# Patient Record
Sex: MALE | Race: WHITE | NOT HISPANIC OR LATINO | URBAN - METROPOLITAN AREA
[De-identification: names, ages, dates, MRNs, and addresses within clinical notes are randomized per-mention and may not be internally consistent; named-entity substitution may affect disease eponyms.]

---

## 2019-07-30 ENCOUNTER — EMERGENCY (EMERGENCY)
Facility: HOSPITAL | Age: 69
LOS: 0 days | Discharge: HOME | End: 2019-07-31
Attending: STUDENT IN AN ORGANIZED HEALTH CARE EDUCATION/TRAINING PROGRAM
Payer: COMMERCIAL

## 2019-07-30 VITALS
OXYGEN SATURATION: 100 % | WEIGHT: 175.05 LBS | RESPIRATION RATE: 17 BRPM | SYSTOLIC BLOOD PRESSURE: 127 MMHG | HEART RATE: 76 BPM | TEMPERATURE: 98 F | DIASTOLIC BLOOD PRESSURE: 73 MMHG | HEIGHT: 74 IN

## 2019-07-30 DIAGNOSIS — S80.212A ABRASION, LEFT KNEE, INITIAL ENCOUNTER: ICD-10-CM

## 2019-07-30 DIAGNOSIS — E16.2 HYPOGLYCEMIA, UNSPECIFIED: ICD-10-CM

## 2019-07-30 DIAGNOSIS — E11.649 TYPE 2 DIABETES MELLITUS WITH HYPOGLYCEMIA WITHOUT COMA: ICD-10-CM

## 2019-07-30 DIAGNOSIS — R42 DIZZINESS AND GIDDINESS: ICD-10-CM

## 2019-07-30 LAB
ALBUMIN SERPL ELPH-MCNC: 2.3 G/DL — LOW (ref 3.3–5)
ALP SERPL-CCNC: 58 U/L — SIGNIFICANT CHANGE UP (ref 40–120)
ALT FLD-CCNC: 27 U/L — SIGNIFICANT CHANGE UP (ref 12–78)
ANION GAP SERPL CALC-SCNC: 13 MMOL/L — SIGNIFICANT CHANGE UP (ref 5–17)
APTT BLD: 33.9 SEC — SIGNIFICANT CHANGE UP (ref 28.5–37)
AST SERPL-CCNC: 26 U/L — SIGNIFICANT CHANGE UP (ref 15–37)
BASOPHILS # BLD AUTO: 0.03 K/UL — SIGNIFICANT CHANGE UP (ref 0–0.2)
BASOPHILS NFR BLD AUTO: 0.6 % — SIGNIFICANT CHANGE UP (ref 0–2)
BILIRUB SERPL-MCNC: 0.2 MG/DL — SIGNIFICANT CHANGE UP (ref 0.2–1.2)
BUN SERPL-MCNC: 50 MG/DL — HIGH (ref 7–23)
CALCIUM SERPL-MCNC: 6.3 MG/DL — CRITICAL LOW (ref 8.5–10.1)
CHLORIDE SERPL-SCNC: 115 MMOL/L — HIGH (ref 96–108)
CK MB BLD-MCNC: 1.9 % — SIGNIFICANT CHANGE UP (ref 0–3.5)
CK MB CFR SERPL CALC: 3.8 NG/ML — HIGH (ref 0.5–3.6)
CK SERPL-CCNC: 202 U/L — SIGNIFICANT CHANGE UP (ref 26–308)
CO2 SERPL-SCNC: 13 MMOL/L — LOW (ref 22–31)
CREAT SERPL-MCNC: 5.73 MG/DL — HIGH (ref 0.5–1.3)
EOSINOPHIL # BLD AUTO: 0.04 K/UL — SIGNIFICANT CHANGE UP (ref 0–0.5)
EOSINOPHIL NFR BLD AUTO: 0.8 % — SIGNIFICANT CHANGE UP (ref 0–6)
GLUCOSE BLDC GLUCOMTR-MCNC: 115 MG/DL — HIGH (ref 70–99)
GLUCOSE BLDC GLUCOMTR-MCNC: 136 MG/DL — HIGH (ref 70–99)
GLUCOSE BLDC GLUCOMTR-MCNC: 141 MG/DL — HIGH (ref 70–99)
GLUCOSE SERPL-MCNC: 104 MG/DL — HIGH (ref 70–99)
HCT VFR BLD CALC: 22.3 % — LOW (ref 39–50)
HGB BLD-MCNC: 7.1 G/DL — LOW (ref 13–17)
IMM GRANULOCYTES NFR BLD AUTO: 0.4 % — SIGNIFICANT CHANGE UP (ref 0–1.5)
INR BLD: 1.18 RATIO — HIGH (ref 0.88–1.16)
LYMPHOCYTES # BLD AUTO: 0.53 K/UL — LOW (ref 1–3.3)
LYMPHOCYTES # BLD AUTO: 10.3 % — LOW (ref 13–44)
MCHC RBC-ENTMCNC: 28.6 PG — SIGNIFICANT CHANGE UP (ref 27–34)
MCHC RBC-ENTMCNC: 31.8 GM/DL — LOW (ref 32–36)
MCV RBC AUTO: 89.9 FL — SIGNIFICANT CHANGE UP (ref 80–100)
MONOCYTES # BLD AUTO: 0.48 K/UL — SIGNIFICANT CHANGE UP (ref 0–0.9)
MONOCYTES NFR BLD AUTO: 9.3 % — SIGNIFICANT CHANGE UP (ref 2–14)
NEUTROPHILS # BLD AUTO: 4.06 K/UL — SIGNIFICANT CHANGE UP (ref 1.8–7.4)
NEUTROPHILS NFR BLD AUTO: 78.6 % — HIGH (ref 43–77)
NRBC # BLD: 0 /100 WBCS — SIGNIFICANT CHANGE UP (ref 0–0)
PLATELET # BLD AUTO: 208 K/UL — SIGNIFICANT CHANGE UP (ref 150–400)
POTASSIUM SERPL-MCNC: 3.5 MMOL/L — SIGNIFICANT CHANGE UP (ref 3.5–5.3)
POTASSIUM SERPL-SCNC: 3.5 MMOL/L — SIGNIFICANT CHANGE UP (ref 3.5–5.3)
PROT SERPL-MCNC: 6.1 GM/DL — SIGNIFICANT CHANGE UP (ref 6–8.3)
PROTHROM AB SERPL-ACNC: 13.3 SEC — HIGH (ref 10–12.9)
RBC # BLD: 2.48 M/UL — LOW (ref 4.2–5.8)
RBC # FLD: 14.3 % — SIGNIFICANT CHANGE UP (ref 10.3–14.5)
SODIUM SERPL-SCNC: 141 MMOL/L — SIGNIFICANT CHANGE UP (ref 135–145)
TROPONIN I SERPL-MCNC: 0.02 NG/ML — SIGNIFICANT CHANGE UP (ref 0.01–0.04)
WBC # BLD: 5.16 K/UL — SIGNIFICANT CHANGE UP (ref 3.8–10.5)
WBC # FLD AUTO: 5.16 K/UL — SIGNIFICANT CHANGE UP (ref 3.8–10.5)

## 2019-07-30 PROCEDURE — 99284 EMERGENCY DEPT VISIT MOD MDM: CPT

## 2019-07-30 PROCEDURE — 93010 ELECTROCARDIOGRAM REPORT: CPT

## 2019-07-30 RX ORDER — SODIUM CHLORIDE 9 MG/ML
1000 INJECTION INTRAMUSCULAR; INTRAVENOUS; SUBCUTANEOUS
Refills: 0 | Status: DISCONTINUED | OUTPATIENT
Start: 2019-07-30 | End: 2019-07-31

## 2019-07-30 RX ORDER — TETANUS TOXOID, REDUCED DIPHTHERIA TOXOID AND ACELLULAR PERTUSSIS VACCINE, ADSORBED 5; 2.5; 8; 8; 2.5 [IU]/.5ML; [IU]/.5ML; UG/.5ML; UG/.5ML; UG/.5ML
0.5 SUSPENSION INTRAMUSCULAR ONCE
Refills: 0 | Status: COMPLETED | OUTPATIENT
Start: 2019-07-30 | End: 2019-07-30

## 2019-07-30 RX ADMIN — TETANUS TOXOID, REDUCED DIPHTHERIA TOXOID AND ACELLULAR PERTUSSIS VACCINE, ADSORBED 0.5 MILLILITER(S): 5; 2.5; 8; 8; 2.5 SUSPENSION INTRAMUSCULAR at 23:12

## 2019-07-30 RX ADMIN — SODIUM CHLORIDE 125 MILLILITER(S): 9 INJECTION INTRAMUSCULAR; INTRAVENOUS; SUBCUTANEOUS at 23:24

## 2019-07-30 NOTE — ED ADULT NURSE NOTE - OBJECTIVE STATEMENT
received patient in bed 15. fs 35 pt ate couple peanuts butter sandwiches, alert x 4, left wrist H/L, pt fall 2 hours ago. AOX4. speech clear.  at this time. Bilat Knee bruising noted. Denies any pain, dizziness or HA. Awaiting MD gill

## 2019-07-30 NOTE — ED ADULT NURSE NOTE - NSIMPLEMENTINTERV_GEN_ALL_ED
Implemented All Fall with Harm Risk Interventions:  [place] to call system. Call bell, personal items and telephone within reach. Instruct patient to call for assistance. Room bathroom lighting operational. Non-slip footwear when patient is off stretcher. Physically safe environment: no spills, clutter or unnecessary equipment. Stretcher in lowest position, wheels locked, appropriate side rails in place. Provide visual cue, wrist band, yellow gown, etc. Monitor gait and stability. Monitor for mental status changes and reorient to person, place, and time. Review medications for side effects contributing to fall risk. Reinforce activity limits and safety measures with patient and family. Provide visual clues: red socks.

## 2019-07-31 VITALS
OXYGEN SATURATION: 100 % | DIASTOLIC BLOOD PRESSURE: 75 MMHG | SYSTOLIC BLOOD PRESSURE: 150 MMHG | HEART RATE: 83 BPM | TEMPERATURE: 97 F | RESPIRATION RATE: 18 BRPM

## 2019-07-31 LAB
GLUCOSE BLDC GLUCOMTR-MCNC: 118 MG/DL — HIGH (ref 70–99)
GLUCOSE BLDC GLUCOMTR-MCNC: 96 MG/DL — SIGNIFICANT CHANGE UP (ref 70–99)

## 2019-07-31 PROCEDURE — 70450 CT HEAD/BRAIN W/O DYE: CPT | Mod: 26

## 2019-07-31 RX ADMIN — SODIUM CHLORIDE 1000 MILLILITER(S): 9 INJECTION INTRAMUSCULAR; INTRAVENOUS; SUBCUTANEOUS at 01:30

## 2019-07-31 NOTE — ED PROVIDER NOTE - OBJECTIVE STATEMENT
presented with hypoglycemia  took insulin this morning  ate at 1130, gave himselft insulin this morning  by 2pm felt dizzy  fell, left knee abrasion  unknown head injury 68 year old male presents with a hypoglycemic episode, pt states that he took his insulin this morning after eating breakfast, by 2pm he started to feel dizzy and unsteady, and fell, he was able to crawl to the phone and call his nephew + left knee abrasion +

## 2022-09-26 ENCOUNTER — INPATIENT (INPATIENT)
Facility: HOSPITAL | Age: 72
LOS: 17 days | Discharge: NURSING, REHAB OR LONG-TERM CARE | DRG: 299 | End: 2022-10-14
Attending: INTERNAL MEDICINE | Admitting: INTERNAL MEDICINE
Payer: MEDICARE

## 2022-09-26 VITALS
RESPIRATION RATE: 16 BRPM | WEIGHT: 160.06 LBS | OXYGEN SATURATION: 100 % | SYSTOLIC BLOOD PRESSURE: 138 MMHG | HEART RATE: 80 BPM | HEIGHT: 74 IN | DIASTOLIC BLOOD PRESSURE: 72 MMHG | TEMPERATURE: 98 F

## 2022-09-26 DIAGNOSIS — Z29.9 ENCOUNTER FOR PROPHYLACTIC MEASURES, UNSPECIFIED: ICD-10-CM

## 2022-09-26 DIAGNOSIS — E11.621 TYPE 2 DIABETES MELLITUS WITH FOOT ULCER: ICD-10-CM

## 2022-09-26 DIAGNOSIS — N18.6 END STAGE RENAL DISEASE: ICD-10-CM

## 2022-09-26 DIAGNOSIS — E11.9 TYPE 2 DIABETES MELLITUS WITHOUT COMPLICATIONS: ICD-10-CM

## 2022-09-26 DIAGNOSIS — I77.1 STRICTURE OF ARTERY: ICD-10-CM

## 2022-09-26 DIAGNOSIS — I96 GANGRENE, NOT ELSEWHERE CLASSIFIED: ICD-10-CM

## 2022-09-26 LAB
A1C WITH ESTIMATED AVERAGE GLUCOSE RESULT: 6.2 % — HIGH (ref 4–5.6)
ALBUMIN SERPL ELPH-MCNC: 3.1 G/DL — LOW (ref 3.5–5)
ALP SERPL-CCNC: 61 U/L — SIGNIFICANT CHANGE UP (ref 40–120)
ALT FLD-CCNC: 16 U/L DA — SIGNIFICANT CHANGE UP (ref 10–60)
ANION GAP SERPL CALC-SCNC: 10 MMOL/L — SIGNIFICANT CHANGE UP (ref 5–17)
APPEARANCE UR: CLEAR — SIGNIFICANT CHANGE UP
AST SERPL-CCNC: 22 U/L — SIGNIFICANT CHANGE UP (ref 10–40)
BASOPHILS # BLD AUTO: 0.05 K/UL — SIGNIFICANT CHANGE UP (ref 0–0.2)
BASOPHILS NFR BLD AUTO: 0.7 % — SIGNIFICANT CHANGE UP (ref 0–2)
BILIRUB SERPL-MCNC: 0.2 MG/DL — SIGNIFICANT CHANGE UP (ref 0.2–1.2)
BILIRUB UR-MCNC: NEGATIVE — SIGNIFICANT CHANGE UP
BLD GP AB SCN SERPL QL: SIGNIFICANT CHANGE UP
BUN SERPL-MCNC: 84 MG/DL — HIGH (ref 7–18)
CALCIUM SERPL-MCNC: 9.2 MG/DL — SIGNIFICANT CHANGE UP (ref 8.4–10.5)
CHLORIDE SERPL-SCNC: 104 MMOL/L — SIGNIFICANT CHANGE UP (ref 96–108)
CO2 SERPL-SCNC: 20 MMOL/L — LOW (ref 22–31)
COLOR SPEC: YELLOW — SIGNIFICANT CHANGE UP
CREAT SERPL-MCNC: 5.73 MG/DL — HIGH (ref 0.5–1.3)
CRP SERPL-MCNC: 9 MG/L — HIGH
DIFF PNL FLD: ABNORMAL
EGFR: 10 ML/MIN/1.73M2 — LOW
EOSINOPHIL # BLD AUTO: 0.2 K/UL — SIGNIFICANT CHANGE UP (ref 0–0.5)
EOSINOPHIL NFR BLD AUTO: 3 % — SIGNIFICANT CHANGE UP (ref 0–6)
ERYTHROCYTE [SEDIMENTATION RATE] IN BLOOD: 19 MM/HR — SIGNIFICANT CHANGE UP (ref 0–20)
ESTIMATED AVERAGE GLUCOSE: 131 MG/DL — HIGH (ref 68–114)
FLUAV AG NPH QL: SIGNIFICANT CHANGE UP
FLUBV AG NPH QL: SIGNIFICANT CHANGE UP
GLUCOSE BLDC GLUCOMTR-MCNC: 171 MG/DL — HIGH (ref 70–99)
GLUCOSE SERPL-MCNC: 200 MG/DL — HIGH (ref 70–99)
GLUCOSE UR QL: 50 MG/DL
HCT VFR BLD CALC: 38 % — LOW (ref 39–50)
HCT VFR BLD CALC: 39.1 % — SIGNIFICANT CHANGE UP (ref 39–50)
HGB BLD-MCNC: 12.3 G/DL — LOW (ref 13–17)
HGB BLD-MCNC: 12.5 G/DL — LOW (ref 13–17)
IMM GRANULOCYTES NFR BLD AUTO: 0.3 % — SIGNIFICANT CHANGE UP (ref 0–0.9)
KETONES UR-MCNC: NEGATIVE — SIGNIFICANT CHANGE UP
LACTATE SERPL-SCNC: 0.9 MMOL/L — SIGNIFICANT CHANGE UP (ref 0.7–2)
LEUKOCYTE ESTERASE UR-ACNC: ABNORMAL
LYMPHOCYTES # BLD AUTO: 0.82 K/UL — LOW (ref 1–3.3)
LYMPHOCYTES # BLD AUTO: 12.1 % — LOW (ref 13–44)
MCHC RBC-ENTMCNC: 30.3 PG — SIGNIFICANT CHANGE UP (ref 27–34)
MCHC RBC-ENTMCNC: 30.4 PG — SIGNIFICANT CHANGE UP (ref 27–34)
MCHC RBC-ENTMCNC: 32 GM/DL — SIGNIFICANT CHANGE UP (ref 32–36)
MCHC RBC-ENTMCNC: 32.4 GM/DL — SIGNIFICANT CHANGE UP (ref 32–36)
MCV RBC AUTO: 93.6 FL — SIGNIFICANT CHANGE UP (ref 80–100)
MCV RBC AUTO: 95.1 FL — SIGNIFICANT CHANGE UP (ref 80–100)
MONOCYTES # BLD AUTO: 0.67 K/UL — SIGNIFICANT CHANGE UP (ref 0–0.9)
MONOCYTES NFR BLD AUTO: 9.9 % — SIGNIFICANT CHANGE UP (ref 2–14)
NEUTROPHILS # BLD AUTO: 5 K/UL — SIGNIFICANT CHANGE UP (ref 1.8–7.4)
NEUTROPHILS NFR BLD AUTO: 74 % — SIGNIFICANT CHANGE UP (ref 43–77)
NITRITE UR-MCNC: NEGATIVE — SIGNIFICANT CHANGE UP
NRBC # BLD: 0 /100 WBCS — SIGNIFICANT CHANGE UP (ref 0–0)
NRBC # BLD: 0 /100 WBCS — SIGNIFICANT CHANGE UP (ref 0–0)
PH UR: 6 — SIGNIFICANT CHANGE UP (ref 5–8)
PLATELET # BLD AUTO: 226 K/UL — SIGNIFICANT CHANGE UP (ref 150–400)
PLATELET # BLD AUTO: 245 K/UL — SIGNIFICANT CHANGE UP (ref 150–400)
POTASSIUM SERPL-MCNC: 4.4 MMOL/L — SIGNIFICANT CHANGE UP (ref 3.5–5.3)
POTASSIUM SERPL-SCNC: 4.4 MMOL/L — SIGNIFICANT CHANGE UP (ref 3.5–5.3)
PROT SERPL-MCNC: 7.1 G/DL — SIGNIFICANT CHANGE UP (ref 6–8.3)
PROT UR-MCNC: 30 MG/DL
RBC # BLD: 4.06 M/UL — LOW (ref 4.2–5.8)
RBC # BLD: 4.11 M/UL — LOW (ref 4.2–5.8)
RBC # FLD: 14.4 % — SIGNIFICANT CHANGE UP (ref 10.3–14.5)
RBC # FLD: 14.4 % — SIGNIFICANT CHANGE UP (ref 10.3–14.5)
SARS-COV-2 RNA SPEC QL NAA+PROBE: SIGNIFICANT CHANGE UP
SODIUM SERPL-SCNC: 134 MMOL/L — LOW (ref 135–145)
SP GR SPEC: 1.01 — SIGNIFICANT CHANGE UP (ref 1.01–1.02)
UROBILINOGEN FLD QL: NEGATIVE — SIGNIFICANT CHANGE UP
WBC # BLD: 6.76 K/UL — SIGNIFICANT CHANGE UP (ref 3.8–10.5)
WBC # BLD: 6.76 K/UL — SIGNIFICANT CHANGE UP (ref 3.8–10.5)
WBC # FLD AUTO: 6.76 K/UL — SIGNIFICANT CHANGE UP (ref 3.8–10.5)
WBC # FLD AUTO: 6.76 K/UL — SIGNIFICANT CHANGE UP (ref 3.8–10.5)

## 2022-09-26 PROCEDURE — 99284 EMERGENCY DEPT VISIT MOD MDM: CPT

## 2022-09-26 PROCEDURE — 73620 X-RAY EXAM OF FOOT: CPT | Mod: 26,RT

## 2022-09-26 PROCEDURE — 93923 UPR/LXTR ART STDY 3+ LVLS: CPT | Mod: 26

## 2022-09-26 PROCEDURE — 99221 1ST HOSP IP/OBS SF/LOW 40: CPT

## 2022-09-26 RX ORDER — SEVELAMER CARBONATE 2400 MG/1
800 POWDER, FOR SUSPENSION ORAL
Refills: 0 | Status: DISCONTINUED | OUTPATIENT
Start: 2022-09-26 | End: 2022-09-28

## 2022-09-26 RX ORDER — POLYETHYLENE GLYCOL 3350 17 G/17G
17 POWDER, FOR SOLUTION ORAL DAILY
Refills: 0 | Status: DISCONTINUED | OUTPATIENT
Start: 2022-09-26 | End: 2022-10-03

## 2022-09-26 RX ORDER — INSULIN LISPRO 100/ML
VIAL (ML) SUBCUTANEOUS EVERY 6 HOURS
Refills: 0 | Status: DISCONTINUED | OUTPATIENT
Start: 2022-09-26 | End: 2022-09-27

## 2022-09-26 RX ORDER — INSULIN LISPRO 100/ML
VIAL (ML) SUBCUTANEOUS
Refills: 0 | Status: DISCONTINUED | OUTPATIENT
Start: 2022-09-26 | End: 2022-09-26

## 2022-09-26 RX ORDER — PANTOPRAZOLE SODIUM 20 MG/1
40 TABLET, DELAYED RELEASE ORAL
Refills: 0 | Status: DISCONTINUED | OUTPATIENT
Start: 2022-09-26 | End: 2022-10-03

## 2022-09-26 RX ORDER — INSULIN LISPRO 100/ML
VIAL (ML) SUBCUTANEOUS AT BEDTIME
Refills: 0 | Status: DISCONTINUED | OUTPATIENT
Start: 2022-09-26 | End: 2022-09-26

## 2022-09-26 RX ORDER — VANCOMYCIN HCL 1 G
1000 VIAL (EA) INTRAVENOUS
Refills: 0 | Status: DISCONTINUED | OUTPATIENT
Start: 2022-09-26 | End: 2022-10-03

## 2022-09-26 RX ORDER — LANOLIN ALCOHOL/MO/W.PET/CERES
3 CREAM (GRAM) TOPICAL AT BEDTIME
Refills: 0 | Status: DISCONTINUED | OUTPATIENT
Start: 2022-09-26 | End: 2022-10-03

## 2022-09-26 RX ORDER — ONDANSETRON 8 MG/1
4 TABLET, FILM COATED ORAL EVERY 8 HOURS
Refills: 0 | Status: DISCONTINUED | OUTPATIENT
Start: 2022-09-26 | End: 2022-10-03

## 2022-09-26 RX ORDER — ACETAMINOPHEN 500 MG
650 TABLET ORAL EVERY 6 HOURS
Refills: 0 | Status: DISCONTINUED | OUTPATIENT
Start: 2022-09-26 | End: 2022-10-03

## 2022-09-26 RX ORDER — SENNA PLUS 8.6 MG/1
2 TABLET ORAL AT BEDTIME
Refills: 0 | Status: DISCONTINUED | OUTPATIENT
Start: 2022-09-26 | End: 2022-10-03

## 2022-09-26 NOTE — H&P ADULT - HISTORY OF PRESENT ILLNESS
This is a 72 year old male, coming from [facility], with medical history of  This is a 72 year old male, coming from [facility], with medical history of ESRD (T-TH-S), DM coming in with right leg necrosis. As per the pt he noticed this growing since 3 months prior. He was concerned and decided to come to the hospital today. His foot is necrotic and no pulses. He denies any headaches, visual disturbances, N/V/D, dizziness, falls, chest pain, palpitations,  fevers, skin rash, recent travel, or sick contacts   This is a 72 year old male, coming from [facility], with medical history of ESRD (T-TH-S), DM coming in with right leg necrosis. As per the pt he noticed this growing since 3 months prior. He was concerned and decided to come to the hospital today. His foot is necrotic and no pulses. He denies any headaches, visual disturbances, N/V/D, dizziness, falls, chest pain, palpitations,  fevers, skin rash, recent travel, or sick contacts    In the ED pt was noted to have a bloody bowl movement.

## 2022-09-26 NOTE — H&P ADULT - PROBLEM SELECTOR PLAN 1
- Pt presented with right foot gangrene.   - CTA abd/pelvis w shannen le runoff   with runoff for further diagnosis, will need to coordinate with renal/HD  - Ordered JESUS/PVR  - Recommend R. MRI to r/o OM  - f/u ESR and CRP.   - ID - Dr. Babcock  - Vascular Following   - Podiatry Following

## 2022-09-26 NOTE — ED ADULT TRIAGE NOTE - CHIEF COMPLAINT QUOTE
BIBA fro [facility] facility, with gangrene of r  2 nd toe and infection on r heel

## 2022-09-26 NOTE — ED PROVIDER NOTE - MUSCULOSKELETAL MINIMAL EXAM
R. foot has 2nd toe w dry gangrene. R. heel has ulcer, tender, no redness or discharge noted. Has some swelling

## 2022-09-26 NOTE — ED ADULT TRIAGE NOTE - SOURCE OF INFORMATION
Go for blood tests as directed. Your doctor will do lab tests at regular visits to monitor the effects of this medicine. Please follow up with your doctor and keep your health care provider appointments.
Patient/EMS

## 2022-09-26 NOTE — ED PROVIDER NOTE - CLINICAL SUMMARY MEDICAL DECISION MAKING FREE TEXT BOX
Ddx: Diabetic foot ulcer/ ro worsening gangrene/ infection  Plan: Cbc, cmp, lactate, esr, crp, xray, podiatry consult

## 2022-09-26 NOTE — H&P ADULT - NSHPREVIEWOFSYSTEMS_GEN_ALL_CORE
CONSTITUTIONAL: No fever, weight loss, or fatigue  RESPIRATORY: No cough, wheezing, chills or hemoptysis; No shortness of breath  CARDIOVASCULAR: No chest pain, palpitations, dizziness, or leg swelling  GASTROINTESTINAL: No abdominal pain. No nausea, vomiting, or hematemesis; No diarrhea or constipation. No melena or hematochezia.  GENITOURINARY: No dysuria or hematuria, urinary frequency  NEUROLOGICAL: No headaches, memory loss, loss of strength, numbness, or tremors  ENDOCRINE: No polyuria, polydipsia, or heat/cold intolerance  MUSCULOSKELETAL: No muscle aches, joint pains  HEME: no easy bruisability, no tender or enlarged lymph nodes  SKIN: No itching, burning, rashes, or lesions .

## 2022-09-26 NOTE — ED ADULT NURSE NOTE - OBJECTIVE STATEMENT
pt awake alert sent from [facility] extended care for right heel gangrebne right 2nd toe ulcer x 3 weeks

## 2022-09-26 NOTE — ED PROVIDER NOTE - OBJECTIVE STATEMENT
Pt is a 71 yo gentleman with a pmhx of PVD, CKD on HD, T, Th, Sa, HTn, who presents to the ED with pressure ulcer on heel. Has known gangrene of 2nd toe on R. foot. Now having more pain on heel. No fever, no chest pain. Has not missed dialysis.

## 2022-09-26 NOTE — H&P ADULT - PROBLEM SELECTOR PLAN 4
h/o DM  f/u A1c  On at home  c/w sliding scale  Adjust insulin as indicated  FS ACHS q6 while NPO h/o DM  iss ESRD on HD on T-TH-S  HD as per nephro  c/w sevelamer  Renal diet when not NPO  Nephro consulted: Dr. Dumont

## 2022-09-26 NOTE — H&P ADULT - ASSESSMENT
This is a 72 year old male, coming [facility], with medical history of ESRD (T-TH-S), DM coming in with right leg necrosis.      - Primary team to do med rec.

## 2022-09-26 NOTE — CONSULT NOTE ADULT - SUBJECTIVE AND OBJECTIVE BOX
HPI: 72 year old male with PMH of ESRD on HD T//S via right chest wall PC, HTN, and PVD with known dry gangrene of his right second toe and PSH of left upper extremity AVF presents to ED complaining of right heel pain and dark circular lesion on the heel that has been present for about a month. Patient is unsure how long he has had the gangrene on his second toe. Patient states that he ambulates without assistance at home. Denies fever, chills. Denies other complains. Denies changes in his left foot. Patient is unsure if he has ever seen a vascular surgeon or had a work up, but states he had his surgery for his fistula about 2 weeks ago. He cannot remember where it was done or the name of the surgeon. Patient also admits to failed AVF in distal LUE. Denies problems with his PC and states that he has not had problems with HD.     Review of Systems: Contained within HPI     FAMILY HISTORY:  Vital Signs Last 24 Hrs  T(C): 36.7 (26 Sep 2022 15:48), Max: 36.7 (26 Sep 2022 11:59)  T(F): 98 (26 Sep 2022 15:48), Max: 98 (26 Sep 2022 11:59)  HR: 78 (26 Sep 2022 15:48) (78 - 80)  BP: 130/71 (26 Sep 2022 15:48) (130/71 - 138/72)  RR: 18 (26 Sep 2022 15:48) (16 - 18)  SpO2: 99% (26 Sep 2022 15:48) (99% - 100%)  Parameters below as of 26 Sep 2022 15:48  Patient On (Oxygen Delivery Method): room air    Physical Exam:  General:  Appears stated age, well-groomed, well-nourished, no distress  Eyes: EOMI  HENT:  WNL, no JVD  Chest: respirations nonlabored; right chest wall PC in place, no signs of infection   Cardiovascular:  Regular rate & rhythm  Abdomen:    Extremities: LUE AVF in place with steri-strips and surgical dressing still in place. Dressing taken down, area without erythema. Steri-strips kept in place. Palpable pulse but no thrill at this time.   RLE: dry gangrene of 2nd toe, ischemic changes to tip of right great toe and tip of 3rd right toe. Right heel posterior eschar, no erythema or drainage.   LLE: skin flaking, warm, no lesions  Vascular: right DP and PT nonpalpable, left  DP and PT palpable.   Skin: warm and dry  Musculoskeletal: no calf tenderness  Neuro:  Alert  Psych: normal affect    LABS:                        12.5   6.76  )-----------( 245      ( 26 Sep 2022 14:30 )             39.1         134<L>  |  104  |  84<H>  ----------------------------<  200<H>  4.4   |  20<L>  |  5.73<H>    Ca    9.2      26 Sep 2022 14:30    TPro  7.1  /  Alb  3.1<L>  /  TBili  0.2  /  DBili  x   /  AST  22  /  ALT  16  /  AlkPhos  61      Urinalysis Basic - ( 26 Sep 2022 16:30 )    Color: Yellow / Appearance: Clear / S.010 / pH: x  Gluc: x / Ketone: Negative  / Bili: Negative / Urobili: Negative   Blood: x / Protein: 30 mg/dL / Nitrite: Negative   Leuk Esterase: Small / RBC: x / WBC x   Sq Epi: x / Non Sq Epi: x / Bacteria: x    RADIOLOGY & ADDITIONAL STUDIES:  < from: VA Physiol Extremity Lower 3+ Level, BI (22 @ 16:49) >  ACC: [id number] EXAM:  US PHYSIOL LWR EXT 3+ LEV BI                          PROCEDURE DATE:  2022      INTERPRETATION:  Clinical indications: Gangrene changes    COMPARISON: None    FINDINGS: There are biphasic changes bilaterally, dampened at the level   of the metatarsals and left digit. No discernible waveform at the right   digit. Segmental pressures were not obtained at the level of the thighs.    Right JESUS = 1.44  Left JESUS = 1.43    IMPRESSION: Abnormally elevated ABIs compatible with calcified vessels.    No discernible pulse volume recording at the level of the right digit.    --- End of Report ---    CARMENCITA FARAH MD; Attending Radiologist  This document has been electronically signed. Sep 26 2022  4:57PM    < end of copied text >

## 2022-09-26 NOTE — H&P ADULT - PROBLEM SELECTOR PLAN 2
- as above p/w melena  protonix 40  CBC q8  GI - Dr. Jarquin consulted p/w melena  protonix 40  CBC stable   if trends down GI can be consulted.

## 2022-09-26 NOTE — H&P ADULT - PROBLEM SELECTOR PLAN 3
ESRD on HD on T-TH-S  HD as per nephro  c/w sevelamer  Renal diet when not NPO  Nephro consulted: Dr. Dumont - as above

## 2022-09-26 NOTE — CONSULT NOTE ADULT - ASSESSMENT
72 year old male with dry gangrene to tips of right great toe and third toe, dry gangrene of right 2nd toe, painful eschar to posterior right heel.   JESUS abnormally calcified. No signal at right foot  ESRD on HD via right chest wall PC    - continue local wound care to right foot per podiatry  - confirm ambulatory status with patient and records  - may need CTA with runoff for further diagnosis, will need to coordinate with renal/HD  - podiatry follow up regarding salvagabilty of right foot  - awaiting call back from vascular attending for finalized plan, updated plan to follow  - HD per renal   - pending discussion with Dr. Kim 72 year old male with dry gangrene to tips of right great toe and third toe, dry gangrene of right 2nd toe, painful eschar to posterior right heel.   JESUS abnormally calcified. No signal at right foot  ESRD on HD via right chest wall PC    - continue local wound care to right foot per podiatry  - confirm ambulatory status with patient and records  - recommend  CTA abd/pelvis w shannen le runoff   with runoff for further diagnosis, will need to coordinate with renal/HD  - podiatry eval to see if foot is at this time salvageable  will follow

## 2022-09-26 NOTE — ED PROVIDER NOTE - CHIEF COMPLAINT
Pt d/c via wheelchair to home with spouse. No complications. The patient is a 72y Male complaining of

## 2022-09-26 NOTE — ED PROVIDER NOTE - PROGRESS NOTE DETAILS
Jose: Received signout from Dr. Gil to follow-up podiatry consult  Podiatry resident called me and is requesting formal vascular consult and recommending admission to medicine Jose: Podiatry recommends admission to medicine and vascular consult  Vascular consulted, recs pending  Spoke with Dr. Huertas who accepted patient to her service.  Endorsed to MAR

## 2022-09-26 NOTE — H&P ADULT - NSHPPHYSICALEXAM_GEN_ALL_CORE
Vital Signs Last 24 Hrs  T(C): 36.7 (26 Sep 2022 15:48), Max: 36.7 (26 Sep 2022 11:59)  T(F): 98 (26 Sep 2022 15:48), Max: 98 (26 Sep 2022 11:59)  HR: 78 (26 Sep 2022 15:48) (78 - 80)  BP: 130/71 (26 Sep 2022 15:48) (130/71 - 138/72)  BP(mean): --  RR: 18 (26 Sep 2022 15:48) (16 - 18)  SpO2: 99% (26 Sep 2022 15:48) (99% - 100%)    Parameters below as of 26 Sep 2022 15:48  Patient On (Oxygen Delivery Method): room air    PHYSICAL EXAM:  GENERAL: NAD, speaks in full sentences, no signs of respiratory distress  HEAD:  Atraumatic, Normocephalic  EYES: EOMI, PERRLA, conjunctiva and sclera clear  NECK: Supple, No JVD  CHEST/LUNG: Clear to auscultation bilaterally; No wheeze; No crackles; No accessory muscles used  HEART: Regular rate and rhythm; No murmurs;   ABDOMEN: Soft, Nontender, Nondistended; Bowel sounds present; No guarding  EXTREMITIES:  Right foot cold and clammy, Necrotic and dusky toes.   PSYCH: AAOx3  NEUROLOGY: non-focal  SKIN: No rashes or lesions

## 2022-09-27 DIAGNOSIS — Z02.9 ENCOUNTER FOR ADMINISTRATIVE EXAMINATIONS, UNSPECIFIED: ICD-10-CM

## 2022-09-27 DIAGNOSIS — K92.2 GASTROINTESTINAL HEMORRHAGE, UNSPECIFIED: ICD-10-CM

## 2022-09-27 LAB
GLUCOSE BLDC GLUCOMTR-MCNC: 127 MG/DL — HIGH (ref 70–99)
GLUCOSE BLDC GLUCOMTR-MCNC: 129 MG/DL — HIGH (ref 70–99)
GLUCOSE BLDC GLUCOMTR-MCNC: 178 MG/DL — HIGH (ref 70–99)
GLUCOSE BLDC GLUCOMTR-MCNC: 182 MG/DL — HIGH (ref 70–99)
GLUCOSE BLDC GLUCOMTR-MCNC: 307 MG/DL — HIGH (ref 70–99)
HBV SURFACE AG SER-ACNC: SIGNIFICANT CHANGE UP

## 2022-09-27 PROCEDURE — 75635 CT ANGIO ABDOMINAL ARTERIES: CPT | Mod: 26

## 2022-09-27 RX ORDER — INSULIN LISPRO 100/ML
VIAL (ML) SUBCUTANEOUS AT BEDTIME
Refills: 0 | Status: DISCONTINUED | OUTPATIENT
Start: 2022-09-27 | End: 2022-10-03

## 2022-09-27 RX ORDER — INSULIN LISPRO 100/ML
VIAL (ML) SUBCUTANEOUS
Refills: 0 | Status: DISCONTINUED | OUTPATIENT
Start: 2022-09-27 | End: 2022-10-03

## 2022-09-27 RX ORDER — INFLUENZA VIRUS VACCINE 15; 15; 15; 15 UG/.5ML; UG/.5ML; UG/.5ML; UG/.5ML
0.7 SUSPENSION INTRAMUSCULAR ONCE
Refills: 0 | Status: DISCONTINUED | OUTPATIENT
Start: 2022-09-27 | End: 2022-10-14

## 2022-09-27 RX ADMIN — Medication 1: at 17:46

## 2022-09-27 RX ADMIN — Medication 650 MILLIGRAM(S): at 14:43

## 2022-09-27 RX ADMIN — Medication 250 MILLIGRAM(S): at 06:15

## 2022-09-27 RX ADMIN — PANTOPRAZOLE SODIUM 40 MILLIGRAM(S): 20 TABLET, DELAYED RELEASE ORAL at 06:15

## 2022-09-27 RX ADMIN — SENNA PLUS 2 TABLET(S): 8.6 TABLET ORAL at 23:23

## 2022-09-27 RX ADMIN — Medication 650 MILLIGRAM(S): at 11:39

## 2022-09-27 RX ADMIN — Medication 4: at 00:09

## 2022-09-27 RX ADMIN — Medication 1 TABLET(S): at 00:09

## 2022-09-27 RX ADMIN — SEVELAMER CARBONATE 800 MILLIGRAM(S): 2400 POWDER, FOR SUSPENSION ORAL at 17:46

## 2022-09-27 NOTE — PROGRESS NOTE ADULT - ASSESSMENT
This is a 72 year old male, coming from [facility], with medical history of ESRD (T-TH-S), DM coming in with right leg necrosis. JESUS abnormal findings. CT angio with out flow disease bilat. f/u vascular for further recommendations. started on vancomycin ID following. Plan MRI in am r/o osteo, podiatry following

## 2022-09-27 NOTE — PROGRESS NOTE ADULT - PROBLEM SELECTOR PLAN 4
ESRD on HD on T-TH-S  HD as per nephro  c/w sevelamer  Renal diet when not NPO  Nephro consulted: Dr. Dumont

## 2022-09-27 NOTE — PROGRESS NOTE ADULT - SUBJECTIVE AND OBJECTIVE BOX
Patient is a 72y old  Male who presents with a chief complaint of Gangrene foot (27 Sep 2022 12:18)    INTERVAL HPI/OVERNIGHT EVENTS: no acute envents ovnernight    REVIEW OF SYSTEMS:  CONSTITUTIONAL: No fever, chills  ENMT:  No difficulty hearing, no change in vision  NECK: No pain or stiffness  RESPIRATORY: No cough, SOB  CARDIOVASCULAR: No chest pain, palpitations  GASTROINTESTINAL: No abdominal pain. No nausea, vomiting, or diarrhea  GENITOURINARY: No dysuria  NEUROLOGICAL: No HA  SKIN: No itching, burning, rashes, or lesions   LYMPH NODES: No enlarged glands  ENDOCRINE: No heat or cold intolerance; No hair loss  MUSCULOSKELETAL: No joint pain or swelling; No muscle, back, or extremity pain  PSYCHIATRIC: No depression, anxiety  HEME/LYMPH: No easy bruising, or bleeding gums    T(C): 36.4 (22 @ 13:43), Max: 36.7 (22 @ 05:07)  HR: 59 (22 @ 13:43) (59 - 76)  BP: 147/58 (22 @ 13:43) (141/75 - 155/77)  RR: 18 (22 @ 13:43) (18 - 18)  SpO2: 100% (22 @ 13:43) (99% - 100%)  Wt(kg): --Vital Signs Last 24 Hrs  T(C): 36.4 (27 Sep 2022 13:43), Max: 36.7 (27 Sep 2022 05:07)  T(F): 97.5 (27 Sep 2022 13:43), Max: 98 (27 Sep 2022 05:07)  HR: 59 (27 Sep 2022 13:43) (59 - 76)  BP: 147/58 (27 Sep 2022 13:43) (141/75 - 155/77)  BP(mean): 91 (27 Sep 2022 05:07) (91 - 91)  RR: 18 (27 Sep 2022 13:43) (18 - 18)  SpO2: 100% (27 Sep 2022 13:43) (99% - 100%)    Parameters below as of 27 Sep 2022 13:43  Patient On (Oxygen Delivery Method): room air      PHYSICAL EXAM:  GENERAL: NAD  EYES: clear conjunctiva; EOMI  ENMT: Moist mucous membranes  NECK: Supple, No JVD, Normal thyroid  CHEST/LUNG: dec breath sounds at bases  HEART: S1, S2, Regular rate and rhythm  ABDOMEN: Soft, Nontender, Nondistended; Bowel sounds present  NEURO: Alert & Oriented X3  EXTREMITIES: R foot gangrene, L av fistula, + thrill and no bruit, L wrist area old av fistulla  LYMPH: No lymphadenopathy noted  SKIN: No rashes or lesions    Consultant(s) Notes Reviewed:  [x ] YES  [ ] NO  Care Discussed with Consultants/Other Providers [ x] YES  [ ] NO    LABS:      134<L>  |  104  |  84<H>  ----------------------------<  200<H>  4.4   |  20<L>  |  5.73<H>    Ca    9.2      26 Sep 2022 14:30    TPro  7.1  /  Alb  3.1<L>  /  TBili  0.2  /  DBili      /  AST  22  /  ALT  16  /  AlkPhos  61      Creatinine Trend: 5.73 <--                        12.3   6.76  )-----------( 226      ( 26 Sep 2022 22:20 )             38.0     Urine Studies:  Urinalysis Basic - ( 26 Sep 2022 16:30 )    Color: Yellow / Appearance: Clear / S.010 / pH:   Gluc:  / Ketone: Negative  / Bili: Negative / Urobili: Negative   Blood:  / Protein: 30 mg/dL / Nitrite: Negative   Leuk Esterase: Small / RBC: 2-5 /HPF / WBC 11-25 /HPF   Sq Epi:  / Non Sq Epi: Few /HPF / Bacteria: Few /HPF              LIVER FUNCTIONS - ( 26 Sep 2022 14:30 )  Alb: 3.1 g/dL / Pro: 7.1 g/dL / ALK PHOS: 61 U/L / ALT: 16 U/L DA / AST: 22 U/L / GGT: x                 POCT Blood Glucose.: 127 mg/dL (27 Sep 2022 11:31)  POCT Blood Glucose.: 129 mg/dL (27 Sep 2022 06:13)  POCT Blood Glucose.: 307 mg/dL (27 Sep 2022 00:00)  POCT Blood Glucose.: 171 mg/dL (26 Sep 2022 21:34)        Urinalysis Basic - ( 26 Sep 2022 16:30 )    Color: Yellow / Appearance: Clear / S.010 / pH: x  Gluc: x / Ketone: Negative  / Bili: Negative / Urobili: Negative   Blood: x / Protein: 30 mg/dL / Nitrite: Negative   Leuk Esterase: Small / RBC: 2-5 /HPF / WBC 11-25 /HPF   Sq Epi: x / Non Sq Epi: Few /HPF / Bacteria: Few /HPF      RADIOLOGY & ADDITIONAL TESTS:    Imaging Personally Reviewed:  [ x] YES  [ ] NO  < from: CT Angio Abd Aorta w/run-off w/ IV Cont (22 @ 12:04) >  ACC: [id number] EXAM:  CT ANGIO ABD AOR W RUN(W)AW IC                          PROCEDURE DATE:  2022          INTERPRETATION:  CLINICAL INFORMATION: Right leg necrosis    COMPARISON: None.    CONTRAST/COMPLICATIONS:  IV Contrast: Omnipaque [id number] cc administered   10 cc discarded  Oral Contrast: NONE  Complications: None reported at time of study completion    CT ANGIOGRAM ABDOMEN, PELVIS, AND LOWER EXTREMITIES:    PROCEDURE:  Initially, nonenhanced CT was obtained through the calves. Then,   following the rapid administration of intravenous contrast, CT   angiography was performed through the abdomen, pelvis, and lower   extremities down to the toes.  Delayed images through the calves were   also obtained. Sagittal and coronal reformats as well as 3D   reconstructions were performed.    FINDINGS: Calcified atherosclerotic disease.    CENTRAL ARTERIAL SYSTEM:    No aortic dissection, or aneurysm. The celiac axis artery, SMA, XANDER, and   the bilateral main renal arteries are patent without stenosis.    RIGHT LOWER EXTREMITY: The common, internal and external iliac arteries   are patent without stenosis. Mild focal stenosis at the common femoral   artery. A few mild focal stenoses are seen in the superficial femoral   artery. Multifocal mild-to-moderate stenoses in the popliteal artery.    Three-vessel runoff in the right calf with multifocal stenoses in the   anterior tibial, peroneal and posterior tibial arteries. There is a   severe focal stenosis at the tibioperoneal trunkartery.    Evaluation of the dorsalis pedis and posterior tibial arteries in the   right foot is difficult due to extensive vascular calcifications.    LEFT LOWER EXTREMITY: The common, internal and external iliac arteries   are patent without stenosis. The common femoral artery is patent without   stenosis. Mild stenoses at the distal superficial femoral artery. The   popliteal artery is patent with mild multifocal stenoses.    At least two-vessel runoff in the left calf via the anterior tibial and   peroneal arteries. The posterior tibial artery is difficult to evaluate   due to extensive vascular calcifications. Moderate to severe stenosis at   the tibioperoneal trunk artery. Multifocal stenoses at the anterior   tibial and peroneal arteries.    In the left foot, the dorsalis pedis and posterior tibial arteries are   difficult to evaluate due to extensive vascular calcifications.    ADDITIONAL FINDINGS: Nonspecific mild left adrenal thickening. Small   nonobstructive calculus in the left kidney. Bilateral renal cysts.   Degenerative spondylosis.    IMPRESSION:    Outflow disease bilaterally as detailed above.          --- End of Report ---            SARANYA CHAN MD; Attending Radiologist  This document has been electronically signed. Sep 27 2022  3:00PM    < end of copied text >  < from: Xray Foot AP + Lateral, Right (22 @ 17:00) >    ACC: [id number] EXAM:  XR FOOT 2 VIEWS RT                          PROCEDURE DATE:  2022          INTERPRETATION:  Right foot    HISTORY: Heel gangrene and second toe gangrene     Three views of the right foot show no evidence of acute fracturenor   destructive change. The joint spaces are maintained. Vascular   calcifications are present.    IMPRESSION: No definite bony destruction.      Thank you for this referral.    --- End of Report ---            ALEISHA GARLAND MD; Attending Interventional Radiologist  This document has been electronically signed. Sep 27 2022  4:05PM    < end of copied text >  < from: VA Physiol Extremity Lower 3+ Level, BI (22 @ 16:49) >    ACC: [id number] EXAM:  US PHYSIOL LWR EXT 3+ LEV BI                          PROCEDURE DATE:  2022          INTERPRETATION:  Clinical indications: Gangrene changes    COMPARISON: None    FINDINGS: There are biphasic changes bilaterally, dampened at the level   of the metatarsals and left digit. No discernible waveform at the right   digit. Segmental pressures were not obtained at the level of the thighs.    Right JESUS = 1.44  Left JESUS = 1.43    IMPRESSION: Abnormally elevated ABIs compatiblewith calcified vessels.    No discernible pulse volume recording at the level of the right digit.    --- End of Report ---            CARMENCITA FARAH MD; Attending Radiologist  This document has been electronically signed. Sep 26 2022  4:57PM    < end of copied text >

## 2022-09-27 NOTE — PROGRESS NOTE ADULT - ASSESSMENT
This is a 72 year old male, coming from [facility], with medical history of ESRD (T-TH-S), DM coming in with right leg necrosis. JESUS abnormal findings. CT angio with out flow disease bilat. f/u vascular for further recommendations. started on vancomycin ID following. Plan MRI in am r/o osteo, podiatry following

## 2022-09-27 NOTE — CONSULT NOTE ADULT - SUBJECTIVE AND OBJECTIVE BOX
[facility] NEPHROLOGY- CONSULTATION NOTE    Patient is a 71yo Male with ESRD on HD, DM a/w Rt foot gangrene and GI bleed. Nephrology consulted for ESRD status.     Last HD  @ [facility]. Pt denies any SOB, chest pain, n/v/d, or LE edema. +Rt foot pain  As per pt, he had a failed Left wrist AVF and had new Left upper arm AVF placed ~2 weeks ago he thinks at [facility].   In the ER, pt found to have bloody bowel movement.       PAST MEDICAL & SURGICAL HISTORY:  ESRD on dialysis      DM (diabetes mellitus)      No significant past surgical history        No Known Allergies    Home Medications Reviewed  Hospital Medications:   MEDICATIONS  (STANDING):  influenza  Vaccine (HIGH DOSE) 0.7 milliLiter(s) IntraMuscular once  insulin lispro (ADMELOG) corrective regimen sliding scale   SubCutaneous every 6 hours  pantoprazole    Tablet 40 milliGRAM(s) Oral before breakfast  polyethylene glycol 3350 17 Gram(s) Oral daily  senna 2 Tablet(s) Oral at bedtime  sevelamer carbonate 800 milliGRAM(s) Oral <User Schedule>  vancomycin  IVPB 1000 milliGRAM(s) IV Intermittent every 48 hours    SOCIAL HISTORY: +smoker  Denies ETOh,  or drug use  FAMILY HISTORY:  No pertinent family history in first degree relatives        REVIEW OF SYSTEMS:  Gen: no changes in weight  HEENT: no rhinorrhea  Neck: no sore throat  Cards: no chest pain  Resp: no dyspnea  GI: no nausea or vomiting or diarrhea  : no dysuria or hematuria  Vascular: no LE edema +rt foot pain  Derm: no rashes  Neuro: no numbness/tingling  All other review of systems is negative unless indicated above.    VITALS:  T(F): 98 (22 @ 05:07), Max: 98 (22 @ 15:48)  HR: 76 (22 @ 05:07)  BP: 141/75 (22 @ 05:07)  RR: 18 (22 @ 05:07)  SpO2: 99% (22 @ 05:07)  Wt(kg): --    Height (cm): 188 ( @ 06:46)  Weight (kg): 72.6 ( @ 06:46)  BMI (kg/m2): 20.5 ( @ 06:46)  BSA (m2): 1.98 ( @ 06:46)    PHYSICAL EXAM:  Gen: NAD, calm  HEENT: anicteric  Neck: no JVD  Cards: RRR, +S1/S2, no M/G/R  Resp: CTA B/L  GI: soft, NT/ND, NABS  : no CVA tenderness  Extremities: no LE edema B/L  Derm: Rt foot wrapped dressing c/d/i  Neuro: non-focal  Access: Rt IJ tunneled HD catheter- benign  Left upper arm AVF- no thrill no bruit    LABS:      134<L>  |  104  |  84<H>  ----------------------------<  200<H>  4.4   |  20<L>  |  5.73<H>    Ca    9.2      26 Sep 2022 14:30    TPro  7.1  /  Alb  3.1<L>  /  TBili  0.2  /  DBili      /  AST  22  /  ALT  16  /  AlkPhos  61      Creatinine Trend: 5.73 <--                        12.3   6.76  )-----------( 226      ( 26 Sep 2022 22:20 )             38.0     Urine Studies:  Urinalysis Basic - ( 26 Sep 2022 16:30 )    Color: Yellow / Appearance: Clear / S.010 / pH:   Gluc:  / Ketone: Negative  / Bili: Negative / Urobili: Negative   Blood:  / Protein: 30 mg/dL / Nitrite: Negative   Leuk Esterase: Small / RBC: 2-5 /HPF / WBC 11-25 /HPF   Sq Epi:  / Non Sq Epi: Few /HPF / Bacteria: Few /HPF        RADIOLOGY & ADDITIONAL STUDIES:    < from: VA Physiol Extremity Lower 3+ Level, BI (22 @ 16:49) >    ACC: [id number] EXAM:  US PHYSIOL LWR EXT 3+ LEV BI                          PROCEDURE DATE:  2022          INTERPRETATION:  Clinical indications: Gangrene changes    COMPARISON: None    FINDINGS: There are biphasic changes bilaterally, dampened at the level   of the metatarsals and left digit. No discernible waveform at the right   digit. Segmental pressures were not obtained at the level of the thighs.    Right JESUS = 1.44  Left JESUS = 1.43    IMPRESSION: Abnormally elevated ABIs compatiblewith calcified vessels.    No discernible pulse volume recording at the level of the right digit.    --- End of Report ---        < end of copied text >

## 2022-09-27 NOTE — PROGRESS NOTE ADULT - PROBLEM SELECTOR PLAN 1
- Pt presented with right foot gangrene.   - CTA run off- out flow disease- f/u vascular for further recs  - abnormal JESUS  - cont vancomycin  - f/u MRI r/o osteo  - f/u blood and urine cx  - ID - Dr. Babcock  - Vascular Following   - Podiatry Following

## 2022-09-27 NOTE — PROGRESS NOTE ADULT - SUBJECTIVE AND OBJECTIVE BOX
Patient is a 72y old  Male who presents with a chief complaint of Gangrene foot (27 Sep 2022 12:18)    INTERVAL HPI/OVERNIGHT EVENTS: no acute envents ovnernight    REVIEW OF SYSTEMS:  CONSTITUTIONAL: No fever, chills  ENMT:  No difficulty hearing, no change in vision  NECK: No pain or stiffness  RESPIRATORY: No cough, SOB  CARDIOVASCULAR: No chest pain, palpitations  GASTROINTESTINAL: No abdominal pain. No nausea, vomiting, or diarrhea  GENITOURINARY: No dysuria  NEUROLOGICAL: No HA  SKIN: No itching, burning, rashes, or lesions   LYMPH NODES: No enlarged glands  ENDOCRINE: No heat or cold intolerance; No hair loss  MUSCULOSKELETAL: No joint pain or swelling; No muscle, back, or extremity pain  PSYCHIATRIC: No depression, anxiety  HEME/LYMPH: No easy bruising, or bleeding gums    T(C): 36.4 (22 @ 13:43), Max: 36.7 (22 @ 05:07)  HR: 59 (22 @ 13:43) (59 - 76)  BP: 147/58 (22 @ 13:43) (141/75 - 155/77)  RR: 18 (22 @ 13:43) (18 - 18)  SpO2: 100% (22 @ 13:43) (99% - 100%)  Wt(kg): --Vital Signs Last 24 Hrs  T(C): 36.4 (27 Sep 2022 13:43), Max: 36.7 (27 Sep 2022 05:07)  T(F): 97.5 (27 Sep 2022 13:43), Max: 98 (27 Sep 2022 05:07)  HR: 59 (27 Sep 2022 13:43) (59 - 76)  BP: 147/58 (27 Sep 2022 13:43) (141/75 - 155/77)  BP(mean): 91 (27 Sep 2022 05:07) (91 - 91)  RR: 18 (27 Sep 2022 13:43) (18 - 18)  SpO2: 100% (27 Sep 2022 13:43) (99% - 100%)    Parameters below as of 27 Sep 2022 13:43  Patient On (Oxygen Delivery Method): room air      PHYSICAL EXAM:  GENERAL: NAD  EYES: clear conjunctiva; EOMI  ENMT: Moist mucous membranes  NECK: Supple, No JVD, Normal thyroid  CHEST/LUNG: Clear to auscultation bilaterally; No rales, rhonchi, wheezing, or rubs  HEART: S1, S2, Regular rate and rhythm  ABDOMEN: Soft, Nontender, Nondistended; Bowel sounds present  NEURO: Alert & Oriented X3  EXTREMITIES: R foot gangrene, L av fistula, + thrill and no bruit, L wrist area old av fistulla  LYMPH: No lymphadenopathy noted  SKIN: No rashes or lesions    Consultant(s) Notes Reviewed:  [x ] YES  [ ] NO  Care Discussed with Consultants/Other Providers [ x] YES  [ ] NO    LABS:                        12.3   6.76  )-----------( 226      ( 26 Sep 2022 22:20 )             38.0         134<L>  |  104  |  84<H>  ----------------------------<  200<H>  4.4   |  20<L>  |  5.73<H>    Ca    9.2      26 Sep 2022 14:30    TPro  7.1  /  Alb  3.1<L>  /  TBili  0.2  /  DBili  x   /  AST  22  /  ALT  16  /  AlkPhos  61        CAPILLARY BLOOD GLUCOSE      POCT Blood Glucose.: 127 mg/dL (27 Sep 2022 11:31)  POCT Blood Glucose.: 129 mg/dL (27 Sep 2022 06:13)  POCT Blood Glucose.: 307 mg/dL (27 Sep 2022 00:00)  POCT Blood Glucose.: 171 mg/dL (26 Sep 2022 21:34)        Urinalysis Basic - ( 26 Sep 2022 16:30 )    Color: Yellow / Appearance: Clear / S.010 / pH: x  Gluc: x / Ketone: Negative  / Bili: Negative / Urobili: Negative   Blood: x / Protein: 30 mg/dL / Nitrite: Negative   Leuk Esterase: Small / RBC: 2-5 /HPF / WBC 11-25 /HPF   Sq Epi: x / Non Sq Epi: Few /HPF / Bacteria: Few /HPF      RADIOLOGY & ADDITIONAL TESTS:    Imaging Personally Reviewed:  [ x] YES  [ ] NO  < from: CT Angio Abd Aorta w/run-off w/ IV Cont (22 @ 12:04) >  ACC: [id number] EXAM:  CT ANGIO ABD AOR W RUN(W)AW IC                          PROCEDURE DATE:  2022          INTERPRETATION:  CLINICAL INFORMATION: Right leg necrosis    COMPARISON: None.    CONTRAST/COMPLICATIONS:  IV Contrast: Omnipaque [id number] cc administered   10 cc discarded  Oral Contrast: NONE  Complications: None reported at time of study completion    CT ANGIOGRAM ABDOMEN, PELVIS, AND LOWER EXTREMITIES:    PROCEDURE:  Initially, nonenhanced CT was obtained through the calves. Then,   following the rapid administration of intravenous contrast, CT   angiography was performed through the abdomen, pelvis, and lower   extremities down to the toes.  Delayed images through the calves were   also obtained. Sagittal and coronal reformats as well as 3D   reconstructions were performed.    FINDINGS: Calcified atherosclerotic disease.    CENTRAL ARTERIAL SYSTEM:    No aortic dissection, or aneurysm. The celiac axis artery, SMA, XANDER, and   the bilateral main renal arteries are patent without stenosis.    RIGHT LOWER EXTREMITY: The common, internal and external iliac arteries   are patent without stenosis. Mild focal stenosis at the common femoral   artery. A few mild focal stenoses are seen in the superficial femoral   artery. Multifocal mild-to-moderate stenoses in the popliteal artery.    Three-vessel runoff in the right calf with multifocal stenoses in the   anterior tibial, peroneal and posterior tibial arteries. There is a   severe focal stenosis at the tibioperoneal trunkartery.    Evaluation of the dorsalis pedis and posterior tibial arteries in the   right foot is difficult due to extensive vascular calcifications.    LEFT LOWER EXTREMITY: The common, internal and external iliac arteries   are patent without stenosis. The common femoral artery is patent without   stenosis. Mild stenoses at the distal superficial femoral artery. The   popliteal artery is patent with mild multifocal stenoses.    At least two-vessel runoff in the left calf via the anterior tibial and   peroneal arteries. The posterior tibial artery is difficult to evaluate   due to extensive vascular calcifications. Moderate to severe stenosis at   the tibioperoneal trunk artery. Multifocal stenoses at the anterior   tibial and peroneal arteries.    In the left foot, the dorsalis pedis and posterior tibial arteries are   difficult to evaluate due to extensive vascular calcifications.    ADDITIONAL FINDINGS: Nonspecific mild left adrenal thickening. Small   nonobstructive calculus in the left kidney. Bilateral renal cysts.   Degenerative spondylosis.    IMPRESSION:    Outflow disease bilaterally as detailed above.          --- End of Report ---            SARANYA CHAN MD; Attending Radiologist  This document has been electronically signed. Sep 27 2022  3:00PM    < end of copied text >  < from: Xray Foot AP + Lateral, Right (22 @ 17:00) >    ACC: [id number] EXAM:  XR FOOT 2 VIEWS RT                          PROCEDURE DATE:  2022          INTERPRETATION:  Right foot    HISTORY: Heel gangrene and second toe gangrene     Three views of the right foot show no evidence of acute fracturenor   destructive change. The joint spaces are maintained. Vascular   calcifications are present.    IMPRESSION: No definite bony destruction.      Thank you for this referral.    --- End of Report ---            ALEISHA GARLAND MD; Attending Interventional Radiologist  This document has been electronically signed. Sep 27 2022  4:05PM    < end of copied text >  < from: VA Physiol Extremity Lower 3+ Level, BI (22 @ 16:49) >    ACC: [id number] EXAM:  US PHYSIOL LWR EXT 3+ LEV BI                          PROCEDURE DATE:  2022          INTERPRETATION:  Clinical indications: Gangrene changes    COMPARISON: None    FINDINGS: There are biphasic changes bilaterally, dampened at the level   of the metatarsals and left digit. No discernible waveform at the right   digit. Segmental pressures were not obtained at the level of the thighs.    Right JESUS = 1.44  Left JESUS = 1.43    IMPRESSION: Abnormally elevated ABIs compatiblewith calcified vessels.    No discernible pulse volume recording at the level of the right digit.    --- End of Report ---            CARMENCITA FARAH MD; Attending Radiologist  This document has been electronically signed. Sep 26 2022  4:57PM    < end of copied text >

## 2022-09-27 NOTE — PROGRESS NOTE ADULT - SUBJECTIVE AND OBJECTIVE BOX
Podiatry interval: Pt is seen bedside sleeping in no acute distress. Denies any pain to right foot. Pt states he would like to know when he is able to leave hospital. Denies constitutional symptoms. Denies any overnight acute events. Denies further pedal complaints at this time.     Podiatry HPI: Pt is a 72M who presented to ED from his nursing home ([facility]) after his right foot started having gangrene changes. Pt states he has a vacular doctor appointment coming up soon but hasn't seen a vascular doctor as of yet. Admits he has 10/10 pain to heel and digits. Admits he is normally able to walk however; since he got the right heel wound 2-3 months ago, he has been bedbound. Pt denies constitutional symptoms. Pt denies any recent acute trauma. Pt denies further pedal complaints.     Patient is a 72y old  Male who presents with a chief complaint of     HPI:    Patient admits to  (-) Fevers, (-) Chills, (-) Nausea, (-) Vomiting, (-) Shortness of Breath (-) calf pain (-) chest pain     Medications acetaminophen     Tablet .. 650 milliGRAM(s) Oral every 6 hours PRN  influenza  Vaccine (HIGH DOSE) 0.7 milliLiter(s) IntraMuscular once  insulin lispro (ADMELOG) corrective regimen sliding scale   SubCutaneous every 6 hours  melatonin 3 milliGRAM(s) Oral at bedtime PRN  ondansetron Injectable 4 milliGRAM(s) IV Push every 8 hours PRN  pantoprazole    Tablet 40 milliGRAM(s) Oral before breakfast  polyethylene glycol 3350 17 Gram(s) Oral daily  senna 2 Tablet(s) Oral at bedtime  sevelamer carbonate 800 milliGRAM(s) Oral <User Schedule>  vancomycin  IVPB 1000 milliGRAM(s) IV Intermittent every 48 hours    FHNo pertinent family history in first degree relatives    ,   PMHESRD on dialysis    DM (diabetes mellitus)       PSHNo significant past surgical history        Labs                          12.3   6.76  )-----------( 226      ( 26 Sep 2022 22:20 )             38.0      09-26    134<L>  |  104  |  84<H>  ----------------------------<  200<H>  4.4   |  20<L>  |  5.73<H>    Ca    9.2      26 Sep 2022 14:30    TPro  7.1  /  Alb  3.1<L>  /  TBili  0.2  /  DBili  x   /  AST  22  /  ALT  16  /  AlkPhos  61  09-26     Vital Signs Last 24 Hrs  T(C): 36.7 (27 Sep 2022 05:07), Max: 36.7 (26 Sep 2022 11:59)  T(F): 98 (27 Sep 2022 05:07), Max: 98 (26 Sep 2022 11:59)  HR: 76 (27 Sep 2022 05:07) (69 - 80)  BP: 141/75 (27 Sep 2022 05:07) (130/71 - 155/77)  BP(mean): 91 (27 Sep 2022 05:07) (91 - 91)  RR: 18 (27 Sep 2022 05:07) (16 - 18)  SpO2: 99% (27 Sep 2022 05:07) (99% - 100%)    Parameters below as of 27 Sep 2022 05:07  Patient On (Oxygen Delivery Method): room air      Sedimentation Rate, Erythrocyte: 19 mm/Hr (09-26-22 @ 19:00)         C-Reactive Protein, Serum: 9 mg/L (09-26-22 @ 19:00)   WBC Count: 6.76 K/uL (09-26-22 @ 22:20)  WBC Count: 6.76 K/uL (09-26-22 @ 14:30)      ROS unremarkable outside of HPI    PHYSICAL EXAM  LE Focused: Pt is A&Ox3 in mild distress from right foot pain   Vasc: DP/PT pulses non palpable 0/4 b/l; temperature gradient is warm to cool from proximal leg to digits with R > L. No edema noted to b/l LE  Derm: Right 2nd digit dry gangrenous ischemic changes noted without erythema, drainage, edema that is rigid and is cool to the touch. Right 1st and 3rd digits at distal tuft's show dusky changes that are also cool to the touch without open lesions or signs of infection. Right plantar heel wound with eschar base and no signs of infection, no fluctuance noted. Left foot no signs of gangrene or any open lesions.   Neuro: Protective sensation present b/l; light touch sensation present b/l   MSK: POP to right digits 1-3 and plantar heel wound; muscle strength exam deferred 2/2 pain; b/l hammertoe rigid contractions of digits 2-5       IMAGING: Xray pending R. foot    CULTURES:     A:  - PVD  - DM  - ESRD on dialysis  - Right 2nd digit dry gangrene  - Right 1st and 3rd digit ischemic changes  - Right plantar heel eschar       P:   Patient evaluated and Chart reviewed   Discussed diagnosis and treatment with patient  Applied betadine, DSD to right plantar heel and right 1st, 2nd, 3rd digits   Vascular consult appreciated; pt to have CTA abd/pelvis w/ runoff in coordination w/ dialysis schedule (dialysis T, Th, Sat)   Recommend R. MRI to r/o OM  X-rays ordered; pending official read   Reviewed JESUS/PVR  NWB to RLE  Recommend nephrology consult due to pt's high creatinine and ESRD/dialysis history   Offloading to bilateral Heels; recommend b/l CAIR boots   Discussed importance of daily foot examinations and proper shoe gear and to importance of lower Fasting Blood Glucose levels.   Podiatry to follow while in house.  Discussed with Attending Dr. Godinez

## 2022-09-27 NOTE — CONSULT NOTE ADULT - ASSESSMENT
Patient is a 73yo Male with ESRD on HD, DM a/w Rt foot gangrene and GI bleed. Nephrology consulted for ESRD status.     1) ESRD:  Plan for next maintenance HD today post CT with IV contrast. Hep BsAg neg. Monitor electrolytes.  2) HTN with ESRD: BP acceptable off antihypertensive medications. Can consider CCB if BP elevated. Recc low sodium diet once on diet. Monitor BP.  3) Anemia of renal disease: with blood loss due to GI bleed. Check FOBT. Hb acceptable. No need for JERED at this time. Monitor Hb.  4) Hyperphosphatemia: Check serum phosphorus acceptable. Pt NPO; no need for phos binders at this time. Monitor serum calcium and phosphorus.  5) Rt foot gangrene    [facility] NEPHROLOGY  Wili Hopkins M.D.  Kiran Fneg D.O.  Sujatha Dumont M.D.  Radha Miller, KENYATTA, ANP-C  [phone]    [address] [place], [place] [id number]   Patient is a 71yo Male with ESRD on HD, DM a/w Rt foot gangrene and GI bleed. Nephrology consulted for ESRD status.     1) ESRD:  Plan for next maintenance HD today post CT with IV contrast. Hep BsAg neg. Monitor electrolytes.  Pt with new Left UE AVF- no thrill no bruit- f/u Vascular.   2) HTN with ESRD: BP acceptable off antihypertensive medications. Can consider CCB if BP elevated. Recc low sodium diet once on diet. Monitor BP.  3) Anemia of renal disease: with blood loss due to GI bleed. Check FOBT. Hb acceptable. No need for JERED at this time. Monitor Hb.  4) Hyperphosphatemia: Check serum phosphorus . Pt NPO; no need for phos binders at this time. Monitor serum calcium and phosphorus.  5) Rt foot gangrene: Pt on Vanco 1g IV every 48 hrs. Podiatry/ Vascular/ ID following.     [facility] NEPHROLOGY  Wili Hopkins M.D.  Kiran Feng D.O.  Sujatha Dumont M.D.  Radha Miller, MSN, ANP-C  [phone]    [address] [place] [address]

## 2022-09-27 NOTE — PATIENT PROFILE ADULT - FALL HARM RISK - HARM RISK INTERVENTIONS
Assistance with ambulation/Assistance OOB with selected safe patient handling equipment/Communicate Risk of Fall with Harm to all staff/Discuss with provider need for PT consult/Monitor for mental status changes/Monitor gait and stability/Reinforce activity limits and safety measures with patient and family/Reorient to person, place and time as needed/Review medications for side effects contributing to fall risk/Sit up slowly, dangle for a short time, stand at bedside before walking/Tailored Fall Risk Interventions/Toileting schedule using arm’s reach rule for commode and bathroom/Use of alarms - bed, chair and/or voice tab/Visual Cue: Yellow wristband and red socks/Bed in lowest position, wheels locked, appropriate side rails in place/Call bell, personal items and telephone in reach/Instruct patient to call for assistance before getting out of bed or chair/Non-slip footwear when patient is out of bed/[facility] to call system/Physically safe environment - no spills, clutter or unnecessary equipment/Purposeful Proactive Rounding/Room/bathroom lighting operational, light cord in reach

## 2022-09-27 NOTE — CONSULT NOTE ADULT - ASSESSMENT
Patient is a 72y old  Male with medical history of ESRD (T-TH-S), DM, now send in to the ER from [facility], for evaluation of right foot necrosis. On admission, he has no fever, No leukocytosis. He has evaluated by Podiatry , started on IV Vancomycin, and the ID consult requested to assist with further evaluation and antibiotic management.     # Right gangrenous foot  # UTI    would recommend:    1. Follow up Urine and Blood cultures  2. Wound care as per Podiatry   3. Add IV Cefepime and continue IV Vancomycin until work up is done  4. Monitor and Keep Vancomycin level between 15 to 20    d/w patient and Covering NPSteven    will follow the patient with you and make further recommendation based on the clinical course and Lab results  Thank you for the opportunity to participate in Mr. MATTHEWS's care    Attending Attestation:    Spent more than 65 minutes on total encounter, more than 50 % of the visit was spent counseling and/or coordinating care by the Attending physician.

## 2022-09-27 NOTE — CONSULT NOTE ADULT - SUBJECTIVE AND OBJECTIVE BOX
Patient is a 72y old  Male who presents with a chief complaint of Gangrene foot (27 Sep 2022 11:20)      REVIEW OF SYSTEMS: Total of twelve systems have been reviewed  and found to be negative unless mentioned in HPI      PAST MEDICAL & SURGICAL HISTORY:  ESRD on dialysis  DM (diabetes mellitus)  No significant past surgical history      SOCIAL HISTORY  Alcohol: Does not drink  Tobacco: Does not smoke  Illicit substance use: None      FAMILY HISTORY: Non contributory to the present illness        ALLERGIES: No Known Allergies      Vital Signs Last 24 Hrs  T(C): 36.7 (27 Sep 2022 05:07), Max: 36.7 (26 Sep 2022 11:59)  T(F): 98 (27 Sep 2022 05:07), Max: 98 (26 Sep 2022 11:59)  HR: 76 (27 Sep 2022 05:07) (69 - 80)  BP: 141/75 (27 Sep 2022 05:07) (130/71 - 155/77)  BP(mean): 91 (27 Sep 2022 05:07) (91 - 91)  RR: 18 (27 Sep 2022 05:07) (16 - 18)  SpO2: 99% (27 Sep 2022 05:07) (99% - 100%)    Parameters below as of 27 Sep 2022 05:07  Patient On (Oxygen Delivery Method): room air        PHYSICAL EXAM:  GENERAL: Not in distress   CHEST/LUNG:  Not using accessory muscles   HEART: s1 and s2 present  ABDOMEN:  Nontender and  Nondistended  EXTREMITIES: No pedal  edema  CNS: Awake and Alert      LABS:                        12.3   6.76  )-----------( 226      ( 26 Sep 2022 22:20 )             38.0       09-    134<L>  |  104  |  84<H>  ----------------------------<  200<H>  4.4   |  20<L>  |  5.73<H>    Ca    9.2      26 Sep 2022 14:30    TPro  7.1  /  Alb  3.1<L>  /  TBili  0.2  /  DBili  x   /  AST  22  /  ALT  16  /  AlkPhos  61  09-26        CAPILLARY BLOOD GLUCOSE  POCT Blood Glucose.: 127 mg/dL (27 Sep 2022 11:31)  POCT Blood Glucose.: 129 mg/dL (27 Sep 2022 06:13)  POCT Blood Glucose.: 307 mg/dL (27 Sep 2022 00:00)  POCT Blood Glucose.: 171 mg/dL (26 Sep 2022 21:34)        Urinalysis Basic - ( 26 Sep 2022 16:30 )  Color: Yellow / Appearance: Clear / S.010 / pH: x  Gluc: x / Ketone: Negative  / Bili: Negative / Urobili: Negative   Blood: x / Protein: 30 mg/dL / Nitrite: Negative   Leuk Esterase: Small / RBC: 2-5 /HPF / WBC 11-25 /HPF   Sq Epi: x / Non Sq Epi: Few /HPF / Bacteria: Few /HPF        MEDICATIONS  (STANDING):  influenza  Vaccine (HIGH DOSE) 0.7 milliLiter(s) IntraMuscular once  insulin lispro (ADMELOG) corrective regimen sliding scale   SubCutaneous every 6 hours  pantoprazole    Tablet 40 milliGRAM(s) Oral before breakfast  polyethylene glycol 3350 17 Gram(s) Oral daily  senna 2 Tablet(s) Oral at bedtime  sevelamer carbonate 800 milliGRAM(s) Oral <User Schedule>  vancomycin  IVPB 1000 milliGRAM(s) IV Intermittent every 48 hours    MEDICATIONS  (PRN):  acetaminophen     Tablet .. 650 milliGRAM(s) Oral every 6 hours PRN Temp greater or equal to 38C (100.4F), Mild Pain (1 - 3)  melatonin 3 milliGRAM(s) Oral at bedtime PRN Insomnia  ondansetron Injectable 4 milliGRAM(s) IV Push every 8 hours PRN Nausea and/or Vomiting          RADIOLOGY & ADDITIONAL TESTS:               Patient is a 72y old  Male with medical history of ESRD (T-S), DM, now send in to the ER from [facility], for evaluation of right foot necrosis. On admission, he has no fever, No leukocytosis. He has evaluated by Podiatry , started on IV Vancomycin, and the ID consult requested to assist with further evaluation and antibiotic management.       REVIEW OF SYSTEMS: Total of twelve systems have been reviewed  and found to be negative unless mentioned in HPI      PAST MEDICAL & SURGICAL HISTORY:  ESRD on dialysis  DM (diabetes mellitus)  No significant past surgical history      SOCIAL HISTORY  Alcohol: Does not drink  Tobacco: Does not smoke  Illicit substance use: None      FAMILY HISTORY: Non contributory to the present illness      ALLERGIES: No Known Allergies      Vital Signs Last 24 Hrs  T(C): 36.7 (27 Sep 2022 05:07), Max: 36.7 (26 Sep 2022 11:59)  T(F): 98 (27 Sep 2022 05:07), Max: 98 (26 Sep 2022 11:59)  HR: 76 (27 Sep 2022 05:07) (69 - 80)  BP: 141/75 (27 Sep 2022 05:07) (130/71 - 155/77)  BP(mean): 91 (27 Sep 2022 05:07) (91 - 91)  RR: 18 (27 Sep 2022 05:07) (16 - 18)  SpO2: 99% (27 Sep 2022 05:07) (99% - 100%)    Parameters below as of 27 Sep 2022 05:07  Patient On (Oxygen Delivery Method): room air      PHYSICAL EXAM:  GENERAL: Not in distress   CHEST/LUNG:  Not using accessory muscles   HEART: s1 and s2 present  ABDOMEN:  Nontender and  Nondistended  EXTREMITIES: Right foot bandage in placed ( Please refer to podiatry note for full description of gangrenous foot)  CNS: Awake and Alert      LABS:                        12.3   6.76  )-----------( 226      ( 26 Sep 2022 22:20 )             38.0       09-    134<L>  |  104  |  84<H>  ----------------------------<  200<H>  4.4   |  20<L>  |  5.73<H>    Ca    9.2      26 Sep 2022 14:30    TPro  7.1  /  Alb  3.1<L>  /  TBili  0.2  /  DBili  x   /  AST  22  /  ALT  16  /  AlkPhos  61          CAPILLARY BLOOD GLUCOSE  POCT Blood Glucose.: 127 mg/dL (27 Sep 2022 11:31)  POCT Blood Glucose.: 129 mg/dL (27 Sep 2022 06:13)  POCT Blood Glucose.: 307 mg/dL (27 Sep 2022 00:00)  POCT Blood Glucose.: 171 mg/dL (26 Sep 2022 21:34)      Urinalysis Basic - ( 26 Sep 2022 16:30 )  Color: Yellow / Appearance: Clear / S.010 / pH: x  Gluc: x / Ketone: Negative  / Bili: Negative / Urobili: Negative   Blood: x / Protein: 30 mg/dL / Nitrite: Negative   Leuk Esterase: Small / RBC: 2-5 /HPF / WBC 11-25 /HPF   Sq Epi: x / Non Sq Epi: Few /HPF / Bacteria: Few /HPF        MEDICATIONS  (STANDING):  influenza  Vaccine (HIGH DOSE) 0.7 milliLiter(s) IntraMuscular once  insulin lispro (ADMELOG) corrective regimen sliding scale   SubCutaneous every 6 hours  pantoprazole    Tablet 40 milliGRAM(s) Oral before breakfast  polyethylene glycol 3350 17 Gram(s) Oral daily  senna 2 Tablet(s) Oral at bedtime  sevelamer carbonate 800 milliGRAM(s) Oral <User Schedule>  vancomycin  IVPB 1000 milliGRAM(s) IV Intermittent every 48 hours    MEDICATIONS  (PRN):  acetaminophen     Tablet .. 650 milliGRAM(s) Oral every 6 hours PRN Temp greater or equal to 38C (100.4F), Mild Pain (1 - 3)  melatonin 3 milliGRAM(s) Oral at bedtime PRN Insomnia  ondansetron Injectable 4 milliGRAM(s) IV Push every 8 hours PRN Nausea and/or Vomiting      RADIOLOGY & ADDITIONAL TESTS:    22 from: Xray Foot AP + Lateral, Right (22 @ 17:00) >  IMPRESSION: No definite bony destruction.      22: VA Physiol Extremity Lower 3+ Level, BI (22 @ 16:49) IMPRESSION: Abnormally elevated ABIs compatible with calcified vessels.    No discernible pulse volume recording at the level of the right digit.      MICROBIOLOGY DATA:    Urine Microscopic-Add On (NC) (22 @ 16:30)   Red Blood Cell - Urine: 2-5 /HPF   White Blood Cell - Urine: 11-25 /HPF   Bacteria: Few /HPF   Comment - Urine: few transitional epithelial cells seen   Epithelial Cells: Few /HPF     Flu With COVID-19 By BEBE (22 @ 14:30)   SARS-CoV-2 Result: NotDetec:

## 2022-09-28 ENCOUNTER — TRANSCRIPTION ENCOUNTER (OUTPATIENT)
Age: 72
End: 2022-09-28

## 2022-09-28 LAB
-  AMIKACIN: SIGNIFICANT CHANGE UP
-  AZTREONAM: SIGNIFICANT CHANGE UP
-  CEFEPIME: SIGNIFICANT CHANGE UP
-  CEFTAZIDIME: SIGNIFICANT CHANGE UP
-  CIPROFLOXACIN: SIGNIFICANT CHANGE UP
-  GENTAMICIN: SIGNIFICANT CHANGE UP
-  IMIPENEM: SIGNIFICANT CHANGE UP
-  LEVOFLOXACIN: SIGNIFICANT CHANGE UP
-  MEROPENEM: SIGNIFICANT CHANGE UP
-  PIPERACILLIN/TAZOBACTAM: SIGNIFICANT CHANGE UP
-  TOBRAMYCIN: SIGNIFICANT CHANGE UP
ALBUMIN SERPL ELPH-MCNC: 3.2 G/DL — LOW (ref 3.5–5)
ALP SERPL-CCNC: 63 U/L — SIGNIFICANT CHANGE UP (ref 40–120)
ALT FLD-CCNC: 18 U/L DA — SIGNIFICANT CHANGE UP (ref 10–60)
ANION GAP SERPL CALC-SCNC: 11 MMOL/L — SIGNIFICANT CHANGE UP (ref 5–17)
AST SERPL-CCNC: 20 U/L — SIGNIFICANT CHANGE UP (ref 10–40)
BILIRUB SERPL-MCNC: 0.3 MG/DL — SIGNIFICANT CHANGE UP (ref 0.2–1.2)
BUN SERPL-MCNC: 59 MG/DL — HIGH (ref 7–18)
CALCIUM SERPL-MCNC: 9.3 MG/DL — SIGNIFICANT CHANGE UP (ref 8.4–10.5)
CHLORIDE SERPL-SCNC: 100 MMOL/L — SIGNIFICANT CHANGE UP (ref 96–108)
CHOLEST SERPL-MCNC: 116 MG/DL — SIGNIFICANT CHANGE UP
CO2 SERPL-SCNC: 25 MMOL/L — SIGNIFICANT CHANGE UP (ref 22–31)
CREAT SERPL-MCNC: 4.82 MG/DL — HIGH (ref 0.5–1.3)
CULTURE RESULTS: SIGNIFICANT CHANGE UP
EGFR: 12 ML/MIN/1.73M2 — LOW
GLUCOSE BLDC GLUCOMTR-MCNC: 156 MG/DL — HIGH (ref 70–99)
GLUCOSE BLDC GLUCOMTR-MCNC: 160 MG/DL — HIGH (ref 70–99)
GLUCOSE BLDC GLUCOMTR-MCNC: 273 MG/DL — HIGH (ref 70–99)
GLUCOSE BLDC GLUCOMTR-MCNC: 353 MG/DL — HIGH (ref 70–99)
GLUCOSE SERPL-MCNC: 156 MG/DL — HIGH (ref 70–99)
HCT VFR BLD CALC: 39 % — SIGNIFICANT CHANGE UP (ref 39–50)
HCV AB S/CO SERPL IA: 0.19 S/CO — SIGNIFICANT CHANGE UP (ref 0–0.99)
HCV AB SERPL-IMP: SIGNIFICANT CHANGE UP
HDLC SERPL-MCNC: 38 MG/DL — LOW
HGB BLD-MCNC: 12.6 G/DL — LOW (ref 13–17)
LIPID PNL WITH DIRECT LDL SERPL: 52 MG/DL — SIGNIFICANT CHANGE UP
MCHC RBC-ENTMCNC: 30.2 PG — SIGNIFICANT CHANGE UP (ref 27–34)
MCHC RBC-ENTMCNC: 32.3 GM/DL — SIGNIFICANT CHANGE UP (ref 32–36)
MCV RBC AUTO: 93.5 FL — SIGNIFICANT CHANGE UP (ref 80–100)
METHOD TYPE: SIGNIFICANT CHANGE UP
NON HDL CHOLESTEROL: 78 MG/DL — SIGNIFICANT CHANGE UP
NRBC # BLD: 0 /100 WBCS — SIGNIFICANT CHANGE UP (ref 0–0)
ORGANISM # SPEC MICROSCOPIC CNT: SIGNIFICANT CHANGE UP
ORGANISM # SPEC MICROSCOPIC CNT: SIGNIFICANT CHANGE UP
PHOSPHATE SERPL-MCNC: 5.1 MG/DL — HIGH (ref 2.5–4.5)
PLATELET # BLD AUTO: 238 K/UL — SIGNIFICANT CHANGE UP (ref 150–400)
POTASSIUM SERPL-MCNC: 4 MMOL/L — SIGNIFICANT CHANGE UP (ref 3.5–5.3)
POTASSIUM SERPL-SCNC: 4 MMOL/L — SIGNIFICANT CHANGE UP (ref 3.5–5.3)
PROT SERPL-MCNC: 7.2 G/DL — SIGNIFICANT CHANGE UP (ref 6–8.3)
RBC # BLD: 4.17 M/UL — LOW (ref 4.2–5.8)
RBC # FLD: 14.4 % — SIGNIFICANT CHANGE UP (ref 10.3–14.5)
SODIUM SERPL-SCNC: 136 MMOL/L — SIGNIFICANT CHANGE UP (ref 135–145)
SPECIMEN SOURCE: SIGNIFICANT CHANGE UP
TRIGL SERPL-MCNC: 128 MG/DL — SIGNIFICANT CHANGE UP
WBC # BLD: 5.24 K/UL — SIGNIFICANT CHANGE UP (ref 3.8–10.5)
WBC # FLD AUTO: 5.24 K/UL — SIGNIFICANT CHANGE UP (ref 3.8–10.5)

## 2022-09-28 PROCEDURE — 99232 SBSQ HOSP IP/OBS MODERATE 35: CPT

## 2022-09-28 RX ORDER — SEVELAMER CARBONATE 2400 MG/1
800 POWDER, FOR SUSPENSION ORAL
Refills: 0 | Status: DISCONTINUED | OUTPATIENT
Start: 2022-09-28 | End: 2022-10-03

## 2022-09-28 RX ORDER — CEFEPIME 1 G/1
INJECTION, POWDER, FOR SOLUTION INTRAMUSCULAR; INTRAVENOUS
Refills: 0 | Status: DISCONTINUED | OUTPATIENT
Start: 2022-09-28 | End: 2022-10-03

## 2022-09-28 RX ORDER — CEFEPIME 1 G/1
1000 INJECTION, POWDER, FOR SOLUTION INTRAMUSCULAR; INTRAVENOUS ONCE
Refills: 0 | Status: COMPLETED | OUTPATIENT
Start: 2022-09-28 | End: 2022-09-28

## 2022-09-28 RX ORDER — CEFEPIME 1 G/1
1000 INJECTION, POWDER, FOR SOLUTION INTRAMUSCULAR; INTRAVENOUS EVERY 24 HOURS
Refills: 0 | Status: DISCONTINUED | OUTPATIENT
Start: 2022-09-29 | End: 2022-10-03

## 2022-09-28 RX ORDER — CHLORHEXIDINE GLUCONATE 213 G/1000ML
1 SOLUTION TOPICAL
Refills: 0 | Status: DISCONTINUED | OUTPATIENT
Start: 2022-09-28 | End: 2022-10-03

## 2022-09-28 RX ADMIN — POLYETHYLENE GLYCOL 3350 17 GRAM(S): 17 POWDER, FOR SOLUTION ORAL at 11:50

## 2022-09-28 RX ADMIN — SEVELAMER CARBONATE 800 MILLIGRAM(S): 2400 POWDER, FOR SUSPENSION ORAL at 17:48

## 2022-09-28 RX ADMIN — SENNA PLUS 2 TABLET(S): 8.6 TABLET ORAL at 22:17

## 2022-09-28 RX ADMIN — Medication 3: at 22:17

## 2022-09-28 RX ADMIN — Medication 650 MILLIGRAM(S): at 17:48

## 2022-09-28 RX ADMIN — SEVELAMER CARBONATE 800 MILLIGRAM(S): 2400 POWDER, FOR SUSPENSION ORAL at 11:51

## 2022-09-28 RX ADMIN — Medication 1: at 08:24

## 2022-09-28 RX ADMIN — Medication 3: at 11:50

## 2022-09-28 RX ADMIN — PANTOPRAZOLE SODIUM 40 MILLIGRAM(S): 20 TABLET, DELAYED RELEASE ORAL at 06:03

## 2022-09-28 RX ADMIN — Medication 650 MILLIGRAM(S): at 08:35

## 2022-09-28 RX ADMIN — SEVELAMER CARBONATE 800 MILLIGRAM(S): 2400 POWDER, FOR SUSPENSION ORAL at 08:24

## 2022-09-28 RX ADMIN — Medication 650 MILLIGRAM(S): at 19:08

## 2022-09-28 RX ADMIN — CEFEPIME 100 MILLIGRAM(S): 1 INJECTION, POWDER, FOR SOLUTION INTRAMUSCULAR; INTRAVENOUS at 19:07

## 2022-09-28 RX ADMIN — CHLORHEXIDINE GLUCONATE 1 APPLICATION(S): 213 SOLUTION TOPICAL at 11:50

## 2022-09-28 RX ADMIN — Medication 650 MILLIGRAM(S): at 11:05

## 2022-09-28 NOTE — PROGRESS NOTE ADULT - SUBJECTIVE AND OBJECTIVE BOX
[facility] NEPHROLOGY- PROGRESS NOTE    Patient is a 71yo Male with ESRD on HD, DM a/w Rt foot gangrene and GI bleed. Nephrology consulted for ESRD status.     Hospital Medications: Medications reviewed.  REVIEW OF SYSTEMS:  CONSTITUTIONAL: No fevers or chills  RESPIRATORY: No shortness of breath  CARDIOVASCULAR: No chest pain.  GASTROINTESTINAL: No nausea, vomiting, diarrhea or abdominal pain.   VASCULAR: No bilateral lower extremity edema. +Mild Rt foot pain    VITALS:  T(F): 98.2 (22 @ 13:37), Max: 98.7 (22 @ 22:38)  HR: 70 (22 @ 13:37)  BP: 118/62 (22 @ 13:37)  RR: 18 (22 @ 13:37)  SpO2: 99% (22 @ 13:37)  Wt(kg): --  Height (cm): 188 ( @ 06:46)  Weight (kg): 72.6 ( @ 06:46)  BMI (kg/m2): 20.5 ( @ 06:46)  BSA (m2): 1.98 ( @ 06:46)     @ 07:01  -   @ 07:00  --------------------------------------------------------  IN: 600 mL / OUT: 2700 mL / NET: -2100 mL      PHYSICAL EXAM:  Gen: NAD, calm  HEENT: anicteric  Neck: no JVD  Cards: RRR, +S1/S2, no M/G/R  Resp: CTA B/L  GI: soft, NT/ND, NABS  Extremities: no LE edema B/L  Derm: Rt foot wrapped dressing c/d/i  Access: Rt IJ tunneled HD catheter- benign  Left upper arm AVF- no thrill no bruit    LABS:      136  |  100  |  59<H>  ----------------------------<  156<H>  4.0   |  25  |  4.82<H>    Ca    9.3      28 Sep 2022 06:20  Phos  5.1         TPro  7.2  /  Alb  3.2<L>  /  TBili  0.3  /  DBili      /  AST  20  /  ALT  18  /  AlkPhos  63      Creatinine Trend: 4.82 <--, 5.73 <--                        12.6   5.24  )-----------( 238      ( 28 Sep 2022 06:20 )             39.0     Urine Studies:  Urinalysis Basic - ( 26 Sep 2022 16:30 )    Color: Yellow / Appearance: Clear / S.010 / pH:   Gluc:  / Ketone: Negative  / Bili: Negative / Urobili: Negative   Blood:  / Protein: 30 mg/dL / Nitrite: Negative   Leuk Esterase: Small / RBC: 2-5 /HPF / WBC 11-25 /HPF   Sq Epi:  / Non Sq Epi: Few /HPF / Bacteria: Few /HPF        RADIOLOGY & ADDITIONAL STUDIES:

## 2022-09-28 NOTE — DISCHARGE NOTE PROVIDER - NSDCMRMEDTOKEN_GEN_ALL_CORE_FT
aspirin 81 mg oral delayed release tablet: 1 tab(s) orally once a day  insulin lispro 100 units/mL injectable solution (obsolete): insulin Sliding scale  Januvia 25 mg oral tablet: 1 tab(s) orally once a day  Nephro-Killian oral tablet: 1 tab(s) orally once a day  Plavix 75 mg oral tablet: 1 tab(s) orally once a day  polyethylene glycol 3350 oral powder for reconstitution: 1 pad(s) orally once a day  sevelamer carbonate 800 mg oral tablet: 1 tab(s) orally 3 times a day (with meals)   acetaminophen 325 mg oral tablet: 2 tab(s) orally every 6 hours, As needed, Temp greater or equal to 38C (100.4F), Mild Pain (1 - 3)  amoxicillin-clavulanate 875 mg-125 mg oral tablet: 1 tab(s) orally once a day (until 10/17)  aspirin 81 mg oral delayed release tablet: 1 tab(s) orally once a day  doxycycline hyclate 100 mg oral tablet: 1 tab(s) orally 2 times a day (until 10/17)  insulin lispro 100 units/mL injectable solution (obsolete): insulin Sliding scale  Januvia 25 mg oral tablet: 1 tab(s) orally once a day  Nephro-Killian oral tablet: 1 tab(s) orally once a day  Plavix 75 mg oral tablet: 1 tab(s) orally once a day  polyethylene glycol 3350 oral powder for reconstitution: 1 pad(s) orally once a day  sevelamer carbonate 800 mg oral tablet: 1 tab(s) orally 3 times a day (with meals)

## 2022-09-28 NOTE — PROGRESS NOTE ADULT - ASSESSMENT
This is a 72 year old male, coming from [facility], with medical history of ESRD (T-TH-S), DM coming in with right leg necrosis. JESUS abnormal findings. CT angio with out flow disease bilat. f/u vascular for further recommendations. started on vancomycin ID following. Plan MRI  r/o osteo, podiatry following.  PT has L ac av graft that was placed 2 weeks ago per pt. + thrill. -bruit. per vascular will take months to mature.

## 2022-09-28 NOTE — DISCHARGE NOTE PROVIDER - NSFOLLOWUPCLINICS_GEN_ALL_ED_FT
[facility]  [address] [place] [address]  Phone: [phone]  Fax: [phone]  Follow Up Time: 1 week    [facility]  Podiatry/Wound Care  [address] [place] [address]  Phone: [phone]  Fax: [phone]  Follow Up Time: 1 week

## 2022-09-28 NOTE — PROGRESS NOTE ADULT - ASSESSMENT
Patient is a 72y old  Male with medical history of ESRD (T-TH-S), DM, now send in to the ER from [facility], for evaluation of right foot necrosis. On admission, he has no fever, No leukocytosis. He has evaluated by Podiatry , started on IV Vancomycin, and the ID consult requested to assist with further evaluation and antibiotic management.     # Right  gangrenous foot- scheduled for Angiogram in next week  # UTI- Urine cx grew Pseudomonas.     would recommend:    1. Add IV Cefepime and continue IV Vancomycin until work up is done  2. Wound care as per Podiatry   3.  Monitor and Keep Vancomycin level between 15 to 20  4. Wound care as per Podiatry     Attending Attestation:    Spent more than 45 minutes on total encounter, more than 50 % of the visit was spent counseling and/or coordinating care by the Attending physician.

## 2022-09-28 NOTE — DISCHARGE NOTE PROVIDER - NSDCCPCAREPLAN_GEN_ALL_CORE_FT
PRINCIPAL DISCHARGE DIAGNOSIS  Diagnosis: Gangrene of toe of right foot  Assessment and Plan of Treatment:       SECONDARY DISCHARGE DIAGNOSES  Diagnosis: 2019 novel coronavirus disease (COVID-19)  Assessment and Plan of Treatment:     Diagnosis: ESRD on dialysis  Assessment and Plan of Treatment:     Diagnosis: Complicated UTI (urinary tract infection)  Assessment and Plan of Treatment:     Diagnosis: Arterial insufficiency with ischemic ulcer  Assessment and Plan of Treatment:     Diagnosis: Anemia of chronic disease  Assessment and Plan of Treatment:      PRINCIPAL DISCHARGE DIAGNOSIS  Diagnosis: Gangrene of toe of right foot  Assessment and Plan of Treatment: Gangrene may be caused by conditions that stop blood flow, or a bacterial infection. Gangrene is a life-threatening condition that needs immediate treatment. You were followed by the vascular and podiatry team. You had an angiogram on 10/4 which found no area to re-vascularize. Gangrene is usually treated with surgery to remove the dead tissue, and you were reccomended an amputation but ultimately refused. The area will continue to have wound care as reccomended   (Wound care: betadine to right digits 1-3 and 4x4 with light nba)  You were treated with intravenous antibiotics while you were in the hospital.   Abx until 10/17/22, may change to oral Augmentin 875 mg daily and Doxycycline 100 mg q 12hours on discharge        SECONDARY DISCHARGE DIAGNOSES  Diagnosis: 2019 novel coronavirus disease (COVID-19)  Assessment and Plan of Treatment:     Diagnosis: ESRD on dialysis  Assessment and Plan of Treatment:     Diagnosis: Complicated UTI (urinary tract infection)  Assessment and Plan of Treatment:     Diagnosis: Arterial insufficiency with ischemic ulcer  Assessment and Plan of Treatment:     Diagnosis: Anemia of chronic disease  Assessment and Plan of Treatment:      PRINCIPAL DISCHARGE DIAGNOSIS  Diagnosis: Gangrene of toe of right foot  Assessment and Plan of Treatment: Gangrene may be caused by conditions that stop blood flow, or a bacterial infection. Gangrene is a life-threatening condition that needs immediate treatment. You were followed by the vascular and podiatry team. You had an angiogram on 10/4 which found no area to re-vascularize. Gangrene is usually treated with surgery to remove the dead tissue, and you were reccomended an amputation but ultimately refused. The area will continue to have wound care as reccomended   (Wound care: betadine to right digits 1-3 and 4x4 with light nba)  You were treated with intravenous antibiotics while you were in the hospital.   Abx until 10/17/22, may change to oral Augmentin 875 mg daily and Doxycycline 100 mg q 12hours on discharge        SECONDARY DISCHARGE DIAGNOSES  Diagnosis: 2019 novel coronavirus disease (COVID-19)  Assessment and Plan of Treatment: You were found to have COVID but remained asymptomatic,    Based on your current clinical status and stability, it has been determined that you no longer need hospitalization and can recover while remaining in self-quarantine at home. You should follow the prevention steps below until a healthcare provider or local or state health department says you can return to your normal activities.   1. You should restrict activities outside your home, except for getting medical care.   2. Do not go to work, school, or public areas.   3. Avoid using public transportation, ride-sharing, or taxis.   4. Separate yourself from other people and animals in your home as much as possible.  When you are around other people (e.g., sharing a room or vehicle) you should wear a facemask.  5. Wash your hands often with soap and water for at least 20 seconds, especially after blowing your nose, coughing, or sneezing; going to the bathroom; and before eating or preparing food.  6. Cover your mouth and nose with a tissue when you cough or sneeze. Throw used tissues in a lined trash can. Immediately wash your hands with soap and water for at least 20 seconds  7. High touch surfaces include counters, tabletops, doorknobs, bathroom fixtures, toilets, phones, keyboards, tablets, and bedside tables.  8. Avoid sharing dishes, drinking glasses, cups, eating utensils, towels, or bedding with other people or pets in your home. After using these items, they should be washed thoroughly with soap and water.  You are strongly advised to seek prompt medical attention if your illness worsens or you develop new symptoms like fever or difficulty breathing.    Diagnosis: ESRD on dialysis  Assessment and Plan of Treatment: You have a history of diaylsis , it is important to continue hemodialysis session avoid taking (NSAIDs) - (ex: Ibuprofen, Advil, Celebrex, Naprosyn) Avoid taking any nephrotoxic agents (can harm kidneys) - Intravenous contrast for diagnostic testing, combination cold medications. Have all medications adjusted for your renal function by your Health Care Provider. Blood pressure control is important.  Take all medication as prescribed.    Diagnosis: Complicated UTI (urinary tract infection)  Assessment and Plan of Treatment: You were found to have a complicated to urinary tract infection, you were treated with intravenous antibiotics. Drink enough water and fluids to keep your urine clear or pale yellow. Avoid caffeine, tea, and carbonated beverages. They tend to irritate your bladder.  Empty your bladder often. Avoid holding urine for long periods of time.  After a bowel movement, women should cleanse from front to back. Use each tissue only once.  SEEK MEDICAL CARE IF:  You have back pain.  You develop a fever.  Your symptoms do not begin to resolve within 3 days.  SEEK IMMEDIATE MEDICAL CARE IF:  You have severe back pain or lower abdominal pain.  You develop chills.  You have nausea or vomiting.  You have continued burning or discomfort with urination.    Diagnosis: Arterial insufficiency with ischemic ulcer  Assessment and Plan of Treatment: plan as above    Diagnosis: Anemia of chronic disease  Assessment and Plan of Treatment: You were found to have anemia, this is likely due to your chronic disease. This appears stable at this time, Symptoms to report, bleeding, palpitations, fatigue, pale skin, cold skin, dizziness. Take medications as ordered by PCP. Follow up with your primary care doctor to have your blood levels checked routinely        PRINCIPAL DISCHARGE DIAGNOSIS  Diagnosis: Gangrene of toe of right foot  Assessment and Plan of Treatment: Gangrene may be caused by conditions that stop blood flow, or a bacterial infection. Gangrene is a life-threatening condition that needs immediate treatment. You were followed by the vascular and podiatry team. You had an angiogram on 10/4 which found no area to re-vascularize. Gangrene is usually treated with surgery to remove the dead tissue, and you were reccomended an amputation but ultimately refused. The area will continue to have wound care as reccomended   (Wound care: betadine to right digits 1-3 and 4x4 with light nba)  You were treated with intravenous antibiotics while you were in the hospital.     You had an MRI of right foot and was negative for osteomyelitis  continue taking Augmentin 875 mg daily and Doxycycline 100 mg q 12hours until 10/17/22.   follow up with Podiatry Dr. Godinez         SECONDARY DISCHARGE DIAGNOSES  Diagnosis: Arterial insufficiency with ischemic ulcer  Assessment and Plan of Treatment: continue taking home medication aspirin and plavix    Diagnosis: ESRD on dialysis  Assessment and Plan of Treatment: You have a history of diaylsis , it is important to continue hemodialysis session (Tues, Thurs, Saturday)  avoid taking (NSAIDs) - (ex: Ibuprofen, Advil, Celebrex, Naprosyn) Avoid taking any nephrotoxic agents (can harm kidneys) - Intravenous contrast for diagnostic testing, combination cold medications. Have all medications adjusted for your renal function by your Health Care Provider. Blood pressure control is important.  Take all medication as prescribed.  you have a Left arm AV fistula - that has no bruit or thrill, follow up with Vascular Dr. Dutta  follow up with nephrologist Dr. Dumont    Diagnosis: Hyperphosphatemia  Assessment and Plan of Treatment: continue taking your home medication sevelamer    Diagnosis: 2019 novel coronavirus disease (COVID-19)  Assessment and Plan of Treatment: you were found covid positive on 9/29 and completed a 10 day quarantine in the hospital.    Diagnosis: Complicated UTI (urinary tract infection)  Assessment and Plan of Treatment: You were found to have a complicated to urinary tract infection, you were treated with intravenous antibiotics. Drink enough water and fluids to keep your urine clear or pale yellow. Avoid caffeine, tea, and carbonated beverages. They tend to irritate your bladder.  Empty your bladder often. Avoid holding urine for long periods of time.  After a bowel movement, women should cleanse from front to back. Use each tissue only once.  SEEK MEDICAL CARE IF:  You have back pain.  You develop a fever.  Your symptoms do not begin to resolve within 3 days.  SEEK IMMEDIATE MEDICAL CARE IF:  You have severe back pain or lower abdominal pain.  You develop chills.  You have nausea or vomiting.  You have continued burning or discomfort with urination.    Diagnosis: Anemia of chronic disease  Assessment and Plan of Treatment: You were found to have anemia, this is likely due to your chronic disease. This appears stable at this time, Symptoms to report, bleeding, palpitations, fatigue, pale skin, cold skin, dizziness. Take medications as ordered by PCP. Follow up with your primary care doctor to have your blood levels checked routinely       Diagnosis: DM (diabetes mellitus)  Assessment and Plan of Treatment: continue taking your home medication januvia and insulin lispro  HgA1C 6.2 this admission.  Make sure you get your HgA1c checked every three months.  If you take oral diabetes medications, check your blood glucose two times a day.  If you take insulin, check your blood glucose before meals and at bedtime.  It's important not to skip any meals.  Keep a log of your blood glucose results and always take it with you to your doctor appointments.  Keep a list of your current medications including injectables and over the counter medications and bring this medication list with you to all your doctor appointments.  If you have not seen your ophthalmologist this year call for appointment.  Check your feet daily for redness, sores, or openings. Do not self treat. If no improvement in two days call your primary care physician for an appointment.  Low blood sugar (hypoglycemia) is a blood sugar below 70mg/dl. Check your blood sugar if you feel signs/symptoms of hypoglycemia. If your blood sugar is below 70 take 15 grams of carbohydrates (ex 4 oz of apple juice, 3-4 glucose tablets, or 4-6 oz of regular soda) wait 15 minutes and repeat blood sugar to make sure it comes up above 70.  If your blood sugar is above 70 and you are due for a meal, have a meal.  If you are not due for a meal have a snack.  This snack helps keeps your blood sugar at a safe range.       PRINCIPAL DISCHARGE DIAGNOSIS  Diagnosis: Gangrene of toe of right foot  Assessment and Plan of Treatment: Gangrene may be caused by conditions that stop blood flow, or a bacterial infection. Gangrene is a life-threatening condition that needs immediate treatment. You were followed by the vascular and podiatry team. You had an angiogram on 10/4 which found no area to re-vascularize. Gangrene is usually treated with surgery to remove the dead tissue, and you were reccomended an amputation but ultimately refused. The area will continue to have wound care as reccomended   (Wound care: betadine to right digits 1-3 and 4x4 with light nba)  You were treated with intravenous antibiotics while you were in the hospital.     You had an MRI of right foot and was negative for osteomyelitis  continue taking Augmentin 875 mg daily and Doxycycline 100 mg q 12hours until 10/17/22.   follow up with Vascular Dr. Dutta if you want to procede to amputation of the right leg.         SECONDARY DISCHARGE DIAGNOSES  Diagnosis: Arterial insufficiency with ischemic ulcer  Assessment and Plan of Treatment: continue taking home medication aspirin and plavix    Diagnosis: ESRD on dialysis  Assessment and Plan of Treatment: You have a history of diaylsis , it is important to continue hemodialysis session (Tues, Thurs, Saturday)  avoid taking (NSAIDs) - (ex: Ibuprofen, Advil, Celebrex, Naprosyn) Avoid taking any nephrotoxic agents (can harm kidneys) - Intravenous contrast for diagnostic testing, combination cold medications. Have all medications adjusted for your renal function by your Health Care Provider. Blood pressure control is important.  Take all medication as prescribed.  you have a Left arm AV fistula - that has no bruit or thrill, follow up with Vascular Dr. Dutta  follow up with nephrologist Dr. Dumont    Diagnosis: Hyperphosphatemia  Assessment and Plan of Treatment: continue taking your home medication sevelamer    Diagnosis: 2019 novel coronavirus disease (COVID-19)  Assessment and Plan of Treatment: you were found covid positive on 9/29 and completed a 10 day quarantine in the hospital.    Diagnosis: Complicated UTI (urinary tract infection)  Assessment and Plan of Treatment: You were found to have a complicated to urinary tract infection, you were treated with intravenous antibiotics. Drink enough water and fluids to keep your urine clear or pale yellow. Avoid caffeine, tea, and carbonated beverages. They tend to irritate your bladder.  Empty your bladder often. Avoid holding urine for long periods of time.  After a bowel movement, women should cleanse from front to back. Use each tissue only once.  SEEK MEDICAL CARE IF:  You have back pain.  You develop a fever.  Your symptoms do not begin to resolve within 3 days.  SEEK IMMEDIATE MEDICAL CARE IF:  You have severe back pain or lower abdominal pain.  You develop chills.  You have nausea or vomiting.  You have continued burning or discomfort with urination.    Diagnosis: Anemia of chronic disease  Assessment and Plan of Treatment: You were found to have anemia, this is likely due to your chronic disease. This appears stable at this time, Symptoms to report, bleeding, palpitations, fatigue, pale skin, cold skin, dizziness. Take medications as ordered by PCP. Follow up with your primary care doctor to have your blood levels checked routinely       Diagnosis: DM (diabetes mellitus)  Assessment and Plan of Treatment: continue taking your home medication januvia and insulin lispro  HgA1C 6.2 this admission.  Make sure you get your HgA1c checked every three months.  If you take oral diabetes medications, check your blood glucose two times a day.  If you take insulin, check your blood glucose before meals and at bedtime.  It's important not to skip any meals.  Keep a log of your blood glucose results and always take it with you to your doctor appointments.  Keep a list of your current medications including injectables and over the counter medications and bring this medication list with you to all your doctor appointments.  If you have not seen your ophthalmologist this year call for appointment.  Check your feet daily for redness, sores, or openings. Do not self treat. If no improvement in two days call your primary care physician for an appointment.  Low blood sugar (hypoglycemia) is a blood sugar below 70mg/dl. Check your blood sugar if you feel signs/symptoms of hypoglycemia. If your blood sugar is below 70 take 15 grams of carbohydrates (ex 4 oz of apple juice, 3-4 glucose tablets, or 4-6 oz of regular soda) wait 15 minutes and repeat blood sugar to make sure it comes up above 70.  If your blood sugar is above 70 and you are due for a meal, have a meal.  If you are not due for a meal have a snack.  This snack helps keeps your blood sugar at a safe range.

## 2022-09-28 NOTE — PROGRESS NOTE ADULT - ASSESSMENT
This is a 72 year old male, coming from [facility], with medical history of ESRD (T-TH-S), DM coming in with right leg necrosis. JESUS abnormal findings. CT angio with out flow disease bilat. f/u vascular for further recommendations. started on vancomycin ID following. Plan MRI  r/o osteo, podiatry following.  PT has L ac av graft that was placed 2 weeks ago per pt. + thrill. -bruit. per vascular will take months to mature.

## 2022-09-28 NOTE — PROGRESS NOTE ADULT - SUBJECTIVE AND OBJECTIVE BOX
Asked by Dr. Kim to eval pt.  Seen ex'ed w PA  R toes/heel ulcers x few months per pt.  Local care w pressure.  Non-ambulatory since onset of wounds.    PMH reviewed.  Ex smoker.    Comfortable  AAO,   Abd: Benign  LEs:   S/NT compartments.  no edema.  L LE skin intact.  R LE: dry gang of toes and heel.  No asc infection.  +fem/pops.  Pedals NP.    JESUS  CTA reviewed:    Asked by Dr. Kim to eval pt.  Seen ex'ed w PA  R toes/heel ulcers x few months per pt.  Local care w pressure.  Non-ambulatory since onset of wounds.    PMH reviewed.  Ex smoker.    Comfortable  AAO,   Abd: Benign  LEs:   S/NT compartments.  no edema.  L LE skin intact.  R LE: dry gang of toes and heel.  No asc infection.  +fem/pops.  Pedals NP.    JESUS: Dist dx.  CTA reviewed: Athero. SFA/pop/tib dz    A/P:   PAD/Gang  Limb threatened.    Rec:   Med mgmt  Podiatry fu/local care/offload.  Clarify WB/ambulatory status  Plan angio next weeek if ok from med standpoint.     Asked by Dr. Kim to eval pt.  Seen ex'ed w PA  R toes/heel ulcers x few months per pt.  Local care w pressure.  Non-ambulatory since onset of wounds.    PMH reviewed.  ESRD: On HD via RI pcath.  L UE AVF in Am Access.  Ex smoker.    Comfortable  AAO,   LUE: healed scar Dist FA.  Fresh C/D/I scar ACF.  No thrill.  Hand/fingers warm  R IJ pcath site ok  Abd: Benign  LEs:   S/NT compartments.  no edema.  L LE skin intact.  R LE: dry gang of toes and heel.  No asc infection.  +fem/pops.  Pedals NP.    JESUS: Dist dx.  CTA reviewed: Athero. SFA/pop/tib dz    A/P:   PAD/Gang  Limb threatened.    Rec:   Med mgmt  Podiatry fu/local care/offload.  Clarify WB/ambulatory status  Plan angio next weeek if ok from med standpoint.  Clarify AVF status

## 2022-09-28 NOTE — DISCHARGE NOTE PROVIDER - PROVIDER TOKENS
PROVIDER:[TOKEN:[[id number]]] PROVIDER:[TOKEN:[[id number]]],PROVIDER:[TOKEN:[[id number]],FOLLOWUP:[1 week]] PROVIDER:[TOKEN:[[id number]]],PROVIDER:[TOKEN:[[id number]],FOLLOWUP:[1 week]],PROVIDER:[TOKEN:[[id number]],FOLLOWUP:[1 week]]

## 2022-09-28 NOTE — DISCHARGE NOTE PROVIDER - HOSPITAL COURSE
72 year old male, coming from [facility], with medical history of ESRD (T-TH-S), DM coming in with right leg necrosis Vascular and Podiatry consulted. JESUS with abnormal findings. CT angio with out flow disease bilat. MRI negative for OM, also found to have pseudomonas UTI, started on vancomycin and cefepime.  ID following. Hospital course c/b COVID + 09/29, asymptomatic, s/p RLE angiogram 10/3 w/no targets for revasc but w/extensive distal small vessel disease, no acute vascular intervention at this time. Recommendations would be for R BKA vs. medication management, patient has been resistant to but now agreeable to BKA. Vascular to follow with SX date -*-*-*-*-* 72 year old male, coming from [facility], with medical history of ESRD (T-TH-S), DM coming in with right leg necrosis Vascular and Podiatry consulted. JESUS with abnormal findings. CT angio with out flow disease bilat. MRI negative for OM, also found to have pseudomonas UTI, started on vancomycin and cefepime.  ID following. Hospital course c/b COVID + 09/29, asymptomatic, s/p RLE angiogram 10/3 w/no targets for revasc but w/extensive distal small vessel disease, no acute vascular intervention at this time. Recommendations would be for R BKA vs. medication management, patient has been resistant to BKA. Plan to return back to [facility] on PO ABX and wound care. Given clinical course decision made to discharge patient back to [facility] with outpatient follow up  Discharge discussed with attending   This is only a brief summary of patients hospital stay, for full course please see EMR 72 year old male, coming from [facility], with medical history of ESRD (T-TH-S), DM coming in with right leg necrosis Vascular and Podiatry consulted. JESUS with abnormal findings. CT angio with out flow disease bilat. MRI negative for OM, also found to have pseudomonas UTI, started on vancomycin and cefepime.  ID following. Hospital course c/b COVID + 09/29, asymptomatic, s/p RLE angiogram 10/3 w/no targets for revasc but w/extensive distal small vessel disease, no acute vascular intervention at this time. Recommendations would be for R BKA vs. medication management, patient has been resistant to BKA. Plan to return back to [facility] on PO ABX and wound care.     pt is medically optimized for discharge.

## 2022-09-28 NOTE — PROGRESS NOTE ADULT - SUBJECTIVE AND OBJECTIVE BOX
Patient is seen and examined at the bed side, is afebrile. He mentioned feeling better, tolerating IV Vancomycin well. The Blood cultures have no growth to date and Urine cx grew Pseudomonas.       REVIEW OF SYSTEMS: All other review systems are negative      ALLERGIES: No Known Allergies      Vital Signs Last 24 Hrs  T(C): 36.8 (28 Sep 2022 13:37), Max: 37.1 (27 Sep 2022 22:38)  T(F): 98.2 (28 Sep 2022 13:37), Max: 98.7 (27 Sep 2022 22:38)  HR: 70 (28 Sep 2022 13:37) (62 - 92)  BP: 118/62 (28 Sep 2022 13:37) (110/68 - 144/71)  BP(mean): --  RR: 18 (28 Sep 2022 13:37) (18 - 18)  SpO2: 99% (28 Sep 2022 13:37) (99% - 100%)    Parameters below as of 28 Sep 2022 13:37  Patient On (Oxygen Delivery Method): room air        PHYSICAL EXAM:  GENERAL: Not in distress   CHEST/LUNG:  Not using accessory muscles   HEART: s1 and s2 present  ABDOMEN:  Nontender and  Nondistended  EXTREMITIES: Right foot bandage in placed ( Please refer to podiatry note for full description of gangrenous foot)  CNS: Awake and Alert      LABS:                        12.6   5.24  )-----------( 238      ( 28 Sep 2022 06:20 )             39.0                           12.3   6.76  )-----------( 226      ( 26 Sep 2022 22:20 )             38.0           136  |  100  |  59<H>  ----------------------------<  156<H>  4.0   |  25  |  4.82<H>    Ca    9.3      28 Sep 2022 06:20  Phos  5.1         TPro  7.2  /  Alb  3.2<L>  /  TBili  0.3  /  DBili  x   /  AST  20  /  ALT  18  /  AlkPhos  63  -    134<L>  |  104  |  84<H>  ----------------------------<  200<H>  4.4   |  20<L>  |  5.73<H>    Ca    9.2      26 Sep 2022 14:30    TPro  7.1  /  Alb  3.1<L>  /  TBili  0.2  /  DBili  x   /  AST  22  /  ALT  16  /  AlkPhos  61          CAPILLARY BLOOD GLUCOSE  POCT Blood Glucose.: 127 mg/dL (27 Sep 2022 11:31)  POCT Blood Glucose.: 129 mg/dL (27 Sep 2022 06:13)  POCT Blood Glucose.: 307 mg/dL (27 Sep 2022 00:00)  POCT Blood Glucose.: 171 mg/dL (26 Sep 2022 21:34)      Urinalysis Basic - ( 26 Sep 2022 16:30 )  Color: Yellow / Appearance: Clear / S.010 / pH: x  Gluc: x / Ketone: Negative  / Bili: Negative / Urobili: Negative   Blood: x / Protein: 30 mg/dL / Nitrite: Negative   Leuk Esterase: Small / RBC: 2-5 /HPF / WBC 11-25 /HPF   Sq Epi: x / Non Sq Epi: Few /HPF / Bacteria: Few /HPF        MEDICATIONS  (STANDING):    chlorhexidine 2% Cloths 1 Application(s) Topical <User Schedule>  influenza  Vaccine (HIGH DOSE) 0.7 milliLiter(s) IntraMuscular once  insulin lispro (ADMELOG) corrective regimen sliding scale   SubCutaneous three times a day before meals  insulin lispro (ADMELOG) corrective regimen sliding scale   SubCutaneous at bedtime  pantoprazole    Tablet 40 milliGRAM(s) Oral before breakfast  polyethylene glycol 3350 17 Gram(s) Oral daily  senna 2 Tablet(s) Oral at bedtime  sevelamer carbonate 800 milliGRAM(s) Oral three times a day with meals  vancomycin  IVPB 1000 milliGRAM(s) IV Intermittent every 48 hours      RADIOLOGY & ADDITIONAL TESTS:    22 from: Xray Foot AP + Lateral, Right (22 @ 17:00) >IMPRESSION: No definite bony destruction.      22: VA Physiol Extremity Lower 3+ Level, BI (22 @ 16:49) IMPRESSION: Abnormally elevated ABIs compatible with calcified vessels.    No discernible pulse volume recording at the level of the right digit.      MICROBIOLOGY DATA:    Culture - Urine (22 @ 16:30)   - Amikacin: S <=16   - Aztreonam: S <=4   - Cefepime: S <=2   - Ceftazidime: S 4   - Ciprofloxacin: S <=0.25   - Gentamicin: S <=2   - Imipenem: S <=1   - Levofloxacin: S <=0.5   - Meropenem: S <=1   - Piperacillin/Tazobactam: S <=8   - Tobramycin: S <=2   Specimen Source: Clean Catch Clean Catch (Midstream)   Culture Results:   50,000 - 99,000 CFU/mL Pseudomonas aeruginosa   Organism Identification: Pseudomonas aeruginosa   Organism: Pseudomonas aeruginosa   Method Type: ANH     Culture - Blood (22 @ 14:30)   Specimen Source: .Blood Blood   Culture Results: No growth to date.     Culture - Blood (22 @ 14:20)   Specimen Source: .Blood Blood   Culture Results: No growth to date.     Urine Microscopic-Add On (NC) (22 @ 16:30)   Red Blood Cell - Urine: 2-5 /HPF   White Blood Cell - Urine: 11-25 /HPF   Bacteria: Few /HPF   Comment - Urine: few transitional epithelial cells seen   Epithelial Cells: Few /HPF     Flu With COVID-19 By BEBE (22 @ 14:30)   SARS-CoV-2 Result: NotDetec:

## 2022-09-28 NOTE — PROGRESS NOTE ADULT - PROBLEM SELECTOR PLAN 1
- Pt presented with right foot gangrene.   - CTA run off- out flow disease- vascular planning angiogram next week   - abnormal JESUS  - cont vancomycin  - f/u MRI r/o osteo  - f/u blood and urine cx  - ID - Dr. Babcock  - Vascular Following   - Podiatry Following

## 2022-09-28 NOTE — PROGRESS NOTE ADULT - SUBJECTIVE AND OBJECTIVE BOX
Patient is a 72y old  Male who presents with a chief complaint of Gangrene foot (28 Sep 2022 15:48)      INTERVAL HPI/OVERNIGHT EVENTS: pt seen and examined    REVIEW OF SYSTEMS:  CONSTITUTIONAL: No fever, chills  ENMT:  No difficulty hearing, no change in vision  NECK: No pain or stiffness  RESPIRATORY: No cough, SOB  CARDIOVASCULAR: No chest pain, palpitations  GASTROINTESTINAL: No abdominal pain. No nausea, vomiting, or diarrhea  GENITOURINARY: No dysuria  NEUROLOGICAL: No HA  SKIN: No itching, burning, rashes, or lesions   LYMPH NODES: No enlarged glands  ENDOCRINE: No heat or cold intolerance; No hair loss  MUSCULOSKELETAL: No joint pain or swelling; No muscle, back, or extremity pain  PSYCHIATRIC: No depression, anxiety  HEME/LYMPH: No easy bruising, or bleeding gums    T(C): 36.8 (09-28-22 @ 13:37), Max: 37.1 (09-27-22 @ 22:38)  HR: 70 (09-28-22 @ 13:37) (62 - 92)  BP: 118/62 (09-28-22 @ 13:37) (110/68 - 144/71)  RR: 18 (09-28-22 @ 13:37) (18 - 18)  SpO2: 99% (09-28-22 @ 13:37) (99% - 100%)  Wt(kg): --Vital Signs Last 24 Hrs  T(C): 36.8 (28 Sep 2022 13:37), Max: 37.1 (27 Sep 2022 22:38)  T(F): 98.2 (28 Sep 2022 13:37), Max: 98.7 (27 Sep 2022 22:38)  HR: 70 (28 Sep 2022 13:37) (62 - 92)  BP: 118/62 (28 Sep 2022 13:37) (110/68 - 144/71)  BP(mean): --  RR: 18 (28 Sep 2022 13:37) (18 - 18)  SpO2: 99% (28 Sep 2022 13:37) (99% - 100%)    Parameters below as of 28 Sep 2022 13:37  Patient On (Oxygen Delivery Method): room air    MEDICATIONS  (STANDING):  chlorhexidine 2% Cloths 1 Application(s) Topical <User Schedule>  influenza  Vaccine (HIGH DOSE) 0.7 milliLiter(s) IntraMuscular once  insulin lispro (ADMELOG) corrective regimen sliding scale   SubCutaneous three times a day before meals  insulin lispro (ADMELOG) corrective regimen sliding scale   SubCutaneous at bedtime  pantoprazole    Tablet 40 milliGRAM(s) Oral before breakfast  polyethylene glycol 3350 17 Gram(s) Oral daily  senna 2 Tablet(s) Oral at bedtime  sevelamer carbonate 800 milliGRAM(s) Oral <User Schedule>  vancomycin  IVPB 1000 milliGRAM(s) IV Intermittent every 48 hours    MEDICATIONS  (PRN):  acetaminophen     Tablet .. 650 milliGRAM(s) Oral every 6 hours PRN Temp greater or equal to 38C (100.4F), Mild Pain (1 - 3)  melatonin 3 milliGRAM(s) Oral at bedtime PRN Insomnia  ondansetron Injectable 4 milliGRAM(s) IV Push every 8 hours PRN Nausea and/or Vomiting      PHYSICAL EXAM:  GENERAL: NAD  EYES: clear conjunctiva; EOMI  ENMT: Moist mucous membranes  NECK: Supple, No JVD, Normal thyroid  CHEST/LUNG: dec breath sounds at bases  HEART: S1, S2, Regular rate and rhythm  ABDOMEN: Soft, Nontender, Nondistended; Bowel sounds present  NEURO: Alert awake  EXTREMITIES- dressing in rt foot  LYMPH: No lymphadenopathy noted  SKIN: No rashes or lesions    Consultant(s) Notes Reviewed:  [x ] YES  [ ] NO  Care Discussed with Consultants/Other Providers [ x] YES  [ ] NO    LABS:                        12.6   5.24  )-----------( 238      ( 28 Sep 2022 06:20 )             39.0     09-28    136  |  100  |  59<H>  ----------------------------<  156<H>  4.0   |  25  |  4.82<H>    Ca    9.3      28 Sep 2022 06:20  Phos  5.1     09-28    TPro  7.2  /  Alb  3.2<L>  /  TBili  0.3  /  DBili  x   /  AST  20  /  ALT  18  /  AlkPhos  63  09-28      CAPILLARY BLOOD GLUCOSE      POCT Blood Glucose.: 273 mg/dL (28 Sep 2022 11:28)  POCT Blood Glucose.: 156 mg/dL (28 Sep 2022 08:23)  POCT Blood Glucose.: 178 mg/dL (27 Sep 2022 22:08)  POCT Blood Glucose.: 182 mg/dL (27 Sep 2022 16:43)      RADIOLOGY & ADDITIONAL TESTS:    Imaging Personally Reviewed:  [ x] YES  [ ] NO  < from: CT Angio Abd Aorta w/run-off w/ IV Cont (09.27.22 @ 12:04) >  ACC: [id number] EXAM:  CT ANGIO ABD AOR W RUN(W)AW IC                          PROCEDURE DATE:  09/27/2022          INTERPRETATION:  CLINICAL INFORMATION: Right leg necrosis    COMPARISON: None.    CONTRAST/COMPLICATIONS:  IV Contrast: Omnipaque [id number] cc administered   10 cc discarded  Oral Contrast: NONE  Complications: None reported at time of study completion    CT ANGIOGRAM ABDOMEN, PELVIS, AND LOWER EXTREMITIES:    PROCEDURE:  Initially, nonenhanced CT was obtained through the calves. Then,   following the rapid administration of intravenous contrast, CT   angiography was performed through the abdomen, pelvis, and lower   extremities down to the toes.  Delayed images through the calves were   also obtained. Sagittal and coronal reformats as well as 3D   reconstructions were performed.    FINDINGS: Calcified atherosclerotic disease.    CENTRAL ARTERIAL SYSTEM:    No aortic dissection, or aneurysm. The celiac axis artery, SMA, XANDER, and   the bilateral main renal arteries are patent without stenosis.    RIGHT LOWER EXTREMITY: The common, internal and external iliac arteries   are patent without stenosis. Mild focal stenosis at the common femoral   artery. A few mild focal stenoses are seen in the superficial femoral   artery. Multifocal mild-to-moderate stenoses in the popliteal artery.    Three-vessel runoff in the right calf with multifocal stenoses in the   anterior tibial, peroneal and posterior tibial arteries. There is a   severe focal stenosis at the tibioperoneal trunkartery.    Evaluation of the dorsalis pedis and posterior tibial arteries in the   right foot is difficult due to extensive vascular calcifications.    LEFT LOWER EXTREMITY: The common, internal and external iliac arteries   are patent without stenosis. The common femoral artery is patent without   stenosis. Mild stenoses at the distal superficial femoral artery. The   popliteal artery is patent with mild multifocal stenoses.    At least two-vessel runoff in the left calf via the anterior tibial and   peroneal arteries. The posterior tibial artery is difficult to evaluate   due to extensive vascular calcifications. Moderate to severe stenosis at   the tibioperoneal trunk artery. Multifocal stenoses at the anterior   tibial and peroneal arteries.    In the left foot, the dorsalis pedis and posterior tibial arteries are   difficult to evaluate due to extensive vascular calcifications.    ADDITIONAL FINDINGS: Nonspecific mild left adrenal thickening. Small   nonobstructive calculus in the left kidney. Bilateral renal cysts.   Degenerative spondylosis.    IMPRESSION:    Outflow disease bilaterally as detailed above.          --- End of Report ---            SARANYA CHAN MD; Attending Radiologist  This document has been electronically signed. Sep 27 2022  3:00PM    < end of copied text >  < from: Xray Foot AP + Lateral, Right (09.26.22 @ 17:00) >    ACC: [id number] EXAM:  XR FOOT 2 VIEWS RT                          PROCEDURE DATE:  09/26/2022          INTERPRETATION:  Right foot    HISTORY: Heel gangrene and second toe gangrene     Three views of the right foot show no evidence of acute fracturenor   destructive change. The joint spaces are maintained. Vascular   calcifications are present.    IMPRESSION: No definite bony destruction.      Thank you for this referral.    --- End of Report ---            ALEISHA GARLAND MD; Attending Interventional Radiologist  This document has been electronically signed. Sep 27 2022  4:05PM    < end of copied text >

## 2022-09-28 NOTE — DISCHARGE NOTE PROVIDER - NSDCQMAMI_CARD_ALL_CORE
No
Constitutional: No fever or chills.   Eyes: No pain, blurry vision, or discharge.  ENMT: No hearing changes, pain, discharge or infections. No neck pain or stiffness.  Cardiac: No chest pain, or edema. No chest pain with exertion.  Respiratory: See HPI  GI: No nausea, vomiting, diarrhea or abdominal pain.  : No dysuria, frequency or burning.  MS: No myalgia, muscle weakness, joint pain or back pain.  Neuro: No headache or weakness. No LOC.  Skin: No skin rash.   Endocrine: No history of thyroid disease or diabetes.  Except as documented in the HPI, all other systems are negative.

## 2022-09-28 NOTE — PROGRESS NOTE ADULT - ASSESSMENT
Patient is a 71yo Male with ESRD on HD, DM a/w Rt foot gangrene and GI bleed. Nephrology consulted for ESRD status.     1) ESRD:  Last HD 9/27, tolerated well with net 2L removed. Plan for next maintenance HD 9/29. Monitor electrolytes.  Pt with new Left UE AVF- no thrill no bruit- f/u Vascular (Dr. Dutta notified).   2) HTN with ESRD: BP acceptable off antihypertensive medications. Can consider CCB if BP elevated. c/w low sodium diet. Monitor BP.  3) Anemia of renal disease: with blood loss due to GI bleed. Check FOBT. Hb acceptable. No need for JERED at this time. Monitor Hb.  4) Hyperphosphatemia: Serum phosphorus acceptable. c/w Sevelamer 1 tab tid with meals and low phos diet. Monitor serum calcium and phosphorus.  5) Rt foot gangrene: Pt on Vanco 1g IV every 48 hrs. Podiatry/ Vascular/ ID following. Pt for possible angiogram next week.     [facility] NEPHROLOGY  Wili Hopkins M.D.  Kiran Feng D.O.  Sujatha Dumont M.D.  Radha Miller, MSN, ANP-C  [phone]    [address] [place] [address]

## 2022-09-28 NOTE — PROGRESS NOTE ADULT - SUBJECTIVE AND OBJECTIVE BOX
Patient is a 72y old  Male who presents with a chief complaint of Gangrene foot (28 Sep 2022 15:48)      INTERVAL HPI/OVERNIGHT EVENTS: no acute events  overnight    REVIEW OF SYSTEMS:  CONSTITUTIONAL: No fever, chills  ENMT:  No difficulty hearing, no change in vision  NECK: No pain or stiffness  RESPIRATORY: No cough, SOB  CARDIOVASCULAR: No chest pain, palpitations  GASTROINTESTINAL: No abdominal pain. No nausea, vomiting, or diarrhea  GENITOURINARY: No dysuria  NEUROLOGICAL: No HA  SKIN: No itching, burning, rashes, or lesions   LYMPH NODES: No enlarged glands  ENDOCRINE: No heat or cold intolerance; No hair loss  MUSCULOSKELETAL: No joint pain or swelling; No muscle, back, or extremity pain  PSYCHIATRIC: No depression, anxiety  HEME/LYMPH: No easy bruising, or bleeding gums    T(C): 36.8 (09-28-22 @ 13:37), Max: 37.1 (09-27-22 @ 22:38)  HR: 70 (09-28-22 @ 13:37) (62 - 92)  BP: 118/62 (09-28-22 @ 13:37) (110/68 - 144/71)  RR: 18 (09-28-22 @ 13:37) (18 - 18)  SpO2: 99% (09-28-22 @ 13:37) (99% - 100%)  Wt(kg): --Vital Signs Last 24 Hrs  T(C): 36.8 (28 Sep 2022 13:37), Max: 37.1 (27 Sep 2022 22:38)  T(F): 98.2 (28 Sep 2022 13:37), Max: 98.7 (27 Sep 2022 22:38)  HR: 70 (28 Sep 2022 13:37) (62 - 92)  BP: 118/62 (28 Sep 2022 13:37) (110/68 - 144/71)  BP(mean): --  RR: 18 (28 Sep 2022 13:37) (18 - 18)  SpO2: 99% (28 Sep 2022 13:37) (99% - 100%)    Parameters below as of 28 Sep 2022 13:37  Patient On (Oxygen Delivery Method): room air    MEDICATIONS  (STANDING):  chlorhexidine 2% Cloths 1 Application(s) Topical <User Schedule>  influenza  Vaccine (HIGH DOSE) 0.7 milliLiter(s) IntraMuscular once  insulin lispro (ADMELOG) corrective regimen sliding scale   SubCutaneous three times a day before meals  insulin lispro (ADMELOG) corrective regimen sliding scale   SubCutaneous at bedtime  pantoprazole    Tablet 40 milliGRAM(s) Oral before breakfast  polyethylene glycol 3350 17 Gram(s) Oral daily  senna 2 Tablet(s) Oral at bedtime  sevelamer carbonate 800 milliGRAM(s) Oral <User Schedule>  vancomycin  IVPB 1000 milliGRAM(s) IV Intermittent every 48 hours    MEDICATIONS  (PRN):  acetaminophen     Tablet .. 650 milliGRAM(s) Oral every 6 hours PRN Temp greater or equal to 38C (100.4F), Mild Pain (1 - 3)  melatonin 3 milliGRAM(s) Oral at bedtime PRN Insomnia  ondansetron Injectable 4 milliGRAM(s) IV Push every 8 hours PRN Nausea and/or Vomiting      PHYSICAL EXAM:  GENERAL: NAD  EYES: clear conjunctiva; EOMI  ENMT: Moist mucous membranes  NECK: Supple, No JVD, Normal thyroid  CHEST/LUNG: Clear to auscultation bilaterally; No rales, rhonchi, wheezing, or rubs, r chest permacath dressing CDi  HEART: S1, S2, Regular rate and rhythm  ABDOMEN: Soft, Nontender, Nondistended; Bowel sounds present  NEURO: Alert & Oriented X3  EXTREMITIES: No LE edema, no calf tenderness, L AC fistula +thrill, - buit  LYMPH: No lymphadenopathy noted  SKIN: No rashes or lesions    Consultant(s) Notes Reviewed:  [x ] YES  [ ] NO  Care Discussed with Consultants/Other Providers [ x] YES  [ ] NO    LABS:                        12.6   5.24  )-----------( 238      ( 28 Sep 2022 06:20 )             39.0     09-28    136  |  100  |  59<H>  ----------------------------<  156<H>  4.0   |  25  |  4.82<H>    Ca    9.3      28 Sep 2022 06:20  Phos  5.1     09-28    TPro  7.2  /  Alb  3.2<L>  /  TBili  0.3  /  DBili  x   /  AST  20  /  ALT  18  /  AlkPhos  63  09-28      CAPILLARY BLOOD GLUCOSE      POCT Blood Glucose.: 273 mg/dL (28 Sep 2022 11:28)  POCT Blood Glucose.: 156 mg/dL (28 Sep 2022 08:23)  POCT Blood Glucose.: 178 mg/dL (27 Sep 2022 22:08)  POCT Blood Glucose.: 182 mg/dL (27 Sep 2022 16:43)      RADIOLOGY & ADDITIONAL TESTS:    Imaging Personally Reviewed:  [ x] YES  [ ] NO  < from: CT Angio Abd Aorta w/run-off w/ IV Cont (09.27.22 @ 12:04) >  ACC: [id number] EXAM:  CT ANGIO ABD AOR W RUN(W)AW IC                          PROCEDURE DATE:  09/27/2022          INTERPRETATION:  CLINICAL INFORMATION: Right leg necrosis    COMPARISON: None.    CONTRAST/COMPLICATIONS:  IV Contrast: Omnipaque [id number] cc administered   10 cc discarded  Oral Contrast: NONE  Complications: None reported at time of study completion    CT ANGIOGRAM ABDOMEN, PELVIS, AND LOWER EXTREMITIES:    PROCEDURE:  Initially, nonenhanced CT was obtained through the calves. Then,   following the rapid administration of intravenous contrast, CT   angiography was performed through the abdomen, pelvis, and lower   extremities down to the toes.  Delayed images through the calves were   also obtained. Sagittal and coronal reformats as well as 3D   reconstructions were performed.    FINDINGS: Calcified atherosclerotic disease.    CENTRAL ARTERIAL SYSTEM:    No aortic dissection, or aneurysm. The celiac axis artery, SMA, XANDER, and   the bilateral main renal arteries are patent without stenosis.    RIGHT LOWER EXTREMITY: The common, internal and external iliac arteries   are patent without stenosis. Mild focal stenosis at the common femoral   artery. A few mild focal stenoses are seen in the superficial femoral   artery. Multifocal mild-to-moderate stenoses in the popliteal artery.    Three-vessel runoff in the right calf with multifocal stenoses in the   anterior tibial, peroneal and posterior tibial arteries. There is a   severe focal stenosis at the tibioperoneal trunkartery.    Evaluation of the dorsalis pedis and posterior tibial arteries in the   right foot is difficult due to extensive vascular calcifications.    LEFT LOWER EXTREMITY: The common, internal and external iliac arteries   are patent without stenosis. The common femoral artery is patent without   stenosis. Mild stenoses at the distal superficial femoral artery. The   popliteal artery is patent with mild multifocal stenoses.    At least two-vessel runoff in the left calf via the anterior tibial and   peroneal arteries. The posterior tibial artery is difficult to evaluate   due to extensive vascular calcifications. Moderate to severe stenosis at   the tibioperoneal trunk artery. Multifocal stenoses at the anterior   tibial and peroneal arteries.    In the left foot, the dorsalis pedis and posterior tibial arteries are   difficult to evaluate due to extensive vascular calcifications.    ADDITIONAL FINDINGS: Nonspecific mild left adrenal thickening. Small   nonobstructive calculus in the left kidney. Bilateral renal cysts.   Degenerative spondylosis.    IMPRESSION:    Outflow disease bilaterally as detailed above.          --- End of Report ---            SARANYA CHAN MD; Attending Radiologist  This document has been electronically signed. Sep 27 2022  3:00PM    < end of copied text >  < from: Xray Foot AP + Lateral, Right (09.26.22 @ 17:00) >    ACC: [id number] EXAM:  XR FOOT 2 VIEWS RT                          PROCEDURE DATE:  09/26/2022          INTERPRETATION:  Right foot    HISTORY: Heel gangrene and second toe gangrene     Three views of the right foot show no evidence of acute fracturenor   destructive change. The joint spaces are maintained. Vascular   calcifications are present.    IMPRESSION: No definite bony destruction.      Thank you for this referral.    --- End of Report ---            ALEISHA GARLAND MD; Attending Interventional Radiologist  This document has been electronically signed. Sep 27 2022  4:05PM    < end of copied text >

## 2022-09-28 NOTE — DISCHARGE NOTE PROVIDER - CARE PROVIDER_API CALL
[facility]  [facility]  [facility] [place] [address]  Phone: [phone]  Fax: ()-  Follow Up Time:    ANGELA FARR  Internal Medicine  [address] [place] [address]  Phone: ()-  Fax: ()-  Follow Up Time:     Sujatha Dumont)  Internal Medicine  [address] [place] [address]  Phone: [phone]  Fax: [phone]  Follow Up Time: 1 week   ANGELA FARR  Internal Medicine  [address] [place] [address]  Phone: ()-  Fax: ()-  Follow Up Time:     Sujatha Dumont)  Internal Medicine  [address] [place] [address]  Phone: [phone]  Fax: [phone]  Follow Up Time: 1 week    Gabriela Dutta)  Surgery; Vascular Surgery  [address] [facility], [address]  [place] [address]  Phone: [phone]  Fax: [phone]  Follow Up Time: 1 week

## 2022-09-28 NOTE — PROGRESS NOTE ADULT - SUBJECTIVE AND OBJECTIVE BOX
Podiatry interval: Pt is seen bedside eating lunch in no acute distress. Requests if attending doctor can speak to daughter about treatment plan. Denies any pain to right foot. Admits he has been seen by vascular earlier today. Denies constitutional symptoms. Denies any overnight acute events. Denies further pedal complaints at this time.     Podiatry HPI: Pt is a 72M who presented to ED from his nursing home ([facility]) after his right foot started having gangrene changes. Pt states he has a vacular doctor appointment coming up soon but hasn't seen a vascular doctor as of yet. Admits he has 10/10 pain to heel and digits. Admits he is normally able to walk however; since he got the right heel wound 2-3 months ago, he has been bedbound. Pt denies constitutional symptoms. Pt denies any recent acute trauma. Pt denies further pedal complaints.     Patient is a 72y old  Male who presents with a chief complaint of     HPI:    Patient admits to  (-) Fevers, (-) Chills, (-) Nausea, (-) Vomiting, (-) Shortness of Breath (-) calf pain (-) chest pain     Medications acetaminophen     Tablet .. 650 milliGRAM(s) Oral every 6 hours PRN  chlorhexidine 2% Cloths 1 Application(s) Topical <User Schedule>  influenza  Vaccine (HIGH DOSE) 0.7 milliLiter(s) IntraMuscular once  insulin lispro (ADMELOG) corrective regimen sliding scale   SubCutaneous three times a day before meals  insulin lispro (ADMELOG) corrective regimen sliding scale   SubCutaneous at bedtime  melatonin 3 milliGRAM(s) Oral at bedtime PRN  ondansetron Injectable 4 milliGRAM(s) IV Push every 8 hours PRN  pantoprazole    Tablet 40 milliGRAM(s) Oral before breakfast  polyethylene glycol 3350 17 Gram(s) Oral daily  senna 2 Tablet(s) Oral at bedtime  sevelamer carbonate 800 milliGRAM(s) Oral <User Schedule>  vancomycin  IVPB 1000 milliGRAM(s) IV Intermittent every 48 hours    FHNo pertinent family history in first degree relatives    ,   PMHESRD on dialysis    DM (diabetes mellitus)       PSHNo significant past surgical history        Labs                          12.6   5.24  )-----------( 238      ( 28 Sep 2022 06:20 )             39.0      09-28    136  |  100  |  59<H>  ----------------------------<  156<H>  4.0   |  25  |  4.82<H>    Ca    9.3      28 Sep 2022 06:20  Phos  5.1     09-28    TPro  7.2  /  Alb  3.2<L>  /  TBili  0.3  /  DBili  x   /  AST  20  /  ALT  18  /  AlkPhos  63  09-28     Vital Signs Last 24 Hrs  T(C): 36.8 (28 Sep 2022 13:37), Max: 37.1 (27 Sep 2022 22:38)  T(F): 98.2 (28 Sep 2022 13:37), Max: 98.7 (27 Sep 2022 22:38)  HR: 70 (28 Sep 2022 13:37) (62 - 92)  BP: 118/62 (28 Sep 2022 13:37) (110/68 - 144/71)  BP(mean): --  RR: 18 (28 Sep 2022 13:37) (18 - 18)  SpO2: 99% (28 Sep 2022 13:37) (99% - 100%)    Parameters below as of 28 Sep 2022 13:37  Patient On (Oxygen Delivery Method): room air      Sedimentation Rate, Erythrocyte: 19 mm/Hr (09-26-22 @ 19:00)         C-Reactive Protein, Serum: 9 mg/L (09-26-22 @ 19:00)   WBC Count: 5.24 K/uL (09-28-22 @ 06:20)      PHYSICAL EXAM  LE Focused: Pt is A&Ox3 in mild distress from right foot pain   Vasc: DP/PT pulses non palpable 0/4 b/l; temperature gradient is warm to cool from proximal leg to digits with R > L. No edema noted to b/l LE  Derm: Right 2nd digit dry gangrenous ischemic changes noted without erythema, drainage, edema that is rigid and is cool to the touch. Right 1st and 3rd digits at distal tuft's show dusky changes that are also cool to the touch without open lesions or signs of infection. Right plantar heel wound with eschar base and no signs of infection, no fluctuance noted. Left foot no signs of gangrene or any open lesions.   Neuro: Protective sensation present b/l; light touch sensation present b/l   MSK: POP to right digits 1-3 and plantar heel wound; muscle strength exam deferred 2/2 pain; b/l hammertoe rigid contractions of digits 2-5       IMAGING: Xray pending R. foot    CULTURES:     A:  - PVD  - DM  - ESRD on dialysis  - Right 2nd digit dry gangrene  - Right 1st and 3rd digit ischemic changes  - Right plantar heel eschar       P:   Patient evaluated and Chart reviewed   Discussed diagnosis and treatment with patient  Applied betadine, DSD to right plantar heel and right 1st, 2nd, 3rd digits   Vascular consult appreciated; pt to have angio next week   Recommend R. MRI to r/o OM  X-rays reviewed; no bony destruction   Reviewed JESUS/PVR  NWB to RLE  Nephrology consult appreciated  Offloading to bilateral Heels via CAIR boots  Discussed importance of daily foot examinations and proper shoe gear and to importance of lower Fasting Blood Glucose levels.   Podiatry to follow while in house.  Discussed and seen bedside with Attending Dr. Godinez

## 2022-09-29 DIAGNOSIS — N39.0 URINARY TRACT INFECTION, SITE NOT SPECIFIED: ICD-10-CM

## 2022-09-29 LAB
ANION GAP SERPL CALC-SCNC: 8 MMOL/L — SIGNIFICANT CHANGE UP (ref 5–17)
BUN SERPL-MCNC: 87 MG/DL — HIGH (ref 7–18)
CALCIUM SERPL-MCNC: 8.6 MG/DL — SIGNIFICANT CHANGE UP (ref 8.4–10.5)
CHLORIDE SERPL-SCNC: 102 MMOL/L — SIGNIFICANT CHANGE UP (ref 96–108)
CO2 SERPL-SCNC: 24 MMOL/L — SIGNIFICANT CHANGE UP (ref 22–31)
CREAT SERPL-MCNC: 6.69 MG/DL — HIGH (ref 0.5–1.3)
EGFR: 8 ML/MIN/1.73M2 — LOW
GLUCOSE BLDC GLUCOMTR-MCNC: 117 MG/DL — HIGH (ref 70–99)
GLUCOSE BLDC GLUCOMTR-MCNC: 158 MG/DL — HIGH (ref 70–99)
GLUCOSE BLDC GLUCOMTR-MCNC: 260 MG/DL — HIGH (ref 70–99)
GLUCOSE BLDC GLUCOMTR-MCNC: 288 MG/DL — HIGH (ref 70–99)
GLUCOSE SERPL-MCNC: 148 MG/DL — HIGH (ref 70–99)
HCT VFR BLD CALC: 36 % — LOW (ref 39–50)
HGB BLD-MCNC: 11.4 G/DL — LOW (ref 13–17)
MCHC RBC-ENTMCNC: 29.7 PG — SIGNIFICANT CHANGE UP (ref 27–34)
MCHC RBC-ENTMCNC: 31.7 GM/DL — LOW (ref 32–36)
MCV RBC AUTO: 93.8 FL — SIGNIFICANT CHANGE UP (ref 80–100)
NRBC # BLD: 0 /100 WBCS — SIGNIFICANT CHANGE UP (ref 0–0)
PLATELET # BLD AUTO: 229 K/UL — SIGNIFICANT CHANGE UP (ref 150–400)
POTASSIUM SERPL-MCNC: 4.5 MMOL/L — SIGNIFICANT CHANGE UP (ref 3.5–5.3)
POTASSIUM SERPL-SCNC: 4.5 MMOL/L — SIGNIFICANT CHANGE UP (ref 3.5–5.3)
RBC # BLD: 3.84 M/UL — LOW (ref 4.2–5.8)
RBC # FLD: 14.1 % — SIGNIFICANT CHANGE UP (ref 10.3–14.5)
SARS-COV-2 RNA SPEC QL NAA+PROBE: DETECTED
SODIUM SERPL-SCNC: 134 MMOL/L — LOW (ref 135–145)
WBC # BLD: 5.96 K/UL — SIGNIFICANT CHANGE UP (ref 3.8–10.5)
WBC # FLD AUTO: 5.96 K/UL — SIGNIFICANT CHANGE UP (ref 3.8–10.5)

## 2022-09-29 PROCEDURE — 73718 MRI LOWER EXTREMITY W/O DYE: CPT | Mod: 26,RT

## 2022-09-29 RX ADMIN — SENNA PLUS 2 TABLET(S): 8.6 TABLET ORAL at 21:23

## 2022-09-29 RX ADMIN — Medication 1: at 21:49

## 2022-09-29 RX ADMIN — PANTOPRAZOLE SODIUM 40 MILLIGRAM(S): 20 TABLET, DELAYED RELEASE ORAL at 05:57

## 2022-09-29 RX ADMIN — CHLORHEXIDINE GLUCONATE 1 APPLICATION(S): 213 SOLUTION TOPICAL at 11:54

## 2022-09-29 RX ADMIN — SEVELAMER CARBONATE 800 MILLIGRAM(S): 2400 POWDER, FOR SUSPENSION ORAL at 18:58

## 2022-09-29 RX ADMIN — Medication 650 MILLIGRAM(S): at 09:00

## 2022-09-29 RX ADMIN — Medication 250 MILLIGRAM(S): at 16:36

## 2022-09-29 RX ADMIN — SEVELAMER CARBONATE 800 MILLIGRAM(S): 2400 POWDER, FOR SUSPENSION ORAL at 11:56

## 2022-09-29 RX ADMIN — POLYETHYLENE GLYCOL 3350 17 GRAM(S): 17 POWDER, FOR SOLUTION ORAL at 18:58

## 2022-09-29 RX ADMIN — Medication 3: at 11:55

## 2022-09-29 RX ADMIN — Medication 650 MILLIGRAM(S): at 08:14

## 2022-09-29 RX ADMIN — SEVELAMER CARBONATE 800 MILLIGRAM(S): 2400 POWDER, FOR SUSPENSION ORAL at 08:15

## 2022-09-29 RX ADMIN — CEFEPIME 100 MILLIGRAM(S): 1 INJECTION, POWDER, FOR SOLUTION INTRAMUSCULAR; INTRAVENOUS at 18:58

## 2022-09-29 NOTE — PROGRESS NOTE ADULT - SUBJECTIVE AND OBJECTIVE BOX
[facility] NEPHROLOGY- PROGRESS NOTE    Patient is a 73yo Male with ESRD on HD, DM a/w Rt foot gangrene and GI bleed. Nephrology consulted for ESRD status.   COVID-10 + 22    Hospital Medications: Medications reviewed.  REVIEW OF SYSTEMS:  CONSTITUTIONAL: No fevers or chills  RESPIRATORY: No shortness of breath  CARDIOVASCULAR: No chest pain.  GASTROINTESTINAL: No nausea, vomiting, diarrhea or abdominal pain.   VASCULAR: No bilateral lower extremity edema. +Mild Rt foot pain    VITALS:  T(F): 97.8 (22 @ 13:37), Max: 98.9 (22 @ 20:44)  HR: 83 (22 @ 13:37)  BP: 110/62 (22 @ 13:37)  RR: 17 (22 @ 13:37)  SpO2: 98% (22 @ 13:37)  Wt(kg): --      PHYSICAL EXAM:  Gen: NAD,   HEENT: anicteric  Neck: no JVD  Cards: RRR, +S1/S2, no M/G/R  Resp: CTA B/L  GI: soft, NT/ND, NABS  Extremities: no LE edema B/L  Derm: Rt foot wrapped dressing c/d/i  Access: Rt IJ tunneled HD catheter- benign  Left upper arm AVF- no thrill no bruit    LABS:      136  |  100  |  59<H>  ----------------------------<  156<H>  4.0   |  25  |  4.82<H>    Ca    9.3      28 Sep 2022 06:20  Phos  5.1         TPro  7.2  /  Alb  3.2<L>  /  TBili  0.3  /  DBili      /  AST  20  /  ALT  18  /  AlkPhos  63      Creatinine Trend: 4.82 <--, 5.73 <--                        12.6   5.24  )-----------( 238      ( 28 Sep 2022 06:20 )             39.0     Urine Studies:  Urinalysis Basic - ( 26 Sep 2022 16:30 )    Color: Yellow / Appearance: Clear / S.010 / pH:   Gluc:  / Ketone: Negative  / Bili: Negative / Urobili: Negative   Blood:  / Protein: 30 mg/dL / Nitrite: Negative   Leuk Esterase: Small / RBC: 2-5 /HPF / WBC 11-25 /HPF   Sq Epi:  / Non Sq Epi: Few /HPF / Bacteria: Few /HPF

## 2022-09-29 NOTE — PROGRESS NOTE ADULT - SUBJECTIVE AND OBJECTIVE BOX
Patient is a 72y old  Male who presents with a chief complaint of Gangrene foot (29 Sep 2022 14:28)      INTERVAL HPI/OVERNIGHT EVENTS: no acute envents  overnight    REVIEW OF SYSTEMS:  CONSTITUTIONAL: No fever, chills  ENMT:  No difficulty hearing, no change in vision  NECK: No pain or stiffness  RESPIRATORY: No cough, SOB  CARDIOVASCULAR: No chest pain, palpitations  GASTROINTESTINAL: No abdominal pain. No nausea, vomiting, or diarrhea  GENITOURINARY: No dysuria  NEUROLOGICAL: No HA  SKIN: No itching, burning, rashes, or lesions   LYMPH NODES: No enlarged glands  ENDOCRINE: No heat or cold intolerance; No hair loss  MUSCULOSKELETAL: No joint pain or swelling; No muscle, back, or extremity pain  PSYCHIATRIC: No depression, anxiety  HEME/LYMPH: No easy bruising, or bleeding gums    T(C): 36.4 (09-29-22 @ 14:45), Max: 37.2 (09-28-22 @ 20:44)  HR: 77 (09-29-22 @ 14:45) (66 - 83)  BP: 112/62 (09-29-22 @ 14:45) (110/62 - 137/59)  RR: 16 (09-29-22 @ 14:45) (16 - 17)  SpO2: 98% (09-29-22 @ 13:37) (98% - 100%)  Wt(kg): --Vital Signs Last 24 Hrs  T(C): 36.4 (29 Sep 2022 14:45), Max: 37.2 (28 Sep 2022 20:44)  T(F): 97.5 (29 Sep 2022 14:45), Max: 98.9 (28 Sep 2022 20:44)  HR: 77 (29 Sep 2022 14:45) (66 - 83)  BP: 112/62 (29 Sep 2022 14:45) (110/62 - 137/59)  BP(mean): --  RR: 16 (29 Sep 2022 14:45) (16 - 17)  SpO2: 98% (29 Sep 2022 13:37) (98% - 100%)    Parameters below as of 29 Sep 2022 14:45  Patient On (Oxygen Delivery Method): room air    MEDICATIONS  (STANDING):  cefepime   IVPB      cefepime   IVPB 1000 milliGRAM(s) IV Intermittent every 24 hours  chlorhexidine 2% Cloths 1 Application(s) Topical <User Schedule>  influenza  Vaccine (HIGH DOSE) 0.7 milliLiter(s) IntraMuscular once  insulin lispro (ADMELOG) corrective regimen sliding scale   SubCutaneous three times a day before meals  insulin lispro (ADMELOG) corrective regimen sliding scale   SubCutaneous at bedtime  pantoprazole    Tablet 40 milliGRAM(s) Oral before breakfast  polyethylene glycol 3350 17 Gram(s) Oral daily  senna 2 Tablet(s) Oral at bedtime  sevelamer carbonate 800 milliGRAM(s) Oral three times a day with meals  vancomycin  IVPB 1000 milliGRAM(s) IV Intermittent every 48 hours    MEDICATIONS  (PRN):  acetaminophen     Tablet .. 650 milliGRAM(s) Oral every 6 hours PRN Temp greater or equal to 38C (100.4F), Mild Pain (1 - 3)  melatonin 3 milliGRAM(s) Oral at bedtime PRN Insomnia  ondansetron Injectable 4 milliGRAM(s) IV Push every 8 hours PRN Nausea and/or Vomiting      PHYSICAL EXAM:  GENERAL: NAD  EYES: clear conjunctiva; EOMI  ENMT: Moist mucous membranes  NECK: Supple, No JVD, Normal thyroid  CHEST/LUNG: Clear to auscultation bilaterally r chest PermCath with dressing cdi  HEART: S1, S2, Regular rate and rhythm  ABDOMEN: Soft, Nontender, Nondistended; Bowel sounds present  NEURO: Alert & Oriented X3  EXTREMITIES: No LE edema, no calf tenderness, R foot with gangrene dressing CDI, L as av fistulla +thrill, no buit  LYMPH: No lymphadenopathy noted  SKIN: No rashes or lesions    Consultant(s) Notes Reviewed:  [x ] YES  [ ] NO  Care Discussed with Consultants/Other Providers [ x] YES  [ ] NO    LABS:                        11.4   5.96  )-----------( 229      ( 29 Sep 2022 14:45 )             36.0     09-29    134<L>  |  102  |  87<H>  ----------------------------<  148<H>  4.5   |  24  |  6.69<H>    Ca    8.6      29 Sep 2022 14:45  Phos  5.1     09-28    TPro  7.2  /  Alb  3.2<L>  /  TBili  0.3  /  DBili  x   /  AST  20  /  ALT  18  /  AlkPhos  63  09-28      CAPILLARY BLOOD GLUCOSE      POCT Blood Glucose.: 117 mg/dL (29 Sep 2022 16:20)  POCT Blood Glucose.: 288 mg/dL (29 Sep 2022 11:25)  POCT Blood Glucose.: 158 mg/dL (29 Sep 2022 07:55)  POCT Blood Glucose.: 353 mg/dL (28 Sep 2022 21:45)      RADIOLOGY & ADDITIONAL TESTS:    Imaging Personally Reviewed:  [ x] YES  [ ] NO  < from: CT Angio Abd Aorta w/run-off w/ IV Cont (09.27.22 @ 12:04) >    ACC: [id number] EXAM:  CT ANGIO ABD AOR W RUN(W)AW IC                          PROCEDURE DATE:  09/27/2022          INTERPRETATION:  CLINICAL INFORMATION: Right leg necrosis    COMPARISON: None.    CONTRAST/COMPLICATIONS:  IV Contrast: Omnipaque [id number] cc administered   10 cc discarded  Oral Contrast: NONE  Complications: None reported at time of study completion    CT ANGIOGRAM ABDOMEN, PELVIS, AND LOWER EXTREMITIES:    PROCEDURE:  Initially, nonenhanced CT was obtained through the calves. Then,   following the rapid administration of intravenous contrast, CT   angiography was performed through the abdomen, pelvis, and lower   extremities down to the toes.  Delayed images through the calves were   also obtained. Sagittal and coronal reformats as well as 3D   reconstructions were performed.    FINDINGS: Calcified atherosclerotic disease.    CENTRAL ARTERIAL SYSTEM:    No aortic dissection, or aneurysm. The celiac axis artery, SMA, XANDER, and   the bilateral main renal arteries are patent without stenosis.    RIGHT LOWER EXTREMITY: The common, internal and external iliac arteries   are patent without stenosis. Mild focal stenosis at the common femoral   artery. A few mild focal stenoses are seen in the superficial femoral   artery. Multifocal mild-to-moderate stenoses in the popliteal artery.    Three-vessel runoff in the right calf with multifocal stenoses in the   anterior tibial, peroneal and posterior tibial arteries. There is a   severe focal stenosis at the tibioperoneal trunkartery.    Evaluation of the dorsalis pedis and posterior tibial arteries in the   right foot is difficult due to extensive vascular calcifications.    LEFT LOWER EXTREMITY: The common, internal and external iliac arteries   are patent without stenosis. The common femoral artery is patent without   stenosis. Mild stenoses at the distal superficial femoral artery. The   popliteal artery is patent with mild multifocal stenoses.    At least two-vessel runoff in the left calf via the anterior tibial and   peroneal arteries. The posterior tibial artery is difficult to evaluate   due to extensive vascular calcifications. Moderate to severe stenosis at   the tibioperoneal trunk artery. Multifocal stenoses at the anterior   tibial and peroneal arteries.    In the left foot, the dorsalis pedis and posterior tibial arteries are   difficult to evaluate due to extensive vascular calcifications.    ADDITIONAL FINDINGS: Nonspecific mild left adrenal thickening. Small   nonobstructive calculus in the left kidney. Bilateral renal cysts.   Degenerative spondylosis.    IMPRESSION:    Outflow disease bilaterally as detailed above.          --- End of Report ---            SARANYA CHAN MD; Attending Radiologist  This document has been electronically signed. Sep 27 2022  3:00PM    < end of copied text >  < from: Xray Foot AP + Lateral, Right (09.26.22 @ 17:00) >    ACC: [id number] EXAM:  XR FOOT 2 VIEWS RT                          PROCEDURE DATE:  09/26/2022          INTERPRETATION:  Right foot    HISTORY: Heel gangrene and second toe gangrene     Three views of the right foot show no evidence of acute fracturenor   destructive change. The joint spaces are maintained. Vascular   calcifications are present.    IMPRESSION: No definite bony destruction.      Thank you for this referral.    --- End of Report ---            ALEISHA GARLAND MD; Attending Interventional Radiologist  This document has been electronically signed. Sep 27 2022  4:05    < end of copied text >

## 2022-09-29 NOTE — PROGRESS NOTE ADULT - ASSESSMENT
This is a 72 year old male, coming from [facility], with medical history of ESRD (T-TH-S), DM coming in with right leg necrosis. JESUS abnormal findings. CT angio with out flow disease bilat. Vascular planning angio gram next week. found to have pseudomonas UTI, Started on vancomycin and cefepime.  ID following. Plan MRI  r/o osteo, podiatry following.  Pt has L ac av graft that was placed 2 weeks ago per pt. + thrill. -bruit. per vascular will take months to mature. currently on dialysis via R chest PermCath. nephro following

## 2022-09-29 NOTE — PROGRESS NOTE ADULT - ASSESSMENT
Patient is a 73yo Male with ESRD on HD, DM a/w Rt foot gangrene and GI bleed. Nephrology consulted for ESRD status.     1) ESRD:  Last HD 9/27, tolerated well with net 2L removed. Plan for next maintenance HD today 9/29. Monitor electrolytes.  Pt with new Left UE AVF- no thrill no bruit- f/u Vascular (Dr. Dutta notified).   2) HTN with ESRD: BP acceptable off antihypertensive medications. Can consider CCB if BP elevated. c/w low sodium diet. Monitor BP.  3) Anemia of renal disease: with blood loss due to GI bleed. Check FOBT. Hb acceptable. No need for JERED at this time. Monitor Hb.  4) Hyperphosphatemia: Serum phosphorus acceptable. c/w Sevelamer 1 tab tid with meals and low phos diet. Monitor serum calcium and phosphorus.  5) Rt foot gangrene: Pt on Cefepime/ Vanco 1g IV every 48 hrs. Podiatry/ Vascular/ ID following. Pt for possible angiogram next week.     [facility] NEPHROLOGY  Wili Hopkins M.D.  Kiran Feng D.O.  Sujatha Dumont M.D.  Radha Miller, MSN, ANP-C  [phone]    [address] [place] [address]

## 2022-09-29 NOTE — PROGRESS NOTE ADULT - SUBJECTIVE AND OBJECTIVE BOX
Patient is a 72y old  Male who presents with a chief complaint of Gangrene foot (29 Sep 2022 14:28)      INTERVAL HPI/OVERNIGHT EVENTS: pt seen and examined    REVIEW OF SYSTEMS:  CONSTITUTIONAL: No fever, chills  ENMT:  No difficulty hearing, no change in vision  NECK: No pain or stiffness  RESPIRATORY: No cough, SOB  CARDIOVASCULAR: No chest pain, palpitations  GASTROINTESTINAL: No abdominal pain. No nausea, vomiting, or diarrhea  GENITOURINARY: No dysuria  NEUROLOGICAL: No HA  SKIN: No itching, burning, rashes, or lesions   LYMPH NODES: No enlarged glands  ENDOCRINE: No heat or cold intolerance; No hair loss  MUSCULOSKELETAL: No joint pain or swelling; No muscle, back, or extremity pain  PSYCHIATRIC: No depression, anxiety  HEME/LYMPH: No easy bruising, or bleeding gums    T(C): 36.4 (09-29-22 @ 14:45), Max: 37.2 (09-28-22 @ 20:44)  HR: 77 (09-29-22 @ 14:45) (66 - 83)  BP: 112/62 (09-29-22 @ 14:45) (110/62 - 137/59)  RR: 16 (09-29-22 @ 14:45) (16 - 17)  SpO2: 98% (09-29-22 @ 13:37) (98% - 100%)  Wt(kg): --Vital Signs Last 24 Hrs  T(C): 36.4 (29 Sep 2022 14:45), Max: 37.2 (28 Sep 2022 20:44)  T(F): 97.5 (29 Sep 2022 14:45), Max: 98.9 (28 Sep 2022 20:44)  HR: 77 (29 Sep 2022 14:45) (66 - 83)  BP: 112/62 (29 Sep 2022 14:45) (110/62 - 137/59)  BP(mean): --  RR: 16 (29 Sep 2022 14:45) (16 - 17)  SpO2: 98% (29 Sep 2022 13:37) (98% - 100%)    Parameters below as of 29 Sep 2022 14:45  Patient On (Oxygen Delivery Method): room air    MEDICATIONS  (STANDING):  cefepime   IVPB      cefepime   IVPB 1000 milliGRAM(s) IV Intermittent every 24 hours  chlorhexidine 2% Cloths 1 Application(s) Topical <User Schedule>  influenza  Vaccine (HIGH DOSE) 0.7 milliLiter(s) IntraMuscular once  insulin lispro (ADMELOG) corrective regimen sliding scale   SubCutaneous three times a day before meals  insulin lispro (ADMELOG) corrective regimen sliding scale   SubCutaneous at bedtime  pantoprazole    Tablet 40 milliGRAM(s) Oral before breakfast  polyethylene glycol 3350 17 Gram(s) Oral daily  senna 2 Tablet(s) Oral at bedtime  sevelamer carbonate 800 milliGRAM(s) Oral three times a day with meals  vancomycin  IVPB 1000 milliGRAM(s) IV Intermittent every 48 hours    MEDICATIONS  (PRN):  acetaminophen     Tablet .. 650 milliGRAM(s) Oral every 6 hours PRN Temp greater or equal to 38C (100.4F), Mild Pain (1 - 3)  melatonin 3 milliGRAM(s) Oral at bedtime PRN Insomnia  ondansetron Injectable 4 milliGRAM(s) IV Push every 8 hours PRN Nausea and/or Vomiting      PHYSICAL EXAM:  GENERAL: NAD  EYES: clear conjunctiva; EOMI  ENMT: Moist mucous membranes  NECK: Supple, No JVD, Normal thyroid  CHEST/LUNG: dec breath sounds at bases  HEART: S1, S2, Regular rate and rhythm  ABDOMEN: Soft, Nontender, Nondistended; Bowel sounds present  NEURO: Alert awake  EXTREMITIES: No LE edema, no calf tenderness, R foot with gangrene dressing CDI, L as av fistulla +thrill, no buit  LYMPH: No lymphadenopathy noted  SKIN: No rashes or lesions    Consultant(s) Notes Reviewed:  [x ] YES  [ ] NO  Care Discussed with Consultants/Other Providers [ x] YES  [ ] NO    LABS:                        11.4   5.96  )-----------( 229      ( 29 Sep 2022 14:45 )             36.0     09-29    134<L>  |  102  |  87<H>  ----------------------------<  148<H>  4.5   |  24  |  6.69<H>    Ca    8.6      29 Sep 2022 14:45  Phos  5.1     09-28    TPro  7.2  /  Alb  3.2<L>  /  TBili  0.3  /  DBili  x   /  AST  20  /  ALT  18  /  AlkPhos  63  09-28      CAPILLARY BLOOD GLUCOSE      POCT Blood Glucose.: 117 mg/dL (29 Sep 2022 16:20)  POCT Blood Glucose.: 288 mg/dL (29 Sep 2022 11:25)  POCT Blood Glucose.: 158 mg/dL (29 Sep 2022 07:55)  POCT Blood Glucose.: 353 mg/dL (28 Sep 2022 21:45)      RADIOLOGY & ADDITIONAL TESTS:    Imaging Personally Reviewed:  [ x] YES  [ ] NO  < from: CT Angio Abd Aorta w/run-off w/ IV Cont (09.27.22 @ 12:04) >    ACC: [id number] EXAM:  CT ANGIO ABD AOR W RUN(W)AW IC                          PROCEDURE DATE:  09/27/2022          INTERPRETATION:  CLINICAL INFORMATION: Right leg necrosis    COMPARISON: None.    CONTRAST/COMPLICATIONS:  IV Contrast: Omnipaque [id number] cc administered   10 cc discarded  Oral Contrast: NONE  Complications: None reported at time of study completion    CT ANGIOGRAM ABDOMEN, PELVIS, AND LOWER EXTREMITIES:    PROCEDURE:  Initially, nonenhanced CT was obtained through the calves. Then,   following the rapid administration of intravenous contrast, CT   angiography was performed through the abdomen, pelvis, and lower   extremities down to the toes.  Delayed images through the calves were   also obtained. Sagittal and coronal reformats as well as 3D   reconstructions were performed.    FINDINGS: Calcified atherosclerotic disease.    CENTRAL ARTERIAL SYSTEM:    No aortic dissection, or aneurysm. The celiac axis artery, SMA, XANDER, and   the bilateral main renal arteries are patent without stenosis.    RIGHT LOWER EXTREMITY: The common, internal and external iliac arteries   are patent without stenosis. Mild focal stenosis at the common femoral   artery. A few mild focal stenoses are seen in the superficial femoral   artery. Multifocal mild-to-moderate stenoses in the popliteal artery.    Three-vessel runoff in the right calf with multifocal stenoses in the   anterior tibial, peroneal and posterior tibial arteries. There is a   severe focal stenosis at the tibioperoneal trunkartery.    Evaluation of the dorsalis pedis and posterior tibial arteries in the   right foot is difficult due to extensive vascular calcifications.    LEFT LOWER EXTREMITY: The common, internal and external iliac arteries   are patent without stenosis. The common femoral artery is patent without   stenosis. Mild stenoses at the distal superficial femoral artery. The   popliteal artery is patent with mild multifocal stenoses.    At least two-vessel runoff in the left calf via the anterior tibial and   peroneal arteries. The posterior tibial artery is difficult to evaluate   due to extensive vascular calcifications. Moderate to severe stenosis at   the tibioperoneal trunk artery. Multifocal stenoses at the anterior   tibial and peroneal arteries.    In the left foot, the dorsalis pedis and posterior tibial arteries are   difficult to evaluate due to extensive vascular calcifications.    ADDITIONAL FINDINGS: Nonspecific mild left adrenal thickening. Small   nonobstructive calculus in the left kidney. Bilateral renal cysts.   Degenerative spondylosis.    IMPRESSION:    Outflow disease bilaterally as detailed above.          --- End of Report ---            SARANYA CHAN MD; Attending Radiologist  This document has been electronically signed. Sep 27 2022  3:00PM    < end of copied text >  < from: Xray Foot AP + Lateral, Right (09.26.22 @ 17:00) >    ACC: [id number] EXAM:  XR FOOT 2 VIEWS RT                          PROCEDURE DATE:  09/26/2022          INTERPRETATION:  Right foot    HISTORY: Heel gangrene and second toe gangrene     Three views of the right foot show no evidence of acute fracturenor   destructive change. The joint spaces are maintained. Vascular   calcifications are present.    IMPRESSION: No definite bony destruction.      Thank you for this referral.    --- End of Report ---            ALEISHA GARLAND MD; Attending Interventional Radiologist  This document has been electronically signed. Sep 27 2022  4:05    < end of copied text >

## 2022-09-29 NOTE — PROGRESS NOTE ADULT - PROBLEM SELECTOR PLAN 1
- Pt presented with right foot gangrene.   - CTA run off- out flow disease- vascular planning angiogram next week   - abnormal JESUS  - cont vancomycin  - blood Cx negative  - f/u MRI r/o osteo- will be done today last case due to covid-  per director of radiology  - ID - Dr. Babcock  - Vascular Following   - Podiatry Following

## 2022-09-29 NOTE — PROGRESS NOTE ADULT - ASSESSMENT
Patient is a 72y old  Male with medical history of ESRD (T-TH-S), DM, now send in to the ER from [facility], for evaluation of right foot necrosis. On admission, he has no fever, No leukocytosis. He has evaluated by Podiatry , started on IV Vancomycin, and the ID consult requested to assist with further evaluation and antibiotic management.     # Right  gangrenous foot- scheduled for Angiogram in next week  # UTI- Urine cx grew Pseudomonas.   # Positive COVID as of 9/29/22    would recommend:    1. Continue IV Cefepime and IV Vancomycin until work up is done  2. Wound care as per Podiatry   3.  Monitor and Keep Vancomycin level between 15 to 20  4. Monitor oxygen saturation closely and if drop to 94 % then start Remdesivir and Dexamethasone  5. COVID precaution    Attending Attestation:    Spent more than 35 minutes on total encounter, more than 50 % of the visit was spent counseling and/or coordinating care by the Attending physician.

## 2022-09-29 NOTE — PROGRESS NOTE ADULT - ASSESSMENT
This is a 72 year old male, coming from [facility] system, with medical history of ESRD (T-TH-S), DM coming in with right leg necrosis. JESUS abnormal findings. CT angio with out flow disease bilat. Vascular planning angio gram next week. found to have pseudomonas UTI, Started on vancomycin and cefepime.  ID following. Plan MRI  r/o osteo, podiatry following.  Pt has L ac av graft that was placed 2 weeks ago per pt. + thrill. -bruit. per vascular will take months to mature. currently on dialysis via R chest PermCath. nephro following

## 2022-09-29 NOTE — PROGRESS NOTE ADULT - SUBJECTIVE AND OBJECTIVE BOX
Patient is afebrile and saturating well in Room Air.  He become COVID positive as of 22.       REVIEW OF SYSTEMS: All other review systems are negative      ALLERGIES: No Known Allergies      Vital Signs Last 24 Hrs  T(C): 36.4 (29 Sep 2022 19:08), Max: 37.2 (28 Sep 2022 20:44)  T(F): 97.5 (29 Sep 2022 19:08), Max: 98.9 (28 Sep 2022 20:44)  HR: 82 (29 Sep 2022 19:08) (63 - 83)  BP: 151/73 (29 Sep 2022 19:08) (105/52 - 151/73)  BP(mean): --  RR: 19 (29 Sep 2022 19:08) (16 - 19)  SpO2: 96% (29 Sep 2022 19:08) (96% - 100%)    Parameters below as of 29 Sep 2022 19:08  Patient On (Oxygen Delivery Method): room air      PHYSICAL EXAM:  GENERAL: Not in distress   CHEST/LUNG:  Not using accessory muscles   EXTREMITIES: Right foot bandage in placed ( Please refer to podiatry note for full description of gangrenous foot)  CNS: Awake and Alert      LABS:                        11.4   5.96  )-----------( 229      ( 29 Sep 2022 14:45 )             36.0                           12.6   5.24  )-----------( 238      ( 28 Sep 2022 06:20 )             39.0         134<L>  |  102  |  87<H>  ----------------------------<  148<H>  4.5   |  24  |  6.69<H>    Ca    8.6      29 Sep 2022 14:45  Phos  5.1         TPro  7.2  /  Alb  3.2<L>  /  TBili  0.3  /  DBili  x   /  AST  20  /  ALT  18  /  AlkPhos  63      136  |  100  |  59<H>  ----------------------------<  156<H>  4.0   |  25  |  4.82<H>    Ca    9.3      28 Sep 2022 06:20  Phos  5.1         TPro  7.2  /  Alb  3.2<L>  /  TBili  0.3  /  DBili  x   /  AST  20  /  ALT  18  /  AlkPhos  63          CAPILLARY BLOOD GLUCOSE  POCT Blood Glucose.: 127 mg/dL (27 Sep 2022 11:31)  POCT Blood Glucose.: 129 mg/dL (27 Sep 2022 06:13)  POCT Blood Glucose.: 307 mg/dL (27 Sep 2022 00:00)  POCT Blood Glucose.: 171 mg/dL (26 Sep 2022 21:34)      Urinalysis Basic - ( 26 Sep 2022 16:30 )  Color: Yellow / Appearance: Clear / S.010 / pH: x  Gluc: x / Ketone: Negative  / Bili: Negative / Urobili: Negative   Blood: x / Protein: 30 mg/dL / Nitrite: Negative   Leuk Esterase: Small / RBC: 2-5 /HPF / WBC 11-25 /HPF   Sq Epi: x / Non Sq Epi: Few /HPF / Bacteria: Few /HPF        MEDICATIONS  (STANDING):    cefepime   IVPB      cefepime   IVPB 1000 milliGRAM(s) IV Intermittent every 24 hours  chlorhexidine 2% Cloths 1 Application(s) Topical <User Schedule>  influenza  Vaccine (HIGH DOSE) 0.7 milliLiter(s) IntraMuscular once  insulin lispro (ADMELOG) corrective regimen sliding scale   SubCutaneous three times a day before meals  insulin lispro (ADMELOG) corrective regimen sliding scale   SubCutaneous at bedtime  pantoprazole    Tablet 40 milliGRAM(s) Oral before breakfast  polyethylene glycol 3350 17 Gram(s) Oral daily  senna 2 Tablet(s) Oral at bedtime  sevelamer carbonate 800 milliGRAM(s) Oral three times a day with meals  vancomycin  IVPB 1000 milliGRAM(s) IV Intermittent every 48 hours      RADIOLOGY & ADDITIONAL TESTS:    22 from: Xray Foot AP + Lateral, Right (22 @ 17:00) >IMPRESSION: No definite bony destruction.      22: VA Physiol Extremity Lower 3+ Level, BI (22 @ 16:49) IMPRESSION: Abnormally elevated ABIs compatible with calcified vessels.    No discernible pulse volume recording at the level of the right digit.      MICROBIOLOGY DATA:    COVID-19 PCR . (22 @ 10:00)   COVID-19 PCR: Detected  Culture - Urine (22 @ 16:30)   - Amikacin: S <=16   - Aztreonam: S <=4   - Cefepime: S <=2   - Ceftazidime: S 4   - Ciprofloxacin: S <=0.25   - Gentamicin: S <=2   - Imipenem: S <=1   - Levofloxacin: S <=0.5   - Meropenem: S <=1   - Piperacillin/Tazobactam: S <=8   - Tobramycin: S <=2   Specimen Source: Clean Catch Clean Catch (Midstream)   Culture Results:   50,000 - 99,000 CFU/mL Pseudomonas aeruginosa   Organism Identification: Pseudomonas aeruginosa   Organism: Pseudomonas aeruginosa   Method Type: ANH     Culture - Blood (22 @ 14:30)   Specimen Source: .Blood Blood   Culture Results: No growth to date.     Culture - Blood (22 @ 14:20)   Specimen Source: .Blood Blood   Culture Results: No growth to date.     Urine Microscopic-Add On (NC) (22 @ 16:30)   Red Blood Cell - Urine: 2-5 /HPF   White Blood Cell - Urine: 11-25 /HPF   Bacteria: Few /HPF   Comment - Urine: few transitional epithelial cells seen   Epithelial Cells: Few /HPF     Flu With COVID-19 By BEBE (22 @ 14:30)   SARS-CoV-2 Result: NotDetec:

## 2022-09-30 DIAGNOSIS — D64.9 ANEMIA, UNSPECIFIED: ICD-10-CM

## 2022-09-30 LAB
ALBUMIN SERPL ELPH-MCNC: 3 G/DL — LOW (ref 3.5–5)
ALP SERPL-CCNC: 57 U/L — SIGNIFICANT CHANGE UP (ref 40–120)
ALT FLD-CCNC: 17 U/L DA — SIGNIFICANT CHANGE UP (ref 10–60)
ANION GAP SERPL CALC-SCNC: 9 MMOL/L — SIGNIFICANT CHANGE UP (ref 5–17)
AST SERPL-CCNC: 18 U/L — SIGNIFICANT CHANGE UP (ref 10–40)
BILIRUB SERPL-MCNC: 0.2 MG/DL — SIGNIFICANT CHANGE UP (ref 0.2–1.2)
BUN SERPL-MCNC: 50 MG/DL — HIGH (ref 7–18)
CALCIUM SERPL-MCNC: 9.1 MG/DL — SIGNIFICANT CHANGE UP (ref 8.4–10.5)
CHLORIDE SERPL-SCNC: 102 MMOL/L — SIGNIFICANT CHANGE UP (ref 96–108)
CO2 SERPL-SCNC: 27 MMOL/L — SIGNIFICANT CHANGE UP (ref 22–31)
CREAT SERPL-MCNC: 4.76 MG/DL — HIGH (ref 0.5–1.3)
EGFR: 12 ML/MIN/1.73M2 — LOW
GLUCOSE BLDC GLUCOMTR-MCNC: 161 MG/DL — HIGH (ref 70–99)
GLUCOSE BLDC GLUCOMTR-MCNC: 171 MG/DL — HIGH (ref 70–99)
GLUCOSE BLDC GLUCOMTR-MCNC: 232 MG/DL — HIGH (ref 70–99)
GLUCOSE BLDC GLUCOMTR-MCNC: 257 MG/DL — HIGH (ref 70–99)
GLUCOSE SERPL-MCNC: 159 MG/DL — HIGH (ref 70–99)
HCT VFR BLD CALC: 37.7 % — LOW (ref 39–50)
HGB BLD-MCNC: 12.1 G/DL — LOW (ref 13–17)
MCHC RBC-ENTMCNC: 30.2 PG — SIGNIFICANT CHANGE UP (ref 27–34)
MCHC RBC-ENTMCNC: 32.1 GM/DL — SIGNIFICANT CHANGE UP (ref 32–36)
MCV RBC AUTO: 94 FL — SIGNIFICANT CHANGE UP (ref 80–100)
NRBC # BLD: 0 /100 WBCS — SIGNIFICANT CHANGE UP (ref 0–0)
PLATELET # BLD AUTO: 208 K/UL — SIGNIFICANT CHANGE UP (ref 150–400)
POTASSIUM SERPL-MCNC: 3.9 MMOL/L — SIGNIFICANT CHANGE UP (ref 3.5–5.3)
POTASSIUM SERPL-SCNC: 3.9 MMOL/L — SIGNIFICANT CHANGE UP (ref 3.5–5.3)
PROT SERPL-MCNC: 6.7 G/DL — SIGNIFICANT CHANGE UP (ref 6–8.3)
RBC # BLD: 4.01 M/UL — LOW (ref 4.2–5.8)
RBC # FLD: 13.7 % — SIGNIFICANT CHANGE UP (ref 10.3–14.5)
SODIUM SERPL-SCNC: 138 MMOL/L — SIGNIFICANT CHANGE UP (ref 135–145)
VANCOMYCIN TROUGH SERPL-MCNC: 11.7 UG/ML — SIGNIFICANT CHANGE UP (ref 10–20)
WBC # BLD: 4.99 K/UL — SIGNIFICANT CHANGE UP (ref 3.8–10.5)
WBC # FLD AUTO: 4.99 K/UL — SIGNIFICANT CHANGE UP (ref 3.8–10.5)

## 2022-09-30 RX ADMIN — Medication 1: at 08:38

## 2022-09-30 RX ADMIN — SEVELAMER CARBONATE 800 MILLIGRAM(S): 2400 POWDER, FOR SUSPENSION ORAL at 08:39

## 2022-09-30 RX ADMIN — Medication 650 MILLIGRAM(S): at 13:31

## 2022-09-30 RX ADMIN — Medication 650 MILLIGRAM(S): at 13:45

## 2022-09-30 RX ADMIN — SEVELAMER CARBONATE 800 MILLIGRAM(S): 2400 POWDER, FOR SUSPENSION ORAL at 17:20

## 2022-09-30 RX ADMIN — Medication 2: at 12:01

## 2022-09-30 RX ADMIN — CEFEPIME 100 MILLIGRAM(S): 1 INJECTION, POWDER, FOR SOLUTION INTRAMUSCULAR; INTRAVENOUS at 17:21

## 2022-09-30 RX ADMIN — SENNA PLUS 2 TABLET(S): 8.6 TABLET ORAL at 21:55

## 2022-09-30 RX ADMIN — POLYETHYLENE GLYCOL 3350 17 GRAM(S): 17 POWDER, FOR SOLUTION ORAL at 12:02

## 2022-09-30 RX ADMIN — Medication 1: at 21:55

## 2022-09-30 RX ADMIN — Medication 1: at 17:19

## 2022-09-30 RX ADMIN — SEVELAMER CARBONATE 800 MILLIGRAM(S): 2400 POWDER, FOR SUSPENSION ORAL at 13:29

## 2022-09-30 RX ADMIN — PANTOPRAZOLE SODIUM 40 MILLIGRAM(S): 20 TABLET, DELAYED RELEASE ORAL at 05:59

## 2022-09-30 RX ADMIN — CHLORHEXIDINE GLUCONATE 1 APPLICATION(S): 213 SOLUTION TOPICAL at 05:59

## 2022-09-30 NOTE — PROGRESS NOTE ADULT - PROBLEM SELECTOR PLAN 1
- Pt presented with right foot gangrene.   - CTA run off- out flow disease- vascular planning angiogram next week   - abnormal JESUS  - NWB to RLE- podiatry recc  - cont vancomycin every 48 hrs, vanco level 11.7 (9/30/22)  - blood Cx negative  - MRI Rt foot no osteo  - plan angio next week- Vascular recc  - ID - Dr. Babcock  - Vascular Following   - Podiatry Following

## 2022-09-30 NOTE — PROGRESS NOTE ADULT - SUBJECTIVE AND OBJECTIVE BOX
Patient is a 72y old  Male who presents with a chief complaint of Gangrene foot (30 Sep 2022 17:06)      INTERVAL HPI/OVERNIGHT EVENTS: pt seen and examined    I&O's Summary    Vital Signs Last 24 Hrs  T(C): 36.1 (30 Sep 2022 12:07), Max: 36.7 (29 Sep 2022 18:06)  T(F): 96.9 (30 Sep 2022 12:07), Max: 98 (29 Sep 2022 18:06)  HR: 74 (30 Sep 2022 12:07) (63 - 87)  BP: 129/68 (30 Sep 2022 12:07) (105/52 - 151/73)  BP(mean): --  RR: 17 (30 Sep 2022 12:07) (16 - 19)  SpO2: 100% (30 Sep 2022 12:07) (96% - 100%)    Parameters below as of 30 Sep 2022 12:07  Patient On (Oxygen Delivery Method): room air      PAST MEDICAL & SURGICAL HISTORY:  ESRD on dialysis      DM (diabetes mellitus)      No significant past surgical history          SOCIAL HISTORY  Alcohol:  Tobacco:  Illicit substance use:      FAMILY HISTORY:      LABS:                        12.1   4.99  )-----------( 208      ( 30 Sep 2022 06:22 )             37.7     09-30    138  |  102  |  50<H>  ----------------------------<  159<H>  3.9   |  27  |  4.76<H>    Ca    9.1      30 Sep 2022 06:22    TPro  6.7  /  Alb  3.0<L>  /  TBili  0.2  /  DBili  x   /  AST  18  /  ALT  17  /  AlkPhos  57  09-30        CAPILLARY BLOOD GLUCOSE      POCT Blood Glucose.: 161 mg/dL (30 Sep 2022 16:55)  POCT Blood Glucose.: 232 mg/dL (30 Sep 2022 11:28)  POCT Blood Glucose.: 171 mg/dL (30 Sep 2022 08:28)  POCT Blood Glucose.: 260 mg/dL (29 Sep 2022 21:38)      MEDICATIONS  (STANDING):  cefepime   IVPB      cefepime   IVPB 1000 milliGRAM(s) IV Intermittent every 24 hours  chlorhexidine 2% Cloths 1 Application(s) Topical <User Schedule>  influenza  Vaccine (HIGH DOSE) 0.7 milliLiter(s) IntraMuscular once  insulin lispro (ADMELOG) corrective regimen sliding scale   SubCutaneous three times a day before meals  insulin lispro (ADMELOG) corrective regimen sliding scale   SubCutaneous at bedtime  pantoprazole    Tablet 40 milliGRAM(s) Oral before breakfast  polyethylene glycol 3350 17 Gram(s) Oral daily  senna 2 Tablet(s) Oral at bedtime  sevelamer carbonate 800 milliGRAM(s) Oral three times a day with meals  vancomycin  IVPB 1000 milliGRAM(s) IV Intermittent every 48 hours    MEDICATIONS  (PRN):  acetaminophen     Tablet .. 650 milliGRAM(s) Oral every 6 hours PRN Temp greater or equal to 38C (100.4F), Mild Pain (1 - 3)  melatonin 3 milliGRAM(s) Oral at bedtime PRN Insomnia  ondansetron Injectable 4 milliGRAM(s) IV Push every 8 hours PRN Nausea and/or Vomiting      REVIEW OF SYSTEMS:  CONSTITUTIONAL: No fever  EYES: No eye pain, visual disturbances  ENMT:  No difficulty hearing, No sinus or throat pain  NECK: No pain  RESPIRATORY: No cough, wheezing; No shortness of breath  CARDIOVASCULAR: No chest pain, palpitations, dizziness, or leg swelling  GASTROINTESTINAL: No abdominal pain. No nausea, vomiting; No diarrhea or constipation.   GENITOURINARY: No dysuria  NEUROLOGICAL: No headaches  SKIN: No rashes, or lesions   MUSCULOSKELETAL: No joint pain or swelling    RADIOLOGY & ADDITIONAL TESTS:< from: MR Foot No Cont, Right (09.29.22 @ 20:37) >    ACC: [id number] EXAM:  MR FOOT RT                          PROCEDURE DATE:  09/29/2022          INTERPRETATION:  MR FOOT RIGHT dated 9/29/2022 8:37 PM    INDICATION: Pain and swelling    COMPARISON: Foot radiographs dated 9/26/2022    TECHNIQUE: Multi-sequential, multiplanar MRI imaging of the right ankle   and hindfoot was performed per standard protocol.    FINDINGS:    BONE MARROW: There is no focal T1 signal hypointensity present. No   cortical erosion or destruction is noted. There is no fracture. No   osteonecrosis.  SYNOVIUM/ JOINT FLUID: No joint effusion.  TENDONS: Mild tendinosis of the posterior tibialis tendon. No   full-thickness tendon tear or retraction  MUSCLES: Edema and atrophy in the musculature. A nonspecific finding that   can be seen with neuropathic change.  NEUROVASCULAR STRUCTURES: Preserved  SUBCUTANEOUS SOFT TISSUES: Mild dorsal forefoot soft tissue swelling.   Posterior heel skin wound. Mild subjacent edema.    IMPRESSION:    1.  No osteomyelitis.  2.  Posterior heel skin wound and dorsal forefoot soft tissue swelling.   Nonspecific but can be seen with cellulitis. No drainable fluid   collection.    --- End of Report ---            ALAYNA ADAM MD; Attending Radiologist  This document has been electronically signed. Sep 30 2022  9:04AM    < end of copied text >      ACC: [id number] EXAM:  CT ANGIO ABD AOR W RUN(W)AW IC                          PROCEDURE DATE:  09/27/2022          INTERPRETATION:  CLINICAL INFORMATION: Right leg necrosis    COMPARISON: None.    CONTRAST/COMPLICATIONS:  IV Contrast: Omnipaque [id number] cc administered   10 cc discarded  Oral Contrast: NONE  Complications: None reported at time of study completion    CT ANGIOGRAM ABDOMEN, PELVIS, AND LOWER EXTREMITIES:    PROCEDURE:  Initially, nonenhanced CT was obtained through the calves. Then,   following the rapid administration of intravenous contrast, CT   angiography was performed through the abdomen, pelvis, and lower   extremities down to the toes.  Delayed images through the calves were   also obtained. Sagittal and coronal reformats as well as 3D   reconstructions were performed.    FINDINGS: Calcified atherosclerotic disease.    CENTRAL ARTERIAL SYSTEM:    No aortic dissection, or aneurysm. The celiac axis artery, SMA, XANDER, and   the bilateral main renal arteries are patent without stenosis.    RIGHT LOWER EXTREMITY: The common, internal and external iliac arteries   are patent without stenosis. Mild focal stenosis at the common femoral   artery. A few mild focal stenoses are seen in the superficial femoral   artery. Multifocal mild-to-moderate stenoses in the popliteal artery.    Three-vessel runoff in the right calf with multifocal stenoses in the   anterior tibial, peroneal and posterior tibial arteries. There is a   severe focal stenosis at the tibioperoneal trunkartery.    Evaluation of the dorsalis pedis and posterior tibial arteries in the   right foot is difficult due to extensive vascular calcifications.    LEFT LOWER EXTREMITY: The common, internal and external iliac arteries   are patent without stenosis. The common femoral artery is patent without   stenosis. Mild stenoses at the distal superficial femoral artery. The   popliteal artery is patent with mild multifocal stenoses.    At least two-vessel runoff in the left calf via the anterior tibial and   peroneal arteries. The posterior tibial artery is difficult to evaluate   due to extensive vascular calcifications. Moderate to severe stenosis at   the tibioperoneal trunk artery. Multifocal stenoses at the anterior   tibial and peroneal arteries.    In the left foot, the dorsalis pedis and posterior tibial arteries are   difficult to evaluate due to extensive vascular calcifications.    ADDITIONAL FINDINGS: Nonspecific mild left adrenal thickening. Small   nonobstructive calculus in the left kidney. Bilateral renal cysts.   Degenerative spondylosis.    IMPRESSION:    Outflow disease bilaterally as detailed above.      --- End of Report ---      SARANYA CHAN MD; Attending Radiologist  This document has been electronically signed. Sep 27 2022  3:00PM      ACC: [id number] EXAM:  US PHYSIOL LWR EXT 3+ LEV BI                          PROCEDURE DATE:  09/26/2022          INTERPRETATION:  Clinical indications: Gangrene changes    COMPARISON: None    FINDINGS: There are biphasic changes bilaterally, dampened at the level   of the metatarsals and left digit. No discernible waveform at the right   digit. Segmental pressures were not obtained at the level of the thighs.    Right JESUS = 1.44  Left JESUS = 1.43    IMPRESSION: Abnormally elevated ABIs compatiblewith calcified vessels.    No discernible pulse volume recording at the level of the right digit.    --- End of Report ---    CARMENCITA FARAH MD; Attending Radiologist  This document has been electronically signed. Sep 26 2022  4:57PM    Imaging Personally Reviewed:  [ x] YES  [ ] NO    Consultant(s) Notes Reviewed:  [ x] YES  [ ] NO    PHYSICAL EXAM:  GENERAL: NAD  HEAD:  Atraumatic, Normocephalic  EYES: conjunctiva and sclera clear  ENMT:  Moist mucous membranes  NECK: Supple  NERVOUS SYSTEM: alert awake  CHEST/LUNG: dec breath sounds at bases  HEART: Regular rate and rhythm  ABDOMEN: Soft, Nontender, Nondistended; Bowel sounds present  EXTREMITIES: no edema foot dressing+  SKIN: No rashes or lesions    Care Collaborated Discussed with Consultants/Other Providers [x ] YES  [ ] NO

## 2022-09-30 NOTE — PROGRESS NOTE ADULT - SUBJECTIVE AND OBJECTIVE BOX
Podiatry interval: Pt is seen resting in bed in no acute distress. Patient inquires if there are any updates on his procedures for his sister. Reports he is sensitive on his right foot. Denies constitutional symptoms. Denies any overnight acute events. Denies further pedal complaints at this time.     Podiatry HPI: Pt is a 72M who presented to ED from his nursing home ([facility]) after his right foot started having gangrene changes. Pt states he has a vacular doctor appointment coming up soon but hasn't seen a vascular doctor as of yet. Admits he has 10/10 pain to heel and digits. Admits he is normally able to walk however; since he got the right heel wound 2-3 months ago, he has been bedbound. Pt denies constitutional symptoms. Pt denies any recent acute trauma. Pt denies further pedal complaints.       Patient admits to  (-) Fevers, (-) Chills, (-) Nausea, (-) Vomiting, (-) Shortness of Breath (-) calf pain (-) chest pain     Medications acetaminophen     Tablet .. 650 milliGRAM(s) Oral every 6 hours PRN  cefepime   IVPB      cefepime   IVPB 1000 milliGRAM(s) IV Intermittent every 24 hours  chlorhexidine 2% Cloths 1 Application(s) Topical <User Schedule>  influenza  Vaccine (HIGH DOSE) 0.7 milliLiter(s) IntraMuscular once  insulin lispro (ADMELOG) corrective regimen sliding scale   SubCutaneous three times a day before meals  insulin lispro (ADMELOG) corrective regimen sliding scale   SubCutaneous at bedtime  melatonin 3 milliGRAM(s) Oral at bedtime PRN  ondansetron Injectable 4 milliGRAM(s) IV Push every 8 hours PRN  pantoprazole    Tablet 40 milliGRAM(s) Oral before breakfast  polyethylene glycol 3350 17 Gram(s) Oral daily  senna 2 Tablet(s) Oral at bedtime  sevelamer carbonate 800 milliGRAM(s) Oral three times a day with meals  vancomycin  IVPB 1000 milliGRAM(s) IV Intermittent every 48 hours    FHNo pertinent family history in first degree relatives    ,   PMHESRD on dialysis    DM (diabetes mellitus)       PSHNo significant past surgical history        Labs                          12.1   4.99  )-----------( 208      ( 30 Sep 2022 06:22 )             37.7      09-30    138  |  102  |  50<H>  ----------------------------<  159<H>  3.9   |  27  |  4.76<H>    Ca    9.1      30 Sep 2022 06:22    TPro  6.7  /  Alb  3.0<L>  /  TBili  0.2  /  DBili  x   /  AST  18  /  ALT  17  /  AlkPhos  57  09-30     Vital Signs Last 24 Hrs  T(C): 36.5 (30 Sep 2022 06:37), Max: 36.7 (29 Sep 2022 18:06)  T(F): 97.7 (30 Sep 2022 06:37), Max: 98 (29 Sep 2022 18:06)  HR: 68 (30 Sep 2022 06:37) (63 - 87)  BP: 114/55 (30 Sep 2022 06:37) (105/52 - 151/73)  BP(mean): --  RR: 18 (30 Sep 2022 06:37) (16 - 19)  SpO2: 99% (30 Sep 2022 06:37) (96% - 99%)    Parameters below as of 30 Sep 2022 06:37  Patient On (Oxygen Delivery Method): room air      Sedimentation Rate, Erythrocyte: 19 mm/Hr (09-26-22 @ 19:00)         C-Reactive Protein, Serum: 9 mg/L (09-26-22 @ 19:00)   WBC Count: 4.99 K/uL (09-30-22 @ 06:22)  WBC Count: 5.96 K/uL (09-29-22 @ 14:45)      ROS - unremarkable outside of Physical Exam      PHYSICAL EXAM  LE Focused: Pt is A&Ox3 in mild distress from right foot pain   Vasc: DP/PT pulses non palpable 0/4 b/l; temperature gradient is warm to cool from proximal leg to digits with R > L. No edema noted to b/l LE. No varicosities. Pedal hair absent.  Derm: Right 2nd digit dry gangrenous ischemic changes noted without erythema, drainage, edema that is rigid and is cool to the touch. Right 1st and 3rd digits at distal tuft's and, now dorsal, show dusky changes that are also cool to the touch without open lesions or signs of infection. Right plantar heel wound with eschar base and no signs of infection, no fluctuance noted. Left foot no signs of gangrene or any open lesions.   Neuro: Protective sensation present b/l; light touch sensation present b/l   MSK: POP to right digits 1-3 and plantar heel wound; muscle strength exam deferred 2/2 pain; b/l hammertoe rigid contractions of digits 2-5       IMAGING    < from: Xray Foot AP + Lateral, Right (09.26.22 @ 17:00) >  ACC: [id number] EXAM:  XR FOOT 2 VIEWS RT                          PROCEDURE DATE:  09/26/2022          INTERPRETATION:  Right foot    HISTORY: Heel gangrene and second toe gangrene     Three views of the right foot show no evidence of acute fracturenor   destructive change. The joint spaces are maintained. Vascular   calcifications are present.    IMPRESSION: No definite bony destruction.      Thank you for this referral.    --- End of Report ---      < end of copied text >      CULTURES:     A:  - PVD  - DM  - ESRD on dialysis  - Right 2nd digit dry gangrene  - Right 1st and 3rd digit ischemic changes  - Right plantar heel eschar       P:   Patient evaluated and Chart reviewed   Discussed diagnosis and treatment with patient  Applied betadine, DSD to right plantar heel and right 1st, 2nd, 3rd digits   Vascular consult appreciated; pt to have angio next week   Recommend R. MRI to r/o OM  X-rays reviewed; no bony destruction   Reviewed JESUS/PVR  NWB to RLE  Nephrology consult appreciated  Offloading to bilateral Heels via CAIR boots  Discussed importance of daily foot examinations and proper shoe gear and to importance of lower Fasting Blood Glucose levels.   Podiatry to follow while in house.  Discussed and seen bedside with Attending Dr. Godinez

## 2022-09-30 NOTE — PROGRESS NOTE ADULT - ASSESSMENT
72 year old male, coming from [facility], with medical history of ESRD (T-TH-S), DM coming in with right leg necrosis. JESUS abnormal findings. CT angio with out flow disease bilat. Vascular planning angio gram next week. found to have pseudomonas UTI, Started on vancomycin and cefepime.  ID following. Plan MRI  r/o osteo, podiatry following.  Pt has L ac av graft that was placed 2 weeks ago per pt. + thrill. -bruit. per vascular will take months to mature. currently on dialysis via R chest PermCath. nephro following

## 2022-09-30 NOTE — PROGRESS NOTE ADULT - SUBJECTIVE AND OBJECTIVE BOX
[facility] NEPHROLOGY- PROGRESS NOTE    Patient is a 73yo Male with ESRD on HD, DM a/w Rt foot gangrene and GI bleed. Nephrology consulted for ESRD status.   COVID-10 + 22    Hospital Medications: Medications reviewed.  REVIEW OF SYSTEMS:  CONSTITUTIONAL: No fevers or chills  RESPIRATORY: No shortness of breath  CARDIOVASCULAR: No chest pain.  GASTROINTESTINAL: No nausea, vomiting, diarrhea or abdominal pain.   VASCULAR: No bilateral lower extremity edema. +Mild Rt foot pain    VITALS:  T(F): 97.8 (22 @ 13:37), Max: 98.9 (22 @ 20:44)  HR: 83 (22 @ 13:37)  BP: 110/62 (22 @ 13:37)  RR: 17 (22 @ 13:37)  SpO2: 98% (22 @ 13:37)  Wt(kg): --      PHYSICAL EXAM:  Gen: NAD,   HEENT: anicteric  Neck: no JVD  Cards: RRR, +S1/S2, no M/G/R  Resp: CTA B/L  GI: soft, NT/ND, NABS  Extremities: no LE edema B/L  Derm: Rt foot wrapped dressing c/d/i  Access: Rt IJ tunneled HD catheter- benign  Left upper arm AVF- no thrill no bruit    LABS:      136  |  100  |  59<H>  ----------------------------<  156<H>  4.0   |  25  |  4.82<H>    Ca    9.3      28 Sep 2022 06:20  Phos  5.1         TPro  7.2  /  Alb  3.2<L>  /  TBili  0.3  /  DBili      /  AST  20  /  ALT  18  /  AlkPhos  63      Creatinine Trend: 4.82 <--, 5.73 <--                        12.6   5.24  )-----------( 238      ( 28 Sep 2022 06:20 )             39.0     Urine Studies:  Urinalysis Basic - ( 26 Sep 2022 16:30 )    Color: Yellow / Appearance: Clear / S.010 / pH:   Gluc:  / Ketone: Negative  / Bili: Negative / Urobili: Negative   Blood:  / Protein: 30 mg/dL / Nitrite: Negative   Leuk Esterase: Small / RBC: 2-5 /HPF / WBC 11-25 /HPF   Sq Epi:  / Non Sq Epi: Few /HPF / Bacteria: Few /HPF         [facility] NEPHROLOGY- PROGRESS NOTE    Patient is a 71yo Male with ESRD on HD, DM a/w Rt foot gangrene and GI bleed. Nephrology consulted for ESRD status.   COVID-10 + 22    Hospital Medications: Medications reviewed.  REVIEW OF SYSTEMS:  CONSTITUTIONAL: No fevers or chills  RESPIRATORY: No shortness of breath  CARDIOVASCULAR: No chest pain.  GASTROINTESTINAL: No nausea, vomiting, diarrhea or abdominal pain.   VASCULAR: No bilateral lower extremity edema. +Mild Rt foot pain    VITALS:  T(F): 96.9 (22 @ 12:07), Max: 98 (22 @ 18:06)  HR: 74 (22 @ 12:07)  BP: 129/68 (22 @ 12:07)  RR: 17 (22 @ 12:07)  SpO2: 100% (22 @ 12:07)  Wt(kg): --      PHYSICAL EXAM:  Gen: NAD,   HEENT: anicteric  Neck: no JVD  Cards: RRR, +S1/S2, no M/G/R  Resp: CTA B/L  GI: soft, NT/ND, NABS  Extremities: no LE edema B/L  Derm: Rt foot wrapped dressing c/d/i  Access: Rt IJ tunneled HD catheter- benign  Left upper arm AVF- no thrill no bruit    LABS:      138  |  102  |  50<H>  ----------------------------<  159<H>  3.9   |  27  |  4.76<H>    Ca    9.1      30 Sep 2022 06:22    TPro  6.7  /  Alb  3.0<L>  /  TBili  0.2  /  DBili      /  AST  18  /  ALT  17  /  AlkPhos  57      Creatinine Trend: 4.76 <--, 6.69 <--, 4.82 <--, 5.73 <--                        12.1   4.99  )-----------( 208      ( 30 Sep 2022 06:22 )             37.7     Urine Studies:  Urinalysis Basic - ( 26 Sep 2022 16:30 )    Color: Yellow / Appearance: Clear / S.010 / pH:   Gluc:  / Ketone: Negative  / Bili: Negative / Urobili: Negative   Blood:  / Protein: 30 mg/dL / Nitrite: Negative   Leuk Esterase: Small / RBC: 2-5 /HPF / WBC 11-25 /HPF   Sq Epi:  / Non Sq Epi: Few /HPF / Bacteria: Few /HPF

## 2022-09-30 NOTE — PROGRESS NOTE ADULT - ASSESSMENT
Patient is a 73yo Male with ESRD on HD, DM a/w Rt foot gangrene and GI bleed. Nephrology consulted for ESRD status.     1) ESRD:  Last HD 9/27, tolerated well with net 2L removed. Plan for next maintenance HD today 9/29. Monitor electrolytes.  Pt with new Left UE AVF- no thrill no bruit- f/u Vascular (Dr. Dutta notified).   2) HTN with ESRD: BP acceptable off antihypertensive medications. Can consider CCB if BP elevated. c/w low sodium diet. Monitor BP.  3) Anemia of renal disease: with blood loss due to GI bleed. Check FOBT. Hb acceptable. No need for JERED at this time. Monitor Hb.  4) Hyperphosphatemia: Serum phosphorus acceptable. c/w Sevelamer 1 tab tid with meals and low phos diet. Monitor serum calcium and phosphorus.  5) Rt foot gangrene: Pt on Cefepime/ Vanco 1g IV every 48 hrs. Podiatry/ Vascular/ ID following. Pt for possible angiogram next week.     [facility] NEPHROLOGY  Wili Hopkins M.D.  Kiran Feng D.O.  Sujatha Dumont M.D.  Radha Miller, MSN, ANP-C  [phone]    [address] [place] [address]   Patient is a 73yo Male with ESRD on HD, DM a/w Rt foot gangrene and GI bleed. Nephrology consulted for ESRD status.     1) ESRD:  Last HD 9/29, with severe intradialytic hypotension with net 307ml removed. Plan for next maintenance HD 10/1. Monitor electrolytes.  Pt with new Left UE AVF- no thrill no bruit- f/u Vascular (Dr. Dutta aware).   2) HTN with ESRD: BP acceptable off antihypertensive medications. Can consider CCB if BP elevated. c/w low sodium diet. Monitor BP.  3) Anemia of renal disease: with blood loss due to GI bleed. Check FOBT. Hb acceptable. No need for JERED at this time. Monitor Hb.  4) Hyperphosphatemia: Serum phosphorus acceptable. c/w Sevelamer 1 tab tid with meals and low phos diet. Monitor serum calcium and phosphorus.  5) Rt foot gangrene: Pt on Cefepime/ Vanco 1g IV every 48 hrs. MRI neg for OM. Podiatry/ Vascular/ ID following. Pt for possible angiogram next week.     [facility] NEPHROLOGY  Wili Hopkins M.D.  Kiran Feng D.O.  Sujatha Dumont M.D.  Radha Miller, MSN, ANP-C  [phone]    [address] [place] [id number]

## 2022-09-30 NOTE — PROGRESS NOTE ADULT - ASSESSMENT
72 year old male, coming from [facility], with medical history of ESRD (T-TH-S), DM coming in with right leg necrosis. JESUS abnormal findings. CT angio with out flow disease bilat. Vascular planning angio gram next week. found to have pseudomonas UTI, Started on vancomycin and cefepime.  ID following. Plan MRI  r/o osteo, podiatry following.  Pt has L ac av graft that was placed 2 weeks ago per pt. + thrill. -bruit. per vascular will take months to mature. currently on dialysis via R chest PermCath. nephro following

## 2022-10-01 LAB
CULTURE RESULTS: SIGNIFICANT CHANGE UP
CULTURE RESULTS: SIGNIFICANT CHANGE UP
GLUCOSE BLDC GLUCOMTR-MCNC: 128 MG/DL — HIGH (ref 70–99)
GLUCOSE BLDC GLUCOMTR-MCNC: 155 MG/DL — HIGH (ref 70–99)
GLUCOSE BLDC GLUCOMTR-MCNC: 204 MG/DL — HIGH (ref 70–99)
GLUCOSE BLDC GLUCOMTR-MCNC: 248 MG/DL — HIGH (ref 70–99)
MRSA PCR RESULT.: SIGNIFICANT CHANGE UP
OB PNL STL: NEGATIVE — SIGNIFICANT CHANGE UP
S AUREUS DNA NOSE QL NAA+PROBE: SIGNIFICANT CHANGE UP
SPECIMEN SOURCE: SIGNIFICANT CHANGE UP
SPECIMEN SOURCE: SIGNIFICANT CHANGE UP

## 2022-10-01 RX ADMIN — Medication 250 MILLIGRAM(S): at 19:34

## 2022-10-01 RX ADMIN — CEFEPIME 100 MILLIGRAM(S): 1 INJECTION, POWDER, FOR SOLUTION INTRAMUSCULAR; INTRAVENOUS at 21:49

## 2022-10-01 RX ADMIN — POLYETHYLENE GLYCOL 3350 17 GRAM(S): 17 POWDER, FOR SOLUTION ORAL at 11:44

## 2022-10-01 RX ADMIN — PANTOPRAZOLE SODIUM 40 MILLIGRAM(S): 20 TABLET, DELAYED RELEASE ORAL at 06:23

## 2022-10-01 RX ADMIN — SEVELAMER CARBONATE 800 MILLIGRAM(S): 2400 POWDER, FOR SUSPENSION ORAL at 11:44

## 2022-10-01 RX ADMIN — Medication 1: at 08:22

## 2022-10-01 RX ADMIN — Medication 2: at 11:43

## 2022-10-01 RX ADMIN — SENNA PLUS 2 TABLET(S): 8.6 TABLET ORAL at 21:48

## 2022-10-01 RX ADMIN — CHLORHEXIDINE GLUCONATE 1 APPLICATION(S): 213 SOLUTION TOPICAL at 06:22

## 2022-10-01 RX ADMIN — Medication 2: at 17:01

## 2022-10-01 RX ADMIN — SEVELAMER CARBONATE 800 MILLIGRAM(S): 2400 POWDER, FOR SUSPENSION ORAL at 08:22

## 2022-10-01 NOTE — PROGRESS NOTE ADULT - ASSESSMENT
Patient is a 73yo Male with ESRD on HD, DM a/w Rt foot gangrene and GI bleed. Nephrology consulted for ESRD status.     1) ESRD:  Last HD 9/29, with severe intradialytic hypotension with net 307ml removed. Plan for next maintenance HD today. Monitor electrolytes.  Pt with new Left UE AVF- no thrill no bruit- f/u Vascular (Dr. Dutta aware).   2) HTN with ESRD: BP acceptable off antihypertensive medications. Can consider CCB if BP elevated. c/w low sodium diet. Monitor BP.  3) Anemia of renal disease: with blood loss due to GI bleed. Check FOBT. Hb acceptable. No need for JERED at this time. Monitor Hb.  4) Hyperphosphatemia: Serum phosphorus acceptable. c/w Sevelamer 1 tab tid with meals and low phos diet. Monitor serum calcium and phosphorus.  5) Rt foot gangrene: Pt on Cefepime/ Vanco 1g IV every 48 hrs. MRI neg for OM. Podiatry/ Vascular/ ID following. Pt for possible angiogram Monday.     [facility] NEPHROLOGY  Wili Hopkins M.D.  Kiran Feng D.O.  Sujatha Dumont M.D.  Radha Miller, MSN, ANP-C  [phone]    [address] [place] [id number]

## 2022-10-01 NOTE — PROGRESS NOTE ADULT - ASSESSMENT
72 year old male, coming from [facility], with medical history of ESRD (T-TH-S), DM coming in with right leg necrosis. JESUS abnormal findings. CT angio with out flow disease bilat. Vascular planning angio gram next week. found to have pseudomonas UTI, Started on vancomycin and cefepime.  ID following. Plan MRI  r/o osteo, podiatry following.  Pt has L ac av graft that was placed 2 weeks ago per pt. + thrill. -bruit. per vascular will take months to mature. currently on dialysis via R chest PermCath. nephro following

## 2022-10-01 NOTE — PROGRESS NOTE ADULT - SUBJECTIVE AND OBJECTIVE BOX
[facility] NEPHROLOGY- PROGRESS NOTE    Patient is a 73yo Male with ESRD on HD, DM a/w Rt foot gangrene and GI bleed. Nephrology consulted for ESRD status.   COVID-10 + 22    Hospital Medications: Medications reviewed.  REVIEW OF SYSTEMS:  CONSTITUTIONAL: No fevers or chills  RESPIRATORY: No shortness of breath  CARDIOVASCULAR: No chest pain.  GASTROINTESTINAL: No nausea, vomiting, diarrhea or abdominal pain.   VASCULAR: No bilateral lower extremity edema. +Mild Rt foot pain      VITALS:  T(F): 97.9 (10-01-22 @ 05:19), Max: 98.3 (22 @ 20:42)  HR: 71 (10-01-22 @ 05:19)  BP: 143/71 (10-01-22 @ 05:19)  RR: 16 (10-01-22 @ 05:19)  SpO2: 100% (10-01-22 @ 05:19)  Wt(kg): --      PHYSICAL EXAM:  Gen: NAD,   HEENT: anicteric  Neck: no JVD  Cards: RRR, +S1/S2, no M/G/R  Resp: CTA B/L  GI: soft, NT/ND, NABS  Extremities: no LE edema B/L  Derm: Rt foot wrapped dressing c/d/i  Access: Rt IJ tunneled HD catheter- benign  Left upper arm AVF- no thrill no bruit      LABS:      138  |  102  |  50<H>  ----------------------------<  159<H>  3.9   |  27  |  4.76<H>    Ca    9.1      30 Sep 2022 06:22    TPro  6.7  /  Alb  3.0<L>  /  TBili  0.2  /  DBili      /  AST  18  /  ALT  17  /  AlkPhos  57      Creatinine Trend: 4.76 <--, 6.69 <--, 4.82 <--, 5.73 <--                        12.1   4.99  )-----------( 208      ( 30 Sep 2022 06:22 )             37.7     Urine Studies:  Urinalysis Basic - ( 26 Sep 2022 16:30 )    Color: Yellow / Appearance: Clear / S.010 / pH:   Gluc:  / Ketone: Negative  / Bili: Negative / Urobili: Negative   Blood:  / Protein: 30 mg/dL / Nitrite: Negative   Leuk Esterase: Small / RBC: 2-5 /HPF / WBC 11-25 /HPF   Sq Epi:  / Non Sq Epi: Few /HPF / Bacteria: Few /HPF

## 2022-10-01 NOTE — PROGRESS NOTE ADULT - SUBJECTIVE AND OBJECTIVE BOX
Patient is seen at the bed side, is afebrile and saturating well in Room Air.  He is doing better, saturating well in Room air.      REVIEW OF SYSTEMS: All other review systems are negative      ALLERGIES: No Known Allergies      Vital Signs Last 24 Hrs  T(C): 36.6 (01 Oct 2022 11:36), Max: 36.8 (30 Sep 2022 20:42)  T(F): 97.8 (01 Oct 2022 11:36), Max: 98.3 (30 Sep 2022 20:42)  HR: 65 (01 Oct 2022 11:36) (65 - 71)  BP: 143/61 (01 Oct 2022 11:36) (143/61 - 143/71)  BP(mean): --  RR: 16 (01 Oct 2022 11:36) (16 - 17)  SpO2: 100% (01 Oct 2022 11:36) (99% - 100%)    Parameters below as of 01 Oct 2022 11:36  Patient On (Oxygen Delivery Method): room air      PHYSICAL EXAM:  GENERAL: Not in distress   HEENT: Right IJ dialysis catheter   CHEST/LUNG:  Not using accessory muscles   EXTREMITIES: Right foot bandage in placed ( Please refer to podiatry note for full description of gangrenous foot)  CNS: Awake and Alert  -Please refer to Primary team's note for rest of the systems      LABS:  No new Labs                         12.1   4.99  )-----------( 208      ( 30 Sep 2022 06:22 )             37.7                           11.4   5.96  )-----------( 229      ( 29 Sep 2022 14:45 )             36.0           138  |  102  |  50<H>  ----------------------------<  159<H>  3.9   |  27  |  4.76<H>    Ca    9.1      30 Sep 2022 06:22    TPro  6.7  /  Alb  3.0<L>  /  TBili  0.2  /  DBili  x   /  AST  18  /  ALT  17  /  AlkPhos  57      0929    134<L>  |  102  |  87<H>  ----------------------------<  148<H>  4.5   |  24  |  6.69<H>    Ca    8.6      29 Sep 2022 14:45  Phos  5.1         TPro  7.2  /  Alb  3.2<L>  /  TBili  0.3  /  DBili  x   /  AST  20  /  ALT  18  /  AlkPhos  63        CAPILLARY BLOOD GLUCOSE  POCT Blood Glucose.: 127 mg/dL (27 Sep 2022 11:31)  POCT Blood Glucose.: 129 mg/dL (27 Sep 2022 06:13)  POCT Blood Glucose.: 307 mg/dL (27 Sep 2022 00:00)  POCT Blood Glucose.: 171 mg/dL (26 Sep 2022 21:34)      Urinalysis Basic - ( 26 Sep 2022 16:30 )  Color: Yellow / Appearance: Clear / S.010 / pH: x  Gluc: x / Ketone: Negative  / Bili: Negative / Urobili: Negative   Blood: x / Protein: 30 mg/dL / Nitrite: Negative   Leuk Esterase: Small / RBC: 2-5 /HPF / WBC 11-25 /HPF   Sq Epi: x / Non Sq Epi: Few /HPF / Bacteria: Few /HPF      Vancomycin Level, Trough (22 @ 15:25) : 11.7  MEDICATIONS  (STANDING):  cefepime   IVPB      cefepime   IVPB 1000 milliGRAM(s) IV Intermittent every 24 hours  chlorhexidine 2% Cloths 1 Application(s) Topical <User Schedule>  influenza  Vaccine (HIGH DOSE) 0.7 milliLiter(s) IntraMuscular once  insulin lispro (ADMELOG) corrective regimen sliding scale   SubCutaneous three times a day before meals  insulin lispro (ADMELOG) corrective regimen sliding scale   SubCutaneous at bedtime  pantoprazole    Tablet 40 milliGRAM(s) Oral before breakfast  polyethylene glycol 3350 17 Gram(s) Oral daily  senna 2 Tablet(s) Oral at bedtime  sevelamer carbonate 800 milliGRAM(s) Oral three times a day with meals  vancomycin  IVPB 1000 milliGRAM(s) IV Intermittent every 48 hours      RADIOLOGY & ADDITIONAL TESTS:    22 from: Xray Foot AP + Lateral, Right (22 @ 17:00) >IMPRESSION: No definite bony destruction.      22: VA Physiol Extremity Lower 3+ Level, BI (22 @ 16:49) IMPRESSION: Abnormally elevated ABIs compatible with calcified vessels.    No discernible pulse volume recording at the level of the right digit.      MICROBIOLOGY DATA:    COVID-19 PCR . (22 @ 10:00)   COVID-19 PCR: Detected  Culture - Urine (22 @ 16:30)   - Amikacin: S <=16   - Aztreonam: S <=4   - Cefepime: S <=2   - Ceftazidime: S 4   - Ciprofloxacin: S <=0.25   - Gentamicin: S <=2   - Imipenem: S <=1   - Levofloxacin: S <=0.5   - Meropenem: S <=1   - Piperacillin/Tazobactam: S <=8   - Tobramycin: S <=2   Specimen Source: Clean Catch Clean Catch (Midstream)   Culture Results:   50,000 - 99,000 CFU/mL Pseudomonas aeruginosa   Organism Identification: Pseudomonas aeruginosa   Organism: Pseudomonas aeruginosa   Method Type: ANH     Culture - Blood (22 @ 14:30)   Specimen Source: .Blood Blood   Culture Results: No growth to date.     Culture - Blood (22 @ 14:20)   Specimen Source: .Blood Blood   Culture Results: No growth to date.     Urine Microscopic-Add On (NC) (22 @ 16:30)   Red Blood Cell - Urine: 2-5 /HPF   White Blood Cell - Urine: 11-25 /HPF   Bacteria: Few /HPF   Comment - Urine: few transitional epithelial cells seen   Epithelial Cells: Few /HPF     Flu With COVID-19 By BEBE (22 @ 14:30)   SARS-CoV-2 Result: NotDetec:

## 2022-10-01 NOTE — PROGRESS NOTE ADULT - SUBJECTIVE AND OBJECTIVE BOX
Podiatry interval: Pt is seen resting in bed eating breakfast in [facility] isolation room in no acute distress. Admits that he is agreeable and aware of his angiogram scheduled for Monday. Reports he is sensitive on his right foot to the gangrenous digits. Denies constitutional symptoms. Denies any overnight acute events. Denies further pedal complaints at this time.     Podiatry HPI: Pt is a 72M who [facility] ED [facility] after his right foot started having gangrene changes. Pt states he has a vacular doctor appointment coming up soon but hasn't seen a vascular doctor as of yet. Admits he has 10/10 pain to heel and digits. Admits he is normally able to walk however; since he got the right heel wound 2-3 months ago, he has been bedbound. Pt denies constitutional symptoms. Pt denies any recent acute trauma. Pt denies further pedal complaints.     Patient admits to  (-) Fevers, (-) Chills, (-) Nausea, (-) Vomiting, (-) Shortness of Breath (-) calf pain (-) chest pain     Medications acetaminophen     Tablet .. 650 milliGRAM(s) Oral every 6 hours PRN  cefepime   IVPB      cefepime   IVPB 1000 milliGRAM(s) IV Intermittent every 24 hours  chlorhexidine 2% Cloths 1 Application(s) Topical <User Schedule>  influenza  Vaccine (HIGH DOSE) 0.7 milliLiter(s) IntraMuscular once  insulin lispro (ADMELOG) corrective regimen sliding scale   SubCutaneous three times a day before meals  insulin lispro (ADMELOG) corrective regimen sliding scale   SubCutaneous at bedtime  melatonin 3 milliGRAM(s) Oral at bedtime PRN  ondansetron Injectable 4 milliGRAM(s) IV Push every 8 hours PRN  pantoprazole    Tablet 40 milliGRAM(s) Oral before breakfast  polyethylene glycol 3350 17 Gram(s) Oral daily  senna 2 Tablet(s) Oral at bedtime  sevelamer carbonate 800 milliGRAM(s) Oral three times a day with meals  vancomycin  IVPB 1000 milliGRAM(s) IV Intermittent every 48 hours    FHNo pertinent family history in first degree relatives    ,   PMHESRD on dialysis    DM (diabetes mellitus)       PSHNo significant past surgical history        Labs                          12.1   4.99  )-----------( 208      ( 30 Sep 2022 06:22 )             37.7      09-30    138  |  102  |  50<H>  ----------------------------<  159<H>  3.9   |  27  |  4.76<H>    Ca    9.1      30 Sep 2022 06:22    TPro  6.7  /  Alb  3.0<L>  /  TBili  0.2  /  DBili  x   /  AST  18  /  ALT  17  /  AlkPhos  57  09-30     Vital Signs Last 24 Hrs  T(C): 36.6 (01 Oct 2022 05:19), Max: 36.8 (30 Sep 2022 20:42)  T(F): 97.9 (01 Oct 2022 05:19), Max: 98.3 (30 Sep 2022 20:42)  HR: 71 (01 Oct 2022 05:19) (71 - 74)  BP: 143/71 (01 Oct 2022 05:19) (129/68 - 143/71)  BP(mean): --  RR: 16 (01 Oct 2022 05:19) (16 - 17)  SpO2: 100% (01 Oct 2022 05:19) (99% - 100%)    Parameters below as of 01 Oct 2022 05:19  Patient On (Oxygen Delivery Method): room air      Sedimentation Rate, Erythrocyte: 19 mm/Hr (09-26-22 @ 19:00)         C-Reactive Protein, Serum: 9 mg/L (09-26-22 @ 19:00)       ROS unremarkable outside of HPI    PHYSICAL EXAM  LE Focused: Pt is A&Ox3 in mild distress from right foot pain   Vasc: DP/PT pulses non palpable 0/4 b/l; temperature gradient is warm to cool from proximal leg to digits with R > L. No edema noted to b/l LE. No varicosities. Pedal hair absent.  Derm: Right 2nd digit dry gangrenous ischemic changes noted without erythema, drainage, edema that is rigid and is cool to the touch. Right 1st and 3rd digits at distal tuft's and, now dorsal, show dusky changes that are also cool to the touch without open lesions or signs of infection. Right plantar heel wound with eschar base and no signs of infection, no fluctuance noted. Left foot no signs of gangrene or any open lesions.   Neuro: Protective sensation present b/l; light touch sensation present b/l   MSK: POP to right digits 1-3 and plantar heel wound; muscle strength exam deferred 2/2 pain; b/l hammertoe rigid contractions of digits 2-5       IMAGING    < from: Xray Foot AP + Lateral, Right (09.26.22 @ 17:00) >  ACC: [id number] EXAM:  XR FOOT 2 VIEWS RT                          PROCEDURE DATE:  09/26/2022          INTERPRETATION:  Right foot    HISTORY: Heel gangrene and second toe gangrene     Three views of the right foot show no evidence of acute fracturenor   destructive change. The joint spaces are maintained. Vascular   calcifications are present.    IMPRESSION: No definite bony destruction.      Thank you for this referral.    --- End of Report ---      < end of copied text >      CULTURES:     A:  - PVD  - DM  - ESRD on dialysis  - Right 2nd digit dry gangrene  - Right 1st and 3rd digit ischemic changes  - Right plantar heel eschar       P:   Patient evaluated and Chart reviewed   Discussed diagnosis and treatment with patient  Applied betadine, DSD to right plantar heel and right 1st, 2nd, 3rd digits   Vascular consult appreciated; pt to have angio Monday; pending results of angiogram, potential podiatric intervention   MRI results reviewed   X-rays reviewed; no bony destruction   Reviewed JESUS/PVR  NWB to RLE  Nephrology consult appreciated  Offloading to bilateral Heels via CAIR boots  Discussed importance of daily foot examinations and proper shoe gear and to importance of lower Fasting Blood Glucose levels.   Podiatry to follow while in house.  Discussed and seen bedside with Attending Dr. Martínez  Discussed with attending Dr. Godinez

## 2022-10-01 NOTE — PROGRESS NOTE ADULT - ASSESSMENT
Patient is a 72y old  Male with medical history of ESRD (T-TH-S), DM, now send in to the ER from [facility], for evaluation of right foot necrosis. On admission, he has no fever, No leukocytosis. He has evaluated by Podiatry , started on IV Vancomycin, and the ID consult requested to assist with further evaluation and antibiotic management.     # Right  gangrenous foot- scheduled for Angiogram in next week  # UTI- Urine cx grew Pseudomonas.   # Positive COVID as of 9/29/22- Not hypoxic     would recommend:    1. Dose of IV Vancomycin now since level i slow, 11.7  2. Continue IV Cefepime  3. Wound care as per Podiatry   4.  Monitor and Keep Vancomycin level between 15 to 20  5. Monitor oxygen saturation closely and if drop to 94 % then start Remdesivir and Dexamethasone  6. COVID precaution    Attending Attestation:    Spent more than 35 minutes on total encounter, more than 50 % of the visit was spent counseling and/or coordinating care by the Attending physician.

## 2022-10-02 ENCOUNTER — TRANSCRIPTION ENCOUNTER (OUTPATIENT)
Age: 72
End: 2022-10-02

## 2022-10-02 LAB
GLUCOSE BLDC GLUCOMTR-MCNC: 112 MG/DL — HIGH (ref 70–99)
GLUCOSE BLDC GLUCOMTR-MCNC: 158 MG/DL — HIGH (ref 70–99)
GLUCOSE BLDC GLUCOMTR-MCNC: 277 MG/DL — HIGH (ref 70–99)
GLUCOSE BLDC GLUCOMTR-MCNC: 288 MG/DL — HIGH (ref 70–99)
SARS-COV-2 RNA SPEC QL NAA+PROBE: SIGNIFICANT CHANGE UP

## 2022-10-02 RX ORDER — CHLORHEXIDINE GLUCONATE 213 G/1000ML
1 SOLUTION TOPICAL DAILY
Refills: 0 | Status: DISCONTINUED | OUTPATIENT
Start: 2022-10-02 | End: 2022-10-03

## 2022-10-02 RX ADMIN — Medication 1: at 08:20

## 2022-10-02 RX ADMIN — Medication 3: at 12:11

## 2022-10-02 RX ADMIN — CEFEPIME 100 MILLIGRAM(S): 1 INJECTION, POWDER, FOR SOLUTION INTRAMUSCULAR; INTRAVENOUS at 17:10

## 2022-10-02 RX ADMIN — CHLORHEXIDINE GLUCONATE 1 APPLICATION(S): 213 SOLUTION TOPICAL at 05:41

## 2022-10-02 RX ADMIN — SEVELAMER CARBONATE 800 MILLIGRAM(S): 2400 POWDER, FOR SUSPENSION ORAL at 17:10

## 2022-10-02 RX ADMIN — Medication 1: at 21:25

## 2022-10-02 RX ADMIN — SEVELAMER CARBONATE 800 MILLIGRAM(S): 2400 POWDER, FOR SUSPENSION ORAL at 12:12

## 2022-10-02 RX ADMIN — SEVELAMER CARBONATE 800 MILLIGRAM(S): 2400 POWDER, FOR SUSPENSION ORAL at 08:28

## 2022-10-02 RX ADMIN — PANTOPRAZOLE SODIUM 40 MILLIGRAM(S): 20 TABLET, DELAYED RELEASE ORAL at 05:41

## 2022-10-02 RX ADMIN — SENNA PLUS 2 TABLET(S): 8.6 TABLET ORAL at 21:24

## 2022-10-02 RX ADMIN — Medication 3 MILLIGRAM(S): at 21:24

## 2022-10-02 NOTE — PROGRESS NOTE ADULT - SUBJECTIVE AND OBJECTIVE BOX
Podiatry interval: Pt is seen resting in bed in [facility] isolation room in no acute distress. Admits that he is agreeable and aware of his angiogram scheduled for Monday. Reports he is sensitive on his right foot to the gangrenous digits. Denies constitutional symptoms. Denies any overnight acute events. Denies further pedal complaints at this time.     Podiatry HPI: Pt is a 72M who presented to ED from his nursing home ([facility]) after his right foot started having gangrene changes. Pt states he has a vacular doctor appointment coming up soon but hasn't seen a vascular doctor as of yet. Admits he has 10/10 pain to heel and digits. Admits he is normally able to walk however; since he got the right heel wound 2-3 months ago, he has been bedbound. Pt denies constitutional symptoms. Pt denies any recent acute trauma. Pt denies further pedal complaints.       Patient admits to  (-) Fevers, (-) Chills, (-) Nausea, (-) Vomiting, (-) Shortness of Breath (-) calf pain (-) chest pain     Medications acetaminophen     Tablet .. 650 milliGRAM(s) Oral every 6 hours PRN  cefepime   IVPB      cefepime   IVPB 1000 milliGRAM(s) IV Intermittent every 24 hours  chlorhexidine 2% Cloths 1 Application(s) Topical <User Schedule>  influenza  Vaccine (HIGH DOSE) 0.7 milliLiter(s) IntraMuscular once  insulin lispro (ADMELOG) corrective regimen sliding scale   SubCutaneous three times a day before meals  insulin lispro (ADMELOG) corrective regimen sliding scale   SubCutaneous at bedtime  melatonin 3 milliGRAM(s) Oral at bedtime PRN  ondansetron Injectable 4 milliGRAM(s) IV Push every 8 hours PRN  pantoprazole    Tablet 40 milliGRAM(s) Oral before breakfast  polyethylene glycol 3350 17 Gram(s) Oral daily  senna 2 Tablet(s) Oral at bedtime  sevelamer carbonate 800 milliGRAM(s) Oral three times a day with meals  vancomycin  IVPB 1000 milliGRAM(s) IV Intermittent every 48 hours    FHNo pertinent family history in first degree relatives    ,   PMHESRD on dialysis    DM (diabetes mellitus)       PSHNo significant past surgical history        Labs              Vital Signs Last 24 Hrs  T(C): 36.6 (02 Oct 2022 12:52), Max: 36.7 (01 Oct 2022 20:13)  T(F): 97.8 (02 Oct 2022 12:52), Max: 98 (01 Oct 2022 20:13)  HR: 69 (02 Oct 2022 12:52) (63 - 73)  BP: 128/65 (02 Oct 2022 12:52) (125/61 - 146/73)  BP(mean): --  RR: 16 (02 Oct 2022 12:52) (16 - 18)  SpO2: 98% (02 Oct 2022 12:52) (98% - 99%)    Parameters below as of 02 Oct 2022 12:52  Patient On (Oxygen Delivery Method): room air      Sedimentation Rate, Erythrocyte: 19 mm/Hr (09-26-22 @ 19:00)         C-Reactive Protein, Serum: 9 mg/L (09-26-22 @ 19:00)     ROS unremarkable outside of HPI    PHYSICAL EXAM  LE Focused: Pt is A&Ox3 in mild distress from right foot pain   Vasc: DP/PT pulses non palpable 0/4 b/l; temperature gradient is warm to cool from proximal leg to digits with R > L. No edema noted to b/l LE. No varicosities. Pedal hair absent.  Derm: Right 2nd digit dry gangrenous ischemic changes noted without erythema, drainage, edema that is rigid and is cool to the touch. Right 1st and 3rd digits at distal tuft's and, now dorsal, show dusky changes that are also cool to the touch without open lesions or signs of infection. Right plantar heel wound with eschar base and no signs of infection, no fluctuance noted. Left foot no signs of gangrene or any open lesions.   Neuro: Protective sensation present b/l; light touch sensation present b/l   MSK: POP to right digits 1-3 and plantar heel wound; muscle strength exam deferred 2/2 pain; b/l hammertoe rigid contractions of digits 2-5       IMAGING    < from: Xray Foot AP + Lateral, Right (09.26.22 @ 17:00) >  ACC: [id number] EXAM:  XR FOOT 2 VIEWS RT                          PROCEDURE DATE:  09/26/2022          INTERPRETATION:  Right foot    HISTORY: Heel gangrene and second toe gangrene     Three views of the right foot show no evidence of acute fracturenor   destructive change. The joint spaces are maintained. Vascular   calcifications are present.    IMPRESSION: No definite bony destruction.      Thank you for this referral.    --- End of Report ---      < end of copied text >      CULTURES:     A:  - PVD  - DM  - ESRD on dialysis  - Right 2nd digit dry gangrene  - Right 1st and 3rd digit ischemic changes  - Right plantar heel eschar       P:   Patient evaluated and Chart reviewed   Discussed diagnosis and treatment with patient  Applied betadine, DSD to right plantar heel and right 1st, 2nd, 3rd digits   Vascular consult appreciated; pt to have angio Monday; pending results of angiogram, potential podiatric intervention   MRI results reviewed   X-rays reviewed; no bony destruction   Reviewed JESUS/PVR  NWB to RLE  Nephrology consult appreciated  Offloading to bilateral Heels via CAIR boots  Discussed importance of daily foot examinations and proper shoe gear and to importance of lower Fasting Blood Glucose levels.   Podiatry to follow while in house.  Discussed with attending Dr. Godinez

## 2022-10-02 NOTE — PROGRESS NOTE ADULT - PROBLEM SELECTOR PLAN 1
- Pt presented with right foot gangrene.   - CTA run off- out flow disease- vascular planning angiogram   pt medically clear for angiogram

## 2022-10-02 NOTE — PROGRESS NOTE ADULT - ASSESSMENT
Patient is a 71yo Male with ESRD on HD, DM a/w Rt foot gangrene and GI bleed. Nephrology consulted for ESRD status.     1) ESRD:  Last HD 10/1 tolerated well with 0.7L removed. Plan for next maintenance HD on 10/4. Monitor electrolytes.  Pt with new Left UE AVF- no thrill no bruit- f/u Vascular (Dr. Dutta aware).   2) HTN with ESRD: BP acceptable off antihypertensive medications. Can consider CCB if BP elevated. c/w low sodium diet. Monitor BP.  3) Anemia of renal disease: with blood loss due to GI bleed. Check FOBT. Hb acceptable. No need for JERED at this time. Monitor Hb.  4) Hyperphosphatemia: Serum phosphorus acceptable. c/w Sevelamer 1 tab tid with meals and low phos diet. Monitor serum calcium and phosphorus.  5) Rt foot gangrene: Pt on Cefepime/ Vanco 1g IV every 48 hrs. MRI neg for OM. Podiatry/ Vascular/ ID following. Pt for possible angiogram Monday.     [facility] NEPHROLOGY  Wili Hopkins M.D.  Kiran Feng D.O.  Sujatha Dumont M.D.  Radha Miller, MSN, ANP-C  [phone]    [address] [place] [address]

## 2022-10-02 NOTE — PROGRESS NOTE ADULT - ASSESSMENT
Patient is a 72y old  Male with medical history of ESRD (T-TH-S), DM, now send in to the ER from [facility], for evaluation of right foot necrosis. On admission, he has no fever, No leukocytosis. He has evaluated by Podiatry , started on IV Vancomycin, and the ID consult requested to assist with further evaluation and antibiotic management.     # Right  gangrenous foot- scheduled for Angiogram in next week  # UTI- Urine cx grew Pseudomonas.   # Positive COVID as of 9/29/22- Not hypoxic     would recommend:    1. NPO after midnight for Angiogram in AM  2. Continue IV Cefepime and IV Vancomycin for now  3. Wound care as per Podiatry   4.  Monitor and Keep Vancomycin level between 15 to 20  5. Monitor oxygen saturation closely and if drop to 94 % then start Remdesivir and Dexamethasone  6. COVID precaution    Attending Attestation:    Spent more than 35 minutes on total encounter, more than 50 % of the visit was spent counseling and/or coordinating care by the Attending physician.

## 2022-10-02 NOTE — PROGRESS NOTE ADULT - ASSESSMENT
72y.o. Male with PAD, R LE Dry Gangrene    - OR 10/3 for B/L LE Angiogram Possible Balloon Stent   - NPO after midnight except PO meds  - IVF when NPO  - F/u medical clearance, discussed with Dr. Huertas   - Chlorhexidine wipes  - DVT ppx  - f/u preop labs  - consent pending   - Remainder of care as per primary team   - Discussed and agreed with Dr. Billy

## 2022-10-02 NOTE — PROGRESS NOTE ADULT - SUBJECTIVE AND OBJECTIVE BOX
Patient is seen at the bed side, remains afebrile and saturating well in Room Air.  He is scheduled for Angiogram on Monday.      REVIEW OF SYSTEMS: All other review systems are negative      ALLERGIES: No Known Allergies      Vital Signs Last 24 Hrs  T(C): 36.6 (02 Oct 2022 12:52), Max: 36.7 (01 Oct 2022 20:13)  T(F): 97.8 (02 Oct 2022 12:52), Max: 98 (01 Oct 2022 20:13)  HR: 69 (02 Oct 2022 12:52) (63 - 73)  BP: 128/65 (02 Oct 2022 12:52) (125/61 - 146/73)  BP(mean): --  RR: 16 (02 Oct 2022 12:52) (16 - 18)  SpO2: 98% (02 Oct 2022 12:52) (98% - 99%)    Parameters below as of 02 Oct 2022 12:52  Patient On (Oxygen Delivery Method): room air        PHYSICAL EXAM:  GENERAL: Not in distress   HEENT: Right IJ dialysis catheter   CHEST/LUNG:  Not using accessory muscles   EXTREMITIES: Right foot bandage in placed ( Please refer to podiatry note for full description of gangrenous foot)  CNS: Awake and Alert  -Please refer to Primary team's note for rest of the systems      LABS:  No new Labs                           12.1   4.99  )-----------( 208      ( 30 Sep 2022 06:22 )             37.7                           11.4   5.96  )-----------( 229      ( 29 Sep 2022 14:45 )             36.0       30    138  |  102  |  50<H>  ----------------------------<  159<H>  3.9   |  27  |  4.76<H>    Ca    9.1      30 Sep 2022 06:22    TPro  6.7  /  Alb  3.0<L>  /  TBili  0.2  /  DBili  x   /  AST  18  /  ALT  17  /  AlkPhos  57      09    134<L>  |  102  |  87<H>  ----------------------------<  148<H>  4.5   |  24  |  6.69<H>    Ca    8.6      29 Sep 2022 14:45  Phos  5.1         TPro  7.2  /  Alb  3.2<L>  /  TBili  0.3  /  DBili  x   /  AST  20  /  ALT  18  /  AlkPhos  63        CAPILLARY BLOOD GLUCOSE  POCT Blood Glucose.: 127 mg/dL (27 Sep 2022 11:31)  POCT Blood Glucose.: 129 mg/dL (27 Sep 2022 06:13)  POCT Blood Glucose.: 307 mg/dL (27 Sep 2022 00:00)  POCT Blood Glucose.: 171 mg/dL (26 Sep 2022 21:34)      Urinalysis Basic - ( 26 Sep 2022 16:30 )  Color: Yellow / Appearance: Clear / S.010 / pH: x  Gluc: x / Ketone: Negative  / Bili: Negative / Urobili: Negative   Blood: x / Protein: 30 mg/dL / Nitrite: Negative   Leuk Esterase: Small / RBC: 2-5 /HPF / WBC 11-25 /HPF   Sq Epi: x / Non Sq Epi: Few /HPF / Bacteria: Few /HPF      Vancomycin Level, Trough (22 @ 15:25) : 11.7  MEDICATIONS  (STANDING):    cefepime   IVPB      cefepime   IVPB 1000 milliGRAM(s) IV Intermittent every 24 hours  chlorhexidine 2% Cloths 1 Application(s) Topical <User Schedule>  influenza  Vaccine (HIGH DOSE) 0.7 milliLiter(s) IntraMuscular once  insulin lispro (ADMELOG) corrective regimen sliding scale   SubCutaneous three times a day before meals  insulin lispro (ADMELOG) corrective regimen sliding scale   SubCutaneous at bedtime  pantoprazole    Tablet 40 milliGRAM(s) Oral before breakfast  polyethylene glycol 3350 17 Gram(s) Oral daily  senna 2 Tablet(s) Oral at bedtime  sevelamer carbonate 800 milliGRAM(s) Oral three times a day with meals  vancomycin  IVPB 1000 milliGRAM(s) IV Intermittent every 48 hours      RADIOLOGY & ADDITIONAL TESTS:    22 from: Xray Foot AP + Lateral, Right (22 @ 17:00) >IMPRESSION: No definite bony destruction.      22: VA Physiol Extremity Lower 3+ Level, BI (22 @ 16:49) IMPRESSION: Abnormally elevated ABIs compatible with calcified vessels.    No discernible pulse volume recording at the level of the right digit.      MICROBIOLOGY DATA:    COVID-19 PCR . (22 @ 10:00)   COVID-19 PCR: Detected  Culture - Urine (22 @ 16:30)   - Amikacin: S <=16   - Aztreonam: S <=4   - Cefepime: S <=2   - Ceftazidime: S 4   - Ciprofloxacin: S <=0.25   - Gentamicin: S <=2   - Imipenem: S <=1   - Levofloxacin: S <=0.5   - Meropenem: S <=1   - Piperacillin/Tazobactam: S <=8   - Tobramycin: S <=2   Specimen Source: Clean Catch Clean Catch (Midstream)   Culture Results:   50,000 - 99,000 CFU/mL Pseudomonas aeruginosa   Organism Identification: Pseudomonas aeruginosa   Organism: Pseudomonas aeruginosa   Method Type: ANH     Culture - Blood (22 @ 14:30)   Specimen Source: .Blood Blood   Culture Results: No growth to date.     Culture - Blood (22 @ 14:20)   Specimen Source: .Blood Blood   Culture Results: No growth to date.     Urine Microscopic-Add On (NC) (22 @ 16:30)   Red Blood Cell - Urine: 2-5 /HPF   White Blood Cell - Urine: 11-25 /HPF   Bacteria: Few /HPF   Comment - Urine: few transitional epithelial cells seen   Epithelial Cells: Few /HPF     Flu With COVID-19 By BEBE (22 @ 14:30)   SARS-CoV-2 Result: NotDetec:

## 2022-10-02 NOTE — PROGRESS NOTE ADULT - SUBJECTIVE AND OBJECTIVE BOX
Patient is a 72y old  Male who presents with a chief complaint of Gangrene foot (30 Sep 2022 17:06)      INTERVAL HPI/OVERNIGHT EVENTS: pt seen and examined    MEDICATIONS  (STANDING):  cefepime   IVPB      cefepime   IVPB 1000 milliGRAM(s) IV Intermittent every 24 hours  chlorhexidine 2% Cloths 1 Application(s) Topical daily  chlorhexidine 2% Cloths 1 Application(s) Topical <User Schedule>  influenza  Vaccine (HIGH DOSE) 0.7 milliLiter(s) IntraMuscular once  insulin lispro (ADMELOG) corrective regimen sliding scale   SubCutaneous three times a day before meals  insulin lispro (ADMELOG) corrective regimen sliding scale   SubCutaneous at bedtime  pantoprazole    Tablet 40 milliGRAM(s) Oral before breakfast  polyethylene glycol 3350 17 Gram(s) Oral daily  senna 2 Tablet(s) Oral at bedtime  sevelamer carbonate 800 milliGRAM(s) Oral three times a day with meals  vancomycin  IVPB 1000 milliGRAM(s) IV Intermittent every 48 hours    MEDICATIONS  (PRN):  acetaminophen     Tablet .. 650 milliGRAM(s) Oral every 6 hours PRN Temp greater or equal to 38C (100.4F), Mild Pain (1 - 3)  melatonin 3 milliGRAM(s) Oral at bedtime PRN Insomnia  ondansetron Injectable 4 milliGRAM(s) IV Push every 8 hours PRN Nausea and/or Vomiting    Vital Signs Last 24 Hrs  T(C): 36.7 (10-02-22 @ 20:45), Max: 36.7 (10-02-22 @ 20:45)  T(F): 98 (10-02-22 @ 20:45), Max: 98 (10-02-22 @ 20:45)  HR: 67 (10-02-22 @ 20:45) (63 - 69)  BP: 137/67 (10-02-22 @ 20:45) (125/61 - 137/67)  BP(mean): --  RR: 17 (10-02-22 @ 20:45) (16 - 18)  SpO2: 96% (10-02-22 @ 20:45) (96% - 98%)        DM (diabetes mellitus)    PHYSICAL EXAM:  GENERAL: NAD  HEAD:  Atraumatic, Normocephalic  EYES: conjunctiva and sclera clear  ENMT:  Moist mucous membranes  NECK: Supple  NERVOUS SYSTEM:  Alert awake  CHEST/LUNG: CTA bilaterally; No rales, rhonchi, wheezing  HEART: Regular rate and rhythm  ABDOMEN: Soft, Nontender, Nondistended; Bowel sounds present  EXTREMITIES:  foot dressing+    No significant past surgical history    LABS:        Creatinine Trend: 4.76 <--, 6.69 <--, 4.82 <--, 5.73 <--    Urine Studies:  Urinalysis Basic - ( 26 Sep 2022 16:30 )    Color: Yellow / Appearance: Clear / S.010 / pH:   Gluc:  / Ketone: Negative  / Bili: Negative / Urobili: Negative   Blood:  / Protein: 30 mg/dL / Nitrite: Negative   Leuk Esterase: Small / RBC: 2-5 /HPF / WBC 11-25 /HPF   Sq Epi:  / Non Sq Epi: Few /HPF / Bacteria: Few /HPF                        RADIOLOGY & ADDITIONAL TESTS:< from: MR Foot No Cont, Right (22 @ 20:37) >    ACC: [id number] EXAM:  MR FOOT RT                          PROCEDURE DATE:  2022          INTERPRETATION:  MR FOOT RIGHT dated 2022 8:37 PM    INDICATION: Pain and swelling    COMPARISON: Foot radiographs dated 2022    TECHNIQUE: Multi-sequential, multiplanar MRI imaging of the right ankle   and hindfoot was performed per standard protocol.    FINDINGS:    BONE MARROW: There is no focal T1 signal hypointensity present. No   cortical erosion or destruction is noted. There is no fracture. No   osteonecrosis.  SYNOVIUM/ JOINT FLUID: No joint effusion.  TENDONS: Mild tendinosis of the posterior tibialis tendon. No   full-thickness tendon tear or retraction  MUSCLES: Edema and atrophy in the musculature. A nonspecific finding that   can be seen with neuropathic change.  NEUROVASCULAR STRUCTURES: Preserved  SUBCUTANEOUS SOFT TISSUES: Mild dorsal forefoot soft tissue swelling.   Posterior heel skin wound. Mild subjacent edema.    IMPRESSION:    1.  No osteomyelitis.  2.  Posterior heel skin wound and dorsal forefoot soft tissue swelling.   Nonspecific but can be seen with cellulitis. No drainable fluid   collection.    --- End of Report ---            ALAYNA ADAM MD; Attending Radiologist  This document has been electronically signed. Sep 30 2022  9:04AM    < end of copied text >      ACC: [id number] EXAM:  CT ANGIO ABD AOR W RUN(W)AW IC                          PROCEDURE DATE:  2022          INTERPRETATION:  CLINICAL INFORMATION: Right leg necrosis    COMPARISON: None.    CONTRAST/COMPLICATIONS:  IV Contrast: Omnipaque [id number] cc administered   10 cc discarded  Oral Contrast: NONE  Complications: None reported at time of study completion    CT ANGIOGRAM ABDOMEN, PELVIS, AND LOWER EXTREMITIES:    PROCEDURE:  Initially, nonenhanced CT was obtained through the calves. Then,   following the rapid administration of intravenous contrast, CT   angiography was performed through the abdomen, pelvis, and lower   extremities down to the toes.  Delayed images through the calves were   also obtained. Sagittal and coronal reformats as well as 3D   reconstructions were performed.    FINDINGS: Calcified atherosclerotic disease.    CENTRAL ARTERIAL SYSTEM:    No aortic dissection, or aneurysm. The celiac axis artery, SMA, XANDER, and   the bilateral main renal arteries are patent without stenosis.    RIGHT LOWER EXTREMITY: The common, internal and external iliac arteries   are patent without stenosis. Mild focal stenosis at the common femoral   artery. A few mild focal stenoses are seen in the superficial femoral   artery. Multifocal mild-to-moderate stenoses in the popliteal artery.    Three-vessel runoff in the right calf with multifocal stenoses in the   anterior tibial, peroneal and posterior tibial arteries. There is a   severe focal stenosis at the tibioperoneal trunkartery.    Evaluation of the dorsalis pedis and posterior tibial arteries in the   right foot is difficult due to extensive vascular calcifications.    LEFT LOWER EXTREMITY: The common, internal and external iliac arteries   are patent without stenosis. The common femoral artery is patent without   stenosis. Mild stenoses at the distal superficial femoral artery. The   popliteal artery is patent with mild multifocal stenoses.    At least two-vessel runoff in the left calf via the anterior tibial and   peroneal arteries. The posterior tibial artery is difficult to evaluate   due to extensive vascular calcifications. Moderate to severe stenosis at   the tibioperoneal trunk artery. Multifocal stenoses at the anterior   tibial and peroneal arteries.    In the left foot, the dorsalis pedis and posterior tibial arteries are   difficult to evaluate due to extensive vascular calcifications.    ADDITIONAL FINDINGS: Nonspecific mild left adrenal thickening. Small   nonobstructive calculus in the left kidney. Bilateral renal cysts.   Degenerative spondylosis.    IMPRESSION:    Outflow disease bilaterally as detailed above.      --- End of Report ---      SARANYA CHAN MD; Attending Radiologist  This document has been electronically signed. Sep 27 2022  3:00PM      ACC: [id number] EXAM:  US PHYSIOL LWR EXT 3+ LEV BI                          PROCEDURE DATE:  2022          INTERPRETATION:  Clinical indications: Gangrene changes    COMPARISON: None    FINDINGS: There are biphasic changes bilaterally, dampened at the level   of the metatarsals and left digit. No discernible waveform at the right   digit. Segmental pressures were not obtained at the level of the thighs.    Right JESUS = 1.44  Left JESUS = 1.43    IMPRESSION: Abnormally elevated ABIs compatiblewith calcified vessels.    No discernible pulse volume recording at the level of the right digit.    --- End of Report ---    CARMENCITA FARAH MD; Attending Radiologist  This document has been electronically signed. Sep 26 2022  4:57PM    Imaging Personally Reviewed:  [ x] YES  [ ] NO    Consultant(s) Notes Reviewed:  [ x] YES  [ ] NO  Care Collaborated Discussed with Consultants/Other Providers [x ] YES  [ ] NO

## 2022-10-02 NOTE — PROGRESS NOTE ADULT - SUBJECTIVE AND OBJECTIVE BOX
[facility] NEPHROLOGY- PROGRESS NOTE    Patient is a 71yo Male with ESRD on HD, DM a/w Rt foot gangrene and GI bleed. Nephrology consulted for ESRD status.   COVID-10 + 22    Hospital Medications: Medications reviewed.  REVIEW OF SYSTEMS:  CONSTITUTIONAL: No fevers or chills  RESPIRATORY: No shortness of breath  CARDIOVASCULAR: No chest pain.  GASTROINTESTINAL: No nausea, vomiting, diarrhea or abdominal pain.   VASCULAR: No bilateral lower extremity edema. +Mild Rt foot pain      VITALS:  T(F): 97.8 (10-02-22 @ 12:52), Max: 98 (10-01-22 @ 20:13)  HR: 69 (10-02-22 @ 12:52)  BP: 128/65 (10-02-22 @ 12:52)  RR: 16 (10-02-22 @ 12:52)  SpO2: 98% (10-02-22 @ 12:52)  Wt(kg): --      PHYSICAL EXAM:  Gen: NAD,   HEENT: anicteric  Neck: no JVD  Cards: RRR, +S1/S2, no M/G/R  Resp: CTA B/L  GI: soft, NT/ND, NABS  Extremities: no LE edema B/L  Derm: Rt foot wrapped dressing c/d/i  Access: Rt IJ tunneled HD catheter- benign  Left upper arm AVF- no thrill no bruit      LABS:        Creatinine Trend: 4.76 <--, 6.69 <--, 4.82 <--, 5.73 <--    Urine Studies:  Urinalysis Basic - ( 26 Sep 2022 16:30 )    Color: Yellow / Appearance: Clear / S.010 / pH:   Gluc:  / Ketone: Negative  / Bili: Negative / Urobili: Negative   Blood:  / Protein: 30 mg/dL / Nitrite: Negative   Leuk Esterase: Small / RBC: 2-5 /HPF / WBC 11-25 /HPF   Sq Epi:  / Non Sq Epi: Few /HPF / Bacteria: Few /HPF

## 2022-10-02 NOTE — PROGRESS NOTE ADULT - ASSESSMENT
72 year old male, coming from [facility], with medical history of ESRD (T-TH-S), DM coming in with right leg necrosis. JESUS abnormal findings. CT angio with out flow disease bilat. Vascular planning angio gram next week. found to have pseudomonas UTI, Started on vancomycin and cefepime.  ID following. Plan MRI  r/o osteo, podiatry following.  Pt has L ac av graft that was placed 2 weeks ago per pt. + thrill. -bruit. per vascular will take months to mature. currently on dialysis via R chest PermCath. nephro following

## 2022-10-02 NOTE — PROGRESS NOTE ADULT - SUBJECTIVE AND OBJECTIVE BOX
Surgery Pre-op    Dx: PAD, R LE Dry Gangrene  Procedure: B/L LE Angiogram Possible Balloon Stent     Patient seen at bedside.   Discussed with patient, demonstrated understanding and all questions were answered.     Vital Signs Last 24 Hrs  T(C): 36.6 (02 Oct 2022 12:52), Max: 36.7 (01 Oct 2022 20:13)  T(F): 97.8 (02 Oct 2022 12:52), Max: 98 (01 Oct 2022 20:13)  HR: 69 (02 Oct 2022 12:52) (63 - 73)  BP: 128/65 (02 Oct 2022 12:52) (125/61 - 146/73)  BP(mean): --  RR: 16 (02 Oct 2022 12:52) (16 - 18)  SpO2: 98% (02 Oct 2022 12:52) (98% - 99%)    Parameters below as of 02 Oct 2022 12:52  Patient On (Oxygen Delivery Method): room air

## 2022-10-03 ENCOUNTER — TRANSCRIPTION ENCOUNTER (OUTPATIENT)
Age: 72
End: 2022-10-03

## 2022-10-03 LAB
ALBUMIN SERPL ELPH-MCNC: 3.1 G/DL — LOW (ref 3.5–5)
ALP SERPL-CCNC: 57 U/L — SIGNIFICANT CHANGE UP (ref 40–120)
ALT FLD-CCNC: 18 U/L DA — SIGNIFICANT CHANGE UP (ref 10–60)
ANION GAP SERPL CALC-SCNC: 12 MMOL/L — SIGNIFICANT CHANGE UP (ref 5–17)
APTT BLD: 34.9 SEC — SIGNIFICANT CHANGE UP (ref 27.5–35.5)
AST SERPL-CCNC: 16 U/L — SIGNIFICANT CHANGE UP (ref 10–40)
BILIRUB SERPL-MCNC: 0.3 MG/DL — SIGNIFICANT CHANGE UP (ref 0.2–1.2)
BLD GP AB SCN SERPL QL: SIGNIFICANT CHANGE UP
BUN SERPL-MCNC: 68 MG/DL — HIGH (ref 7–18)
CALCIUM SERPL-MCNC: 9.1 MG/DL — SIGNIFICANT CHANGE UP (ref 8.4–10.5)
CHLORIDE SERPL-SCNC: 100 MMOL/L — SIGNIFICANT CHANGE UP (ref 96–108)
CO2 SERPL-SCNC: 24 MMOL/L — SIGNIFICANT CHANGE UP (ref 22–31)
CREAT SERPL-MCNC: 5.1 MG/DL — HIGH (ref 0.5–1.3)
EGFR: 11 ML/MIN/1.73M2 — LOW
GLUCOSE BLDC GLUCOMTR-MCNC: 135 MG/DL — HIGH (ref 70–99)
GLUCOSE BLDC GLUCOMTR-MCNC: 145 MG/DL — HIGH (ref 70–99)
GLUCOSE BLDC GLUCOMTR-MCNC: 151 MG/DL — HIGH (ref 70–99)
GLUCOSE BLDC GLUCOMTR-MCNC: 173 MG/DL — HIGH (ref 70–99)
GLUCOSE SERPL-MCNC: 162 MG/DL — HIGH (ref 70–99)
HCT VFR BLD CALC: 38.2 % — LOW (ref 39–50)
HGB BLD-MCNC: 12.2 G/DL — LOW (ref 13–17)
INR BLD: 1.01 RATIO — SIGNIFICANT CHANGE UP (ref 0.88–1.16)
MAGNESIUM SERPL-MCNC: 2.5 MG/DL — SIGNIFICANT CHANGE UP (ref 1.6–2.6)
MCHC RBC-ENTMCNC: 29.6 PG — SIGNIFICANT CHANGE UP (ref 27–34)
MCHC RBC-ENTMCNC: 31.9 GM/DL — LOW (ref 32–36)
MCV RBC AUTO: 92.7 FL — SIGNIFICANT CHANGE UP (ref 80–100)
NRBC # BLD: 0 /100 WBCS — SIGNIFICANT CHANGE UP (ref 0–0)
PHOSPHATE SERPL-MCNC: 5.2 MG/DL — HIGH (ref 2.5–4.5)
PLATELET # BLD AUTO: 207 K/UL — SIGNIFICANT CHANGE UP (ref 150–400)
POTASSIUM SERPL-MCNC: 4 MMOL/L — SIGNIFICANT CHANGE UP (ref 3.5–5.3)
POTASSIUM SERPL-SCNC: 4 MMOL/L — SIGNIFICANT CHANGE UP (ref 3.5–5.3)
PROT SERPL-MCNC: 6.8 G/DL — SIGNIFICANT CHANGE UP (ref 6–8.3)
PROTHROM AB SERPL-ACNC: 12 SEC — SIGNIFICANT CHANGE UP (ref 10.5–13.4)
RBC # BLD: 4.12 M/UL — LOW (ref 4.2–5.8)
RBC # FLD: 13.6 % — SIGNIFICANT CHANGE UP (ref 10.3–14.5)
SODIUM SERPL-SCNC: 136 MMOL/L — SIGNIFICANT CHANGE UP (ref 135–145)
VANCOMYCIN TROUGH SERPL-MCNC: 11.1 UG/ML — SIGNIFICANT CHANGE UP (ref 10–20)
WBC # BLD: 4.84 K/UL — SIGNIFICANT CHANGE UP (ref 3.8–10.5)
WBC # FLD AUTO: 4.84 K/UL — SIGNIFICANT CHANGE UP (ref 3.8–10.5)

## 2022-10-03 PROCEDURE — 75710 ARTERY X-RAYS ARM/LEG: CPT | Mod: 26,GC

## 2022-10-03 PROCEDURE — 36246 INS CATH ABD/L-EXT ART 2ND: CPT | Mod: GC

## 2022-10-03 PROCEDURE — 76937 US GUIDE VASCULAR ACCESS: CPT | Mod: 26,GC

## 2022-10-03 DEVICE — CATH OMNI FLSH 0.035IN 5FRX65: Type: IMPLANTABLE DEVICE | Site: RIGHT | Status: FUNCTIONAL

## 2022-10-03 DEVICE — DEVICE CLOSURE 5F MYNX GRIP MUST ORDER MIN OF 10 EA: Type: IMPLANTABLE DEVICE | Site: RIGHT | Status: FUNCTIONAL

## 2022-10-03 DEVICE — GUIDEWIRE RADIFOCUS GLIDEWIRE STANDARD ANGLED TIP 0.035" X 260CM: Type: IMPLANTABLE DEVICE | Site: RIGHT | Status: FUNCTIONAL

## 2022-10-03 DEVICE — SHEATH INTRODUCER TERUMO PINNACLE PERIPHERAL 4FR X 10CM X 0.035" MINI WIRE: Type: IMPLANTABLE DEVICE | Site: RIGHT | Status: FUNCTIONAL

## 2022-10-03 DEVICE — INTRO MICROPUNC 4FRX10CM SS: Type: IMPLANTABLE DEVICE | Site: RIGHT | Status: FUNCTIONAL

## 2022-10-03 DEVICE — IMPLANTABLE DEVICE: Type: IMPLANTABLE DEVICE | Site: RIGHT | Status: FUNCTIONAL

## 2022-10-03 DEVICE — MICRO-INTRODUCER KIT 5FR 60CM NITINOL TC TIP 7CM ECHOGENIC REGULAR: Type: IMPLANTABLE DEVICE | Site: RIGHT | Status: FUNCTIONAL

## 2022-10-03 RX ORDER — ACETAMINOPHEN 500 MG
1000 TABLET ORAL ONCE
Refills: 0 | Status: COMPLETED | OUTPATIENT
Start: 2022-10-03 | End: 2022-10-03

## 2022-10-03 RX ORDER — SODIUM CHLORIDE 9 MG/ML
1000 INJECTION, SOLUTION INTRAVENOUS
Refills: 0 | Status: DISCONTINUED | OUTPATIENT
Start: 2022-10-03 | End: 2022-10-03

## 2022-10-03 RX ADMIN — SODIUM CHLORIDE 50 MILLILITER(S): 9 INJECTION, SOLUTION INTRAVENOUS at 06:25

## 2022-10-03 RX ADMIN — PANTOPRAZOLE SODIUM 40 MILLIGRAM(S): 20 TABLET, DELAYED RELEASE ORAL at 06:26

## 2022-10-03 RX ADMIN — Medication 400 MILLIGRAM(S): at 20:46

## 2022-10-03 RX ADMIN — CHLORHEXIDINE GLUCONATE 1 APPLICATION(S): 213 SOLUTION TOPICAL at 06:29

## 2022-10-03 RX ADMIN — Medication 1000 MILLIGRAM(S): at 21:30

## 2022-10-03 RX ADMIN — CHLORHEXIDINE GLUCONATE 1 APPLICATION(S): 213 SOLUTION TOPICAL at 12:29

## 2022-10-03 NOTE — PROGRESS NOTE ADULT - ASSESSMENT
72 year old male, coming from [facility], with medical history of ESRD (T-TH-S), DM coming in with right leg necrosis Vascular and Podiatry consulted. JESUS with abnormal findings. CT angio with out flow disease bilat. MRI negative for OM, also found to have pseudomonas UTI, started on vancomycin and cefepime.  ID following.  Pt has L AV graft that was placed 2 weeks ago per pt. + thrill. -bruit. per vascular will take months to mature, currently on dialysis via R chest PermCath. Nephro following. Hospital course c/b COVID + 09/29. Plan for angiogram today 10/3 with Vascular surgery.

## 2022-10-03 NOTE — PROGRESS NOTE ADULT - PROBLEM SELECTOR PLAN 7
a1c 6.2  c/w ISS  monitor finger sticks - A1c 6.2, glucose controlled  - continue with sliding scale  - monitor finger sticks

## 2022-10-03 NOTE — PROGRESS NOTE ADULT - SUBJECTIVE AND OBJECTIVE BOX
Patient remains afebrile and currently in OR for Angiogram . The vascular follow up recommendation noted.       REVIEW OF SYSTEMS: All other review systems are negative      ALLERGIES: No Known Allergies      Vital Signs Last 24 Hrs  T(C): 36.4 (03 Oct 2022 11:43), Max: 36.7 (02 Oct 2022 20:45)  T(F): 97.6 (03 Oct 2022 11:43), Max: 98 (02 Oct 2022 20:45)  HR: 60 (03 Oct 2022 11:43) (60 - 67)  BP: 142/66 (03 Oct 2022 11:43) (135/67 - 142/66)  BP(mean): --  RR: 18 (03 Oct 2022 11:43) (17 - 18)  SpO2: 97% (03 Oct 2022 11:43) (96% - 98%)    Parameters below as of 03 Oct 2022 11:43  Patient On (Oxygen Delivery Method): room air        PHYSICAL EXAM:   in OR      LABS:                          12.2   4.84  )-----------( 207      ( 03 Oct 2022 07:12 )             38.2                           12.1   4.99  )-----------( 208      ( 30 Sep 2022 06:22 )             37.7       10-03    136  |  100  |  68<H>  ----------------------------<  162<H>  4.0   |  24  |  5.10<H>    Ca    9.1      03 Oct 2022 07:12  Phos  5.2     10-03  Mg     2.5     10-03    TPro  6.8  /  Alb  3.1<L>  /  TBili  0.3  /  DBili  x   /  AST  16  /  ALT  18  /  AlkPhos  57  10-30    138  |  102  |  50<H>  ----------------------------<  159<H>  3.9   |  27  |  4.76<H>    Ca    9.1      30 Sep 2022 06:22    TPro  6.7  /  Alb  3.0<L>  /  TBili  0.2  /  DBili  x   /  AST  18  /  ALT  17  /  AlkPhos  57          CAPILLARY BLOOD GLUCOSE  POCT Blood Glucose.: 127 mg/dL (27 Sep 2022 11:31)  POCT Blood Glucose.: 129 mg/dL (27 Sep 2022 06:13)  POCT Blood Glucose.: 307 mg/dL (27 Sep 2022 00:00)  POCT Blood Glucose.: 171 mg/dL (26 Sep 2022 21:34)      Urinalysis Basic - ( 26 Sep 2022 16:30 )  Color: Yellow / Appearance: Clear / S.010 / pH: x  Gluc: x / Ketone: Negative  / Bili: Negative / Urobili: Negative   Blood: x / Protein: 30 mg/dL / Nitrite: Negative   Leuk Esterase: Small / RBC: 2-5 /HPF / WBC 11-25 /HPF   Sq Epi: x / Non Sq Epi: Few /HPF / Bacteria: Few /HPF      Vancomycin Level, Trough (22 @ 15:25) : 11.7  MEDICATIONS  (STANDING):  cefepime   IVPB      cefepime   IVPB 1000 milliGRAM(s) IV Intermittent every 24 hours  chlorhexidine 2% Cloths 1 Application(s) Topical daily  chlorhexidine 2% Cloths 1 Application(s) Topical <User Schedule>  influenza  Vaccine (HIGH DOSE) 0.7 milliLiter(s) IntraMuscular once  insulin lispro (ADMELOG) corrective regimen sliding scale   SubCutaneous three times a day before meals  insulin lispro (ADMELOG) corrective regimen sliding scale   SubCutaneous at bedtime  pantoprazole    Tablet 40 milliGRAM(s) Oral before breakfast  polyethylene glycol 3350 17 Gram(s) Oral daily  senna 2 Tablet(s) Oral at bedtime  sevelamer carbonate 800 milliGRAM(s) Oral three times a day with meals  vancomycin  IVPB 1000 milliGRAM(s) IV Intermittent every 48 hours        RADIOLOGY & ADDITIONAL TESTS:    22 from: Xray Foot AP + Lateral, Right (22 @ 17:00) >IMPRESSION: No definite bony destruction.      22: VA Physiol Extremity Lower 3+ Level, BI (22 @ 16:49) IMPRESSION: Abnormally elevated ABIs compatible with calcified vessels.    No discernible pulse volume recording at the level of the right digit.      MICROBIOLOGY DATA:    COVID-19 PCR . (22 @ 10:00)   COVID-19 PCR: Detected  Culture - Urine (22 @ 16:30)   - Amikacin: S <=16   - Aztreonam: S <=4   - Cefepime: S <=2   - Ceftazidime: S 4   - Ciprofloxacin: S <=0.25   - Gentamicin: S <=2   - Imipenem: S <=1   - Levofloxacin: S <=0.5   - Meropenem: S <=1   - Piperacillin/Tazobactam: S <=8   - Tobramycin: S <=2   Specimen Source: Clean Catch Clean Catch (Midstream)   Culture Results:   50,000 - 99,000 CFU/mL Pseudomonas aeruginosa   Organism Identification: Pseudomonas aeruginosa   Organism: Pseudomonas aeruginosa   Method Type: ANH     Culture - Blood (22 @ 14:30)   Specimen Source: .Blood Blood   Culture Results: No growth to date.     Culture - Blood (22 @ 14:20)   Specimen Source: .Blood Blood   Culture Results: No growth to date.     Urine Microscopic-Add On (NC) (22 @ 16:30)   Red Blood Cell - Urine: 2-5 /HPF   White Blood Cell - Urine: 11-25 /HPF   Bacteria: Few /HPF   Comment - Urine: few transitional epithelial cells seen   Epithelial Cells: Few /HPF     Flu With COVID-19 By BEBE (22 @ 14:30)   SARS-CoV-2 Result: NotDetec:

## 2022-10-03 NOTE — PROGRESS NOTE ADULT - PROBLEM SELECTOR PLAN 1
- Pt presented with right foot gangrene  - CTA run off shows outflow disease  - abnormal JESUS  - NWB to RLE- podiatry recc  - cont vancomycin every 48 hrs,   - blood Cx negative  - MRI Rt foot negative for OM  - plan for Angiogram today 10/3   - ID Dr. Babcock following  - Vascular Following   - Podiatry Following

## 2022-10-03 NOTE — PROGRESS NOTE ADULT - SUBJECTIVE AND OBJECTIVE BOX
Clinicallu unchanged.  Plan : Angio +/- for limb salvage R.  Informed consent obtained.   Clinicallu unchanged.  Plan : Angio +/- for limb salvage R.  Informed consent obtained.  Also dw'ed sister Kaykay by tel [phone] per pt request.

## 2022-10-03 NOTE — DIETITIAN INITIAL EVALUATION ADULT - NUTRITION DIAGNOSIS
yes... X Size Of Lesion In Cm (Optional): 0 Total Volume Injected (Ccs- Only Use Numbers And Decimals): 0.5 Administered By (Optional): Omar Keller Ndc# For Kenalog Only: 2464-4997-28 Medical Necessity Clause: This procedure was medically necessary because the lesions that were treated were: Concentration Of Solution Injected (Mg/Ml): 3.0 Include Z78.9 (Other Specified Conditions Influencing Health Status) As An Associated Diagnosis?: No Consent: The risks of atrophy were reviewed with the patient. Detail Level: Detailed Kenalog Preparation: Kenalog

## 2022-10-03 NOTE — PROGRESS NOTE ADULT - SUBJECTIVE AND OBJECTIVE BOX
Podiatry interval: Pt is seen resting in bed in [facility] isolation room in no acute distress. Aware of his upcoming angiogram. Admits to pain on his right foot to the gangrenous digits. Denies constitutional symptoms. Denies any overnight acute events. Denies further pedal complaints at this time.     Podiatry HPI: Pt is a 72M who presented to ED from his nursing home ([facility]) after his right foot started having gangrene changes. Pt states he has a vacular doctor appointment coming up soon but hasn't seen a vascular doctor as of yet. Admits he has 10/10 pain to heel and digits. Admits he is normally able to walk however; since he got the right heel wound 2-3 months ago, he has been bedbound. Pt denies constitutional symptoms. Pt denies any recent acute trauma. Pt denies further pedal complaints.     Patient admits to  (-) Fevers, (-) Chills, (-) Nausea, (-) Vomiting, (-) Shortness of Breath (-) calf pain (-) chest pain     Medications acetaminophen     Tablet .. 650 milliGRAM(s) Oral every 6 hours PRN  cefepime   IVPB      cefepime   IVPB 1000 milliGRAM(s) IV Intermittent every 24 hours  chlorhexidine 2% Cloths 1 Application(s) Topical daily  chlorhexidine 2% Cloths 1 Application(s) Topical <User Schedule>  influenza  Vaccine (HIGH DOSE) 0.7 milliLiter(s) IntraMuscular once  insulin lispro (ADMELOG) corrective regimen sliding scale   SubCutaneous three times a day before meals  insulin lispro (ADMELOG) corrective regimen sliding scale   SubCutaneous at bedtime  melatonin 3 milliGRAM(s) Oral at bedtime PRN  ondansetron Injectable 4 milliGRAM(s) IV Push every 8 hours PRN  pantoprazole    Tablet 40 milliGRAM(s) Oral before breakfast  polyethylene glycol 3350 17 Gram(s) Oral daily  senna 2 Tablet(s) Oral at bedtime  sevelamer carbonate 800 milliGRAM(s) Oral three times a day with meals  vancomycin  IVPB 1000 milliGRAM(s) IV Intermittent every 48 hours    FHNo pertinent family history in first degree relatives    ,   PMHESRD on dialysis    DM (diabetes mellitus)       PSHNo significant past surgical history        Labs                          12.2   4.84  )-----------( 207      ( 03 Oct 2022 07:12 )             38.2      10-03    136  |  100  |  68<H>  ----------------------------<  162<H>  4.0   |  24  |  5.10<H>    Ca    9.1      03 Oct 2022 07:12  Phos  5.2     10-03  Mg     2.5     10-03    TPro  6.8  /  Alb  3.1<L>  /  TBili  0.3  /  DBili  x   /  AST  16  /  ALT  18  /  AlkPhos  57  10-03     Vital Signs Last 24 Hrs  T(C): 36.4 (03 Oct 2022 11:43), Max: 36.7 (02 Oct 2022 20:45)  T(F): 97.6 (03 Oct 2022 11:43), Max: 98 (02 Oct 2022 20:45)  HR: 60 (03 Oct 2022 11:43) (60 - 69)  BP: 142/66 (03 Oct 2022 11:43) (128/65 - 142/66)  BP(mean): --  RR: 18 (03 Oct 2022 11:43) (16 - 18)  SpO2: 97% (03 Oct 2022 11:43) (96% - 98%)    Parameters below as of 03 Oct 2022 11:43  Patient On (Oxygen Delivery Method): room air      Sedimentation Rate, Erythrocyte: 19 mm/Hr (09-26-22 @ 19:00)         C-Reactive Protein, Serum: 9 mg/L (09-26-22 @ 19:00)   WBC Count: 4.84 K/uL (10-03-22 @ 07:12)      ROS unremarkable outside of HPI    PHYSICAL EXAM  LE Focused: Pt is A&Ox3 in mild distress from right foot pain   Vasc: DP/PT pulses non palpable 0/4 b/l; temperature gradient is warm to cool from proximal leg to digits with R > L. No edema noted to b/l LE. No varicosities. Pedal hair absent.  Derm: Right 2nd digit dry gangrenous ischemic changes noted without erythema, drainage, edema that is rigid and is cool to the touch. Right 1st and 3rd digits at distal tuft's and, now dorsal, show dusky changes that are also cool to the touch without open lesions or signs of infection. Right plantar heel wound with eschar base and no signs of infection, no fluctuance noted. Left foot no signs of gangrene or any open lesions.   Neuro: Protective sensation present b/l; light touch sensation present b/l   MSK: POP to right digits 1-3 and plantar heel wound; muscle strength exam deferred 2/2 pain; b/l hammertoe rigid contractions of digits 2-5       IMAGING    < from: Xray Foot AP + Lateral, Right (09.26.22 @ 17:00) >  ACC: [id number] EXAM:  XR FOOT 2 VIEWS RT                          PROCEDURE DATE:  09/26/2022          INTERPRETATION:  Right foot    HISTORY: Heel gangrene and second toe gangrene     Three views of the right foot show no evidence of acute fracturenor   destructive change. The joint spaces are maintained. Vascular   calcifications are present.    IMPRESSION: No definite bony destruction.      Thank you for this referral.    --- End of Report ---      < end of copied text >      CULTURES:     A:  - PVD  - DM  - ESRD on dialysis  - Right 2nd digit dry gangrene  - Right 1st and 3rd digit ischemic changes  - Right plantar heel eschar       P:   Patient evaluated and Chart reviewed   Discussed diagnosis and treatment with patient  Applied betadine, DSD to right plantar heel and right 1st, 2nd, 3rd digits   Vascular consult appreciated; pt to have angio Monday; pending results of angiogram, potential podiatric intervention   MRI results reviewed   X-rays reviewed; no bony destruction   Reviewed JESUS/PVR  NWB to RLE  Offloading to bilateral Heels via CAIR boots  Discussed importance of daily foot examinations and proper shoe gear and to importance of lower Fasting Blood Glucose levels.   Podiatry to follow while in house.  Discussed with attending Dr. Godinez

## 2022-10-03 NOTE — PROGRESS NOTE ADULT - SUBJECTIVE AND OBJECTIVE BOX
Patient is a 72y old  Male who presents with a chief complaint of Gangrene foot (03 Oct 2022 12:02)      INTERVAL HPI/OVERNIGHT EVENTS: no acute events overnight, for angiogram today 10/3    MEDICATIONS  (STANDING):  cefepime   IVPB      cefepime   IVPB 1000 milliGRAM(s) IV Intermittent every 24 hours  chlorhexidine 2% Cloths 1 Application(s) Topical daily  chlorhexidine 2% Cloths 1 Application(s) Topical <User Schedule>  influenza  Vaccine (HIGH DOSE) 0.7 milliLiter(s) IntraMuscular once  insulin lispro (ADMELOG) corrective regimen sliding scale   SubCutaneous three times a day before meals  insulin lispro (ADMELOG) corrective regimen sliding scale   SubCutaneous at bedtime  pantoprazole    Tablet 40 milliGRAM(s) Oral before breakfast  polyethylene glycol 3350 17 Gram(s) Oral daily  senna 2 Tablet(s) Oral at bedtime  sevelamer carbonate 800 milliGRAM(s) Oral three times a day with meals  vancomycin  IVPB 1000 milliGRAM(s) IV Intermittent every 48 hours    MEDICATIONS  (PRN):  acetaminophen     Tablet .. 650 milliGRAM(s) Oral every 6 hours PRN Temp greater or equal to 38C (100.4F), Mild Pain (1 - 3)  melatonin 3 milliGRAM(s) Oral at bedtime PRN Insomnia  ondansetron Injectable 4 milliGRAM(s) IV Push every 8 hours PRN Nausea and/or Vomiting      __________________________________________________  REVIEW OF SYSTEMS:    CONSTITUTIONAL: No fever,   EYES: no acute visual disturbances  NECK: No pain or stiffness  RESPIRATORY: No cough; No shortness of breath  CARDIOVASCULAR: No chest pain, no palpitations  GASTROINTESTINAL: No pain. No nausea or vomiting; No diarrhea   NEUROLOGICAL: No headache or numbness, no tremors  MUSCULOSKELETAL: No joint pain, no muscle pain  GENITOURINARY: no dysuria, no frequency, no hesitancy  PSYCHIATRY: no depression , no anxiety  ALL OTHER  ROS negative        Vital Signs Last 24 Hrs  T(C): 36.4 (03 Oct 2022 11:43), Max: 36.7 (02 Oct 2022 20:45)  T(F): 97.6 (03 Oct 2022 11:43), Max: 98 (02 Oct 2022 20:45)  HR: 60 (03 Oct 2022 11:43) (60 - 67)  BP: 142/66 (03 Oct 2022 11:43) (135/67 - 142/66)  BP(mean): --  RR: 18 (03 Oct 2022 11:43) (17 - 18)  SpO2: 97% (03 Oct 2022 11:43) (96% - 98%)    Parameters below as of 03 Oct 2022 11:43  Patient On (Oxygen Delivery Method): room air        ________________________________________________  PHYSICAL EXAM:  GENERAL: NAD  HEENT: Normocephalic;  conjunctivae and sclerae clear  NECK : supple  CHEST/LUNG: dec breath sounds at bases  HEART: S1 S2  RRR  ABDOMEN: Soft, Nontender, Nondistended; Bowel sounds present  EXTREMITIES: foot dressing+  SKIN: warm and dry; no rash  NERVOUS SYSTEM:  Awake and alert; Oriented  to place, person and time    _________________________________________________  LABS:                        12.2   4.84  )-----------( 207      ( 03 Oct 2022 07:12 )             38.2     10-03    136  |  100  |  68<H>  ----------------------------<  162<H>  4.0   |  24  |  5.10<H>    Ca    9.1      03 Oct 2022 07:12  Phos  5.2     10-03  Mg     2.5     10-03    TPro  6.8  /  Alb  3.1<L>  /  TBili  0.3  /  DBili  x   /  AST  16  /  ALT  18  /  AlkPhos  57  10-03    PT/INR - ( 03 Oct 2022 07:12 )   PT: 12.0 sec;   INR: 1.01 ratio         PTT - ( 03 Oct 2022 07:12 )  PTT:34.9 sec    CAPILLARY BLOOD GLUCOSE      POCT Blood Glucose.: 173 mg/dL (03 Oct 2022 11:24)  POCT Blood Glucose.: 135 mg/dL (03 Oct 2022 07:46)  POCT Blood Glucose.: 277 mg/dL (02 Oct 2022 20:55)  POCT Blood Glucose.: 112 mg/dL (02 Oct 2022 16:58)        RADIOLOGY & ADDITIONAL TESTS:    < from: VA Physiol Extremity Lower 3+ Level, BI (09.26.22 @ 16:49) >    IMPRESSION: Abnormally elevated ABIs compatiblewith calcified vessels.    No discernible pulse volume recording at the level of the right digit.    < end of copied text >    < from: Xray Foot AP + Lateral, Right (09.26.22 @ 17:00) >  IMPRESSION: No definite bony destruction.    < end of copied text >    < from: CT Angio Abd Aorta w/run-off w/ IV Cont (09.27.22 @ 12:04) >  IMPRESSION:    Outflow disease bilaterally as detailed above.    < end of copied text >    < from: MR Foot No Cont, Right (09.29.22 @ 20:37) >  IMPRESSION:    1.  No osteomyelitis.  2.  Posterior heel skin wound and dorsal forefoot soft tissue swelling.   Nonspecific but can be seen with cellulitis. No drainable fluid   collection.    < end of copied text >        Imaging Personally Reviewed:  YES    Consultant(s) Notes Reviewed:   YES      Plan of care was discussed with patient and /or primary care giver; all questions and concerns were addressed and care was aligned with patient's wishes.

## 2022-10-03 NOTE — DIETITIAN INITIAL EVALUATION ADULT - OTHER INFO
72 years old male with right foot gangrene PMH DM ESRD on HD, GI bleed, Anemia Gangrene of toe of right foot, arterial insufficiency. Visited this morning, currently NPO 2/2 going for LE angiogram, PT stated no weight loss in the past, good PO intake and no N/V/D/C. Nursing home records reviewed pt on Regular consistency diet Renal restriction consistent Carbohydrates, LPS sf 30 ml Po daily and Nephro 1 can PO TID.

## 2022-10-03 NOTE — PROGRESS NOTE ADULT - SUBJECTIVE AND OBJECTIVE BOX
R LE angio today:   Poor dist tib/pedal flow.  No target for revasc,    FU podiatry plans.  if Foot not salvageable then will need BKA/AKA  DW'wed pt's sister

## 2022-10-03 NOTE — DIETITIAN INITIAL EVALUATION ADULT - PERTINENT MEDS FT
MEDICATIONS  (STANDING):  cefepime   IVPB      cefepime   IVPB 1000 milliGRAM(s) IV Intermittent every 24 hours  chlorhexidine 2% Cloths 1 Application(s) Topical daily  chlorhexidine 2% Cloths 1 Application(s) Topical <User Schedule>  influenza  Vaccine (HIGH DOSE) 0.7 milliLiter(s) IntraMuscular once  insulin lispro (ADMELOG) corrective regimen sliding scale   SubCutaneous three times a day before meals  insulin lispro (ADMELOG) corrective regimen sliding scale   SubCutaneous at bedtime  pantoprazole    Tablet 40 milliGRAM(s) Oral before breakfast  polyethylene glycol 3350 17 Gram(s) Oral daily  senna 2 Tablet(s) Oral at bedtime  sevelamer carbonate 800 milliGRAM(s) Oral three times a day with meals  vancomycin  IVPB 1000 milliGRAM(s) IV Intermittent every 48 hours    MEDICATIONS  (PRN):  acetaminophen     Tablet .. 650 milliGRAM(s) Oral every 6 hours PRN Temp greater or equal to 38C (100.4F), Mild Pain (1 - 3)  melatonin 3 milliGRAM(s) Oral at bedtime PRN Insomnia  ondansetron Injectable 4 milliGRAM(s) IV Push every 8 hours PRN Nausea and/or Vomiting

## 2022-10-03 NOTE — PROGRESS NOTE ADULT - PROBLEM SELECTOR PLAN 2
+ pseudocomas UTi  - c/w cefepime every 48 hrs  - sensitivity noted.  - ID following - + Pseudomonas UTI  - continue Cefepime  - blood cx NGTD  - ID Dr. Babcock following

## 2022-10-03 NOTE — PROGRESS NOTE ADULT - PROBLEM SELECTOR PLAN 1
- Pt presented with right foot gangrene.   - CTA run off- out flow disease- vascular planning angiogram next week   - abnormal JESUS  - NWB to RLE- podiatry recc  - cont vancomycin every 48 hrs, vanco level 11.7 (9/30/22)  - blood Cx negative  - MRI Rt foot no osteo  - plan angio next week- Vascular recc  - ID - Dr. Babcock  - Vascular Following   - Podiatry Following - Pt presented with right foot gangrene  - CTA run off shows outflow disease  - abnormal JESUS  - NWB to RLE- podiatry recc  - cont vancomycin every 48 hrs,   - blood Cx negative  - MRI Rt foot negative for OM  - plan for Angiogram today 10/3   - ID Dr. Babcock following  - Vascular Following   - Podiatry Following

## 2022-10-03 NOTE — PROGRESS NOTE ADULT - ASSESSMENT
72 year old male, coming from [facility], with medical history of ESRD (T-TH-S), DM coming in with right leg necrosis. JESUS abnormal findings. CT angio with out flow disease bilat. Vascular planning angio gram next week. found to have pseudomonas UTI, Started on vancomycin and cefepime.  ID following. Plan MRI  r/o osteo, podiatry following.  Pt has L ac av graft that was placed 2 weeks ago per pt. + thrill. -bruit. per vascular will take months to mature. currently on dialysis via R chest PermCath. nephro following    72 year old male, coming from [facility], with medical history of ESRD (T-TH-S), DM coming in with right leg necrosis Vascular and Podiatry consulted. JESUS with abnormal findings. CT angio with out flow disease bilat. MRI negative for OM, also found to have pseudomonas UTI, started on vancomycin and cefepime.  ID following.  Pt has L AV graft that was placed 2 weeks ago per pt. + thrill. -bruit. per vascular will take months to mature, currently on dialysis via R chest PermCath. Nephro following. Hospital course c/b COVID + 09/29. Plan for angiogram today 10/3 with Vascular surgery.

## 2022-10-03 NOTE — PROGRESS NOTE ADULT - SUBJECTIVE AND OBJECTIVE BOX
[facility] NEPHROLOGY- PROGRESS NOTE    Patient is a 73yo Male with ESRD on HD, DM a/w Rt foot gangrene and GI bleed. Nephrology consulted for ESRD status.   COVID-10 + 22    Hospital Medications: Medications reviewed.  REVIEW OF SYSTEMS:  CONSTITUTIONAL: No fevers or chills  RESPIRATORY: No shortness of breath  CARDIOVASCULAR: No chest pain.  GASTROINTESTINAL: No nausea, vomiting, diarrhea or abdominal pain.   VASCULAR: No bilateral lower extremity edema. +Mild Rt foot pain      VITALS:  T(F): 97.6 (10-03-22 @ 11:43), Max: 98 (10-02-22 @ 20:45)  HR: 60 (10-03-22 @ 11:43)  BP: 142/66 (10-03-22 @ 11:43)  RR: 18 (10-03-22 @ 11:43)  SpO2: 97% (10-03-22 @ 11:43)  Wt(kg): --    10-02 @ 07:01  -  10-03 @ 07:00  --------------------------------------------------------  IN: 0 mL / OUT: 400 mL / NET: -400 mL      Height (cm): 188 (10-03 @ 11:57)  Weight (kg): 72.6 (10-03 @ 11:57)  BMI (kg/m2): 20.5 (10-03 @ 11:57)  BSA (m2): 1.98 (10-03 @ 11:57)      PHYSICAL EXAM:  Gen: NAD,   HEENT: anicteric  Neck: no JVD  Cards: RRR, +S1/S2, no M/G/R  Resp: CTA B/L  GI: soft, NT/ND, NABS  Extremities: no LE edema B/L  Derm: Rt foot wrapped dressing c/d/i  Access: Rt IJ tunneled HD catheter- benign  Left upper arm AVF- no thrill no bruit    LABS:  10-03    136  |  100  |  68<H>  ----------------------------<  162<H>  4.0   |  24  |  5.10<H>    Ca    9.1      03 Oct 2022 07:12  Phos  5.2     10-03  Mg     2.5     10-    TPro  6.8  /  Alb  3.1<L>  /  TBili  0.3  /  DBili      /  AST  16  /  ALT  18  /  AlkPhos  57  10-03    Creatinine Trend: 5.10 <--, 4.76 <--, 6.69 <--, 4.82 <--, 5.73 <--                        12.2   4.84  )-----------( 207      ( 03 Oct 2022 07:12 )             38.2     Urine Studies:  Urinalysis Basic - ( 26 Sep 2022 16:30 )    Color: Yellow / Appearance: Clear / S.010 / pH:   Gluc:  / Ketone: Negative  / Bili: Negative / Urobili: Negative   Blood:  / Protein: 30 mg/dL / Nitrite: Negative   Leuk Esterase: Small / RBC: 2-5 /HPF / WBC 11-25 /HPF   Sq Epi:  / Non Sq Epi: Few /HPF / Bacteria: Few /HPF

## 2022-10-03 NOTE — DIETITIAN INITIAL EVALUATION ADULT - ADD RECOMMEND
Advance diet as per MD rx. to CHO consistent Renal (No protein restriction, No conc K, No conc Phos Low sodium), Fluid restriction as per MD rx. Nepro cans 1 PO TID.

## 2022-10-03 NOTE — BRIEF OPERATIVE NOTE - OPERATION/FINDINGS
L common femoral artery was used for access. RLE angiography was performed which showed in-line flow of R popliteal artery with <50% stenosis by visual estimation. There was extensive small vessel disease of DP and plantar arteries distal to the ankle but there were no targets for revascularization. There was no angiographic evidence of extravasation or pseudoaneurysm formation for the entirety of the procedure. Please see operative report for further details

## 2022-10-03 NOTE — PROGRESS NOTE ADULT - SUBJECTIVE AND OBJECTIVE BOX
NP Note discussed with  Primary Attending    Patient is a 72y old  Male who presents with a chief complaint of Gangrene foot (03 Oct 2022 12:02)      INTERVAL HPI/OVERNIGHT EVENTS: no acute events overnight, for angiogram today 10/3    MEDICATIONS  (STANDING):  cefepime   IVPB      cefepime   IVPB 1000 milliGRAM(s) IV Intermittent every 24 hours  chlorhexidine 2% Cloths 1 Application(s) Topical daily  chlorhexidine 2% Cloths 1 Application(s) Topical <User Schedule>  influenza  Vaccine (HIGH DOSE) 0.7 milliLiter(s) IntraMuscular once  insulin lispro (ADMELOG) corrective regimen sliding scale   SubCutaneous three times a day before meals  insulin lispro (ADMELOG) corrective regimen sliding scale   SubCutaneous at bedtime  pantoprazole    Tablet 40 milliGRAM(s) Oral before breakfast  polyethylene glycol 3350 17 Gram(s) Oral daily  senna 2 Tablet(s) Oral at bedtime  sevelamer carbonate 800 milliGRAM(s) Oral three times a day with meals  vancomycin  IVPB 1000 milliGRAM(s) IV Intermittent every 48 hours    MEDICATIONS  (PRN):  acetaminophen     Tablet .. 650 milliGRAM(s) Oral every 6 hours PRN Temp greater or equal to 38C (100.4F), Mild Pain (1 - 3)  melatonin 3 milliGRAM(s) Oral at bedtime PRN Insomnia  ondansetron Injectable 4 milliGRAM(s) IV Push every 8 hours PRN Nausea and/or Vomiting      __________________________________________________  REVIEW OF SYSTEMS:    CONSTITUTIONAL: No fever,   EYES: no acute visual disturbances  NECK: No pain or stiffness  RESPIRATORY: No cough; No shortness of breath  CARDIOVASCULAR: No chest pain, no palpitations  GASTROINTESTINAL: No pain. No nausea or vomiting; No diarrhea   NEUROLOGICAL: No headache or numbness, no tremors  MUSCULOSKELETAL: No joint pain, no muscle pain  GENITOURINARY: no dysuria, no frequency, no hesitancy  PSYCHIATRY: no depression , no anxiety  ALL OTHER  ROS negative        Vital Signs Last 24 Hrs  T(C): 36.4 (03 Oct 2022 11:43), Max: 36.7 (02 Oct 2022 20:45)  T(F): 97.6 (03 Oct 2022 11:43), Max: 98 (02 Oct 2022 20:45)  HR: 60 (03 Oct 2022 11:43) (60 - 67)  BP: 142/66 (03 Oct 2022 11:43) (135/67 - 142/66)  BP(mean): --  RR: 18 (03 Oct 2022 11:43) (17 - 18)  SpO2: 97% (03 Oct 2022 11:43) (96% - 98%)    Parameters below as of 03 Oct 2022 11:43  Patient On (Oxygen Delivery Method): room air        ________________________________________________  PHYSICAL EXAM:  GENERAL: NAD  HEENT: Normocephalic;  conjunctivae and sclerae clear  NECK : supple  CHEST/LUNG: Clear to auscultation bilaterally  HEART: S1 S2  RRR  ABDOMEN: Soft, Nontender, Nondistended; Bowel sounds present  EXTREMITIES: no cyanosis; no edema; no calf tenderness  SKIN: warm and dry; no rash  NERVOUS SYSTEM:  Awake and alert; Oriented  to place, person and time    _________________________________________________  LABS:                        12.2   4.84  )-----------( 207      ( 03 Oct 2022 07:12 )             38.2     10-03    136  |  100  |  68<H>  ----------------------------<  162<H>  4.0   |  24  |  5.10<H>    Ca    9.1      03 Oct 2022 07:12  Phos  5.2     10-03  Mg     2.5     10-03    TPro  6.8  /  Alb  3.1<L>  /  TBili  0.3  /  DBili  x   /  AST  16  /  ALT  18  /  AlkPhos  57  10-03    PT/INR - ( 03 Oct 2022 07:12 )   PT: 12.0 sec;   INR: 1.01 ratio         PTT - ( 03 Oct 2022 07:12 )  PTT:34.9 sec    CAPILLARY BLOOD GLUCOSE      POCT Blood Glucose.: 173 mg/dL (03 Oct 2022 11:24)  POCT Blood Glucose.: 135 mg/dL (03 Oct 2022 07:46)  POCT Blood Glucose.: 277 mg/dL (02 Oct 2022 20:55)  POCT Blood Glucose.: 112 mg/dL (02 Oct 2022 16:58)        RADIOLOGY & ADDITIONAL TESTS:    < from: VA Physiol Extremity Lower 3+ Level, BI (09.26.22 @ 16:49) >    IMPRESSION: Abnormally elevated ABIs compatiblewith calcified vessels.    No discernible pulse volume recording at the level of the right digit.    < end of copied text >    < from: Xray Foot AP + Lateral, Right (09.26.22 @ 17:00) >  IMPRESSION: No definite bony destruction.    < end of copied text >    < from: CT Angio Abd Aorta w/run-off w/ IV Cont (09.27.22 @ 12:04) >  IMPRESSION:    Outflow disease bilaterally as detailed above.    < end of copied text >    < from: MR Foot No Cont, Right (09.29.22 @ 20:37) >  IMPRESSION:    1.  No osteomyelitis.  2.  Posterior heel skin wound and dorsal forefoot soft tissue swelling.   Nonspecific but can be seen with cellulitis. No drainable fluid   collection.    < end of copied text >        Imaging Personally Reviewed:  YES    Consultant(s) Notes Reviewed:   YES      Plan of care was discussed with patient and /or primary care giver; all questions and concerns were addressed and care was aligned with patient's wishes.

## 2022-10-03 NOTE — PROGRESS NOTE ADULT - PROBLEM SELECTOR PLAN 6
hgb 12.1  MCv 94  likely of chronic disease  Plan as above. - Hgb 12.1  - MCv 94  - likely 2/2 chronic disease  - trend CBC  - rest of plan as above

## 2022-10-03 NOTE — PROGRESS NOTE ADULT - PROBLEM SELECTOR PLAN 5
p/w melena  protonix 40mg  no overt bleeding  check FOBT  Monitor CBC - p/w melena  - protonix 40mg  - no overt bleeding  - FOBT negative  - trend CBC

## 2022-10-03 NOTE — PROGRESS NOTE ADULT - ASSESSMENT
Patient is a 72y old  Male with medical history of ESRD (T-TH-S), DM, now send in to the ER from [facility], for evaluation of right foot necrosis. On admission, he has no fever, No leukocytosis. He has evaluated by Podiatry , started on IV Vancomycin, and the ID consult requested to assist with further evaluation and antibiotic management.     # Right  gangrenous foot- scheduled for Angiogram in next week  # UTI- Urine cx grew Pseudomonas.   # Positive COVID as of 9/29/22- Not hypoxic     would recommend:    1. Post-procedure labs  2. Continue IV Cefepime and IV Vancomycin until work up is done  3. Wound care as per Podiatry   4.  Monitor and Keep Vancomycin level between 15 to 20  5. Monitor oxygen saturation closely and if drop to 94 % then start Remdesivir and Dexamethasone  6. COVID precaution as per protocol    Attending Attestation:    Spent more than 35 minutes on total encounter, more than 50 % of the visit was spent counseling and/or coordinating care by the Attending physician.

## 2022-10-03 NOTE — DIETITIAN INITIAL EVALUATION ADULT - NS FNS DIET ORDER
Diet, NPO after Midnight:      NPO Start Date: 02-Oct-2022,   NPO Start Time: 23:59  Except Medications (10-02-22 @ 17:59)  Diet: Consistenct CHO Renal replacement- No protein restriction No Concentrated K, No concentrated Phos, Low sodium (RENAL); Supplements Nepro Cans BID.

## 2022-10-03 NOTE — PROGRESS NOTE ADULT - ASSESSMENT
Patient is a 73yo Male with ESRD on HD, DM a/w Rt foot gangrene and GI bleed. Nephrology consulted for ESRD status.     1) ESRD:  Last HD 10/1 tolerated well with 0.7L removed. Plan for next maintenance HD on 10/4. Monitor electrolytes.  Pt with new Left UE AVF- no thrill no bruit- f/u Vascular (Dr. Dutta aware).   2) HTN with ESRD: BP acceptable off antihypertensive medications. Can consider CCB if BP elevated. c/w low sodium diet. Monitor BP.  3) Anemia of renal disease: with blood loss due to GI bleed. Check FOBT. Hb acceptable. No need for JERED at this time. Monitor Hb.  4) Hyperphosphatemia: Serum phosphorus acceptable. c/w Sevelamer 1 tab tid with meals and low phos diet. Monitor serum calcium and phosphorus.  5) Rt foot gangrene: Pt on Cefepime/ Vanco 1g IV every 48 hrs. MRI neg for OM. Pt for angiogram today. Podiatry/ Vascular/ ID following.     [facility] NEPHR[facility]  Wili Hopkins M.D.  Kiran Feng D.O.  Sujatha Dumont M.D.  Radha Miller, MSN, ANP-C  [phone]    [address] [place] [address]

## 2022-10-03 NOTE — DIETITIAN INITIAL EVALUATION ADULT - PERTINENT LABORATORY DATA
10-03    136  |  100  |  68<H>  ----------------------------<  162<H>  4.0   |  24  |  5.10<H>    Ca    9.1      03 Oct 2022 07:12  Phos  5.2     10-03  Mg     2.5     10-03    TPro  6.8  /  Alb  3.1<L>  /  TBili  0.3  /  DBili  x   /  AST  16  /  ALT  18  /  AlkPhos  57  10-03  POCT Blood Glucose.: 135 mg/dL (10-03-22 @ 07:46)  A1C with Estimated Average Glucose Result: 6.2 % (09-26-22 @ 19:00)

## 2022-10-03 NOTE — PROGRESS NOTE ADULT - PROBLEM SELECTOR PLAN 4
ESRD on HD on T-TH-S  c/w sevelamer  Renal diet   Monitor BMP  Nephro consulted: Dr. Dumont - ESRD on HD on T-TH-S  - c/w sevelamer  - Renal diet   - trend BMP  - Nephro consulted: Dr. Dumont

## 2022-10-04 LAB
ALBUMIN SERPL ELPH-MCNC: 3.1 G/DL — LOW (ref 3.5–5)
ALP SERPL-CCNC: 61 U/L — SIGNIFICANT CHANGE UP (ref 40–120)
ALT FLD-CCNC: 17 U/L DA — SIGNIFICANT CHANGE UP (ref 10–60)
ANION GAP SERPL CALC-SCNC: 13 MMOL/L — SIGNIFICANT CHANGE UP (ref 5–17)
AST SERPL-CCNC: 15 U/L — SIGNIFICANT CHANGE UP (ref 10–40)
BILIRUB SERPL-MCNC: 0.2 MG/DL — SIGNIFICANT CHANGE UP (ref 0.2–1.2)
BUN SERPL-MCNC: 79 MG/DL — HIGH (ref 7–18)
CALCIUM SERPL-MCNC: 9 MG/DL — SIGNIFICANT CHANGE UP (ref 8.4–10.5)
CHLORIDE SERPL-SCNC: 100 MMOL/L — SIGNIFICANT CHANGE UP (ref 96–108)
CO2 SERPL-SCNC: 23 MMOL/L — SIGNIFICANT CHANGE UP (ref 22–31)
CREAT SERPL-MCNC: 5.73 MG/DL — HIGH (ref 0.5–1.3)
EGFR: 10 ML/MIN/1.73M2 — LOW
GLUCOSE BLDC GLUCOMTR-MCNC: 178 MG/DL — HIGH (ref 70–99)
GLUCOSE BLDC GLUCOMTR-MCNC: 197 MG/DL — HIGH (ref 70–99)
GLUCOSE BLDC GLUCOMTR-MCNC: 215 MG/DL — HIGH (ref 70–99)
GLUCOSE BLDC GLUCOMTR-MCNC: 337 MG/DL — HIGH (ref 70–99)
GLUCOSE SERPL-MCNC: 234 MG/DL — HIGH (ref 70–99)
HCT VFR BLD CALC: 35.7 % — LOW (ref 39–50)
HGB BLD-MCNC: 11.4 G/DL — LOW (ref 13–17)
MCHC RBC-ENTMCNC: 29.7 PG — SIGNIFICANT CHANGE UP (ref 27–34)
MCHC RBC-ENTMCNC: 31.9 GM/DL — LOW (ref 32–36)
MCV RBC AUTO: 93 FL — SIGNIFICANT CHANGE UP (ref 80–100)
NRBC # BLD: 0 /100 WBCS — SIGNIFICANT CHANGE UP (ref 0–0)
PLATELET # BLD AUTO: 202 K/UL — SIGNIFICANT CHANGE UP (ref 150–400)
POTASSIUM SERPL-MCNC: 4.2 MMOL/L — SIGNIFICANT CHANGE UP (ref 3.5–5.3)
POTASSIUM SERPL-SCNC: 4.2 MMOL/L — SIGNIFICANT CHANGE UP (ref 3.5–5.3)
PROT SERPL-MCNC: 6.6 G/DL — SIGNIFICANT CHANGE UP (ref 6–8.3)
RBC # BLD: 3.84 M/UL — LOW (ref 4.2–5.8)
RBC # FLD: 13.5 % — SIGNIFICANT CHANGE UP (ref 10.3–14.5)
SARS-COV-2 RNA SPEC QL NAA+PROBE: DETECTED
SODIUM SERPL-SCNC: 136 MMOL/L — SIGNIFICANT CHANGE UP (ref 135–145)
WBC # BLD: 6.12 K/UL — SIGNIFICANT CHANGE UP (ref 3.8–10.5)
WBC # FLD AUTO: 6.12 K/UL — SIGNIFICANT CHANGE UP (ref 3.8–10.5)

## 2022-10-04 RX ORDER — CEFEPIME 1 G/1
1000 INJECTION, POWDER, FOR SOLUTION INTRAMUSCULAR; INTRAVENOUS EVERY 24 HOURS
Refills: 0 | Status: DISCONTINUED | OUTPATIENT
Start: 2022-10-04 | End: 2022-10-14

## 2022-10-04 RX ORDER — LANOLIN ALCOHOL/MO/W.PET/CERES
3 CREAM (GRAM) TOPICAL AT BEDTIME
Refills: 0 | Status: DISCONTINUED | OUTPATIENT
Start: 2022-10-04 | End: 2022-10-14

## 2022-10-04 RX ORDER — PANTOPRAZOLE SODIUM 20 MG/1
40 TABLET, DELAYED RELEASE ORAL
Refills: 0 | Status: DISCONTINUED | OUTPATIENT
Start: 2022-10-04 | End: 2022-10-14

## 2022-10-04 RX ORDER — ACETAMINOPHEN 500 MG
650 TABLET ORAL EVERY 6 HOURS
Refills: 0 | Status: DISCONTINUED | OUTPATIENT
Start: 2022-10-04 | End: 2022-10-14

## 2022-10-04 RX ORDER — VANCOMYCIN HCL 1 G
1000 VIAL (EA) INTRAVENOUS
Refills: 0 | Status: DISCONTINUED | OUTPATIENT
Start: 2022-10-04 | End: 2022-10-04

## 2022-10-04 RX ORDER — SENNA PLUS 8.6 MG/1
2 TABLET ORAL AT BEDTIME
Refills: 0 | Status: DISCONTINUED | OUTPATIENT
Start: 2022-10-04 | End: 2022-10-14

## 2022-10-04 RX ORDER — VANCOMYCIN HCL 1 G
1000 VIAL (EA) INTRAVENOUS ONCE
Refills: 0 | Status: COMPLETED | OUTPATIENT
Start: 2022-10-04 | End: 2022-10-04

## 2022-10-04 RX ORDER — CHLORHEXIDINE GLUCONATE 213 G/1000ML
1 SOLUTION TOPICAL DAILY
Refills: 0 | Status: DISCONTINUED | OUTPATIENT
Start: 2022-10-04 | End: 2022-10-14

## 2022-10-04 RX ORDER — POLYETHYLENE GLYCOL 3350 17 G/17G
17 POWDER, FOR SOLUTION ORAL DAILY
Refills: 0 | Status: DISCONTINUED | OUTPATIENT
Start: 2022-10-04 | End: 2022-10-14

## 2022-10-04 RX ORDER — INSULIN LISPRO 100/ML
VIAL (ML) SUBCUTANEOUS
Refills: 0 | Status: DISCONTINUED | OUTPATIENT
Start: 2022-10-04 | End: 2022-10-14

## 2022-10-04 RX ORDER — VANCOMYCIN HCL 1 G
1000 VIAL (EA) INTRAVENOUS
Refills: 0 | Status: DISCONTINUED | OUTPATIENT
Start: 2022-10-04 | End: 2022-10-14

## 2022-10-04 RX ORDER — INSULIN LISPRO 100/ML
VIAL (ML) SUBCUTANEOUS AT BEDTIME
Refills: 0 | Status: DISCONTINUED | OUTPATIENT
Start: 2022-10-04 | End: 2022-10-14

## 2022-10-04 RX ORDER — SEVELAMER CARBONATE 2400 MG/1
800 POWDER, FOR SUSPENSION ORAL
Refills: 0 | Status: DISCONTINUED | OUTPATIENT
Start: 2022-10-04 | End: 2022-10-14

## 2022-10-04 RX ORDER — ONDANSETRON 8 MG/1
4 TABLET, FILM COATED ORAL EVERY 8 HOURS
Refills: 0 | Status: DISCONTINUED | OUTPATIENT
Start: 2022-10-04 | End: 2022-10-14

## 2022-10-04 RX ADMIN — SEVELAMER CARBONATE 800 MILLIGRAM(S): 2400 POWDER, FOR SUSPENSION ORAL at 17:07

## 2022-10-04 RX ADMIN — SEVELAMER CARBONATE 800 MILLIGRAM(S): 2400 POWDER, FOR SUSPENSION ORAL at 08:20

## 2022-10-04 RX ADMIN — SEVELAMER CARBONATE 800 MILLIGRAM(S): 2400 POWDER, FOR SUSPENSION ORAL at 14:24

## 2022-10-04 RX ADMIN — CHLORHEXIDINE GLUCONATE 1 APPLICATION(S): 213 SOLUTION TOPICAL at 06:11

## 2022-10-04 RX ADMIN — SENNA PLUS 2 TABLET(S): 8.6 TABLET ORAL at 21:19

## 2022-10-04 RX ADMIN — Medication 250 MILLIGRAM(S): at 13:13

## 2022-10-04 RX ADMIN — CEFEPIME 100 MILLIGRAM(S): 1 INJECTION, POWDER, FOR SOLUTION INTRAMUSCULAR; INTRAVENOUS at 06:12

## 2022-10-04 RX ADMIN — CHLORHEXIDINE GLUCONATE 1 APPLICATION(S): 213 SOLUTION TOPICAL at 14:25

## 2022-10-04 RX ADMIN — Medication 4: at 17:08

## 2022-10-04 RX ADMIN — Medication 2: at 08:17

## 2022-10-04 RX ADMIN — PANTOPRAZOLE SODIUM 40 MILLIGRAM(S): 20 TABLET, DELAYED RELEASE ORAL at 06:11

## 2022-10-04 RX ADMIN — POLYETHYLENE GLYCOL 3350 17 GRAM(S): 17 POWDER, FOR SOLUTION ORAL at 14:27

## 2022-10-04 NOTE — PROGRESS NOTE ADULT - SUBJECTIVE AND OBJECTIVE BOX
S: Patient seen and examined at bedside. No acute events overnight. Hemodynamically stable afebrile. Patient states he feels well and only endorses complaint of mild R heel ulcer pain unchanged from prior. POD1 s/p RLE angiography - pt w/no targets for revasc but w/extensive distal small vessel disease. No further acute vascular surgery intervention - f/u podiatry and medicine for medical mangement.    O:  Vital Signs Last 24 Hrs  T(C): 36.6 (04 Oct 2022 05:42), Max: 36.6 (04 Oct 2022 05:42)  T(F): 97.8 (04 Oct 2022 05:42), Max: 97.8 (04 Oct 2022 05:42)  HR: 66 (04 Oct 2022 05:42) (56 - 80)  BP: 150/74 (04 Oct 2022 05:42) (112/52 - 162/71)  BP(mean): 88 (03 Oct 2022 19:20) (61 - 90)  RR: 18 (04 Oct 2022 05:42) (14 - 20)  SpO2: 100% (04 Oct 2022 05:42) (97% - 100%)    Parameters below as of 04 Oct 2022 05:42  Patient On (Oxygen Delivery Method): room air    Physical Examination:  Gen: Awake, alert, oriented x3, no acute distress  Abd: Soft, nondistended, non-tender, no guarding, rigidity, or rebound. L groin puncture site dressing is CDI with no hematoma formation, no active bleeding, no strikethrough, no TTP, and no overlying skin changes.  Ext: BL popliteal pulses palpable, DP pulses non-palpable bilaterally

## 2022-10-04 NOTE — PROGRESS NOTE ADULT - SUBJECTIVE AND OBJECTIVE BOX
Podiatry interval: Pt is seen resting in bed in covid isolation room in no acute distress. Patient understands that his angiogram results reported no pedal flow. He is upset that his surgical plan is only a more proximal amputation. Conservative wound care vs amputation is discussedAdmits to pain on his right foot to the gangrenous digits. Denies constitutional symptoms. Denies any overnight acute events. Denies further pedal complaints at this time.     Podiatry HPI: Pt is a 72M who presented to ED from his nursing home ([facility]) after his right foot started having gangrene changes. Pt states he has a vacular doctor appointment coming up soon but hasn't seen a vascular doctor as of yet. Admits he has 10/10 pain to heel and digits. Admits he is normally able to walk however; since he got the right heel wound 2-3 months ago, he has been bedbound. Pt denies constitutional symptoms. Pt denies any recent acute trauma. Pt denies further pedal complaints.     Patient admits to  (-) Fevers, (-) Chills, (-) Nausea, (-) Vomiting, (-) Shortness of Breath (-) calf pain (-) chest pain     Medications acetaminophen     Tablet .. 650 milliGRAM(s) Oral every 6 hours PRN  cefepime   IVPB 1000 milliGRAM(s) IV Intermittent every 24 hours  chlorhexidine 2% Cloths 1 Application(s) Topical daily  influenza  Vaccine (HIGH DOSE) 0.7 milliLiter(s) IntraMuscular once  insulin lispro (ADMELOG) corrective regimen sliding scale   SubCutaneous three times a day before meals  insulin lispro (ADMELOG) corrective regimen sliding scale   SubCutaneous at bedtime  melatonin 3 milliGRAM(s) Oral at bedtime PRN  ondansetron Injectable 4 milliGRAM(s) IV Push every 8 hours PRN  pantoprazole    Tablet 40 milliGRAM(s) Oral before breakfast  polyethylene glycol 3350 17 Gram(s) Oral daily  senna 2 Tablet(s) Oral at bedtime  sevelamer carbonate 800 milliGRAM(s) Oral three times a day with meals  vancomycin  IVPB 1000 milliGRAM(s) IV Intermittent <User Schedule>    FHNo pertinent family history in first degree relatives    ,   PMHESRD on dialysis    DM (diabetes mellitus)       PSHNo significant past surgical history        Labs                          11.4   6.12  )-----------( 202      ( 04 Oct 2022 10:30 )             35.7      10-04    136  |  100  |  79<H>  ----------------------------<  234<H>  4.2   |  23  |  5.73<H>    Ca    9.0      04 Oct 2022 10:30  Phos  5.2     10-03  Mg     2.5     10-03    TPro  6.6  /  Alb  3.1<L>  /  TBili  0.2  /  DBili  x   /  AST  15  /  ALT  17  /  AlkPhos  61  10-04     Vital Signs Last 24 Hrs  T(C): 36.4 (04 Oct 2022 14:31), Max: 36.6 (04 Oct 2022 05:42)  T(F): 97.5 (04 Oct 2022 14:31), Max: 97.8 (04 Oct 2022 05:42)  HR: 50 (04 Oct 2022 14:31) (50 - 80)  BP: 138/69 (04 Oct 2022 14:31) (112/52 - 162/71)  BP(mean): 88 (03 Oct 2022 19:20) (61 - 90)  RR: 15 (04 Oct 2022 14:31) (14 - 20)  SpO2: 96% (04 Oct 2022 14:31) (96% - 100%)    Parameters below as of 04 Oct 2022 14:31  Patient On (Oxygen Delivery Method): room air      Sedimentation Rate, Erythrocyte: 19 mm/Hr (09-26-22 @ 19:00)         C-Reactive Protein, Serum: 9 mg/L (09-26-22 @ 19:00)   WBC Count: 6.12 K/uL (10-04-22 @ 10:30)      ROS unremarkable outside of HPI    PHYSICAL EXAM  LE Focused: Pt is A&Ox3 in mild distress from right foot pain   Vasc: DP/PT pulses non palpable 0/4 b/l; temperature gradient is warm to cool from proximal leg to digits with R > L. No edema noted to b/l LE. No varicosities. Pedal hair absent.  Derm: Right 2nd digit dry gangrenous ischemic changes noted without erythema, drainage, edema that is rigid and is cool to the touch. Right 1st and 3rd digits at distal tuft's and, now dorsal, show dusky changes that are also cool to the touch without open lesions or signs of infection. Right plantar heel wound with eschar base and no signs of infection, no fluctuance noted. Left foot no signs of gangrene or any open lesions.   Neuro: Protective sensation present b/l; light touch sensation present b/l   MSK: POP to right digits 1-3 and plantar heel wound; muscle strength exam deferred 2/2 pain; b/l hammertoe rigid contractions of digits 2-5       IMAGING    < from: Xray Foot AP + Lateral, Right (09.26.22 @ 17:00) >  ACC: [id number] EXAM:  XR FOOT 2 VIEWS RT                          PROCEDURE DATE:  09/26/2022          INTERPRETATION:  Right foot    HISTORY: Heel gangrene and second toe gangrene     Three views of the right foot show no evidence of acute fracturenor   destructive change. The joint spaces are maintained. Vascular   calcifications are present.    IMPRESSION: No definite bony destruction.      Thank you for this referral.    --- End of Report ---      < end of copied text >      CULTURES:     A:  - PVD  - DM  - ESRD on dialysis  - Right 2nd digit dry gangrene  - Right 1st and 3rd digit ischemic changes  - Right plantar heel eschar       P:   Patient evaluated and Chart reviewed   Discussed diagnosis and treatment with patient  Applied betadine, DSD to right plantar heel and right 1st, 2nd, 3rd digits   Vascular consult appreciated  Discussed conservative wound care vs proximal amputation with patient, patient will think over  Discussed vascular results and podiatry interventions with patient's sister, Kelin  MRI results reviewed   X-rays reviewed; no bony destruction   Reviewed JESUS/PVR  NWB to RLE  Offloading to bilateral Heels via CAIR boots  Discussed importance of daily foot examinations and proper shoe gear and to importance of lower Fasting Blood Glucose levels.   Podiatry to follow while in house.  Discussed with attending Dr. Godinez

## 2022-10-04 NOTE — PROGRESS NOTE ADULT - ASSESSMENT
72 year old male, coming from [facility], with medical history of ESRD (T-TH-S), DM coming in with right leg necrosis Vascular and Podiatry consulted. JESUS with abnormal findings. CT angio with out flow disease bilat. MRI negative for OM, also found to have pseudomonas UTI, started on vancomycin and cefepime.  ID following. Hospital course c/b COVID + 09/29, asymptomatic, s/p RLE angioplasty 10/3 w/no targets for revasc but w/extensive distal small vessel disease, no acute vascular intervention at this time. Recommendations would be for R BKA vs. medication management, patient has been resistant to BKA.

## 2022-10-04 NOTE — PROGRESS NOTE ADULT - SUBJECTIVE AND OBJECTIVE BOX
POST-OPERATIVE NOTE    Subjective:   72y Male s/p RLE angioplasty POD #0 . Seen and examined at bed side . Denies nausea, vomiting, chest pain, sob, fevers chills. Pain is well controlled. . Voiding .    Vital Signs Last 24 Hrs  T(C): 36.6 (04 Oct 2022 05:42), Max: 36.6 (04 Oct 2022 05:42)  T(F): 97.8 (04 Oct 2022 05:42), Max: 97.8 (04 Oct 2022 05:42)  HR: 66 (04 Oct 2022 05:42) (56 - 80)  BP: 150/74 (04 Oct 2022 05:42) (112/52 - 162/71)  BP(mean): 88 (03 Oct 2022 19:20) (61 - 90)  RR: 18 (04 Oct 2022 05:42) (14 - 20)  SpO2: 100% (04 Oct 2022 05:42) (97% - 100%)    Parameters below as of 04 Oct 2022 05:42  Patient On (Oxygen Delivery Method): room air      I&O's Detail    02 Oct 2022 07:01  -  03 Oct 2022 07:00  --------------------------------------------------------  IN:  Total IN: 0 mL    OUT:    Voided (mL): 400 mL  Total OUT: 400 mL    Total NET: -400 mL          Physical Exam:  General: NAD, resting comfortably in bed  Pulmonary: Nonlabored breathing, no respiratory distress  Cardiovascular: NSR, S1, S2  Abdominal: soft, NT/ND,   Extremities: dressing c/d/i, no edema, no calf tenderness, distal pulses are palpable     LABS:                        12.2   4.84  )-----------( 207      ( 03 Oct 2022 07:12 )             38.2     10-03    136  |  100  |  68<H>  ----------------------------<  162<H>  4.0   |  24  |  5.10<H>    Ca    9.1      03 Oct 2022 07:12  Phos  5.2     10-03  Mg     2.5     10-03    TPro  6.8  /  Alb  3.1<L>  /  TBili  0.3  /  DBili  x   /  AST  16  /  ALT  18  /  AlkPhos  57  10-03    LIVER FUNCTIONS - ( 03 Oct 2022 07:12 )  Alb: 3.1 g/dL / Pro: 6.8 g/dL / ALK PHOS: 57 U/L / ALT: 18 U/L DA / AST: 16 U/L / GGT: x             MEDICATIONS  (STANDING):  cefepime   IVPB 1000 milliGRAM(s) IV Intermittent every 24 hours  chlorhexidine 2% Cloths 1 Application(s) Topical daily  influenza  Vaccine (HIGH DOSE) 0.7 milliLiter(s) IntraMuscular once  insulin lispro (ADMELOG) corrective regimen sliding scale   SubCutaneous three times a day before meals  insulin lispro (ADMELOG) corrective regimen sliding scale   SubCutaneous at bedtime  pantoprazole    Tablet 40 milliGRAM(s) Oral before breakfast  polyethylene glycol 3350 17 Gram(s) Oral daily  senna 2 Tablet(s) Oral at bedtime  sevelamer carbonate 800 milliGRAM(s) Oral three times a day with meals  vancomycin  IVPB 1000 milliGRAM(s) IV Intermittent every 48 hours    MEDICATIONS  (PRN):  acetaminophen     Tablet .. 650 milliGRAM(s) Oral every 6 hours PRN Temp greater or equal to 38C (100.4F), Mild Pain (1 - 3)  melatonin 3 milliGRAM(s) Oral at bedtime PRN Insomnia  ondansetron Injectable 4 milliGRAM(s) IV Push every 8 hours PRN Nausea and/or Vomiting      Assessment:   72y Male who is s/p.RLE angioplasty POD #0 . Stable    Plan:  - Pain control prn  -Dressing change prn   - Reg diet and DC IV fluids  - Incentive Spirometry  - OOB and ambulating as tolerated  - F/u AM labs  - DVT ppx

## 2022-10-04 NOTE — PROGRESS NOTE ADULT - SUBJECTIVE AND OBJECTIVE BOX
NP Note discussed with  Primary Attending    Patient is a 72y old  Male who presents with a chief complaint of Gangrene foot (04 Oct 2022 11:39)      INTERVAL HPI/OVERNIGHT EVENTS: s/p RLE angioplasty    MEDICATIONS  (STANDING):  cefepime   IVPB 1000 milliGRAM(s) IV Intermittent every 24 hours  chlorhexidine 2% Cloths 1 Application(s) Topical daily  influenza  Vaccine (HIGH DOSE) 0.7 milliLiter(s) IntraMuscular once  insulin lispro (ADMELOG) corrective regimen sliding scale   SubCutaneous three times a day before meals  insulin lispro (ADMELOG) corrective regimen sliding scale   SubCutaneous at bedtime  pantoprazole    Tablet 40 milliGRAM(s) Oral before breakfast  polyethylene glycol 3350 17 Gram(s) Oral daily  senna 2 Tablet(s) Oral at bedtime  sevelamer carbonate 800 milliGRAM(s) Oral three times a day with meals  vancomycin  IVPB 1000 milliGRAM(s) IV Intermittent every 48 hours    MEDICATIONS  (PRN):  acetaminophen     Tablet .. 650 milliGRAM(s) Oral every 6 hours PRN Temp greater or equal to 38C (100.4F), Mild Pain (1 - 3)  melatonin 3 milliGRAM(s) Oral at bedtime PRN Insomnia  ondansetron Injectable 4 milliGRAM(s) IV Push every 8 hours PRN Nausea and/or Vomiting      __________________________________________________  REVIEW OF SYSTEMS:    CONSTITUTIONAL: No fever,   EYES: no acute visual disturbances  NECK: No pain or stiffness  RESPIRATORY: No cough; No shortness of breath  CARDIOVASCULAR: No chest pain, no palpitations  GASTROINTESTINAL: No pain. No nausea or vomiting; No diarrhea   NEUROLOGICAL: No headache or numbness, no tremors  MUSCULOSKELETAL: No joint pain, no muscle pain  GENITOURINARY: no dysuria, no frequency, no hesitancy  PSYCHIATRY: no depression , no anxiety  ALL OTHER  ROS negative        Vital Signs Last 24 Hrs  T(C): 36.1 (04 Oct 2022 10:44), Max: 36.6 (04 Oct 2022 05:42)  T(F): 97 (04 Oct 2022 10:44), Max: 97.8 (04 Oct 2022 05:42)  HR: 65 (04 Oct 2022 10:44) (56 - 80)  BP: 135/66 (04 Oct 2022 10:44) (112/52 - 162/71)  BP(mean): 88 (03 Oct 2022 19:20) (61 - 90)  RR: 17 (04 Oct 2022 10:44) (14 - 20)  SpO2: 100% (04 Oct 2022 05:42) (98% - 100%)    Parameters below as of 04 Oct 2022 10:44  Patient On (Oxygen Delivery Method): room air        ________________________________________________  PHYSICAL EXAM:  GENERAL: NAD  HEENT: Normocephalic;  conjunctivae and sclerae clear  NECK : supple  CHEST/LUNG: Clear to auscultation bilaterally  HEART: S1 S2 RRR  ABDOMEN: Soft, Nontender, Nondistended; Bowel sounds present  EXTREMITIES: no cyanosis; no edema; no calf tenderness  SKIN: warm and dry; no rash; + R foot wrapped in Gauze  NERVOUS SYSTEM:  Awake and alert; Oriented  to place, person and time    _________________________________________________  LABS:                        11.4   6.12  )-----------( 202      ( 04 Oct 2022 10:30 )             35.7     10-04    136  |  100  |  79<H>  ----------------------------<  234<H>  4.2   |  23  |  5.73<H>    Ca    9.0      04 Oct 2022 10:30  Phos  5.2     10-03  Mg     2.5     10-03    TPro  6.6  /  Alb  3.1<L>  /  TBili  0.2  /  DBili  x   /  AST  15  /  ALT  17  /  AlkPhos  61  10-04    PT/INR - ( 03 Oct 2022 07:12 )   PT: 12.0 sec;   INR: 1.01 ratio         PTT - ( 03 Oct 2022 07:12 )  PTT:34.9 sec    CAPILLARY BLOOD GLUCOSE      POCT Blood Glucose.: 178 mg/dL (04 Oct 2022 11:28)  POCT Blood Glucose.: 215 mg/dL (04 Oct 2022 07:55)  POCT Blood Glucose.: 145 mg/dL (03 Oct 2022 21:10)  POCT Blood Glucose.: 151 mg/dL (03 Oct 2022 19:07)        RADIOLOGY & ADDITIONAL TESTS:  < from: VA Physiol Extremity Lower 3+ Level, BI (09.26.22 @ 16:49) >  IMPRESSION: Abnormally elevated ABIs compatiblewith calcified vessels.    No discernible pulse volume recording at the level of the right digit.    < end of copied text >      < from: Xray Foot AP + Lateral, Right (09.26.22 @ 17:00) >  IMPRESSION: No definite bony destruction.    < end of copied text >    < from: CT Angio Abd Aorta w/run-off w/ IV Cont (09.27.22 @ 12:04) >  IMPRESSION:    Outflow disease bilaterally as detailed above.    < end of copied text >    < from: MR Foot No Cont, Right (09.29.22 @ 20:37) >  IMPRESSION:    1.  No osteomyelitis.  2.  Posterior heel skin wound and dorsal forefoot soft tissue swelling.   Nonspecific but can be seen with cellulitis. No drainable fluid   collection.    < end of copied text >      Imaging Personally Reviewed:  YES    Consultant(s) Notes Reviewed:   YES    Care Discussed with Consultants : Podiatry    Plan of care was discussed with patient and /or primary care giver; all questions and concerns were addressed and care was aligned with patient's wishes.

## 2022-10-04 NOTE — PROGRESS NOTE ADULT - SUBJECTIVE AND OBJECTIVE BOX
[facility] NEPHROLOGY- PROGRESS NOTE    Patient is a 71yo Male with ESRD on HD, DM a/w Rt foot gangrene and GI bleed. Nephrology consulted for ESRD status.   COVID-10 + 9/29/22    Hospital Medications: Medications reviewed.  REVIEW OF SYSTEMS:  CONSTITUTIONAL: No fevers or chills  RESPIRATORY: No shortness of breath  CARDIOVASCULAR: No chest pain.  GASTROINTESTINAL: No nausea, vomiting, diarrhea or abdominal pain.   VASCULAR: No bilateral lower extremity edema. +Mild Rt foot pain      VITALS:  T(F): 97.8 (10-04-22 @ 05:42), Max: 97.8 (10-04-22 @ 05:42)  HR: 66 (10-04-22 @ 05:42)  BP: 150/74 (10-04-22 @ 05:42)  RR: 18 (10-04-22 @ 05:42)  SpO2: 100% (10-04-22 @ 05:42)  Wt(kg): --    Height (cm): 188 (10-03 @ 11:57)  Weight (kg): 72.6 (10-03 @ 11:57)  BMI (kg/m2): 20.5 (10-03 @ 11:57)  BSA (m2): 1.98 (10-03 @ 11:57)      PHYSICAL EXAM:  Gen: NAD,   HEENT: anicteric  Neck: no JVD  Cards: RRR, +S1/S2, no M/G/R  Resp: CTA B/L  GI: soft, NT/ND, NABS  Extremities: no LE edema B/L  Derm: Rt foot wrapped dressing c/d/i  Access: Rt IJ tunneled HD catheter- benign  Left upper arm AVF- no thrill no bruit    LABS:  10-04    136  |  100  |  79<H>  ----------------------------<  234<H>  4.2   |  23  |  x     Ca    9.0      04 Oct 2022 10:30  Phos  5.2     10-03  Mg     2.5     10-03    TPro      /  Alb  3.1<L>  /  TBili      /  DBili      /  AST      /  ALT      /  AlkPhos      10-04    Creatinine Trend: 5.10 <--, 4.76 <--, 6.69 <--, 4.82 <--                        11.4   6.12  )-----------( 202      ( 04 Oct 2022 10:30 )             35.7     Urine Studies:

## 2022-10-04 NOTE — PROGRESS NOTE ADULT - SUBJECTIVE AND OBJECTIVE BOX
Patient is seen at the bed side, is afebrile. He is s/p RLE Angioplasty on 10/3 and tolerated the procedure well.       REVIEW OF SYSTEMS: All other review systems are negative      ALLERGIES: No Known Allergies      Vital Signs Last 24 Hrs  T(C): 36.4 (04 Oct 2022 14:31), Max: 36.6 (04 Oct 2022 05:42)  T(F): 97.5 (04 Oct 2022 14:31), Max: 97.8 (04 Oct 2022 05:42)  HR: 50 (04 Oct 2022 14:31) (50 - 80)  BP: 138/69 (04 Oct 2022 14:31) (112/52 - 162/71)  BP(mean): 88 (03 Oct 2022 19:20) (61 - 90)  RR: 15 (04 Oct 2022 14:31) (14 - 20)  SpO2: 96% (04 Oct 2022 14:31) (96% - 100%)    Parameters below as of 04 Oct 2022 14:31  Patient On (Oxygen Delivery Method): room air      PHYSICAL EXAM:  GENERAL : Not in distress  RESP: Not using accessory muscles   EXT: Left heel bandage in placed   CNS: Awake and Alert  -Please refer to Primary team's note for rest of the systems      LABS:                          11.4   6.12  )-----------( 202      ( 04 Oct 2022 10:30 )             35.7                           12.2   4.84  )-----------( 207      ( 03 Oct 2022 07:12 )             38.2                10    136  |  100  |  79<H>  ----------------------------<  234<H>  4.2   |  23  |  5.73<H>    Ca    9.0      04 Oct 2022 10:30  Phos  5.2     10-03  Mg     2.5     10-03    TPro  6.6  /  Alb  3.1<L>  /  TBili  0.2  /  DBili  x   /  AST  15  /  ALT  17  /  AlkPhos  61  10-04    10-03    136  |  100  |  68<H>  ----------------------------<  162<H>  4.0   |  24  |  5.10<H>    Ca    9.1      03 Oct 2022 07:12  Phos  5.2     10-  Mg     2.5     10-03    TPro  6.8  /  Alb  3.1<L>  /  TBili  0.3  /  DBili  x   /  AST  16  /  ALT  18  /  AlkPhos  57  10-      CAPILLARY BLOOD GLUCOSE  POCT Blood Glucose.: 127 mg/dL (27 Sep 2022 11:31)  POCT Blood Glucose.: 129 mg/dL (27 Sep 2022 06:13)  POCT Blood Glucose.: 307 mg/dL (27 Sep 2022 00:00)  POCT Blood Glucose.: 171 mg/dL (26 Sep 2022 21:34)      Urinalysis Basic - ( 26 Sep 2022 16:30 )  Color: Yellow / Appearance: Clear / S.010 / pH: x  Gluc: x / Ketone: Negative  / Bili: Negative / Urobili: Negative   Blood: x / Protein: 30 mg/dL / Nitrite: Negative   Leuk Esterase: Small / RBC: 2-5 /HPF / WBC 11-25 /HPF   Sq Epi: x / Non Sq Epi: Few /HPF / Bacteria: Few /HPF      Vancomycin Level, Trough (10.03.22 @ 07:12) : 11.1  Vancomycin Level, Trough (22 @ 15:25) : 11.7    MEDICATIONS  (STANDING):    cefepime   IVPB 1000 milliGRAM(s) IV Intermittent every 24 hours  chlorhexidine 2% Cloths 1 Application(s) Topical daily  influenza  Vaccine (HIGH DOSE) 0.7 milliLiter(s) IntraMuscular once  insulin lispro (ADMELOG) corrective regimen sliding scale   SubCutaneous three times a day before meals  insulin lispro (ADMELOG) corrective regimen sliding scale   SubCutaneous at bedtime  pantoprazole    Tablet 40 milliGRAM(s) Oral before breakfast  polyethylene glycol 3350 17 Gram(s) Oral daily  senna 2 Tablet(s) Oral at bedtime  sevelamer carbonate 800 milliGRAM(s) Oral three times a day with meals  vancomycin  IVPB 1000 milliGRAM(s) IV Intermittent <User Schedule>  vancomycin  IVPB 1000 milliGRAM(s) IV Intermittent once      RADIOLOGY & ADDITIONAL TESTS:    22: MR Foot No Cont, Right (22 @ 20:37) >  1.  No osteomyelitis.  2.  Posterior heel skin wound and dorsal forefoot soft tissue swelling. Nonspecific but can be seen with cellulitis. No drainable fluid collection.      22 from: Xray Foot AP + Lateral, Right (22 @ 17:00) >IMPRESSION: No definite bony destruction.    22: VA Physiol Extremity Lower 3+ Level, BI (22 @ 16:49) IMPRESSION: Abnormally elevated ABIs compatible with calcified vessels.    No discernible pulse volume recording at the level of the right digit.      MICROBIOLOGY DATA:    COVID-19 PCR . (22 @ 10:00)   COVID-19 PCR: Detected    Culture - Urine (22 @ 16:30)   - Amikacin: S <=16   - Aztreonam: S <=4   - Cefepime: S <=2   - Ceftazidime: S 4   - Ciprofloxacin: S <=0.25   - Gentamicin: S <=2   - Imipenem: S <=1   - Levofloxacin: S <=0.5   - Meropenem: S <=1   - Piperacillin/Tazobactam: S <=8   - Tobramycin: S <=2   Specimen Source: Clean Catch Clean Catch (Midstream)   Culture Results:   50,000 - 99,000 CFU/mL Pseudomonas aeruginosa   Organism Identification: Pseudomonas aeruginosa   Organism: Pseudomonas aeruginosa   Method Type: ANH     Culture - Blood (22 @ 14:30)   Specimen Source: .Blood Blood   Culture Results: No growth to date.     Culture - Blood (22 @ 14:20)   Specimen Source: .Blood Blood   Culture Results: No growth to date.     Urine Microscopic-Add On (NC) (22 @ 16:30)   Red Blood Cell - Urine: 2-5 /HPF   White Blood Cell - Urine: 11-25 /HPF   Bacteria: Few /HPF   Comment - Urine: few transitional epithelial cells seen   Epithelial Cells: Few /HPF     Flu With COVID-19 By BEBE (22 @ 14:30)   SARS-CoV-2 Result: NotDetec:

## 2022-10-04 NOTE — PROGRESS NOTE ADULT - ASSESSMENT
72 year old male, [facility], with medical history of ESRD (T-TH-S), DM coming in with right leg necrosis Vascular and Podiatry consulted. JESUS with abnormal findings. CT angio with out flow disease bilat. MRI negative for OM, also found to have pseudomonas UTI, started on vancomycin and cefepime.  ID following.  Pt has L AV graft that was placed 2 weeks ago per pt. + thrill. -bruit. per vascular will take months to mature, currently on dialysis via R chest PermCath. Nephro following. Hospital course c/b COVID + 09/29. s/p angiogram  10/3

## 2022-10-04 NOTE — PROGRESS NOTE ADULT - ASSESSMENT
Patient is a 73yo Male with ESRD on HD, DM a/w Rt foot gangrene and GI bleed. Nephrology consulted for ESRD status.     1) ESRD:  Last HD 10/1 tolerated well with 0.7L removed. Plan for next maintenance HD today; 10/4. Monitor electrolytes.  Pt with new Left UE AVF- no thrill no bruit- f/u Vascular (Dr. Dutta aware).   2) HTN with ESRD: BP borderline high off antihypertensive medications. Recc CCB. c/w low sodium diet. Monitor BP.  3) Anemia of renal disease: with blood loss due to GI bleed. FOBT neg. Hb acceptable. No need for JERED at this time. Monitor Hb.  4) Hyperphosphatemia: Serum phosphorus acceptable. c/w Sevelamer 1 tab tid with meals and low phos diet. Monitor serum calcium and phosphorus.  5) Rt foot gangrene: Pt on Cefepime/ Vanco 1g IV every 48 hrs. MRI neg for OM. s/p RLE angioplasty on 10/3. Podiatry/ Vascular/ ID following.     [facility] NEPHROLOGY  Wili Hopkins M.D.  Kiran Feng D.O.  Sujatha Dumont M.D.  Radha Millre, MSN, ANP-C  [phone]    [address] [place] [address]

## 2022-10-04 NOTE — PROGRESS NOTE ADULT - PROBLEM SELECTOR PLAN 1
- Pt presented with right foot gangrene  - CTA run off shows outflow disease  - - cont vancomycin every 48 hrs,   - BCx negative  - MRI Rt foot negative for OM  - s/p Angiogram w/no targets for revasc but w/extensive distal small vessel disease  - recommendation would be for R BKA vs. medical management

## 2022-10-04 NOTE — PROGRESS NOTE ADULT - SUBJECTIVE AND OBJECTIVE BOX
Patient is a 72y old  Male who presents with a chief complaint of Gangrene foot (03 Oct 2022 12:02)      INTERVAL HPI/OVERNIGHT EVENTS: no acute events overnight,     MEDICATIONS  (STANDING):  cefepime   IVPB 1000 milliGRAM(s) IV Intermittent every 24 hours  chlorhexidine 2% Cloths 1 Application(s) Topical daily  influenza  Vaccine (HIGH DOSE) 0.7 milliLiter(s) IntraMuscular once  insulin lispro (ADMELOG) corrective regimen sliding scale   SubCutaneous three times a day before meals  insulin lispro (ADMELOG) corrective regimen sliding scale   SubCutaneous at bedtime  pantoprazole    Tablet 40 milliGRAM(s) Oral before breakfast  polyethylene glycol 3350 17 Gram(s) Oral daily  senna 2 Tablet(s) Oral at bedtime  sevelamer carbonate 800 milliGRAM(s) Oral three times a day with meals  vancomycin  IVPB 1000 milliGRAM(s) IV Intermittent <User Schedule>    MEDICATIONS  (PRN):  acetaminophen     Tablet .. 650 milliGRAM(s) Oral every 6 hours PRN Temp greater or equal to 38C (100.4F), Mild Pain (1 - 3)  melatonin 3 milliGRAM(s) Oral at bedtime PRN Insomnia  ondansetron Injectable 4 milliGRAM(s) IV Push every 8 hours PRN Nausea and/or Vomiting      __________________________________________________  REVIEW OF SYSTEMS:    CONSTITUTIONAL: No fever,   EYES: no acute visual disturbances  NECK: No pain or stiffness  RESPIRATORY: No cough; No shortness of breath  CARDIOVASCULAR: No chest pain, no palpitations  GASTROINTESTINAL: No pain. No nausea or vomiting; No diarrhea   NEUROLOGICAL: No headache or numbness, no tremors  MUSCULOSKELETAL: No joint pain, no muscle pain  GENITOURINARY: no dysuria, no frequency, no hesitancy  PSYCHIATRY: no depression , no anxiety  ALL OTHER  ROS negative      Vital Signs Last 24 Hrs  T(C): 36.9 (10-04-22 @ 20:54), Max: 36.9 (10-04-22 @ 20:54)  T(F): 98.5 (10-04-22 @ 20:54), Max: 98.5 (10-04-22 @ 20:54)  HR: 69 (10-04-22 @ 20:54) (50 - 69)  BP: 128/66 (10-04-22 @ 20:54) (128/66 - 154/70)  BP(mean): --  RR: 18 (10-04-22 @ 20:54) (15 - 18)  SpO2: 99% (10-04-22 @ 20:54) (96% - 100%)        ________________________________________________  PHYSICAL EXAM:  GENERAL: NAD  HEENT: Normocephalic;  conjunctivae and sclerae clear  NECK : supple  CHEST/LUNG: dec breath sounds at bases  HEART: S1 S2  RRR  ABDOMEN: Soft, Nontender, Nondistended; Bowel sounds present  EXTREMITIES: foot dressing+  SKIN: warm and dry; no rash  NERVOUS SYSTEM:  Awake and alert; Oriented  to place, person and time    _________________________________________________  LABS:  10-04    136  |  100  |  79<H>  ----------------------------<  234<H>  4.2   |  23  |  5.73<H>    Ca    9.0      04 Oct 2022 10:30  Phos  5.2     10-03  Mg     2.5     10-03    TPro  6.6  /  Alb  3.1<L>  /  TBili  0.2  /  DBili      /  AST  15  /  ALT  17  /  AlkPhos  61  10-04    Creatinine Trend: 5.73 <--, 5.10 <--, 4.76 <--, 6.69 <--, 4.82 <--                        11.4   6.12  )-----------( 202      ( 04 Oct 2022 10:30 )             35.7     Urine Studies:            LIVER FUNCTIONS - ( 04 Oct 2022 10:30 )  Alb: 3.1 g/dL / Pro: 6.6 g/dL / ALK PHOS: 61 U/L / ALT: 17 U/L DA / AST: 15 U/L / GGT: x           PT/INR - ( 03 Oct 2022 07:12 )   PT: 12.0 sec;   INR: 1.01 ratio         PTT - ( 03 Oct 2022 07:12 )  PTT:34.9 sec      RADIOLOGY & ADDITIONAL TESTS:    < from: VA Physiol Extremity Lower 3+ Level, BI (09.26.22 @ 16:49) >    IMPRESSION: Abnormally elevated ABIs compatiblewith calcified vessels.    No discernible pulse volume recording at the level of the right digit.    < end of copied text >    < from: Xray Foot AP + Lateral, Right (09.26.22 @ 17:00) >  IMPRESSION: No definite bony destruction.    < end of copied text >    < from: CT Angio Abd Aorta w/run-off w/ IV Cont (09.27.22 @ 12:04) >  IMPRESSION:    Outflow disease bilaterally as detailed above.    < end of copied text >    < from: MR Foot No Cont, Right (09.29.22 @ 20:37) >  IMPRESSION:    1.  No osteomyelitis.  2.  Posterior heel skin wound and dorsal forefoot soft tissue swelling.   Nonspecific but can be seen with cellulitis. No drainable fluid   collection.    < end of copied text >        Imaging Personally Reviewed:  YES    Consultant(s) Notes Reviewed:   YES      Plan of care was discussed with patient and /or primary care giver; all questions and concerns were addressed and care was aligned with patient's wishes.

## 2022-10-04 NOTE — PROGRESS NOTE ADULT - PROBLEM SELECTOR PLAN 1
- Pt presented with right foot gangrene  - CTA run off shows outflow disease  - abnormal JESUS  - NWB to RLE- podiatry recc  - cont vancomycin every 48 hrs,   - BCx negative  - MRI Rt foot negative for OM  - s/p Angiogram w/no targets for revasc but w/extensive distal small vessel disease  - recommendation would be for R BKA vs. medical management  - ID Dr. Babcock following  - Vascular Following   - Podiatry Following

## 2022-10-04 NOTE — PROGRESS NOTE ADULT - ASSESSMENT
Patient is a 72y old  Male with medical history of ESRD (T-TH-S), DM, now send in to the ER from [facility], for evaluation of right foot necrosis. On admission, he has no fever, No leukocytosis. He has evaluated by Podiatry , started on IV Vancomycin, and the ID consult requested to assist with further evaluation and antibiotic management.     # Right  gangrenous foot- s/p Angioplasty 10/3 - MRI Of Right foot from 9/29/22 shows No osteomyelitis.  # UTI- Urine cx grew Pseudomonas.   # Positive COVID as of 9/29/22- Not hypoxic   # s/p RLE Angioplasty 10/3/22    would recommend:    1. Dose of IV Vancomycin now since level is not therapeutic   2. Continue IV Cefepime and IV Vancomycin   3. Wound care as per Podiatry   4. Monitor and Keep Vancomycin level between 15 to 20  5. Need  Abx until 10/17/22, may change to oral Augmentin 875 mg daily and Doxycycline 100 mg q 12hours on discharge  6. COVID precaution as per protocol    d/w Covering Suki MARCUS    Attending Attestation:    Spent more than 35 minutes on total encounter, more than 50 % of the visit was spent counseling and/or coordinating care by the Attending physician.

## 2022-10-04 NOTE — PROGRESS NOTE ADULT - ASSESSMENT
A: 72M w/PAD POD1 s/p RLE angiography w/no target for revasc    P:  - C/w medical management of PAD per primary  - F/u podiatry for additional recs  - Vascular surgery to sign off - please reconsult for additional concerns

## 2022-10-04 NOTE — PROGRESS NOTE ADULT - PROBLEM SELECTOR PLAN 4
- ESRD on HD on T-TH-S  - c/w sevelamer  - continue with Vancomycin every 48hours intradialysis  - Renal diet   - trend BMP  - avoid Nephrotoxins  - Nephro consulted: Dr. Dumont

## 2022-10-05 LAB
ANION GAP SERPL CALC-SCNC: 9 MMOL/L — SIGNIFICANT CHANGE UP (ref 5–17)
BUN SERPL-MCNC: 52 MG/DL — HIGH (ref 7–18)
CALCIUM SERPL-MCNC: 9.1 MG/DL — SIGNIFICANT CHANGE UP (ref 8.4–10.5)
CHLORIDE SERPL-SCNC: 102 MMOL/L — SIGNIFICANT CHANGE UP (ref 96–108)
CO2 SERPL-SCNC: 24 MMOL/L — SIGNIFICANT CHANGE UP (ref 22–31)
CREAT SERPL-MCNC: 4.63 MG/DL — HIGH (ref 0.5–1.3)
EGFR: 13 ML/MIN/1.73M2 — LOW
GLUCOSE BLDC GLUCOMTR-MCNC: 181 MG/DL — HIGH (ref 70–99)
GLUCOSE BLDC GLUCOMTR-MCNC: 191 MG/DL — HIGH (ref 70–99)
GLUCOSE BLDC GLUCOMTR-MCNC: 236 MG/DL — HIGH (ref 70–99)
GLUCOSE BLDC GLUCOMTR-MCNC: 279 MG/DL — HIGH (ref 70–99)
GLUCOSE SERPL-MCNC: 193 MG/DL — HIGH (ref 70–99)
HCT VFR BLD CALC: 36.8 % — LOW (ref 39–50)
HGB BLD-MCNC: 11.7 G/DL — LOW (ref 13–17)
MCHC RBC-ENTMCNC: 29.3 PG — SIGNIFICANT CHANGE UP (ref 27–34)
MCHC RBC-ENTMCNC: 31.8 GM/DL — LOW (ref 32–36)
MCV RBC AUTO: 92 FL — SIGNIFICANT CHANGE UP (ref 80–100)
NRBC # BLD: 0 /100 WBCS — SIGNIFICANT CHANGE UP (ref 0–0)
PLATELET # BLD AUTO: 208 K/UL — SIGNIFICANT CHANGE UP (ref 150–400)
POTASSIUM SERPL-MCNC: 3.8 MMOL/L — SIGNIFICANT CHANGE UP (ref 3.5–5.3)
POTASSIUM SERPL-SCNC: 3.8 MMOL/L — SIGNIFICANT CHANGE UP (ref 3.5–5.3)
RBC # BLD: 4 M/UL — LOW (ref 4.2–5.8)
RBC # FLD: 13.3 % — SIGNIFICANT CHANGE UP (ref 10.3–14.5)
SODIUM SERPL-SCNC: 135 MMOL/L — SIGNIFICANT CHANGE UP (ref 135–145)
WBC # BLD: 5.27 K/UL — SIGNIFICANT CHANGE UP (ref 3.8–10.5)
WBC # FLD AUTO: 5.27 K/UL — SIGNIFICANT CHANGE UP (ref 3.8–10.5)

## 2022-10-05 RX ADMIN — SEVELAMER CARBONATE 800 MILLIGRAM(S): 2400 POWDER, FOR SUSPENSION ORAL at 17:06

## 2022-10-05 RX ADMIN — Medication 2: at 12:21

## 2022-10-05 RX ADMIN — SEVELAMER CARBONATE 800 MILLIGRAM(S): 2400 POWDER, FOR SUSPENSION ORAL at 12:23

## 2022-10-05 RX ADMIN — CEFEPIME 100 MILLIGRAM(S): 1 INJECTION, POWDER, FOR SOLUTION INTRAMUSCULAR; INTRAVENOUS at 05:12

## 2022-10-05 RX ADMIN — Medication 1: at 08:31

## 2022-10-05 RX ADMIN — Medication 1: at 17:06

## 2022-10-05 RX ADMIN — POLYETHYLENE GLYCOL 3350 17 GRAM(S): 17 POWDER, FOR SOLUTION ORAL at 12:22

## 2022-10-05 RX ADMIN — SEVELAMER CARBONATE 800 MILLIGRAM(S): 2400 POWDER, FOR SUSPENSION ORAL at 08:32

## 2022-10-05 RX ADMIN — PANTOPRAZOLE SODIUM 40 MILLIGRAM(S): 20 TABLET, DELAYED RELEASE ORAL at 05:10

## 2022-10-05 RX ADMIN — Medication 1: at 22:58

## 2022-10-05 RX ADMIN — SENNA PLUS 2 TABLET(S): 8.6 TABLET ORAL at 21:45

## 2022-10-05 RX ADMIN — CHLORHEXIDINE GLUCONATE 1 APPLICATION(S): 213 SOLUTION TOPICAL at 12:23

## 2022-10-05 NOTE — PROGRESS NOTE ADULT - PROBLEM SELECTOR PLAN 9
- patient from home  - BKA vs. medical management of R foot necrosis  - remains on IV ABX  pending decision by pt for BKA

## 2022-10-05 NOTE — PROGRESS NOTE ADULT - PROBLEM SELECTOR PLAN 1
- Pt presented with right foot gangrene  - CTA run off shows outflow disease  - abnormal JESUS  - NWB to RLE- podiatry recc  - cont vancomycin every 48 hrs,   - BCx negative  - MRI Rt foot negative for OM  - s/p Angiogram w/no targets for revasc but w/extensive distal small vessel disease  - recommendation would be for R BKA vs. medical management  - ID Dr. Babcock following  - Vascular Following   - Podiatry Following  pending decision by patient

## 2022-10-05 NOTE — PROGRESS NOTE ADULT - ASSESSMENT
72 year old male, coming from [facility], with medical history of ESRD (T-TH-S), DM coming in with right leg necrosis Vascular and Podiatry consulted. JESUS with abnormal findings. CT angio with out flow disease bilat. MRI negative for OM, also found to have pseudomonas UTI, started on vancomycin and cefepime.  ID following. Hospital course c/b COVID + 09/29, asymptomatic, s/p RLE angiogram 10/3 w/no targets for revasc but w/extensive distal small vessel disease, no acute vascular intervention at this time. Recommendations would be for R BKA vs. medication management, patient has been resistant to BKA pending decision from patient.

## 2022-10-05 NOTE — PROGRESS NOTE ADULT - SUBJECTIVE AND OBJECTIVE BOX
Patient is a 72y old  Male who presents with a chief complaint of Gangrene foot (05 Oct 2022 16:17)      INTERVAL HPI/OVERNIGHT EVENTS: pt seen and examined    MEDICATIONS  (STANDING):  cefepime   IVPB 1000 milliGRAM(s) IV Intermittent every 24 hours  chlorhexidine 2% Cloths 1 Application(s) Topical daily  influenza  Vaccine (HIGH DOSE) 0.7 milliLiter(s) IntraMuscular once  insulin lispro (ADMELOG) corrective regimen sliding scale   SubCutaneous three times a day before meals  insulin lispro (ADMELOG) corrective regimen sliding scale   SubCutaneous at bedtime  pantoprazole    Tablet 40 milliGRAM(s) Oral before breakfast  polyethylene glycol 3350 17 Gram(s) Oral daily  senna 2 Tablet(s) Oral at bedtime  sevelamer carbonate 800 milliGRAM(s) Oral three times a day with meals  vancomycin  IVPB 1000 milliGRAM(s) IV Intermittent <User Schedule>    MEDICATIONS  (PRN):  acetaminophen     Tablet .. 650 milliGRAM(s) Oral every 6 hours PRN Temp greater or equal to 38C (100.4F), Mild Pain (1 - 3)  melatonin 3 milliGRAM(s) Oral at bedtime PRN Insomnia  ondansetron Injectable 4 milliGRAM(s) IV Push every 8 hours PRN Nausea and/or Vomiting    __________________________________________________  REVIEW OF SYSTEMS:    CONSTITUTIONAL: No fever,   EYES: no acute visual disturbances  NECK: No pain or stiffness  RESPIRATORY: No cough; No shortness of breath  CARDIOVASCULAR: No chest pain, no palpitations  GASTROINTESTINAL: No pain. No nausea or vomiting; No diarrhea   NEUROLOGICAL: No headache or numbness, no tremors  MUSCULOSKELETAL: No joint pain, no muscle pain  GENITOURINARY: no dysuria, no frequency, no hesitancy  PSYCHIATRY: no depression , no anxiety  ALL OTHER  ROS negative        Vital Signs Last 24 Hrs  T(C): 36.6 (05 Oct 2022 13:39), Max: 36.9 (04 Oct 2022 20:54)  T(F): 97.9 (05 Oct 2022 13:39), Max: 98.5 (04 Oct 2022 20:54)  HR: 66 (05 Oct 2022 13:39) (66 - 69)  BP: 110/68 (05 Oct 2022 13:39) (110/68 - 145/72)  BP(mean): --  RR: 14 (05 Oct 2022 13:39) (14 - 18)  SpO2: 100% (05 Oct 2022 13:39) (98% - 100%)    Parameters below as of 05 Oct 2022 13:39  Patient On (Oxygen Delivery Method): room air        ________________________________________________  PHYSICAL EXAM:  GENERAL: NAD  HEENT: Normocephalic;  conjunctivae and sclerae clear  NECK : supple  CHEST/LUNG: dec breath sounds at bases  HEART: S1 S2 RRR  ABDOMEN: Soft, Nontender, Nondistended; Bowel sounds present  EXTREMITIES: no cyanosis; no edema; no calf tenderness  SKIN: warm and dry; no rash; + R foot wrapped in Gauze  NERVOUS SYSTEM:  Awake and alert; Oriented  to place, person and time    _________________________________________________  LABS:                        11.7   5.27  )-----------( 208      ( 05 Oct 2022 06:48 )             36.8     10-05    135  |  102  |  52<H>  ----------------------------<  193<H>  3.8   |  24  |  4.63<H>    Ca    9.1      05 Oct 2022 06:48    TPro  6.6  /  Alb  3.1<L>  /  TBili  0.2  /  DBili  x   /  AST  15  /  ALT  17  /  AlkPhos  61  10-04        CAPILLARY BLOOD GLUCOSE      POCT Blood Glucose.: 191 mg/dL (05 Oct 2022 16:42)  POCT Blood Glucose.: 236 mg/dL (05 Oct 2022 11:35)  POCT Blood Glucose.: 181 mg/dL (05 Oct 2022 07:45)  POCT Blood Glucose.: 197 mg/dL (04 Oct 2022 21:01)        RADIOLOGY & ADDITIONAL TESTS:   < from: MR Foot No Cont, Right (09.29.22 @ 20:37) >    IMPRESSION:    1.  No osteomyelitis.  2.  Posterior heel skin wound and dorsal forefoot soft tissue swelling.   Nonspecific but can be seen with cellulitis. No drainable fluid   collection.    --- End of Report ---      < end of copied text >< from: CT Angio Abd Aorta w/run-off w/ IV Cont (09.27.22 @ 12:04) >      IMPRESSION:    Outflow disease bilaterally as detailed above.          --- End of Report ---    < end of copied text >  < from: Xray Foot AP + Lateral, Right (09.26.22 @ 17:00) >  IMPRESSION: No definite bony destruction.      Thank you for this referral.    --- End of Report ---      < end of copied text >  < from: VA Physiol Extremity Lower 3+ Level, BI (09.26.22 @ 16:49) >  IMPRESSION: Abnormally elevated ABIs compatiblewith calcified vessels.    No discernible pulse volume recording at the level of the right digit.    --- End of Report ---    < end of copied text >      Imaging Personally Reviewed:  YES/    Consultant(s) Notes Reviewed:   YES/    Plan of care was discussed with patient and /or primary care giver; all questions and concerns were addressed and care was aligned with patient's wishes.

## 2022-10-05 NOTE — PROGRESS NOTE ADULT - SUBJECTIVE AND OBJECTIVE BOX
Podiatry interval: Pt is seen resting in bed in [facility] isolation room in no acute distress. Patient listens about surgical intervention vs conservative treatment once again. Patient understands that his angiogram results reported no pedal flow. He is upset that his surgical plan is only a more proximal amputation. Admits to pain on his right foot to the gangrenous digits. Denies constitutional symptoms. Denies any overnight acute events. Denies further pedal complaints at this time.     Podiatry HPI: Pt is a 72M who presented to ED from his nursing home ([facility]) after his right foot started having gangrene changes. Pt states he has a vacular doctor appointment coming up soon but hasn't seen a vascular doctor as of yet. Admits he has 10/10 pain to heel and digits. Admits he is normally able to walk however; since he got the right heel wound 2-3 months ago, he has been bedbound. Pt denies constitutional symptoms. Pt denies any recent acute trauma. Pt denies further pedal complaints.     Patient admits to  (-) Fevers, (-) Chills, (-) Nausea, (-) Vomiting, (-) Shortness of Breath (-) calf pain (-) chest pain     Medications acetaminophen     Tablet .. 650 milliGRAM(s) Oral every 6 hours PRN  cefepime   IVPB 1000 milliGRAM(s) IV Intermittent every 24 hours  chlorhexidine 2% Cloths 1 Application(s) Topical daily  influenza  Vaccine (HIGH DOSE) 0.7 milliLiter(s) IntraMuscular once  insulin lispro (ADMELOG) corrective regimen sliding scale   SubCutaneous three times a day before meals  insulin lispro (ADMELOG) corrective regimen sliding scale   SubCutaneous at bedtime  melatonin 3 milliGRAM(s) Oral at bedtime PRN  ondansetron Injectable 4 milliGRAM(s) IV Push every 8 hours PRN  pantoprazole    Tablet 40 milliGRAM(s) Oral before breakfast  polyethylene glycol 3350 17 Gram(s) Oral daily  senna 2 Tablet(s) Oral at bedtime  sevelamer carbonate 800 milliGRAM(s) Oral three times a day with meals  vancomycin  IVPB 1000 milliGRAM(s) IV Intermittent <User Schedule>    FHNo pertinent family history in first degree relatives    ,   PMHESRD on dialysis    DM (diabetes mellitus)       PSHNo significant past surgical history        Labs                          11.7   5.27  )-----------( 208      ( 05 Oct 2022 06:48 )             36.8      10-05    135  |  102  |  52<H>  ----------------------------<  193<H>  3.8   |  24  |  4.63<H>    Ca    9.1      05 Oct 2022 06:48    TPro  6.6  /  Alb  3.1<L>  /  TBili  0.2  /  DBili  x   /  AST  15  /  ALT  17  /  AlkPhos  61  10-04     Vital Signs Last 24 Hrs  T(C): 36.6 (05 Oct 2022 13:39), Max: 36.9 (04 Oct 2022 20:54)  T(F): 97.9 (05 Oct 2022 13:39), Max: 98.5 (04 Oct 2022 20:54)  HR: 66 (05 Oct 2022 13:39) (66 - 69)  BP: 110/68 (05 Oct 2022 13:39) (110/68 - 145/72)  BP(mean): --  RR: 14 (05 Oct 2022 13:39) (14 - 18)  SpO2: 100% (05 Oct 2022 13:39) (98% - 100%)    Parameters below as of 05 Oct 2022 13:39  Patient On (Oxygen Delivery Method): room air      Sedimentation Rate, Erythrocyte: 19 mm/Hr (09-26-22 @ 19:00)         C-Reactive Protein, Serum: 9 mg/L (09-26-22 @ 19:00)   WBC Count: 5.27 K/uL (10-05-22 @ 06:48)          ROS unremarkable outside of HPI    PHYSICAL EXAM  LE Focused: Pt is A&Ox3 in mild distress from right foot pain   Vasc: DP/PT pulses non palpable 0/4 b/l; temperature gradient is warm to cool from proximal leg to digits with R > L. No edema noted to b/l LE. No varicosities. Pedal hair absent.  Derm: Right 2nd digit dry gangrenous ischemic changes noted without erythema, drainage, edema that is rigid and is cool to the touch. Right 1st and 3rd digits at distal tuft's and, now dorsal, show dusky changes that are also cool to the touch without open lesions or signs of infection. Right plantar heel wound with eschar base and no signs of infection, no fluctuance noted. Left foot no signs of gangrene or any open lesions.   Neuro: Protective sensation present b/l; light touch sensation present b/l   MSK: POP to right digits 1-3 and plantar heel wound; muscle strength exam deferred 2/2 pain; b/l hammertoe rigid contractions of digits 2-5       IMAGING    < from: Xray Foot AP + Lateral, Right (09.26.22 @ 17:00) >  ACC: [id number] EXAM:  XR FOOT 2 VIEWS RT                          PROCEDURE DATE:  09/26/2022          INTERPRETATION:  Right foot    HISTORY: Heel gangrene and second toe gangrene     Three views of the right foot show no evidence of acute fracturenor   destructive change. The joint spaces are maintained. Vascular   calcifications are present.    IMPRESSION: No definite bony destruction.      Thank you for this referral.    --- End of Report ---      < end of copied text >      CULTURES:     A:  - PVD  - DM  - ESRD on dialysis  - Right 2nd digit dry gangrene  - Right 1st and 3rd digit ischemic changes  - Right plantar heel eschar       P:   Patient evaluated and Chart reviewed   Discussed diagnosis and treatment with patient  Applied betadine, DSD to right plantar heel and right 1st, 2nd, 3rd digits   Vascular consult appreciated  Discussed conservative wound care vs proximal amputation with patient, patient will think over  MRI results reviewed   X-rays reviewed; no bony destruction   Reviewed JESUS/PVR  NWB to RLE  Offloading to bilateral Heels via CAIR boots  Discussed importance of daily foot examinations and proper shoe gear and to importance of lower Fasting Blood Glucose levels.   Podiatry to follow while in house.  Seen bedside and discussed with attending Dr. Godinez

## 2022-10-05 NOTE — PROGRESS NOTE ADULT - SUBJECTIVE AND OBJECTIVE BOX
[facility] NEPHROLOGY- PROGRESS NOTE    Patient is a 73yo Male with ESRD on HD, DM a/w Rt foot gangrene and GI bleed. Nephrology consulted for ESRD status.   COVID-10 + 9/29/22    Hospital Medications: Medications reviewed.  REVIEW OF SYSTEMS:  CONSTITUTIONAL: No fevers or chills  RESPIRATORY: No shortness of breath  CARDIOVASCULAR: No chest pain.  GASTROINTESTINAL: No nausea, vomiting, diarrhea or abdominal pain.   VASCULAR: No bilateral lower extremity edema. +Mild Rt foot pain      VITALS:  T(F): 97.9 (10-05-22 @ 13:39), Max: 98.5 (10-04-22 @ 20:54)  HR: 66 (10-05-22 @ 13:39)  BP: 110/68 (10-05-22 @ 13:39)  RR: 14 (10-05-22 @ 13:39)  SpO2: 100% (10-05-22 @ 13:39)  Wt(kg): --    10-04 @ 07:01  -  10-05 @ 07:00  --------------------------------------------------------  IN: 0 mL / OUT: 200 mL / NET: -200 mL    10-05 @ 07:01  -  10-05 @ 15:55  --------------------------------------------------------  IN: 0 mL / OUT: 150 mL / NET: -150 mL      PHYSICAL EXAM:  Gen: NAD,   HEENT: anicteric  Neck: no JVD  Cards: RRR, +S1/S2, no M/G/R  Resp: CTA B/L  GI: soft, NT/ND, NABS  Extremities: no LE edema B/L  Derm: Rt foot wrapped dressing c/d/i  Access: Rt IJ tunneled HD catheter- benign  Left upper arm AVF- no thrill no bruit    LABS:  10-05    135  |  102  |  52<H>  ----------------------------<  193<H>  3.8   |  24  |  4.63<H>    Ca    9.1      05 Oct 2022 06:48    TPro  6.6  /  Alb  3.1<L>  /  TBili  0.2  /  DBili      /  AST  15  /  ALT  17  /  AlkPhos  61  10-04    Creatinine Trend: 4.63 <--, 5.73 <--, 5.10 <--, 4.76 <--, 6.69 <--                        11.7   5.27  )-----------( 208      ( 05 Oct 2022 06:48 )             36.8     Urine Studies:

## 2022-10-05 NOTE — PROGRESS NOTE ADULT - ASSESSMENT
Patient is a 71yo Male with ESRD on HD, DM a/w Rt foot gangrene and GI bleed. Nephrology consulted for ESRD status.     1) ESRD:  Last HD 10/4 tolerated well with 1L removed. Plan for next maintenance HD 10/6. Monitor electrolytes.  Pt with new Left UE AVF- no thrill no bruit- f/u Vascular (Dr. Dutta aware); can f/u as an outpt   2) HTN with ESRD: BP borderlin high off antihypertensive medications. Recc low dose CCB. c/w low sodium diet. Monitor BP.  3) Anemia of renal disease: with blood loss due to GI bleed. FOBT neg. Hb acceptable. No need for JERED at this time. Monitor Hb.  4) Hyperphosphatemia: Last serum phosphorus acceptable. c/w Sevelamer 1 tab tid with meals and low phos diet. Monitor serum calcium and phosphorus.  5) Rt foot gangrene: Pt on Cefepime/ Vanco 1g IV tiw. MRI neg for OM. s/p RLE angioplasty on 10/3. Podiatry/ Vascular/ ID following.     [facility] NEPHROLOGY  Wili Hopkins M.D.  Kiran Feng D.O.  Sujatha Dumont M.D.  Radha Miller, MSN, ANP-C  [phone]    [address] [place] [address]

## 2022-10-05 NOTE — PROGRESS NOTE ADULT - SUBJECTIVE AND OBJECTIVE BOX
Patient is seen at the bed side, is afebrile. He is doing better, no new complaints.  The WBC count stay normal.       REVIEW OF SYSTEMS: All other review systems are negative      ALLERGIES: No Known Allergies    Vital Signs Last 24 Hrs  T(C): 36.6 (05 Oct 2022 13:39), Max: 36.9 (04 Oct 2022 20:54)  T(F): 97.9 (05 Oct 2022 13:39), Max: 98.5 (04 Oct 2022 20:54)  HR: 66 (05 Oct 2022 13:39) (66 - 69)  BP: 110/68 (05 Oct 2022 13:39) (110/68 - 145/72)  BP(mean): --  RR: 14 (05 Oct 2022 13:39) (14 - 18)  SpO2: 100% (05 Oct 2022 13:39) (98% - 100%)    Parameters below as of 05 Oct 2022 13:39  Patient On (Oxygen Delivery Method): room air        PHYSICAL EXAM:  GENERAL : Not in distress  RESP: Not using accessory muscles   EXT: Left heel bandage in placed   CNS: Awake and Alert  -Please refer to Primary team's note for rest of the systems      LABS:                          11.7   5.27  )-----------( 208      ( 05 Oct 2022 06:48 )             36.8                           11.4   6.12  )-----------( 202      ( 04 Oct 2022 10:30 )             35.7       10-05    135  |  102  |  52<H>  ----------------------------<  193<H>  3.8   |  24  |  4.63<H>    Ca    9.1      05 Oct 2022 06:48    TPro  6.6  /  Alb  3.1<L>  /  TBili  0.2  /  DBili  x   /  AST  15  /  ALT  17  /  AlkPhos  61  10-04    10-04    136  |  100  |  79<H>  ----------------------------<  234<H>  4.2   |  23  |  5.73<H>    Ca    9.0      04 Oct 2022 10:30  Phos  5.2     10-03  Mg     2.5     10-03    TPro  6.6  /  Alb  3.1<L>  /  TBili  0.2  /  DBili  x   /  AST  15  /  ALT  17  /  AlkPhos  61  10-04      CAPILLARY BLOOD GLUCOSE  POCT Blood Glucose.: 127 mg/dL (27 Sep 2022 11:31)  POCT Blood Glucose.: 129 mg/dL (27 Sep 2022 06:13)  POCT Blood Glucose.: 307 mg/dL (27 Sep 2022 00:00)  POCT Blood Glucose.: 171 mg/dL (26 Sep 2022 21:34)      Urinalysis Basic - ( 26 Sep 2022 16:30 )  Color: Yellow / Appearance: Clear / S.010 / pH: x  Gluc: x / Ketone: Negative  / Bili: Negative / Urobili: Negative   Blood: x / Protein: 30 mg/dL / Nitrite: Negative   Leuk Esterase: Small / RBC: 2-5 /HPF / WBC 11-25 /HPF   Sq Epi: x / Non Sq Epi: Few /HPF / Bacteria: Few /HPF      Vancomycin Level, Trough (10.03.22 @ 07:12) : 11.1  Vancomycin Level, Trough (22 @ 15:25) : 11.7    MEDICATIONS  (STANDING):    cefepime   IVPB 1000 milliGRAM(s) IV Intermittent every 24 hours  chlorhexidine 2% Cloths 1 Application(s) Topical daily  influenza  Vaccine (HIGH DOSE) 0.7 milliLiter(s) IntraMuscular once  insulin lispro (ADMELOG) corrective regimen sliding scale   SubCutaneous three times a day before meals  insulin lispro (ADMELOG) corrective regimen sliding scale   SubCutaneous at bedtime  pantoprazole    Tablet 40 milliGRAM(s) Oral before breakfast  polyethylene glycol 3350 17 Gram(s) Oral daily  senna 2 Tablet(s) Oral at bedtime  sevelamer carbonate 800 milliGRAM(s) Oral three times a day with meals  vancomycin  IVPB 1000 milliGRAM(s) IV Intermittent <User Schedule>      RADIOLOGY & ADDITIONAL TESTS:    22: MR Foot No Cont, Right (22 @ 20:37) >  1.  No osteomyelitis.  2.  Posterior heel skin wound and dorsal forefoot soft tissue swelling. Nonspecific but can be seen with cellulitis. No drainable fluid collection.      22 from: Xray Foot AP + Lateral, Right (22 @ 17:00) >IMPRESSION: No definite bony destruction.    22: VA Physiol Extremity Lower 3+ Level, BI (22 @ 16:49) IMPRESSION: Abnormally elevated ABIs compatible with calcified vessels.    No discernible pulse volume recording at the level of the right digit.      MICROBIOLOGY DATA:    COVID-19 PCR . (22 @ 10:00)   COVID-19 PCR: Detected    Culture - Urine (22 @ 16:30)   - Amikacin: S <=16   - Aztreonam: S <=4   - Cefepime: S <=2   - Ceftazidime: S 4   - Ciprofloxacin: S <=0.25   - Gentamicin: S <=2   - Imipenem: S <=1   - Levofloxacin: S <=0.5   - Meropenem: S <=1   - Piperacillin/Tazobactam: S <=8   - Tobramycin: S <=2   Specimen Source: Clean Catch Clean Catch (Midstream)   Culture Results:   50,000 - 99,000 CFU/mL Pseudomonas aeruginosa   Organism Identification: Pseudomonas aeruginosa   Organism: Pseudomonas aeruginosa   Method Type: ANH     Culture - Blood (22 @ 14:30)   Specimen Source: .Blood Blood   Culture Results: No growth to date.     Culture - Blood (22 @ 14:20)   Specimen Source: .Blood Blood   Culture Results: No growth to date.     Urine Microscopic-Add On (NC) (22 @ 16:30)   Red Blood Cell - Urine: 2-5 /HPF   White Blood Cell - Urine: 11-25 /HPF   Bacteria: Few /HPF   Comment - Urine: few transitional epithelial cells seen   Epithelial Cells: Few /HPF     Flu With COVID-19 By BEBE (22 @ 14:30)   SARS-CoV-2 Result: NotDetec:

## 2022-10-05 NOTE — PROGRESS NOTE ADULT - ASSESSMENT
72 year old male, coming from [facility], with medical history of ESRD (T-TH-S), DM coming in with right leg necrosis Vascular and Podiatry consulted. JESUS with abnormal findings. CT angio with out flow disease bilat. MRI negative for OM, also found to have pseudomonas UTI, started on vancomycin and cefepime.  ID following. Hospital course c/b COVID + 09/29, asymptomatic, s/p RLE angiogram 10/3 w/no targets for revasc but w/extensive distal small vessel disease, no acute vascular intervention at this time. Recommendations would be for R BKA vs. medication management, patient has been resistant to BKA pending decision from patient.

## 2022-10-05 NOTE — PROGRESS NOTE ADULT - ASSESSMENT
Patient is a 72y old  Male with medical history of ESRD (T-TH-S), DM, now send in to the ER from [facility], for evaluation of right foot necrosis. On admission, he has no fever, No leukocytosis. He has evaluated by Podiatry , started on IV Vancomycin, and the ID consult requested to assist with further evaluation and antibiotic management.     # Right  gangrenous foot- s/p Angioplasty 10/3 - MRI Of Right foot from 9/29/22 shows No osteomyelitis.  # UTI- Urine cx grew Pseudomonas.   # Positive COVID as of 9/29/22- Not hypoxic   # s/p RLE Angioplasty 10/3/22 n- Plan for ?BKA    would recommend:    1. Continue IV Cefepime and IV Vancomycin based on the level, until work up is done  2. Wound care as per Podiatry   3. Monitor and Keep Vancomycin level between 15 to 20  4. Need  Abx until 10/17/22, may change to oral Augmentin 875 mg daily and Doxycycline 100 mg q 12hours on discharge  5. COVID precaution as per protocol    d/w Nursing staff    Attending Attestation:    Spent more than 35 minutes on total encounter, more than 50 % of the visit was spent counseling and/or coordinating care by the Attending physician.

## 2022-10-05 NOTE — PROGRESS NOTE ADULT - SUBJECTIVE AND OBJECTIVE BOX
NP Note discussed with  primary attending    Patient is a 72y old  Male who presents with a chief complaint of Gangrene foot (05 Oct 2022 16:17)      INTERVAL HPI/OVERNIGHT EVENTS: No medical events overnight POD 2    MEDICATIONS  (STANDING):  cefepime   IVPB 1000 milliGRAM(s) IV Intermittent every 24 hours  chlorhexidine 2% Cloths 1 Application(s) Topical daily  influenza  Vaccine (HIGH DOSE) 0.7 milliLiter(s) IntraMuscular once  insulin lispro (ADMELOG) corrective regimen sliding scale   SubCutaneous three times a day before meals  insulin lispro (ADMELOG) corrective regimen sliding scale   SubCutaneous at bedtime  pantoprazole    Tablet 40 milliGRAM(s) Oral before breakfast  polyethylene glycol 3350 17 Gram(s) Oral daily  senna 2 Tablet(s) Oral at bedtime  sevelamer carbonate 800 milliGRAM(s) Oral three times a day with meals  vancomycin  IVPB 1000 milliGRAM(s) IV Intermittent <User Schedule>    MEDICATIONS  (PRN):  acetaminophen     Tablet .. 650 milliGRAM(s) Oral every 6 hours PRN Temp greater or equal to 38C (100.4F), Mild Pain (1 - 3)  melatonin 3 milliGRAM(s) Oral at bedtime PRN Insomnia  ondansetron Injectable 4 milliGRAM(s) IV Push every 8 hours PRN Nausea and/or Vomiting      __________________________________________________  REVIEW OF SYSTEMS:    CONSTITUTIONAL: No fever,   EYES: no acute visual disturbances  NECK: No pain or stiffness  RESPIRATORY: No cough; No shortness of breath  CARDIOVASCULAR: No chest pain, no palpitations  GASTROINTESTINAL: No pain. No nausea or vomiting; No diarrhea   NEUROLOGICAL: No headache or numbness, no tremors  MUSCULOSKELETAL: No joint pain, no muscle pain  GENITOURINARY: no dysuria, no frequency, no hesitancy  PSYCHIATRY: no depression , no anxiety  ALL OTHER  ROS negative        Vital Signs Last 24 Hrs  T(C): 36.6 (05 Oct 2022 13:39), Max: 36.9 (04 Oct 2022 20:54)  T(F): 97.9 (05 Oct 2022 13:39), Max: 98.5 (04 Oct 2022 20:54)  HR: 66 (05 Oct 2022 13:39) (66 - 69)  BP: 110/68 (05 Oct 2022 13:39) (110/68 - 145/72)  BP(mean): --  RR: 14 (05 Oct 2022 13:39) (14 - 18)  SpO2: 100% (05 Oct 2022 13:39) (98% - 100%)    Parameters below as of 05 Oct 2022 13:39  Patient On (Oxygen Delivery Method): room air        ________________________________________________  PHYSICAL EXAM:  GENERAL: NAD  HEENT: Normocephalic;  conjunctivae and sclerae clear  NECK : supple  CHEST/LUNG: Clear to auscultation bilaterally  HEART: S1 S2 RRR  ABDOMEN: Soft, Nontender, Nondistended; Bowel sounds present  EXTREMITIES: no cyanosis; no edema; no calf tenderness  SKIN: warm and dry; no rash; + R foot wrapped in Gauze  NERVOUS SYSTEM:  Awake and alert; Oriented  to place, person and time    _________________________________________________  LABS:                        11.7   5.27  )-----------( 208      ( 05 Oct 2022 06:48 )             36.8     10-05    135  |  102  |  52<H>  ----------------------------<  193<H>  3.8   |  24  |  4.63<H>    Ca    9.1      05 Oct 2022 06:48    TPro  6.6  /  Alb  3.1<L>  /  TBili  0.2  /  DBili  x   /  AST  15  /  ALT  17  /  AlkPhos  61  10-04        CAPILLARY BLOOD GLUCOSE      POCT Blood Glucose.: 191 mg/dL (05 Oct 2022 16:42)  POCT Blood Glucose.: 236 mg/dL (05 Oct 2022 11:35)  POCT Blood Glucose.: 181 mg/dL (05 Oct 2022 07:45)  POCT Blood Glucose.: 197 mg/dL (04 Oct 2022 21:01)        RADIOLOGY & ADDITIONAL TESTS:   < from: MR Foot No Cont, Right (09.29.22 @ 20:37) >    IMPRESSION:    1.  No osteomyelitis.  2.  Posterior heel skin wound and dorsal forefoot soft tissue swelling.   Nonspecific but can be seen with cellulitis. No drainable fluid   collection.    --- End of Report ---      < end of copied text >< from: CT Angio Abd Aorta w/run-off w/ IV Cont (09.27.22 @ 12:04) >      IMPRESSION:    Outflow disease bilaterally as detailed above.          --- End of Report ---    < end of copied text >  < from: Xray Foot AP + Lateral, Right (09.26.22 @ 17:00) >  IMPRESSION: No definite bony destruction.      Thank you for this referral.    --- End of Report ---      < end of copied text >  < from: VA Physiol Extremity Lower 3+ Level, BI (09.26.22 @ 16:49) >  IMPRESSION: Abnormally elevated ABIs compatiblewith calcified vessels.    No discernible pulse volume recording at the level of the right digit.    --- End of Report ---    < end of copied text >      Imaging Personally Reviewed:  YES/    Consultant(s) Notes Reviewed:   YES/    Plan of care was discussed with patient and /or primary care giver; all questions and concerns were addressed and care was aligned with patient's wishes.

## 2022-10-06 DIAGNOSIS — U07.1 COVID-19: ICD-10-CM

## 2022-10-06 LAB
ANION GAP SERPL CALC-SCNC: 11 MMOL/L — SIGNIFICANT CHANGE UP (ref 5–17)
BUN SERPL-MCNC: 72 MG/DL — HIGH (ref 7–18)
CALCIUM SERPL-MCNC: 9.4 MG/DL — SIGNIFICANT CHANGE UP (ref 8.4–10.5)
CHLORIDE SERPL-SCNC: 103 MMOL/L — SIGNIFICANT CHANGE UP (ref 96–108)
CO2 SERPL-SCNC: 24 MMOL/L — SIGNIFICANT CHANGE UP (ref 22–31)
CREAT SERPL-MCNC: 5.67 MG/DL — HIGH (ref 0.5–1.3)
D DIMER BLD IA.RAPID-MCNC: 268 NG/ML DDU — HIGH
EGFR: 10 ML/MIN/1.73M2 — LOW
GLUCOSE BLDC GLUCOMTR-MCNC: 136 MG/DL — HIGH (ref 70–99)
GLUCOSE BLDC GLUCOMTR-MCNC: 169 MG/DL — HIGH (ref 70–99)
GLUCOSE BLDC GLUCOMTR-MCNC: 188 MG/DL — HIGH (ref 70–99)
GLUCOSE BLDC GLUCOMTR-MCNC: 276 MG/DL — HIGH (ref 70–99)
GLUCOSE SERPL-MCNC: 182 MG/DL — HIGH (ref 70–99)
HCT VFR BLD CALC: 37.1 % — LOW (ref 39–50)
HGB BLD-MCNC: 11.9 G/DL — LOW (ref 13–17)
MCHC RBC-ENTMCNC: 29.6 PG — SIGNIFICANT CHANGE UP (ref 27–34)
MCHC RBC-ENTMCNC: 32.1 GM/DL — SIGNIFICANT CHANGE UP (ref 32–36)
MCV RBC AUTO: 92.3 FL — SIGNIFICANT CHANGE UP (ref 80–100)
NRBC # BLD: 0 /100 WBCS — SIGNIFICANT CHANGE UP (ref 0–0)
PHOSPHATE SERPL-MCNC: 4.8 MG/DL — HIGH (ref 2.5–4.5)
PLATELET # BLD AUTO: 197 K/UL — SIGNIFICANT CHANGE UP (ref 150–400)
POTASSIUM SERPL-MCNC: 4.1 MMOL/L — SIGNIFICANT CHANGE UP (ref 3.5–5.3)
POTASSIUM SERPL-SCNC: 4.1 MMOL/L — SIGNIFICANT CHANGE UP (ref 3.5–5.3)
RBC # BLD: 4.02 M/UL — LOW (ref 4.2–5.8)
RBC # FLD: 13.4 % — SIGNIFICANT CHANGE UP (ref 10.3–14.5)
SODIUM SERPL-SCNC: 138 MMOL/L — SIGNIFICANT CHANGE UP (ref 135–145)
WBC # BLD: 4.9 K/UL — SIGNIFICANT CHANGE UP (ref 3.8–10.5)
WBC # FLD AUTO: 4.9 K/UL — SIGNIFICANT CHANGE UP (ref 3.8–10.5)

## 2022-10-06 RX ORDER — HEPARIN SODIUM 5000 [USP'U]/ML
5000 INJECTION INTRAVENOUS; SUBCUTANEOUS EVERY 12 HOURS
Refills: 0 | Status: DISCONTINUED | OUTPATIENT
Start: 2022-10-06 | End: 2022-10-14

## 2022-10-06 RX ADMIN — SENNA PLUS 2 TABLET(S): 8.6 TABLET ORAL at 21:18

## 2022-10-06 RX ADMIN — POLYETHYLENE GLYCOL 3350 17 GRAM(S): 17 POWDER, FOR SOLUTION ORAL at 11:27

## 2022-10-06 RX ADMIN — CEFEPIME 100 MILLIGRAM(S): 1 INJECTION, POWDER, FOR SOLUTION INTRAMUSCULAR; INTRAVENOUS at 06:17

## 2022-10-06 RX ADMIN — SEVELAMER CARBONATE 800 MILLIGRAM(S): 2400 POWDER, FOR SUSPENSION ORAL at 12:46

## 2022-10-06 RX ADMIN — Medication 1: at 11:44

## 2022-10-06 RX ADMIN — SEVELAMER CARBONATE 800 MILLIGRAM(S): 2400 POWDER, FOR SUSPENSION ORAL at 08:10

## 2022-10-06 RX ADMIN — HEPARIN SODIUM 5000 UNIT(S): 5000 INJECTION INTRAVENOUS; SUBCUTANEOUS at 17:01

## 2022-10-06 RX ADMIN — SEVELAMER CARBONATE 800 MILLIGRAM(S): 2400 POWDER, FOR SUSPENSION ORAL at 17:02

## 2022-10-06 RX ADMIN — Medication 1: at 08:10

## 2022-10-06 RX ADMIN — CHLORHEXIDINE GLUCONATE 1 APPLICATION(S): 213 SOLUTION TOPICAL at 11:28

## 2022-10-06 RX ADMIN — Medication 1: at 22:01

## 2022-10-06 RX ADMIN — PANTOPRAZOLE SODIUM 40 MILLIGRAM(S): 20 TABLET, DELAYED RELEASE ORAL at 06:17

## 2022-10-06 NOTE — PROGRESS NOTE ADULT - SUBJECTIVE AND OBJECTIVE BOX
Patient is a 72y old  Male who presents with a chief complaint of Gangrene foot (05 Oct 2022 17:32)      INTERVAL HPI/OVERNIGHT EVENTS: pt seen and examined    MEDICATIONS  (STANDING):  cefepime   IVPB 1000 milliGRAM(s) IV Intermittent every 24 hours  chlorhexidine 2% Cloths 1 Application(s) Topical daily  influenza  Vaccine (HIGH DOSE) 0.7 milliLiter(s) IntraMuscular once  insulin lispro (ADMELOG) corrective regimen sliding scale   SubCutaneous three times a day before meals  insulin lispro (ADMELOG) corrective regimen sliding scale   SubCutaneous at bedtime  pantoprazole    Tablet 40 milliGRAM(s) Oral before breakfast  polyethylene glycol 3350 17 Gram(s) Oral daily  senna 2 Tablet(s) Oral at bedtime  sevelamer carbonate 800 milliGRAM(s) Oral three times a day with meals  vancomycin  IVPB 1000 milliGRAM(s) IV Intermittent <User Schedule>    MEDICATIONS  (PRN):  acetaminophen     Tablet .. 650 milliGRAM(s) Oral every 6 hours PRN Temp greater or equal to 38C (100.4F), Mild Pain (1 - 3)  melatonin 3 milliGRAM(s) Oral at bedtime PRN Insomnia  ondansetron Injectable 4 milliGRAM(s) IV Push every 8 hours PRN Nausea and/or Vomiting      __________________________________________________  REVIEW OF SYSTEMS:    CONSTITUTIONAL: No fever,   EYES: no acute visual disturbances  NECK: No pain or stiffness  RESPIRATORY: No cough; No shortness of breath  CARDIOVASCULAR: No chest pain, no palpitations  GASTROINTESTINAL: No pain. No nausea or vomiting; No diarrhea   NEUROLOGICAL: No headache or numbness, no tremors  MUSCULOSKELETAL: No joint pain, no muscle pain  GENITOURINARY: no dysuria, no frequency, no hesitancy  PSYCHIATRY: no depression , no anxiety  ALL OTHER  ROS negative        Vital Signs Last 24 Hrs  T(C): 36.3 (06 Oct 2022 06:27), Max: 36.7 (05 Oct 2022 20:17)  T(F): 97.3 (06 Oct 2022 06:27), Max: 98 (05 Oct 2022 20:17)  HR: 74 (06 Oct 2022 06:27) (65 - 74)  BP: 157/73 (06 Oct 2022 06:27) (110/68 - 157/73)  BP(mean): --  RR: 18 (06 Oct 2022 06:27) (14 - 18)  SpO2: 99% (06 Oct 2022 06:27) (99% - 100%)    Parameters below as of 06 Oct 2022 06:27  Patient On (Oxygen Delivery Method): room air        ________________________________________________  PHYSICAL EXAM:  GENERAL: withdrawn disheveled   HEENT: Normocephalic;  conjunctivae and sclerae clear;  NECK : supple  CHEST/LUNG: dec breath sounds at bases   HEART: S1 S2  regular; no murmurs, gallops or rubs  ABDOMEN: Soft, Nontender, Nondistended; Bowel sounds present  EXTREMITIES: right foot with clean dry dressing in place   SKIN: warm and dry; no rash  NERVOUS SYSTEM: lethargic, but once Awake and alert; Oriented  to place, person and time     _________________________________________________  LABS:                        11.7   5.27  )-----------( 208      ( 05 Oct 2022 06:48 )             36.8     10-05    135  |  102  |  52<H>  ----------------------------<  193<H>  3.8   |  24  |  4.63<H>    Ca    9.1      05 Oct 2022 06:48          CAPILLARY BLOOD GLUCOSE      POCT Blood Glucose.: 188 mg/dL (06 Oct 2022 07:54)  POCT Blood Glucose.: 279 mg/dL (05 Oct 2022 22:20)  POCT Blood Glucose.: 191 mg/dL (05 Oct 2022 16:42)  POCT Blood Glucose.: 236 mg/dL (05 Oct 2022 11:35)         RADIOLOGY & ADDITIONAL TESTS:   < from: MR Foot No Cont, Right (09.29.22 @ 20:37) >  IMPRESSION:    1.  No osteomyelitis.  2.  Posterior heel skin wound and dorsal forefoot soft tissue swelling.   Nonspecific but can be seen with cellulitis. No drainable fluid   collection.    --- End of Report ---    < end of copied text >< from: CT Angio Abd Aorta w/run-off w/ IV Cont (09.27.22 @ 12:04) >  IMPRESSION:    Outflow disease bilaterally as detailed above.          --- End of Report ---      < end of copied text >  < from: Xray Foot AP + Lateral, Right (09.26.22 @ 17:00) >    IMPRESSION: No definite bony destruction.      Thank you for this referral.    --- End of Report ---    < end of copied text >      < from: VA Physiol Extremity Lower 3+ Level, BI (09.26.22 @ 16:49) >    IMPRESSION: Abnormally elevated ABIs compatiblewith calcified vessels.    No discernible pulse volume recording at the level of the right digit.    --- End of Report ---    < end of copied text >  Imaging Personally Reviewed:  YES    Consultant(s) Notes Reviewed:   YES    Care Discussed with Consultants: Vascular/podiatry      Plan of care was discussed with patient and /or primary care giver; all questions and concerns were addressed and care was aligned with patient's wishes.

## 2022-10-06 NOTE — PROGRESS NOTE ADULT - ASSESSMENT
72 year old male, coming from [facility], with medical history of ESRD (T-TH-S), DM coming in with right leg necrosis Vascular and Podiatry consulted. JESUS with abnormal findings. CT angio with out flow disease bilat. MRI negative for OM, also found to have pseudomonas UTI, started on vancomycin and cefepime.  ID following. Hospital course c/b COVID + 09/29, asymptomatic, s/p RLE angiogram 10/3 w/no targets for revasc but w/extensive distal small vessel disease, no acute vascular intervention at this time. Recommendations would be for R BKA vs. medication management, patient has been resistant to but now agreeable to BKA. Vascular to follow with SX today

## 2022-10-06 NOTE — PROGRESS NOTE ADULT - PROBLEM SELECTOR PLAN 1
- Pt presented with right foot gangrene  - CTA run off shows outflow disease  - abnormal JESUS  - NWB to RLE- podiatry recc  - cont vancomycin every 48 hrs,   - BCx negative  - MRI Rt foot negative for OM  - s/p Angiogram w/no targets for revasc but w/extensive distal small vessel disease  - recommendation would be for R BKA vs. medical management  - ID Dr. Babcock following  - Vascular Following   - Podiatry Following  reccomended for BKA

## 2022-10-06 NOTE — PROGRESS NOTE ADULT - PROBLEM SELECTOR PLAN 3
COVID conversion 9/29  remains asymptomatic   supportive care  isolation precautions   F.U inflammatory markers   monitor 02 status

## 2022-10-06 NOTE — PROGRESS NOTE ADULT - SUBJECTIVE AND OBJECTIVE BOX
Podiatry interval: Pt is seen resting in bed in [facility] isolation room in no acute distress. Patient reports this afternoon that he would like to proceed with local wound care only for the gangrenous changes in his foot. Admits to pain on his right foot to the gangrenous digits. Denies constitutional symptoms. Denies any overnight acute events. Denies further pedal complaints at this time.     Podiatry HPI: Pt is a 72M who presented to ED from his nursing home ([facility]) after his right foot started having gangrene changes. Pt states he has a vacular doctor appointment coming up soon but hasn't seen a vascular doctor as of yet. Admits he has 10/10 pain to heel and digits. Admits he is normally able to walk however; since he got the right heel wound 2-3 months ago, he has been bedbound. Pt denies constitutional symptoms. Pt denies any recent acute trauma. Pt denies further pedal complaints.     Patient admits to  (-) Fevers, (-) Chills, (-) Nausea, (-) Vomiting, (-) Shortness of Breath (-) calf pain (-) chest pain     Medications acetaminophen     Tablet .. 650 milliGRAM(s) Oral every 6 hours PRN  cefepime   IVPB 1000 milliGRAM(s) IV Intermittent every 24 hours  chlorhexidine 2% Cloths 1 Application(s) Topical daily  heparin   Injectable 5000 Unit(s) SubCutaneous every 12 hours  influenza  Vaccine (HIGH DOSE) 0.7 milliLiter(s) IntraMuscular once  insulin lispro (ADMELOG) corrective regimen sliding scale   SubCutaneous three times a day before meals  insulin lispro (ADMELOG) corrective regimen sliding scale   SubCutaneous at bedtime  melatonin 3 milliGRAM(s) Oral at bedtime PRN  ondansetron Injectable 4 milliGRAM(s) IV Push every 8 hours PRN  pantoprazole    Tablet 40 milliGRAM(s) Oral before breakfast  polyethylene glycol 3350 17 Gram(s) Oral daily  senna 2 Tablet(s) Oral at bedtime  sevelamer carbonate 800 milliGRAM(s) Oral three times a day with meals  vancomycin  IVPB 1000 milliGRAM(s) IV Intermittent <User Schedule>    FHNo pertinent family history in first degree relatives    ,   PMHESRD on dialysis    DM (diabetes mellitus)       PSHNo significant past surgical history        Labs                          11.9   4.90  )-----------( 197      ( 06 Oct 2022 12:32 )             37.1      10-06    138  |  103  |  72<H>  ----------------------------<  182<H>  4.1   |  24  |  5.67<H>    Ca    9.4      06 Oct 2022 12:32  Phos  4.8     10-06       Vital Signs Last 24 Hrs  T(C): 36.4 (06 Oct 2022 13:29), Max: 36.7 (05 Oct 2022 20:17)  T(F): 97.6 (06 Oct 2022 13:29), Max: 98 (05 Oct 2022 20:17)  HR: 71 (06 Oct 2022 13:29) (65 - 74)  BP: 126/66 (06 Oct 2022 13:29) (126/66 - 157/73)  BP(mean): --  RR: 16 (06 Oct 2022 13:29) (16 - 18)  SpO2: 100% (06 Oct 2022 13:29) (99% - 100%)    Parameters below as of 06 Oct 2022 13:29  Patient On (Oxygen Delivery Method): room air      Sedimentation Rate, Erythrocyte: 19 mm/Hr (09-26-22 @ 19:00)         C-Reactive Protein, Serum: 9 mg/L (09-26-22 @ 19:00)   WBC Count: 4.90 K/uL (10-06-22 @ 12:32)      ROS unremarkable outside of HPI    PHYSICAL EXAM  LE Focused: Pt is A&Ox3 in mild distress from right foot pain   Vasc: DP/PT pulses non palpable 0/4 b/l; temperature gradient is warm to cool from proximal leg to digits with R > L. No edema noted to b/l LE. No varicosities. Pedal hair absent.  Derm: Right 2nd digit dry gangrenous ischemic changes noted without erythema, drainage, edema that is rigid and is cool to the touch. Right 1st and 3rd digits at distal tuft's and, now dorsal, show dusky changes that are also cool to the touch without open lesions or signs of infection. Right plantar heel wound with eschar base and no signs of infection, no fluctuance noted. Left foot no signs of gangrene or any open lesions.   Neuro: Protective sensation present b/l; light touch sensation present b/l   MSK: POP to right digits 1-3 and plantar heel wound; muscle strength exam deferred 2/2 pain; b/l hammertoe rigid contractions of digits 2-5       IMAGING    < from: Xray Foot AP + Lateral, Right (09.26.22 @ 17:00) >  ACC: [id number] EXAM:  XR FOOT 2 VIEWS RT                          PROCEDURE DATE:  09/26/2022          INTERPRETATION:  Right foot    HISTORY: Heel gangrene and second toe gangrene     Three views of the right foot show no evidence of acute fracturenor   destructive change. The joint spaces are maintained. Vascular   calcifications are present.    IMPRESSION: No definite bony destruction.      Thank you for this referral.    --- End of Report ---      < end of copied text >      CULTURES:     A:  - PVD  - DM  - ESRD on dialysis  - Right 2nd digit dry gangrene  - Right 1st and 3rd digit ischemic changes  - Right plantar heel eschar       P:   Patient evaluated and Chart reviewed   Discussed diagnosis and treatment with patient  Applied betadine, DSD to right plantar heel and right 1st, 2nd, 3rd digits   Vascular consult appreciated  Discussed conservative wound care vs proximal amputation with patient, patient will think over  MRI results reviewed   X-rays reviewed; no bony destruction   Reviewed JESUS/PVR  NWB to RLE  Offloading to bilateral Heels via CAIR boots  Discussed importance of daily foot examinations and proper shoe gear and to importance of lower Fasting Blood Glucose levels.   Podiatry to follow while in house.  Discussed with attending Dr. Godinez     Wound care: betadine to right digits 1-3 and 4x4 with light nba

## 2022-10-06 NOTE — PROGRESS NOTE ADULT - SUBJECTIVE AND OBJECTIVE BOX
NP Note discussed with  primary attending    Patient is a 72y old  Male who presents with a chief complaint of Gangrene foot (05 Oct 2022 17:32)      INTERVAL HPI/OVERNIGHT EVENTS: No acute medical events overnight     MEDICATIONS  (STANDING):  cefepime   IVPB 1000 milliGRAM(s) IV Intermittent every 24 hours  chlorhexidine 2% Cloths 1 Application(s) Topical daily  influenza  Vaccine (HIGH DOSE) 0.7 milliLiter(s) IntraMuscular once  insulin lispro (ADMELOG) corrective regimen sliding scale   SubCutaneous three times a day before meals  insulin lispro (ADMELOG) corrective regimen sliding scale   SubCutaneous at bedtime  pantoprazole    Tablet 40 milliGRAM(s) Oral before breakfast  polyethylene glycol 3350 17 Gram(s) Oral daily  senna 2 Tablet(s) Oral at bedtime  sevelamer carbonate 800 milliGRAM(s) Oral three times a day with meals  vancomycin  IVPB 1000 milliGRAM(s) IV Intermittent <User Schedule>    MEDICATIONS  (PRN):  acetaminophen     Tablet .. 650 milliGRAM(s) Oral every 6 hours PRN Temp greater or equal to 38C (100.4F), Mild Pain (1 - 3)  melatonin 3 milliGRAM(s) Oral at bedtime PRN Insomnia  ondansetron Injectable 4 milliGRAM(s) IV Push every 8 hours PRN Nausea and/or Vomiting      __________________________________________________  REVIEW OF SYSTEMS:    CONSTITUTIONAL: No fever,   EYES: no acute visual disturbances  NECK: No pain or stiffness  RESPIRATORY: No cough; No shortness of breath  CARDIOVASCULAR: No chest pain, no palpitations  GASTROINTESTINAL: No pain. No nausea or vomiting; No diarrhea   NEUROLOGICAL: No headache or numbness, no tremors  MUSCULOSKELETAL: No joint pain, no muscle pain  GENITOURINARY: no dysuria, no frequency, no hesitancy  PSYCHIATRY: no depression , no anxiety  ALL OTHER  ROS negative        Vital Signs Last 24 Hrs  T(C): 36.3 (06 Oct 2022 06:27), Max: 36.7 (05 Oct 2022 20:17)  T(F): 97.3 (06 Oct 2022 06:27), Max: 98 (05 Oct 2022 20:17)  HR: 74 (06 Oct 2022 06:27) (65 - 74)  BP: 157/73 (06 Oct 2022 06:27) (110/68 - 157/73)  BP(mean): --  RR: 18 (06 Oct 2022 06:27) (14 - 18)  SpO2: 99% (06 Oct 2022 06:27) (99% - 100%)    Parameters below as of 06 Oct 2022 06:27  Patient On (Oxygen Delivery Method): room air        ________________________________________________  PHYSICAL EXAM:  GENERAL: withdrawn disheveled   HEENT: Normocephalic;  conjunctivae and sclerae clear;  NECK : supple  CHEST/LUNG: Clear to ausculitation bilaterally with good air entry   HEART: S1 S2  regular; no murmurs, gallops or rubs  ABDOMEN: Soft, Nontender, Nondistended; Bowel sounds present  EXTREMITIES: right foot with clean dry dressing in place   SKIN: warm and dry; no rash  NERVOUS SYSTEM: lethargic, but once Awake and alert; Oriented  to place, person and time     _________________________________________________  LABS:                        11.7   5.27  )-----------( 208      ( 05 Oct 2022 06:48 )             36.8     10-05    135  |  102  |  52<H>  ----------------------------<  193<H>  3.8   |  24  |  4.63<H>    Ca    9.1      05 Oct 2022 06:48          CAPILLARY BLOOD GLUCOSE      POCT Blood Glucose.: 188 mg/dL (06 Oct 2022 07:54)  POCT Blood Glucose.: 279 mg/dL (05 Oct 2022 22:20)  POCT Blood Glucose.: 191 mg/dL (05 Oct 2022 16:42)  POCT Blood Glucose.: 236 mg/dL (05 Oct 2022 11:35)         RADIOLOGY & ADDITIONAL TESTS:   < from: MR Foot No Cont, Right (09.29.22 @ 20:37) >  IMPRESSION:    1.  No osteomyelitis.  2.  Posterior heel skin wound and dorsal forefoot soft tissue swelling.   Nonspecific but can be seen with cellulitis. No drainable fluid   collection.    --- End of Report ---    < end of copied text >< from: CT Angio Abd Aorta w/run-off w/ IV Cont (09.27.22 @ 12:04) >  IMPRESSION:    Outflow disease bilaterally as detailed above.          --- End of Report ---      < end of copied text >  < from: Xray Foot AP + Lateral, Right (09.26.22 @ 17:00) >    IMPRESSION: No definite bony destruction.      Thank you for this referral.    --- End of Report ---    < end of copied text >      < from: VA Physiol Extremity Lower 3+ Level, BI (09.26.22 @ 16:49) >    IMPRESSION: Abnormally elevated ABIs compatiblewith calcified vessels.    No discernible pulse volume recording at the level of the right digit.    --- End of Report ---    < end of copied text >  Imaging Personally Reviewed:  YES    Consultant(s) Notes Reviewed:   YES    Care Discussed with Consultants: Vascular/podiatry      Plan of care was discussed with patient and /or primary care giver; all questions and concerns were addressed and care was aligned with patient's wishes.

## 2022-10-06 NOTE — PROGRESS NOTE ADULT - ASSESSMENT
72 year old male, coming from [facility], with medical history of ESRD (T-TH-S), DM coming in with right leg necrosis Vascular and Podiatry consulted. JESUS with abnormal findings. CT angio with out flow disease bilat. MRI negative for OM, also found to have pseudomonas UTI, started on vancomycin and cefepime.  ID following. Hospital course c/b COVID + 09/29, asymptomatic, s/p RLE angiogram 10/3 w/no targets for revasc but w/extensive distal small vessel disease, no acute vascular intervention at this time. Recommendations would be for R BKA vs. medication management, patient has been resistant to but now agreeable to BKA. Vascular to follow with SX today

## 2022-10-06 NOTE — PROGRESS NOTE ADULT - ASSESSMENT
Patient is a 73yo Male with ESRD on HD, DM a/w Rt foot gangrene and GI bleed. Nephrology consulted for ESRD status.     1) ESRD:  Last HD 10/4 tolerated well with 1L removed. Plan for next maintenance HD today 10/6. Monitor electrolytes.  Pt with new Left UE AVF- no thrill no bruit- f/u Vascular (Dr. Dutta aware); can f/u as an outpt   2) HTN with ESRD: BP borderline high off antihypertensive medications. Recc low dose CCB. c/w low sodium diet. Monitor BP.  3) Anemia of renal disease: with blood loss due to GI bleed. FOBT neg. Hb acceptable. No need for JERED at this time. Monitor Hb.  4) Hyperphosphatemia: Last serum phosphorus acceptable. c/w Sevelamer 1 tab tid with meals and low phos diet. Monitor serum calcium and phosphorus.  5) Rt foot gangrene: Pt on Cefepime/ Vanco 1g IV tiw. MRI neg for OM. s/p RLE angioplasty on 10/3. Podiatry/ Vascular/ ID following.     [facility] NEPHROLOGY  Wili Hopkins M.D.  Kiran Feng D.O.  Sujatha Dumont M.D.  Radha Miller, MSN, ANP-C  [phone]    [address] [place] [address]   Patient is a 71yo Male with ESRD on HD, DM a/w Rt foot gangrene and GI bleed. Nephrology consulted for ESRD status.     1) ESRD:  Last HD 10/4 tolerated well with 1L removed. Planned for next maintenance HD today 10/6, but will bump to tomorrow for logistical reasons. Monitor electrolytes.  Pt with new Left UE AVF- no thrill no bruit- f/u Vascular (Dr. Dutta aware); can f/u as an outpt   2) HTN with ESRD: BP borderline high off antihypertensive medications. Recc low dose CCB. c/w low sodium diet. Monitor BP.  3) Anemia of renal disease: with blood loss due to GI bleed. FOBT neg. Hb acceptable. No need for JERED at this time. Monitor Hb.  4) Hyperphosphatemia: Last serum phosphorus acceptable. c/w Sevelamer 1 tab tid with meals and low phos diet. Monitor serum calcium and phosphorus.  5) Rt foot gangrene: Pt on Cefepime/ Vanco 1g IV tiw. MRI neg for OM. s/p RLE angioplasty on 10/3. Podiatry/ Vascular/ ID following.     [facility] NEPHROLOGY  Wili Hopkins M.D.  Kiran Feng D.O.  Sujatha Dumont M.D.  Radha Miller, MSN, ANP-C  [phone]    [address] [place] [id number]

## 2022-10-07 LAB
GLUCOSE BLDC GLUCOMTR-MCNC: 174 MG/DL — HIGH (ref 70–99)
GLUCOSE BLDC GLUCOMTR-MCNC: 205 MG/DL — HIGH (ref 70–99)
GLUCOSE BLDC GLUCOMTR-MCNC: 237 MG/DL — HIGH (ref 70–99)
GLUCOSE BLDC GLUCOMTR-MCNC: 266 MG/DL — HIGH (ref 70–99)
PHOSPHATE SERPL-MCNC: 3.1 MG/DL — SIGNIFICANT CHANGE UP (ref 2.5–4.5)

## 2022-10-07 RX ADMIN — CEFEPIME 100 MILLIGRAM(S): 1 INJECTION, POWDER, FOR SOLUTION INTRAMUSCULAR; INTRAVENOUS at 05:42

## 2022-10-07 RX ADMIN — SEVELAMER CARBONATE 800 MILLIGRAM(S): 2400 POWDER, FOR SUSPENSION ORAL at 08:36

## 2022-10-07 RX ADMIN — Medication 3: at 11:45

## 2022-10-07 RX ADMIN — SENNA PLUS 2 TABLET(S): 8.6 TABLET ORAL at 21:50

## 2022-10-07 RX ADMIN — PANTOPRAZOLE SODIUM 40 MILLIGRAM(S): 20 TABLET, DELAYED RELEASE ORAL at 05:42

## 2022-10-07 RX ADMIN — HEPARIN SODIUM 5000 UNIT(S): 5000 INJECTION INTRAVENOUS; SUBCUTANEOUS at 16:57

## 2022-10-07 RX ADMIN — SEVELAMER CARBONATE 800 MILLIGRAM(S): 2400 POWDER, FOR SUSPENSION ORAL at 11:47

## 2022-10-07 RX ADMIN — HEPARIN SODIUM 5000 UNIT(S): 5000 INJECTION INTRAVENOUS; SUBCUTANEOUS at 05:42

## 2022-10-07 RX ADMIN — Medication 250 MILLIGRAM(S): at 13:28

## 2022-10-07 RX ADMIN — POLYETHYLENE GLYCOL 3350 17 GRAM(S): 17 POWDER, FOR SOLUTION ORAL at 16:53

## 2022-10-07 RX ADMIN — Medication 1: at 08:36

## 2022-10-07 RX ADMIN — Medication 2: at 16:53

## 2022-10-07 RX ADMIN — SEVELAMER CARBONATE 800 MILLIGRAM(S): 2400 POWDER, FOR SUSPENSION ORAL at 16:54

## 2022-10-07 RX ADMIN — CHLORHEXIDINE GLUCONATE 1 APPLICATION(S): 213 SOLUTION TOPICAL at 11:48

## 2022-10-07 NOTE — PROGRESS NOTE ADULT - ASSESSMENT
72 year old male, coming from [facility], with medical history of ESRD (T-TH-S), DM coming in with right leg necrosis Vascular and Podiatry consulted. JESUS with abnormal findings. CT angio with out flow disease bilat. MRI negative for OM, also found to have pseudomonas UTI, started on vancomycin and cefepime.  ID following. Hospital course c/b COVID + 09/29, asymptomatic, s/p RLE angiogram 10/3 w/no targets for revasc but w/extensive distal small vessel disease, no acute vascular intervention at this time. Recommendations would be for R BKA vs. medication management, patient has been resistant to BKA. Plan to return back to [facility] on PO ABX and wound care. NCM to follow

## 2022-10-07 NOTE — PROGRESS NOTE ADULT - PROBLEM SELECTOR PLAN 1
- Pt presented with right foot gangrene  - CTA run off shows outflow disease  - abnormal JESUS  - NWB to RLE- podiatry recc  - cont vancomycin every 48 hrs,   - BCx negative  - MRI Rt foot negative for OM  - s/p Angiogram w/no targets for revasc but w/extensive distal small vessel disease  - recommendation would be for R BKA vs. medical management  - ID Dr. Babcock following  - Vascular Following   - Podiatry Following  reccomended for BKA patient refuses

## 2022-10-07 NOTE — PROGRESS NOTE ADULT - ASSESSMENT
72 year old male, coming from [facility], with medical history of ESRD (T-TH-S), DM coming in with right leg necrosis Vascular and Podiatry consulted. JESUS with abnormal findings. CT angio with out flow disease bilat. MRI negative for OM, also found to have pseudomonas UTI, started on vancomycin and cefepime.  ID following. Hospital course c/b COVID + 09/29, asymptomatic, s/p RLE angiogram 10/3 w/no targets for revasc but w/extensive distal small vessel disease, no acute vascular intervention at this time. Recommendations would be for R BKA vs. medication management, patient has been resistant to BKA. Plan to return back to [facility] on PO ABX and wound care. NCM to follow

## 2022-10-07 NOTE — PROGRESS NOTE ADULT - ASSESSMENT
Patient is a 71yo Male with ESRD on HD, DM a/w Rt foot gangrene and GI bleed. Nephrology consulted for ESRD status.     1) ESRD:  Last HD 10/4 tolerated well with 1L removed. Next maintenance HD today 10/7. Monitor electrolytes.  Pt with new Left UE AVF- no thrill no bruit- f/u Vascular (Dr. Dutta aware); can f/u as an outpt     2) HTN with ESRD: BP borderline high off antihypertensive medications. Recc low dose CCB. c/w low sodium diet. Monitor BP.  3) Anemia of renal disease: with blood loss due to GI bleed. FOBT neg. Hb acceptable. No need for JERED at this time. Monitor Hb.  4) Hyperphosphatemia: Last serum phosphorus acceptable. c/w Sevelamer 1 tab tid with meals and low phos diet. Monitor serum calcium and phosphorus.  5) Rt foot gangrene: Pt on Cefepime/ Vanco 1g IV tiw. MRI neg for OM. s/p RLE angioplasty on 10/3. Podiatry/ Vascular/ ID following.     [facility] NEPHROLOGY  Wili Hopkins M.D.  Kiran Feng D.O.  Sujatha Dumont M.D.  Radha Miller, MSN, ANP-C  [phone]    [address] [place] [id number]

## 2022-10-07 NOTE — PROGRESS NOTE ADULT - SUBJECTIVE AND OBJECTIVE BOX
[facility] NEPHROLOGY- PROGRESS NOTE    Patient is a 73yo Male with ESRD on HD, DM a/w Rt foot gangrene and GI bleed. Nephrology consulted for ESRD status.   COVID-10 + 9/29/22    Hospital Medications: Medications reviewed.  REVIEW OF SYSTEMS:  CONSTITUTIONAL: No fevers or chills  RESPIRATORY: No shortness of breath  CARDIOVASCULAR: No chest pain.  GASTROINTESTINAL: No nausea, vomiting, diarrhea or abdominal pain.   VASCULAR: No bilateral lower extremity edema. +Mild Rt foot pain      Vital Signs Last 24 Hrs  T(C): 36.9 (07 Oct 2022 20:25), Max: 36.9 (07 Oct 2022 20:25)  T(F): 98.5 (07 Oct 2022 20:25), Max: 98.5 (07 Oct 2022 20:25)  HR: 74 (07 Oct 2022 20:25) (64 - 97)  BP: 136/67 (07 Oct 2022 20:25) (127/67 - 143/67)  RR: 18 (07 Oct 2022 20:25) (16 - 18)  SpO2: 98% (07 Oct 2022 20:25) (97% - 100%)    Parameters below as of 07 Oct 2022 20:25  Patient On (Oxygen Delivery Method): room air            PHYSICAL EXAM:  Gen: NAD,   HEENT: anicteric  Neck: no JVD  Cards: RRR, +S1/S2, no M/G/R  Resp: CTA B/L  GI: soft, NT/ND, NABS  Extremities: no LE edema B/L  Derm: Rt foot wrapped dressing c/d/i  Access: Rt IJ tunneled HD catheter- benign  Left upper arm AVF- no thrill no bruit      LABS:  10-06    138  |  103  |  72<H>  ----------------------------<  182<H>  4.1   |  24  |  5.67<H>    Ca    9.4      06 Oct 2022 12:32  Phos  3.1     10-07      Creatinine Trend: 5.67 <--, 4.63 <--, 5.73 <--, 5.10 <--                        11.9   4.90  )-----------( 197      ( 06 Oct 2022 12:32 )             37.1

## 2022-10-07 NOTE — PROGRESS NOTE ADULT - SUBJECTIVE AND OBJECTIVE BOX
NP Note discussed with  primary attending    Patient is a 72y old  Male who presents with a chief complaint of Gangrene foot (06 Oct 2022 17:22)      INTERVAL HPI/OVERNIGHT EVENTS: opts to not have BKA this AM     MEDICATIONS  (STANDING):  cefepime   IVPB 1000 milliGRAM(s) IV Intermittent every 24 hours  chlorhexidine 2% Cloths 1 Application(s) Topical daily  heparin   Injectable 5000 Unit(s) SubCutaneous every 12 hours  influenza  Vaccine (HIGH DOSE) 0.7 milliLiter(s) IntraMuscular once  insulin lispro (ADMELOG) corrective regimen sliding scale   SubCutaneous three times a day before meals  insulin lispro (ADMELOG) corrective regimen sliding scale   SubCutaneous at bedtime  pantoprazole    Tablet 40 milliGRAM(s) Oral before breakfast  polyethylene glycol 3350 17 Gram(s) Oral daily  senna 2 Tablet(s) Oral at bedtime  sevelamer carbonate 800 milliGRAM(s) Oral three times a day with meals  vancomycin  IVPB 1000 milliGRAM(s) IV Intermittent <User Schedule>    MEDICATIONS  (PRN):  acetaminophen     Tablet .. 650 milliGRAM(s) Oral every 6 hours PRN Temp greater or equal to 38C (100.4F), Mild Pain (1 - 3)  melatonin 3 milliGRAM(s) Oral at bedtime PRN Insomnia  ondansetron Injectable 4 milliGRAM(s) IV Push every 8 hours PRN Nausea and/or Vomiting      __________________________________________________  REVIEW OF SYSTEMS:    CONSTITUTIONAL: No fever,   EYES: no acute visual disturbances  NECK: No pain or stiffness  RESPIRATORY: No cough; No shortness of breath  CARDIOVASCULAR: No chest pain, no palpitations  GASTROINTESTINAL: No pain. No nausea or vomiting; No diarrhea   NEUROLOGICAL: No headache or numbness, no tremors  MUSCULOSKELETAL: No joint pain, no muscle pain  GENITOURINARY: no dysuria, no frequency, no hesitancy  PSYCHIATRY: no depression , no anxiety  ALL OTHER  ROS negative        Vital Signs Last 24 Hrs  T(C): 36.2 (07 Oct 2022 05:00), Max: 36.6 (06 Oct 2022 20:37)  T(F): 97.1 (07 Oct 2022 05:00), Max: 97.8 (06 Oct 2022 20:37)  HR: 71 (07 Oct 2022 12:04) (64 - 71)  BP: 143/67 (07 Oct 2022 05:00) (126/66 - 143/67)  BP(mean): --  RR: 18 (07 Oct 2022 05:00) (16 - 18)  SpO2: 97% (07 Oct 2022 12:04) (97% - 100%)    Parameters below as of 07 Oct 2022 12:04  Patient On (Oxygen Delivery Method): room air        ________________________________________________  PHYSICAL EXAM:  GENERAL: withdrawn disheveled   HEENT: Normocephalic;  conjunctivae and sclerae clear;  NECK : supple  CHEST/LUNG: Clear to ausculitation bilaterally with good air entry   HEART: S1 S2  regular; no murmurs, gallops or rubs  ABDOMEN: Soft, Nontender, Nondistended; Bowel sounds present  EXTREMITIES: right foot with clean dry dressing in place   SKIN: warm and dry; no rash  NERVOUS SYSTEM: lethargic, but once Awake and alert; Oriented  to place, person and time   _________________________________________________  LABS:                        11.9   4.90  )-----------( 197      ( 06 Oct 2022 12:32 )             37.1     10-06    138  |  103  |  72<H>  ----------------------------<  182<H>  4.1   |  24  |  5.67<H>    Ca    9.4      06 Oct 2022 12:32  Phos  4.8     10-06          CAPILLARY BLOOD GLUCOSE      POCT Blood Glucose.: 266 mg/dL (07 Oct 2022 11:28)  POCT Blood Glucose.: 174 mg/dL (07 Oct 2022 08:08)  POCT Blood Glucose.: 276 mg/dL (06 Oct 2022 21:42)  POCT Blood Glucose.: 136 mg/dL (06 Oct 2022 16:39)        RADIOLOGY & ADDITIONAL TESTS:   < from: MR Foot No Cont, Right (09.29.22 @ 20:37) >  IMPRESSION:    1.  No osteomyelitis.  2.  Posterior heel skin wound and dorsal forefoot soft tissue swelling.   Nonspecific but can be seen with cellulitis. No drainable fluid   collection.    --- End of Report ---    < end of copied text >  < from: CT Angio Abd Aorta w/run-off w/ IV Cont (09.27.22 @ 12:04) >  IMPRESSION:    Outflow disease bilaterally as detailed above.          --- End of Report ---    < end of copied text >    < from: Xray Foot AP + Lateral, Right (09.26.22 @ 17:00) >  IMPRESSION: No definite bony destruction.      Thank you for this referral.    --- End of Report ---      < end of copied text >  < from: [facility] Physiol Extremity Lower 3+ Level, BI (09.26.22 @ 16:49) >  IMPRESSION: Abnormally elevated ABIs compatiblewith calcified vessels.    No discernible pulse volume recording at the level of the right digit.    --- End of Report ---      < end of copied text >  Imaging Personally Reviewed:  YES    Consultant(s) Notes Reviewed:   YES    Plan of care was discussed with patient and /or primary care giver; all questions and concerns were addressed and care was aligned with patient's wishes.

## 2022-10-07 NOTE — PHYSICAL THERAPY INITIAL EVALUATION ADULT - NS ASR WT BEARING DETAIL RLE
Podiatry Recommendation/BLE yovanny boots in place/nonweight-bearing Podiatry Recommendation Of BLE; cair boots in place/nonweight-bearing

## 2022-10-07 NOTE — PROGRESS NOTE ADULT - SUBJECTIVE AND OBJECTIVE BOX
Patient is a 72y old  Male who presents with a chief complaint of Gangrene foot (06 Oct 2022 17:22)      INTERVAL HPI/OVERNIGHT EVENTS: pt seen and examined    MEDICATIONS  (STANDING):  cefepime   IVPB 1000 milliGRAM(s) IV Intermittent every 24 hours  chlorhexidine 2% Cloths 1 Application(s) Topical daily  heparin   Injectable 5000 Unit(s) SubCutaneous every 12 hours  influenza  Vaccine (HIGH DOSE) 0.7 milliLiter(s) IntraMuscular once  insulin lispro (ADMELOG) corrective regimen sliding scale   SubCutaneous three times a day before meals  insulin lispro (ADMELOG) corrective regimen sliding scale   SubCutaneous at bedtime  pantoprazole    Tablet 40 milliGRAM(s) Oral before breakfast  polyethylene glycol 3350 17 Gram(s) Oral daily  senna 2 Tablet(s) Oral at bedtime  sevelamer carbonate 800 milliGRAM(s) Oral three times a day with meals  vancomycin  IVPB 1000 milliGRAM(s) IV Intermittent <User Schedule>    MEDICATIONS  (PRN):  acetaminophen     Tablet .. 650 milliGRAM(s) Oral every 6 hours PRN Temp greater or equal to 38C (100.4F), Mild Pain (1 - 3)  melatonin 3 milliGRAM(s) Oral at bedtime PRN Insomnia  ondansetron Injectable 4 milliGRAM(s) IV Push every 8 hours PRN Nausea and/or Vomiting      __________________________________________________  REVIEW OF SYSTEMS:    CONSTITUTIONAL: No fever,   EYES: no acute visual disturbances  NECK: No pain or stiffness  RESPIRATORY: No cough; No shortness of breath  CARDIOVASCULAR: No chest pain, no palpitations  GASTROINTESTINAL: No pain. No nausea or vomiting; No diarrhea   NEUROLOGICAL: No headache or numbness, no tremors  MUSCULOSKELETAL: No joint pain, no muscle pain  GENITOURINARY: no dysuria, no frequency, no hesitancy  PSYCHIATRY: no depression , no anxiety  ALL OTHER  ROS negative        Vital Signs Last 24 Hrs  T(C): 36.2 (07 Oct 2022 05:00), Max: 36.6 (06 Oct 2022 20:37)  T(F): 97.1 (07 Oct 2022 05:00), Max: 97.8 (06 Oct 2022 20:37)  HR: 71 (07 Oct 2022 12:04) (64 - 71)  BP: 143/67 (07 Oct 2022 05:00) (126/66 - 143/67)  BP(mean): --  RR: 18 (07 Oct 2022 05:00) (16 - 18)  SpO2: 97% (07 Oct 2022 12:04) (97% - 100%)    Parameters below as of 07 Oct 2022 12:04  Patient On (Oxygen Delivery Method): room air        ________________________________________________  PHYSICAL EXAM:  GENERAL: withdrawn disheveled   HEENT: Normocephalic;  conjunctivae and sclerae clear;  NECK : supple  CHEST/LUNG: Clear to ausculitation bilaterally with good air entry   HEART: S1 S2  regular; no murmurs, gallops or rubs  ABDOMEN: Soft, Nontender, Nondistended; Bowel sounds present  EXTREMITIES: right foot with clean dry dressing in place   SKIN: warm and dry; no rash  NERVOUS SYSTEM: pt alert awake    _________________________________________________  LABS:  10-06    138  |  103  |  72<H>  ----------------------------<  182<H>  4.1   |  24  |  5.67<H>    Ca    9.4      06 Oct 2022 12:32  Phos  3.1     10-07      Creatinine Trend: 5.67 <--, 4.63 <--, 5.73 <--, 5.10 <--                        11.9   4.90  )-----------( 197      ( 06 Oct 2022 12:32 )             37.1     Urine Studies:                    RADIOLOGY & ADDITIONAL TESTS:   < from: MR Foot No Cont, Right (09.29.22 @ 20:37) >  IMPRESSION:    1.  No osteomyelitis.  2.  Posterior heel skin wound and dorsal forefoot soft tissue swelling.   Nonspecific but can be seen with cellulitis. No drainable fluid   collection.    --- End of Report ---    < end of copied text >  < from: CT Angio Abd Aorta w/run-off w/ IV Cont (09.27.22 @ 12:04) >  IMPRESSION:    Outflow disease bilaterally as detailed above.          --- End of Report ---    < end of copied text >    < from: Xray Foot AP + Lateral, Right (09.26.22 @ 17:00) >  IMPRESSION: No definite bony destruction.      Thank you for this referral.    --- End of Report ---      < end of copied text >  < from: [facility] Physiol Extremity Lower 3+ Level, BI (09.26.22 @ 16:49) >  IMPRESSION: Abnormally elevated ABIs compatiblewith calcified vessels.    No discernible pulse volume recording at the level of the right digit.    --- End of Report ---      < end of copied text >  Imaging Personally Reviewed:  YES    Consultant(s) Notes Reviewed:   YES    Plan of care was discussed with patient and /or primary care giver; all questions and concerns were addressed and care was aligned with patient's wishes.

## 2022-10-07 NOTE — PHYSICAL THERAPY INITIAL EVALUATION ADULT - GENERAL OBSERVATIONS, REHAB EVAL
Rehab patient, HD, on isolation and room air, refer for PT assessment, presented difficulty with ambulation assistance of rw from narrow base of support and podiatry recommendation of non weight bearing to aníbal mock cair boots in use

## 2022-10-08 LAB
ANION GAP SERPL CALC-SCNC: 13 MMOL/L — SIGNIFICANT CHANGE UP (ref 5–17)
BUN SERPL-MCNC: 67 MG/DL — HIGH (ref 7–18)
CALCIUM SERPL-MCNC: 9.7 MG/DL — SIGNIFICANT CHANGE UP (ref 8.4–10.5)
CHLORIDE SERPL-SCNC: 104 MMOL/L — SIGNIFICANT CHANGE UP (ref 96–108)
CO2 SERPL-SCNC: 22 MMOL/L — SIGNIFICANT CHANGE UP (ref 22–31)
CREAT SERPL-MCNC: 5.33 MG/DL — HIGH (ref 0.5–1.3)
EGFR: 11 ML/MIN/1.73M2 — LOW
GLUCOSE BLDC GLUCOMTR-MCNC: 166 MG/DL — HIGH (ref 70–99)
GLUCOSE BLDC GLUCOMTR-MCNC: 186 MG/DL — HIGH (ref 70–99)
GLUCOSE BLDC GLUCOMTR-MCNC: 232 MG/DL — HIGH (ref 70–99)
GLUCOSE BLDC GLUCOMTR-MCNC: 309 MG/DL — HIGH (ref 70–99)
GLUCOSE SERPL-MCNC: 185 MG/DL — HIGH (ref 70–99)
HCT VFR BLD CALC: 34.9 % — LOW (ref 39–50)
HGB BLD-MCNC: 11.4 G/DL — LOW (ref 13–17)
MCHC RBC-ENTMCNC: 30 PG — SIGNIFICANT CHANGE UP (ref 27–34)
MCHC RBC-ENTMCNC: 32.7 GM/DL — SIGNIFICANT CHANGE UP (ref 32–36)
MCV RBC AUTO: 91.8 FL — SIGNIFICANT CHANGE UP (ref 80–100)
NRBC # BLD: 0 /100 WBCS — SIGNIFICANT CHANGE UP (ref 0–0)
PLATELET # BLD AUTO: 200 K/UL — SIGNIFICANT CHANGE UP (ref 150–400)
POTASSIUM SERPL-MCNC: 3.8 MMOL/L — SIGNIFICANT CHANGE UP (ref 3.5–5.3)
POTASSIUM SERPL-SCNC: 3.8 MMOL/L — SIGNIFICANT CHANGE UP (ref 3.5–5.3)
RBC # BLD: 3.8 M/UL — LOW (ref 4.2–5.8)
RBC # FLD: 13.2 % — SIGNIFICANT CHANGE UP (ref 10.3–14.5)
SODIUM SERPL-SCNC: 139 MMOL/L — SIGNIFICANT CHANGE UP (ref 135–145)
WBC # BLD: 5.11 K/UL — SIGNIFICANT CHANGE UP (ref 3.8–10.5)
WBC # FLD AUTO: 5.11 K/UL — SIGNIFICANT CHANGE UP (ref 3.8–10.5)

## 2022-10-08 RX ADMIN — Medication 1: at 08:17

## 2022-10-08 RX ADMIN — Medication 1: at 17:00

## 2022-10-08 RX ADMIN — HEPARIN SODIUM 5000 UNIT(S): 5000 INJECTION INTRAVENOUS; SUBCUTANEOUS at 06:17

## 2022-10-08 RX ADMIN — CHLORHEXIDINE GLUCONATE 1 APPLICATION(S): 213 SOLUTION TOPICAL at 11:37

## 2022-10-08 RX ADMIN — SEVELAMER CARBONATE 800 MILLIGRAM(S): 2400 POWDER, FOR SUSPENSION ORAL at 11:36

## 2022-10-08 RX ADMIN — SEVELAMER CARBONATE 800 MILLIGRAM(S): 2400 POWDER, FOR SUSPENSION ORAL at 08:02

## 2022-10-08 RX ADMIN — Medication 250 MILLIGRAM(S): at 09:48

## 2022-10-08 RX ADMIN — POLYETHYLENE GLYCOL 3350 17 GRAM(S): 17 POWDER, FOR SOLUTION ORAL at 11:36

## 2022-10-08 RX ADMIN — HEPARIN SODIUM 5000 UNIT(S): 5000 INJECTION INTRAVENOUS; SUBCUTANEOUS at 17:26

## 2022-10-08 RX ADMIN — Medication 2: at 21:51

## 2022-10-08 RX ADMIN — CEFEPIME 100 MILLIGRAM(S): 1 INJECTION, POWDER, FOR SOLUTION INTRAMUSCULAR; INTRAVENOUS at 06:47

## 2022-10-08 RX ADMIN — SEVELAMER CARBONATE 800 MILLIGRAM(S): 2400 POWDER, FOR SUSPENSION ORAL at 17:25

## 2022-10-08 RX ADMIN — Medication 2: at 12:16

## 2022-10-08 RX ADMIN — SENNA PLUS 2 TABLET(S): 8.6 TABLET ORAL at 21:42

## 2022-10-08 RX ADMIN — PANTOPRAZOLE SODIUM 40 MILLIGRAM(S): 20 TABLET, DELAYED RELEASE ORAL at 06:14

## 2022-10-08 NOTE — PROGRESS NOTE ADULT - SUBJECTIVE AND OBJECTIVE BOX
Patient is seen at the bed side, is afebrile. He is doing much better, listening music and tolerating Abx well.      REVIEW OF SYSTEMS: All other review systems are negative      ALLERGIES: No Known Allergies      Vital Signs Last 24 Hrs  T(C): 36.8 (08 Oct 2022 13:48), Max: 36.9 (07 Oct 2022 20:25)  T(F): 98.3 (08 Oct 2022 13:48), Max: 98.5 (07 Oct 2022 20:25)  HR: 63 (08 Oct 2022 13:48) (63 - 74)  BP: 137/63 (08 Oct 2022 13:48) (128/67 - 137/63)  BP(mean): --  RR: 18 (08 Oct 2022 13:48) (18 - 18)  SpO2: 99% (08 Oct 2022 13:48) (97% - 99%)    Parameters below as of 08 Oct 2022 13:48  Patient On (Oxygen Delivery Method): room air      PHYSICAL EXAM:  GENERAL : Not in distress  RESP: Not using accessory muscles   EXT: Left heel bandage in placed   CNS: Awake and Alert  -Please refer to Primary team's note for rest of the systems      LABS:                          11.4   5.11  )-----------( 200      ( 08 Oct 2022 06:38 )             34.9                        11.7   5.27  )-----------( 208      ( 05 Oct 2022 06:48 )             36.8       10-08    139  |  104  |  67<H>  ----------------------------<  185<H>  3.8   |  22  |  5.33<H>    Ca    9.7      08 Oct 2022 06:38  Phos  3.1     10-      10-    135  |  102  |  52<H>  ----------------------------<  193<H>  3.8   |  24  |  4.63<H>    Ca    9.1      05 Oct 2022 06:48    TPro  6.6  /  Alb  3.1<L>  /  TBili  0.2  /  DBili  x   /  AST  15  /  ALT  17  /  AlkPhos  61  10-04      CAPILLARY BLOOD GLUCOSE  POCT Blood Glucose.: 127 mg/dL (27 Sep 2022 11:31)  POCT Blood Glucose.: 129 mg/dL (27 Sep 2022 06:13)  POCT Blood Glucose.: 307 mg/dL (27 Sep 2022 00:00)  POCT Blood Glucose.: 171 mg/dL (26 Sep 2022 21:34)      Urinalysis Basic - ( 26 Sep 2022 16:30 )  Color: Yellow / Appearance: Clear / S.010 / pH: x  Gluc: x / Ketone: Negative  / Bili: Negative / Urobili: Negative   Blood: x / Protein: 30 mg/dL / Nitrite: Negative   Leuk Esterase: Small / RBC: 2-5 /HPF / WBC 11-25 /HPF   Sq Epi: x / Non Sq Epi: Few /HPF / Bacteria: Few /HPF      Vancomycin Level, Trough (10.03.22 @ 07:12) : 11.1  Vancomycin Level, Trough (22 @ 15:25) : 11.7    MEDICATIONS  (STANDING):    cefepime   IVPB 1000 milliGRAM(s) IV Intermittent every 24 hours  chlorhexidine 2% Cloths 1 Application(s) Topical daily  heparin   Injectable 5000 Unit(s) SubCutaneous every 12 hours  influenza  Vaccine (HIGH DOSE) 0.7 milliLiter(s) IntraMuscular once  insulin lispro (ADMELOG) corrective regimen sliding scale   SubCutaneous at bedtime  insulin lispro (ADMELOG) corrective regimen sliding scale   SubCutaneous three times a day before meals  pantoprazole    Tablet 40 milliGRAM(s) Oral before breakfast  polyethylene glycol 3350 17 Gram(s) Oral daily  senna 2 Tablet(s) Oral at bedtime  sevelamer carbonate 800 milliGRAM(s) Oral three times a day with meals  vancomycin  IVPB 1000 milliGRAM(s) IV Intermittent <User Schedule>      RADIOLOGY & ADDITIONAL TESTS:    22: MR Foot No Cont, Right (22 @ 20:37) >  1.  No osteomyelitis.  2.  Posterior heel skin wound and dorsal forefoot soft tissue swelling. Nonspecific but can be seen with cellulitis. No drainable fluid collection.      22 from: Xray Foot AP + Lateral, Right (22 @ 17:00) >IMPRESSION: No definite bony destruction.    22: VA Physiol Extremity Lower 3+ Level, BI (22 @ 16:49) IMPRESSION: Abnormally elevated ABIs compatible with calcified vessels.    No discernible pulse volume recording at the level of the right digit.      MICROBIOLOGY DATA:    COVID-19 PCR . (22 @ 10:00)   COVID-19 PCR: Detected    Culture - Urine (22 @ 16:30)   - Amikacin: S <=16   - Aztreonam: S <=4   - Cefepime: S <=2   - Ceftazidime: S 4   - Ciprofloxacin: S <=0.25   - Gentamicin: S <=2   - Imipenem: S <=1   - Levofloxacin: S <=0.5   - Meropenem: S <=1   - Piperacillin/Tazobactam: S <=8   - Tobramycin: S <=2   Specimen Source: Clean Catch Clean Catch (Midstream)   Culture Results:   50,000 - 99,000 CFU/mL Pseudomonas aeruginosa   Organism Identification: Pseudomonas aeruginosa   Organism: Pseudomonas aeruginosa   Method Type: ANH     Culture - Blood (22 @ 14:30)   Specimen Source: .Blood Blood   Culture Results: No growth to date.     Culture - Blood (22 @ 14:20)   Specimen Source: .Blood Blood   Culture Results: No growth to date.     Urine Microscopic-Add On (NC) (22 @ 16:30)   Red Blood Cell - Urine: 2-5 /HPF   White Blood Cell - Urine: 11-25 /HPF   Bacteria: Few /HPF   Comment - Urine: few transitional epithelial cells seen   Epithelial Cells: Few /HPF     Flu With COVID-19 By BEBE (22 @ 14:30)   SARS-CoV-2 Result: NotDetec:

## 2022-10-08 NOTE — PROGRESS NOTE ADULT - SUBJECTIVE AND OBJECTIVE BOX
Patient is a 72y old  Male who presents with a chief complaint of Gangrene foot (06 Oct 2022 17:22)      INTERVAL HPI/OVERNIGHT EVENTS: pt seen and examined    MEDICATIONS  (STANDING):  cefepime   IVPB 1000 milliGRAM(s) IV Intermittent every 24 hours  chlorhexidine 2% Cloths 1 Application(s) Topical daily  heparin   Injectable 5000 Unit(s) SubCutaneous every 12 hours  influenza  Vaccine (HIGH DOSE) 0.7 milliLiter(s) IntraMuscular once  insulin lispro (ADMELOG) corrective regimen sliding scale   SubCutaneous at bedtime  insulin lispro (ADMELOG) corrective regimen sliding scale   SubCutaneous three times a day before meals  pantoprazole    Tablet 40 milliGRAM(s) Oral before breakfast  polyethylene glycol 3350 17 Gram(s) Oral daily  senna 2 Tablet(s) Oral at bedtime  sevelamer carbonate 800 milliGRAM(s) Oral three times a day with meals  vancomycin  IVPB 1000 milliGRAM(s) IV Intermittent <User Schedule>    MEDICATIONS  (PRN):  acetaminophen     Tablet .. 650 milliGRAM(s) Oral every 6 hours PRN Temp greater or equal to 38C (100.4F), Mild Pain (1 - 3)  melatonin 3 milliGRAM(s) Oral at bedtime PRN Insomnia  ondansetron Injectable 4 milliGRAM(s) IV Push every 8 hours PRN Nausea and/or Vomiting      __________________________________________________  REVIEW OF SYSTEMS:    CONSTITUTIONAL: No fever,   EYES: no acute visual disturbances  NECK: No pain or stiffness  RESPIRATORY: No cough; No shortness of breath  CARDIOVASCULAR: No chest pain, no palpitations  GASTROINTESTINAL: No pain. No nausea or vomiting; No diarrhea   NEUROLOGICAL: No headache or numbness, no tremors  MUSCULOSKELETAL: No joint pain, no muscle pain  GENITOURINARY: no dysuria, no frequency, no hesitancy  PSYCHIATRY: no depression , no anxiety  ALL OTHER  ROS negative      Vital Signs Last 24 Hrs  T(C): 36.8 (10-08-22 @ 20:59), Max: 36.8 (10-08-22 @ 13:48)  T(F): 98.2 (10-08-22 @ 20:59), Max: 98.3 (10-08-22 @ 13:48)  HR: 70 (10-08-22 @ 20:59) (63 - 70)  BP: 132/67 (10-08-22 @ 20:59) (128/67 - 137/63)  BP(mean): --  RR: 18 (10-08-22 @ 20:59) (18 - 18)  SpO2: 100% (10-08-22 @ 20:59) (97% - 100%)          ________________________________________________  PHYSICAL EXAM:  GENERAL: withdrawn disheveled   HEENT: Normocephalic;  conjunctivae and sclerae clear;  NECK : supple  CHEST/LUNG: dec breath sounds at bases  HEART: S1 S2  regular; no murmurs, gallops or rubs  ABDOMEN: Soft, Nontender, Nondistended; Bowel sounds present  EXTREMITIES: right foot with clean dry dressing in place   SKIN: warm and dry; no rash  NERVOUS SYSTEM: pt alert awake  _________________________________________________  LABS:  10-08    139  |  104  |  67<H>  ----------------------------<  185<H>  3.8   |  22  |  5.33<H>    Ca    9.7      08 Oct 2022 06:38  Phos  3.1     10-07      Creatinine Trend: 5.33 <--, 5.67 <--, 4.63 <--, 5.73 <--, 5.10 <--                        11.4   5.11  )-----------( 200      ( 08 Oct 2022 06:38 )             34.9     Urine Studies:                    RADIOLOGY & ADDITIONAL TESTS:   < from: MR Foot No Cont, Right (09.29.22 @ 20:37) >  IMPRESSION:    1.  No osteomyelitis.  2.  Posterior heel skin wound and dorsal forefoot soft tissue swelling.   Nonspecific but can be seen with cellulitis. No drainable fluid   collection.    --- End of Report ---    < end of copied text >  < from: CT Angio Abd Aorta w/run-off w/ IV Cont (09.27.22 @ 12:04) >  IMPRESSION:    Outflow disease bilaterally as detailed above.          --- End of Report ---    < end of copied text >    < from: Xray Foot AP + Lateral, Right (09.26.22 @ 17:00) >  IMPRESSION: No definite bony destruction.      Thank you for this referral.    --- End of Report ---      < end of copied text >  < from: VA Physiol Extremity Lower 3+ Level, BI (09.26.22 @ 16:49) >  IMPRESSION: Abnormally elevated ABIs compatiblewith calcified vessels.    No discernible pulse volume recording at the level of the right digit.    --- End of Report ---      < end of copied text >  Imaging Personally Reviewed:  YES    Consultant(s) Notes Reviewed:   YES    Plan of care was discussed with patient and /or primary care giver; all questions and concerns were addressed and care was aligned with patient's wishes.

## 2022-10-08 NOTE — PROGRESS NOTE ADULT - ASSESSMENT
Patient is a 71yo Male with ESRD on HD, DM a/w Rt foot gangrene and GI bleed. Nephrology consulted for ESRD status.     1) ESRD:  Last HD 10/7 tolerated well. Next maintenance HD today 10/10. Monitor electrolytes.  Pt with new Left UE AVF- no thrill no bruit- f/u Vascular (Dr. Dutta aware); can f/u as an outpt     2) HTN with ESRD: BP borderline high off antihypertensive medications. Monitor. c/w low sodium diet. Monitor BP.  3) Anemia of renal disease: with blood loss due to GI bleed. FOBT neg. Hb acceptable. No need for JERED at this time. Monitor Hb.  4) Hyperphosphatemia: Last serum phosphorus acceptable. c/w Sevelamer 1 tab tid with meals and low phos diet. Monitor serum calcium and phosphorus.  5) Rt foot gangrene: Pt on Cefepime/ Vanco 1g IV tiw. MRI neg for OM. s/p RLE angioplasty on 10/3. Podiatry/ Vascular/ ID following.  Also pseudomonal UTI.    [facility] NEPHROLOGY  Wili Hopkins M.D.  Kiran Feng D.O.  Sujatha Dumont M.D.  Radha Miller, MSN, ANP-C  [phone]    [id number] [id number]

## 2022-10-08 NOTE — PROGRESS NOTE ADULT - ASSESSMENT
72 year old male, coming from [facility], with medical history of ESRD (T-TH-S), DM coming in with right leg necrosis Vascular and Podiatry consulted. JESUS with abnormal findings. CT angio with out flow disease bilat. MRI negative for OM, also found to have pseudomonas UTI, started on vancomycin and cefepime.  ID following. Hospital course c/b COVID + 09/29, asymptomatic, s/p RLE angiogram 10/3 w/no targets for revasc but w/extensive distal small vessel disease, no acute vascular intervention at this time. Recommendations would be for R BKA vs. medication management, patient has been resistant to BKA. Plan to return back to [facility] on PO ABX and wound care. NCM to follow

## 2022-10-08 NOTE — PROGRESS NOTE ADULT - ASSESSMENT
Patient is a 72y old  Male with medical history of ESRD (T-TH-S), DM, now send in to the ER from [facility], for evaluation of right foot necrosis. On admission, he has no fever, No leukocytosis. He has evaluated by Podiatry , started on IV Vancomycin, and the ID consult requested to assist with further evaluation and antibiotic management.     # Right  gangrenous foot- s/p Angioplasty 10/3 - MRI Of Right foot from 9/29/22 shows No osteomyelitis.  # UTI- Urine cx grew Pseudomonas.   # Positive COVID as of 9/29/22- Not hypoxic   # s/p RLE Angioplasty 10/3/22 n- Plan for ?BKA    would recommend:    1. Please obtain Vancomycin level and adjust doses accordingly   2. Continue IV Cefepime and IV Vancomycin based on the level  3. Wound care as per Podiatry   4. Monitor and Keep Vancomycin level between 15 to 20  5. Need  Abx until 10/17/22, may change to oral Augmentin 875 mg daily and Doxycycline 100 mg q 12hours on discharge  6. COVID precaution as per protocol    d/w Patient and Nursing staff    Attending Attestation:    Spent more than 35 minutes on total encounter, more than 50 % of the visit was spent counseling and/or coordinating care by the Attending physician.

## 2022-10-08 NOTE — PROGRESS NOTE ADULT - SUBJECTIVE AND OBJECTIVE BOX
[facility] NEPHROLOGY- PROGRESS NOTE    Patient is a 73yo Male with ESRD on HD, DM a/w Rt foot gangrene and GI bleed. Nephrology consulted for ESRD status.   COVID-10 + 9/29/22    No complaints.    Hospital Medications: Medications reviewed.  REVIEW OF SYSTEMS:  CONSTITUTIONAL: No fevers or chills  RESPIRATORY: No shortness of breath  CARDIOVASCULAR: No chest pain.  GASTROINTESTINAL: No nausea, vomiting, diarrhea or abdominal pain.   VASCULAR: No bilateral lower extremity edema. +Mild Rt foot pain    VITALS:  T(F): 98.2 (10-08-22 @ 20:59), Max: 98.3 (10-08-22 @ 13:48)  HR: 70 (10-08-22 @ 20:59)  BP: 132/67 (10-08-22 @ 20:59)  RR: 18 (10-08-22 @ 20:59)  SpO2: 100% (10-08-22 @ 20:59)  Wt(kg): --    10-07 @ 07:01  -  10-08 @ 07:00  --------------------------------------------------------  IN: 0 mL / OUT: 1250 mL / NET: -1250 mL    10-08 @ 07:01  -  10-08 @ 23:13  --------------------------------------------------------  IN: 0 mL / OUT: 200 mL / NET: -200 mL    PHYSICAL EXAM:  Gen: NAD,   HEENT: anicteric  Neck: no JVD  Cards: RRR, +S1/S2, no M/G/R  Resp: CTA B/L  GI: soft, NT/ND, NABS  Extremities: no LE edema B/L  Derm: Rt foot wrapped dressing c/d/i  Access: Rt IJ tunneled HD catheter- benign  Left upper arm AVF- no thrill no bruit      LABS:  10-08  139 <--, 138 <--, 135 <--, 136 <--, 136 <--  139  |  104  |  67<H>  ----------------------------<  185<H>  3.8   |  22  |  5.33<H>    Ca    9.7      08 Oct 2022 06:38  Phos  3.1     10-07      Creatinine Trend: 5.33 <--, 5.67 <--, 4.63 <--, 5.73 <--, 5.10 <--                        11.4   5.11  )-----------( 200      ( 08 Oct 2022 06:38 )             34.9

## 2022-10-09 LAB
ALBUMIN SERPL ELPH-MCNC: 2.9 G/DL — LOW (ref 3.5–5)
ALP SERPL-CCNC: 56 U/L — SIGNIFICANT CHANGE UP (ref 40–120)
ALT FLD-CCNC: 16 U/L DA — SIGNIFICANT CHANGE UP (ref 10–60)
ANION GAP SERPL CALC-SCNC: 9 MMOL/L — SIGNIFICANT CHANGE UP (ref 5–17)
AST SERPL-CCNC: 11 U/L — SIGNIFICANT CHANGE UP (ref 10–40)
BILIRUB SERPL-MCNC: 0.2 MG/DL — SIGNIFICANT CHANGE UP (ref 0.2–1.2)
BUN SERPL-MCNC: 83 MG/DL — HIGH (ref 7–18)
CALCIUM SERPL-MCNC: 9.1 MG/DL — SIGNIFICANT CHANGE UP (ref 8.4–10.5)
CHLORIDE SERPL-SCNC: 104 MMOL/L — SIGNIFICANT CHANGE UP (ref 96–108)
CO2 SERPL-SCNC: 23 MMOL/L — SIGNIFICANT CHANGE UP (ref 22–31)
CREAT SERPL-MCNC: 5.91 MG/DL — HIGH (ref 0.5–1.3)
EGFR: 9 ML/MIN/1.73M2 — LOW
GLUCOSE BLDC GLUCOMTR-MCNC: 140 MG/DL — HIGH (ref 70–99)
GLUCOSE BLDC GLUCOMTR-MCNC: 186 MG/DL — HIGH (ref 70–99)
GLUCOSE BLDC GLUCOMTR-MCNC: 255 MG/DL — HIGH (ref 70–99)
GLUCOSE BLDC GLUCOMTR-MCNC: 334 MG/DL — HIGH (ref 70–99)
GLUCOSE SERPL-MCNC: 175 MG/DL — HIGH (ref 70–99)
HCT VFR BLD CALC: 34.3 % — LOW (ref 39–50)
HGB BLD-MCNC: 11.1 G/DL — LOW (ref 13–17)
MCHC RBC-ENTMCNC: 29.7 PG — SIGNIFICANT CHANGE UP (ref 27–34)
MCHC RBC-ENTMCNC: 32.4 GM/DL — SIGNIFICANT CHANGE UP (ref 32–36)
MCV RBC AUTO: 91.7 FL — SIGNIFICANT CHANGE UP (ref 80–100)
NRBC # BLD: 0 /100 WBCS — SIGNIFICANT CHANGE UP (ref 0–0)
PHOSPHATE SERPL-MCNC: 5.2 MG/DL — HIGH (ref 2.5–4.5)
PLATELET # BLD AUTO: 200 K/UL — SIGNIFICANT CHANGE UP (ref 150–400)
POTASSIUM SERPL-MCNC: 4 MMOL/L — SIGNIFICANT CHANGE UP (ref 3.5–5.3)
POTASSIUM SERPL-SCNC: 4 MMOL/L — SIGNIFICANT CHANGE UP (ref 3.5–5.3)
PROT SERPL-MCNC: 6.6 G/DL — SIGNIFICANT CHANGE UP (ref 6–8.3)
RBC # BLD: 3.74 M/UL — LOW (ref 4.2–5.8)
RBC # FLD: 13.1 % — SIGNIFICANT CHANGE UP (ref 10.3–14.5)
SARS-COV-2 RNA SPEC QL NAA+PROBE: SIGNIFICANT CHANGE UP
SODIUM SERPL-SCNC: 136 MMOL/L — SIGNIFICANT CHANGE UP (ref 135–145)
WBC # BLD: 5.09 K/UL — SIGNIFICANT CHANGE UP (ref 3.8–10.5)
WBC # FLD AUTO: 5.09 K/UL — SIGNIFICANT CHANGE UP (ref 3.8–10.5)

## 2022-10-09 RX ADMIN — CEFEPIME 100 MILLIGRAM(S): 1 INJECTION, POWDER, FOR SOLUTION INTRAMUSCULAR; INTRAVENOUS at 05:47

## 2022-10-09 RX ADMIN — HEPARIN SODIUM 5000 UNIT(S): 5000 INJECTION INTRAVENOUS; SUBCUTANEOUS at 17:29

## 2022-10-09 RX ADMIN — Medication 4: at 12:14

## 2022-10-09 RX ADMIN — SEVELAMER CARBONATE 800 MILLIGRAM(S): 2400 POWDER, FOR SUSPENSION ORAL at 12:14

## 2022-10-09 RX ADMIN — POLYETHYLENE GLYCOL 3350 17 GRAM(S): 17 POWDER, FOR SOLUTION ORAL at 12:15

## 2022-10-09 RX ADMIN — SEVELAMER CARBONATE 800 MILLIGRAM(S): 2400 POWDER, FOR SUSPENSION ORAL at 17:30

## 2022-10-09 RX ADMIN — SENNA PLUS 2 TABLET(S): 8.6 TABLET ORAL at 21:43

## 2022-10-09 RX ADMIN — Medication 1: at 08:17

## 2022-10-09 RX ADMIN — HEPARIN SODIUM 5000 UNIT(S): 5000 INJECTION INTRAVENOUS; SUBCUTANEOUS at 05:47

## 2022-10-09 RX ADMIN — SEVELAMER CARBONATE 800 MILLIGRAM(S): 2400 POWDER, FOR SUSPENSION ORAL at 08:17

## 2022-10-09 RX ADMIN — CHLORHEXIDINE GLUCONATE 1 APPLICATION(S): 213 SOLUTION TOPICAL at 12:15

## 2022-10-09 RX ADMIN — PANTOPRAZOLE SODIUM 40 MILLIGRAM(S): 20 TABLET, DELAYED RELEASE ORAL at 07:25

## 2022-10-09 RX ADMIN — Medication 1: at 21:42

## 2022-10-09 NOTE — PROGRESS NOTE ADULT - SUBJECTIVE AND OBJECTIVE BOX
Patient is a 72y old  Male who presents with a chief complaint of Gangrene foot (06 Oct 2022 17:22)      INTERVAL HPI/OVERNIGHT EVENTS: pt seen and examined    MEDICATIONS  (STANDING):  cefepime   IVPB 1000 milliGRAM(s) IV Intermittent every 24 hours  chlorhexidine 2% Cloths 1 Application(s) Topical daily  heparin   Injectable 5000 Unit(s) SubCutaneous every 12 hours  influenza  Vaccine (HIGH DOSE) 0.7 milliLiter(s) IntraMuscular once  insulin lispro (ADMELOG) corrective regimen sliding scale   SubCutaneous three times a day before meals  insulin lispro (ADMELOG) corrective regimen sliding scale   SubCutaneous at bedtime  pantoprazole    Tablet 40 milliGRAM(s) Oral before breakfast  polyethylene glycol 3350 17 Gram(s) Oral daily  senna 2 Tablet(s) Oral at bedtime  sevelamer carbonate 800 milliGRAM(s) Oral three times a day with meals  vancomycin  IVPB 1000 milliGRAM(s) IV Intermittent <User Schedule>    MEDICATIONS  (PRN):  acetaminophen     Tablet .. 650 milliGRAM(s) Oral every 6 hours PRN Temp greater or equal to 38C (100.4F), Mild Pain (1 - 3)  melatonin 3 milliGRAM(s) Oral at bedtime PRN Insomnia  ondansetron Injectable 4 milliGRAM(s) IV Push every 8 hours PRN Nausea and/or Vomiting        __________________________________________________  REVIEW OF SYSTEMS:    CONSTITUTIONAL: No fever,   EYES: no acute visual disturbances  NECK: No pain or stiffness  RESPIRATORY: No cough; No shortness of breath  CARDIOVASCULAR: No chest pain, no palpitations  GASTROINTESTINAL: No pain. No nausea or vomiting; No diarrhea   NEUROLOGICAL: No headache or numbness, no tremors  MUSCULOSKELETAL: No joint pain, no muscle pain  GENITOURINARY: no dysuria, no frequency, no hesitancy  PSYCHIATRY: no depression , no anxiety  ALL OTHER  ROS negative      Vital Signs Last 24 Hrs  T(C): 36.5 (10-09-22 @ 11:40), Max: 36.8 (10-08-22 @ 20:59)  T(F): 97.7 (10-09-22 @ 11:40), Max: 98.2 (10-08-22 @ 20:59)  HR: 69 (10-09-22 @ 11:40) (69 - 70)  BP: 131/69 (10-09-22 @ 11:40) (131/69 - 143/69)  BP(mean): --  RR: 14 (10-09-22 @ 11:40) (14 - 18)  SpO2: 100% (10-09-22 @ 11:40) (98% - 100%)        ________________________________________________  PHYSICAL EXAM:  GENERAL: withdrawn disheveled   HEENT: Normocephalic;  conjunctivae and sclerae clear;  NECK : supple  CHEST/LUNG: dec breath sounds at bases  HEART: S1 S2  regular; no murmurs, gallops or rubs  ABDOMEN: Soft, Nontender, Nondistended; Bowel sounds present  EXTREMITIES: right foot with clean dry dressing in place   SKIN: warm and dry; no rash  NERVOUS SYSTEM: pt alert awake  _________________________________________________  LABS:  10-09    136  |  104  |  83<H>  ----------------------------<  175<H>  4.0   |  23  |  5.91<H>    Ca    9.1      09 Oct 2022 05:49  Phos  5.2     10-09    TPro  6.6  /  Alb  2.9<L>  /  TBili  0.2  /  DBili      /  AST  11  /  ALT  16  /  AlkPhos  56  10-09    Creatinine Trend: 5.91 <--, 5.33 <--, 5.67 <--, 4.63 <--, 5.73 <--, 5.10 <--                        11.1   5.09  )-----------( 200      ( 09 Oct 2022 05:49 )             34.3     Urine Studies:            LIVER FUNCTIONS - ( 09 Oct 2022 05:49 )  Alb: 2.9 g/dL / Pro: 6.6 g/dL / ALK PHOS: 56 U/L / ALT: 16 U/L DA / AST: 11 U/L / GGT: x                               RADIOLOGY & ADDITIONAL TESTS:   < from: MR Foot No Cont, Right (09.29.22 @ 20:37) >  IMPRESSION:    1.  No osteomyelitis.  2.  Posterior heel skin wound and dorsal forefoot soft tissue swelling.   Nonspecific but can be seen with cellulitis. No drainable fluid   collection.    --- End of Report ---    < end of copied text >  < from: CT Angio Abd Aorta w/run-off w/ IV Cont (09.27.22 @ 12:04) >  IMPRESSION:    Outflow disease bilaterally as detailed above.          --- End of Report ---    < end of copied text >    < from: Xray Foot AP + Lateral, Right (09.26.22 @ 17:00) >  IMPRESSION: No definite bony destruction.      Thank you for this referral.    --- End of Report ---      < end of copied text >  < from: VA Physiol Extremity Lower 3+ Level, BI (09.26.22 @ 16:49) >  IMPRESSION: Abnormally elevated ABIs compatiblewith calcified vessels.    No discernible pulse volume recording at the level of the right digit.    --- End of Report ---      < end of copied text >  Imaging Personally Reviewed:  YES    Consultant(s) Notes Reviewed:   YES    Plan of care was discussed with patient and /or primary care giver; all questions and concerns were addressed and care was aligned with patient's wishes.

## 2022-10-09 NOTE — PROGRESS NOTE ADULT - ASSESSMENT
Patient is a 71yo Male with ESRD on HD, DM a/w Rt foot gangrene and GI bleed. Nephrology consulted for ESRD status.     1) ESRD:  Last HD 10/10 tolerated well. Next maintenance HD 10/12. Monitor electrolytes.  Pt with new Left UE AVF- no thrill no bruit- f/u Vascular (Dr. Dutta aware); can f/u as an outpt     2) HTN with ESRD: BP borderline high off antihypertensive medications. Monitor. c/w low sodium diet. Monitor BP.  3) Anemia of renal disease: with blood loss due to GI bleed. FOBT neg. Hb acceptable. No need for JERED at this time. Monitor Hb.  4) Hyperphosphatemia: Last serum phosphorus acceptable. c/w Sevelamer 1 tab tid with meals and low phos diet. Monitor serum calcium and phosphorus.  5) Rt foot gangrene: Pt on Cefepime/ Vanco 1g IV tiw. MRI neg for OM. s/p RLE angioplasty on 10/3. Podiatry/ Vascular/ ID following.  Also pseudomonal UTI.    [facility] NEPHROLOGY  Wili Hopkins M.D.  Kiran Feng D.O.  Sujatha Dumont M.D.  Radha Miller, MSN, ANP-C  [phone]    [address] [place] [address]

## 2022-10-09 NOTE — PROGRESS NOTE ADULT - ASSESSMENT
72 year old male, coming from [facility], with medical history of ESRD (T-TH-S), DM coming in with right leg necrosis Vascular and Podiatry consulted. JESUS with abnormal findings. CT angio with out flow disease bilat. MRI negative for OM, also found to have pseudomonas UTI, started on vancomycin and cefepime.  ID following. Hospital course c/b COVID + 09/29, asymptomatic, s/p RLE angiogram 10/3 w/no targets for revasc but w/extensive distal small vessel disease, no acute vascular intervention at this time. Recommendations would be for R BKA vs. medication management, patient has been resistant to BKA. Plan to return back to [facility] on PO ABX and wound care. NCM to follow

## 2022-10-10 LAB
GLUCOSE BLDC GLUCOMTR-MCNC: 187 MG/DL — HIGH (ref 70–99)
GLUCOSE BLDC GLUCOMTR-MCNC: 229 MG/DL — HIGH (ref 70–99)
GLUCOSE BLDC GLUCOMTR-MCNC: 236 MG/DL — HIGH (ref 70–99)
GLUCOSE BLDC GLUCOMTR-MCNC: 332 MG/DL — HIGH (ref 70–99)

## 2022-10-10 RX ADMIN — Medication 4: at 11:59

## 2022-10-10 RX ADMIN — SENNA PLUS 2 TABLET(S): 8.6 TABLET ORAL at 21:11

## 2022-10-10 RX ADMIN — SEVELAMER CARBONATE 800 MILLIGRAM(S): 2400 POWDER, FOR SUSPENSION ORAL at 11:58

## 2022-10-10 RX ADMIN — CHLORHEXIDINE GLUCONATE 1 APPLICATION(S): 213 SOLUTION TOPICAL at 13:00

## 2022-10-10 RX ADMIN — SEVELAMER CARBONATE 800 MILLIGRAM(S): 2400 POWDER, FOR SUSPENSION ORAL at 17:23

## 2022-10-10 RX ADMIN — HEPARIN SODIUM 5000 UNIT(S): 5000 INJECTION INTRAVENOUS; SUBCUTANEOUS at 05:53

## 2022-10-10 RX ADMIN — PANTOPRAZOLE SODIUM 40 MILLIGRAM(S): 20 TABLET, DELAYED RELEASE ORAL at 06:22

## 2022-10-10 RX ADMIN — Medication 2: at 17:23

## 2022-10-10 RX ADMIN — SEVELAMER CARBONATE 800 MILLIGRAM(S): 2400 POWDER, FOR SUSPENSION ORAL at 08:35

## 2022-10-10 RX ADMIN — Medication 1: at 08:35

## 2022-10-10 RX ADMIN — CEFEPIME 100 MILLIGRAM(S): 1 INJECTION, POWDER, FOR SOLUTION INTRAMUSCULAR; INTRAVENOUS at 05:53

## 2022-10-10 RX ADMIN — HEPARIN SODIUM 5000 UNIT(S): 5000 INJECTION INTRAVENOUS; SUBCUTANEOUS at 17:24

## 2022-10-10 NOTE — PROGRESS NOTE ADULT - SUBJECTIVE AND OBJECTIVE BOX
NP Note discussed with  primary attending    Patient is a 72y old  Male who presents with a chief complaint of Gangrene foot (10 Oct 2022 11:10)      INTERVAL HPI/OVERNIGHT EVENTS: no acute medical events overnight     MEDICATIONS  (STANDING):  cefepime   IVPB 1000 milliGRAM(s) IV Intermittent every 24 hours  chlorhexidine 2% Cloths 1 Application(s) Topical daily  heparin   Injectable 5000 Unit(s) SubCutaneous every 12 hours  influenza  Vaccine (HIGH DOSE) 0.7 milliLiter(s) IntraMuscular once  insulin lispro (ADMELOG) corrective regimen sliding scale   SubCutaneous three times a day before meals  insulin lispro (ADMELOG) corrective regimen sliding scale   SubCutaneous at bedtime  pantoprazole    Tablet 40 milliGRAM(s) Oral before breakfast  polyethylene glycol 3350 17 Gram(s) Oral daily  senna 2 Tablet(s) Oral at bedtime  sevelamer carbonate 800 milliGRAM(s) Oral three times a day with meals  vancomycin  IVPB 1000 milliGRAM(s) IV Intermittent <User Schedule>    MEDICATIONS  (PRN):  acetaminophen     Tablet .. 650 milliGRAM(s) Oral every 6 hours PRN Temp greater or equal to 38C (100.4F), Mild Pain (1 - 3)  melatonin 3 milliGRAM(s) Oral at bedtime PRN Insomnia  ondansetron Injectable 4 milliGRAM(s) IV Push every 8 hours PRN Nausea and/or Vomiting      __________________________________________________  REVIEW OF SYSTEMS:    CONSTITUTIONAL: No fever,   EYES: no acute visual disturbances  NECK: No pain or stiffness  RESPIRATORY: No cough; No shortness of breath  CARDIOVASCULAR: No chest pain, no palpitations  GASTROINTESTINAL: No pain. No nausea or vomiting; No diarrhea   NEUROLOGICAL: No headache or numbness, no tremors  MUSCULOSKELETAL: No joint pain, no muscle pain  GENITOURINARY: no dysuria, no frequency, no hesitancy  PSYCHIATRY: no depression , no anxiety  ALL OTHER  ROS negative        Vital Signs Last 24 Hrs  T(C): 36.5 (10 Oct 2022 06:07), Max: 36.9 (09 Oct 2022 20:58)  T(F): 97.7 (10 Oct 2022 06:07), Max: 98.5 (09 Oct 2022 20:58)  HR: 69 (10 Oct 2022 06:07) (69 - 71)  BP: 150/78 (10 Oct 2022 06:07) (144/70 - 150/78)  BP(mean): --  RR: 18 (10 Oct 2022 06:07) (15 - 18)  SpO2: 100% (10 Oct 2022 06:07) (100% - 100%)    Parameters below as of 10 Oct 2022 06:07  Patient On (Oxygen Delivery Method): room air        ________________________________________________  PHYSICAL EXAM:  GENERAL: withdrawn disheveled   HEENT: Normocephalic;  conjunctivae and sclerae clear;  NECK : supple  CHEST/LUNG: Clear to auscultation bilaterally with good air entry   HEART: S1 S2  regular; no murmurs, gallops or rubs  ABDOMEN: Soft, Nontender, Nondistended; Bowel sounds present  EXTREMITIES: right foot with clean dry dressing in place   SKIN: warm and dry; no rash  NERVOUS SYSTEM: a&ox3    _________________________________________________  LABS:                        11.1   5.09  )-----------( 200      ( 09 Oct 2022 05:49 )             34.3     10-09    136  |  104  |  83<H>  ----------------------------<  175<H>  4.0   |  23  |  5.91<H>    Ca    9.1      09 Oct 2022 05:49  Phos  5.2     10-09    TPro  6.6  /  Alb  2.9<L>  /  TBili  0.2  /  DBili  x   /  AST  11  /  ALT  16  /  AlkPhos  56  10-09        CAPILLARY BLOOD GLUCOSE      POCT Blood Glucose.: 332 mg/dL (10 Oct 2022 11:37)  POCT Blood Glucose.: 187 mg/dL (10 Oct 2022 07:37)  POCT Blood Glucose.: 255 mg/dL (09 Oct 2022 21:13)  POCT Blood Glucose.: 140 mg/dL (09 Oct 2022 16:55)        RADIOLOGY & ADDITIONAL TESTS:  < from: MR Foot No Cont, Right (09.29.22 @ 20:37) >  IMPRESSION:    1.  No osteomyelitis.  2.  Posterior heel skin wound and dorsal forefoot soft tissue swelling.   Nonspecific but can be seen with cellulitis. No drainable fluid   collection.    --- End of Report ---    < end of copied text >  < from: CT Angio Abd Aorta w/run-off w/ IV Cont (09.27.22 @ 12:04) >  IMPRESSION:    Outflow disease bilaterally as detailed above.          --- End of Report ---    < end of copied text >    < from: Xray Foot AP + Lateral, Right (09.26.22 @ 17:00) >  IMPRESSION: No definite bony destruction.      Thank you for this referral.    --- End of Report ---      < end of copied text >  < from: VA Physiol Extremity Lower 3+ Level, BI (09.26.22 @ 16:49) >  IMPRESSION: Abnormally elevated ABIs compatiblewith calcified vessels.    No discernible pulse volume recording at the level of the right digit.    --- End of Report ---      Imaging Personally Reviewed:  YES    Consultant(s) Notes Reviewed:   YES    Plan of care was discussed with patient and /or primary care giver; all questions and concerns were addressed and care was aligned with patient's wishes.

## 2022-10-10 NOTE — PROGRESS NOTE ADULT - SUBJECTIVE AND OBJECTIVE BOX
Podiatry interval: Pt is seen resting in bed in [facility] isolation room in no acute distress. Patient confirms once again he would like to proceed with local wound care only for his gangrenous toe. Admits to pain on his right foot to the gangrenous digits. Denies constitutional symptoms. Denies any overnight acute events. Denies further pedal complaints at this time.     Podiatry HPI: Pt is a 72M who presented to ED from his nursing home ([facility]) after his right foot started having gangrene changes. Pt states he has a vacular doctor appointment coming up soon but hasn't seen a vascular doctor as of yet. Admits he has 10/10 pain to heel and digits. Admits he is normally able to walk however; since he got the right heel wound 2-3 months ago, he has been bedbound. Pt denies constitutional symptoms. Pt denies any recent acute trauma. Pt denies further pedal complaints.     ROS unremarkable outside of HPI    PHYSICAL EXAM  LE Focused: Pt is A&Ox3 in mild distress from right foot pain   Vasc: DP/PT pulses non palpable 0/4 b/l; temperature gradient is warm to cool from proximal leg to digits with R > L. No edema noted to b/l LE. No varicosities. Pedal hair absent.  Derm: Right 2nd digit dry gangrenous ischemic changes noted without erythema, drainage, edema that is rigid and is cool to the touch. Right 1st and 3rd digits at distal tuft's and, now dorsal, show dusky changes that are also cool to the touch without open lesions or signs of infection. Right plantar heel wound with eschar base and no signs of infection, no fluctuance noted. Left foot no signs of gangrene or any open lesions.   Neuro: Protective sensation present b/l; light touch sensation present b/l   MSK: POP to right digits 1-3 and plantar heel wound; muscle strength exam deferred 2/2 pain; b/l hammertoe rigid contractions of digits 2-5       IMAGING    < from: Xray Foot AP + Lateral, Right (09.26.22 @ 17:00) >  ACC: [id number] EXAM:  XR FOOT 2 VIEWS RT                          PROCEDURE DATE:  09/26/2022          INTERPRETATION:  Right foot    HISTORY: Heel gangrene and second toe gangrene     Three views of the right foot show no evidence of acute fracturenor   destructive change. The joint spaces are maintained. Vascular   calcifications are present.    IMPRESSION: No definite bony destruction.      Thank you for this referral.    --- End of Report ---      < end of copied text >      CULTURES:     A:  - PVD  - DM  - ESRD on dialysis  - Right 2nd digit dry gangrene  - Right 1st and 3rd digit ischemic changes  - Right plantar heel eschar       P:   Patient evaluated and Chart reviewed   Discussed diagnosis and treatment with patient  Applied betadine, DSD to right plantar heel and right 1st, 2nd, 3rd digits   Vascular consult appreciated  Re-confirmed patient's decision to proceed with wound care only  MRI results reviewed   X-rays reviewed; no bony destruction   Reviewed JEUSS/PVR  NWB to RLE  Offloading to bilateral Heels via CAIR boots  Discussed importance of daily foot examinations and proper shoe gear and to importance of lower Fasting Blood Glucose levels.   Podiatry to follow while in house.  Discussed with attending Dr. Godinez     Wound care: betadine to right digits 1-3 and 4x4 with light nba

## 2022-10-10 NOTE — PROGRESS NOTE ADULT - SUBJECTIVE AND OBJECTIVE BOX
Patient is seen at the bed side, is afebrile. He has no new complaints, the COVID PCR as of 10/9/22 is negative       REVIEW OF SYSTEMS: All other review systems are negative      ALLERGIES: No Known Allergies      Vital Signs Last 24 Hrs  T(C): 36.6 (10 Oct 2022 14:19), Max: 36.9 (09 Oct 2022 20:58)  T(F): 97.9 (10 Oct 2022 14:19), Max: 98.5 (09 Oct 2022 20:58)  HR: 77 (10 Oct 2022 14:19) (69 - 77)  BP: 133/67 (10 Oct 2022 14:19) (133/67 - 150/78)  BP(mean): --  RR: 15 (10 Oct 2022 14:19) (15 - 18)  SpO2: 99% (10 Oct 2022 14:19) (99% - 100%)    Parameters below as of 10 Oct 2022 14:19  Patient On (Oxygen Delivery Method): room air        PHYSICAL EXAM:  GENERAL : Not in distress  RESP: Not using accessory muscles   EXT: Left heel bandage in placed   CNS: Awake and Alert  -Please refer to Primary team's note for rest of the systems      LABS:                          11.1   5.09  )-----------( 200      ( 09 Oct 2022 05:49 )             34.3                           11.4   5.11  )-----------( 200      ( 08 Oct 2022 06:38 )             34.9      10-09    136  |  104  |  83<H>  ----------------------------<  175<H>  4.0   |  23  |  5.91<H>    Ca    9.1      09 Oct 2022 05:49  Phos  5.2     10-09    TPro  6.6  /  Alb  2.9<L>  /  TBili  0.2  /  DBili  x   /  AST  11  /  ALT  16  /  AlkPhos  56  10-09    10-08    139  |  104  |  67<H>  ----------------------------<  185<H>  3.8   |  22  |  5.33<H>    Ca    9.7      08 Oct 2022 06:38  Phos  3.1     10-07    TPro  6.6  /  Alb  3.1<L>  /  TBili  0.2  /  DBili  x   /  AST  15  /  ALT  17  /  AlkPhos  61  10-04      CAPILLARY BLOOD GLUCOSE  POCT Blood Glucose.: 127 mg/dL (27 Sep 2022 11:31)  POCT Blood Glucose.: 129 mg/dL (27 Sep 2022 06:13)  POCT Blood Glucose.: 307 mg/dL (27 Sep 2022 00:00)  POCT Blood Glucose.: 171 mg/dL (26 Sep 2022 21:34)      Urinalysis Basic - ( 26 Sep 2022 16:30 )  Color: Yellow / Appearance: Clear / S.010 / pH: x  Gluc: x / Ketone: Negative  / Bili: Negative / Urobili: Negative   Blood: x / Protein: 30 mg/dL / Nitrite: Negative   Leuk Esterase: Small / RBC: 2-5 /HPF / WBC 11-25 /HPF   Sq Epi: x / Non Sq Epi: Few /HPF / Bacteria: Few /HPF        Vancomycin Level, Trough (10..22 @ 07:12) : 11.1  Vancomycin Level, Trough (22 @ 15:25) : 11.7    MEDICATIONS  (STANDING):    cefepime   IVPB 1000 milliGRAM(s) IV Intermittent every 24 hours  chlorhexidine 2% Cloths 1 Application(s) Topical daily  heparin   Injectable 5000 Unit(s) SubCutaneous every 12 hours  influenza  Vaccine (HIGH DOSE) 0.7 milliLiter(s) IntraMuscular once  insulin lispro (ADMELOG) corrective regimen sliding scale   SubCutaneous three times a day before meals  insulin lispro (ADMELOG) corrective regimen sliding scale   SubCutaneous at bedtime  pantoprazole    Tablet 40 milliGRAM(s) Oral before breakfast  polyethylene glycol 3350 17 Gram(s) Oral daily  senna 2 Tablet(s) Oral at bedtime  sevelamer carbonate 800 milliGRAM(s) Oral three times a day with meals  vancomycin  IVPB 1000 milliGRAM(s) IV Intermittent <User Schedule>      RADIOLOGY & ADDITIONAL TESTS:    22: MR Foot No Cont, Right (22 @ 20:37) >  1.  No osteomyelitis.  2.  Posterior heel skin wound and dorsal forefoot soft tissue swelling. Nonspecific but can be seen with cellulitis. No drainable fluid collection.      22 from: Xray Foot AP + Lateral, Right (22 @ 17:00) >IMPRESSION: No definite bony destruction.    22: VA Physiol Extremity Lower 3+ Level, BI (22 @ 16:49) IMPRESSION: Abnormally elevated ABIs compatible with calcified vessels.    No discernible pulse volume recording at the level of the right digit.        MICROBIOLOGY DATA:    COVID-19 PCR . (10.09.22 @ 06:38)   COVID-19 PCR: NotDetec:    COVID-19 PCR . (22 @ 10:00)   COVID-19 PCR: Detected    Culture - Urine (22 @ 16:30)   - Amikacin: S <=16   - Aztreonam: S <=4   - Cefepime: S <=2   - Ceftazidime: S 4   - Ciprofloxacin: S <=0.25   - Gentamicin: S <=2   - Imipenem: S <=1   - Levofloxacin: S <=0.5   - Meropenem: S <=1   - Piperacillin/Tazobactam: S <=8   - Tobramycin: S <=2   Specimen Source: Clean Catch Clean Catch (Midstream)   Culture Results:   50,000 - 99,000 CFU/mL Pseudomonas aeruginosa   Organism Identification: Pseudomonas aeruginosa   Organism: Pseudomonas aeruginosa   Method Type: ANH     Culture - Blood (22 @ 14:30)   Specimen Source: .Blood Blood   Culture Results: No growth to date.     Culture - Blood (22 @ 14:20)   Specimen Source: .Blood Blood   Culture Results: No growth to date.     Urine Microscopic-Add On (NC) (22 @ 16:30)   Red Blood Cell - Urine: 2-5 /HPF   White Blood Cell - Urine: 11-25 /HPF   Bacteria: Few /HPF   Comment - Urine: few transitional epithelial cells seen   Epithelial Cells: Few /HPF     Flu With COVID-19 By BEBE (22 @ 14:30)   SARS-CoV-2 Result: NotDetec:

## 2022-10-10 NOTE — PROGRESS NOTE ADULT - ASSESSMENT
72 year old male, coming from [facility], with medical history of ESRD (T-TH-S), DM coming in with right leg necrosis Vascular and Podiatry consulted. JESUS with abnormal findings. CT angio with out flow disease bilat. MRI negative for OM, also found to have pseudomonas UTI, started on vancomycin and cefepime.  ID following. Hospital course c/b COVID + 09/29, asymptomatic, s/p RLE angiogram 10/3 w/no targets for revasc but w/extensive distal small vessel disease, no acute vascular intervention at this time. Recommendations would be for R BKA vs. medication management, patient has been resistant to BKA. Plan to return back to [facility] on PO ABX and wound care. NCM to follow

## 2022-10-10 NOTE — PROGRESS NOTE ADULT - ASSESSMENT
Patient is a 72y old  Male with medical history of ESRD (T-TH-S), DM, now send in to the ER from [facility], for evaluation of right foot necrosis. On admission, he has no fever, No leukocytosis. He has evaluated by Podiatry , started on IV Vancomycin, and the ID consult requested to assist with further evaluation and antibiotic management.     # Right  gangrenous foot- s/p Angioplasty 10/3 - MRI Of Right foot from 9/29/22 shows No osteomyelitis.  # UTI- Urine cx grew Pseudomonas.   # Positive COVID as of 9/29/22- Not hypoxic - Repeat COVID PCR as of 10/9 is negative   # s/p RLE Angioplasty 10/3/22 n- Plan for ?BKA    would recommend:    1. Stat Vancomycin level and adjust doses accordingly   2. Continue IV Cefepime and IV Vancomycin based on the level while inpatient   3. Wound care as per Podiatry   4. May change to oral Augmentin 875 mg daily and Doxycycline 100 mg q 12hours on discharge, until 10/17/22,   5. OOB to chair     d/w Covering NPChristine and Nursing staff    Attending Attestation:    Spent more than 35 minutes on total encounter, more than 50 % of the visit was spent counseling and/or coordinating care by the Attending physician.

## 2022-10-10 NOTE — PROGRESS NOTE ADULT - ASSESSMENT
72 year old male, coming from [facility], with medical history of ESRD (T-TH-S), DM coming in with right leg necrosis Vascular and Podiatry consulted. JESUS with abnormal findings. CT angio with out flow disease bilat. MRI negative for OM, also found to have pseudomonas UTI, started on vancomycin and cefepime.  ID following. Hospital course c/b COVID + 09/29, asymptomatic, s/p RLE angiogram 10/3 w/no targets for revasc but w/extensive distal small vessel disease, no acute vascular intervention at this time. Recommendations would be for R BKA vs. medication management, patient has been resistant to BKA. Plan to return back to [facility] on PO ABX and wound care. NCM to follow

## 2022-10-10 NOTE — PROGRESS NOTE ADULT - SUBJECTIVE AND OBJECTIVE BOX
[facility] NEPHROLOGY- PROGRESS NOTE    Patient is a 71yo Male with ESRD on HD, DM a/w Rt foot gangrene and GI bleed. Nephrology consulted for ESRD status.   COVID-10 + 9/29/22    No complaints.    Hospital Medications: Medications reviewed.  REVIEW OF SYSTEMS:  CONSTITUTIONAL: No fevers or chills  RESPIRATORY: No shortness of breath  CARDIOVASCULAR: No chest pain.  GASTROINTESTINAL: No nausea, vomiting, diarrhea or abdominal pain.   VASCULAR: No bilateral lower extremity edema. +Mild Rt foot pain    VITALS:  T(F): 98.2 (10-10-22 @ 19:59), Max: 98.2 (10-10-22 @ 19:59)  HR: 73 (10-10-22 @ 19:59)  BP: 152/60 (10-10-22 @ 19:59)  RR: 16 (10-10-22 @ 19:59)  SpO2: 100% (10-10-22 @ 19:59)  Wt(kg): --    10-09 @ 07:01  -  10-10 @ 07:00  --------------------------------------------------------  IN: 0 mL / OUT: 600 mL / NET: -600 mL    10-10 @ 07:01  -  10-10 @ 22:33  --------------------------------------------------------  IN: 0 mL / OUT: 200 mL / NET: -200 mL        PHYSICAL EXAM:  Gen: NAD,   HEENT: anicteric  Neck: no JVD  Cards: RRR, +S1/S2, no M/G/R  Resp: CTA B/L  GI: soft, NT/ND, NABS  Extremities: no LE edema B/L  Derm: Rt foot wrapped dressing c/d/i  Access: Rt IJ tunneled HD catheter- benign  Left upper arm AVF- no thrill no bruit      LABS:  10-09    136  |  104  |  83<H>  ----------------------------<  175<H>  4.0   |  23  |  5.91<H>    Ca    9.1      09 Oct 2022 05:49  Phos  5.2     10-09    TPro  6.6  /  Alb  2.9<L>  /  TBili  0.2  /  DBili      /  AST  11  /  ALT  16  /  AlkPhos  56  10-09    Creatinine Trend: 5.91 <--, 5.33 <--, 5.67 <--, 4.63 <--, 5.73 <--                        11.1   5.09  )-----------( 200      ( 09 Oct 2022 05:49 )             34.3

## 2022-10-10 NOTE — PROGRESS NOTE ADULT - SUBJECTIVE AND OBJECTIVE BOX
Patient is a 72y old  Male who presents with a chief complaint of Gangrene foot (10 Oct 2022 11:10)      INTERVAL HPI/OVERNIGHT EVENTS: pt seen and examined    MEDICATIONS  (STANDING):  cefepime   IVPB 1000 milliGRAM(s) IV Intermittent every 24 hours  chlorhexidine 2% Cloths 1 Application(s) Topical daily  heparin   Injectable 5000 Unit(s) SubCutaneous every 12 hours  influenza  Vaccine (HIGH DOSE) 0.7 milliLiter(s) IntraMuscular once  insulin lispro (ADMELOG) corrective regimen sliding scale   SubCutaneous three times a day before meals  insulin lispro (ADMELOG) corrective regimen sliding scale   SubCutaneous at bedtime  pantoprazole    Tablet 40 milliGRAM(s) Oral before breakfast  polyethylene glycol 3350 17 Gram(s) Oral daily  senna 2 Tablet(s) Oral at bedtime  sevelamer carbonate 800 milliGRAM(s) Oral three times a day with meals  vancomycin  IVPB 1000 milliGRAM(s) IV Intermittent <User Schedule>    MEDICATIONS  (PRN):  acetaminophen     Tablet .. 650 milliGRAM(s) Oral every 6 hours PRN Temp greater or equal to 38C (100.4F), Mild Pain (1 - 3)  melatonin 3 milliGRAM(s) Oral at bedtime PRN Insomnia  ondansetron Injectable 4 milliGRAM(s) IV Push every 8 hours PRN Nausea and/or Vomiting      __________________________________________________  REVIEW OF SYSTEMS:    CONSTITUTIONAL: No fever,   EYES: no acute visual disturbances  NECK: No pain or stiffness  RESPIRATORY: No cough; No shortness of breath  CARDIOVASCULAR: No chest pain, no palpitations  GASTROINTESTINAL: No pain. No nausea or vomiting; No diarrhea   NEUROLOGICAL: No headache or numbness, no tremors  MUSCULOSKELETAL: No joint pain, no muscle pain  GENITOURINARY: no dysuria, no frequency, no hesitancy  PSYCHIATRY: no depression , no anxiety  ALL OTHER  ROS negative        Vital Signs Last 24 Hrs  T(C): 36.5 (10 Oct 2022 06:07), Max: 36.9 (09 Oct 2022 20:58)  T(F): 97.7 (10 Oct 2022 06:07), Max: 98.5 (09 Oct 2022 20:58)  HR: 69 (10 Oct 2022 06:07) (69 - 71)  BP: 150/78 (10 Oct 2022 06:07) (144/70 - 150/78)  BP(mean): --  RR: 18 (10 Oct 2022 06:07) (15 - 18)  SpO2: 100% (10 Oct 2022 06:07) (100% - 100%)    Parameters below as of 10 Oct 2022 06:07  Patient On (Oxygen Delivery Method): room air        ________________________________________________  PHYSICAL EXAM:  GENERAL: withdrawn disheveled   HEENT: Normocephalic;  conjunctivae and sclerae clear;  NECK : supple  CHEST/LUNG: dec breath sounds at bases  HEART: S1 S2  regular; no murmurs, gallops or rubs  ABDOMEN: Soft, Nontender, Nondistended; Bowel sounds present  EXTREMITIES: right foot with clean dry dressing in place   SKIN: warm and dry; no rash  NERVOUS SYSTEM: alert awake    _________________________________________________  LABS:                        11.1   5.09  )-----------( 200      ( 09 Oct 2022 05:49 )             34.3     10-09    136  |  104  |  83<H>  ----------------------------<  175<H>  4.0   |  23  |  5.91<H>    Ca    9.1      09 Oct 2022 05:49  Phos  5.2     10-09    TPro  6.6  /  Alb  2.9<L>  /  TBili  0.2  /  DBili  x   /  AST  11  /  ALT  16  /  AlkPhos  56  10-09        CAPILLARY BLOOD GLUCOSE      POCT Blood Glucose.: 332 mg/dL (10 Oct 2022 11:37)  POCT Blood Glucose.: 187 mg/dL (10 Oct 2022 07:37)  POCT Blood Glucose.: 255 mg/dL (09 Oct 2022 21:13)  POCT Blood Glucose.: 140 mg/dL (09 Oct 2022 16:55)        RADIOLOGY & ADDITIONAL TESTS:  < from: MR Foot No Cont, Right (09.29.22 @ 20:37) >  IMPRESSION:    1.  No osteomyelitis.  2.  Posterior heel skin wound and dorsal forefoot soft tissue swelling.   Nonspecific but can be seen with cellulitis. No drainable fluid   collection.    --- End of Report ---    < end of copied text >  < from: CT Angio Abd Aorta w/run-off w/ IV Cont (09.27.22 @ 12:04) >  IMPRESSION:    Outflow disease bilaterally as detailed above.          --- End of Report ---    < end of copied text >    < from: Xray Foot AP + Lateral, Right (09.26.22 @ 17:00) >  IMPRESSION: No definite bony destruction.      Thank you for this referral.    --- End of Report ---      < end of copied text >  < from: VA Physiol Extremity Lower 3+ Level, BI (09.26.22 @ 16:49) >  IMPRESSION: Abnormally elevated ABIs compatiblewith calcified vessels.    No discernible pulse volume recording at the level of the right digit.    --- End of Report ---      Imaging Personally Reviewed:  YES    Consultant(s) Notes Reviewed:   YES    Plan of care was discussed with patient and /or primary care giver; all questions and concerns were addressed and care was aligned with patient's wishes.

## 2022-10-10 NOTE — PROGRESS NOTE ADULT - ASSESSMENT
Patient is a 71yo Male with ESRD on HD, DM a/w Rt foot gangrene and GI bleed. Nephrology consulted for ESRD status.     1) ESRD:  Last HD 10/7 tolerated well. Next maintenance HD 10/11. Monitor electrolytes.  Pt with new Left UE AVF- no thrill no bruit- f/u Vascular (Dr. Dutta aware); can f/u as an outpt     2) HTN with ESRD: BP borderline high off antihypertensive medications. Monitor. c/w low sodium diet. Monitor BP.  3) Anemia of renal disease: with blood loss due to GI bleed. FOBT neg. Hb acceptable. No need for JERED at this time. Monitor Hb.  4) Hyperphosphatemia: Last serum phosphorus acceptable. c/w Sevelamer 1 tab tid with meals and low phos diet. Monitor serum calcium and phosphorus.  5) Rt foot gangrene: Pt on Cefepime/ Vanco 1g IV tiw. MRI neg for OM. s/p RLE angioplasty on 10/3. Podiatry/ Vascular/ ID following.  Also pseudomonal UTI.    [facility] NEPHROLOGY  Wili Hopkins M.D.  Kiran Feng D.O.  Sujatha Dumont M.D.  Radha Miller, MSN, ANP-C  [phone]    [address] [place] [address]

## 2022-10-11 LAB
ANION GAP SERPL CALC-SCNC: 14 MMOL/L — SIGNIFICANT CHANGE UP (ref 5–17)
BUN SERPL-MCNC: 110 MG/DL — HIGH (ref 7–18)
CALCIUM SERPL-MCNC: 9.4 MG/DL — SIGNIFICANT CHANGE UP (ref 8.4–10.5)
CHLORIDE SERPL-SCNC: 101 MMOL/L — SIGNIFICANT CHANGE UP (ref 96–108)
CO2 SERPL-SCNC: 22 MMOL/L — SIGNIFICANT CHANGE UP (ref 22–31)
CREAT SERPL-MCNC: 6.83 MG/DL — HIGH (ref 0.5–1.3)
EGFR: 8 ML/MIN/1.73M2 — LOW
GLUCOSE BLDC GLUCOMTR-MCNC: 207 MG/DL — HIGH (ref 70–99)
GLUCOSE BLDC GLUCOMTR-MCNC: 211 MG/DL — HIGH (ref 70–99)
GLUCOSE BLDC GLUCOMTR-MCNC: 245 MG/DL — HIGH (ref 70–99)
GLUCOSE BLDC GLUCOMTR-MCNC: 283 MG/DL — HIGH (ref 70–99)
GLUCOSE SERPL-MCNC: 281 MG/DL — HIGH (ref 70–99)
HCT VFR BLD CALC: 33.9 % — LOW (ref 39–50)
HGB BLD-MCNC: 11 G/DL — LOW (ref 13–17)
MCHC RBC-ENTMCNC: 29.6 PG — SIGNIFICANT CHANGE UP (ref 27–34)
MCHC RBC-ENTMCNC: 32.4 GM/DL — SIGNIFICANT CHANGE UP (ref 32–36)
MCV RBC AUTO: 91.1 FL — SIGNIFICANT CHANGE UP (ref 80–100)
NRBC # BLD: 0 /100 WBCS — SIGNIFICANT CHANGE UP (ref 0–0)
PLATELET # BLD AUTO: 211 K/UL — SIGNIFICANT CHANGE UP (ref 150–400)
POTASSIUM SERPL-MCNC: 3.8 MMOL/L — SIGNIFICANT CHANGE UP (ref 3.5–5.3)
POTASSIUM SERPL-SCNC: 3.8 MMOL/L — SIGNIFICANT CHANGE UP (ref 3.5–5.3)
RBC # BLD: 3.72 M/UL — LOW (ref 4.2–5.8)
RBC # FLD: 13.1 % — SIGNIFICANT CHANGE UP (ref 10.3–14.5)
SODIUM SERPL-SCNC: 137 MMOL/L — SIGNIFICANT CHANGE UP (ref 135–145)
WBC # BLD: 4.92 K/UL — SIGNIFICANT CHANGE UP (ref 3.8–10.5)
WBC # FLD AUTO: 4.92 K/UL — SIGNIFICANT CHANGE UP (ref 3.8–10.5)

## 2022-10-11 RX ADMIN — HEPARIN SODIUM 5000 UNIT(S): 5000 INJECTION INTRAVENOUS; SUBCUTANEOUS at 05:13

## 2022-10-11 RX ADMIN — CEFEPIME 100 MILLIGRAM(S): 1 INJECTION, POWDER, FOR SOLUTION INTRAMUSCULAR; INTRAVENOUS at 05:13

## 2022-10-11 RX ADMIN — SENNA PLUS 2 TABLET(S): 8.6 TABLET ORAL at 22:01

## 2022-10-11 RX ADMIN — PANTOPRAZOLE SODIUM 40 MILLIGRAM(S): 20 TABLET, DELAYED RELEASE ORAL at 06:30

## 2022-10-11 RX ADMIN — Medication 250 MILLIGRAM(S): at 10:37

## 2022-10-11 RX ADMIN — Medication 3: at 13:08

## 2022-10-11 RX ADMIN — SEVELAMER CARBONATE 800 MILLIGRAM(S): 2400 POWDER, FOR SUSPENSION ORAL at 12:58

## 2022-10-11 RX ADMIN — SEVELAMER CARBONATE 800 MILLIGRAM(S): 2400 POWDER, FOR SUSPENSION ORAL at 17:13

## 2022-10-11 RX ADMIN — Medication 2: at 08:01

## 2022-10-11 RX ADMIN — SEVELAMER CARBONATE 800 MILLIGRAM(S): 2400 POWDER, FOR SUSPENSION ORAL at 08:02

## 2022-10-11 RX ADMIN — HEPARIN SODIUM 5000 UNIT(S): 5000 INJECTION INTRAVENOUS; SUBCUTANEOUS at 17:13

## 2022-10-11 RX ADMIN — Medication 2: at 17:12

## 2022-10-11 RX ADMIN — CHLORHEXIDINE GLUCONATE 1 APPLICATION(S): 213 SOLUTION TOPICAL at 13:00

## 2022-10-11 NOTE — PROGRESS NOTE ADULT - SUBJECTIVE AND OBJECTIVE BOX
NP Note discussed with  primary attending    Patient is a 72y old  Male who presents with a chief complaint of Gangrene foot (10 Oct 2022 22:30)      INTERVAL HPI/OVERNIGHT EVENTS: HD today, no acute medical events overnight     MEDICATIONS  (STANDING):  cefepime   IVPB 1000 milliGRAM(s) IV Intermittent every 24 hours  chlorhexidine 2% Cloths 1 Application(s) Topical daily  heparin   Injectable 5000 Unit(s) SubCutaneous every 12 hours  influenza  Vaccine (HIGH DOSE) 0.7 milliLiter(s) IntraMuscular once  insulin lispro (ADMELOG) corrective regimen sliding scale   SubCutaneous three times a day before meals  insulin lispro (ADMELOG) corrective regimen sliding scale   SubCutaneous at bedtime  pantoprazole    Tablet 40 milliGRAM(s) Oral before breakfast  polyethylene glycol 3350 17 Gram(s) Oral daily  senna 2 Tablet(s) Oral at bedtime  sevelamer carbonate 800 milliGRAM(s) Oral three times a day with meals  vancomycin  IVPB 1000 milliGRAM(s) IV Intermittent <User Schedule>    MEDICATIONS  (PRN):  acetaminophen     Tablet .. 650 milliGRAM(s) Oral every 6 hours PRN Temp greater or equal to 38C (100.4F), Mild Pain (1 - 3)  melatonin 3 milliGRAM(s) Oral at bedtime PRN Insomnia  ondansetron Injectable 4 milliGRAM(s) IV Push every 8 hours PRN Nausea and/or Vomiting      __________________________________________________  REVIEW OF SYSTEMS:    CONSTITUTIONAL: No fever,   EYES: no acute visual disturbances  NECK: No pain or stiffness  RESPIRATORY: No cough; No shortness of breath  CARDIOVASCULAR: No chest pain, no palpitations  GASTROINTESTINAL: No pain. No nausea or vomiting; No diarrhea   NEUROLOGICAL: No headache or numbness, no tremors  MUSCULOSKELETAL: No joint pain, no muscle pain  GENITOURINARY: no dysuria, no frequency, no hesitancy  PSYCHIATRY: no depression , no anxiety  ALL OTHER  ROS negative        Vital Signs Last 24 Hrs  T(C): 36.5 (11 Oct 2022 08:55), Max: 36.8 (10 Oct 2022 19:59)  T(F): 97.7 (11 Oct 2022 08:55), Max: 98.2 (10 Oct 2022 19:59)  HR: 71 (11 Oct 2022 08:55) (63 - 77)  BP: 142/66 (11 Oct 2022 08:55) (133/67 - 152/60)  BP(mean): --  RR: 16 (11 Oct 2022 08:55) (15 - 18)  SpO2: 100% (11 Oct 2022 08:55) (98% - 100%)    Parameters below as of 11 Oct 2022 08:55  Patient On (Oxygen Delivery Method): room air        ________________________________________________  PHYSICAL EXAM:  GENERAL: withdrawn disheveled   HEENT: Normocephalic;  conjunctivae and sclerae clear;  NECK : supple  CHEST/LUNG: Clear to auscultation bilaterally with good air entry   HEART: S1 S2  regular; no murmurs, gallops or rubs  ABDOMEN: Soft, Nontender, Nondistended; Bowel sounds present  EXTREMITIES: right foot with clean dry dressing in place. Access: Rt IJ tunneled HD catheter  Left upper arm AVF  SKIN: warm and dry; no rash  NERVOUS SYSTEM: a&ox3      _________________________________________________  LABS:                        11.0   4.92  )-----------( 211      ( 11 Oct 2022 09:00 )             33.9     10-11    137  |  101  |  110<H>  ----------------------------<  281<H>  3.8   |  22  |  6.83<H>    Ca    9.4      11 Oct 2022 09:00          CAPILLARY BLOOD GLUCOSE      POCT Blood Glucose.: 211 mg/dL (11 Oct 2022 07:48)  POCT Blood Glucose.: 236 mg/dL (10 Oct 2022 21:44)  POCT Blood Glucose.: 229 mg/dL (10 Oct 2022 16:35)  POCT Blood Glucose.: 332 mg/dL (10 Oct 2022 11:37)        RADIOLOGY & ADDITIONAL TESTS: < from: MR Foot No Cont, Right (09.29.22 @ 20:37) >  IMPRESSION:    1.  No osteomyelitis.  2.  Posterior heel skin wound and dorsal forefoot soft tissue swelling.   Nonspecific but can be seen with cellulitis. No drainable fluid   collection.    --- End of Report ---      < end of copied text >  < from: CT Angio Abd Aorta w/run-off w/ IV Cont (09.27.22 @ 12:04) >  IMPRESSION:    Outflow disease bilaterally as detailed above.          --- End of Report ---          < end of copied text >  < from: Xray Foot AP + Lateral, Right (09.26.22 @ 17:00) >  MPRESSION: No definite bony destruction.      Thank you for this referral.    --- End of Report ---    < end of copied text >    < from: VA Physiol Extremity Lower 3+ Level, BI (09.26.22 @ 16:49) >    IMPRESSION: Abnormally elevated ABIs compatiblewith calcified vessels.    No discernible pulse volume recording at the level of the right digit.    --- End of Report ---        < end of copied text >    Imaging Personally Reviewed:  YES    Consultant(s) Notes Reviewed:   YES    Plan of care was discussed with patient and /or primary care giver; all questions and concerns were addressed and care was aligned with patient's wishes.

## 2022-10-11 NOTE — PROGRESS NOTE ADULT - ASSESSMENT
Patient is a 73yo Male with ESRD on HD, DM a/w Rt foot gangrene and GI bleed. Nephrology consulted for ESRD status.     1) ESRD:  Pt seen on HD today (10/11), hemodynamically stable, tolerating UF goal of 2L. Plan for next maintenance HD 10/13. Monitor electrolytes.  Pt with new Left UE AVF- no thrill no bruit- f/u Vascular (Dr. Dutta aware); can f/u as an outpt     2) HTN with ESRD: BP borderline off antihypertensive medications. Will monitor. c/w low sodium diet. Monitor BP.  3) Anemia of renal disease: with blood loss due to GI bleed. FOBT neg. Hb acceptable. No need for JERED at this time. Monitor Hb.  4) Hyperphosphatemia: Last serum phosphorus acceptable. c/w Sevelamer 1 tab tid with meals and low phos diet. Monitor serum calcium and phosphorus.  5) Rt foot gangrene: Pt on Cefepime/ Vanco 1g IV tiw. MRI neg for OM. s/p RLE angioplasty on 10/3. Podiatry/ Vascular/ ID following.  Also pseudomonal UTI. Pt refusing BKA    [facility] NEPHROLOGY  Wili Hopkins M.D.  Kiran Feng D.O.  Sujatha Dumont M.D.  Radha Miller, MSN, ANP-C  [phone]    [address] [place] [id number]

## 2022-10-11 NOTE — PROGRESS NOTE ADULT - SUBJECTIVE AND OBJECTIVE BOX
Patient is a 72y old  Male who presents with a chief complaint of Gangrene foot (10 Oct 2022 22:30)      INTERVAL HPI/OVERNIGHT EVENTS: pt seen and examined    MEDICATIONS  (STANDING):  cefepime   IVPB 1000 milliGRAM(s) IV Intermittent every 24 hours  chlorhexidine 2% Cloths 1 Application(s) Topical daily  heparin   Injectable 5000 Unit(s) SubCutaneous every 12 hours  influenza  Vaccine (HIGH DOSE) 0.7 milliLiter(s) IntraMuscular once  insulin lispro (ADMELOG) corrective regimen sliding scale   SubCutaneous three times a day before meals  insulin lispro (ADMELOG) corrective regimen sliding scale   SubCutaneous at bedtime  pantoprazole    Tablet 40 milliGRAM(s) Oral before breakfast  polyethylene glycol 3350 17 Gram(s) Oral daily  senna 2 Tablet(s) Oral at bedtime  sevelamer carbonate 800 milliGRAM(s) Oral three times a day with meals  vancomycin  IVPB 1000 milliGRAM(s) IV Intermittent <User Schedule>    MEDICATIONS  (PRN):  acetaminophen     Tablet .. 650 milliGRAM(s) Oral every 6 hours PRN Temp greater or equal to 38C (100.4F), Mild Pain (1 - 3)  melatonin 3 milliGRAM(s) Oral at bedtime PRN Insomnia  ondansetron Injectable 4 milliGRAM(s) IV Push every 8 hours PRN Nausea and/or Vomiting      __________________________________________________  REVIEW OF SYSTEMS:    CONSTITUTIONAL: No fever,   EYES: no acute visual disturbances  NECK: No pain or stiffness  RESPIRATORY: No cough; No shortness of breath  CARDIOVASCULAR: No chest pain, no palpitations  GASTROINTESTINAL: No pain. No nausea or vomiting; No diarrhea   NEUROLOGICAL: No headache or numbness, no tremors  MUSCULOSKELETAL: No joint pain, no muscle pain  GENITOURINARY: no dysuria, no frequency, no hesitancy  PSYCHIATRY: no depression , no anxiety  ALL OTHER  ROS negative        Vital Signs Last 24 Hrs  T(C): 36.5 (11 Oct 2022 08:55), Max: 36.8 (10 Oct 2022 19:59)  T(F): 97.7 (11 Oct 2022 08:55), Max: 98.2 (10 Oct 2022 19:59)  HR: 71 (11 Oct 2022 08:55) (63 - 77)  BP: 142/66 (11 Oct 2022 08:55) (133/67 - 152/60)  BP(mean): --  RR: 16 (11 Oct 2022 08:55) (15 - 18)  SpO2: 100% (11 Oct 2022 08:55) (98% - 100%)    Parameters below as of 11 Oct 2022 08:55  Patient On (Oxygen Delivery Method): room air        ________________________________________________  PHYSICAL EXAM:  GENERAL: withdrawn disheveled   HEENT: Normocephalic;  conjunctivae and sclerae clear;  NECK : supple  CHEST/LUNG: dec breath sounds at bases  HEART: S1 S2  regular; no murmurs, gallops or rubs  ABDOMEN: Soft, Nontender, Nondistended; Bowel sounds present  EXTREMITIES: right foot with clean dry dressing in place. Access: Rt IJ tunneled HD catheter  Left upper arm AVF  SKIN: warm and dry; no rash  NERVOUS SYSTEM: alert awake      _________________________________________________  LABS:                        11.0   4.92  )-----------( 211      ( 11 Oct 2022 09:00 )             33.9     10-11    137  |  101  |  110<H>  ----------------------------<  281<H>  3.8   |  22  |  6.83<H>    Ca    9.4      11 Oct 2022 09:00          CAPILLARY BLOOD GLUCOSE      POCT Blood Glucose.: 211 mg/dL (11 Oct 2022 07:48)  POCT Blood Glucose.: 236 mg/dL (10 Oct 2022 21:44)  POCT Blood Glucose.: 229 mg/dL (10 Oct 2022 16:35)  POCT Blood Glucose.: 332 mg/dL (10 Oct 2022 11:37)        RADIOLOGY & ADDITIONAL TESTS: < from: MR Foot No Cont, Right (09.29.22 @ 20:37) >  IMPRESSION:    1.  No osteomyelitis.  2.  Posterior heel skin wound and dorsal forefoot soft tissue swelling.   Nonspecific but can be seen with cellulitis. No drainable fluid   collection.    --- End of Report ---      < end of copied text >  < from: CT Angio Abd Aorta w/run-off w/ IV Cont (09.27.22 @ 12:04) >  IMPRESSION:    Outflow disease bilaterally as detailed above.          --- End of Report ---          < end of copied text >  < from: Xray Foot AP + Lateral, Right (09.26.22 @ 17:00) >  MPRESSION: No definite bony destruction.      Thank you for this referral.    --- End of Report ---    < end of copied text >    < from: VA Physiol Extremity Lower 3+ Level, BI (09.26.22 @ 16:49) >    IMPRESSION: Abnormally elevated ABIs compatiblewith calcified vessels.    No discernible pulse volume recording at the level of the right digit.    --- End of Report ---        < end of copied text >    Imaging Personally Reviewed:  YES    Consultant(s) Notes Reviewed:   YES    Plan of care was discussed with patient and /or primary care giver; all questions and concerns were addressed and care was aligned with patient's wishes.

## 2022-10-11 NOTE — PROGRESS NOTE ADULT - ASSESSMENT
Patient is a 72y old  Male with medical history of ESRD (T-TH-S), DM, now send in to the ER from [facility], for evaluation of right foot necrosis. On admission, he has no fever, No leukocytosis. He has evaluated by Podiatry , started on IV Vancomycin, and the ID consult requested to assist with further evaluation and antibiotic management.     # Right  gangrenous foot- s/p Angioplasty 10/3 - MRI Of Right foot from 9/29/22 shows No osteomyelitis.  # UTI- Urine cx grew Pseudomonas.   # Positive COVID as of 9/29/22- Not hypoxic - Repeat COVID PCR as of 10/9 is negative   # s/p RLE Angioplasty 10/3/22 n- Plan for ?BKA    would recommend:    1. Please check Vancomycin level and adjust doses accordingly   2. Continue IV Cefepime and IV Vancomycin based on the level while inpatient   3. May change to oral Augmentin 875 mg daily and Doxycycline 100 mg q 12hours on discharge, until 10/17/22,   4. Wound care as per Podiatry   5. OOB to chair     d/w Covering NPChristine     Attending Attestation:    Spent more than 35 minutes on total encounter, more than 50 % of the visit was spent counseling and/or coordinating care by the Attending physician.

## 2022-10-11 NOTE — PROGRESS NOTE ADULT - ASSESSMENT
72 year old male, coming from [facility], with medical history of ESRD (T-TH-S), DM coming in with right leg necrosis Vascular and Podiatry consulted. JESUS with abnormal findings. CT angio with out flow disease bilat. MRI negative for OM, also found to have pseudomonas UTI, started on vancomycin and cefepime.  ID following. Hospital course c/b COVID + 09/29, asymptomatic, s/p RLE angiogram 10/3 w/no targets for revasc but w/extensive distal small vessel disease, no acute vascular intervention at this time. Recommendations would be for R BKA vs. medication management, patient has been resistant to BKA. Plan to return back to [facility] on PO ABX and wound care. NCM to follow

## 2022-10-11 NOTE — PROGRESS NOTE ADULT - ASSESSMENT
72 year old male, coming from [facility], with medical history of ESRD (T-TH-S), DM coming in with right leg necrosis Vascular and Podiatry consulted. JESUS with abnormal findings. CT angio with out flow disease bilat. MRI negative for OM, also found to have pseudomonas UTI, started on vancomycin and cefepime.  ID following. Hospital course c/b COVID + 09/29, asymptomatic, s/p RLE angiogram 10/3 w/no targets for revasc but w/extensive distal small vessel disease, no acute vascular intervention at this time. Recommendations would be for R BKA vs. medication management, patient has been resistant to BKA. Plan to return back to [facility] on PO ABX and wound care. NCM to follow

## 2022-10-11 NOTE — PROGRESS NOTE ADULT - SUBJECTIVE AND OBJECTIVE BOX
Patient is seen and examined  at the bed side, is afebrile. He has n new complaints. The WBC count is normal.       REVIEW OF SYSTEMS: All other review systems are negative      ALLERGIES: No Known Allergies      Vital Signs Last 24 Hrs  T(C): 36.4 (11 Oct 2022 14:15), Max: 36.8 (10 Oct 2022 19:59)  T(F): 97.6 (11 Oct 2022 14:15), Max: 98.2 (10 Oct 2022 19:59)  HR: 71 (11 Oct 2022 14:15) (63 - 73)  BP: 128/63 (11 Oct 2022 14:15) (125/77 - 152/60)  BP(mean): --  RR: 18 (11 Oct 2022 14:15) (16 - 18)  SpO2: 100% (11 Oct 2022 14:15) (98% - 100%)    Parameters below as of 11 Oct 2022 14:15  Patient On (Oxygen Delivery Method): room air        PHYSICAL EXAM:  GENERAL : Not in distress  RESP: Not using accessory muscles   CVS: s1 and s2 present   GI; Nondistended   EXT: Left heel bandage in placed   CNS: Awake and Alert        LABS:                          11.0   4.92  )-----------( 211      ( 11 Oct 2022 09:00 )             33.9                           11.1   5.09  )-----------( 200      ( 09 Oct 2022 05:49 )             34.3       10-09    136  |  104  |  83<H>  ----------------------------<  175<H>  4.0   |  23  |  5.91<H>    Ca    9.1      09 Oct 2022 05:49  Phos  5.2     10-09    TPro  6.6  /  Alb  2.9<L>  /  TBili  0.2  /  DBili  x   /  AST  11  /  ALT  16  /  AlkPhos  56  10-09    10-08    139  |  104  |  67<H>  ----------------------------<  185<H>  3.8   |  22  |  5.33<H>    Ca    9.7      08 Oct 2022 06:38  Phos  3.1     10-07    TPro  6.6  /  Alb  3.1<L>  /  TBili  0.2  /  DBili  x   /  AST  15  /  ALT  17  /  AlkPhos  61  10-      CAPILLARY BLOOD GLUCOSE  POCT Blood Glucose.: 127 mg/dL (27 Sep 2022 11:31)  POCT Blood Glucose.: 129 mg/dL (27 Sep 2022 06:13)  POCT Blood Glucose.: 307 mg/dL (27 Sep 2022 00:00)  POCT Blood Glucose.: 171 mg/dL (26 Sep 2022 21:34)      Urinalysis Basic - ( 26 Sep 2022 16:30 )  Color: Yellow / Appearance: Clear / S.010 / pH: x  Gluc: x / Ketone: Negative  / Bili: Negative / Urobili: Negative   Blood: x / Protein: 30 mg/dL / Nitrite: Negative   Leuk Esterase: Small / RBC: 2-5 /HPF / WBC 11-25 /HPF   Sq Epi: x / Non Sq Epi: Few /HPF / Bacteria: Few /HPF      Vancomycin Level, Trough (10.03.22 @ 07:12) : 11.1  Vancomycin Level, Trough (22 @ 15:25) : 11.7    MEDICATIONS  (STANDING):    cefepime   IVPB 1000 milliGRAM(s) IV Intermittent every 24 hours  chlorhexidine 2% Cloths 1 Application(s) Topical daily  heparin   Injectable 5000 Unit(s) SubCutaneous every 12 hours  influenza  Vaccine (HIGH DOSE) 0.7 milliLiter(s) IntraMuscular once  insulin lispro (ADMELOG) corrective regimen sliding scale   SubCutaneous three times a day before meals  insulin lispro (ADMELOG) corrective regimen sliding scale   SubCutaneous at bedtime  pantoprazole    Tablet 40 milliGRAM(s) Oral before breakfast  polyethylene glycol 3350 17 Gram(s) Oral daily  senna 2 Tablet(s) Oral at bedtime  sevelamer carbonate 800 milliGRAM(s) Oral three times a day with meals  vancomycin  IVPB 1000 milliGRAM(s) IV Intermittent <User Schedule>      RADIOLOGY & ADDITIONAL TESTS:    22: MR Foot No Cont, Right (22 @ 20:37) >  1.  No osteomyelitis.  2.  Posterior heel skin wound and dorsal forefoot soft tissue swelling. Nonspecific but can be seen with cellulitis. No drainable fluid collection.      22 from: Xray Foot AP + Lateral, Right (22 @ 17:00) >IMPRESSION: No definite bony destruction.    22: VA Physiol Extremity Lower 3+ Level, BI (22 @ 16:49) IMPRESSION: Abnormally elevated ABIs compatible with calcified vessels.    No discernible pulse volume recording at the level of the right digit.        MICROBIOLOGY DATA:    COVID-19 PCR . (10.09.22 @ 06:38)   COVID-19 PCR: NotDetec:    COVID-19 PCR . (22 @ 10:00)   COVID-19 PCR: Detected    Culture - Urine (22 @ 16:30)   - Amikacin: S <=16   - Aztreonam: S <=4   - Cefepime: S <=2   - Ceftazidime: S 4   - Ciprofloxacin: S <=0.25   - Gentamicin: S <=2   - Imipenem: S <=1   - Levofloxacin: S <=0.5   - Meropenem: S <=1   - Piperacillin/Tazobactam: S <=8   - Tobramycin: S <=2   Specimen Source: Clean Catch Clean Catch (Midstream)   Culture Results:   50,000 - 99,000 CFU/mL Pseudomonas aeruginosa   Organism Identification: Pseudomonas aeruginosa   Organism: Pseudomonas aeruginosa   Method Type: ANH     Culture - Blood (22 @ 14:30)   Specimen Source: .Blood Blood   Culture Results: No growth to date.     Culture - Blood (22 @ 14:20)   Specimen Source: .Blood Blood   Culture Results: No growth to date.     Urine Microscopic-Add On (NC) (22 @ 16:30)   Red Blood Cell - Urine: 2-5 /HPF   White Blood Cell - Urine: 11-25 /HPF   Bacteria: Few /HPF   Comment - Urine: few transitional epithelial cells seen   Epithelial Cells: Few /HPF     Flu With COVID-19 By BEBE (22 @ 14:30)   SARS-CoV-2 Result: NotDetec:

## 2022-10-11 NOTE — PROGRESS NOTE ADULT - SUBJECTIVE AND OBJECTIVE BOX
[facility] NEPHROLOGY- PROGRESS NOTE    Patient is a 73yo Male with ESRD on HD, DM a/w Rt foot gangrene and GI bleed. Nephrology consulted for ESRD status.   COVID + 9/29/22      Hospital Medications: Medications reviewed.  REVIEW OF SYSTEMS:  CONSTITUTIONAL: No fevers or chills  RESPIRATORY: No shortness of breath  CARDIOVASCULAR: No chest pain.  GASTROINTESTINAL: No nausea, vomiting, diarrhea or abdominal pain.   VASCULAR: No bilateral lower extremity edema. +Mild Rt foot pain    VITALS:  T(F): 98.2 (10-11-22 @ 11:49), Max: 98.2 (10-10-22 @ 19:59)  HR: 72 (10-11-22 @ 11:49)  BP: 125/77 (10-11-22 @ 11:49)  RR: 18 (10-11-22 @ 11:49)  SpO2: 100% (10-11-22 @ 11:49)  Wt(kg): --    10-10 @ 07:01  -  10-11 @ 07:00  --------------------------------------------------------  IN: 0 mL / OUT: 200 mL / NET: -200 mL    10-11 @ 07:01  -  10-11 @ 13:08  --------------------------------------------------------  IN: 0 mL / OUT: 1300 mL / NET: -1300 mL      PHYSICAL EXAM:  Gen: NAD,   HEENT: anicteric  Neck: no JVD  Cards: RRR, +S1/S2, no M/G/R  Resp: CTA B/L  GI: soft, NT/ND, NABS  Extremities: no LE edema B/L  Derm: Rt foot wrapped dressing c/d/i  Access: Rt IJ tunneled HD catheter- benign  Left upper arm AVF- no thrill no bruit      LABS:  10-11    137  |  101  |  110<H>  ----------------------------<  281<H>  3.8   |  22  |  6.83<H>    Ca    9.4      11 Oct 2022 09:00      Creatinine Trend: 6.83 <--, 5.91 <--, 5.33 <--, 5.67 <--, 4.63 <--                        11.0   4.92  )-----------( 211      ( 11 Oct 2022 09:00 )             33.9     Urine Studies:

## 2022-10-12 LAB
GLUCOSE BLDC GLUCOMTR-MCNC: 173 MG/DL — HIGH (ref 70–99)
GLUCOSE BLDC GLUCOMTR-MCNC: 199 MG/DL — HIGH (ref 70–99)
GLUCOSE BLDC GLUCOMTR-MCNC: 244 MG/DL — HIGH (ref 70–99)
GLUCOSE BLDC GLUCOMTR-MCNC: 355 MG/DL — HIGH (ref 70–99)
VANCOMYCIN FLD-MCNC: 19.4 UG/ML — SIGNIFICANT CHANGE UP

## 2022-10-12 RX ADMIN — CHLORHEXIDINE GLUCONATE 1 APPLICATION(S): 213 SOLUTION TOPICAL at 14:19

## 2022-10-12 RX ADMIN — SEVELAMER CARBONATE 800 MILLIGRAM(S): 2400 POWDER, FOR SUSPENSION ORAL at 17:27

## 2022-10-12 RX ADMIN — Medication 1: at 08:48

## 2022-10-12 RX ADMIN — SEVELAMER CARBONATE 800 MILLIGRAM(S): 2400 POWDER, FOR SUSPENSION ORAL at 08:52

## 2022-10-12 RX ADMIN — PANTOPRAZOLE SODIUM 40 MILLIGRAM(S): 20 TABLET, DELAYED RELEASE ORAL at 06:29

## 2022-10-12 RX ADMIN — HEPARIN SODIUM 5000 UNIT(S): 5000 INJECTION INTRAVENOUS; SUBCUTANEOUS at 05:08

## 2022-10-12 RX ADMIN — CEFEPIME 100 MILLIGRAM(S): 1 INJECTION, POWDER, FOR SOLUTION INTRAMUSCULAR; INTRAVENOUS at 06:29

## 2022-10-12 RX ADMIN — SENNA PLUS 2 TABLET(S): 8.6 TABLET ORAL at 21:44

## 2022-10-12 RX ADMIN — Medication 1: at 17:26

## 2022-10-12 RX ADMIN — SEVELAMER CARBONATE 800 MILLIGRAM(S): 2400 POWDER, FOR SUSPENSION ORAL at 12:08

## 2022-10-12 RX ADMIN — HEPARIN SODIUM 5000 UNIT(S): 5000 INJECTION INTRAVENOUS; SUBCUTANEOUS at 17:25

## 2022-10-12 RX ADMIN — Medication 5: at 12:08

## 2022-10-12 RX ADMIN — POLYETHYLENE GLYCOL 3350 17 GRAM(S): 17 POWDER, FOR SOLUTION ORAL at 14:19

## 2022-10-12 NOTE — PROGRESS NOTE ADULT - ASSESSMENT
72 year old male, coming from [facility], with medical history of ESRD (T-TH-S), DM coming in with right leg necrosis Vascular and Podiatry consulted. JESUS with abnormal findings. CT angio with out flow disease bilat. MRI negative for OM, also found to have pseudomonas UTI, started on vancomycin and cefepime.  ID following. Hospital course c/b COVID + 09/29, asymptomatic, s/p RLE angiogram 10/3 w/no targets for revasc but w/extensive distal small vessel disease, no acute vascular intervention at this time. Recommendations would be for R BKA vs. medication management, patient has been resistant to BKA. Plan to return back to [facility] on PO ABX and wound care. NCM to follow

## 2022-10-12 NOTE — PROGRESS NOTE ADULT - ASSESSMENT
72 year old male, coming from [facility], with medical history of ESRD (T-TH-S), DM coming in with right leg necrosis Vascular and Podiatry consulted. JESUS with abnormal findings. CT angio with out flow disease bilat. MRI negative for OM, also found to have pseudomonas UTI, started on vancomycin and cefepime.  ID following. Hospital course c/b COVID + 09/29, asymptomatic, s/p RLE angiogram 10/3 w/no targets for revasc but w/extensive distal small vessel disease, no acute vascular intervention at this time. Recommendations would be for R BKA vs. medication management, patient has been resistant to BKA. Plan to return back to [facility] on PO ABX and wound care. NCM to follow

## 2022-10-12 NOTE — PROGRESS NOTE ADULT - SUBJECTIVE AND OBJECTIVE BOX
[facility] NEPHROLOGY- PROGRESS NOTE    Patient is a 71yo Male with ESRD on HD, DM a/w Rt foot gangrene and GI bleed. Nephrology consulted for ESRD status.   COVID + 9/29/22      Hospital Medications: Medications reviewed.  REVIEW OF SYSTEMS:  CONSTITUTIONAL: No fevers or chills  RESPIRATORY: No shortness of breath  CARDIOVASCULAR: No chest pain.  GASTROINTESTINAL: No nausea, vomiting, diarrhea or abdominal pain.   VASCULAR: No bilateral lower extremity edema. +Mild Rt foot pain    VITALS:  T(F): 98.3 (10-12-22 @ 13:55), Max: 98.3 (10-12-22 @ 13:55)  HR: 65 (10-12-22 @ 13:55)  BP: 140/68 (10-12-22 @ 13:55)  RR: 18 (10-12-22 @ 13:55)  SpO2: 99% (10-12-22 @ 13:55)  Wt(kg): --    10-11 @ 07:01  -  10-12 @ 07:00  --------------------------------------------------------  IN: 0 mL / OUT: 1600 mL / NET: -1600 mL      PHYSICAL EXAM:  Gen: NAD,   HEENT: anicteric  Neck: no JVD  Cards: RRR, +S1/S2, no M/G/R  Resp: CTA B/L  GI: soft, NT/ND, NABS  Extremities: no LE edema B/L  Derm: Rt foot wrapped dressing c/d/i  Access: Rt IJ tunneled HD catheter- benign  Left upper arm AVF- no thrill no bruit      LABS:  10-11    137  |  101  |  110<H>  ----------------------------<  281<H>  3.8   |  22  |  6.83<H>    Ca    9.4      11 Oct 2022 09:00      Creatinine Trend: 6.83 <--, 5.91 <--, 5.33 <--, 5.67 <--                        11.0   4.92  )-----------( 211      ( 11 Oct 2022 09:00 )             33.9     Urine Studies:

## 2022-10-12 NOTE — PROGRESS NOTE ADULT - SUBJECTIVE AND OBJECTIVE BOX
NP Note discussed with  primary attending    Patient is a 72y old  Male who presents with a chief complaint of Gangrene foot (11 Oct 2022 16:31)      INTERVAL HPI/OVERNIGHT EVENTS: no acute medical events overnight     MEDICATIONS  (STANDING):  cefepime   IVPB 1000 milliGRAM(s) IV Intermittent every 24 hours  chlorhexidine 2% Cloths 1 Application(s) Topical daily  heparin   Injectable 5000 Unit(s) SubCutaneous every 12 hours  influenza  Vaccine (HIGH DOSE) 0.7 milliLiter(s) IntraMuscular once  insulin lispro (ADMELOG) corrective regimen sliding scale   SubCutaneous three times a day before meals  insulin lispro (ADMELOG) corrective regimen sliding scale   SubCutaneous at bedtime  pantoprazole    Tablet 40 milliGRAM(s) Oral before breakfast  polyethylene glycol 3350 17 Gram(s) Oral daily  senna 2 Tablet(s) Oral at bedtime  sevelamer carbonate 800 milliGRAM(s) Oral three times a day with meals  vancomycin  IVPB 1000 milliGRAM(s) IV Intermittent <User Schedule>    MEDICATIONS  (PRN):  acetaminophen     Tablet .. 650 milliGRAM(s) Oral every 6 hours PRN Temp greater or equal to 38C (100.4F), Mild Pain (1 - 3)  melatonin 3 milliGRAM(s) Oral at bedtime PRN Insomnia  ondansetron Injectable 4 milliGRAM(s) IV Push every 8 hours PRN Nausea and/or Vomiting      __________________________________________________  REVIEW OF SYSTEMS:    CONSTITUTIONAL: No fever,   EYES: no acute visual disturbances  NECK: No pain or stiffness  RESPIRATORY: No cough; No shortness of breath  CARDIOVASCULAR: No chest pain, no palpitations  GASTROINTESTINAL: No pain. No nausea or vomiting; No diarrhea   NEUROLOGICAL: No headache or numbness, no tremors  MUSCULOSKELETAL: No joint pain, no muscle pain  GENITOURINARY: no dysuria, no frequency, no hesitancy  PSYCHIATRY: no depression , no anxiety  ALL OTHER  ROS negative        Vital Signs Last 24 Hrs  T(C): 36.6 (12 Oct 2022 05:02), Max: 36.8 (11 Oct 2022 11:49)  T(F): 97.8 (12 Oct 2022 05:02), Max: 98.2 (11 Oct 2022 11:49)  HR: 67 (12 Oct 2022 05:02) (67 - 74)  BP: 154/76 (12 Oct 2022 05:02) (125/77 - 154/76)  BP(mean): --  RR: 18 (12 Oct 2022 05:02) (18 - 18)  SpO2: 100% (12 Oct 2022 05:02) (100% - 100%)    Parameters below as of 12 Oct 2022 05:02  Patient On (Oxygen Delivery Method): room air        ________________________________________________  PHYSICAL EXAM:  GENERAL: NAD  HEENT: Normocephalic;  conjunctivae and sclerae clear; moist mucous membranes;   NECK : supple  CHEST/LUNG: Clear to ausculitation bilaterally with good air entry   HEART: S1 S2  regular; no murmurs, gallops or rubs  ABDOMEN: Soft, Nontender, Nondistended; Bowel sounds present  EXTREMITIES: no cyanosis; no edema; no calf tenderness  SKIN: warm and dry; no rash  NERVOUS SYSTEM:  Awake and alert; Oriented  to place, person and time ; no new deficits    _________________________________________________  LABS:                        11.0   4.92  )-----------( 211      ( 11 Oct 2022 09:00 )             33.9     10-11    137  |  101  |  110<H>  ----------------------------<  281<H>  3.8   |  22  |  6.83<H>    Ca    9.4      11 Oct 2022 09:00          CAPILLARY BLOOD GLUCOSE      POCT Blood Glucose.: 199 mg/dL (12 Oct 2022 08:00)  POCT Blood Glucose.: 207 mg/dL (11 Oct 2022 20:48)  POCT Blood Glucose.: 245 mg/dL (11 Oct 2022 16:52)  POCT Blood Glucose.: 283 mg/dL (11 Oct 2022 13:05)        RADIOLOGY & ADDITIONAL TESTS:  < from: MR Foot No Cont, Right (09.29.22 @ 20:37) >  IMPRESSION:    1.  No osteomyelitis.  2.  Posterior heel skin wound and dorsal forefoot soft tissue swelling.   Nonspecific but can be seen with cellulitis. No drainable fluid   collection.    --- End of Report ---      < end of copied text >  < from: CT Angio Abd Aorta w/run-off w/ IV Cont (09.27.22 @ 12:04) >  IMPRESSION:    Outflow disease bilaterally as detailed above.          --- End of Report ---          < end of copied text >  < from: Xray Foot AP + Lateral, Right (09.26.22 @ 17:00) >  MPRESSION: No definite bony destruction.      Thank you for this referral.    --- End of Report ---    < end of copied text >    < from: VA Physiol Extremity Lower 3+ Level, BI (09.26.22 @ 16:49) >    IMPRESSION: Abnormally elevated ABIs compatiblewith calcified vessels.    No discernible pulse volume recording at the level of the right digit.    --- End of Report ---        < end of copied text >    Imaging Personally Reviewed:  YES    Consultant(s) Notes Reviewed:   YES    Plan of care was discussed with patient and /or primary care giver; all questions and concerns were addressed and care was aligned with patient's wishes.

## 2022-10-12 NOTE — PROGRESS NOTE ADULT - SUBJECTIVE AND OBJECTIVE BOX
Podiatry interval: Pt is seen resting in bed in new room, patient is out of Covid isolation. Patient reports tenderness to his right heel. Admits to pain on his right foot to the gangrenous digits. Denies constitutional symptoms. Denies any overnight acute events. Denies further pedal complaints at this time.     Podiatry HPI: Pt is a 72M who presented to ED from his nursing home ([facility]) after his right foot started having gangrene changes. Pt states he has a vacular doctor appointment coming up soon but hasn't seen a vascular doctor as of yet. Admits he has 10/10 pain to heel and digits. Admits he is normally able to walk however; since he got the right heel wound 2-3 months ago, he has been bedbound. Pt denies constitutional symptoms. Pt denies any recent acute trauma. Pt denies further pedal complaints.       Patient admits to  (-) Fevers, (-) Chills, (-) Nausea, (-) Vomiting, (-) Shortness of Breath (-) calf pain (-) chest pain     Medications acetaminophen     Tablet .. 650 milliGRAM(s) Oral every 6 hours PRN  cefepime   IVPB 1000 milliGRAM(s) IV Intermittent every 24 hours  chlorhexidine 2% Cloths 1 Application(s) Topical daily  heparin   Injectable 5000 Unit(s) SubCutaneous every 12 hours  influenza  Vaccine (HIGH DOSE) 0.7 milliLiter(s) IntraMuscular once  insulin lispro (ADMELOG) corrective regimen sliding scale   SubCutaneous three times a day before meals  insulin lispro (ADMELOG) corrective regimen sliding scale   SubCutaneous at bedtime  melatonin 3 milliGRAM(s) Oral at bedtime PRN  ondansetron Injectable 4 milliGRAM(s) IV Push every 8 hours PRN  pantoprazole    Tablet 40 milliGRAM(s) Oral before breakfast  polyethylene glycol 3350 17 Gram(s) Oral daily  senna 2 Tablet(s) Oral at bedtime  sevelamer carbonate 800 milliGRAM(s) Oral three times a day with meals  vancomycin  IVPB 1000 milliGRAM(s) IV Intermittent <User Schedule>    FHNo pertinent family history in first degree relatives    ,   PMHESRD on dialysis    DM (diabetes mellitus)       PSHNo significant past surgical history        Labs                          11.0   4.92  )-----------( 211      ( 11 Oct 2022 09:00 )             33.9      10-11    137  |  101  |  110<H>  ----------------------------<  281<H>  3.8   |  22  |  6.83<H>    Ca    9.4      11 Oct 2022 09:00       Vital Signs Last 24 Hrs  T(C): 36.8 (12 Oct 2022 13:55), Max: 36.8 (12 Oct 2022 13:55)  T(F): 98.3 (12 Oct 2022 13:55), Max: 98.3 (12 Oct 2022 13:55)  HR: 65 (12 Oct 2022 13:55) (65 - 74)  BP: 140/68 (12 Oct 2022 13:55) (135/66 - 154/76)  BP(mean): --  RR: 18 (12 Oct 2022 13:55) (18 - 18)  SpO2: 99% (12 Oct 2022 13:55) (99% - 100%)    Parameters below as of 12 Oct 2022 13:55  Patient On (Oxygen Delivery Method): room air      Sedimentation Rate, Erythrocyte: 19 mm/Hr (09-26-22 @ 19:00)         C-Reactive Protein, Serum: 9 mg/L (09-26-22 @ 19:00)       ROS unremarkable outside of HPI    PHYSICAL EXAM  LE Focused: Pt is A&Ox3 in mild distress from right foot pain   Vasc: DP/PT pulses non palpable 0/4 b/l; temperature gradient is warm to cool from proximal leg to digits with R > L. No edema noted to b/l LE. No varicosities. Pedal hair absent.  Derm: Right 2nd digit dry gangrenous ischemic changes noted without erythema, drainage, edema that is rigid and is cool to the touch. Right 1st and 3rd digits at distal tuft's and, now dorsal, show dusky changes that are also cool to the touch without open lesions or signs of infection. Right plantar heel wound with eschar base and no signs of infection, no fluctuance noted. Left foot no signs of gangrene or any open lesions.   Neuro: Protective sensation present b/l; light touch sensation present b/l   MSK: POP to right digits 1-3 and plantar heel wound; muscle strength exam deferred 2/2 pain; b/l hammertoe rigid contractions of digits 2-5       IMAGING    < from: Xray Foot AP + Lateral, Right (09.26.22 @ 17:00) >  ACC: [id number] EXAM:  XR FOOT 2 VIEWS RT                          PROCEDURE DATE:  09/26/2022          INTERPRETATION:  Right foot    HISTORY: Heel gangrene and second toe gangrene     Three views of the right foot show no evidence of acute fracturenor   destructive change. The joint spaces are maintained. Vascular   calcifications are present.    IMPRESSION: No definite bony destruction.      Thank you for this referral.    --- End of Report ---      < end of copied text >      CULTURES:     A:  - PVD  - DM  - ESRD on dialysis  - Right 2nd digit dry gangrene  - Right 1st and 3rd digit ischemic changes  - Right plantar heel eschar       P:   Patient evaluated and Chart reviewed   Discussed diagnosis and treatment with patient  Applied betadine, DSD to right plantar heel and right 1st, 2nd, 3rd digits   Re-confirmed patient's decision to proceed with wound care only  MRI results reviewed   X-rays reviewed; no bony destruction   Reviewed JESUS/PVR  NWB to RLE  Offloading to bilateral Heels via CAIR boots  Discussed importance of daily foot examinations and proper shoe gear and to importance of lower Fasting Blood Glucose levels.   Podiatry to follow while in house.  Seen bed side and discussed with attending Dr. Godinez     Wound care: betadine to right digits 1-3 and 4x4 with light nba

## 2022-10-12 NOTE — PROGRESS NOTE ADULT - ASSESSMENT
Patient is a 72y old  Male with medical history of ESRD (T-TH-S), DM, now send in to the ER from [facility], for evaluation of right foot necrosis. On admission, he has no fever, No leukocytosis. He has evaluated by Podiatry , started on IV Vancomycin, and the ID consult requested to assist with further evaluation and antibiotic management.     # Right  gangrenous foot- s/p Angioplasty 10/3 - MRI Of Right foot from 9/29/22 shows No osteomyelitis.  # UTI- Urine cx grew Pseudomonas.   # Positive COVID as of 9/29/22- Not hypoxic - Repeat COVID PCR as of 10/9 is negative   # s/p RLE Angioplasty 10/3/22 n- Plan for ?BKA    would recommend:    1. PT/OOB to chair   2. Continue IV Cefepime and IV Vancomycin based on the level while inpatient   3. May change to oral Augmentin 875 mg daily and Doxycycline 100 mg q 12hours on discharge, until 10/17/22,  X 5 more days  4. Wound care as per Podiatry   5. OOB to chair     Attending Attestation:    Spent more than 35 minutes on total encounter, more than 50 % of the visit was spent counseling and/or coordinating care by the Attending physician.

## 2022-10-12 NOTE — PROGRESS NOTE ADULT - ATTENDING COMMENTS
Seen at bedside. Poor pedal flow with no revasc options.  Local wound care at this time, patient currently not amenable to BKA
Seen at bedside.  Dry gangrene to digits.  [facility] planning for angio next week.  If there is adequate flow, patient will need TMA.  If there is poor flow into the foot, he will need a more proximal amputation.  Plan discussed with patient's sister over the phone. Tentatively plan for TMA after angio pending [facility] recs
Seen at bedside. Discussed treatment options with patient. Plan discussed with vascular, poor pedal flow with no revasc options, unlikely to heal any podiatric procedures.  Will need more proximal amp or continue with local wound care.

## 2022-10-12 NOTE — PROGRESS NOTE ADULT - SUBJECTIVE AND OBJECTIVE BOX
Patient is seen and examined  at the bed side, is afebrile. He is doing better, tolerating abx well. The Vancomycin level is therapeutic, 19.4.      REVIEW OF SYSTEMS: All other review systems are negative      ALLERGIES: No Known Allergies      Vital Signs Last 24 Hrs  T(C): 36.8 (12 Oct 2022 13:55), Max: 36.8 (12 Oct 2022 13:55)  T(F): 98.3 (12 Oct 2022 13:55), Max: 98.3 (12 Oct 2022 13:55)  HR: 65 (12 Oct 2022 13:55) (65 - 74)  BP: 140/68 (12 Oct 2022 13:55) (135/66 - 154/76)  BP(mean): --  RR: 18 (12 Oct 2022 13:55) (18 - 18)  SpO2: 99% (12 Oct 2022 13:55) (99% - 100%)    Parameters below as of 12 Oct 2022 13:55  Patient On (Oxygen Delivery Method): room air        PHYSICAL EXAM:  GENERAL : Not in distress  RESP: Not using accessory muscles   CVS: s1 and s2 present   GI; Nondistended   EXT: Left heel bandage in placed   CNS: Awake and Alert        LABS:  No new labs                        11.0   4.92  )-----------( 211      ( 11 Oct 2022 09:00 )             33.9                         11.1   5.09  )-----------( 200      ( 09 Oct 2022 05:49 )             34.3       10    136  |  104  |  83<H>  ----------------------------<  175<H>  4.0   |  23  |  5.91<H>    Ca    9.1      09 Oct 2022 05:49  Phos  5.2     10-09    TPro  6.6  /  Alb  2.9<L>  /  TBili  0.2  /  DBili  x   /  AST  11  /  ALT  16  /  AlkPhos  56  10-09      CAPILLARY BLOOD GLUCOSE  POCT Blood Glucose.: 127 mg/dL (27 Sep 2022 11:31)  POCT Blood Glucose.: 129 mg/dL (27 Sep 2022 06:13)  POCT Blood Glucose.: 307 mg/dL (27 Sep 2022 00:00)  POCT Blood Glucose.: 171 mg/dL (26 Sep 2022 21:34)      Urinalysis Basic - ( 26 Sep 2022 16:30 )  Color: Yellow / Appearance: Clear / S.010 / pH: x  Gluc: x / Ketone: Negative  / Bili: Negative / Urobili: Negative   Blood: x / Protein: 30 mg/dL / Nitrite: Negative   Leuk Esterase: Small / RBC: 2-5 /HPF / WBC 11-25 /HPF   Sq Epi: x / Non Sq Epi: Few /HPF / Bacteria: Few /HPF        Vancomycin Level, Random (10.11.22 @ 19:11) : 19.4:   Vancomycin Level, Trough (10.03.22 @ 07:12) : 11.1  Vancomycin Level, Trough (22 @ 15:25) : 11.7    MEDICATIONS  (STANDING):    cefepime   IVPB 1000 milliGRAM(s) IV Intermittent every 24 hours  chlorhexidine 2% Cloths 1 Application(s) Topical daily  heparin   Injectable 5000 Unit(s) SubCutaneous every 12 hours  influenza  Vaccine (HIGH DOSE) 0.7 milliLiter(s) IntraMuscular once  insulin lispro (ADMELOG) corrective regimen sliding scale   SubCutaneous three times a day before meals  insulin lispro (ADMELOG) corrective regimen sliding scale   SubCutaneous at bedtime  pantoprazole    Tablet 40 milliGRAM(s) Oral before breakfast  polyethylene glycol 3350 17 Gram(s) Oral daily  senna 2 Tablet(s) Oral at bedtime  sevelamer carbonate 800 milliGRAM(s) Oral three times a day with meals  vancomycin  IVPB 1000 milliGRAM(s) IV Intermittent <User Schedule>      RADIOLOGY & ADDITIONAL TESTS:    22: MR Foot No Cont, Right (22 @ 20:37) >  1.  No osteomyelitis.  2.  Posterior heel skin wound and dorsal forefoot soft tissue swelling. Nonspecific but can be seen with cellulitis. No drainable fluid collection.      22 from: Xray Foot AP + Lateral, Right (22 @ 17:00) >IMPRESSION: No definite bony destruction.    22: VA Physiol Extremity Lower 3+ Level, BI (22 @ 16:49) IMPRESSION: Abnormally elevated ABIs compatible with calcified vessels.    No discernible pulse volume recording at the level of the right digit.        MICROBIOLOGY DATA:    COVID-19 PCR . (10.09.22 @ 06:38)   COVID-19 PCR: NotDetec:    COVID-19 PCR . (22 @ 10:00)   COVID-19 PCR: Detected    Culture - Urine (22 @ 16:30)   - Amikacin: S <=16   - Aztreonam: S <=4   - Cefepime: S <=2   - Ceftazidime: S 4   - Ciprofloxacin: S <=0.25   - Gentamicin: S <=2   - Imipenem: S <=1   - Levofloxacin: S <=0.5   - Meropenem: S <=1   - Piperacillin/Tazobactam: S <=8   - Tobramycin: S <=2   Specimen Source: Clean Catch Clean Catch (Midstream)   Culture Results:   50,000 - 99,000 CFU/mL Pseudomonas aeruginosa   Organism Identification: Pseudomonas aeruginosa   Organism: Pseudomonas aeruginosa   Method Type: ANH     Culture - Blood (22 @ 14:30)   Specimen Source: .Blood Blood   Culture Results: No growth to date.     Culture - Blood (22 @ 14:20)   Specimen Source: .Blood Blood   Culture Results: No growth to date.     Urine Microscopic-Add On (NC) (22 @ 16:30)   Red Blood Cell - Urine: 2-5 /HPF   White Blood Cell - Urine: 11-25 /HPF   Bacteria: Few /HPF   Comment - Urine: few transitional epithelial cells seen   Epithelial Cells: Few /HPF     Flu With COVID-19 By BEBE (22 @ 14:30)   SARS-CoV-2 Result: NotDetec:

## 2022-10-12 NOTE — PROGRESS NOTE ADULT - ASSESSMENT
Patient is a 73yo Male with ESRD on HD, DM a/w Rt foot gangrene and GI bleed. Nephrology consulted for ESRD status.     1) ESRD:  Last HD 10/11, tolerated well with net 1.3L. Plan for next maintenance HD 10/13. Monitor electrolytes.  Pt with new Left UE AVF- no thrill no bruit- f/u Vascular (Dr. Dutta aware); can f/u as an outpt     2) HTN with ESRD: BP borderline off antihypertensive medications. Will monitor. c/w low sodium diet. Monitor BP.  3) Anemia of renal disease: with blood loss due to GI bleed. FOBT neg. Hb acceptable. No need for JERED at this time. Monitor Hb.  4) Hyperphosphatemia: Last serum phosphorus acceptable. c/w Sevelamer 1 tab tid with meals and low phos diet. Monitor serum calcium and phosphorus.  5) Rt foot gangrene: Pt on Cefepime/ Vanco 1g IV tiw. MRI neg for OM. s/p RLE angioplasty on 10/3. Podiatry/ Vascular/ ID following.  Also pseudomonal UTI. Pt refusing BKA    [facility] NEPHROLOGY  Wili Hopkins M.D.  Kiran Feng D.O.  Sujatha Dumont M.D.  Radha Miller, MSN, ANP-C  [phone]    [address] [place], [place] [id number]

## 2022-10-12 NOTE — PROGRESS NOTE ADULT - SUBJECTIVE AND OBJECTIVE BOX
Patient is a 72y old  Male who presents with a chief complaint of Gangrene foot (11 Oct 2022 16:31)      INTERVAL HPI/OVERNIGHT EVENTS: pt seen and examined    MEDICATIONS  (STANDING):  cefepime   IVPB 1000 milliGRAM(s) IV Intermittent every 24 hours  chlorhexidine 2% Cloths 1 Application(s) Topical daily  heparin   Injectable 5000 Unit(s) SubCutaneous every 12 hours  influenza  Vaccine (HIGH DOSE) 0.7 milliLiter(s) IntraMuscular once  insulin lispro (ADMELOG) corrective regimen sliding scale   SubCutaneous three times a day before meals  insulin lispro (ADMELOG) corrective regimen sliding scale   SubCutaneous at bedtime  pantoprazole    Tablet 40 milliGRAM(s) Oral before breakfast  polyethylene glycol 3350 17 Gram(s) Oral daily  senna 2 Tablet(s) Oral at bedtime  sevelamer carbonate 800 milliGRAM(s) Oral three times a day with meals  vancomycin  IVPB 1000 milliGRAM(s) IV Intermittent <User Schedule>    MEDICATIONS  (PRN):  acetaminophen     Tablet .. 650 milliGRAM(s) Oral every 6 hours PRN Temp greater or equal to 38C (100.4F), Mild Pain (1 - 3)  melatonin 3 milliGRAM(s) Oral at bedtime PRN Insomnia  ondansetron Injectable 4 milliGRAM(s) IV Push every 8 hours PRN Nausea and/or Vomiting      __________________________________________________  REVIEW OF SYSTEMS:    CONSTITUTIONAL: No fever,   EYES: no acute visual disturbances  NECK: No pain or stiffness  RESPIRATORY: No cough; No shortness of breath  CARDIOVASCULAR: No chest pain, no palpitations  GASTROINTESTINAL: No pain. No nausea or vomiting; No diarrhea   NEUROLOGICAL: No headache or numbness, no tremors  MUSCULOSKELETAL: No joint pain, no muscle pain  GENITOURINARY: no dysuria, no frequency, no hesitancy  PSYCHIATRY: no depression , no anxiety  ALL OTHER  ROS negative        Vital Signs Last 24 Hrs  T(C): 36.6 (12 Oct 2022 05:02), Max: 36.8 (11 Oct 2022 11:49)  T(F): 97.8 (12 Oct 2022 05:02), Max: 98.2 (11 Oct 2022 11:49)  HR: 67 (12 Oct 2022 05:02) (67 - 74)  BP: 154/76 (12 Oct 2022 05:02) (125/77 - 154/76)  BP(mean): --  RR: 18 (12 Oct 2022 05:02) (18 - 18)  SpO2: 100% (12 Oct 2022 05:02) (100% - 100%)    Parameters below as of 12 Oct 2022 05:02  Patient On (Oxygen Delivery Method): room air        ________________________________________________  PHYSICAL EXAM:  GENERAL: NAD  HEENT: Normocephalic;  conjunctivae and sclerae clear; moist mucous membranes;   NECK : supple  CHEST/LUNG: dec breath sounds at bases  HEART: S1 S2  regular; no murmurs, gallops or rubs  ABDOMEN: Soft, Nontender, Nondistended; Bowel sounds present  EXTREMITIES: no cyanosis; no edema; no calf tenderness  SKIN: warm and dry; no rash  NERVOUS SYSTEM:  Awake and alert;   _________________________________________________  LABS:                        11.0   4.92  )-----------( 211      ( 11 Oct 2022 09:00 )             33.9     10-11    137  |  101  |  110<H>  ----------------------------<  281<H>  3.8   |  22  |  6.83<H>    Ca    9.4      11 Oct 2022 09:00          CAPILLARY BLOOD GLUCOSE      POCT Blood Glucose.: 199 mg/dL (12 Oct 2022 08:00)  POCT Blood Glucose.: 207 mg/dL (11 Oct 2022 20:48)  POCT Blood Glucose.: 245 mg/dL (11 Oct 2022 16:52)  POCT Blood Glucose.: 283 mg/dL (11 Oct 2022 13:05)        RADIOLOGY & ADDITIONAL TESTS:  < from: MR Foot No Cont, Right (09.29.22 @ 20:37) >  IMPRESSION:    1.  No osteomyelitis.  2.  Posterior heel skin wound and dorsal forefoot soft tissue swelling.   Nonspecific but can be seen with cellulitis. No drainable fluid   collection.    --- End of Report ---      < end of copied text >  < from: CT Angio Abd Aorta w/run-off w/ IV Cont (09.27.22 @ 12:04) >  IMPRESSION:    Outflow disease bilaterally as detailed above.          --- End of Report ---          < end of copied text >  < from: Xray Foot AP + Lateral, Right (09.26.22 @ 17:00) >  MPRESSION: No definite bony destruction.      Thank you for this referral.    --- End of Report ---    < end of copied text >    < from: VA Physiol Extremity Lower 3+ Level, BI (09.26.22 @ 16:49) >    IMPRESSION: Abnormally elevated ABIs compatiblewith calcified vessels.    No discernible pulse volume recording at the level of the right digit.    --- End of Report ---        < end of copied text >    Imaging Personally Reviewed:  YES    Consultant(s) Notes Reviewed:   YES    Plan of care was discussed with patient and /or primary care giver; all questions and concerns were addressed and care was aligned with patient's wishes.

## 2022-10-13 LAB
ANION GAP SERPL CALC-SCNC: 10 MMOL/L — SIGNIFICANT CHANGE UP (ref 5–17)
BUN SERPL-MCNC: 96 MG/DL — HIGH (ref 7–18)
CALCIUM SERPL-MCNC: 9 MG/DL — SIGNIFICANT CHANGE UP (ref 8.4–10.5)
CHLORIDE SERPL-SCNC: 99 MMOL/L — SIGNIFICANT CHANGE UP (ref 96–108)
CO2 SERPL-SCNC: 25 MMOL/L — SIGNIFICANT CHANGE UP (ref 22–31)
CREAT SERPL-MCNC: 5.85 MG/DL — HIGH (ref 0.5–1.3)
EGFR: 10 ML/MIN/1.73M2 — LOW
GLUCOSE BLDC GLUCOMTR-MCNC: 146 MG/DL — HIGH (ref 70–99)
GLUCOSE BLDC GLUCOMTR-MCNC: 156 MG/DL — HIGH (ref 70–99)
GLUCOSE BLDC GLUCOMTR-MCNC: 213 MG/DL — HIGH (ref 70–99)
GLUCOSE BLDC GLUCOMTR-MCNC: 370 MG/DL — HIGH (ref 70–99)
GLUCOSE BLDC GLUCOMTR-MCNC: 392 MG/DL — HIGH (ref 70–99)
GLUCOSE BLDC GLUCOMTR-MCNC: 397 MG/DL — HIGH (ref 70–99)
GLUCOSE BLDC GLUCOMTR-MCNC: 407 MG/DL — HIGH (ref 70–99)
GLUCOSE SERPL-MCNC: 270 MG/DL — HIGH (ref 70–99)
HCT VFR BLD CALC: 33.8 % — LOW (ref 39–50)
HGB BLD-MCNC: 10.9 G/DL — LOW (ref 13–17)
MCHC RBC-ENTMCNC: 29.3 PG — SIGNIFICANT CHANGE UP (ref 27–34)
MCHC RBC-ENTMCNC: 32.2 GM/DL — SIGNIFICANT CHANGE UP (ref 32–36)
MCV RBC AUTO: 90.9 FL — SIGNIFICANT CHANGE UP (ref 80–100)
NRBC # BLD: 0 /100 WBCS — SIGNIFICANT CHANGE UP (ref 0–0)
PLATELET # BLD AUTO: 195 K/UL — SIGNIFICANT CHANGE UP (ref 150–400)
POTASSIUM SERPL-MCNC: 4.3 MMOL/L — SIGNIFICANT CHANGE UP (ref 3.5–5.3)
POTASSIUM SERPL-SCNC: 4.3 MMOL/L — SIGNIFICANT CHANGE UP (ref 3.5–5.3)
RBC # BLD: 3.72 M/UL — LOW (ref 4.2–5.8)
RBC # FLD: 12.9 % — SIGNIFICANT CHANGE UP (ref 10.3–14.5)
SARS-COV-2 RNA SPEC QL NAA+PROBE: SIGNIFICANT CHANGE UP
SODIUM SERPL-SCNC: 134 MMOL/L — LOW (ref 135–145)
VANCOMYCIN TROUGH SERPL-MCNC: 17.3 UG/ML — SIGNIFICANT CHANGE UP (ref 10–20)
WBC # BLD: 5.32 K/UL — SIGNIFICANT CHANGE UP (ref 3.8–10.5)
WBC # FLD AUTO: 5.32 K/UL — SIGNIFICANT CHANGE UP (ref 3.8–10.5)

## 2022-10-13 RX ADMIN — SEVELAMER CARBONATE 800 MILLIGRAM(S): 2400 POWDER, FOR SUSPENSION ORAL at 12:39

## 2022-10-13 RX ADMIN — Medication 250 MILLIGRAM(S): at 11:00

## 2022-10-13 RX ADMIN — HEPARIN SODIUM 5000 UNIT(S): 5000 INJECTION INTRAVENOUS; SUBCUTANEOUS at 06:14

## 2022-10-13 RX ADMIN — PANTOPRAZOLE SODIUM 40 MILLIGRAM(S): 20 TABLET, DELAYED RELEASE ORAL at 06:14

## 2022-10-13 RX ADMIN — CHLORHEXIDINE GLUCONATE 1 APPLICATION(S): 213 SOLUTION TOPICAL at 12:42

## 2022-10-13 RX ADMIN — HEPARIN SODIUM 5000 UNIT(S): 5000 INJECTION INTRAVENOUS; SUBCUTANEOUS at 18:23

## 2022-10-13 RX ADMIN — Medication 2: at 08:20

## 2022-10-13 RX ADMIN — POLYETHYLENE GLYCOL 3350 17 GRAM(S): 17 POWDER, FOR SOLUTION ORAL at 12:42

## 2022-10-13 RX ADMIN — Medication 3: at 22:36

## 2022-10-13 RX ADMIN — SENNA PLUS 2 TABLET(S): 8.6 TABLET ORAL at 21:18

## 2022-10-13 RX ADMIN — Medication 5: at 12:39

## 2022-10-13 RX ADMIN — SEVELAMER CARBONATE 800 MILLIGRAM(S): 2400 POWDER, FOR SUSPENSION ORAL at 08:20

## 2022-10-13 RX ADMIN — CEFEPIME 100 MILLIGRAM(S): 1 INJECTION, POWDER, FOR SOLUTION INTRAMUSCULAR; INTRAVENOUS at 06:14

## 2022-10-13 RX ADMIN — SEVELAMER CARBONATE 800 MILLIGRAM(S): 2400 POWDER, FOR SUSPENSION ORAL at 18:23

## 2022-10-13 RX ADMIN — Medication 1: at 18:23

## 2022-10-13 NOTE — PROGRESS NOTE ADULT - ASSESSMENT
Patient is a 71yo Male with ESRD on HD, DM a/w Rt foot gangrene and GI bleed. Nephrology consulted for ESRD status.     1) ESRD:  Last HD 10/11, tolerated well with net 1.3L. Plan for next maintenance HD today; 10/13. Monitor electrolytes.  Pt with new Left UE AVF- no thrill no bruit- f/u Vascular (Dr. Dutta aware); can f/u as an outpt     2) HTN with ESRD: BP borderline off antihypertensive medications. Will monitor. c/w low sodium diet. Monitor BP.  3) Anemia of renal disease: with blood loss due to GI bleed. FOBT neg. Hb acceptable. No need for JERED at this time. Monitor Hb.  4) Hyperphosphatemia: Last serum phosphorus acceptable. c/w Sevelamer 1 tab tid with meals and low phos diet. Monitor serum calcium and phosphorus.  5) Rt foot gangrene: Pt on Cefepime/ Vanco 1g IV tiw. MRI neg for OM. s/p RLE angioplasty on 10/3. Podiatry/ Vascular/ ID following.  Also pseudomonal UTI. Pt refusing BKA    [facility] NEPHROLOGY  Wili Hopkins M.D.  Kiran Feng D.O.  Sujatha Dumont M.D.  Radha Miller, MSN, ANP-C  [phone]    [address] [place], [place] [id number]

## 2022-10-13 NOTE — PROGRESS NOTE ADULT - PROBLEM SELECTOR PLAN 10
- patient from [facility]  - BKA vs. medical management of R foot necrosis  - remains on IV ABX  patient refused BKA   plan to return back to [facility]   COVID + 9/29, + 10/4   will continue PO abx on d/c
- patient from [facility]  - BKA vs. medical management of R foot necrosis  patient refused BKA   plan to return back to [facility]   COVID + 9/29, + 10/4   will continue PO abx on d/c
- patient from [facility]  - BKA vs. medical management of R foot necrosis  patient refused BKA   plan to return back to [facility]   COVID + 9/29, + 10/4   will continue PO abx on d/c
- patient from [facility]  - BKA vs. medical management of R foot necrosis  - remains on IV ABX  now agreeable to BKA   Vascular to follow for surgery date
- patient from [facility]  - BKA vs. medical management of R foot necrosis  patient refused BKA   plan to return back to [facility]   COVID + 9/29, + 10/4   will continue PO abx on d/c
- patient from [facility]  - BKA vs. medical management of R foot necrosis  - remains on IV ABX  patient refused BKA   plan to return back to [facility]   COVID + 9/29, + 10/4   will continue PO abx on d/c
- patient from [facility]  - BKA vs. medical management of R foot necrosis  patient refused BKA   plan to return back to [facility]   COVID + 9/29, + 10/4   will continue PO abx on d/c
- patient from [facility]  - BKA vs. medical management of R foot necrosis  patient refused BKA   plan to return back to [facility]   COVID + 9/29, + 10/4   will continue PO abx on d/c
patient refused BKA   COVID neg 10/13  will continue augmentin 875 qd and doxy 100 mg bid until 10/17  dc plan to [facility], case management following
- patient from [facility]  - BKA vs. medical management of R foot necrosis  - remains on IV ABX  patient refused BKA   plan to return back to [facility]   COVID + 9/29, + 10/4   will continue PO abx on d/c
- patient from [facility]  - BKA vs. medical management of R foot necrosis  - remains on IV ABX  patient refused BKA   plan to return back to [facility]   COVID + 9/29, + 10/4   will continue PO abx on d/c
- patient from [facility]  - BKA vs. medical management of R foot necrosis  - remains on IV ABX  now agreeable to BKA   Vascular to follow for surgery date
- patient from [facility]  - BKA vs. medical management of R foot necrosis  patient refused BKA   plan to return back to [facility]   COVID + 9/29, + 10/4   will continue PO abx on d/c

## 2022-10-13 NOTE — PROGRESS NOTE ADULT - PROBLEM SELECTOR PROBLEM 1
Gangrene of toe of right foot

## 2022-10-13 NOTE — PROGRESS NOTE ADULT - PROBLEM SELECTOR PLAN 4
- ESRD on HD on T-TH-S  - c/w sevelamer  - continue with Vancomycin every 48hours intradialysis  - Renal diet   - trend BMP  - avoid Nephrotoxins  - Nephro Dr. Dumont following

## 2022-10-13 NOTE — PROGRESS NOTE ADULT - PROBLEM SELECTOR PLAN 1
- Pt presented with right foot gangrene  - CTA run off shows outflow disease  - abnormal JESUS  - NWB to RLE- podiatry recc  - cont vancomycin every 48 hrs,   - BCx negative  - MRI Rt foot negative for OM  - s/p Angiogram w/no targets for revasc but w/extensive distal small vessel disease  - recommendation would be for R BKA vs. medical management  - ID Dr. Babcock following  - Vascular Following   - Podiatry Following, reccomended for BKA patient refuses - Pt presented with right foot gangrene  - CTA run off shows outflow disease  - abnormal JESUS  - MRI Rt foot negative for OM  - s/p Angiogram w/no targets for revasc but w/extensive distal small vessel disease  - refused BKA  - cont abx  - BCx negative  - ID Dr. Babcock following  - Vascular Following   - Podiatry Following

## 2022-10-13 NOTE — PROGRESS NOTE ADULT - SUBJECTIVE AND OBJECTIVE BOX
Patient is seen and examined  at the bed side, is afebrile. He is tolerating  Abx well. The discharge plan noted.       REVIEW OF SYSTEMS: All other review systems are negative      ALLERGIES: No Known Allergies      Vital Signs Last 24 Hrs  T(C): 36.7 (13 Oct 2022 17:20), Max: 36.8 (12 Oct 2022 20:21)  T(F): 98 (13 Oct 2022 17:20), Max: 98.2 (12 Oct 2022 20:21)  HR: 53 (13 Oct 2022 17:20) (53 - 73)  BP: 115/59 (13 Oct 2022 17:20) (115/59 - 155/73)  BP(mean): --  RR: 17 (13 Oct 2022 17:20) (15 - 18)  SpO2: 100% (13 Oct 2022 17:20) (99% - 100%)    Parameters below as of 13 Oct 2022 17:20  Patient On (Oxygen Delivery Method): room air        PHYSICAL EXAM:  GENERAL : Not in distress  RESP: Not using accessory muscles   CVS: s1 and s2 present   GI; Nondistended   EXT: Left heel bandage in placed   CNS: Awake and Alert        LABS:               10.9   5.32  )-----------( 195      ( 13 Oct 2022 14:25 )             33.8                           11.0   4.92  )-----------( 211      ( 11 Oct 2022 09:00 )             33.9                  10-13    134<L>  |  99  |  96<H>  ----------------------------<  270<H>  4.3   |  25  |  5.85<H>    Ca    9.0      13 Oct 2022 14:25    TPro  6.6  /  Alb  2.9<L>  /  TBili  0.2  /  DBili  x   /  AST  11  /  ALT  16  /  AlkPhos  56  10-09      CAPILLARY BLOOD GLUCOSE  POCT Blood Glucose.: 127 mg/dL (27 Sep 2022 11:31)  POCT Blood Glucose.: 129 mg/dL (27 Sep 2022 06:13)  POCT Blood Glucose.: 307 mg/dL (27 Sep 2022 00:00)  POCT Blood Glucose.: 171 mg/dL (26 Sep 2022 21:34)      Urinalysis Basic - ( 26 Sep 2022 16:30 )  Color: Yellow / Appearance: Clear / S.010 / pH: x  Gluc: x / Ketone: Negative  / Bili: Negative / Urobili: Negative   Blood: x / Protein: 30 mg/dL / Nitrite: Negative   Leuk Esterase: Small / RBC: 2-5 /HPF / WBC 11-25 /HPF   Sq Epi: x / Non Sq Epi: Few /HPF / Bacteria: Few /HPF      Vancomycin Level, Random (10.11.22 @ 19:11) : 19.4:   Vancomycin Level, Trough (10.03.22 @ 07:12) : 11.1  Vancomycin Level, Trough (22 @ 15:25) : 11.7    MEDICATIONS  (STANDING):    cefepime   IVPB 1000 milliGRAM(s) IV Intermittent every 24 hours  chlorhexidine 2% Cloths 1 Application(s) Topical daily  heparin   Injectable 5000 Unit(s) SubCutaneous every 12 hours  influenza  Vaccine (HIGH DOSE) 0.7 milliLiter(s) IntraMuscular once  insulin lispro (ADMELOG) corrective regimen sliding scale   SubCutaneous three times a day before meals  insulin lispro (ADMELOG) corrective regimen sliding scale   SubCutaneous at bedtime  pantoprazole    Tablet 40 milliGRAM(s) Oral before breakfast  polyethylene glycol 3350 17 Gram(s) Oral daily  senna 2 Tablet(s) Oral at bedtime  sevelamer carbonate 800 milliGRAM(s) Oral three times a day with meals  vancomycin  IVPB 1000 milliGRAM(s) IV Intermittent <User Schedule>        RADIOLOGY & ADDITIONAL TESTS:    22: MR Foot No Cont, Right (22 @ 20:37) >  1.  No osteomyelitis.  2.  Posterior heel skin wound and dorsal forefoot soft tissue swelling. Nonspecific but can be seen with cellulitis. No drainable fluid collection.      22 from: Xray Foot AP + Lateral, Right (22 @ 17:00) >IMPRESSION: No definite bony destruction.    22: VA Physiol Extremity Lower 3+ Level, BI (22 @ 16:49) IMPRESSION: Abnormally elevated ABIs compatible with calcified vessels.    No discernible pulse volume recording at the level of the right digit.        MICROBIOLOGY DATA:    COVID-19 PCR . (10.09.22 @ 06:38)   COVID-19 PCR: NotDetec:    COVID-19 PCR . (22 @ 10:00)   COVID-19 PCR: Detected    Culture - Urine (22 @ 16:30)   - Amikacin: S <=16   - Aztreonam: S <=4   - Cefepime: S <=2   - Ceftazidime: S 4   - Ciprofloxacin: S <=0.25   - Gentamicin: S <=2   - Imipenem: S <=1   - Levofloxacin: S <=0.5   - Meropenem: S <=1   - Piperacillin/Tazobactam: S <=8   - Tobramycin: S <=2   Specimen Source: Clean Catch Clean Catch (Midstream)   Culture Results:   50,000 - 99,000 CFU/mL Pseudomonas aeruginosa   Organism Identification: Pseudomonas aeruginosa   Organism: Pseudomonas aeruginosa   Method Type: ANH     Culture - Blood (22 @ 14:30)   Specimen Source: .Blood Blood   Culture Results: No growth to date.     Culture - Blood (22 @ 14:20)   Specimen Source: .Blood Blood   Culture Results: No growth to date.     Urine Microscopic-Add On (NC) (22 @ 16:30)   Red Blood Cell - Urine: 2-5 /HPF   White Blood Cell - Urine: 11-25 /HPF   Bacteria: Few /HPF   Comment - Urine: few transitional epithelial cells seen   Epithelial Cells: Few /HPF     Flu With COVID-19 By BEBE (22 @ 14:30)   SARS-CoV-2 Result: NotDetec:

## 2022-10-13 NOTE — PROGRESS NOTE ADULT - SUBJECTIVE AND OBJECTIVE BOX
Patient is a 72y old  Male who presents with a chief complaint of Gangrene foot (13 Oct 2022 13:38)    OVERNIGHT EVENTS: no acute changes    REVIEW OF SYSTEMS:  CONSTITUTIONAL: No fever, chills  ENMT:  No difficulty hearing, no change in vision  NECK: No pain or stiffness  RESPIRATORY: No cough, SOB  CARDIOVASCULAR: No chest pain, palpitations  GASTROINTESTINAL: No abdominal pain. No nausea, vomiting, or diarrhea  GENITOURINARY: No dysuria  NEUROLOGICAL: No HA  SKIN: No itching, burning, rashes, or lesions   LYMPH NODES: No enlarged glands  ENDOCRINE: No heat or cold intolerance; No hair loss  MUSCULOSKELETAL: +right foot pain.   PSYCHIATRIC: No depression, anxiety  HEME/LYMPH: No easy bruising, or bleeding gums    T(C): 36.4 (10-13-22 @ 14:20), Max: 36.8 (10-12-22 @ 20:21)  HR: 73 (10-13-22 @ 14:20) (59 - 73)  BP: 147/77 (10-13-22 @ 14:20) (140/65 - 155/73)  RR: 17 (10-13-22 @ 14:20) (15 - 18)  SpO2: 100% (10-13-22 @ 13:55) (99% - 100%)  Wt(kg): --Vital Signs Last 24 Hrs  T(C): 36.4 (13 Oct 2022 14:20), Max: 36.8 (12 Oct 2022 20:21)  T(F): 97.5 (13 Oct 2022 14:20), Max: 98.2 (12 Oct 2022 20:21)  HR: 73 (13 Oct 2022 14:20) (59 - 73)  BP: 147/77 (13 Oct 2022 14:20) (140/65 - 155/73)  BP(mean): --  RR: 17 (13 Oct 2022 14:20) (15 - 18)  SpO2: 100% (13 Oct 2022 13:55) (99% - 100%)    Parameters below as of 13 Oct 2022 14:20  Patient On (Oxygen Delivery Method): room air        MEDICATIONS  (STANDING):  cefepime   IVPB 1000 milliGRAM(s) IV Intermittent every 24 hours  chlorhexidine 2% Cloths 1 Application(s) Topical daily  heparin   Injectable 5000 Unit(s) SubCutaneous every 12 hours  influenza  Vaccine (HIGH DOSE) 0.7 milliLiter(s) IntraMuscular once  insulin lispro (ADMELOG) corrective regimen sliding scale   SubCutaneous three times a day before meals  insulin lispro (ADMELOG) corrective regimen sliding scale   SubCutaneous at bedtime  pantoprazole    Tablet 40 milliGRAM(s) Oral before breakfast  polyethylene glycol 3350 17 Gram(s) Oral daily  senna 2 Tablet(s) Oral at bedtime  sevelamer carbonate 800 milliGRAM(s) Oral three times a day with meals  vancomycin  IVPB 1000 milliGRAM(s) IV Intermittent <User Schedule>    MEDICATIONS  (PRN):  acetaminophen     Tablet .. 650 milliGRAM(s) Oral every 6 hours PRN Temp greater or equal to 38C (100.4F), Mild Pain (1 - 3)  melatonin 3 milliGRAM(s) Oral at bedtime PRN Insomnia  ondansetron Injectable 4 milliGRAM(s) IV Push every 8 hours PRN Nausea and/or Vomiting      PHYSICAL EXAM:  GENERAL: well-appearing, NAD  EYES: clear conjunctiva  ENMT: Moist mucous membranes  NECK: Supple, No JVD, Normal thyroid  CHEST/LUNG: +right chest PermCath Clear to auscultation bilaterally; No rales, rhonchi, wheezing, or rubs  HEART: S1, S2, Regular rate and rhythm  ABDOMEN: Soft, Nontender, Nondistended; Bowel sounds present  NEURO: Alert & Oriented X3  EXTREMITIES: +left av fistula, no bruit/thrill. No LE edema, no calf tenderness  LYMPH: No lymphadenopathy noted  SKIN: No rashes or lesions    Consultant(s) Notes Reviewed:  [x ] YES  [ ] NO  Care Discussed with Consultants/Other Providers [ x] YES  [ ] NO    LABS:                        10.9   5.32  )-----------( 195      ( 13 Oct 2022 14:25 )             33.8     10-13    134<L>  |  99  |  96<H>  ----------------------------<  270<H>  4.3   |  25  |  5.85<H>    Ca    9.0      13 Oct 2022 14:25        CAPILLARY BLOOD GLUCOSE      POCT Blood Glucose.: 146 mg/dL (13 Oct 2022 16:27)  POCT Blood Glucose.: 397 mg/dL (13 Oct 2022 12:32)  POCT Blood Glucose.: 392 mg/dL (13 Oct 2022 11:58)  POCT Blood Glucose.: 213 mg/dL (13 Oct 2022 07:44)  POCT Blood Glucose.: 244 mg/dL (12 Oct 2022 20:54)            RADIOLOGY & ADDITIONAL TESTS:      Imaging Personally Reviewed:  [ ] YES  [ ] NO

## 2022-10-13 NOTE — PROGRESS NOTE ADULT - SUBJECTIVE AND OBJECTIVE BOX
[facility] NEPHROLOGY- PROGRESS NOTE    Patient is a 73yo Male with ESRD on HD, DM a/w Rt foot gangrene and GI bleed. Nephrology consulted for ESRD status.   COVID + 9/29/22      Hospital Medications: Medications reviewed.  REVIEW OF SYSTEMS:  CONSTITUTIONAL: No fevers or chills  RESPIRATORY: No shortness of breath  CARDIOVASCULAR: No chest pain.  GASTROINTESTINAL: No nausea, vomiting, diarrhea or abdominal pain.   VASCULAR: No bilateral lower extremity edema. +Mild Rt foot pain    VITALS:  T(F): 96.7 (10-13-22 @ 05:21), Max: 98.3 (10-12-22 @ 13:55)  HR: 63 (10-13-22 @ 05:21)  BP: 141/69 (10-13-22 @ 05:21)  RR: 18 (10-13-22 @ 05:21)  SpO2: 100% (10-13-22 @ 05:21)  Wt(kg): --        PHYSICAL EXAM:  Gen: NAD,   HEENT: anicteric  Neck: no JVD  Cards: RRR, +S1/S2, no M/G/R  Resp: CTA B/L  GI: soft, NT/ND, NABS  Extremities: no LE edema B/L  Derm: Rt foot wrapped dressing c/d/i  Access: Rt IJ tunneled HD catheter- benign  Left upper arm AVF- no thrill no bruit    LABS:        Creatinine Trend: 6.83 <--, 5.91 <--, 5.33 <--    Urine Studies:

## 2022-10-13 NOTE — PROGRESS NOTE ADULT - PROBLEM SELECTOR PROBLEM 6
GI bleed
Anemia
Prophylactic measure
Anemia
GI bleed
GI bleed
Anemia
GI bleed
GI bleed
Anemia
GI bleed
Anemia
GI bleed
Anemia
GI bleed
Prophylactic measure
Anemia
Prophylactic measure
Anemia
Prophylactic measure
GI bleed
Anemia
GI bleed
Anemia
GI bleed

## 2022-10-13 NOTE — PROGRESS NOTE ADULT - PROBLEM SELECTOR PROBLEM 9
Discharge planning issues
Prophylactic measure
Prophylactic measure
Discharge planning issues
Prophylactic measure
Prophylactic measure
Discharge planning issues
Discharge planning issues
Prophylactic measure
Discharge planning issues
Prophylactic measure
Discharge planning issues
Discharge planning issues
Prophylactic measure
Discharge planning issues
Prophylactic measure
Prophylactic measure
Discharge planning issues
Discharge planning issues
Prophylactic measure

## 2022-10-13 NOTE — PROGRESS NOTE ADULT - PROBLEM SELECTOR PROBLEM 8
DM (diabetes mellitus)
Prophylactic measure
DM (diabetes mellitus)
Prophylactic measure
DM (diabetes mellitus)
DM (diabetes mellitus)
Prophylactic measure
DM (diabetes mellitus)
DM (diabetes mellitus)
Prophylactic measure
DM (diabetes mellitus)

## 2022-10-13 NOTE — PROGRESS NOTE ADULT - PROBLEM SELECTOR PROBLEM 7
Anemia
DM (diabetes mellitus)
Anemia
Discharge planning issues
DM (diabetes mellitus)
Anemia
DM (diabetes mellitus)
Anemia
Discharge planning issues
Discharge planning issues
Anemia
Discharge planning issues
DM (diabetes mellitus)
Discharge planning issues
DM (diabetes mellitus)
Anemia
DM (diabetes mellitus)
Discharge planning issues
Anemia

## 2022-10-13 NOTE — PROGRESS NOTE ADULT - ASSESSMENT
72 year old male, coming from [facility], with medical history of ESRD (T-TH-S), DM coming in with right leg necrosis Vascular and Podiatry consulted. JESUS with abnormal findings. CT angio with out flow disease bilat. MRI negative for OM, also found to have pseudomonas UTI, started on vancomycin and cefepime.  ID following. Hospital course c/b COVID + 09/29, asymptomatic, s/p RLE angiogram 10/3 w/no targets for revasc but w/extensive distal small vessel disease, no acute vascular intervention at this time. Recommendations would be for R BKA vs. medication management, patient has been resistant to BKA. Plan to return back to [facility] on PO ABX and wound care. NCM to follow

## 2022-10-13 NOTE — PROGRESS NOTE ADULT - PROBLEM SELECTOR PROBLEM 3
2019 novel coronavirus disease (COVID-19)
Arterial insufficiency with ischemic ulcer
2019 novel coronavirus disease (COVID-19)
2019 novel coronavirus disease (COVID-19)
Arterial insufficiency with ischemic ulcer
2019 novel coronavirus disease (COVID-19)
Arterial insufficiency with ischemic ulcer
2019 novel coronavirus disease (COVID-19)
2019 novel coronavirus disease (COVID-19)
Arterial insufficiency with ischemic ulcer
Arterial insufficiency with ischemic ulcer
2019 novel coronavirus disease (COVID-19)
Arterial insufficiency with ischemic ulcer
Arterial insufficiency with ischemic ulcer
2019 novel coronavirus disease (COVID-19)

## 2022-10-13 NOTE — PROGRESS NOTE ADULT - PROBLEM SELECTOR PROBLEM 5
DM (diabetes mellitus)
GI bleed
DM (diabetes mellitus)
DM (diabetes mellitus)
Complicated UTI (urinary tract infection)
DM (diabetes mellitus)
GI bleed
Complicated UTI (urinary tract infection)
Complicated UTI (urinary tract infection)
GI bleed
DM (diabetes mellitus)
GI bleed
DM (diabetes mellitus)
Complicated UTI (urinary tract infection)
Complicated UTI (urinary tract infection)
GI bleed
Complicated UTI (urinary tract infection)
GI bleed
Complicated UTI (urinary tract infection)
GI bleed
Complicated UTI (urinary tract infection)
GI bleed
Complicated UTI (urinary tract infection)
Complicated UTI (urinary tract infection)

## 2022-10-13 NOTE — PROGRESS NOTE ADULT - PROBLEM SELECTOR PROBLEM 4
ESRD on dialysis
Never
ESRD on dialysis

## 2022-10-14 ENCOUNTER — TRANSCRIPTION ENCOUNTER (OUTPATIENT)
Age: 72
End: 2022-10-14

## 2022-10-14 VITALS
SYSTOLIC BLOOD PRESSURE: 151 MMHG | OXYGEN SATURATION: 100 % | DIASTOLIC BLOOD PRESSURE: 76 MMHG | HEART RATE: 74 BPM | TEMPERATURE: 98 F | RESPIRATION RATE: 17 BRPM

## 2022-10-14 LAB
GLUCOSE BLDC GLUCOMTR-MCNC: 199 MG/DL — HIGH (ref 70–99)
GLUCOSE BLDC GLUCOMTR-MCNC: 403 MG/DL — HIGH (ref 70–99)

## 2022-10-14 PROCEDURE — 73620 X-RAY EXAM OF FOOT: CPT

## 2022-10-14 PROCEDURE — 87641 MR-STAPH DNA AMP PROBE: CPT

## 2022-10-14 PROCEDURE — C1760: CPT

## 2022-10-14 PROCEDURE — 83605 ASSAY OF LACTIC ACID: CPT

## 2022-10-14 PROCEDURE — 86900 BLOOD TYPING SEROLOGIC ABO: CPT

## 2022-10-14 PROCEDURE — 36415 COLL VENOUS BLD VENIPUNCTURE: CPT

## 2022-10-14 PROCEDURE — 81001 URINALYSIS AUTO W/SCOPE: CPT

## 2022-10-14 PROCEDURE — 99261: CPT

## 2022-10-14 PROCEDURE — C1894: CPT

## 2022-10-14 PROCEDURE — 80202 ASSAY OF VANCOMYCIN: CPT

## 2022-10-14 PROCEDURE — 87635 SARS-COV-2 COVID-19 AMP PRB: CPT

## 2022-10-14 PROCEDURE — 86803 HEPATITIS C AB TEST: CPT

## 2022-10-14 PROCEDURE — 76000 FLUOROSCOPY <1 HR PHYS/QHP: CPT

## 2022-10-14 PROCEDURE — 85610 PROTHROMBIN TIME: CPT

## 2022-10-14 PROCEDURE — 87040 BLOOD CULTURE FOR BACTERIA: CPT

## 2022-10-14 PROCEDURE — 80061 LIPID PANEL: CPT

## 2022-10-14 PROCEDURE — 85025 COMPLETE CBC W/AUTO DIFF WBC: CPT

## 2022-10-14 PROCEDURE — 86140 C-REACTIVE PROTEIN: CPT

## 2022-10-14 PROCEDURE — 85730 THROMBOPLASTIN TIME PARTIAL: CPT

## 2022-10-14 PROCEDURE — 83036 HEMOGLOBIN GLYCOSYLATED A1C: CPT

## 2022-10-14 PROCEDURE — 87640 STAPH A DNA AMP PROBE: CPT

## 2022-10-14 PROCEDURE — 87086 URINE CULTURE/COLONY COUNT: CPT

## 2022-10-14 PROCEDURE — 84100 ASSAY OF PHOSPHORUS: CPT

## 2022-10-14 PROCEDURE — 85652 RBC SED RATE AUTOMATED: CPT

## 2022-10-14 PROCEDURE — C1769: CPT

## 2022-10-14 PROCEDURE — 82272 OCCULT BLD FECES 1-3 TESTS: CPT

## 2022-10-14 PROCEDURE — 87637 SARSCOV2&INF A&B&RSV AMP PRB: CPT

## 2022-10-14 PROCEDURE — 80053 COMPREHEN METABOLIC PANEL: CPT

## 2022-10-14 PROCEDURE — 85027 COMPLETE CBC AUTOMATED: CPT

## 2022-10-14 PROCEDURE — 87186 SC STD MICRODIL/AGAR DIL: CPT

## 2022-10-14 PROCEDURE — 86850 RBC ANTIBODY SCREEN: CPT

## 2022-10-14 PROCEDURE — C1887: CPT

## 2022-10-14 PROCEDURE — 73718 MRI LOWER EXTREMITY W/O DYE: CPT

## 2022-10-14 PROCEDURE — 80048 BASIC METABOLIC PNL TOTAL CA: CPT

## 2022-10-14 PROCEDURE — 75635 CT ANGIO ABDOMINAL ARTERIES: CPT

## 2022-10-14 PROCEDURE — 85379 FIBRIN DEGRADATION QUANT: CPT

## 2022-10-14 PROCEDURE — 83735 ASSAY OF MAGNESIUM: CPT

## 2022-10-14 PROCEDURE — 87340 HEPATITIS B SURFACE AG IA: CPT

## 2022-10-14 PROCEDURE — 86901 BLOOD TYPING SEROLOGIC RH(D): CPT

## 2022-10-14 PROCEDURE — 82962 GLUCOSE BLOOD TEST: CPT

## 2022-10-14 PROCEDURE — 93923 UPR/LXTR ART STDY 3+ LVLS: CPT

## 2022-10-14 PROCEDURE — 99285 EMERGENCY DEPT VISIT HI MDM: CPT

## 2022-10-14 PROCEDURE — 97161 PT EVAL LOW COMPLEX 20 MIN: CPT

## 2022-10-14 RX ORDER — INSULIN LISPRO 100/ML
3 VIAL (ML) SUBCUTANEOUS
Refills: 0 | Status: DISCONTINUED | OUTPATIENT
Start: 2022-10-14 | End: 2022-10-14

## 2022-10-14 RX ORDER — INSULIN GLARGINE 100 [IU]/ML
10 INJECTION, SOLUTION SUBCUTANEOUS AT BEDTIME
Refills: 0 | Status: DISCONTINUED | OUTPATIENT
Start: 2022-10-14 | End: 2022-10-14

## 2022-10-14 RX ORDER — ACETAMINOPHEN 500 MG
2 TABLET ORAL
Qty: 0 | Refills: 0 | DISCHARGE
Start: 2022-10-14

## 2022-10-14 RX ADMIN — Medication 3 UNIT(S): at 11:50

## 2022-10-14 RX ADMIN — PANTOPRAZOLE SODIUM 40 MILLIGRAM(S): 20 TABLET, DELAYED RELEASE ORAL at 05:43

## 2022-10-14 RX ADMIN — SEVELAMER CARBONATE 800 MILLIGRAM(S): 2400 POWDER, FOR SUSPENSION ORAL at 07:57

## 2022-10-14 RX ADMIN — CHLORHEXIDINE GLUCONATE 1 APPLICATION(S): 213 SOLUTION TOPICAL at 11:51

## 2022-10-14 RX ADMIN — Medication 6: at 11:49

## 2022-10-14 RX ADMIN — HEPARIN SODIUM 5000 UNIT(S): 5000 INJECTION INTRAVENOUS; SUBCUTANEOUS at 05:20

## 2022-10-14 RX ADMIN — SEVELAMER CARBONATE 800 MILLIGRAM(S): 2400 POWDER, FOR SUSPENSION ORAL at 11:50

## 2022-10-14 RX ADMIN — CEFEPIME 100 MILLIGRAM(S): 1 INJECTION, POWDER, FOR SOLUTION INTRAMUSCULAR; INTRAVENOUS at 05:20

## 2022-10-14 RX ADMIN — Medication 1: at 07:58

## 2022-10-14 RX ADMIN — POLYETHYLENE GLYCOL 3350 17 GRAM(S): 17 POWDER, FOR SOLUTION ORAL at 11:50

## 2022-10-14 NOTE — CHART NOTE - NSCHARTNOTEFT_GEN_A_CORE
- late entry  - spoke with brothkeysha Calderon over the phone. permission received from pt to  update brother. updated regarding pt condition, intervention and plan of care. All questions were answered.
- late entry  - spoke with sister over phone and updated regarding pt condition, updated plan of care from vascular and podiatry. also discussed about pending MRI. and covid exposure. sister started being very upset regarding delay in MRI and covid exposure. She demanded things needs to get done right away. She started speaking in very loud voice and started yelling at NP over the phone. escalated to manager Felipa to intervene.
72M s/p R LE angio 10/3  Poor distal tib/pedal flow. No target for revascularization  Pt will benefit from BKA vs. AKA. Discussed with patient, states he wants more time to think, currently not amenable for surgery  As per primary team, planned for discharge today. Pt to follow u with Dr. Billy as an outpatient.  Reconsult PRN
Reassessment:   Nutrition note/ follow up: Patient is a 72y old  Male who presents with a chief complaint of Gangrene foot (11 Oct 2022 13:28). Chart reviewed pt seen via glass window- Covid 19+.  Tolerates current diet consistency well. Followed up with diet tech for food preferences/meal updates. Plan for pt to d/c to [facility] on PO ABX and wound care, SW on the case.       Factors impacting intake: [ ] none [ ] nausea  [ ] vomiting [ ] diarrhea [ ] constipation  [ ]chewing problems [ ] swallowing issues  [x ] other: acute to chronic illness right foot gangrene, DM ESRD on HD.    Diet Prescription: Diet, Regular:   Consistent Carbohydrate {No Snacks}  For patients receiving Renal Replacement - No Protein Restr, No Conc K, No Conc Phos, Low Sodium (RENAL)  No Concentrated Potassium  No Concentrated Phosphorus  Low Sodium  Supplement Feeding Modality:  Oral  Nepro Cans or Servings Per Day:  1       Frequency:  Two Times a day (10-04-22 @ 00:37)    Intake:     Daily     Daily Weight in k.8 (07 Oct 2022 15:29)  Weight in k.8 (07 Oct 2022 12:45)  Weight in k.7 (04 Oct 2022 13:45)  Weight in k.7 (04 Oct 2022 10:44)  Weight in k.1 (01 Oct 2022 20:13)  Weight in k.8 (01 Oct 2022 16:55)  Weight in k.6 (29 Sep 2022 14:45)  Weight in k.9 (28 Sep 2022 05:03)  Weight in k.5 (27 Sep 2022 21:33)  Weight in k (27 Sep 2022 18:30)    % Weight Change likely due to fluid shift on HD.    Pertinent Medications: MEDICATIONS  (STANDING):  cefepime   IVPB 1000 milliGRAM(s) IV Intermittent every 24 hours  chlorhexidine 2% Cloths 1 Application(s) Topical daily  heparin   Injectable 5000 Unit(s) SubCutaneous every 12 hours  influenza  Vaccine (HIGH DOSE) 0.7 milliLiter(s) IntraMuscular once  insulin lispro (ADMELOG) corrective regimen sliding scale   SubCutaneous three times a day before meals  insulin lispro (ADMELOG) corrective regimen sliding scale   SubCutaneous at bedtime  pantoprazole    Tablet 40 milliGRAM(s) Oral before breakfast  polyethylene glycol 3350 17 Gram(s) Oral daily  senna 2 Tablet(s) Oral at bedtime  sevelamer carbonate 800 milliGRAM(s) Oral three times a day with meals  vancomycin  IVPB 1000 milliGRAM(s) IV Intermittent <User Schedule>    MEDICATIONS  (PRN):  acetaminophen     Tablet .. 650 milliGRAM(s) Oral every 6 hours PRN Temp greater or equal to 38C (100.4F), Mild Pain (1 - 3)  melatonin 3 milliGRAM(s) Oral at bedtime PRN Insomnia  ondansetron Injectable 4 milliGRAM(s) IV Push every 8 hours PRN Nausea and/or Vomiting    Pertinent Labs: 10-11 Na137 mmol/L Glu 281 mg/dL<H> K+ 3.8 mmol/L Cr  6.83 mg/dL<H>  mg/dL<H> 10-09 Phos 5.2 mg/dL<H> 10-09 Alb 2.9 g/dL<L>  Chol 116 mg/dL LDL --    HDL 38 mg/dL<L> Trig 128 mg/dL     CAPILLARY BLOOD GLUCOSE      POCT Blood Glucose.: 283 mg/dL (11 Oct 2022 13:05)  POCT Blood Glucose.: 211 mg/dL (11 Oct 2022 07:48)  POCT Blood Glucose.: 236 mg/dL (10 Oct 2022 21:44)  POCT Blood Glucose.: 229 mg/dL (10 Oct 2022 16:35)    Skin:     Estimated Needs:   [ x] no change since previous assessment  [ ] recalculated:       Previous Nutrition Diagnosis:   [ ] Altered GI function  [ ]Inadequate Oral Intake [ ] Swallowing Difficulty   [ ] Altered nutrition related labs [ ] Increased Nutrient Needs [ ] Overweight/Obesity   [ ] Unintended Weight Loss [ ] Food & Nutrition Related Knowledge Deficit [ ] Malnutrition   [ x] Other: Underweight status    Nutrition Diagnosis is [x ] ongoing  [ ] resolved [ ] not applicable     New Nutrition Diagnosis: [ ] not applicable       Interventions:   Recommend  [ ] Change Diet To:  [ ] Nutrition Supplement  [ ] Nutrition Support  [x ] Other:  Nursing to continue with meal set up and encouragement. Continue With renal  replacement Consistent CHO Nephro 1 can  PO BID.     Monitoring and Evaluation:   [ ] PO intake [ x ] Tolerance to diet prescription [ x ] weights [ x ] labs[ x ] follow up per protocol  [ ] other:
EVENT:   10/3/22, pt. s/p Angiogram, c/o 8/10 level pain in both LE. Requested pain medication. BP - 162/71, HR - 58.    HPI:      72 year old male, coming from [facility], with medical history of ESRD (T-TH-S), DM coming in with right leg necrosis. As per the pt he noticed this growing since 3 months prior. He was concerned and decided to come to the hospital today. His foot is necrotic and no pulses. He denies any headaches, visual disturbances, N/V/D, dizziness, falls, chest pain, palpitations,  fevers, skin rash, recent travel, or sick contacts    In the ED pt was noted to have a bloody bowl movement.    (26 Sep 2022 19:46)      OBJECTIVE:  Vital Signs Last 24 Hrs  T(C): 35.8 (03 Oct 2022 20:29), Max: 36.6 (03 Oct 2022 05:54)  T(F): 96.5 (03 Oct 2022 20:29), Max: 97.9 (03 Oct 2022 05:54)  HR: 58 (03 Oct 2022 20:29) (56 - 80)  BP: 162/71 (03 Oct 2022 20:29) (112/52 - 162/71)  BP(mean): 88 (03 Oct 2022 19:20) (61 - 90)  RR: 18 (03 Oct 2022 20:29) (14 - 20)  SpO2: 100% (03 Oct 2022 20:29) (97% - 100%)    Parameters below as of 03 Oct 2022 20:29  Patient On (Oxygen Delivery Method): room air        FOCUSED PHYSICAL EXAM:    LABS:                        12.2   4.84  )-----------( 207      ( 03 Oct 2022 07:12 )             38.2     10-03    136  |  100  |  68<H>  ----------------------------<  162<H>  4.0   |  24  |  5.10<H>    Ca    9.1      03 Oct 2022 07:12  Phos  5.2     10-03  Mg     2.5     10-03    TPro  6.8  /  Alb  3.1<L>  /  TBili  0.3  /  DBili  x   /  AST  16  /  ALT  18  /  AlkPhos  57  10-03    PLAN:   - Acetaminophen ( Ofirmev) 1000 mg IV PB x 1 dose for pain.     FOLLOW UP / RESULT:   - Reassess patient pain level,   - Continue supportive care/treatment.

## 2022-10-14 NOTE — PROGRESS NOTE ADULT - PROVIDER SPECIALTY LIST ADULT
Infectious Disease
Nephrology
Nephrology
Podiatry
Infectious Disease
Internal Medicine
Nephrology
Nephrology
Podiatry
Surgery
Vascular Surgery
Vascular Surgery
Internal Medicine
Nephrology
Podiatry
Vascular Surgery
Vascular Surgery
Infectious Disease
Internal Medicine
Nephrology
Vascular Surgery
Vascular Surgery
Internal Medicine

## 2022-10-14 NOTE — DISCHARGE NOTE NURSING/CASE MANAGEMENT/SOCIAL WORK - PATIENT PORTAL LINK FT
You can access the FollowMyHealth Patient Portal offered by [facility] by registering at the following website: [email or web address]. By joining [facility]’s FollowMyHealth portal, you will also be able to view your health information using other applications (apps) compatible with our system.

## 2022-10-14 NOTE — PROGRESS NOTE ADULT - REASON FOR ADMISSION
Gangrene foot

## 2022-11-08 ENCOUNTER — INPATIENT (INPATIENT)
Facility: HOSPITAL | Age: 72
LOS: 13 days | Discharge: NURSING, REHAB OR LONG-TERM CARE | DRG: 239 | End: 2022-11-22
Attending: INTERNAL MEDICINE | Admitting: INTERNAL MEDICINE
Payer: MEDICARE

## 2022-11-08 ENCOUNTER — TRANSCRIPTION ENCOUNTER (OUTPATIENT)
Age: 72
End: 2022-11-08

## 2022-11-08 VITALS
DIASTOLIC BLOOD PRESSURE: 60 MMHG | HEIGHT: 74 IN | WEIGHT: 143.08 LBS | TEMPERATURE: 98 F | OXYGEN SATURATION: 96 % | SYSTOLIC BLOOD PRESSURE: 102 MMHG | RESPIRATION RATE: 18 BRPM | HEART RATE: 88 BPM

## 2022-11-08 DIAGNOSIS — N18.6 END STAGE RENAL DISEASE: ICD-10-CM

## 2022-11-08 DIAGNOSIS — I96 GANGRENE, NOT ELSEWHERE CLASSIFIED: ICD-10-CM

## 2022-11-08 DIAGNOSIS — Z29.9 ENCOUNTER FOR PROPHYLACTIC MEASURES, UNSPECIFIED: ICD-10-CM

## 2022-11-08 DIAGNOSIS — E11.9 TYPE 2 DIABETES MELLITUS WITHOUT COMPLICATIONS: ICD-10-CM

## 2022-11-08 DIAGNOSIS — D64.9 ANEMIA, UNSPECIFIED: ICD-10-CM

## 2022-11-08 LAB
ALBUMIN SERPL ELPH-MCNC: 3 G/DL — LOW (ref 3.5–5)
ALP SERPL-CCNC: 64 U/L — SIGNIFICANT CHANGE UP (ref 40–120)
ALT FLD-CCNC: 22 U/L DA — SIGNIFICANT CHANGE UP (ref 10–60)
ANION GAP SERPL CALC-SCNC: 5 MMOL/L — SIGNIFICANT CHANGE UP (ref 5–17)
APPEARANCE UR: CLEAR — SIGNIFICANT CHANGE UP
APTT BLD: 36.3 SEC — HIGH (ref 27.5–35.5)
AST SERPL-CCNC: 15 U/L — SIGNIFICANT CHANGE UP (ref 10–40)
BACTERIA # UR AUTO: ABNORMAL /HPF
BASOPHILS # BLD AUTO: 0.02 K/UL — SIGNIFICANT CHANGE UP (ref 0–0.2)
BASOPHILS NFR BLD AUTO: 0.3 % — SIGNIFICANT CHANGE UP (ref 0–2)
BILIRUB SERPL-MCNC: 0.3 MG/DL — SIGNIFICANT CHANGE UP (ref 0.2–1.2)
BILIRUB UR-MCNC: NEGATIVE — SIGNIFICANT CHANGE UP
BLD GP AB SCN SERPL QL: SIGNIFICANT CHANGE UP
BUN SERPL-MCNC: 33 MG/DL — HIGH (ref 7–18)
CALCIUM SERPL-MCNC: 8.6 MG/DL — SIGNIFICANT CHANGE UP (ref 8.4–10.5)
CHLORIDE SERPL-SCNC: 103 MMOL/L — SIGNIFICANT CHANGE UP (ref 96–108)
CO2 SERPL-SCNC: 26 MMOL/L — SIGNIFICANT CHANGE UP (ref 22–31)
COLOR SPEC: YELLOW — SIGNIFICANT CHANGE UP
COMMENT - URINE: SIGNIFICANT CHANGE UP
CREAT SERPL-MCNC: 2.78 MG/DL — HIGH (ref 0.5–1.3)
DIFF PNL FLD: ABNORMAL
EGFR: 23 ML/MIN/1.73M2 — LOW
EOSINOPHIL # BLD AUTO: 0.07 K/UL — SIGNIFICANT CHANGE UP (ref 0–0.5)
EOSINOPHIL NFR BLD AUTO: 1.1 % — SIGNIFICANT CHANGE UP (ref 0–6)
EPI CELLS # UR: ABNORMAL /HPF
ERYTHROCYTE [SEDIMENTATION RATE] IN BLOOD: 56 MM/HR — HIGH (ref 0–20)
GLUCOSE SERPL-MCNC: 195 MG/DL — HIGH (ref 70–99)
GLUCOSE UR QL: 100 MG/DL
HCT VFR BLD CALC: 25.7 % — LOW (ref 39–50)
HGB BLD-MCNC: 8.1 G/DL — LOW (ref 13–17)
IMM GRANULOCYTES NFR BLD AUTO: 0.5 % — SIGNIFICANT CHANGE UP (ref 0–0.9)
INR BLD: 1.07 RATIO — SIGNIFICANT CHANGE UP (ref 0.88–1.16)
KETONES UR-MCNC: NEGATIVE — SIGNIFICANT CHANGE UP
LACTATE SERPL-SCNC: 1.4 MMOL/L — SIGNIFICANT CHANGE UP (ref 0.7–2)
LEUKOCYTE ESTERASE UR-ACNC: NEGATIVE — SIGNIFICANT CHANGE UP
LYMPHOCYTES # BLD AUTO: 0.75 K/UL — LOW (ref 1–3.3)
LYMPHOCYTES # BLD AUTO: 11.4 % — LOW (ref 13–44)
MCHC RBC-ENTMCNC: 29.2 PG — SIGNIFICANT CHANGE UP (ref 27–34)
MCHC RBC-ENTMCNC: 31.5 GM/DL — LOW (ref 32–36)
MCV RBC AUTO: 92.8 FL — SIGNIFICANT CHANGE UP (ref 80–100)
MONOCYTES # BLD AUTO: 0.58 K/UL — SIGNIFICANT CHANGE UP (ref 0–0.9)
MONOCYTES NFR BLD AUTO: 8.8 % — SIGNIFICANT CHANGE UP (ref 2–14)
NEUTROPHILS # BLD AUTO: 5.14 K/UL — SIGNIFICANT CHANGE UP (ref 1.8–7.4)
NEUTROPHILS NFR BLD AUTO: 77.9 % — HIGH (ref 43–77)
NITRITE UR-MCNC: NEGATIVE — SIGNIFICANT CHANGE UP
NRBC # BLD: 0 /100 WBCS — SIGNIFICANT CHANGE UP (ref 0–0)
PH UR: 7 — SIGNIFICANT CHANGE UP (ref 5–8)
PLATELET # BLD AUTO: 221 K/UL — SIGNIFICANT CHANGE UP (ref 150–400)
POTASSIUM SERPL-MCNC: 4 MMOL/L — SIGNIFICANT CHANGE UP (ref 3.5–5.3)
POTASSIUM SERPL-SCNC: 4 MMOL/L — SIGNIFICANT CHANGE UP (ref 3.5–5.3)
PROT SERPL-MCNC: 6.8 G/DL — SIGNIFICANT CHANGE UP (ref 6–8.3)
PROT UR-MCNC: 30 MG/DL
PROTHROM AB SERPL-ACNC: 12.7 SEC — SIGNIFICANT CHANGE UP (ref 10.5–13.4)
RBC # BLD: 2.77 M/UL — LOW (ref 4.2–5.8)
RBC # FLD: 14.6 % — HIGH (ref 10.3–14.5)
RBC CASTS # UR COMP ASSIST: SIGNIFICANT CHANGE UP /HPF (ref 0–2)
SARS-COV-2 RNA SPEC QL NAA+PROBE: SIGNIFICANT CHANGE UP
SODIUM SERPL-SCNC: 134 MMOL/L — LOW (ref 135–145)
SP GR SPEC: 1 — LOW (ref 1.01–1.02)
UROBILINOGEN FLD QL: NEGATIVE — SIGNIFICANT CHANGE UP
WBC # BLD: 6.59 K/UL — SIGNIFICANT CHANGE UP (ref 3.8–10.5)
WBC # FLD AUTO: 6.59 K/UL — SIGNIFICANT CHANGE UP (ref 3.8–10.5)
WBC UR QL: SIGNIFICANT CHANGE UP /HPF (ref 0–5)

## 2022-11-08 PROCEDURE — 73630 X-RAY EXAM OF FOOT: CPT | Mod: 26,RT

## 2022-11-08 PROCEDURE — 99285 EMERGENCY DEPT VISIT HI MDM: CPT

## 2022-11-08 RX ORDER — DEXTROSE 50 % IN WATER 50 %
15 SYRINGE (ML) INTRAVENOUS ONCE
Refills: 0 | Status: DISCONTINUED | OUTPATIENT
Start: 2022-11-08 | End: 2022-11-09

## 2022-11-08 RX ORDER — VANCOMYCIN HCL 1 G
1000 VIAL (EA) INTRAVENOUS ONCE
Refills: 0 | Status: COMPLETED | OUTPATIENT
Start: 2022-11-08 | End: 2022-11-08

## 2022-11-08 RX ORDER — INSULIN LISPRO 100/ML
VIAL (ML) SUBCUTANEOUS AT BEDTIME
Refills: 0 | Status: DISCONTINUED | OUTPATIENT
Start: 2022-11-08 | End: 2022-11-09

## 2022-11-08 RX ORDER — SEVELAMER CARBONATE 2400 MG/1
800 POWDER, FOR SUSPENSION ORAL
Refills: 0 | Status: DISCONTINUED | OUTPATIENT
Start: 2022-11-08 | End: 2022-11-09

## 2022-11-08 RX ORDER — ASPIRIN/CALCIUM CARB/MAGNESIUM 324 MG
81 TABLET ORAL DAILY
Refills: 0 | Status: DISCONTINUED | OUTPATIENT
Start: 2022-11-08 | End: 2022-11-09

## 2022-11-08 RX ORDER — POLYETHYLENE GLYCOL 3350 17 G/17G
17 POWDER, FOR SOLUTION ORAL DAILY
Refills: 0 | Status: DISCONTINUED | OUTPATIENT
Start: 2022-11-08 | End: 2022-11-09

## 2022-11-08 RX ORDER — ACETAMINOPHEN 500 MG
650 TABLET ORAL EVERY 6 HOURS
Refills: 0 | Status: DISCONTINUED | OUTPATIENT
Start: 2022-11-08 | End: 2022-11-09

## 2022-11-08 RX ORDER — VANCOMYCIN HCL 1 G
1000 VIAL (EA) INTRAVENOUS
Refills: 0 | Status: DISCONTINUED | OUTPATIENT
Start: 2022-11-08 | End: 2022-11-08

## 2022-11-08 RX ORDER — DEXTROSE 50 % IN WATER 50 %
25 SYRINGE (ML) INTRAVENOUS ONCE
Refills: 0 | Status: DISCONTINUED | OUTPATIENT
Start: 2022-11-08 | End: 2022-11-09

## 2022-11-08 RX ORDER — GLUCAGON INJECTION, SOLUTION 0.5 MG/.1ML
1 INJECTION, SOLUTION SUBCUTANEOUS ONCE
Refills: 0 | Status: DISCONTINUED | OUTPATIENT
Start: 2022-11-08 | End: 2022-11-09

## 2022-11-08 RX ORDER — HEPARIN SODIUM 5000 [USP'U]/ML
5000 INJECTION INTRAVENOUS; SUBCUTANEOUS EVERY 8 HOURS
Refills: 0 | Status: DISCONTINUED | OUTPATIENT
Start: 2022-11-08 | End: 2022-11-09

## 2022-11-08 RX ORDER — CLOPIDOGREL BISULFATE 75 MG/1
75 TABLET, FILM COATED ORAL DAILY
Refills: 0 | Status: DISCONTINUED | OUTPATIENT
Start: 2022-11-08 | End: 2022-11-09

## 2022-11-08 RX ORDER — PANTOPRAZOLE SODIUM 20 MG/1
40 TABLET, DELAYED RELEASE ORAL
Refills: 0 | Status: DISCONTINUED | OUTPATIENT
Start: 2022-11-08 | End: 2022-11-09

## 2022-11-08 RX ORDER — SODIUM CHLORIDE 9 MG/ML
1000 INJECTION, SOLUTION INTRAVENOUS
Refills: 0 | Status: DISCONTINUED | OUTPATIENT
Start: 2022-11-08 | End: 2022-11-09

## 2022-11-08 RX ORDER — VANCOMYCIN HCL 1 G
1000 VIAL (EA) INTRAVENOUS EVERY 12 HOURS
Refills: 0 | Status: DISCONTINUED | OUTPATIENT
Start: 2022-11-08 | End: 2022-11-08

## 2022-11-08 RX ORDER — DEXTROSE 50 % IN WATER 50 %
12.5 SYRINGE (ML) INTRAVENOUS ONCE
Refills: 0 | Status: DISCONTINUED | OUTPATIENT
Start: 2022-11-08 | End: 2022-11-09

## 2022-11-08 RX ORDER — PIPERACILLIN AND TAZOBACTAM 4; .5 G/20ML; G/20ML
3.38 INJECTION, POWDER, LYOPHILIZED, FOR SOLUTION INTRAVENOUS EVERY 8 HOURS
Refills: 0 | Status: DISCONTINUED | OUTPATIENT
Start: 2022-11-08 | End: 2022-11-09

## 2022-11-08 RX ORDER — VANCOMYCIN HCL 1 G
500 VIAL (EA) INTRAVENOUS
Refills: 0 | Status: DISCONTINUED | OUTPATIENT
Start: 2022-11-08 | End: 2022-11-09

## 2022-11-08 RX ORDER — PIPERACILLIN AND TAZOBACTAM 4; .5 G/20ML; G/20ML
3.38 INJECTION, POWDER, LYOPHILIZED, FOR SOLUTION INTRAVENOUS ONCE
Refills: 0 | Status: COMPLETED | OUTPATIENT
Start: 2022-11-08 | End: 2022-11-08

## 2022-11-08 RX ORDER — INSULIN LISPRO 100/ML
VIAL (ML) SUBCUTANEOUS
Refills: 0 | Status: DISCONTINUED | OUTPATIENT
Start: 2022-11-08 | End: 2022-11-09

## 2022-11-08 RX ADMIN — PIPERACILLIN AND TAZOBACTAM 200 GRAM(S): 4; .5 INJECTION, POWDER, LYOPHILIZED, FOR SOLUTION INTRAVENOUS at 16:10

## 2022-11-08 RX ADMIN — Medication 250 MILLIGRAM(S): at 16:11

## 2022-11-08 NOTE — H&P ADULT - PROBLEM SELECTOR PLAN 2
-Hb 8.1 in ED , patient had blood work done on 11/3/22 which showed Hb 7.9   likely anemia due to chronic disease  -no obvious signs of bleeding including GI and Urine   -f/u iron studies

## 2022-11-08 NOTE — ED PROVIDER NOTE - OBJECTIVE STATEMENT
72-year-old man, history of diabetes, history of end-stage renal disease, history of anemia, diabetic neuropathy, sent from [facility] by Dr. Huertas for gangrene of the right first, second, and third toes and right heel ulcer.  No reported discharge from the wound.  No reported fever.  Patient denies any pain.  No reported injury.

## 2022-11-08 NOTE — ED ADULT NURSE NOTE - NSIMPLEMENTINTERV_GEN_ALL_ED
Implemented All Fall Risk Interventions:  Sylva to call system. Call bell, personal items and telephone within reach. Instruct patient to call for assistance. Room bathroom lighting operational. Non-slip footwear when patient is off stretcher. Physically safe environment: no spills, clutter or unnecessary equipment. Stretcher in lowest position, wheels locked, appropriate side rails in place. Provide visual cue, wrist band, yellow gown, etc. Monitor gait and stability. Monitor for mental status changes and reorient to person, place, and time. Review medications for side effects contributing to fall risk. Reinforce activity limits and safety measures with patient and family.

## 2022-11-08 NOTE — H&P ADULT - MUSCULOSKELETAL
Fall
normal/ROM intact/normal gait/strength 5/5 bilateral upper extremities/strength 5/5 bilateral lower extremities

## 2022-11-08 NOTE — H&P ADULT - NSICDXPASTMEDICALHX_GEN_ALL_CORE_FT
PAST MEDICAL HISTORY:  DM (diabetes mellitus)     ESRD on dialysis     PAD (peripheral artery disease)      No Residual Tumor Seen Histology Text: There were no malignant cells seen in the sections examined.

## 2022-11-08 NOTE — H&P ADULT - HISTORY OF PRESENT ILLNESS
72 year old male, coming from [facility], with medical history of ESRD (T-TH-S), and DM coming to ED c/o R foot pain. Patient has severe peripheral arterial disease. He was admitted on September 26 2022 for gangrene of right foot. At that time, patient was treated with IV abx. MRI was negative for OM. He had R LE angiogram on 10/3 w/ no targets for re vascularization but showed extensive distal small vessel disease, there was no acute vascular interventions done. Patient was recommended a R BKA but wanted a medical management instead. Patient now coming in c/o R foot pain. He states that he wants surgery now for his foot. He denies any fevers, chills, headaches, dizziness, chest pain, cough, SOB, abd pain, n/v, diarrhea, constipation, hematuria, dysuria, numbness, and weakness.    in the ed VS: T 98, HR 88, /60, RR 18, Spo2 96%RA  Hb 8.1   s/p 1 dose vanc and zosyn in Ed

## 2022-11-08 NOTE — ED ADULT NURSE NOTE - OBJECTIVE STATEMENT
pt is here for wound check.  pt stated that dialysis Tue/THUT/SAT, dialysis port to right chest wall, wound to right toes and heel, denied chest pain or sob, a/ox2.

## 2022-11-08 NOTE — ED PROVIDER NOTE - CLINICAL SUMMARY MEDICAL DECISION MAKING FREE TEXT BOX
72-year-old man, history of diabetes, history of end-stage renal disease, history of anemia, diabetic neuropathy, sent from [facility] by Dr. Huertas for gangrene of the right first, second, and third toes and right heel ulcer--IV ABx, labs, R foot X-ray, podiatry consult, admit.

## 2022-11-08 NOTE — H&P ADULT - RESPIRATORY
[facility]  NEUROSURGERY CV-Stroke APC  Progress Note      SUBJECTIVE/CC:   Pt seen and examined overnight on rounds. Pt resting comfortably in bed upon exam. States he feels his gait has much improved since lumbar drain has been placed. States vision and cognitive issues are about the same. Around 0100, received message from RN that lumbar drain was no longer draining. Presented to bedside to evaluate, along with NSG resident. No apparent kinks in tubing, lumbar drain intact at insertion site, however appears to possibly has been pulled out some. Unable to get lumbar drain to fluctuate after trouble shooting. Neuro exam stable. Will plan to obtain CTH in the AM and discuss if drain needs to be replaced in IR later today. Dr. Neil aware.     Pt denies headache, nausea/vomiting, numbness/tingling, dizziness, or weakness.     OBJECTIVE:   Visit Vitals  /63 (BP Location: LUE - Left upper extremity, Patient Position: Left side lying)   Pulse 90   Temp 98.8 °F (37.1 °C) (Temporal)   Resp 17   Ht 5' 9\" (1.753 m)   Wt 113.2 kg (249 lb 9 oz)   SpO2 95%   BMI 36.85 kg/m²       Intake/Output Summary (Last 24 hours) at 3/24/2022 0335  Last data filed at 3/24/2022 0059  Gross per 24 hour   Intake 482 ml   Output 680 ml   Net -198 ml       CURRENT MEDICATIONS:  Current Facility-Administered Medications   Medication Dose Route Frequency Provider Last Rate Last Admin   • hydrALAZINE (APRESOLINE) injection 10 mg  10 mg Intravenous Q4H PRN Dione Johny, NP   10 mg at 03/23/22 0008   • labetalol (NORMODYNE) injection 10 mg  10 mg Intravenous Q4H PRN Dione Mcclain, NP   10 mg at 03/22/22 2038   • sodium chloride 0.9 % flush bag 25 mL  25 mL Intravenous PRN ANNE Galaviz       • sodium chloride (PF) 0.9 % injection 2 mL  2 mL Intracatheter 2 times per day Jean-Claude Jacobsen APNP   2 mL at 03/23/22 2046   • amLODIPine (NORVASC) tablet 5 mg  5 mg Oral Daily Camilla Ambrosio MD   5 mg at  03/23/22 0807       REVIEW OF SYSTEMS:  10 point ROS obtained and are negative except for those listed in the HPI      PHYSICAL EXAM:   Alert and oriented x2-3 w/ choices  GCS 15   PERRL, EOMI, visual fields intact bilaterally  Face is symmetrical  Tongue midline, no deviation  Shoulder shrug is equal   Cranial nerves II-XII grossly intact bilaterally  Strength    BUE: 5/5, no drift    BLE: 5/5, no drift   Sensation is intact to light touch in all extremities  Speech is clear and fluent, no appreciated aphasia or dysarthria       Lumbar drain in place, no longer drainage. Appears intact throughout, clear fluid within tubing     NSR on monitor          DATA:    Labs:  Recent Labs   Lab 03/23/22  0349   WBC 10.2   RBC 4.97   HGB 15.1   HCT 43.8          Recent Labs   Lab 03/23/22  0349 03/22/22  0340 03/21/22  0938   SODIUM 138 138 139   POTASSIUM 3.9 4.0 4.0   CHLORIDE 110* 109* 110*   CO2 21 24 26   BUN 20 20 20   CREATININE 0.96 0.98 1.13   GLUCOSE 162* 149* 161*   CALCIUM 8.8 9.3 10.0       Recent Labs   Lab 03/21/22  0938   PTT 27   PT 10.4   INR 1.0       IMAGING: I have personally reviewed all of the following imaging  No results found.      IMPRESSION  Patient is a 66 year old yo male with hydrocephalus and complaints of balance problems, memory issues, and double vision. Admitted to [facility] on 3/21/2022 for elective NPH trial. 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      PPD#2 - s/p lumbar drain placement (3/22/2022, Dr. Neil)    RECOMMENDATIONS:  **hydrocephalus  **NPH trial  - Cont. neuro/VS checks per protocol   - SBP goals: normotensive, less than 140 mmHg  - Lumbar Drain, stopped working overnight 3/23  - RN to attempt to continue draining every hour, will discuss if drain needs to be replaced in IR later today pending pt's clinical exam and CTH results  - Open to drain 10-13cc of CSF/hr then CLAMP for  the remainder of the hour, pt with headache when full 15cc drained  - OK to clamp Lumbar Drain for transport and/or repositioning   - Repeat imaging:              -  CTH in the AM to assess hydro w/ non-functioning lumbar drain  - Neuro Medications:              - NO AP/AC/NSAIDs while lumbar drain is in place  - Procedures:              - Will discuss benefit of VPS pending on results from NPH trial    - Will discuss if lumbar drain to be replaced in IR later today   - Labs:   - most recent labs reviewed, routine daily labs  - Consults:              - PT to follow daily during NPH trial             - Neurology to follow daily during NPH trial              - NCCU             - [facility] Hospitalist   - Therapies:              - PT/OT/ST following, appreciate input   - Activity: ok to AAT, defer to therapy recs  - Diet: ok for diet  - DVT ppx: SCDs, no chemical DVT ppx while lumbar drain is in place     - Medical management per primary team. Appreciate input.  - Discussed with patient and NCCU APC    Dispo: NCCU    Exam, labs, imaging, and assessment/plan discussed with attending, Dr. Thiago Neil, who agrees with that which is listed above. Please perfect serve [facility] Adult Neurosurgery with any questions/concerns    Halle Fajardo PA-C  CV-NSG  3/24/2022    Addendum:  Labs, imaging, exam, and plan reviewed in detail with Dr. Morel during am rounds.      Lumbar drain not working overnight,  Dr. Morel readjusted connection tubing and flushed connection site.  Clear CSF noted to be draining slowly at this time.       Awaiting PT/Neurology assessment today.      Patient denies any headache or back pain.     Plan:   Continue to drain 10-15 cc's of CSF hourly   If drain continues to work, will repeat PT/Neuro assessment for 1 more day  Plan discussed with patient and RN     Please perfect serve \"[facility] Adult Neurosurgery- CV\" with any questions or concerns.    PHILLIP Zimmerman  3/24/22       normal/clear to auscultation bilaterally/no wheezes/no rales/no rhonchi

## 2022-11-08 NOTE — ED PROVIDER NOTE - MUSCULOSKELETAL, MLM
No swelling/tenderness to the right foot, range of motion is not limited, no muscle or joint tenderness

## 2022-11-08 NOTE — ED PROVIDER NOTE - PROGRESS NOTE DETAILS
Informed podiatry resident for consult and he will see Pt. Dr. Huertas informed and accepts admission. Suturegard Retention Suture: 2-0 Nylon

## 2022-11-08 NOTE — H&P ADULT - NSHPREVIEWOFSYSTEMS_GEN_ALL_CORE
He denies any fevers, chills, headaches, dizziness, chest pain, cough, SOB, abd pain, n/v, diarrhea, constipation, hematuria, dysuria, numbness, and weakness.

## 2022-11-08 NOTE — ED PROVIDER NOTE - SKIN, MLM
Right 1st, 2nd, 3rd toes necrotic appearing dry gangrene, decreased sensation, no discharge, no crepitus/fluctuance, also right heel ulcer

## 2022-11-08 NOTE — H&P ADULT - ASSESSMENT
72 year old male, coming from [facility], with medical history of ESRD (T-TH-S), and DM coming to ED c/o R foot pain admitted for further management of necrotic toe.

## 2022-11-08 NOTE — H&P ADULT - PROBLEM SELECTOR PLAN 1
-Patient MR on R foot on 9/29/22 which showed No osteomyelitis. Pt had JESUS of lower ext on 9/26/22 which showed Abnormally elevated ABIs compatible with calcified vessels. No discernible pulse volume recording at the level of the right digit. He was discharged on medical management although R BKA was recommended by vascular surgery.  -R toe gangrene present on 1st-3rd digits. No drainage or pus present. Pulses. R Dorsalis pedis pulse not palpable -Patient MR on R foot on 9/29/22 which showed No osteomyelitis. Pt had JESUS of lower ext on 9/26/22 which showed Abnormally elevated ABIs compatible with calcified vessels. No discernible pulse volume recording at the level of the right digit. He was discharged on medical management although R BKA was recommended by vascular surgery.  -R toe gangrene present on 1st-3rd digits. No drainage or pus present. Pulses. R Dorsalis pedis pulse not palpable  -Vascular surgery consulted [facility] P[facility]on. Will keep patient NPO after midnight.   -ID Consulted Dr. Babcock  -podiatry consulted  -s/p 1 dose vanc and zosyn in ED   -c/w zosyn . Vanc will be given post HD per pharmacy recommendations  -Tylenol PRN for pain

## 2022-11-09 DIAGNOSIS — Z02.9 ENCOUNTER FOR ADMINISTRATIVE EXAMINATIONS, UNSPECIFIED: ICD-10-CM

## 2022-11-09 LAB
ALBUMIN SERPL ELPH-MCNC: 2.8 G/DL — LOW (ref 3.5–5)
ALP SERPL-CCNC: 57 U/L — SIGNIFICANT CHANGE UP (ref 40–120)
ALT FLD-CCNC: 20 U/L DA — SIGNIFICANT CHANGE UP (ref 10–60)
ANION GAP SERPL CALC-SCNC: 9 MMOL/L — SIGNIFICANT CHANGE UP (ref 5–17)
APTT BLD: 37 SEC — HIGH (ref 27.5–35.5)
AST SERPL-CCNC: 15 U/L — SIGNIFICANT CHANGE UP (ref 10–40)
BILIRUB SERPL-MCNC: 0.3 MG/DL — SIGNIFICANT CHANGE UP (ref 0.2–1.2)
BLD GP AB SCN SERPL QL: SIGNIFICANT CHANGE UP
BUN SERPL-MCNC: 44 MG/DL — HIGH (ref 7–18)
CALCIUM SERPL-MCNC: 8.6 MG/DL — SIGNIFICANT CHANGE UP (ref 8.4–10.5)
CHLORIDE SERPL-SCNC: 102 MMOL/L — SIGNIFICANT CHANGE UP (ref 96–108)
CO2 SERPL-SCNC: 24 MMOL/L — SIGNIFICANT CHANGE UP (ref 22–31)
CREAT SERPL-MCNC: 3.66 MG/DL — HIGH (ref 0.5–1.3)
CRP SERPL-MCNC: 10 MG/L — HIGH
CULTURE RESULTS: SIGNIFICANT CHANGE UP
EGFR: 17 ML/MIN/1.73M2 — LOW
FERRITIN SERPL-MCNC: 652 NG/ML — HIGH (ref 30–400)
GLUCOSE BLDC GLUCOMTR-MCNC: 116 MG/DL — HIGH (ref 70–99)
GLUCOSE BLDC GLUCOMTR-MCNC: 153 MG/DL — HIGH (ref 70–99)
GLUCOSE BLDC GLUCOMTR-MCNC: 156 MG/DL — HIGH (ref 70–99)
GLUCOSE BLDC GLUCOMTR-MCNC: 193 MG/DL — HIGH (ref 70–99)
GLUCOSE SERPL-MCNC: 178 MG/DL — HIGH (ref 70–99)
HCT VFR BLD CALC: 22.4 % — LOW (ref 39–50)
HGB BLD-MCNC: 7.2 G/DL — LOW (ref 13–17)
INR BLD: 1.14 RATIO — SIGNIFICANT CHANGE UP (ref 0.88–1.16)
IRON SATN MFR SERPL: 39 % — SIGNIFICANT CHANGE UP (ref 20–55)
IRON SATN MFR SERPL: 88 UG/DL — SIGNIFICANT CHANGE UP (ref 65–170)
MAGNESIUM SERPL-MCNC: 2 MG/DL — SIGNIFICANT CHANGE UP (ref 1.6–2.6)
MCHC RBC-ENTMCNC: 29.6 PG — SIGNIFICANT CHANGE UP (ref 27–34)
MCHC RBC-ENTMCNC: 32.1 GM/DL — SIGNIFICANT CHANGE UP (ref 32–36)
MCV RBC AUTO: 92.2 FL — SIGNIFICANT CHANGE UP (ref 80–100)
NRBC # BLD: 0 /100 WBCS — SIGNIFICANT CHANGE UP (ref 0–0)
PHOSPHATE SERPL-MCNC: 3.9 MG/DL — SIGNIFICANT CHANGE UP (ref 2.5–4.5)
PLATELET # BLD AUTO: 205 K/UL — SIGNIFICANT CHANGE UP (ref 150–400)
POTASSIUM SERPL-MCNC: 3.9 MMOL/L — SIGNIFICANT CHANGE UP (ref 3.5–5.3)
POTASSIUM SERPL-SCNC: 3.9 MMOL/L — SIGNIFICANT CHANGE UP (ref 3.5–5.3)
PROT SERPL-MCNC: 6.4 G/DL — SIGNIFICANT CHANGE UP (ref 6–8.3)
PROTHROM AB SERPL-ACNC: 13.6 SEC — HIGH (ref 10.5–13.4)
RBC # BLD: 2.43 M/UL — LOW (ref 4.2–5.8)
RBC # FLD: 14.7 % — HIGH (ref 10.3–14.5)
SODIUM SERPL-SCNC: 135 MMOL/L — SIGNIFICANT CHANGE UP (ref 135–145)
SPECIMEN SOURCE: SIGNIFICANT CHANGE UP
TIBC SERPL-MCNC: 225 UG/DL — LOW (ref 250–450)
TRANSFERRIN SERPL-MCNC: 184 MG/DL — LOW (ref 200–360)
UIBC SERPL-MCNC: 137 UG/DL — SIGNIFICANT CHANGE UP (ref 110–370)
WBC # BLD: 6.27 K/UL — SIGNIFICANT CHANGE UP (ref 3.8–10.5)
WBC # FLD AUTO: 6.27 K/UL — SIGNIFICANT CHANGE UP (ref 3.8–10.5)

## 2022-11-09 PROCEDURE — 27880 AMPUTATION OF LOWER LEG: CPT | Mod: GC

## 2022-11-09 PROCEDURE — 99231 SBSQ HOSP IP/OBS SF/LOW 25: CPT | Mod: 57

## 2022-11-09 PROCEDURE — 88307 TISSUE EXAM BY PATHOLOGIST: CPT | Mod: 26

## 2022-11-09 RX ORDER — GLUCAGON INJECTION, SOLUTION 0.5 MG/.1ML
1 INJECTION, SOLUTION SUBCUTANEOUS ONCE
Refills: 0 | Status: DISCONTINUED | OUTPATIENT
Start: 2022-11-09 | End: 2022-11-22

## 2022-11-09 RX ORDER — CHLORHEXIDINE GLUCONATE 213 G/1000ML
1 SOLUTION TOPICAL DAILY
Refills: 0 | Status: DISCONTINUED | OUTPATIENT
Start: 2022-11-09 | End: 2022-11-22

## 2022-11-09 RX ORDER — CLOPIDOGREL BISULFATE 75 MG/1
75 TABLET, FILM COATED ORAL DAILY
Refills: 0 | Status: DISCONTINUED | OUTPATIENT
Start: 2022-11-09 | End: 2022-11-22

## 2022-11-09 RX ORDER — GABAPENTIN 400 MG/1
300 CAPSULE ORAL ONCE
Refills: 0 | Status: COMPLETED | OUTPATIENT
Start: 2022-11-09 | End: 2022-11-09

## 2022-11-09 RX ORDER — VANCOMYCIN HCL 1 G
1000 VIAL (EA) INTRAVENOUS ONCE
Refills: 0 | Status: DISCONTINUED | OUTPATIENT
Start: 2022-11-09 | End: 2022-11-09

## 2022-11-09 RX ORDER — DEXTROSE 50 % IN WATER 50 %
15 SYRINGE (ML) INTRAVENOUS ONCE
Refills: 0 | Status: DISCONTINUED | OUTPATIENT
Start: 2022-11-09 | End: 2022-11-22

## 2022-11-09 RX ORDER — OXYCODONE HYDROCHLORIDE 5 MG/1
5 TABLET ORAL EVERY 4 HOURS
Refills: 0 | Status: DISCONTINUED | OUTPATIENT
Start: 2022-11-09 | End: 2022-11-09

## 2022-11-09 RX ORDER — INSULIN LISPRO 100/ML
VIAL (ML) SUBCUTANEOUS
Refills: 0 | Status: DISCONTINUED | OUTPATIENT
Start: 2022-11-09 | End: 2022-11-22

## 2022-11-09 RX ORDER — LINEZOLID 600 MG/300ML
600 INJECTION, SOLUTION INTRAVENOUS EVERY 12 HOURS
Refills: 0 | Status: DISCONTINUED | OUTPATIENT
Start: 2022-11-10 | End: 2022-11-16

## 2022-11-09 RX ORDER — INSULIN LISPRO 100/ML
VIAL (ML) SUBCUTANEOUS AT BEDTIME
Refills: 0 | Status: DISCONTINUED | OUTPATIENT
Start: 2022-11-09 | End: 2022-11-22

## 2022-11-09 RX ORDER — LINEZOLID 600 MG/300ML
INJECTION, SOLUTION INTRAVENOUS
Refills: 0 | Status: DISCONTINUED | OUTPATIENT
Start: 2022-11-09 | End: 2022-11-16

## 2022-11-09 RX ORDER — POLYETHYLENE GLYCOL 3350 17 G/17G
17 POWDER, FOR SOLUTION ORAL DAILY
Refills: 0 | Status: DISCONTINUED | OUTPATIENT
Start: 2022-11-09 | End: 2022-11-22

## 2022-11-09 RX ORDER — FENTANYL CITRATE 50 UG/ML
25 INJECTION INTRAVENOUS
Refills: 0 | Status: DISCONTINUED | OUTPATIENT
Start: 2022-11-09 | End: 2022-11-09

## 2022-11-09 RX ORDER — SODIUM CHLORIDE 9 MG/ML
1000 INJECTION, SOLUTION INTRAVENOUS
Refills: 0 | Status: DISCONTINUED | OUTPATIENT
Start: 2022-11-09 | End: 2022-11-22

## 2022-11-09 RX ORDER — PIPERACILLIN AND TAZOBACTAM 4; .5 G/20ML; G/20ML
3.38 INJECTION, POWDER, LYOPHILIZED, FOR SOLUTION INTRAVENOUS EVERY 12 HOURS
Refills: 0 | Status: COMPLETED | OUTPATIENT
Start: 2022-11-09 | End: 2022-11-16

## 2022-11-09 RX ORDER — DEXTROSE 50 % IN WATER 50 %
25 SYRINGE (ML) INTRAVENOUS ONCE
Refills: 0 | Status: DISCONTINUED | OUTPATIENT
Start: 2022-11-09 | End: 2022-11-22

## 2022-11-09 RX ORDER — ACETAMINOPHEN 500 MG
650 TABLET ORAL EVERY 6 HOURS
Refills: 0 | Status: DISCONTINUED | OUTPATIENT
Start: 2022-11-09 | End: 2022-11-22

## 2022-11-09 RX ORDER — ASPIRIN/CALCIUM CARB/MAGNESIUM 324 MG
81 TABLET ORAL DAILY
Refills: 0 | Status: DISCONTINUED | OUTPATIENT
Start: 2022-11-09 | End: 2022-11-22

## 2022-11-09 RX ORDER — CHLORHEXIDINE GLUCONATE 213 G/1000ML
1 SOLUTION TOPICAL ONCE
Refills: 0 | Status: DISCONTINUED | OUTPATIENT
Start: 2022-11-09 | End: 2022-11-09

## 2022-11-09 RX ORDER — ERYTHROPOIETIN 10000 [IU]/ML
10000 INJECTION, SOLUTION INTRAVENOUS; SUBCUTANEOUS ONCE
Refills: 0 | Status: COMPLETED | OUTPATIENT
Start: 2022-11-09 | End: 2022-11-09

## 2022-11-09 RX ORDER — INFLUENZA VIRUS VACCINE 15; 15; 15; 15 UG/.5ML; UG/.5ML; UG/.5ML; UG/.5ML
0.7 SUSPENSION INTRAMUSCULAR ONCE
Refills: 0 | Status: DISCONTINUED | OUTPATIENT
Start: 2022-11-09 | End: 2022-11-22

## 2022-11-09 RX ORDER — HEPARIN SODIUM 5000 [USP'U]/ML
5000 INJECTION INTRAVENOUS; SUBCUTANEOUS EVERY 8 HOURS
Refills: 0 | Status: DISCONTINUED | OUTPATIENT
Start: 2022-11-09 | End: 2022-11-22

## 2022-11-09 RX ORDER — CHLORHEXIDINE GLUCONATE 213 G/1000ML
1 SOLUTION TOPICAL DAILY
Refills: 0 | Status: DISCONTINUED | OUTPATIENT
Start: 2022-11-09 | End: 2022-11-09

## 2022-11-09 RX ORDER — LINEZOLID 600 MG/300ML
600 INJECTION, SOLUTION INTRAVENOUS ONCE
Refills: 0 | Status: COMPLETED | OUTPATIENT
Start: 2022-11-09 | End: 2022-11-09

## 2022-11-09 RX ORDER — DEXTROSE 50 % IN WATER 50 %
12.5 SYRINGE (ML) INTRAVENOUS ONCE
Refills: 0 | Status: DISCONTINUED | OUTPATIENT
Start: 2022-11-09 | End: 2022-11-22

## 2022-11-09 RX ADMIN — CHLORHEXIDINE GLUCONATE 1 APPLICATION(S): 213 SOLUTION TOPICAL at 10:52

## 2022-11-09 RX ADMIN — Medication 100 MILLIGRAM(S): at 06:12

## 2022-11-09 RX ADMIN — PANTOPRAZOLE SODIUM 40 MILLIGRAM(S): 20 TABLET, DELAYED RELEASE ORAL at 06:13

## 2022-11-09 RX ADMIN — PIPERACILLIN AND TAZOBACTAM 25 GRAM(S): 4; .5 INJECTION, POWDER, LYOPHILIZED, FOR SOLUTION INTRAVENOUS at 09:47

## 2022-11-09 RX ADMIN — PIPERACILLIN AND TAZOBACTAM 25 GRAM(S): 4; .5 INJECTION, POWDER, LYOPHILIZED, FOR SOLUTION INTRAVENOUS at 00:41

## 2022-11-09 RX ADMIN — HEPARIN SODIUM 5000 UNIT(S): 5000 INJECTION INTRAVENOUS; SUBCUTANEOUS at 06:13

## 2022-11-09 RX ADMIN — ERYTHROPOIETIN 10000 UNIT(S): 10000 INJECTION, SOLUTION INTRAVENOUS; SUBCUTANEOUS at 15:41

## 2022-11-09 RX ADMIN — LINEZOLID 300 MILLIGRAM(S): 600 INJECTION, SOLUTION INTRAVENOUS at 21:58

## 2022-11-09 RX ADMIN — HEPARIN SODIUM 5000 UNIT(S): 5000 INJECTION INTRAVENOUS; SUBCUTANEOUS at 21:59

## 2022-11-09 RX ADMIN — GABAPENTIN 300 MILLIGRAM(S): 400 CAPSULE ORAL at 18:51

## 2022-11-09 RX ADMIN — FENTANYL CITRATE 25 MICROGRAM(S): 50 INJECTION INTRAVENOUS at 14:04

## 2022-11-09 RX ADMIN — PIPERACILLIN AND TAZOBACTAM 25 GRAM(S): 4; .5 INJECTION, POWDER, LYOPHILIZED, FOR SOLUTION INTRAVENOUS at 18:51

## 2022-11-09 NOTE — CONSULT NOTE ADULT - ASSESSMENT
Patient is a 72y old  Male who is coming from [facility], with medical history of ESRD (T-TH-S), and DM, presents to the ER  for evaluation of Right foot pain. Patient has severe peripheral arterial disease. He was admitted on September 26, 2022 for gangrene of right foot. At that time, patient was treated with IV abx. MRI was negative for OM. He had Right LE angiogram on 10/3 w/ no targets for re vascularization but showed extensive distal small vessel disease, there was no acute vascular interventions done. Patient was recommended a R BKA but wanted a medical management instead. Patient now coming in c/o R foot pain. He states that he wants surgery now for his foot. On admission, he has no fever , no leukocytosis. He has evaluated by Vascular team and taken to OR for Right BKA. He has started on Vancomycin and Zosyn, and the ID consult requested to assist with further evaluation and antibiotic management.    # Right gangrenous foot- in OR for s/p Right BKA    would recommend:    1. Follow Up Blood cultures  2. Post-op Labs  3. Pain management as needed  4. Please change Vancomycin to linezolid due to kidney insufficiency  5. Continue Zosyn for now    will follow the patient with you and make further recommendation based on the clinical course and Lab results  Thank you for the opportunity to participate in Mr. MATTHEWS's care    Attending Attestation:    Spent more than 65 minutes on total encounter, more than 50 % of the visit was spent counseling and/or coordinating care by the Attending physician.

## 2022-11-09 NOTE — CONSULT NOTE ADULT - ASSESSMENT
72 year old male, coming from [facility], with medical history of ESRD (T-TH-S), and DM coming to ED c/o R foot pain admitted for further management of necrotic toe.   Problem/Plan - 1:  ·  Problem: Gangrene of toe of right foot.   ·  Plan: -Patient MR on R foot on 9/29/22 which showed No osteomyelitis. Pt had JESUS of lower ext on 9/26/22 which showed Abnormally elevated ABIs compatible with calcified vessels. No discernible pulse volume recording at the level of the right digit. He was discharged on medical management although R BKA was recommended by vascular surgery.  -R toe gangrene present on 1st-3rd digits. No drainage or pus present. Pulses. R Dorsalis pedis pulse not palpable+  -Scheduled for amputation toes  Cardiovascular Risk Stratification - RCRI =  (1   ).    1. History of ischemic heart disease: [ ]yes [x ]no  2. History of congestive heart failure: [ ]yes [x ]no  3. History of cerebrovascular disease (stroke or transient ischemic attack):[ ]yes [x ] no  4. History of diabetes requiring preoperative insulin use: [ ] yes [ x]no  5. Chronic kidney disease (creatinine > 2 mg/dL): [x ] yes [ ] no  6. Undergoing suprainguinal vascular, intraperitoneal, or intrathoracic surgery:[ ]  yes [x ]no      0 predictors = 0.4%, 1 predictor = 0.9%, 2 predictors = 6.6%, =3 predictors = >11%    - Overall this patient is as   0.9 _% risk (for cardiac death, nonfatal myocardial infarction, and nonfatal cardiac arrest perioperatively for this _ intermediate     _risk procedure).   They are however without evidence of ACS, Decompensated heart failure,Obstructive Valvular haert disease or unstable arrhythmia.     There is no further recommendation for risk stratifying imaging/stress testing prior to planned surgery        Transfuse PRBC PREOP

## 2022-11-09 NOTE — CONSULT NOTE ADULT - TIME BILLING
- Review of records, telemetry, vital signs and daily labs.   - General and cardiovascular physical examination.  - Generation of cardiovascular treatment plan.  - Coordination of care.      Patient was seen and examined by me on 11/09/2022,interim events noted,labs and radiology studies reviewed.  Mumtaz Babcock MD,FACC.  [address] [place], [place] [id number].  [phone]

## 2022-11-09 NOTE — PROGRESS NOTE ADULT - ASSESSMENT
71 y/o male from [facility], with PMHx of ESRD (T-TH-S), DM, s/p Rt foot angiogram 10/03, RBKA recommended at time but patient refused, presented back to ED with c/o R foot pain and admitted to medicine.  Patient has severe peripheral arterial disease. and gangrene of the 2nd , 3rd digit and heel of rt foot. Pt NPO since midnight for OR today for R BKA.

## 2022-11-09 NOTE — PRE-OP CHECKLIST - SELECT TESTS ORDERED
BMP/CBC/CMP/PT/PTT/INR/Type and Cross/Type and Screen/Results in MD note/POCT Blood Glucose/COVID-19

## 2022-11-09 NOTE — PROGRESS NOTE ADULT - SUBJECTIVE AND OBJECTIVE BOX
NP Note discussed with  Primary Attending    Patient is a 72y old  Male who presents with a chief complaint of toe gangrene (2022 09:01)      INTERVAL HPI/OVERNIGHT EVENTS: no new complaints    MEDICATIONS  (STANDING):  aspirin enteric coated 81 milliGRAM(s) Oral daily  chlorhexidine 4% Liquid 1 Application(s) Topical once  clopidogrel Tablet 75 milliGRAM(s) Oral daily  dextrose 5%. 1000 milliLiter(s) (50 mL/Hr) IV Continuous <Continuous>  dextrose 5%. 1000 milliLiter(s) (100 mL/Hr) IV Continuous <Continuous>  dextrose 50% Injectable 25 Gram(s) IV Push once  dextrose 50% Injectable 12.5 Gram(s) IV Push once  dextrose 50% Injectable 25 Gram(s) IV Push once  glucagon  Injectable 1 milliGRAM(s) IntraMuscular once  heparin   Injectable 5000 Unit(s) SubCutaneous every 8 hours  influenza  Vaccine (HIGH DOSE) 0.7 milliLiter(s) IntraMuscular once  insulin lispro (ADMELOG) corrective regimen sliding scale   SubCutaneous three times a day before meals  insulin lispro (ADMELOG) corrective regimen sliding scale   SubCutaneous at bedtime  Nephro-zack 1 Tablet(s) Oral daily  pantoprazole    Tablet 40 milliGRAM(s) Oral before breakfast  piperacillin/tazobactam IVPB.. 3.375 Gram(s) IV Intermittent every 8 hours  polyethylene glycol 3350 17 Gram(s) Oral daily  sevelamer carbonate 800 milliGRAM(s) Oral three times a day with meals  vancomycin  IVPB 500 milliGRAM(s) IV Intermittent every 48 hours    MEDICATIONS  (PRN):  acetaminophen     Tablet .. 650 milliGRAM(s) Oral every 6 hours PRN Temp greater or equal to 38C (100.4F), Mild Pain (1 - 3)  dextrose Oral Gel 15 Gram(s) Oral once PRN Blood Glucose LESS THAN 70 milliGRAM(s)/deciliter      __________________________________________________  REVIEW OF SYSTEMS:    CONSTITUTIONAL: No fever,   EYES: no acute visual disturbances  NECK: No pain or stiffness  RESPIRATORY: No cough; No shortness of breath  CARDIOVASCULAR: No chest pain, no palpitations  GASTROINTESTINAL: No pain. No nausea or vomiting; No diarrhea   NEUROLOGICAL: No headache or numbness, no tremors  MUSCULOSKELETAL: No joint pain, no muscle pain  GENITOURINARY: no dysuria, no frequency, no hesitancy  PSYCHIATRY: no depression , no anxiety  ALL OTHER  ROS negative        Vital Signs Last 24 Hrs  T(C): 36.1 (2022 05:38), Max: 37.2 (2022 20:50)  T(F): 97 (2022 05:38), Max: 98.9 (2022 20:50)  HR: 78 (2022 05:38) (73 - 88)  BP: 135/60 (2022 05:38) (102/60 - 145/76)  BP(mean): --  RR: 17 (2022 05:38) (17 - 19)  SpO2: 100% (2022 05:38) (96% - 100%)    Parameters below as of 2022 05:38  Patient On (Oxygen Delivery Method): room air        ________________________________________________  PHYSICAL EXAM:  GENERAL: NAD  HEENT: Normocephalic;  conjunctivae and sclerae clear; moist mucous membranes;   NECK : supple  CHEST/LUNG: Clear to auscultation bilaterally with good air entry   HEART: S1 S2  regular; no murmurs, gallops or rubs  ABDOMEN: Soft, Nontender, Nondistended; Bowel sounds present  EXTREMITIES: no cyanosis; no edema; no calf tenderness  SKIN: warm and dry; no rash  NERVOUS SYSTEM:  Awake and alert; Oriented  to place, person and time ; no new deficits    _________________________________________________  LABS:                        7.2    6.27  )-----------( 205      ( 2022 06:40 )             22.4     11-    135  |  102  |  44<H>  ----------------------------<  178<H>  3.9   |  24  |  3.66<H>    Ca    8.6      2022 06:40  Phos  3.9       Mg     2.0         TPro  6.4  /  Alb  2.8<L>  /  TBili  0.3  /  DBili  x   /  AST  15  /  ALT  20  /  AlkPhos  57  11-    PT/INR - ( 2022 06:40 )   PT: 13.6 sec;   INR: 1.14 ratio         PTT - ( 2022 06:40 )  PTT:37.0 sec  Urinalysis Basic - ( 2022 18:00 )    Color: Yellow / Appearance: Clear / S.005 / pH: x  Gluc: x / Ketone: Negative  / Bili: Negative / Urobili: Negative   Blood: x / Protein: 30 mg/dL / Nitrite: Negative   Leuk Esterase: Negative / RBC: 0-2 /HPF / WBC 0-2 /HPF   Sq Epi: x / Non Sq Epi: Occasional /HPF / Bacteria: Few /HPF      CAPILLARY BLOOD GLUCOSE      POCT Blood Glucose.: 153 mg/dL (2022 09:54)        RADIOLOGY & ADDITIONAL TESTS:    Imaging  Reviewed:  YES/NO    Consultant(s) Notes Reviewed:   YES/ No      Plan of care was discussed with patient and /or primary care giver; all questions and concerns were addressed  NP Note discussed with  Primary Attending    Patient is a 72y old  Male who presents with a chief complaint of toe gangrene (2022 09:01)      INTERVAL HPI/OVERNIGHT EVENTS: patient is preop for today and wants to eat    MEDICATIONS  (STANDING):  aspirin enteric coated 81 milliGRAM(s) Oral daily  chlorhexidine 4% Liquid 1 Application(s) Topical once  clopidogrel Tablet 75 milliGRAM(s) Oral daily  dextrose 5%. 1000 milliLiter(s) (50 mL/Hr) IV Continuous <Continuous>  dextrose 5%. 1000 milliLiter(s) (100 mL/Hr) IV Continuous <Continuous>  dextrose 50% Injectable 25 Gram(s) IV Push once  dextrose 50% Injectable 12.5 Gram(s) IV Push once  dextrose 50% Injectable 25 Gram(s) IV Push once  glucagon  Injectable 1 milliGRAM(s) IntraMuscular once  heparin   Injectable 5000 Unit(s) SubCutaneous every 8 hours  influenza  Vaccine (HIGH DOSE) 0.7 milliLiter(s) IntraMuscular once  insulin lispro (ADMELOG) corrective regimen sliding scale   SubCutaneous three times a day before meals  insulin lispro (ADMELOG) corrective regimen sliding scale   SubCutaneous at bedtime  Nephro-zack 1 Tablet(s) Oral daily  pantoprazole    Tablet 40 milliGRAM(s) Oral before breakfast  piperacillin/tazobactam IVPB.. 3.375 Gram(s) IV Intermittent every 8 hours  polyethylene glycol 3350 17 Gram(s) Oral daily  sevelamer carbonate 800 milliGRAM(s) Oral three times a day with meals  vancomycin  IVPB 500 milliGRAM(s) IV Intermittent every 48 hours    MEDICATIONS  (PRN):  acetaminophen     Tablet .. 650 milliGRAM(s) Oral every 6 hours PRN Temp greater or equal to 38C (100.4F), Mild Pain (1 - 3)  dextrose Oral Gel 15 Gram(s) Oral once PRN Blood Glucose LESS THAN 70 milliGRAM(s)/deciliter      __________________________________________________  REVIEW OF SYSTEMS:    CONSTITUTIONAL: No fever,   EYES: no acute visual disturbances  NECK: No pain or stiffness  RESPIRATORY: No cough; No shortness of breath  CARDIOVASCULAR: No chest pain, no palpitations  GASTROINTESTINAL: No pain. No nausea or vomiting; No diarrhea   NEUROLOGICAL: No headache or numbness, no tremors  MUSCULOSKELETAL: right foot pain  GENITOURINARY: no dysuria, no frequency, no hesitancy  PSYCHIATRY: no depression , no anxiety  ALL OTHER  ROS negative        Vital Signs Last 24 Hrs  T(C): 36.1 (2022 05:38), Max: 37.2 (2022 20:50)  T(F): 97 (2022 05:38), Max: 98.9 (2022 20:50)  HR: 78 (2022 05:38) (73 - 88)  BP: 135/60 (2022 05:38) (102/60 - 145/76)  BP(mean): --  RR: 17 (2022 05:38) (17 - 19)  SpO2: 100% (2022 05:38) (96% - 100%)    Parameters below as of 2022 05:38  Patient On (Oxygen Delivery Method): room air        ________________________________________________  PHYSICAL EXAM:  GENERAL: NAD  HEENT: Normocephalic;  conjunctivae and sclerae clear; moist mucous membranes;   NECK : supple  CHEST/LUNG: Clear to auscultation bilaterally with good air entry   HEART: S1 S2  regular; no murmurs, gallops or rubs  ABDOMEN: Soft, Nontender, Nondistended; Bowel sounds present  EXTREMITIES: no cyanosis; no edema; no calf tenderness  SKIN: warm and dry; no rash  NERVOUS SYSTEM:  Awake and alert; Oriented  to place, person and time ; no new deficits    _________________________________________________  LABS:                        7.2    6.27  )-----------( 205      ( 2022 06:40 )             22.4     11-    135  |  102  |  44<H>  ----------------------------<  178<H>  3.9   |  24  |  3.66<H>    Ca    8.6      2022 06:40  Phos  3.9     11-  Mg     2.0     11-    TPro  6.4  /  Alb  2.8<L>  /  TBili  0.3  /  DBili  x   /  AST  15  /  ALT  20  /  AlkPhos  57  11-09    PT/INR - ( 2022 06:40 )   PT: 13.6 sec;   INR: 1.14 ratio         PTT - ( 2022 06:40 )  PTT:37.0 sec  Urinalysis Basic - ( 2022 18:00 )    Color: Yellow / Appearance: Clear / S.005 / pH: x  Gluc: x / Ketone: Negative  / Bili: Negative / Urobili: Negative   Blood: x / Protein: 30 mg/dL / Nitrite: Negative   Leuk Esterase: Negative / RBC: 0-2 /HPF / WBC 0-2 /HPF   Sq Epi: x / Non Sq Epi: Occasional /HPF / Bacteria: Few /HPF      CAPILLARY BLOOD GLUCOSE      POCT Blood Glucose.: 153 mg/dL (2022 09:54)        RADIOLOGY & ADDITIONAL TESTS:    Imaging  Reviewed:    < from: MR Foot No Cont, Right (22 @ 20:37) >    INTERPRETATION:  MR FOOT RIGHT dated 2022 8:37 PM    INDICATION: Pain and swelling    COMPARISON: Foot radiographs dated 2022    TECHNIQUE: Multi-sequential, multiplanar MRI imaging of the right ankle   and hindfoot was performed per standard protocol.    FINDINGS:    BONE MARROW: There is no focal T1 signal hypointensity present. No   cortical erosion or destruction is noted. There is no fracture. No   osteonecrosis.  SYNOVIUM/ JOINT FLUID: No joint effusion.  TENDONS: Mild tendinosis of the posterior tibialis tendon. No   full-thickness tendon tear or retraction  MUSCLES: Edema and atrophy in the musculature. A nonspecific finding that   can be seen with neuropathic change.  NEUROVASCULAR STRUCTURES: Preserved  SUBCUTANEOUS SOFT TISSUES: Mild dorsal forefoot soft tissue swelling.   Posterior heel skin wound. Mild subjacent edema.    IMPRESSION:    1.  No osteomyelitis.  2.  Posterior heel skin wound and dorsal forefoot soft tissue swelling.   Nonspecific but can be seen with cellulitis. No drainable fluid   collection.    < end of copied text >      Consultant(s) Notes Reviewed:   YES      Plan of care was discussed with patient and /or primary care giver; all questions and concerns were addressed  NP Note discussed with  Primary Attending    Patient is a 72y old  Male who presents with a chief complaint of toe gangrene (2022 09:01)      INTERVAL HPI/OVERNIGHT EVENTS: patient is preop for today and wants to eat    MEDICATIONS  (STANDING):  aspirin enteric coated 81 milliGRAM(s) Oral daily  chlorhexidine 4% Liquid 1 Application(s) Topical once  clopidogrel Tablet 75 milliGRAM(s) Oral daily  dextrose 5%. 1000 milliLiter(s) (50 mL/Hr) IV Continuous <Continuous>  dextrose 5%. 1000 milliLiter(s) (100 mL/Hr) IV Continuous <Continuous>  dextrose 50% Injectable 25 Gram(s) IV Push once  dextrose 50% Injectable 12.5 Gram(s) IV Push once  dextrose 50% Injectable 25 Gram(s) IV Push once  glucagon  Injectable 1 milliGRAM(s) IntraMuscular once  heparin   Injectable 5000 Unit(s) SubCutaneous every 8 hours  influenza  Vaccine (HIGH DOSE) 0.7 milliLiter(s) IntraMuscular once  insulin lispro (ADMELOG) corrective regimen sliding scale   SubCutaneous three times a day before meals  insulin lispro (ADMELOG) corrective regimen sliding scale   SubCutaneous at bedtime  Nephro-zack 1 Tablet(s) Oral daily  pantoprazole    Tablet 40 milliGRAM(s) Oral before breakfast  piperacillin/tazobactam IVPB.. 3.375 Gram(s) IV Intermittent every 8 hours  polyethylene glycol 3350 17 Gram(s) Oral daily  sevelamer carbonate 800 milliGRAM(s) Oral three times a day with meals  vancomycin  IVPB 500 milliGRAM(s) IV Intermittent every 48 hours    MEDICATIONS  (PRN):  acetaminophen     Tablet .. 650 milliGRAM(s) Oral every 6 hours PRN Temp greater or equal to 38C (100.4F), Mild Pain (1 - 3)  dextrose Oral Gel 15 Gram(s) Oral once PRN Blood Glucose LESS THAN 70 milliGRAM(s)/deciliter      __________________________________________________  REVIEW OF SYSTEMS:    CONSTITUTIONAL: No fever,   EYES: no acute visual disturbances  NECK: No pain or stiffness  RESPIRATORY: No cough; No shortness of breath  CARDIOVASCULAR: No chest pain, no palpitations  GASTROINTESTINAL: No pain. No nausea or vomiting; No diarrhea   NEUROLOGICAL: No headache or numbness, no tremors  MUSCULOSKELETAL: right foot pain  GENITOURINARY: no dysuria, no frequency, no hesitancy  PSYCHIATRY: no depression , no anxiety  ALL OTHER  ROS negative        Vital Signs Last 24 Hrs  T(C): 36.1 (2022 05:38), Max: 37.2 (2022 20:50)  T(F): 97 (2022 05:38), Max: 98.9 (2022 20:50)  HR: 78 (2022 05:38) (73 - 88)  BP: 135/60 (2022 05:38) (102/60 - 145/76)  BP(mean): --  RR: 17 (2022 05:38) (17 - 19)  SpO2: 100% (2022 05:38) (96% - 100%)    Parameters below as of 2022 05:38  Patient On (Oxygen Delivery Method): room air        ________________________________________________  PHYSICAL EXAM:  GENERAL: NAD  HEENT: Normocephalic;  conjunctivae and sclerae clear; moist mucous membranes;   NECK : supple, no JVD  CHEST/LUNG: Clear and diminshed bilaterally  HEART: S1 S2  RRR; no murmurs, gallops or rubs  ABDOMEN: Soft, Nontender, Nondistended; Bowel sounds present  EXTREMITIES: no cyanosis; no edema; no calf tenderness, 2+ radial pulses and L pedal, palpable R popliteal  SKIN: warm and dry; no rash  NERVOUS SYSTEM:  Awake and alert; Oriented  to place, person and time ; no new deficits    _________________________________________________  LABS:                        7.2    6.27  )-----------( 205      ( 2022 06:40 )             22.4     -    135  |  102  |  44<H>  ----------------------------<  178<H>  3.9   |  24  |  3.66<H>    Ca    8.6      2022 06:40  Phos  3.9       Mg     2.0         TPro  6.4  /  Alb  2.8<L>  /  TBili  0.3  /  DBili  x   /  AST  15  /  ALT  20  /  AlkPhos  57  11-    PT/INR - ( 2022 06:40 )   PT: 13.6 sec;   INR: 1.14 ratio         PTT - ( 2022 06:40 )  PTT:37.0 sec  Urinalysis Basic - ( 2022 18:00 )    Color: Yellow / Appearance: Clear / S.005 / pH: x  Gluc: x / Ketone: Negative  / Bili: Negative / Urobili: Negative   Blood: x / Protein: 30 mg/dL / Nitrite: Negative   Leuk Esterase: Negative / RBC: 0-2 /HPF / WBC 0-2 /HPF   Sq Epi: x / Non Sq Epi: Occasional /HPF / Bacteria: Few /HPF      CAPILLARY BLOOD GLUCOSE      POCT Blood Glucose.: 153 mg/dL (2022 09:54)        RADIOLOGY & ADDITIONAL TESTS:    Imaging  Reviewed:    < from: MR Foot No Cont, Right (22 @ 20:37) >    INTERPRETATION:  MR FOOT RIGHT dated 2022 8:37 PM    INDICATION: Pain and swelling    COMPARISON: Foot radiographs dated 2022    TECHNIQUE: Multi-sequential, multiplanar MRI imaging of the right ankle   and hindfoot was performed per standard protocol.    FINDINGS:    BONE MARROW: There is no focal T1 signal hypointensity present. No   cortical erosion or destruction is noted. There is no fracture. No   osteonecrosis.  SYNOVIUM/ JOINT FLUID: No joint effusion.  TENDONS: Mild tendinosis of the posterior tibialis tendon. No   full-thickness tendon tear or retraction  MUSCLES: Edema and atrophy in the musculature. A nonspecific finding that   can be seen with neuropathic change.  NEUROVASCULAR STRUCTURES: Preserved  SUBCUTANEOUS SOFT TISSUES: Mild dorsal forefoot soft tissue swelling.   Posterior heel skin wound. Mild subjacent edema.    IMPRESSION:    1.  No osteomyelitis.  2.  Posterior heel skin wound and dorsal forefoot soft tissue swelling.   Nonspecific but can be seen with cellulitis. No drainable fluid   collection.    < end of copied text >      Consultant(s) Notes Reviewed:   YES      Plan of care was discussed with patient and /or primary care giver; all questions and concerns were addressed

## 2022-11-09 NOTE — CONSULT NOTE ADULT - SUBJECTIVE AND OBJECTIVE BOX
[facility] NEPHROLOGY- CONSULTATION NOTE    Patient is a 71yo Male with ESRD on HD, DM a/w Rt foot gangrene. Nephrology consulted for ESRD status.     Last HD  @ [facility]. Pt denies any SOB, chest pain, n/v/d, or LE edema. +Rt foot pain  As per pt, he had a failed Left AVF x2.   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         PAST MEDICAL & SURGICAL HISTORY:  ESRD on dialysis      DM (diabetes mellitus)      No significant past surgical history        No Known Allergies    Home Medications Reviewed  Hospital Medications: Reviewed    SOCIAL HISTORY: +smoker  Denies ETOh,  or drug use  FAMILY HISTORY:  No pertinent family history in first degree relatives        REVIEW OF SYSTEMS:  Gen: no changes in weight  HEENT: no rhinorrhea  Neck: no sore throat  Cards: no chest pain  Resp: no dyspnea  GI: no nausea or vomiting or diarrhea  : no dysuria or hematuria  Vascular: no LE edema +rt LE pain  Derm: no rashes  Neuro: no numbness/tingling  All other review of systems is negative unless indicated above.    VITALS:  T(F): 97.7 (22 @ 14:56), Max: 98.9 (22 @ 20:50)  HR: 62 (22 @ 14:56)  BP: 148/74 (22 @ 14:56)  RR: 16 (22 @ 14:56)  SpO2: 98% (22 @ 14:56)  Wt(kg): --      PHYSICAL EXAM:  Gen: NAD, calm  HEENT: anicteric  Neck: no JVD  Cards: RRR, +S1/S2, no M/G/R  Resp: CTA B/L  GI: soft, NT/ND, NABS  : no CVA tenderness  Extremities: no LLE edema  Derm: s/p Rt BKA wrapped  Access: Rt IJ tunneled HD catheter- benign  Left upper arm AVF- no thrill no bruit    LABS:      135  |  102  |  44<H>  ----------------------------<  178<H>  3.9   |  24  |  3.66<H>    Ca    8.6      2022 06:40  Phos  3.9       Mg     2.0         TPro  6.4  /  Alb  2.8<L>  /  TBili  0.3  /  DBili      /  AST  15  /  ALT  20  /  AlkPhos  57      Creatinine Trend: 3.66 <--, 2.78 <--                        7.2    6.27  )-----------( 205      ( 2022 06:40 )             22.4     Urine Studies:  Urinalysis Basic - ( 2022 18:00 )    Color: Yellow / Appearance: Clear / S.005 / pH:   Gluc:  / Ketone: Negative  / Bili: Negative / Urobili: Negative   Blood:  / Protein: 30 mg/dL / Nitrite: Negative   Leuk Esterase: Negative / RBC: 0-2 /HPF / WBC 0-2 /HPF   Sq Epi:  / Non Sq Epi: Occasional /HPF / Bacteria: Few /HPF    < from: Xray Foot AP + Lateral + Oblique, Right (22 @ 19:08) >    ACC: [id number] EXAM:  XR FOOT COMP MIN 3 VIEWS RT                          PROCEDURE DATE:  2022          INTERPRETATION:  XR FOOT 3 VIEWS RIGHT    Clinical History: Dry gangrene first second and third toes. Right heel   ulcer    Right foot 3 views with comparison to 2022      FINDINGS:    There is no fracture, dislocation or joint effusion.  No degenerative changes.  Moderate atherosclerotic changes.  No radiopaque foreign body.    IMPRESSION:  1.  Swelling with soft tissue ulcer posterior to calcaneus.  2.  No plain film evidence of osteomyelitis.  3.  Obtain MRI as warranted clinically.    --- End of Report ---      < end of copied text >

## 2022-11-09 NOTE — CONSULT NOTE ADULT - NS ATTEND AMEND GEN_ALL_CORE FT
Seen ex'ed PA.  PAD/R ch gangrene/Pain  Non-salvageable  L LE ok. Per pt- was ambulatory.    +fem/pop pulses.  R pedals NP.  Previous angio reviewed- no targets for revasc.  Comorbidities.  Previously declined major amp but now agreeable.  DW'ed pt and sister by tel at length re cond, options procedure/risks/benefits/implications.  Informed consent for R BKA obtained.  Will proceed if ok from med standpoint.  Anemia- ch- Will plan to  transfuse intraop HD post op (pt/sister/med team aware)  .

## 2022-11-09 NOTE — CONSULT NOTE ADULT - ASSESSMENT
71 y/o male from [facility], with PMHx of ESRD (T-TH-S), DM, s/p Rt foot angiogram 10/03 presents to the ED c/o R foot pain Patient has severe peripheral arterial disease. and gangrene of the 2nd , 3rd digit and heel of rt foot. Pt now wants surgery .     Keep NPO, light hydration   Nephrology consult   Other care/mgmt per primary team   Vascular will update/advise

## 2022-11-09 NOTE — PROGRESS NOTE ADULT - PROBLEM SELECTOR PLAN 1
Patient with gangrene and necrotic toe on right foot.  on Zonsyn and Vanco  NPO since midnight for OR  R BKA today   consent obtained by vascular  ID. Dr. Babcock

## 2022-11-09 NOTE — CONSULT NOTE ADULT - SUBJECTIVE AND OBJECTIVE BOX
DATE OF SERVICE:11-09-22 @ 10:58    Patient was seen,examined and evaluated  by me.ER evaluation, Labs and Hospital course was reviewed,    CHIEF COMPLAINT:Foot pain    HPI:HPI:  72 year old male, coming from [facility], with medical history of ESRD (T-TH-S), and DM coming to ED c/o R foot pain. Patient has severe peripheral arterial disease. He was admitted on September 26 2022 for gangrene of right foot. At that time, patient was treated with IV abx. MRI was negative for OM. He had R LE angiogram on 10/3 w/ no targets for re vascularization but showed extensive distal small vessel disease, there was no acute vascular interventions done. Patient was recommended a R BKA but wanted a medical management instead. Patient now coming in c/o R foot pain. He states that he wants surgery now for his foot. He denies any fevers, chills, headaches, dizziness, chest pain, cough, SOB, abd pain, n/v, diarrhea, constipation, hematuria, dysuria, numbness, and weakness.    Patient has severe peripheral arterial disease. and gangrene of the 2nd , 3rd digit and heel of rt foot. Pt now wants surgery .   Denies chest pain    PAST MEDICAL & SURGICAL HISTORY:  ESRD on dialysis      DM (diabetes mellitus)      PAD (peripheral artery disease)      No significant past surgical history          MEDICATIONS  (STANDING):  aspirin enteric coated 81 milliGRAM(s) Oral daily  chlorhexidine 2% Cloths 1 Application(s) Topical daily  clopidogrel Tablet 75 milliGRAM(s) Oral daily  dextrose 5%. 1000 milliLiter(s) (50 mL/Hr) IV Continuous <Continuous>  glucagon  Injectable 1 milliGRAM(s) IntraMuscular once  heparin   Injectable 5000 Unit(s) SubCutaneous every 8 hours  influenza  Vaccine (HIGH DOSE) 0.7 milliLiter(s) IntraMuscular once  insulin lispro (ADMELOG) corrective regimen sliding scale   SubCutaneous three times a day before meals  insulin lispro (ADMELOG) corrective regimen sliding scale   SubCutaneous at bedtime  Nephro-zack 1 Tablet(s) Oral daily  pantoprazole    Tablet 40 milliGRAM(s) Oral before breakfast  piperacillin/tazobactam IVPB.. 3.375 Gram(s) IV Intermittent every 8 hours  polyethylene glycol 3350 17 Gram(s) Oral daily  sevelamer carbonate 800 milliGRAM(s) Oral three times a day with meals  vancomycin  IVPB 500 milliGRAM(s) IV Intermittent every 48 hours    MEDICATIONS  (PRN):  acetaminophen     Tablet .. 650 milliGRAM(s) Oral every 6 hours PRN Temp greater or equal to 38C (100.4F), Mild Pain (1 - 3)  dextrose Oral Gel 15 Gram(s) Oral once PRN Blood Glucose LESS THAN 70 milliGRAM(s)/deciliter      FAMILY HISTORY:    No family history of premature coronary artery disease or sudden cardiac death    SOCIAL HISTORY:  Smoking-[ ] Active  [x ] Former [ ] Non Smoker  Alcohol-[x ] Denies [ ] Social [ ] Daily  Ilicit Drug use-[x ] Denies [ ] Active user    REVIEW OF SYSTEMS:  Constitutional: [ ] fever, [ ]weight loss, [ ]fatigue   Activity [ ] Bedbound,[x ] Ambulates [ x] Unassisted[ ] Cane/Walker [ ] Assistence.  Effort tolerance:[ ] Excellent [ ] Good [ ] Fair [ ] Poor [x ]  Eyes: [ ] visual changes  Respiratory: [ ]shortness of breath;  [ ] cough, [ ]wheezing, [ ]chills, [ ]hemoptysis  Cardiovascular: [ ] chest pain, [ ]palpitations, [ ]dizziness,  [ ]leg swelling[ ]orthopnea [ ]PND  Gastrointestinal: [ ] abdominal pain, [ ]nausea, [ ]vomiting,  [ ]diarrhea,[ ]constipation  Genitourinary: [ ] dysuria, [ ] hematuria  Neurologic: [ ] headaches [ ] tremors[ ] weakness  Skin: [ ] itching, [ ]burning, [ ] rashes  Endocrine: [ ] heat or cold intolerance  Musculoskeletal: [ ] joint pain or swelling; [ ] muscle, back, or extremity pain  Psychiatric: [ ] depression, [ ]anxiety, [ ]mood swings, or [ ]difficulty sleeping  Hematologic: [ ] easy bruising, [ ] bleeding gums       [ x] All others negative	  [ ] Unable to obtain    Vital Signs Last 24 Hrs  T(C): 36.1 (09 Nov 2022 05:38), Max: 37.2 (08 Nov 2022 20:50)  T(F): 97 (09 Nov 2022 05:38), Max: 98.9 (08 Nov 2022 20:50)  HR: 78 (09 Nov 2022 05:38) (73 - 88)  BP: 135/60 (09 Nov 2022 05:38) (102/60 - 145/76)  RR: 17 (09 Nov 2022 05:38) (17 - 19)  SpO2: 100% (09 Nov 2022 05:38) (96% - 100%)    Parameters below as of 09 Nov 2022 05:38  Patient On (Oxygen Delivery Method): room air      I&O's Summary      PHYSICAL EXAM:  General: No acute distress BMI-31  HEENT: EOMI, PERRL[ ] Icteric  Neck: Supple, No JVD  Lungs: Equal air entry bilaterally; [ ] Rales [ ] Rhonchi [ ] Wheezing  Heart: Regular rate and rhythm;[x ] Murmurs-  2 /6 [x ] Systolic [ ] Diastolic [ ] Radiation,No rubs, or gallops  Abdomen: Nontender, bowel sounds present  Extremities: No clubbing, cyanosis, or edemaDark woody dry necrotic 2nd and 3rd toe and necrotic heel. Distal pulses are difficult to palpate. Other extremity warm and well perfused. 2+ peripheral pulses left Calf soft, nontender b/l. No pedal edema.    Nervous system:  Alert & Oriented X3, no focal deficits  Psychiatric: Normal affect  Skin: No rashes or lesions      LABS:  11-09    135  |  102  |  44<H>  ----------------------------<  178<H>  3.9   |  24  |  3.66<H>    Ca    8.6      09 Nov 2022 06:40  Phos  3.9     11-09  Mg     2.0     11-09    TPro  6.4  /  Alb  2.8<L>  /  TBili  0.3  /  DBili  x   /  AST  15  /  ALT  20  /  AlkPhos  57  11-09        Creatinine Trend: 3.66<--, 2.78<--, 5.85<--, 6.83<--                        7.2    6.27  )-----------( 205      ( 09 Nov 2022 06:40 )             22.4     PT/INR - ( 09 Nov 2022 06:40 )   PT: 13.6 sec;   INR: 1.14 ratio         PTT - ( 09 Nov 2022 06:40 )  PTT:37.0 sec          RADIOLOGY:  IMPRESSION:    1.  No osteomyelitis.  2.  Posterior heel skin wound and dorsal forefoot soft tissue swelling.   Nonspecific but can be seen with cellulitis. No drainable fluid   collection.      ECG [my interpretation]:  Normal sinus rhythm  Normal ECG

## 2022-11-09 NOTE — CONSULT NOTE ADULT - ASSESSMENT
Patient is a 73yo Male with ESRD on HD, DM a/w Rt foot gangrene. Nephrology consulted for ESRD status.   s/p OR today 11/9 with Rt BKA and received 2 units prbc in OR.       1) ESRD:  Pt for HD today since pt received 2 units prbc in OR today; prevent fluid overload. Will keep on MWF schedule for now and switch to TTS next week. Monitor electrolytes.  Pt with Left UE AVF- no thrill no bruit- f/u Vascular Surgeon Dr. Dutta as an outpt.   2) Anemia of renal disease: low hgb. Pt received 2 unit prbc in OR today. c/w Epogen 10k units IV tiw in HD. Monitor Hb.  3) Hyperphosphatemia: serum phosphorus acceptable. c/w low phos diet. Will hold Sevelamer 800mg PO tid with meals for now. Monitor serum calcium and phosphorus.  4) Rt foot gangrene: s/p Rt BKA in OR today 11/9 by Dr. Billy. Abx as per ID. Vascular/ ID following.     [facility] NEPHROLOGY  Wili Hopkins M.D.  Kiran Feng D.O.  Sujatha Dumont M.D.  Radha Miller, MSN, ANP-C  [phone]    [address] [place] [address]   Patient is a 71yo Male with ESRD on HD, DM a/w Rt foot gangrene. Nephrology consulted for ESRD status.   s/p OR today 11/9 with Rt BKA and received 2 units prbc in OR.       1) ESRD:  Pt for HD today since pt received 2 units prbc in OR today; prevent fluid overload. Will keep on MWF schedule for now and switch to TTS next week. Monitor electrolytes.  Pt with Left UE AVF- no thrill no bruit- f/u Vascular Surgeon Dr. Dutta as an outpt.   2) Anemia of renal disease: low hgb with adequate iron stores. Pt received 2 unit prbc in OR today. c/w Epogen 10k units IV tiw in HD. Monitor Hb.  3) Hyperphosphatemia: serum phosphorus acceptable. c/w low phos diet. Will hold Sevelamer 800mg PO tid with meals for now. Monitor serum calcium and phosphorus.  4) Rt foot gangrene: s/p Rt BKA in OR today 11/9 by Dr. Billy. Abx as per ID. Vascular/ ID following.     [facility] NEPHROLOGY  Wili Hopkins M.D.  Kiran Feng D.O.  Sujatha Dumont M.D.  Radha Miller, MSN, ANP-C  [phone]    [id number] [place] [id number]

## 2022-11-09 NOTE — CONSULT NOTE ADULT - SUBJECTIVE AND OBJECTIVE BOX
VASCULAR SURGERY CONSULT NOTE     chief complaint of toe gangrene (2022 21:31)      HPI:  71 y/o male from [facility], with PMHx of ESRD (T-TH-S), DM, s/p Rt foot angiogram 10/03 presents to the ED c/o R foot pain Patient has severe peripheral arterial disease. He was admitted on 2022 for gangrene of right foot. At that time, patient was treated with IV abx. MRI was negative for OM. He had R LE angiogram on 10/3 w/ no targets for re vascularization but showed extensive distal small vessel disease, there was no acute vascular interventions done. Patient was recommended a R BKA but wanted a medical management instead. Patient now coming in c/o R foot pain. He states that he wants surgery now for his foot. He denies any fevers, chills, headaches, dizziness, chest pain, cough, SOB, abd pain, n/v, diarrhea, constipation, hematuria, dysuria, numbness, and weakness.    Vascular was re-consulted for patient who now wants surgery . Admits severe right foot rest pain . No other complaints.       PAST MEDICAL & SURGICAL HISTORY:  ESRD on dialysis  DM (diabetes mellitus)  PAD (peripheral artery disease)  No significant past surgical history      MEDICATIONS  (STANDING):  aspirin enteric coated 81 milliGRAM(s) Oral daily  clopidogrel Tablet 75 milliGRAM(s) Oral daily  dextrose 5%. 1000 milliLiter(s) (50 mL/Hr) IV Continuous <Continuous>  dextrose 5%. 1000 milliLiter(s) (100 mL/Hr) IV Continuous <Continuous>  dextrose 50% Injectable 25 Gram(s) IV Push once  dextrose 50% Injectable 12.5 Gram(s) IV Push once  dextrose 50% Injectable 25 Gram(s) IV Push once  glucagon  Injectable 1 milliGRAM(s) IntraMuscular once  heparin   Injectable 5000 Unit(s) SubCutaneous every 8 hours  influenza  Vaccine (HIGH DOSE) 0.7 milliLiter(s) IntraMuscular once  insulin lispro (ADMELOG) corrective regimen sliding scale   SubCutaneous three times a day before meals  insulin lispro (ADMELOG) corrective regimen sliding scale   SubCutaneous at bedtime  Nephro-zack 1 Tablet(s) Oral daily  pantoprazole    Tablet 40 milliGRAM(s) Oral before breakfast  piperacillin/tazobactam IVPB.. 3.375 Gram(s) IV Intermittent every 8 hours  polyethylene glycol 3350 17 Gram(s) Oral daily  sevelamer carbonate 800 milliGRAM(s) Oral three times a day with meals  vancomycin  IVPB 500 milliGRAM(s) IV Intermittent every 48 hours    MEDICATIONS  (PRN):  acetaminophen     Tablet .. 650 milliGRAM(s) Oral every 6 hours PRN Temp greater or equal to 38C (100.4F), Mild Pain (1 - 3)  dextrose Oral Gel 15 Gram(s) Oral once PRN Blood Glucose LESS THAN 70 milliGRAM(s)/deciliter      Allergies    No Known Allergies    Intolerances        Social History:  Denies any smoking, etoh, or drug use. (2022 21:31)      FAMILY HISTORY:      Physical Exam:  Vital Signs Last 24 Hrs  T(C): 36.9 (2022 23:27), Max: 37.2 (2022 20:50)  T(F): 98.4 (2022 23:27), Max: 98.9 (2022 20:50)  HR: 82 (2022 23:27) (73 - 88)  BP: 145/76 (2022 23:27) (102/60 - 145/76)  BP(mean): --  RR: 18 (2022 23:27) (18 - 19)  SpO2: 97% (2022 23:27) (96% - 100%)    Parameters below as of 2022 14:02  Patient On (Oxygen Delivery Method): room air        General:  A&Ox3,Appears stated age, No acute distress,  Head: NC/AT  EENT: PERRLA. EOMI. Conjunctiva and sclera clear. Pharynx clear.  Neck: Supple. No JVD  Lungs: CTA B/l. Nonlabored Respirations  CV: +S1S2, RRR  Abdomen: Soft, Nondistended,  Nontender, no guarding, no rebound  Extremities: Dark woody dry necrotic 2nd and 3rd toe and necrotic heel. Distal pulses are difficult to palpate. Other extremity warm and well perfused. 2+ peripheral pulses left Calf soft, nontender b/l. No pedal edema.            LABS:                        8.1    6.59  )-----------( 221      ( 2022 15:10 )             25.7     11-08    134<L>  |  103  |  33<H>  ----------------------------<  195<H>  4.0   |  26  |  2.78<H>    Ca    8.6      2022 15:10    TPro  6.8  /  Alb  3.0<L>  /  TBili  0.3  /  DBili  x   /  AST  15  /  ALT  22  /  AlkPhos  64  11-08    LIVER FUNCTIONS - ( 2022 15:10 )  Alb: 3.0 g/dL / Pro: 6.8 g/dL / ALK PHOS: 64 U/L / ALT: 22 U/L DA / AST: 15 U/L / GGT: x           PT/INR - ( 2022 15:10 )   PT: 12.7 sec;   INR: 1.07 ratio         PTT - ( 2022 15:10 )  PTT:36.3 sec  Urinalysis Basic - ( 2022 18:00 )    Color: Yellow / Appearance: Clear / S.005 / pH: x  Gluc: x / Ketone: Negative  / Bili: Negative / Urobili: Negative   Blood: x / Protein: 30 mg/dL / Nitrite: Negative   Leuk Esterase: Negative / RBC: 0-2 /HPF / WBC 0-2 /HPF   Sq Epi: x / Non Sq Epi: Occasional /HPF / Bacteria: Few /HPF        RADIOLOGY & ADDITIONAL STUDIES:  < from: MR Foot No Cont, Right (22 @ 20:37) >  FINDINGS:    BONE MARROW: There is no focal T1 signal hypointensity present. No   cortical erosion or destruction is noted. There is no fracture. No   osteonecrosis.  SYNOVIUM/ JOINT FLUID: No joint effusion.  TENDONS: Mild tendinosis of the posterior tibialis tendon. No   full-thickness tendon tear or retraction  MUSCLES: Edema and atrophy in the musculature. A nonspecific finding that   can be seen with neuropathic change.  NEUROVASCULAR STRUCTURES: Preserved  SUBCUTANEOUS SOFT TISSUES: Mild dorsal forefoot soft tissue swelling.   Posterior heel skin wound. Mild subjacent edema.    IMPRESSION:    1.  No osteomyelitis.  2.  Posterior heel skin wound and dorsal forefoot soft tissue swelling.   Nonspecific but can be seen with cellulitis. No drainable fluid   collection.      < end of copied text >

## 2022-11-09 NOTE — PROGRESS NOTE ADULT - SUBJECTIVE AND OBJECTIVE BOX
Patient is a 72y old  Male who presents with a chief complaint of toe gangrene (2022 09:01)      INTERVAL HPI/OVERNIGHT EVENTS: pt seen and examined    MEDICATIONS  (STANDING):  aspirin enteric coated 81 milliGRAM(s) Oral daily  chlorhexidine 4% Liquid 1 Application(s) Topical once  clopidogrel Tablet 75 milliGRAM(s) Oral daily  dextrose 5%. 1000 milliLiter(s) (50 mL/Hr) IV Continuous <Continuous>  dextrose 5%. 1000 milliLiter(s) (100 mL/Hr) IV Continuous <Continuous>  dextrose 50% Injectable 25 Gram(s) IV Push once  dextrose 50% Injectable 12.5 Gram(s) IV Push once  dextrose 50% Injectable 25 Gram(s) IV Push once  glucagon  Injectable 1 milliGRAM(s) IntraMuscular once  heparin   Injectable 5000 Unit(s) SubCutaneous every 8 hours  influenza  Vaccine (HIGH DOSE) 0.7 milliLiter(s) IntraMuscular once  insulin lispro (ADMELOG) corrective regimen sliding scale   SubCutaneous three times a day before meals  insulin lispro (ADMELOG) corrective regimen sliding scale   SubCutaneous at bedtime  Nephro-zack 1 Tablet(s) Oral daily  pantoprazole    Tablet 40 milliGRAM(s) Oral before breakfast  piperacillin/tazobactam IVPB.. 3.375 Gram(s) IV Intermittent every 8 hours  polyethylene glycol 3350 17 Gram(s) Oral daily  sevelamer carbonate 800 milliGRAM(s) Oral three times a day with meals  vancomycin  IVPB 500 milliGRAM(s) IV Intermittent every 48 hours    MEDICATIONS  (PRN):  acetaminophen     Tablet .. 650 milliGRAM(s) Oral every 6 hours PRN Temp greater or equal to 38C (100.4F), Mild Pain (1 - 3)  dextrose Oral Gel 15 Gram(s) Oral once PRN Blood Glucose LESS THAN 70 milliGRAM(s)/deciliter      __________________________________________________  REVIEW OF SYSTEMS:    CONSTITUTIONAL: No fever,   EYES: no acute visual disturbances  NECK: No pain or stiffness  RESPIRATORY: No cough; No shortness of breath  CARDIOVASCULAR: No chest pain, no palpitations  GASTROINTESTINAL: No pain. No nausea or vomiting; No diarrhea   NEUROLOGICAL: No headache or numbness, no tremors  MUSCULOSKELETAL: right foot pain  GENITOURINARY: no dysuria, no frequency, no hesitancy  PSYCHIATRY: no depression , no anxiety  ALL OTHER  ROS negative        Vital Signs Last 24 Hrs  T(C): 36.1 (2022 05:38), Max: 37.2 (2022 20:50)  T(F): 97 (2022 05:38), Max: 98.9 (2022 20:50)  HR: 78 (2022 05:38) (73 - 88)  BP: 135/60 (2022 05:38) (102/60 - 145/76)  BP(mean): --  RR: 17 (2022 05:38) (17 - 19)  SpO2: 100% (2022 05:38) (96% - 100%)    Parameters below as of 2022 05:38  Patient On (Oxygen Delivery Method): room air        ________________________________________________  PHYSICAL EXAM:  GENERAL: NAD  HEENT: Normocephalic;  conjunctivae and sclerae clear; moist mucous membranes;   NECK : supple, no JVD  CHEST/LUNG: dec breath sounds at bases  HEART: S1 S2  RRR; no murmurs, gallops or rubs  ABDOMEN: Soft, Nontender, Nondistended; Bowel sounds present  EXTREMITIES:multiple wound dressing+   SKIN: warm and dry; no rash  NERVOUS SYSTEM:  Awake and alert;   _________________________________________________  LABS:                        7.2    6.27  )-----------( 205      ( 2022 06:40 )             22.4     11-    135  |  102  |  44<H>  ----------------------------<  178<H>  3.9   |  24  |  3.66<H>    Ca    8.6      2022 06:40  Phos  3.9     11-  Mg     2.0     11-    TPro  6.4  /  Alb  2.8<L>  /  TBili  0.3  /  DBili  x   /  AST  15  /  ALT  20  /  AlkPhos  57  11-09    PT/INR - ( 2022 06:40 )   PT: 13.6 sec;   INR: 1.14 ratio         PTT - ( 2022 06:40 )  PTT:37.0 sec  Urinalysis Basic - ( 2022 18:00 )    Color: Yellow / Appearance: Clear / S.005 / pH: x  Gluc: x / Ketone: Negative  / Bili: Negative / Urobili: Negative   Blood: x / Protein: 30 mg/dL / Nitrite: Negative   Leuk Esterase: Negative / RBC: 0-2 /HPF / WBC 0-2 /HPF   Sq Epi: x / Non Sq Epi: Occasional /HPF / Bacteria: Few /HPF      CAPILLARY BLOOD GLUCOSE      POCT Blood Glucose.: 153 mg/dL (2022 09:54)        RADIOLOGY & ADDITIONAL TESTS:    Imaging  Reviewed:    < from: MR Foot No Cont, Right (22 @ 20:37) >    INTERPRETATION:  MR FOOT RIGHT dated 2022 8:37 PM    INDICATION: Pain and swelling    COMPARISON: Foot radiographs dated 2022    TECHNIQUE: Multi-sequential, multiplanar MRI imaging of the right ankle   and hindfoot was performed per standard protocol.    FINDINGS:    BONE MARROW: There is no focal T1 signal hypointensity present. No   cortical erosion or destruction is noted. There is no fracture. No   osteonecrosis.  SYNOVIUM/ JOINT FLUID: No joint effusion.  TENDONS: Mild tendinosis of the posterior tibialis tendon. No   full-thickness tendon tear or retraction  MUSCLES: Edema and atrophy in the musculature. A nonspecific finding that   can be seen with neuropathic change.  NEUROVASCULAR STRUCTURES: Preserved  SUBCUTANEOUS SOFT TISSUES: Mild dorsal forefoot soft tissue swelling.   Posterior heel skin wound. Mild subjacent edema.    IMPRESSION:    1.  No osteomyelitis.  2.  Posterior heel skin wound and dorsal forefoot soft tissue swelling.   Nonspecific but can be seen with cellulitis. No drainable fluid   collection.    < end of copied text >      Consultant(s) Notes Reviewed:   YES      Plan of care was discussed with patient and /or primary care giver; all questions and concerns were addressed

## 2022-11-09 NOTE — PROGRESS NOTE ADULT - ASSESSMENT
71 y/o male from [facility], with PMHx of ESRD (T-TH-S), DM, s/p Rt foot angiogram 10/03, RBKA recommended at time but patient refused, presented back to ED with c/o R foot pain and admitted to medicine.  Patient has severe peripheral arterial disease. and gangrene of the 2nd , 3rd digit and heel of rt foot. Pt NPO since midnight for OR today.   73 y/o male from [facility], with PMHx of ESRD (T-TH-S), DM, s/p Rt foot angiogram 10/03, RBKA recommended at time but patient refused, presented back to ED with c/o R foot pain and admitted to medicine.  Patient has severe peripheral arterial disease. and gangrene of the 2nd , 3rd digit and heel of rt foot. Pt NPO since midnight for OR today for R BKA.

## 2022-11-09 NOTE — PRE-OP CHECKLIST - DNR CLARIFICATION FORM COMPLETED
GENERAL: NAD, speaks in full sentences, no signs of respiratory distress  HEAD:  Atraumatic, Normocephalic  EYES: EOMI, PERRLA, non-icteric, no injected sclera  NECK: Supple, No JVD  CHEST/LUNG: INspiratry crackles; No accessory muscles used  HEART: Regular rate and rhythm; No murmurs;   ABDOMEN: Soft, Nontender, Nondistended; Bowel sounds present; No guarding  EXTREMITIES:  2+ Peripheral Pulses, No cyanosis or edema  PSYCH: AAOx3  NEUROLOGY: non-focal  SKIN: No rashes or lesions n/a

## 2022-11-09 NOTE — PRE-OP CHECKLIST - ALLERGIES REVIEWED
Patient Seen in: THE [facility] In [facility]      History   Patient presents with:  Leg or Foot Injury: RIGHT KNEE    Stated Complaint: PATIENT TRIPPED 17 HOURS AGO HIT RIGHT KNEE ON CONCRETE.     HPI/Subjective:   HPI    25-year-old female 132/64   Pulse 95   Temp 97.6 °F (36.4 °C)   Resp 18   Ht 177.8 cm (5' 10\")   Wt 77.1 kg   LMP  (LMP Unknown)   SpO2 97%   BMI 24.39 kg/m²         Physical Exam        ED Course   Labs Reviewed - No data to display       XR KNEE ROUTINE (3 VIEWS), RIGHT ( [address] [place] [address], [address] [place] [address]     Schedule an appointment as soon as possible for a visit  If symptoms worsen    Praveen Pineda MD  [address] [place] [address] done

## 2022-11-09 NOTE — PROGRESS NOTE ADULT - PROBLEM SELECTOR PLAN 4
A1C 6.2  POC fingersticks intermittently elevated  Patient NPO  change IV antibiotics from D5W to NS in post op period and resume ISS

## 2022-11-10 ENCOUNTER — TRANSCRIPTION ENCOUNTER (OUTPATIENT)
Age: 72
End: 2022-11-10

## 2022-11-10 DIAGNOSIS — D64.9 ANEMIA, UNSPECIFIED: ICD-10-CM

## 2022-11-10 LAB
ANION GAP SERPL CALC-SCNC: 9 MMOL/L — SIGNIFICANT CHANGE UP (ref 5–17)
BUN SERPL-MCNC: 34 MG/DL — HIGH (ref 7–18)
CALCIUM SERPL-MCNC: 9 MG/DL — SIGNIFICANT CHANGE UP (ref 8.4–10.5)
CHLORIDE SERPL-SCNC: 104 MMOL/L — SIGNIFICANT CHANGE UP (ref 96–108)
CO2 SERPL-SCNC: 26 MMOL/L — SIGNIFICANT CHANGE UP (ref 22–31)
CREAT SERPL-MCNC: 3.67 MG/DL — HIGH (ref 0.5–1.3)
EGFR: 17 ML/MIN/1.73M2 — LOW
GLUCOSE BLDC GLUCOMTR-MCNC: 222 MG/DL — HIGH (ref 70–99)
GLUCOSE BLDC GLUCOMTR-MCNC: 229 MG/DL — HIGH (ref 70–99)
GLUCOSE BLDC GLUCOMTR-MCNC: 253 MG/DL — HIGH (ref 70–99)
GLUCOSE BLDC GLUCOMTR-MCNC: 277 MG/DL — HIGH (ref 70–99)
GLUCOSE SERPL-MCNC: 234 MG/DL — HIGH (ref 70–99)
HBV SURFACE AG SER-ACNC: SIGNIFICANT CHANGE UP
HCT VFR BLD CALC: 31.3 % — LOW (ref 39–50)
HGB BLD-MCNC: 10 G/DL — LOW (ref 13–17)
MCHC RBC-ENTMCNC: 30 PG — SIGNIFICANT CHANGE UP (ref 27–34)
MCHC RBC-ENTMCNC: 31.9 GM/DL — LOW (ref 32–36)
MCV RBC AUTO: 94 FL — SIGNIFICANT CHANGE UP (ref 80–100)
MRSA PCR RESULT.: SIGNIFICANT CHANGE UP
NRBC # BLD: 0 /100 WBCS — SIGNIFICANT CHANGE UP (ref 0–0)
PLATELET # BLD AUTO: 196 K/UL — SIGNIFICANT CHANGE UP (ref 150–400)
POTASSIUM SERPL-MCNC: 4.1 MMOL/L — SIGNIFICANT CHANGE UP (ref 3.5–5.3)
POTASSIUM SERPL-SCNC: 4.1 MMOL/L — SIGNIFICANT CHANGE UP (ref 3.5–5.3)
RBC # BLD: 3.33 M/UL — LOW (ref 4.2–5.8)
RBC # FLD: 14.8 % — HIGH (ref 10.3–14.5)
S AUREUS DNA NOSE QL NAA+PROBE: DETECTED
SODIUM SERPL-SCNC: 139 MMOL/L — SIGNIFICANT CHANGE UP (ref 135–145)
WBC # BLD: 13.82 K/UL — HIGH (ref 3.8–10.5)
WBC # FLD AUTO: 13.82 K/UL — HIGH (ref 3.8–10.5)

## 2022-11-10 RX ORDER — MUPIROCIN 20 MG/G
1 OINTMENT TOPICAL
Refills: 0 | Status: COMPLETED | OUTPATIENT
Start: 2022-11-10 | End: 2022-11-15

## 2022-11-10 RX ORDER — ERYTHROPOIETIN 10000 [IU]/ML
10000 INJECTION, SOLUTION INTRAVENOUS; SUBCUTANEOUS
Refills: 0 | Status: DISCONTINUED | OUTPATIENT
Start: 2022-11-10 | End: 2022-11-14

## 2022-11-10 RX ORDER — PANTOPRAZOLE SODIUM 20 MG/1
40 TABLET, DELAYED RELEASE ORAL
Refills: 0 | Status: DISCONTINUED | OUTPATIENT
Start: 2022-11-10 | End: 2022-11-22

## 2022-11-10 RX ADMIN — PIPERACILLIN AND TAZOBACTAM 25 GRAM(S): 4; .5 INJECTION, POWDER, LYOPHILIZED, FOR SOLUTION INTRAVENOUS at 06:45

## 2022-11-10 RX ADMIN — MUPIROCIN 1 APPLICATION(S): 20 OINTMENT TOPICAL at 17:44

## 2022-11-10 RX ADMIN — PIPERACILLIN AND TAZOBACTAM 25 GRAM(S): 4; .5 INJECTION, POWDER, LYOPHILIZED, FOR SOLUTION INTRAVENOUS at 17:44

## 2022-11-10 RX ADMIN — Medication 81 MILLIGRAM(S): at 12:27

## 2022-11-10 RX ADMIN — CHLORHEXIDINE GLUCONATE 1 APPLICATION(S): 213 SOLUTION TOPICAL at 12:26

## 2022-11-10 RX ADMIN — Medication 1 TABLET(S): at 12:26

## 2022-11-10 RX ADMIN — Medication 2: at 08:30

## 2022-11-10 RX ADMIN — HEPARIN SODIUM 5000 UNIT(S): 5000 INJECTION INTRAVENOUS; SUBCUTANEOUS at 22:16

## 2022-11-10 RX ADMIN — POLYETHYLENE GLYCOL 3350 17 GRAM(S): 17 POWDER, FOR SOLUTION ORAL at 12:26

## 2022-11-10 RX ADMIN — Medication 3: at 17:43

## 2022-11-10 RX ADMIN — Medication 3: at 12:26

## 2022-11-10 RX ADMIN — HEPARIN SODIUM 5000 UNIT(S): 5000 INJECTION INTRAVENOUS; SUBCUTANEOUS at 15:00

## 2022-11-10 RX ADMIN — CLOPIDOGREL BISULFATE 75 MILLIGRAM(S): 75 TABLET, FILM COATED ORAL at 12:27

## 2022-11-10 RX ADMIN — LINEZOLID 300 MILLIGRAM(S): 600 INJECTION, SOLUTION INTRAVENOUS at 12:25

## 2022-11-10 NOTE — DISCHARGE NOTE PROVIDER - NSDCMRMEDTOKEN_GEN_ALL_CORE_FT
acetaminophen 325 mg oral tablet: 2 tab(s) orally every 6 hours, As needed, Temp greater or equal to 38C (100.4F), Mild Pain (1 - 3)  aspirin 81 mg oral delayed release tablet: 1 tab(s) orally once a day  insulin lispro 100 units/mL injectable solution (obsolete): insulin Sliding scale  Januvia 25 mg oral tablet: 1 tab(s) orally once a day  Nephro-Killian oral tablet: 1 tab(s) orally once a day  Plavix 75 mg oral tablet: 1 tab(s) orally once a day  polyethylene glycol 3350 oral powder for reconstitution: 1 pad(s) orally once a day  sevelamer carbonate 800 mg oral tablet: 1 tab(s) orally 3 times a day (with meals)   acetaminophen 325 mg oral tablet: 2 tab(s) orally every 6 hours, As needed, Temp greater or equal to 38C (100.4F), Mild Pain (1 - 3)  aspirin 81 mg oral delayed release tablet: 1 tab(s) orally once a day  epoetin nyasia: 32860 unit(s) intravenous Tuesday, Thursday, Saturday  insulin lispro 100 units/mL injectable solution (obsolete): insulin Sliding scale  Januvia 25 mg oral tablet: 1 tab(s) orally once a day  Nephro-Killian oral tablet: 1 tab(s) orally once a day  pantoprazole 40 mg oral delayed release tablet: 1 tab(s) orally once a day (before a meal)  Plavix 75 mg oral tablet: 1 tab(s) orally once a day  polyethylene glycol 3350 oral powder for reconstitution: 1 pad(s) orally once a day  sevelamer carbonate 800 mg oral tablet: 1 tab(s) orally 3 times a day (with meals)

## 2022-11-10 NOTE — PROGRESS NOTE ADULT - PROBLEM SELECTOR PLAN 1
Postop day 2 R BKA  afebrile  pain well controlled  continue linezolid + zosyn   ID Babcock   Vascular follows

## 2022-11-10 NOTE — PROGRESS NOTE ADULT - SUBJECTIVE AND OBJECTIVE BOX
NP Note discussed with  Primary Attending    Patient is a 72y old  Male who presents with a chief complaint of toe gangrene (10 Nov 2022 13:23)      INTERVAL HPI/OVERNIGHT EVENTS: Family at bedside with questions about primary care issues including obtaining hearing exam and hearing aides as well as having chronic skin issue evaluated by dermatology.  Patient is postop day 1 of R BKA    MEDICATIONS  (STANDING):  aspirin enteric coated 81 milliGRAM(s) Oral daily  chlorhexidine 2% Cloths 1 Application(s) Topical daily  clopidogrel Tablet 75 milliGRAM(s) Oral daily  dextrose 5%. 1000 milliLiter(s) (100 mL/Hr) IV Continuous <Continuous>  dextrose 5%. 1000 milliLiter(s) (50 mL/Hr) IV Continuous <Continuous>  dextrose 50% Injectable 25 Gram(s) IV Push once  dextrose 50% Injectable 12.5 Gram(s) IV Push once  dextrose 50% Injectable 25 Gram(s) IV Push once  epoetin nyasia-epbx (RETACRIT) Injectable 32546 Unit(s) IV Push <User Schedule>  glucagon  Injectable 1 milliGRAM(s) IntraMuscular once  heparin   Injectable 5000 Unit(s) SubCutaneous every 8 hours  influenza  Vaccine (HIGH DOSE) 0.7 milliLiter(s) IntraMuscular once  insulin lispro (ADMELOG) corrective regimen sliding scale   SubCutaneous at bedtime  insulin lispro (ADMELOG) corrective regimen sliding scale   SubCutaneous three times a day before meals  linezolid  IVPB      linezolid  IVPB 600 milliGRAM(s) IV Intermittent every 12 hours  mupirocin 2% Ointment 1 Application(s) Both Nostrils two times a day  Nephro-zack 1 Tablet(s) Oral daily  piperacillin/tazobactam IVPB.. 3.375 Gram(s) IV Intermittent every 12 hours  polyethylene glycol 3350 17 Gram(s) Oral daily    MEDICATIONS  (PRN):  acetaminophen     Tablet .. 650 milliGRAM(s) Oral every 6 hours PRN Temp greater or equal to 38C (100.4F), Mild Pain (1 - 3)  dextrose Oral Gel 15 Gram(s) Oral once PRN Blood Glucose LESS THAN 70 milliGRAM(s)/deciliter  oxyCODONE    IR 5 milliGRAM(s) Oral every 4 hours PRN Severe Pain (7 - 10)      __________________________________________________  REVIEW OF SYSTEMS:    CONSTITUTIONAL: No fever,   EYES: no acute visual disturbances  NECK: No pain or stiffness  RESPIRATORY: No cough; No shortness of breath  CARDIOVASCULAR: No chest pain, no palpitations  GASTROINTESTINAL: No pain. No nausea or vomiting; No diarrhea   NEUROLOGICAL: No headache or numbness, no tremors  MUSCULOSKELETAL: No joint pain, no muscle pain  GENITOURINARY: no dysuria, no frequency, no hesitancy  PSYCHIATRY: no depression , no anxiety  ALL OTHER  ROS negative        Vital Signs Last 24 Hrs  T(C): 36.8 (10 Nov 2022 11:47), Max: 37.4 (10 Nov 2022 05:08)  T(F): 98.2 (10 Nov 2022 11:47), Max: 99.4 (10 Nov 2022 05:08)  HR: 79 (10 Nov 2022 11:47) (62 - 83)  BP: 129/63 (10 Nov 2022 11:47) (99/63 - 153/60)  BP(mean): --  RR: 18 (10 Nov 2022 11:47) (16 - 18)  SpO2: 100% (10 Nov 2022 11:47) (94% - 100%)    Parameters below as of 10 Nov 2022 11:47  Patient On (Oxygen Delivery Method): nasal cannula  O2 Flow (L/min): 2      ________________________________________________  PHYSICAL EXAM:  GENERAL: NAD  HEENT: atraumatic, Normocephalic;  conjunctivae and sclerae clear; moist mucous membranes;   NECK : supple, no JVD  CHEST/LUNG: Clear and diminished to auscultation bilaterally  HEART: S1 S2 RRR, no murmurs, gallops or rubs  ABDOMEN: Soft, Nontender, Nondistended; Bowel sounds present and hypoactive  EXTREMITIES: no cyanosis; no edema to BUE or LLE, 2+ radial pulses, RLE wrapped in kerlex and coban with splint.  Clean dry and intact.  SKIN: warm and dry; no rash  NERVOUS SYSTEM:  Awake and alert; Oriented  to place, person and time ; no new deficits    _________________________________________________  LABS:                        10.0   13.82 )-----------( 196      ( 10 Nov 2022 05:35 )             31.3     11-10    139  |  104  |  34<H>  ----------------------------<  234<H>  4.1   |  26  |  3.67<H>    Ca    9.0      10 Nov 2022 05:35  Phos  3.9     11  Mg     2.0         TPro  6.4  /  Alb  2.8<L>  /  TBili  0.3  /  DBili  x   /  AST  15  /  ALT  20  /  AlkPhos  57  11-    PT/INR - ( 2022 06:40 )   PT: 13.6 sec;   INR: 1.14 ratio         PTT - ( 2022 06:40 )  PTT:37.0 sec  Urinalysis Basic - ( 2022 18:00 )    Color: Yellow / Appearance: Clear / S.005 / pH: x  Gluc: x / Ketone: Negative  / Bili: Negative / Urobili: Negative   Blood: x / Protein: 30 mg/dL / Nitrite: Negative   Leuk Esterase: Negative / RBC: 0-2 /HPF / WBC 0-2 /HPF   Sq Epi: x / Non Sq Epi: Occasional /HPF / Bacteria: Few /HPF      CAPILLARY BLOOD GLUCOSE      POCT Blood Glucose.: 277 mg/dL (10 Nov 2022 11:30)  POCT Blood Glucose.: 222 mg/dL (10 Nov 2022 08:11)  POCT Blood Glucose.: 193 mg/dL (2022 23:01)  POCT Blood Glucose.: 116 mg/dL (2022 17:36)        RADIOLOGY & ADDITIONAL TESTS:    Imaging  Reviewed:    < from: Xray Foot AP + Lateral + Oblique, Right (22 @ 19:08) >    FINDINGS:    There is no fracture, dislocation or joint effusion.  No degenerative changes.  Moderate atherosclerotic changes.  No radiopaque foreign body.    IMPRESSION:  1.  Swelling with soft tissue ulcer posterior to calcaneus.  2.  No plain film evidence of osteomyelitis.  3.  Obtain MRI as warranted clinically.      < end of copied text >      Consultant(s) Notes Reviewed:   YES    Plan of care was discussed with patient and /or primary care giver; all questions and concerns were addressed

## 2022-11-10 NOTE — DISCHARGE NOTE PROVIDER - NSDCCPCAREPLAN_GEN_ALL_CORE_FT
PRINCIPAL DISCHARGE DIAGNOSIS  Diagnosis: S/P BKA (below knee amputation) unilateral, right  Assessment and Plan of Treatment:       SECONDARY DISCHARGE DIAGNOSES  Diagnosis: Diabetic foot ulcers  Assessment and Plan of Treatment:     Diagnosis: Anemia  Assessment and Plan of Treatment:      PRINCIPAL DISCHARGE DIAGNOSIS  Diagnosis: S/P BKA (below knee amputation) unilateral, right  Assessment and Plan of Treatment: AMBULATORY CARE:  After a below the knee amputation, it is important to care for your residual limb every day. Healthcare providers will show you how to wrap the limb and care for your skin.  Physical and occupational therapy: A physical therapist will help you with exercises to improve your strength. You may be fitted with a prosthesis (artificial limb). It may need to be adjusted several times before it fits well. Physical therapists will also help you learn to walk with the prosthesis and with crutches. Occupational therapists will help you adjust to daily activities at home and work.  Care for your residual limb:  Clean and care for your skin. When you are allowed to bathe, gently wash around the incision with soap and water. It is okay to let soap and water run over your incision. Take showers instead of baths. Do not soak in a tub or hot tub, and do not swim. These could cause an infection. Your healthcare provider will tell you when it is okay to do these activities again. Carefully rinse and dry your skin. Look closely at the skin on your residual limb every day. Use a hand mirror to see all sides of your residual limb. Look for redness, blisters, or scrapes. Do not put lotion, oil, cream, or rubbing alcohol on your residual limb. Rubbing alcohol dries and cracks your skin.  Prevent infection. Wash your hands before you touch your wound. This will help prevent an infection. Only use antiseptic (germ-killing) medicines if healthcare providers tell you to.  Help your residual limb heal. Push the residual limb against a soft pillow. Slowly increase the pressure and start to push your residual limb against harder surfaces, such as the back of a chair. Massage the residual limb to soften the scar, decrease tenderness, and improve blood flow.  Start exercising your other leg and residual limb as soon as healthcare providers say it is okay. Lift your leg off the bed and move it in big circles. This helps strengthen your leg, and may prevent blood clots from forming      SECONDARY DISCHARGE DIAGNOSES  Diagnosis: Diabetic foot ulcers  Assessment and Plan of Treatment: A diabetic foot ulcer can be redness over a bony area or an open sore. The ulcer can develop anywhere on your foot or toes. Ulcers usually develop on the bottom of the foot. You may not know you have an ulcer until you notice drainage on your sock. Drainage is fluid that may be yellow, brown, or red. How is a diabetic foot ulcer diagnosed and treated? Your healthcare provider will ask about your symptoms and examine your foot and the ulcer. He or she may check your shoes. He or she may also send you to a podiatrist (foot doctor) for treatment. The goal of treatment is to start healing your foot ulcer as soon as possible. The risk for infection decreases with faster healing. Do the following to help the ulcer heal:  Prevent or treat an infection. A bandage will be put on the ulcer. Your healthcare provider will give you instructions on changing your bandage. You may need to clean the ulcer and change the bandage daily. The bandage may contain medicines to help the ulcer heal. You may be asked to put medicine on your foot ulcer before you put on the bandage. The medicine may also prevent growth of tissue that is not healthy. If you have an infection, your healthcare provider will give you antibiotics to treat it.  Have any dead tissue debrided (removed). The removal of dead skin and tissue around your foot ulcer can help with healing.  Manage your blood sugar levels and other health problems. Your blood sugar, blood pressure, and cholesterol levels need to be controlled to help your foot ulcer heal. Your healthcare provider can help you make a plan to manage your health problems.  Offload (take the pressure off) the foot ulcer. You may need special shoes with insoles, cushions, or braces. You may be asked to use a wheelchair or crutches until your foot ulcer heals. These items will help keep pressure and irritation off the area of your foot ulcer. Your foot ulcer can heal faster without pressure and irritation.  Have blood flow to your foot increased. Your healthcare    Diagnosis: Anemia  Assessment and Plan of Treatment: Anemia is a low number of red blood cells or a low amount of hemoglobin in your red blood cells. Hemoglobin is a protein that helps carry oxygen throughout your body. Red blood cells use iron to create hemoglobin. Anemia may develop if your body does not have enough iron. It may also develop if your body does not make enough red blood cells or they die faster than your body can make them.  DISCHARGE INSTRUCTIONS:  Call your local emergency number (911 in the [place]), or have someone call if:  You lose consciousness.  You have severe chest pain.  Seek care immediately if:  You have dark or bloody bowel movements.  Call your doctor if:  Your symptoms are worse, even after treatment.  You have questions or concerns about your condition or care.  Medicines:  Iron or folic acid supplements help increase your red blood cell and hemoglobin levels.  Vitamin B12 injections may help boost your red blood cell level and decrease your symptoms. Ask your healthcare provider how to inject B12.  Take your medicine as directed. Contact your healthcare provider if you think your medicine is not helping or if you have side effects. Tell your provider if you are allergic to any medicine. Keep a list of the medicines, vitamins, and herbs you take. Include the amounts, and when and why you take them. Bring the list or the pill bottles to follow-up visits. Carry your medicine list with you in case of an emergency.  Prevent anemia: Eat healthy foods rich in iron and vitamin C. Nuts, meat, dark leafy green vegetables, and beans are high in iron and protein. Vitamin C helps your body absorb iron. Foods rich in vitamin C include oranges and other citrus fruits. Ask your healthcare provider for a list of other foods that are high in iron or vitamin C     PRINCIPAL DISCHARGE DIAGNOSIS  Diagnosis: S/P BKA (below knee amputation) unilateral, right  Assessment and Plan of Treatment: AMBULATORY CARE:  After a below the knee amputation, it is important to care for your residual limb every day. Healthcare providers will show you how to wrap the limb and care for your skin.  Physical and occupational therapy: A physical therapist will help you with exercises to improve your strength. You may be fitted with a prosthesis (artificial limb). It may need to be adjusted several times before it fits well. Physical therapists will also help you learn to walk with the prosthesis and with crutches. Occupational therapists will help you adjust to daily activities at home and work.  Care for your residual limb:  Clean and care for your skin. When you are allowed to bathe, gently wash around the incision with soap and water. It is okay to let soap and water run over your incision. Take showers instead of baths. Do not soak in a tub or hot tub, and do not swim. These could cause an infection. Your healthcare provider will tell you when it is okay to do these activities again. Carefully rinse and dry your skin. Look closely at the skin on your residual limb every day. Use a hand mirror to see all sides of your residual limb. Look for redness, blisters, or scrapes. Do not put lotion, oil, cream, or rubbing alcohol on your residual limb. Rubbing alcohol dries and cracks your skin.  Prevent infection. Wash your hands before you touch your wound. This will help prevent an infection. Only use antiseptic (germ-killing) medicines if healthcare providers tell you to.  Help your residual limb heal. Push the residual limb against a soft pillow. Slowly increase the pressure and start to push your residual limb against harder surfaces, such as the back of a chair. Massage the residual limb to soften the scar, decrease tenderness, and improve blood flow.  Start exercising your other leg and residual limb as soon as healthcare providers say it is okay. Lift your leg off the bed and move it in big circles. This helps strengthen your leg, and may prevent blood clots from forming      SECONDARY DISCHARGE DIAGNOSES  Diagnosis: ESRD (end stage renal disease)  Assessment and Plan of Treatment: Continue hemodualysis 3x/week as prior to hospitalization  Follow up with nephrology  Follow renal diet    Diagnosis: Diabetic foot ulcers  Assessment and Plan of Treatment: A diabetic foot ulcer can be redness over a bony area or an open sore. The ulcer can develop anywhere on your foot or toes. Ulcers usually develop on the bottom of the foot. You may not know you have an ulcer until you notice drainage on your sock. Drainage is fluid that may be yellow, brown, or red. How is a diabetic foot ulcer diagnosed and treated? Your healthcare provider will ask about your symptoms and examine your foot and the ulcer. He or she may check your shoes. He or she may also send you to a podiatrist (foot doctor) for treatment. The goal of treatment is to start healing your foot ulcer as soon as possible. The risk for infection decreases with faster healing. Do the following to help the ulcer heal:  Prevent or treat an infection. A bandage will be put on the ulcer. Your healthcare provider will give you instructions on changing your bandage. You may need to clean the ulcer and change the bandage daily. The bandage may contain medicines to help the ulcer heal. You may be asked to put medicine on your foot ulcer before you put on the bandage. The medicine may also prevent growth of tissue that is not healthy. If you have an infection, your healthcare provider will give you antibiotics to treat it.  Have any dead tissue debrided (removed). The removal of dead skin and tissue around your foot ulcer can help with healing.  Manage your blood sugar levels and other health problems. Your blood sugar, blood pressure, and cholesterol levels need to be controlled to help your foot ulcer heal. Your healthcare provider can help you make a plan to manage your health problems.  Offload (take the pressure off) the foot ulcer. You may need special shoes with insoles, cushions, or braces. You may be asked to use a wheelchair or crutches until your foot ulcer heals. These items will help keep pressure and irritation off the area of your foot ulcer. Your foot ulcer can heal faster without pressure and irritation.  Have blood flow to your foot increased. Your healthcare    Diagnosis: Anemia  Assessment and Plan of Treatment: Anemia is a low number of red blood cells or a low amount of hemoglobin in your red blood cells. Hemoglobin is a protein that helps carry oxygen throughout your body. Red blood cells use iron to create hemoglobin. Anemia may develop if your body does not have enough iron. It may also develop if your body does not make enough red blood cells or they die faster than your body can make them.  DISCHARGE INSTRUCTIONS:  Call your local emergency number (659 in the [place]), or have someone call if:  You lose consciousness.  You have severe chest pain.  Seek care immediately if:  You have dark or bloody bowel movements.  Call your doctor if:  Your symptoms are worse, even after treatment.  You have questions or concerns about your condition or care.  Medicines:  Iron or folic acid supplements help increase your red blood cell and hemoglobin levels.  Vitamin B12 injections may help boost your red blood cell level and decrease your symptoms. Ask your healthcare provider how to inject B12.  Take your medicine as directed. Contact your healthcare provider if you think your medicine is not helping or if you have side effects. Tell your provider if you are allergic to any medicine. Keep a list of the medicines, vitamins, and herbs you take. Include the amounts, and when and why you take them. Bring the list or the pill bottles to follow-up visits. Carry your medicine list with you in case of an emergency.  Prevent anemia: Eat healthy foods rich in iron and vitamin C. Nuts, meat, dark leafy green vegetables, and beans are high in iron and protein. Vitamin C helps your body absorb iron. Foods rich in vitamin C include oranges and other citrus fruits. Ask your healthcare provider for a list of other foods that are high in iron or vitamin C     PRINCIPAL DISCHARGE DIAGNOSIS  Diagnosis: S/P BKA (below knee amputation) unilateral, right  Assessment and Plan of Treatment: AMBULATORY CARE:  After a below the knee amputation, it is important to care for your residual limb every day. Healthcare providers will show you how to wrap the limb and care for your skin.  Physical and occupational therapy: A physical therapist will help you with exercises to improve your strength. You may be fitted with a prosthesis (artificial limb). It may need to be adjusted several times before it fits well. Physical therapists will also help you learn to walk with the prosthesis and with crutches. Occupational therapists will help you adjust to daily activities at home and work.  Care for your residual limb:  Clean and care for your skin. When you are allowed to bathe, gently wash around the incision with soap and water. It is okay to let soap and water run over your incision. Take showers instead of baths. Do not soak in a tub or hot tub, and do not swim. These could cause an infection. Your healthcare provider will tell you when it is okay to do these activities again. Carefully rinse and dry your skin. Look closely at the skin on your residual limb every day. Use a hand mirror to see all sides of your residual limb. Look for redness, blisters, or scrapes. Do not put lotion, oil, cream, or rubbing alcohol on your residual limb. Rubbing alcohol dries and cracks your skin.  Prevent infection. Wash your hands before you touch your wound. This will help prevent an infection. Only use antiseptic (germ-killing) medicines if healthcare providers tell you to.  Help your residual limb heal. Push the residual limb against a soft pillow. Slowly increase the pressure and start to push your residual limb against harder surfaces, such as the back of a chair. Massage the residual limb to soften the scar, decrease tenderness, and improve blood flow.  Start exercising your other leg and residual limb as soon as healthcare providers say it is okay. Lift your leg off the bed and move it in big circles. This helps strengthen your leg, and may prevent blood clots from forming      SECONDARY DISCHARGE DIAGNOSES  Diagnosis: ESRD (end stage renal disease)  Assessment and Plan of Treatment: Continue hemodualysis 3x/week as prior to hospitalization  Follow up with nephrology  Follow renal diet    Diagnosis: Diabetic foot ulcers  Assessment and Plan of Treatment: A diabetic foot ulcer can be redness over a bony area or an open sore. The ulcer can develop anywhere on your foot or toes. Ulcers usually develop on the bottom of the foot. You may not know you have an ulcer until you notice drainage on your sock. Drainage is fluid that may be yellow, brown, or red. How is a diabetic foot ulcer diagnosed and treated? Your healthcare provider will ask about your symptoms and examine your foot and the ulcer. He or she may check your shoes. He or she may also send you to a podiatrist (foot doctor) for treatment. The goal of treatment is to start healing your foot ulcer as soon as possible. The risk for infection decreases with faster healing. Do the following to help the ulcer heal:  Prevent or treat an infection. A bandage will be put on the ulcer. Your healthcare provider will give you instructions on changing your bandage. You may need to clean the ulcer and change the bandage daily. The bandage may contain medicines to help the ulcer heal. You may be asked to put medicine on your foot ulcer before you put on the bandage. The medicine may also prevent growth of tissue that is not healthy. If you have an infection, your healthcare provider will give you antibiotics to treat it.  Have any dead tissue debrided (removed). The removal of dead skin and tissue around your foot ulcer can help with healing.  Manage your blood sugar levels and other health problems. Your blood sugar, blood pressure, and cholesterol levels need to be controlled to help your foot ulcer heal. Your healthcare provider can help you make a plan to manage your health problems.  Offload (take the pressure off) the foot ulcer. You may need special shoes with insoles, cushions, or braces. You may be asked to use a wheelchair or crutches until your foot ulcer heals. These items will help keep pressure and irritation off the area of your foot ulcer. Your foot ulcer can heal faster without pressure and irritation.  Have blood flow to your foot increased. Your healthcare    Diagnosis: Anemia  Assessment and Plan of Treatment: Anemia is a low number of red blood cells or a low amount of hemoglobin in your red blood cells. Hemoglobin is a protein that helps carry oxygen throughout your body. Red blood cells use iron to create hemoglobin. Anemia may develop if your body does not have enough iron. It may also develop if your body does not make enough red blood cells or they die faster than your body can make them.  DISCHARGE INSTRUCTIONS:  Call your local emergency number (267 in the [place]), or have someone call if:  You lose consciousness.  You have severe chest pain.  Seek care immediately if:  You have dark or bloody bowel movements.  Call your doctor if:  Your symptoms are worse, even after treatment.  You have questions or concerns about your condition or care.  Medicines:  Iron or folic acid supplements help increase your red blood cell and hemoglobin levels.  Vitamin B12 injections may help boost your red blood cell level and decrease your symptoms. Ask your healthcare provider how to inject B12.  Take your medicine as directed. Contact your healthcare provider if you think your medicine is not helping or if you have side effects. Tell your provider if you are allergic to any medicine. Keep a list of the medicines, vitamins, and herbs you take. Include the amounts, and when and why you take them. Bring the list or the pill bottles to follow-up visits. Carry your medicine list with you in case of an emergency.  Prevent anemia: Eat healthy foods rich in iron and vitamin C. Nuts, meat, dark leafy green vegetables, and beans are high in iron and protein. Vitamin C helps your body absorb iron. Foods rich in vitamin C include oranges and other citrus fruits. Ask your healthcare provider for a list of other foods that are high in iron or vitamin C    Diagnosis: Removal of staples  Assessment and Plan of Treatment: STAPLES SHOULD BE REMOVED 11/24 OR AFTER.  PLEASE REMOVE STAPLES AT FACILITY  IF UNABLE TO PLEASE FOLLOW UP WITH OUTPATIENT CLINIC AT  [address] [place], [place] [id number]  [phone]

## 2022-11-10 NOTE — PROGRESS NOTE ADULT - ASSESSMENT
Patient is a 71yo Male with ESRD on HD, DM a/w Rt foot gangrene. Nephrology consulted for ESRD status.   s/p OR today 11/9 with Rt BKA and received 2 units prbc in OR.       1) ESRD:  Last HD 11/9 for 2 hrs with intradialytic hypotension with net 1450 ml removed. Plan for next HD Sat 11/12 to place back on TTS schedule. Monitor electrolytes.  Pt with Left UE AVF- no thrill no bruit- f/u Vascular Surgeon Dr. Dutta as an outpt.   2) Anemia of renal disease: hgb improved with adequate iron stores. s/p 2 unit prbc on 11/9. c/w Epogen 10k units IV tiw in HD. Monitor Hb.  3) Hyperphosphatemia: serum phosphorus acceptable. c/w low phos diet. Continue to hold Sevelamer 800mg PO tid with meals for now. Monitor serum calcium and phosphorus.  4) Rt foot gangrene: s/p Rt BKA 11/9 by Dr. Billy. Abx as per ID. Vascular/ ID following.     [facility] NEPHROLOGY  Wili Hopkins M.D.  Kiran Feng D.O.  Sujatha Dumont M.D.  Radha Miller, MSN, ANP-C  [phone]    [address] [id number]   Patient is a 71yo Male with ESRD on HD, DM a/w Rt foot gangrene. Nephrology consulted for ESRD status.   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       1) ESRD:  Last HD 11/9 for 2 hrs with intradialytic hypotension with net 1450 ml removed. Plan for next HD Sat 11/12 to place back on TTS schedule. Monitor electrolytes.  Pt with Left UE AVF- no thrill no bruit- f/u Vascular Surgeon Dr. Dutta as an outpt.   2) Anemia of renal disease: hgb improved with adequate iron stores. s/p 2 unit prbc on 11/9. c/w Epogen 10k units IV tiw in HD. Monitor Hb.  3) Hyperphosphatemia: serum phosphorus acceptable. c/w low phos diet. Continue to hold Sevelamer 800mg PO tid with meals for now. Monitor serum calcium and phosphorus.  4) Rt foot gangrene: s/p Rt BKA 11/9 by Dr. Billy. Abx as per ID. Vascular/ ID following.     [facility] NEPHROLOGY  Wili Hopkins M.D.  Kiran Feng D.O.  Sujatha Dumont M.D.  Radha Miller, MSN, ANP-C  [phone]    [address] [id number]

## 2022-11-10 NOTE — PROGRESS NOTE ADULT - SUBJECTIVE AND OBJECTIVE BOX
Patient is seen and examined at the bed side, is afebrile.        REVIEW OF SYSTEMS: All other review systems are negative      ALLERGIES: No Known Allergies      Vital Signs Last 24 Hrs  T(C): 36.8 (10 Nov 2022 11:47), Max: 37.4 (10 Nov 2022 05:08)  T(F): 98.2 (10 Nov 2022 11:47), Max: 99.4 (10 Nov 2022 05:08)  HR: 79 (10 Nov 2022 11:47) (73 - 83)  BP: 129/63 (10 Nov 2022 11:47) (99/63 - 153/60)  BP(mean): --  RR: 18 (10 Nov 2022 11:47) (16 - 18)  SpO2: 100% (10 Nov 2022 11:47) (94% - 100%)    Parameters below as of 10 Nov 2022 11:47  Patient On (Oxygen Delivery Method): nasal cannula  O2 Flow (L/min): 2        PHYSICAL EXAM:      LABS:                        10.0   13.82 )-----------( 196      ( 10 Nov 2022 05:35 )             31.3                           7.2    6.27  )-----------( 205      ( 2022 06:40 )             22.4       11-10    139  |  104  |  34<H>  ----------------------------<  234<H>  4.1   |  26  |  3.67<H>    Ca    9.0      10 Nov 2022 05:35  Phos  3.9     11-09  Mg     2.0     11-    TPro  6.4  /  Alb  2.8<L>  /  TBili  0.3  /  DBili  x   /  AST  15  /  ALT  20  /  AlkPhos  57      11-    135  |  102  |  44<H>  ----------------------------<  178<H>  3.9   |  24  |  3.66<H>    Ca    8.6      2022 06:40  Phos  3.9     11-09  Mg     2.0     11-    TPro  6.4  /  Alb  2.8<L>  /  TBili  0.3  /  DBili  x   /  AST  15  /  ALT  20  /  AlkPhos  57  11-    PT/INR - ( 2022 06:40 )   PT: 13.6 sec;   INR: 1.14 ratio    PTT - ( 2022 06:40 )  PTT:37.0 sec        CAPILLARY BLOOD GLUCOSE  POCT Blood Glucose.: 153 mg/dL (2022 09:54)        Urinalysis Basic - ( 2022 18:00 )Color: Yellow / Appearance: Clear / S.005 / pH: x  Gluc: x / Ketone: Negative  / Bili: Negative / Urobili: Negative   Blood: x / Protein: 30 mg/dL / Nitrite: Negative   Leuk Esterase: Negative / RBC: 0-2 /HPF / WBC 0-2 /HPF   Sq Epi: x / Non Sq Epi: Occasional /HPF / Bacteria: Few /HPF        MEDICATIONS  (STANDING):    aspirin enteric coated 81 milliGRAM(s) Oral daily  chlorhexidine 2% Cloths 1 Application(s) Topical daily  clopidogrel Tablet 75 milliGRAM(s) Oral daily  epoetin nyasia-epbx (RETACRIT) Injectable 15878 Unit(s) IV Push <User Schedule>  glucagon  Injectable 1 milliGRAM(s) IntraMuscular once  heparin   Injectable 5000 Unit(s) SubCutaneous every 8 hours  influenza  Vaccine (HIGH DOSE) 0.7 milliLiter(s) IntraMuscular once  insulin lispro (ADMELOG) corrective regimen sliding scale   SubCutaneous at bedtime  insulin lispro (ADMELOG) corrective regimen sliding scale   SubCutaneous three times a day before meals  linezolid  IVPB      linezolid  IVPB 600 milliGRAM(s) IV Intermittent every 12 hours  mupirocin 2% Ointment 1 Application(s) Both Nostrils two times a day  Nephro-zack 1 Tablet(s) Oral daily  pantoprazole    Tablet 40 milliGRAM(s) Oral before breakfast  piperacillin/tazobactam IVPB.. 3.375 Gram(s) IV Intermittent every 12 hours  polyethylene glycol 3350 17 Gram(s) Oral daily      RADIOLOGY & ADDITIONAL TESTS:    < from: Xray Foot AP + Lateral + Oblique, Right (22 @ 19:08) >  1.  Swelling with soft tissue ulcer posterior to calcaneus.  2.  No plain film evidence of osteomyelitis.  3.  Obtain MRI as warranted clinically.        MICROBIOLOGY DATA:    MRSA/MSSA PCR (22 @ 10:30)   MRSA PCR Result.: NotDetec:    Culture - Blood (22 @ 15:20)   Specimen Source: .Blood Blood-Peripheral   Culture Results: No growth to date.     Culture - Blood (22 @ 15:10)   Specimen Source: .Blood Blood-Peripheral   Culture Results: No growth to date.     Culture - Urine (22 @ 18:00)   Specimen Source: Clean Catch Clean Catch (Midstream)   Culture Results:   <10,000 CFU/mL Normal Urogenital Martha     COVID-19 PCR (22 @ 15:10)   COVID-19 PCR: NotDetec:                 Patient is seen and examined at the bed side, is afebrile. He mentioned doing better and  pain is controlled . The WBC count elevated, most likely reactive.       REVIEW OF SYSTEMS: All other review systems are negative      ALLERGIES: No Known Allergies      Vital Signs Last 24 Hrs  T(C): 36.8 (10 Nov 2022 11:47), Max: 37.4 (10 Nov 2022 05:08)  T(F): 98.2 (10 Nov 2022 11:47), Max: 99.4 (10 Nov 2022 05:08)  HR: 79 (10 Nov 2022 11:47) (73 - 83)  BP: 129/63 (10 Nov 2022 11:47) (99/63 - 153/60)  BP(mean): --  RR: 18 (10 Nov 2022 11:47) (16 - 18)  SpO2: 100% (10 Nov 2022 11:47) (94% - 100%)    Parameters below as of 10 Nov 2022 11:47  Patient On (Oxygen Delivery Method): nasal cannula  O2 Flow (L/min): 2        PHYSICAL EXAM:    General: Not in distress  CVS: s1 and s2 present   RESP: Not using accessory muscles   GI: abdomen non distended   EXT: Positive Right BKA, bandage and immobilizer is in placed   CNS: Awake and Alert      LABS:                        10.0   13.82 )-----------( 196      ( 10 Nov 2022 05:35 )             31.3                           7.2    6.27  )-----------( 205      ( 2022 06:40 )             22.4       11-10    139  |  104  |  34<H>  ----------------------------<  234<H>  4.1   |  26  |  3.67<H>    Ca    9.0      10 Nov 2022 05:35  Phos  3.9       Mg     2.0         TPro  6.4  /  Alb  2.8<L>  /  TBili  0.3  /  DBili  x   /  AST  15  /  ALT  20  /  AlkPhos  57      135  |  102  |  44<H>  ----------------------------<  178<H>  3.9   |  24  |  3.66<H>    Ca    8.6      2022 06:40  Phos  3.9       Mg     2.0         TPro  6.4  /  Alb  2.8<L>  /  TBili  0.3  /  DBili  x   /  AST  15  /  ALT  20  /  AlkPhos  57  11-    PT/INR - ( 2022 06:40 )   PT: 13.6 sec;   INR: 1.14 ratio    PTT - ( 2022 06:40 )  PTT:37.0 sec        CAPILLARY BLOOD GLUCOSE  POCT Blood Glucose.: 153 mg/dL (2022 09:54)        Urinalysis Basic - ( 2022 18:00 )Color: Yellow / Appearance: Clear / S.005 / pH: x  Gluc: x / Ketone: Negative  / Bili: Negative / Urobili: Negative   Blood: x / Protein: 30 mg/dL / Nitrite: Negative   Leuk Esterase: Negative / RBC: 0-2 /HPF / WBC 0-2 /HPF   Sq Epi: x / Non Sq Epi: Occasional /HPF / Bacteria: Few /HPF        MEDICATIONS  (STANDING):    aspirin enteric coated 81 milliGRAM(s) Oral daily  chlorhexidine 2% Cloths 1 Application(s) Topical daily  clopidogrel Tablet 75 milliGRAM(s) Oral daily  epoetin nyasia-epbx (RETACRIT) Injectable 28058 Unit(s) IV Push <User Schedule>  glucagon  Injectable 1 milliGRAM(s) IntraMuscular once  heparin   Injectable 5000 Unit(s) SubCutaneous every 8 hours  influenza  Vaccine (HIGH DOSE) 0.7 milliLiter(s) IntraMuscular once  insulin lispro (ADMELOG) corrective regimen sliding scale   SubCutaneous at bedtime  insulin lispro (ADMELOG) corrective regimen sliding scale   SubCutaneous three times a day before meals  linezolid  IVPB      linezolid  IVPB 600 milliGRAM(s) IV Intermittent every 12 hours  mupirocin 2% Ointment 1 Application(s) Both Nostrils two times a day  Nephro-zack 1 Tablet(s) Oral daily  pantoprazole    Tablet 40 milliGRAM(s) Oral before breakfast  piperacillin/tazobactam IVPB.. 3.375 Gram(s) IV Intermittent every 12 hours  polyethylene glycol 3350 17 Gram(s) Oral daily      RADIOLOGY & ADDITIONAL TESTS:    < from: Xray Foot AP + Lateral + Oblique, Right (22 @ 19:08) >  1.  Swelling with soft tissue ulcer posterior to calcaneus.  2.  No plain film evidence of osteomyelitis.  3.  Obtain MRI as warranted clinically.        MICROBIOLOGY DATA:    MRSA/MSSA PCR (22 @ 10:30)   MRSA PCR Result.: NotDetec:    Culture - Blood (22 @ 15:20)   Specimen Source: .Blood Blood-Peripheral   Culture Results: No growth to date.     Culture - Blood (22 @ 15:10)   Specimen Source: .Blood Blood-Peripheral   Culture Results: No growth to date.     Culture - Urine (22 @ 18:00)   Specimen Source: Clean Catch Clean Catch (Midstream)   Culture Results:   <10,000 CFU/mL Normal Urogenital Martha     COVID-19 PCR (22 @ 15:10)   COVID-19 PCR: NotDetec:

## 2022-11-10 NOTE — PROGRESS NOTE ADULT - SUBJECTIVE AND OBJECTIVE BOX
[facility] NEPHROLOGY- PROGRESS NOTE    Patient is a 73yo Male with ESRD on HD, DM a/w Rt foot gangrene. Nephrology consulted for ESRD status.   s/p OR  with Rt BKA and received 2 units prbc in OR.       Hospital Medications: Medications reviewed.  REVIEW OF SYSTEMS:  CONSTITUTIONAL: No fevers or chills  RESPIRATORY: No shortness of breath  CARDIOVASCULAR: No chest pain.  GASTROINTESTINAL: No nausea, vomiting, diarrhea or abdominal pain.   VASCULAR: No left lower extremity edema. +denies RLE pain at this time    VITALS:  T(F): 98.2 (11-10-22 @ 11:47), Max: 99.4 (11-10-22 @ 05:08)  HR: 79 (11-10-22 @ 11:47)  BP: 129/63 (11-10-22 @ 11:47)  RR: 18 (11-10-22 @ 11:47)  SpO2: 100% (11-10-22 @ 11:47)  Wt(kg): --  Height (cm): 188 (:)  Weight (kg): 64.9 (:)  BMI (kg/m2): 18.4 (:)  BSA (m2): 1.89 ( @ :02)     @ 07:01  -  11-10 @ 07:00  --------------------------------------------------------  IN: 550 mL / OUT: 2000 mL / NET: -1450 mL      PHYSICAL EXAM:  Gen: NAD, calm  HEENT: anicteric  Neck: no JVD  Cards: RRR, +S1/S2, no M/G/R  Resp: CTA B/L  GI: soft, NT/ND, NABS  Extremities: no LLE edema  Derm: s/p Rt BKA wrapped  Access: Rt IJ tunneled HD catheter- benign  Left upper arm AVF- no thrill no bruit      LABS:  11-10    139  |  104  |  34<H>  ----------------------------<  234<H>  4.1   |  26  |  3.67<H>    Ca    9.0      10 Nov 2022 05:35  Phos  3.9     11-  Mg     2.0     11-    TPro  6.4  /  Alb  2.8<L>  /  TBili  0.3  /  DBili      /  AST  15  /  ALT  20  /  AlkPhos  57  11-    Creatinine Trend: 3.67 <--, 3.66 <--, 2.78 <--                        10.0   13.82 )-----------( 196      ( 10 Nov 2022 05:35 )             31.3     Urine Studies:  Urinalysis Basic - ( 2022 18:00 )    Color: Yellow / Appearance: Clear / S.005 / pH:   Gluc:  / Ketone: Negative  / Bili: Negative / Urobili: Negative   Blood:  / Protein: 30 mg/dL / Nitrite: Negative   Leuk Esterase: Negative / RBC: 0-2 /HPF / WBC 0-2 /HPF   Sq Epi:  / Non Sq Epi: Occasional /HPF / Bacteria: Few /HPF        RADIOLOGY & ADDITIONAL STUDIES:

## 2022-11-10 NOTE — DISCHARGE NOTE PROVIDER - HOSPITAL COURSE
71 y/o male from [facility], with PMHx of ESRD (T-TH-S), DM, s/p Rt foot angiogram 10/03, RBKA recommended at time but patient refused, presented back to ED with c/o R foot pain and admitted to medicine.  Patient has severe peripheral arterial disease. and gangrene of the 2nd , 3rd digit and heel of rt foot. Today postop day 1 or R BKA. Plan for dressing change on 11/12 by vascular and HD tomorrow on 11/11.      INCOMPLETE***   71 y/o male from [facility], with PMHx of ESRD (T-TH-S), DM, s/p Rt foot angiogram 10/03, RBKA recommended at time but patient refused, presented back to ED with c/o R foot pain and admitted to medicine.  Patient has severe peripheral arterial disease. and gangrene of the 2nd , 3rd digit and heel of rt foot. Today postop day 1 or R BKA. Plan for dressing change on 11/12 by vascular surgery and HD tomorrow on 11/11.      Pt DC back to [facility] once medically optimized.    This is only a brief summary, for full course of stay please see patient's full medical record.    INCOMPLETE***   71 y/o male from [facility], with PMHx of ESRD (T-TH-S), DM, s/p Rt foot angiogram 10/03 confirmed severe PAD and  no targets for re vascularization, vascular followed and recommended a Right BKA at that time but patient refused. Patient presented back to ED on 11/8/22 with c/o R foot pain and admitted to medicine. 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 Complete abx course IV Linezolid & Zosyn on 11/15. ID Dr. Babcock followed. PT recommends ANGELA.   Hospital course complicated by Covid + conversion on 11/13, on RA. Will likely return back to [facility] once quarantine period is completed on 11/23  Patient is medically opitimized for discharge    UPDATED 11/19      Note this is just a brief course for full course please refer to daily progress and consult notes. 71 y/o male from [facility], with PMHx of ESRD (T-TH-S), DM, s/p Rt foot angiogram 10/03 confirmed severe PAD and  no targets for re vascularization, vascular followed and recommended a Right BKA at that time but patient refused. Patient presented back to ED on 11/8/22 with c/o R foot pain and admitted to medicine. 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 Complete abx course IV Linezolid & Zosyn on 11/15. ID Dr. Babcock followed. PT recommends ANGELA.   Hospital course complicated by Covid + conversion on 11/13, on RA, repeat COVID on 11/21 negative.  Pt. is medically stable for discharge back to [facility]       Note this is just a brief course for full course please refer to daily progress and consult notes.

## 2022-11-10 NOTE — PROGRESS NOTE ADULT - SUBJECTIVE AND OBJECTIVE BOX
PATIENT SEEN AND EXAMINED ON :- 11/10/22  DATE OF SERVICE:  11/10/22           Interim events noted,Labs ,Radiological studies and Cardiology tests reviewed .       HOSPITAL COURSE: HPI:  72 year old male, coming from [facility], with medical history of ESRD (T-TH-S), and DM coming to ED c/o R foot pain. Patient has severe peripheral arterial disease. He was admitted on September 26 2022 for gangrene of right foot. At that time, patient was treated with IV abx. MRI was negative for OM. He had R LE angiogram on 10/3 w/ no targets for re vascularization but showed extensive distal small vessel disease, there was no acute vascular interventions done. Patient was recommended a R BKA but wanted a medical management instead. Patient now coming in c/o R foot pain. He states that he wants surgery now for his foot. He denies any fevers, chills, headaches, dizziness, chest pain, cough, SOB, abd pain, n/v, diarrhea, constipation, hematuria, dysuria, numbness, and weakness.    in the ed VS: T 98, HR 88, /60, RR 18, Spo2 96%RA  Hb 8.1   s/p 1 dose vanc and zosyn in Ed    (08 Nov 2022 21:31)      INTERIM EVENTS:Patient seen at bedside ,interim events noted.      PMH -reviewed admission note, no change since admission  HEART FAILURE: Acute[ ]Chronic[ ] Systolic[ ] Diastolic[ ] Combined Systolic and Diastolic[ ]  CAD[ ] CABG[ ] PCI[ ]  DEVICES[ ] PPM[ ] ICD[ ] ILR[ ]  ATRIAL FIBRILLATION[ ] Paroxysmal[ ] Permanent[ ] CHADS2-[  ]  CANDE[ ] CKD1[ ] CKD2[ ] CKD3[ ] CKD4[ ] ESRD[ ]  COPD[ ] HTN[ ]   DM[ ] Type1[ ] Type 2[ ]   CVA[ ] Paresis[ ]    AMBULATION: Assisted[ ] Cane/walker[ ] Independent[ ]    MEDICATIONS  (STANDING):  aspirin enteric coated 81 milliGRAM(s) Oral daily  chlorhexidine 2% Cloths 1 Application(s) Topical daily  clopidogrel Tablet 75 milliGRAM(s) Oral daily  dextrose 5%. 1000 milliLiter(s) (100 mL/Hr) IV Continuous <Continuous>  dextrose 5%. 1000 milliLiter(s) (50 mL/Hr) IV Continuous <Continuous>  dextrose 50% Injectable 25 Gram(s) IV Push once  dextrose 50% Injectable 12.5 Gram(s) IV Push once  dextrose 50% Injectable 25 Gram(s) IV Push once  epoetin nyasia-epbx (RETACRIT) Injectable 23956 Unit(s) IV Push <User Schedule>  glucagon  Injectable 1 milliGRAM(s) IntraMuscular once  heparin   Injectable 5000 Unit(s) SubCutaneous every 8 hours  influenza  Vaccine (HIGH DOSE) 0.7 milliLiter(s) IntraMuscular once  insulin lispro (ADMELOG) corrective regimen sliding scale   SubCutaneous three times a day before meals  insulin lispro (ADMELOG) corrective regimen sliding scale   SubCutaneous at bedtime  linezolid  IVPB      linezolid  IVPB 600 milliGRAM(s) IV Intermittent every 12 hours  mupirocin 2% Ointment 1 Application(s) Both Nostrils two times a day  Nephro-zack 1 Tablet(s) Oral daily  pantoprazole    Tablet 40 milliGRAM(s) Oral before breakfast  piperacillin/tazobactam IVPB.. 3.375 Gram(s) IV Intermittent every 12 hours  polyethylene glycol 3350 17 Gram(s) Oral daily    MEDICATIONS  (PRN):  acetaminophen     Tablet .. 650 milliGRAM(s) Oral every 6 hours PRN Temp greater or equal to 38C (100.4F), Mild Pain (1 - 3)  dextrose Oral Gel 15 Gram(s) Oral once PRN Blood Glucose LESS THAN 70 milliGRAM(s)/deciliter  oxyCODONE    IR 5 milliGRAM(s) Oral every 4 hours PRN Severe Pain (7 - 10)            REVIEW OF SYSTEMS:  Constitutional: [ ] fever, [ ]weight loss,  [ ]fatigue [ ]weight gain  Eyes: [ ] visual changes  Respiratory: [ ]shortness of breath;  [ ] cough, [ ]wheezing, [ ]chills, [ ]hemoptysis  Cardiovascular: [ ] chest pain, [ ]palpitations, [ ]dizziness,  [ ]leg swelling[ ]orthopnea[ ]PND  Gastrointestinal: [ ] abdominal pain, [ ]nausea, [ ]vomiting,  [ ]diarrhea [ ]Constipation [ ]Melena  Genitourinary: [ ] dysuria, [ ] hematuria [ ]Garcia  Neurologic: [ ] headaches [ ] tremors[ ]weakness [ ]Paralysis Right[ ] Left[ ]  Skin: [ ] itching, [ ]burning, [ ] rashes  Endocrine: [ ] heat or cold intolerance  Musculoskeletal: [ ] joint pain or swelling; [ ] muscle, back, or extremity pain  Psychiatric: [ ] depression, [ ]anxiety, [ ]mood swings, or [ ]difficulty sleeping  Hematologic: [ ] easy bruising, [ ] bleeding gums    [ ] All remaining systems negative except as per above.   [ ]Unable to obtain.  [x] No change in ROS since admission      Vital Signs Last 24 Hrs  T(C): 36.8 (10 Nov 2022 11:47), Max: 37.4 (10 Nov 2022 05:08)  T(F): 98.2 (10 Nov 2022 11:47), Max: 99.4 (10 Nov 2022 05:08)  HR: 79 (10 Nov 2022 11:47) (78 - 83)  BP: 129/63 (10 Nov 2022 11:47) (129/63 - 153/60)  BP(mean): --  RR: 18 (10 Nov 2022 11:47) (16 - 18)  SpO2: 100% (10 Nov 2022 11:47) (96% - 100%)    Parameters below as of 10 Nov 2022 11:47  Patient On (Oxygen Delivery Method): nasal cannula  O2 Flow (L/min): 2    I&O's Summary    09 Nov 2022 07:01  -  10 Nov 2022 07:00  --------------------------------------------------------  IN: 550 mL / OUT: 2000 mL / NET: -1450 mL        PHYSICAL EXAM:  General: No acute distress BMI-  HEENT: EOMI, PERRL  Neck: Supple, [ ] JVD  Lungs: Equal air entry bilaterally; [ ] rales [ ] wheezing [ ] rhonchi  Heart: Regular rate and rhythm; [x ] murmur   2/6 [ x] systolic [ ] diastolic [ ] radiation[ ] rubs [ ]  gallops  Abdomen: Nontender, bowel sounds present  Extremities: No clubbing, cyanosis, [ ] edema [ ]Pulses  equal and intact  Nervous system:  Alert & Oriented X3, no focal deficits  Psychiatric: Normal affect  Skin: No rashes or lesions    LABS:  11-10    139  |  104  |  34<H>  ----------------------------<  234<H>  4.1   |  26  |  3.67<H>    Ca    9.0      10 Nov 2022 05:35  Phos  3.9     11-09  Mg     2.0     11-09    TPro  6.4  /  Alb  2.8<L>  /  TBili  0.3  /  DBili  x   /  AST  15  /  ALT  20  /  AlkPhos  57  11-09    Creatinine Trend: 3.67<--, 3.66<--, 2.78<--, 5.85<--                        10.0   13.82 )-----------( 196      ( 10 Nov 2022 05:35 )             31.3     PT/INR - ( 09 Nov 2022 06:40 )   PT: 13.6 sec;   INR: 1.14 ratio         PTT - ( 09 Nov 2022 06:40 )  PTT:37.0 sec

## 2022-11-10 NOTE — DISCHARGE NOTE PROVIDER - CARE PROVIDER_API CALL
Jorge Huertas)  Internal Medicine  [phone] [place] [address]  Phone: [phone]  Fax: [phone]  Follow Up Time: 1 week   Jorge Huertas)  Internal Medicine  [phone] [place] [address]  Phone: [phone]  Fax: [phone]  Follow Up Time: 1 week    Narinder Billy)  Vascular Surgery  [address] [facility], [address] [place] [address]  Phone: [phone]  Fax: [phone]  Follow Up Time: 1-3 days

## 2022-11-10 NOTE — PROGRESS NOTE ADULT - ASSESSMENT
73 y/o male from [facility], with PMHx of ESRD (T-TH-S), DM, s/p Rt foot angiogram 10/03 confirmed severe PAD and  no targets for re vascularization, vascular followed and recommended a Right BKA at that time but patient refused. Patient presented back to ED on 11/8/22 with c/o R foot pain and admitted to medicine. Noted with right foot gangrene of the 2nd , 3rd digit and heel- S/P R BKA 11/9, today is POD 2.  Plan for dressing change on 11/12 by vascular and HD tomorrow on 11/12, will likely return back to [facility] once optimized

## 2022-11-10 NOTE — PROGRESS NOTE ADULT - PROBLEM SELECTOR PLAN 6
from [facility]  POD#2  plan for HD Sat 11/12   PT reccs: ANGELA   will likely return to [facility] once cleared by vascular   COVID - 11/8

## 2022-11-10 NOTE — DISCHARGE NOTE PROVIDER - PROVIDER TOKENS
PROVIDER:[TOKEN:[[id number]],FOLLOWUP:[1 week]] PROVIDER:[TOKEN:[[id number]],FOLLOWUP:[1 week]],PROVIDER:[TOKEN:[[id number]],FOLLOWUP:[1-3 days]]

## 2022-11-10 NOTE — PROGRESS NOTE ADULT - SUBJECTIVE AND OBJECTIVE BOX
Patient is a 72y old  Male who presents with a chief complaint of toe gangrene (11 Nov 2022 09:32)      INTERVAL HPI/OVERNIGHT EVENTS: POD # 2 offer no acute medical complaints     MEDICATIONS  (STANDING):  aspirin enteric coated 81 milliGRAM(s) Oral daily  chlorhexidine 2% Cloths 1 Application(s) Topical daily  clopidogrel Tablet 75 milliGRAM(s) Oral daily  dextrose 5%. 1000 milliLiter(s) (50 mL/Hr) IV Continuous <Continuous>  dextrose 5%. 1000 milliLiter(s) (100 mL/Hr) IV Continuous <Continuous>  dextrose 50% Injectable 25 Gram(s) IV Push once  dextrose 50% Injectable 12.5 Gram(s) IV Push once  dextrose 50% Injectable 25 Gram(s) IV Push once  epoetin nyasia-epbx (RETACRIT) Injectable 13282 Unit(s) IV Push <User Schedule>  glucagon  Injectable 1 milliGRAM(s) IntraMuscular once  heparin   Injectable 5000 Unit(s) SubCutaneous every 8 hours  influenza  Vaccine (HIGH DOSE) 0.7 milliLiter(s) IntraMuscular once  insulin lispro (ADMELOG) corrective regimen sliding scale   SubCutaneous at bedtime  insulin lispro (ADMELOG) corrective regimen sliding scale   SubCutaneous three times a day before meals  linezolid  IVPB      linezolid  IVPB 600 milliGRAM(s) IV Intermittent every 12 hours  mupirocin 2% Ointment 1 Application(s) Both Nostrils two times a day  Nephro-zack 1 Tablet(s) Oral daily  pantoprazole    Tablet 40 milliGRAM(s) Oral before breakfast  piperacillin/tazobactam IVPB.. 3.375 Gram(s) IV Intermittent every 12 hours  polyethylene glycol 3350 17 Gram(s) Oral daily    MEDICATIONS  (PRN):  acetaminophen     Tablet .. 650 milliGRAM(s) Oral every 6 hours PRN Temp greater or equal to 38C (100.4F), Mild Pain (1 - 3)  dextrose Oral Gel 15 Gram(s) Oral once PRN Blood Glucose LESS THAN 70 milliGRAM(s)/deciliter  oxyCODONE    IR 5 milliGRAM(s) Oral every 4 hours PRN Severe Pain (7 - 10)      __________________________________________________  REVIEW OF SYSTEMS:    CONSTITUTIONAL: No fever,   EYES: no acute visual disturbances  NECK: No pain or stiffness  RESPIRATORY: No cough; No shortness of breath  CARDIOVASCULAR: No chest pain, no palpitations  GASTROINTESTINAL: No pain. No nausea or vomiting; No diarrhea   NEUROLOGICAL: No headache or numbness, no tremors  MUSCULOSKELETAL: No joint pain, no muscle pain  GENITOURINARY: no dysuria, no frequency, no hesitancy  PSYCHIATRY: no depression , no anxiety  ALL OTHER  ROS negative        Vital Signs Last 24 Hrs  T(C): 36.8 (11 Nov 2022 04:41), Max: 37.7 (10 Nov 2022 20:54)  T(F): 98.3 (11 Nov 2022 04:41), Max: 99.8 (10 Nov 2022 20:54)  HR: 87 (11 Nov 2022 04:41) (79 - 88)  BP: 132/68 (11 Nov 2022 04:41) (122/60 - 132/68)  BP(mean): --  RR: 20 (11 Nov 2022 04:41) (16 - 20)  SpO2: 100% (11 Nov 2022 04:41) (100% - 100%)    Parameters below as of 11 Nov 2022 04:41  Patient On (Oxygen Delivery Method): nasal cannula  O2 Flow (L/min): 2      ________________________________________________  PHYSICAL EXAM:  GENERAL: NAD  HEENT: atraumatic, Normocephalic;  conjunctivae and sclerae clear;   NECK : supple, no JVD  CHEST/LUNG:dec breath sounds at bases  HEART: S1 S2 RRR, no murmurs, gallops or rubs  ABDOMEN: Soft, Nontender, Nondistended; Bowel sounds present and hypoactive  EXTREMITIES: no cyanosis; no edema to BUE or LLE, 2+ radial pulses, RLE wrapped in kerlex and coban with splint.  Clean dry and intact.  SKIN: warm and dry; no rash  NERVOUS SYSTEM:  Awake and alert;  _________________________________________________  LABS:                        10.0   13.82 )-----------( 196      ( 10 Nov 2022 05:35 )             31.3     11-10    139  |  104  |  34<H>  ----------------------------<  234<H>  4.1   |  26  |  3.67<H>    Ca    9.0      10 Nov 2022 05:35          CAPILLARY BLOOD GLUCOSE      POCT Blood Glucose.: 206 mg/dL (11 Nov 2022 07:40)  POCT Blood Glucose.: 229 mg/dL (10 Nov 2022 21:19)  POCT Blood Glucose.: 253 mg/dL (10 Nov 2022 16:47)  POCT Blood Glucose.: 277 mg/dL (10 Nov 2022 11:30)        RADIOLOGY & ADDITIONAL TESTS:   < from: Xray Foot AP + Lateral + Oblique, Right (11.08.22 @ 19:08) >  IMPRESSION:  1.  Swelling with soft tissue ulcer posterior to calcaneus.  2.  No plain film evidence of osteomyelitis.  3.  Obtain MRI as warranted clinically.    --- End of Report ---        < end of copied text >    Imaging Personally Reviewed:  YES/    Consultant(s) Notes Reviewed:   YES/    Plan of care was discussed with patient and /or primary care giver; all questions and concerns were addressed and care was aligned with patient's wishes.

## 2022-11-10 NOTE — PROGRESS NOTE ADULT - PROBLEM SELECTOR PLAN 3
on HD   will get dialysis tomorrow   HD to return to [facility] next week on HD   will get dialysis Sat 11/12  HD to return to [facility] next week

## 2022-11-10 NOTE — DISCHARGE NOTE PROVIDER - CARE PROVIDERS DIRECT ADDRESSES
,DirectAddress_Unknown ,DirectAddress_Unknown,[email or web address]

## 2022-11-10 NOTE — PROGRESS NOTE ADULT - SUBJECTIVE AND OBJECTIVE BOX
Patient is a 72y old  Male who presents with a chief complaint of toe gangrene (10 Nov 2022 13:23)      INTERVAL HPI/OVERNIGHT EVENTS: pt seen and examined    MEDICATIONS  (STANDING):  aspirin enteric coated 81 milliGRAM(s) Oral daily  chlorhexidine 2% Cloths 1 Application(s) Topical daily  clopidogrel Tablet 75 milliGRAM(s) Oral daily  dextrose 5%. 1000 milliLiter(s) (100 mL/Hr) IV Continuous <Continuous>  dextrose 5%. 1000 milliLiter(s) (50 mL/Hr) IV Continuous <Continuous>  dextrose 50% Injectable 25 Gram(s) IV Push once  dextrose 50% Injectable 12.5 Gram(s) IV Push once  dextrose 50% Injectable 25 Gram(s) IV Push once  epoetin nyasia-epbx (RETACRIT) Injectable 96008 Unit(s) IV Push <User Schedule>  glucagon  Injectable 1 milliGRAM(s) IntraMuscular once  heparin   Injectable 5000 Unit(s) SubCutaneous every 8 hours  influenza  Vaccine (HIGH DOSE) 0.7 milliLiter(s) IntraMuscular once  insulin lispro (ADMELOG) corrective regimen sliding scale   SubCutaneous at bedtime  insulin lispro (ADMELOG) corrective regimen sliding scale   SubCutaneous three times a day before meals  linezolid  IVPB      linezolid  IVPB 600 milliGRAM(s) IV Intermittent every 12 hours  mupirocin 2% Ointment 1 Application(s) Both Nostrils two times a day  Nephro-zack 1 Tablet(s) Oral daily  piperacillin/tazobactam IVPB.. 3.375 Gram(s) IV Intermittent every 12 hours  polyethylene glycol 3350 17 Gram(s) Oral daily    MEDICATIONS  (PRN):  acetaminophen     Tablet .. 650 milliGRAM(s) Oral every 6 hours PRN Temp greater or equal to 38C (100.4F), Mild Pain (1 - 3)  dextrose Oral Gel 15 Gram(s) Oral once PRN Blood Glucose LESS THAN 70 milliGRAM(s)/deciliter  oxyCODONE    IR 5 milliGRAM(s) Oral every 4 hours PRN Severe Pain (7 - 10)      __________________________________________________  REVIEW OF SYSTEMS:    CONSTITUTIONAL: No fever,   EYES: no acute visual disturbances  NECK: No pain or stiffness  RESPIRATORY: No cough; No shortness of breath  CARDIOVASCULAR: No chest pain, no palpitations  GASTROINTESTINAL: No pain. No nausea or vomiting; No diarrhea   NEUROLOGICAL: No headache or numbness, no tremors  MUSCULOSKELETAL: No joint pain, no muscle pain  GENITOURINARY: no dysuria, no frequency, no hesitancy  PSYCHIATRY: no depression , no anxiety  ALL OTHER  ROS negative        Vital Signs Last 24 Hrs  T(C): 36.8 (10 Nov 2022 11:47), Max: 37.4 (10 Nov 2022 05:08)  T(F): 98.2 (10 Nov 2022 11:47), Max: 99.4 (10 Nov 2022 05:08)  HR: 79 (10 Nov 2022 11:47) (62 - 83)  BP: 129/63 (10 Nov 2022 11:47) (99/63 - 153/60)  BP(mean): --  RR: 18 (10 Nov 2022 11:47) (16 - 18)  SpO2: 100% (10 Nov 2022 11:47) (94% - 100%)    Parameters below as of 10 Nov 2022 11:47  Patient On (Oxygen Delivery Method): nasal cannula  O2 Flow (L/min): 2      ________________________________________________  PHYSICAL EXAM:  GENERAL: NAD  HEENT: atraumatic, Normocephalic;  conjunctivae and sclerae clear; moist mucous membranes;   NECK : supple, no JVD  CHEST/LUNG: dec breath sounds at bases  HEART: S1 S2 RRR, no murmurs, gallops or rubs  ABDOMEN: Soft, Nontender, Nondistended; Bowel sounds present and hypoactive  EXTREMITIES: no cyanosis; no edema to BUE or LLE, 2+ radial pulses, RLE wrapped in kerlex and coban with splint.  Clean dry and intact.  SKIN: warm and dry; no rash  NERVOUS SYSTEM:  Awake and alert;   _________________________________________________  LABS:                        10.0   13.82 )-----------( 196      ( 10 Nov 2022 05:35 )             31.3     11-10    139  |  104  |  34<H>  ----------------------------<  234<H>  4.1   |  26  |  3.67<H>    Ca    9.0      10 Nov 2022 05:35  Phos  3.9       Mg     2.0         TPro  6.4  /  Alb  2.8<L>  /  TBili  0.3  /  DBili  x   /  AST  15  /  ALT  20  /  AlkPhos  57      PT/INR - ( 2022 06:40 )   PT: 13.6 sec;   INR: 1.14 ratio         PTT - ( 2022 06:40 )  PTT:37.0 sec  Urinalysis Basic - ( 2022 18:00 )    Color: Yellow / Appearance: Clear / S.005 / pH: x  Gluc: x / Ketone: Negative  / Bili: Negative / Urobili: Negative   Blood: x / Protein: 30 mg/dL / Nitrite: Negative   Leuk Esterase: Negative / RBC: 0-2 /HPF / WBC 0-2 /HPF   Sq Epi: x / Non Sq Epi: Occasional /HPF / Bacteria: Few /HPF      CAPILLARY BLOOD GLUCOSE      POCT Blood Glucose.: 277 mg/dL (10 Nov 2022 11:30)  POCT Blood Glucose.: 222 mg/dL (10 Nov 2022 08:11)  POCT Blood Glucose.: 193 mg/dL (2022 23:01)  POCT Blood Glucose.: 116 mg/dL (2022 17:36)        RADIOLOGY & ADDITIONAL TESTS:    Imaging  Reviewed:    < from: Xray Foot AP + Lateral + Oblique, Right (22 @ 19:08) >    FINDINGS:    There is no fracture, dislocation or joint effusion.  No degenerative changes.  Moderate atherosclerotic changes.  No radiopaque foreign body.    IMPRESSION:  1.  Swelling with soft tissue ulcer posterior to calcaneus.  2.  No plain film evidence of osteomyelitis.  3.  Obtain MRI as warranted clinically.      < end of copied text >      Consultant(s) Notes Reviewed:   YES    Plan of care was discussed with patient and /or primary care giver; all questions and concerns were addressed

## 2022-11-10 NOTE — PROGRESS NOTE ADULT - ASSESSMENT
Patient is a 72y old  Male who is coming from [facility], with medical history of ESRD (T-TH-S), and DM, presents to the ER  for evaluation of Right foot pain. Patient has severe peripheral arterial disease. He was admitted on September 26, 2022 for gangrene of right foot. At that time, patient was treated with IV abx. MRI was negative for OM. He had Right LE angiogram on 10/3 w/ no targets for re vascularization but showed extensive distal small vessel disease, there was no acute vascular interventions done. Patient was recommended a R BKA but wanted a medical management instead. Patient now coming in c/o R foot pain. He states that he wants surgery now for his foot. On admission, he has no fever , no leukocytosis. He has evaluated by Vascular team and taken to OR for Right BKA. He has started on Vancomycin and Zosyn, and the ID consult requested to assist with further evaluation and antibiotic management.    # Right gangrenous foot- in OR for s/p Right BKA    would recommend:    1. Follow Up final Blood cultures  2. Post-op Labs  3. Pain management as needed  4. Continue linezolid and Zosyn for now    Attending Attestation:    Spent more than 45 minutes on total encounter, more than 50 % of the visit was spent counseling and/or coordinating care by the Attending physician.   Patient is a 72y old  Male who is coming from [facility], with medical history of ESRD (T-TH-S), and DM, presents to the ER  for evaluation of Right foot pain. Patient has severe peripheral arterial disease. He was admitted on September 26, 2022 for gangrene of right foot. At that time, patient was treated with IV abx. MRI was negative for OM. He had Right LE angiogram on 10/3 w/ no targets for re vascularization but showed extensive distal small vessel disease, there was no acute vascular interventions done. Patient was recommended a R BKA but wanted a medical management instead. Patient now coming in c/o R foot pain. He states that he wants surgery now for his foot. On admission, he has no fever , no leukocytosis. He has evaluated by Vascular team and taken to OR for Right BKA. He has started on Vancomycin and Zosyn, and the ID consult requested to assist with further evaluation and antibiotic management.    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 Wound care as per Vascular Sx  2.  Pain management as needed  3. Continue linezolid and Zosyn for a short course for surrounding skin and soft tissue infection  4. Monitor kidney function     Attending Attestation:    Spent more than 45 minutes on total encounter, more than 50 % of the visit was spent counseling and/or coordinating care by the Attending physician.

## 2022-11-10 NOTE — PROGRESS NOTE ADULT - ASSESSMENT
71 y/o male from [facility], with PMHx of ESRD (T-TH-S), DM, s/p Rt foot angiogram 10/03, RBKA recommended at time but patient refused, presented back to ED with c/o R foot pain and admitted to medicine.  Patient has severe peripheral arterial disease. and gangrene of the 2nd , 3rd digit and heel of rt foot. Today postop day 1 or R BKA. Plan for dressing change on 11/12 by vascular and HD tomorrow on 11/11.

## 2022-11-10 NOTE — PROGRESS NOTE ADULT - ASSESSMENT
73 y/o male from [facility], with PMHx of ESRD (T-TH-S), DM, s/p Rt foot angiogram 10/03, RBKA recommended at time but patient refused, presented back to ED with c/o R foot pain and admitted to medicine.  Patient has severe peripheral arterial disease. and gangrene of the 2nd , 3rd digit and heel of rt foot. Today postop day 1 or R BKA. Plan for dressing change on 11/12 by vascular and HD tomorrow on 11/11.

## 2022-11-10 NOTE — PROGRESS NOTE ADULT - ASSESSMENT
"  SUBJECTIVE:   Lilia Lilly is a 63 year old female who presents to clinic today for the following health issues:      Hypertension Follow-up      Outpatient blood pressures are not being checked.    Low Salt Diet: no added salt      Amount of exercise or physical activity: None    Problems taking medications regularly: No    Medication side effects: none    Diet: low salt and low fat/cholesterol    On last visit increased lisinpril to 20mg - tolerating well. Patient also losing weight - watching carbs.     Wt Readings from Last 3 Encounters:   07/23/18 221 lb (100.2 kg)   06/08/18 228 lb (103.4 kg)   01/15/18 227 lb (103 kg)     BP Readings from Last 6 Encounters:   07/23/18 156/80   06/08/18 178/80   01/15/18 142/72   11/10/17 136/78   09/25/17 136/70   09/12/17 142/70         PROBLEMS TO ADD ON...    -arthritis in hands - she has had multiple hand surgeries - usually goes to O, but interested in seeing someone else.  She has had injections in her thumbs before - she is wondering is she needs this again.  Hurts when she does a pincer grasp.  Tylenol/ibuprofen with minimal effect and tries to avoid long term with her ulcerative colitis.  She wears gloves at home that hold the thumb in adductive position.  She has never been tested for RA.  Pain worse in AM, worse during activities such as hand washing, but then better later.    Problem list and histories reviewed & adjusted, as indicated.  Additional history: as documented        Reviewed and updated as needed this visit by clinical staff       Reviewed and updated as needed this visit by Provider         ROS:  Constitutional, HEENT, cardiovascular, pulmonary, gi and gu systems are negative, except as otherwise noted.    OBJECTIVE:     /80 (BP Location: Right arm, Cuff Size: Adult Large)  Pulse 75  Temp 98  F (36.7  C) (Oral)  Ht 5' 4\" (1.626 m)  Wt 221 lb (100.2 kg)  SpO2 95%  BMI 37.93 kg/m2  Body mass index is 37.93 kg/(m^2).  GENERAL " APPEARANCE: healthy, alert and no distress  RESP: lungs clear to auscultation - no rales, rhonchi or wheezes  CV: regular rates and rhythm, normal S1 S2, no S3 or S4 and no murmur, click or rub  ORTHO: left thumb base mild swelling +tender, right mildly tender, no swelling, no other notable joint enlargment.    Diagnostic Test Results:  none     ASSESSMENT/PLAN:             1. Benign essential hypertension  Uncontrolled, but improving - increase lisinopril to 40mg - follow up with nurse only in 1-2 weeks.   - lisinopril (PRINIVIL/ZESTRIL) 40 MG tablet; Take 1 tablet (40 mg) by mouth daily  Dispense: 30 tablet; Refill: 1  - Basic metabolic panel    2. Arthritis  Due to colon issues NSAID use is limited - best option would be repeat cortisone injections - also recommend topical pain relievers to thumb as well.   - Rheumatoid factor  - ORTHO  REFERRAL    Depression - patient states Effexor very expensive - advised to check with pharmacy or insurance to ensure she is getting the generic and the cheapest formulation.    Patient Instructions   Call insurance or ask pharmacist - is this the cheapest form of Effexor (venlafaxine - generic) - is it cheaper if not extended release?  Any other formulation cheaper?    For hands:  Recommend over the counter Aleve (naprosyn) - take 2 tablets twice daily.  Referral below for orthopedics for injections in hands.   You will be called to schedule orthopedics appt.    Blood pressure - increase lisinopril to 40mg - may take two of your current tablets until you  new prescription.     Make nurse only appt in 2 weeks for blood pressure check.      Teagan Ann MD  [facility]   73 y/o male from [facility], with PMHx of ESRD (T-TH-S), DM, s/p Rt foot angiogram 10/03, RBKA recommended at time but patient refused, presented back to ED with c/o R foot pain and admitted to medicine.  Patient has severe peripheral arterial disease. and gangrene of the 2nd , 3rd digit and heel of rt foot. Today postop day 1 or R BKA. Plan for dressing change on 11/12 by vascular and HD tomorrow on 11/11.

## 2022-11-11 LAB
ANION GAP SERPL CALC-SCNC: 11 MMOL/L — SIGNIFICANT CHANGE UP (ref 5–17)
BUN SERPL-MCNC: 50 MG/DL — HIGH (ref 7–18)
CALCIUM SERPL-MCNC: 8.6 MG/DL — SIGNIFICANT CHANGE UP (ref 8.4–10.5)
CHLORIDE SERPL-SCNC: 101 MMOL/L — SIGNIFICANT CHANGE UP (ref 96–108)
CO2 SERPL-SCNC: 25 MMOL/L — SIGNIFICANT CHANGE UP (ref 22–31)
CREAT SERPL-MCNC: 4.92 MG/DL — HIGH (ref 0.5–1.3)
EGFR: 12 ML/MIN/1.73M2 — LOW
GLUCOSE BLDC GLUCOMTR-MCNC: 179 MG/DL — HIGH (ref 70–99)
GLUCOSE BLDC GLUCOMTR-MCNC: 206 MG/DL — HIGH (ref 70–99)
GLUCOSE BLDC GLUCOMTR-MCNC: 235 MG/DL — HIGH (ref 70–99)
GLUCOSE BLDC GLUCOMTR-MCNC: 247 MG/DL — HIGH (ref 70–99)
GLUCOSE SERPL-MCNC: 176 MG/DL — HIGH (ref 70–99)
HCT VFR BLD CALC: 28.6 % — LOW (ref 39–50)
HGB BLD-MCNC: 9.3 G/DL — LOW (ref 13–17)
MCHC RBC-ENTMCNC: 30.6 PG — SIGNIFICANT CHANGE UP (ref 27–34)
MCHC RBC-ENTMCNC: 32.5 GM/DL — SIGNIFICANT CHANGE UP (ref 32–36)
MCV RBC AUTO: 94.1 FL — SIGNIFICANT CHANGE UP (ref 80–100)
NRBC # BLD: 0 /100 WBCS — SIGNIFICANT CHANGE UP (ref 0–0)
PLATELET # BLD AUTO: 197 K/UL — SIGNIFICANT CHANGE UP (ref 150–400)
POTASSIUM SERPL-MCNC: 4.2 MMOL/L — SIGNIFICANT CHANGE UP (ref 3.5–5.3)
POTASSIUM SERPL-SCNC: 4.2 MMOL/L — SIGNIFICANT CHANGE UP (ref 3.5–5.3)
RBC # BLD: 3.04 M/UL — LOW (ref 4.2–5.8)
RBC # FLD: 15.5 % — HIGH (ref 10.3–14.5)
SODIUM SERPL-SCNC: 137 MMOL/L — SIGNIFICANT CHANGE UP (ref 135–145)
WBC # BLD: 11.3 K/UL — HIGH (ref 3.8–10.5)
WBC # FLD AUTO: 11.3 K/UL — HIGH (ref 3.8–10.5)

## 2022-11-11 RX ADMIN — HEPARIN SODIUM 5000 UNIT(S): 5000 INJECTION INTRAVENOUS; SUBCUTANEOUS at 21:40

## 2022-11-11 RX ADMIN — Medication 1 TABLET(S): at 11:55

## 2022-11-11 RX ADMIN — Medication 2: at 17:07

## 2022-11-11 RX ADMIN — Medication 1: at 11:54

## 2022-11-11 RX ADMIN — HEPARIN SODIUM 5000 UNIT(S): 5000 INJECTION INTRAVENOUS; SUBCUTANEOUS at 13:21

## 2022-11-11 RX ADMIN — CLOPIDOGREL BISULFATE 75 MILLIGRAM(S): 75 TABLET, FILM COATED ORAL at 11:56

## 2022-11-11 RX ADMIN — Medication 81 MILLIGRAM(S): at 11:55

## 2022-11-11 RX ADMIN — LINEZOLID 300 MILLIGRAM(S): 600 INJECTION, SOLUTION INTRAVENOUS at 21:28

## 2022-11-11 RX ADMIN — PIPERACILLIN AND TAZOBACTAM 25 GRAM(S): 4; .5 INJECTION, POWDER, LYOPHILIZED, FOR SOLUTION INTRAVENOUS at 05:37

## 2022-11-11 RX ADMIN — LINEZOLID 300 MILLIGRAM(S): 600 INJECTION, SOLUTION INTRAVENOUS at 13:16

## 2022-11-11 RX ADMIN — Medication 650 MILLIGRAM(S): at 21:28

## 2022-11-11 RX ADMIN — Medication 2: at 08:21

## 2022-11-11 RX ADMIN — PIPERACILLIN AND TAZOBACTAM 25 GRAM(S): 4; .5 INJECTION, POWDER, LYOPHILIZED, FOR SOLUTION INTRAVENOUS at 17:07

## 2022-11-11 RX ADMIN — LINEZOLID 300 MILLIGRAM(S): 600 INJECTION, SOLUTION INTRAVENOUS at 00:39

## 2022-11-11 RX ADMIN — MUPIROCIN 1 APPLICATION(S): 20 OINTMENT TOPICAL at 05:37

## 2022-11-11 RX ADMIN — HEPARIN SODIUM 5000 UNIT(S): 5000 INJECTION INTRAVENOUS; SUBCUTANEOUS at 05:37

## 2022-11-11 RX ADMIN — CHLORHEXIDINE GLUCONATE 1 APPLICATION(S): 213 SOLUTION TOPICAL at 11:56

## 2022-11-11 RX ADMIN — Medication 650 MILLIGRAM(S): at 22:31

## 2022-11-11 RX ADMIN — POLYETHYLENE GLYCOL 3350 17 GRAM(S): 17 POWDER, FOR SOLUTION ORAL at 13:15

## 2022-11-11 RX ADMIN — PANTOPRAZOLE SODIUM 40 MILLIGRAM(S): 20 TABLET, DELAYED RELEASE ORAL at 05:37

## 2022-11-11 RX ADMIN — MUPIROCIN 1 APPLICATION(S): 20 OINTMENT TOPICAL at 17:07

## 2022-11-11 NOTE — DIETITIAN INITIAL EVALUATION ADULT - NSICDXPASTMEDICALHX_GEN_ALL_CORE_FT
PAST MEDICAL HISTORY:  DM (diabetes mellitus)     ESRD on dialysis     PAD (peripheral artery disease)

## 2022-11-11 NOTE — DIETITIAN INITIAL EVALUATION ADULT - PERTINENT LABORATORY DATA
11-10    139  |  104  |  34<H>  ----------------------------<  234<H>  4.1   |  26  |  3.67<H>    Ca    9.0      10 Nov 2022 05:35    POCT Blood Glucose.: 206 mg/dL (11-11-22 @ 07:40)  A1C with Estimated Average Glucose Result: 6.2 % (09-26-22 @ 19:00)

## 2022-11-11 NOTE — DIETITIAN INITIAL EVALUATION ADULT - PERTINENT MEDS FT
MEDICATIONS  (STANDING):  aspirin enteric coated 81 milliGRAM(s) Oral daily  chlorhexidine 2% Cloths 1 Application(s) Topical daily  clopidogrel Tablet 75 milliGRAM(s) Oral daily  dextrose 5%. 1000 milliLiter(s) (50 mL/Hr) IV Continuous <Continuous>  dextrose 5%. 1000 milliLiter(s) (100 mL/Hr) IV Continuous <Continuous>  dextrose 50% Injectable 25 Gram(s) IV Push once  dextrose 50% Injectable 12.5 Gram(s) IV Push once  dextrose 50% Injectable 25 Gram(s) IV Push once  epoetin nyasia-epbx (RETACRIT) Injectable 00577 Unit(s) IV Push <User Schedule>  glucagon  Injectable 1 milliGRAM(s) IntraMuscular once  heparin   Injectable 5000 Unit(s) SubCutaneous every 8 hours  influenza  Vaccine (HIGH DOSE) 0.7 milliLiter(s) IntraMuscular once  insulin lispro (ADMELOG) corrective regimen sliding scale   SubCutaneous at bedtime  insulin lispro (ADMELOG) corrective regimen sliding scale   SubCutaneous three times a day before meals  linezolid  IVPB      linezolid  IVPB 600 milliGRAM(s) IV Intermittent every 12 hours  mupirocin 2% Ointment 1 Application(s) Both Nostrils two times a day  Nephro-zack 1 Tablet(s) Oral daily  pantoprazole    Tablet 40 milliGRAM(s) Oral before breakfast  piperacillin/tazobactam IVPB.. 3.375 Gram(s) IV Intermittent every 12 hours  polyethylene glycol 3350 17 Gram(s) Oral daily    MEDICATIONS  (PRN):  acetaminophen     Tablet .. 650 milliGRAM(s) Oral every 6 hours PRN Temp greater or equal to 38C (100.4F), Mild Pain (1 - 3)  dextrose Oral Gel 15 Gram(s) Oral once PRN Blood Glucose LESS THAN 70 milliGRAM(s)/deciliter  oxyCODONE    IR 5 milliGRAM(s) Oral every 4 hours PRN Severe Pain (7 - 10)

## 2022-11-11 NOTE — PROGRESS NOTE ADULT - PROBLEM SELECTOR PLAN 2
2/2 ESRD  on epogen  f/u cbc in AM likely anemia of chronic disease   HBG 10.0 - Stable   on epogen with HD   trend CBC

## 2022-11-11 NOTE — PROGRESS NOTE ADULT - ASSESSMENT
71 y/o male from [facility], with PMHx of ESRD (T-TH-S), DM, s/p Rt foot angiogram 10/03 confirmed severe PAD and  no targets for re vascularization, vascular followed and recommended a Right BKA at that time but patient refused. Patient presented back to ED on 11/8/22 with c/o R foot pain and admitted to medicine. Noted with right foot gangrene of the 2nd , 3rd digit and heel- S/P R BKA 11/9, today is POD 2.  Plan for dressing change on 11/12 by vascular and HD tomorrow on 11/12, will likely return back to [facility] once optimized

## 2022-11-11 NOTE — PROGRESS NOTE ADULT - ASSESSMENT
71 y/o male from [facility], with PMHx of ESRD (T-TH-S), DM, s/p Rt foot angiogram 10/03, RBKA recommended at time but patient refused, presented back to ED with c/o R foot pain and admitted to medicine.  Patient has severe peripheral arterial disease. and gangrene of the 2nd , 3rd digit and heel of rt foot. Today postop day 1 or R BKA. Plan for dressing change on 11/12 by vascular and HD tomorrow on 11/11.     71 y/o male from [facility], with PMHx of ESRD (T-TH-S), DM, s/p Rt foot angiogram 10/03 confirmed severe PAD and  no targets for re vascularization, vascular followed and recommended a Right BKA at that time but patient refused. Patient presented back to ED on 11/8/22 with c/o R foot pain and admitted to medicine. Noted with right foot gangrene of the 2nd , 3rd digit and heel- S/P R BKA 11/9, today is POD 2.  Plan for dressing change on 11/12 by vascular and HD tomorrow on 11/12, will likely return back to [facility] once optimized

## 2022-11-11 NOTE — PROGRESS NOTE ADULT - PROBLEM SELECTOR PLAN 4
POC FS slightly elevated however preop with poor PO intake  continue ISS A1C 6.2   continue HISS   monitor FS

## 2022-11-11 NOTE — PHYSICAL THERAPY INITIAL EVALUATION ADULT - GENERAL OBSERVATIONS, REHAB EVAL
patient adm from rehab, hd, s/p right below knee amp/immobilized, assistance in bedmobility required post debility and narror base of support

## 2022-11-11 NOTE — PROGRESS NOTE ADULT - PROBLEM SELECTOR PLAN 6
from [facility]  will need covid pcr Sunday  will need PT eval from [facility]  POD#2  plan for HD Sat 11/12   PT reccs: ANGELA   will likely return to [facility] once cleared by vascular   COVID - 11/8

## 2022-11-11 NOTE — DIETITIAN INITIAL EVALUATION ADULT - OTHER INFO
Pt Visited. Pt seen Nutrition consult. Pt Reports " My appetite is Pretty Good" NKFA Reported. NO chewing or  swallowing Difficulty reported. Pt is From [facility] Diet - NCS, 2-3 Gm  Na, 2-3 gm K,  1 Gm PHOS, Nepro TID, LPS . Pt states he is Maintaining weight. Per Pt HT 6 feet 2 inches,  LB  ( Post HD). Pt w S/P R BKA. Bed scale 149 Lb with  R Knee immobilizer. .Pt with  Recent stay in Oct 2022 Wt 144 lb.  Pt Reports H/O HD x ~4 Years, H/O DM. Will Provide Large Portions with meals. ( Pt agreeable) . Pt likes Butter Pecan Nepro . Pt Visited. Pt seen Nutrition consult. Pt Reports " My appetite is Pretty Good" NKFA Reported. NO chewing or  swallowing Difficulty reported. Pt is From [facility] Diet - NCS, 2-3 Gm  Na, 2-3 gm K,  1 Gm PHOS,  1200 Ml fluid restriction Nepro TID, LPS . Pt states he is Maintaining weight. Per Pt HT 6 feet 2 inches,  LB  ( Post HD). Pt w S/P R BKA. Bed scale 149 Lb with  R Knee immobilizer. .Pt with  Recent stay in Oct 2022 Wt 144 lb.  Pt Reports H/O HD x ~4 Years, H/O DM. Will Provide Large Portions with meals. ( Pt agreeable) . Pt likes Butter Pecan Nepro .

## 2022-11-11 NOTE — PROGRESS NOTE ADULT - SUBJECTIVE AND OBJECTIVE BOX
Patient is a 72y old  Male who presents with a chief complaint of toe gangrene (11 Nov 2022 09:32)      INTERVAL HPI/OVERNIGHT EVENTS: POD # 2 offer no acute medical complaints     MEDICATIONS  (STANDING):  aspirin enteric coated 81 milliGRAM(s) Oral daily  chlorhexidine 2% Cloths 1 Application(s) Topical daily  clopidogrel Tablet 75 milliGRAM(s) Oral daily  dextrose 5%. 1000 milliLiter(s) (50 mL/Hr) IV Continuous <Continuous>  dextrose 5%. 1000 milliLiter(s) (100 mL/Hr) IV Continuous <Continuous>  dextrose 50% Injectable 25 Gram(s) IV Push once  dextrose 50% Injectable 12.5 Gram(s) IV Push once  dextrose 50% Injectable 25 Gram(s) IV Push once  epoetin nyasia-epbx (RETACRIT) Injectable 26770 Unit(s) IV Push <User Schedule>  glucagon  Injectable 1 milliGRAM(s) IntraMuscular once  heparin   Injectable 5000 Unit(s) SubCutaneous every 8 hours  influenza  Vaccine (HIGH DOSE) 0.7 milliLiter(s) IntraMuscular once  insulin lispro (ADMELOG) corrective regimen sliding scale   SubCutaneous at bedtime  insulin lispro (ADMELOG) corrective regimen sliding scale   SubCutaneous three times a day before meals  linezolid  IVPB      linezolid  IVPB 600 milliGRAM(s) IV Intermittent every 12 hours  mupirocin 2% Ointment 1 Application(s) Both Nostrils two times a day  Nephro-zack 1 Tablet(s) Oral daily  pantoprazole    Tablet 40 milliGRAM(s) Oral before breakfast  piperacillin/tazobactam IVPB.. 3.375 Gram(s) IV Intermittent every 12 hours  polyethylene glycol 3350 17 Gram(s) Oral daily    MEDICATIONS  (PRN):  acetaminophen     Tablet .. 650 milliGRAM(s) Oral every 6 hours PRN Temp greater or equal to 38C (100.4F), Mild Pain (1 - 3)  dextrose Oral Gel 15 Gram(s) Oral once PRN Blood Glucose LESS THAN 70 milliGRAM(s)/deciliter  oxyCODONE    IR 5 milliGRAM(s) Oral every 4 hours PRN Severe Pain (7 - 10)      __________________________________________________  REVIEW OF SYSTEMS:    CONSTITUTIONAL: No fever,   EYES: no acute visual disturbances  NECK: No pain or stiffness  RESPIRATORY: No cough; No shortness of breath  CARDIOVASCULAR: No chest pain, no palpitations  GASTROINTESTINAL: No pain. No nausea or vomiting; No diarrhea   NEUROLOGICAL: No headache or numbness, no tremors  MUSCULOSKELETAL: No joint pain, no muscle pain  GENITOURINARY: no dysuria, no frequency, no hesitancy  PSYCHIATRY: no depression , no anxiety  ALL OTHER  ROS negative        Vital Signs Last 24 Hrs  T(C): 36.8 (11 Nov 2022 04:41), Max: 37.7 (10 Nov 2022 20:54)  T(F): 98.3 (11 Nov 2022 04:41), Max: 99.8 (10 Nov 2022 20:54)  HR: 87 (11 Nov 2022 04:41) (79 - 88)  BP: 132/68 (11 Nov 2022 04:41) (122/60 - 132/68)  BP(mean): --  RR: 20 (11 Nov 2022 04:41) (16 - 20)  SpO2: 100% (11 Nov 2022 04:41) (100% - 100%)    Parameters below as of 11 Nov 2022 04:41  Patient On (Oxygen Delivery Method): nasal cannula  O2 Flow (L/min): 2      ________________________________________________  PHYSICAL EXAM:  GENERAL: NAD  HEENT: atraumatic, Normocephalic;  conjunctivae and sclerae clear;   NECK : supple, no JVD  CHEST/LUNG: dec breath sounds at bases  HEART: S1 S2 RRR, no murmurs, gallops or rubs  ABDOMEN: Soft, Nontender, Nondistended; Bowel sounds present and hypoactive  EXTREMITIES: no cyanosis; no edema to BUE or LLE, 2+ radial pulses, RLE wrapped in kerlex and coban with splint.  Clean dry and intact.  SKIN: warm and dry; no rash  NERVOUS SYSTEM:  Awake and alert;_________________________________________________  LABS:                        10.0   13.82 )-----------( 196      ( 10 Nov 2022 05:35 )             31.3     11-10    139  |  104  |  34<H>  ----------------------------<  234<H>  4.1   |  26  |  3.67<H>    Ca    9.0      10 Nov 2022 05:35          CAPILLARY BLOOD GLUCOSE      POCT Blood Glucose.: 206 mg/dL (11 Nov 2022 07:40)  POCT Blood Glucose.: 229 mg/dL (10 Nov 2022 21:19)  POCT Blood Glucose.: 253 mg/dL (10 Nov 2022 16:47)  POCT Blood Glucose.: 277 mg/dL (10 Nov 2022 11:30)        RADIOLOGY & ADDITIONAL TESTS:   < from: Xray Foot AP + Lateral + Oblique, Right (11.08.22 @ 19:08) >  IMPRESSION:  1.  Swelling with soft tissue ulcer posterior to calcaneus.  2.  No plain film evidence of osteomyelitis.  3.  Obtain MRI as warranted clinically.    --- End of Report ---        < end of copied text >    Imaging Personally Reviewed:  YES/    Consultant(s) Notes Reviewed:   YES/    Plan of care was discussed with patient and /or primary care giver; all questions and concerns were addressed and care was aligned with patient's wishes.

## 2022-11-11 NOTE — PROGRESS NOTE ADULT - PROBLEM SELECTOR PLAN 1
Postop day 1 R BKA  afebrile  pain well controlled  slight leukocytosis  f/u cbc in AM Postop day 2 R BKA  afebrile  pain well controlled  continue linezolid + zosyn   ID Babcock   Vascular follows

## 2022-11-11 NOTE — PROGRESS NOTE ADULT - ASSESSMENT
73 y/o male from [facility], with PMHx of ESRD (T-TH-S), DM, s/p Rt foot angiogram 10/03 confirmed severe PAD and  no targets for re vascularization, vascular followed and recommended a Right BKA at that time but patient refused. Patient presented back to ED on 11/8/22 with c/o R foot pain and admitted to medicine. Noted with right foot gangrene of the 2nd , 3rd digit and heel- S/P R BKA 11/9, today is POD 2.  Plan for dressing change on 11/12 by vascular and HD tomorrow on 11/12, will likely return back to [facility] once optimized

## 2022-11-11 NOTE — PROGRESS NOTE ADULT - SUBJECTIVE AND OBJECTIVE BOX
[facility] NEPHROLOGY- PROGRESS NOTE    Patient is a 73yo Male with ESRD on HD, DM a/w Rt foot gangrene. Nephrology consulted for ESRD status.   s/p OR  with Rt BKA and received 2 units prbc in OR.       Hospital Medications: Medications reviewed.  REVIEW OF SYSTEMS:  CONSTITUTIONAL: No fevers or chills  RESPIRATORY: No shortness of breath  CARDIOVASCULAR: No chest pain.  GASTROINTESTINAL: No nausea, vomiting, diarrhea or abdominal pain.   VASCULAR: No left lower extremity edema. +denies RLE pain at this time    VITALS:  T(F): 97.9 (22 @ 11:51), Max: 99.8 (11-10-22 @ 20:54)  HR: 64 (22 @ 11:51)  BP: 136/72 (22 @ 11:51)  RR: 18 (22 @ 11:51)  SpO2: 94% (22 @ 11:51)  Wt(kg): --          PHYSICAL EXAM:  Gen: NAD, calm  HEENT: anicteric  Neck: no JVD  Cards: RRR, +S1/S2, no M/G/R  Resp: CTA B/L  GI: soft, NT/ND, NABS  Extremities: no LLE edema  Derm: s/p Rt BKA wrapped  Access: Rt IJ tunneled HD catheter- benign  Left upper arm AVF- no thrill no bruit      LABS:      137  |  101  |  50<H>  ----------------------------<  176<H>  4.2   |  25  |  4.92<H>    Ca    8.6      2022 10:25      Creatinine Trend: 4.92 <--, 3.67 <--, 3.66 <--, 2.78 <--                        9.3    11.30 )-----------( 197      ( 2022 10:25 )             28.6     Urine Studies:  Urinalysis Basic - ( 2022 18:00 )    Color: Yellow / Appearance: Clear / S.005 / pH:   Gluc:  / Ketone: Negative  / Bili: Negative / Urobili: Negative   Blood:  / Protein: 30 mg/dL / Nitrite: Negative   Leuk Esterase: Negative / RBC: 0-2 /HPF / WBC 0-2 /HPF   Sq Epi:  / Non Sq Epi: Occasional /HPF / Bacteria: Few /HPF

## 2022-11-11 NOTE — PROGRESS NOTE ADULT - SUBJECTIVE AND OBJECTIVE BOX
PATIENT SEEN AND EXAMINED ON :- 11/11/22  DATE OF SERVICE:   11/11/22          Interim events noted,Labs ,Radiological studies and Cardiology tests reviewed .       HOSPITAL COURSE: HPI:  72 year old male, coming from [facility], with medical history of ESRD (T-TH-S), and DM coming to ED c/o R foot pain. Patient has severe peripheral arterial disease. He was admitted on September 26 2022 for gangrene of right foot. At that time, patient was treated with IV abx. MRI was negative for OM. He had R LE angiogram on 10/3 w/ no targets for re vascularization but showed extensive distal small vessel disease, there was no acute vascular interventions done. Patient was recommended a R BKA but wanted a medical management instead. Patient now coming in c/o R foot pain. He states that he wants surgery now for his foot. He denies any fevers, chills, headaches, dizziness, chest pain, cough, SOB, abd pain, n/v, diarrhea, constipation, hematuria, dysuria, numbness, and weakness.    in the ed VS: T 98, HR 88, /60, RR 18, Spo2 96%RA  Hb 8.1   s/p 1 dose vanc and zosyn in Ed    (08 Nov 2022 21:31)      INTERIM EVENTS:Patient seen at bedside ,interim events noted.      PMH -reviewed admission note, no change since admission  HEART FAILURE: Acute[ ]Chronic[ ] Systolic[ ] Diastolic[ ] Combined Systolic and Diastolic[ ]  CAD[ ] CABG[ ] PCI[ ]  DEVICES[ ] PPM[ ] ICD[ ] ILR[ ]  ATRIAL FIBRILLATION[ ] Paroxysmal[ ] Permanent[ ] CHADS2-[  ]  CANDE[ ] CKD1[ ] CKD2[ ] CKD3[ ] CKD4[ ] ESRD[ ]  COPD[ ] HTN[ ]   DM[ ] Type1[ ] Type 2[ ]   CVA[ ] Paresis[ ]    AMBULATION: Assisted[ ] Cane/walker[ ] Independent[ ]    MEDICATIONS  (STANDING):  aspirin enteric coated 81 milliGRAM(s) Oral daily  chlorhexidine 2% Cloths 1 Application(s) Topical daily  clopidogrel Tablet 75 milliGRAM(s) Oral daily  dextrose 5%. 1000 milliLiter(s) (50 mL/Hr) IV Continuous <Continuous>  dextrose 5%. 1000 milliLiter(s) (100 mL/Hr) IV Continuous <Continuous>  dextrose 50% Injectable 25 Gram(s) IV Push once  dextrose 50% Injectable 12.5 Gram(s) IV Push once  dextrose 50% Injectable 25 Gram(s) IV Push once  epoetin nyasia-epbx (RETACRIT) Injectable 45900 Unit(s) IV Push <User Schedule>  glucagon  Injectable 1 milliGRAM(s) IntraMuscular once  heparin   Injectable 5000 Unit(s) SubCutaneous every 8 hours  influenza  Vaccine (HIGH DOSE) 0.7 milliLiter(s) IntraMuscular once  insulin lispro (ADMELOG) corrective regimen sliding scale   SubCutaneous three times a day before meals  insulin lispro (ADMELOG) corrective regimen sliding scale   SubCutaneous at bedtime  linezolid  IVPB      linezolid  IVPB 600 milliGRAM(s) IV Intermittent every 12 hours  mupirocin 2% Ointment 1 Application(s) Both Nostrils two times a day  Nephro-zack 1 Tablet(s) Oral daily  pantoprazole    Tablet 40 milliGRAM(s) Oral before breakfast  piperacillin/tazobactam IVPB.. 3.375 Gram(s) IV Intermittent every 12 hours  polyethylene glycol 3350 17 Gram(s) Oral daily    MEDICATIONS  (PRN):  acetaminophen     Tablet .. 650 milliGRAM(s) Oral every 6 hours PRN Temp greater or equal to 38C (100.4F), Mild Pain (1 - 3)  dextrose Oral Gel 15 Gram(s) Oral once PRN Blood Glucose LESS THAN 70 milliGRAM(s)/deciliter  oxyCODONE    IR 5 milliGRAM(s) Oral every 4 hours PRN Severe Pain (7 - 10)      REVIEW OF SYSTEMS:  Constitutional: [ ] fever, [ ]weight loss,  [ ]fatigue [ ]weight gain  Eyes: [ ] visual changes  Respiratory: [ ]shortness of breath;  [ ] cough, [ ]wheezing, [ ]chills, [ ]hemoptysis  Cardiovascular: [ ] chest pain, [ ]palpitations, [ ]dizziness,  [ ]leg swelling[ ]orthopnea[ ]PND  Gastrointestinal: [ ] abdominal pain, [ ]nausea, [ ]vomiting,  [ ]diarrhea [ ]Constipation [ ]Melena  Genitourinary: [ ] dysuria, [ ] hematuria [ ]Garcia  Neurologic: [ ] headaches [ ] tremors[ ]weakness [ ]Paralysis Right[ ] Left[ ]  Skin: [ ] itching, [ ]burning, [ ] rashes  Endocrine: [ ] heat or cold intolerance  Musculoskeletal: [ ] joint pain or swelling; [ ] muscle, back, or extremity pain  Psychiatric: [ ] depression, [ ]anxiety, [ ]mood swings, or [ ]difficulty sleeping  Hematologic: [ ] easy bruising, [ ] bleeding gums    [ ] All remaining systems negative except as per above.   [ ]Unable to obtain.  [x] No change in ROS since admission      Vital Signs Last 24 Hrs  T(C): 37.8 (11 Nov 2022 20:24), Max: 37.8 (11 Nov 2022 20:24)  T(F): 100 (11 Nov 2022 20:24), Max: 100 (11 Nov 2022 20:24)  HR: 93 (11 Nov 2022 20:24) (64 - 93)  BP: 129/68 (11 Nov 2022 20:24) (129/68 - 136/72)  BP(mean): --  RR: 18 (11 Nov 2022 20:24) (18 - 20)  SpO2: 98% (11 Nov 2022 20:24) (94% - 100%)    Parameters below as of 11 Nov 2022 20:24  Patient On (Oxygen Delivery Method): room air      I&O's Summary      PHYSICAL EXAM:  General: No acute distress BMI-  HEENT: EOMI, PERRL  Neck: Supple, [ ] JVD  Lungs: Equal air entry bilaterally; [ ] rales [ ] wheezing [ ] rhonchi  Heart: Regular rate and rhythm; [x ] murmur   2/6 [ x] systolic [ ] diastolic [ ] radiation[ ] rubs [ ]  gallops  Abdomen: Nontender, bowel sounds present  Extremities: No clubbing, cyanosis, [ ] edema [ ]Pulses  equal and intact  Nervous system:  Alert & Oriented X3, no focal deficits  Psychiatric: Normal affect  Skin: No rashes or lesions    LABS:  11-11    137  |  101  |  50<H>  ----------------------------<  176<H>  4.2   |  25  |  4.92<H>    Ca    8.6      11 Nov 2022 10:25      Creatinine Trend: 4.92<--, 3.67<--, 3.66<--, 2.78<--, 5.85<--                        9.3    11.30 )-----------( 197      ( 11 Nov 2022 10:25 )             28.6

## 2022-11-11 NOTE — PHYSICAL THERAPY INITIAL EVALUATION ADULT - RANGE OF MOTION EXAMINATION, REHAB EVAL
right le in full extension, R hip wfl/bilateral upper extremity ROM was WNL (within normal limits)/Left LE ROM was WNL (within normal limits)

## 2022-11-11 NOTE — PROGRESS NOTE ADULT - ASSESSMENT
Patient is a 71yo Male with ESRD on HD, DM a/w Rt foot gangrene. Nephrology consulted for ESRD status.   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       1) ESRD:  Last HD 11/9 for 2 hrs with intradialytic hypotension with net 1450 ml removed. Plan for next HD Sat 11/12 to place back on TTS schedule. Monitor electrolytes.  Pt with Left UE AVF- no thrill no bruit- f/u Vascular Surgeon Dr. Dutta as an outpt.   2) Anemia of renal disease: hgb low with adequate iron stores. s/p 2 unit prbc on 11/9. c/w Epogen 10k units IV tiw in HD. Monitor Hb.  3) Hyperphosphatemia: last serum phosphorus acceptable. c/w low phos diet. Continue to hold Sevelamer 800mg PO tid with meals for now. Monitor serum calcium and phosphorus.  4) Rt foot gangrene: s/p Rt BKA 11/9 by Dr. Billy. Abx as per ID. Vascular/ ID following.     [facility] NEPHROLOGY  Wili Hopkins M.D.  Kiran Feng D.O.  Sujatha Dumont M.D.  Radha Miller, MSN, ANP-C  [phone]    [id number] [id number]

## 2022-11-11 NOTE — PROGRESS NOTE ADULT - SUBJECTIVE AND OBJECTIVE BOX
-*-*- INCOMPLETE  NP Note discussed with  primary attending    Patient is a 72y old  Male who presents with a chief complaint of toe gangrene (11 Nov 2022 09:32)      INTERVAL HPI/OVERNIGHT EVENTS: POD # 2 offer no acute medical complaints     MEDICATIONS  (STANDING):  aspirin enteric coated 81 milliGRAM(s) Oral daily  chlorhexidine 2% Cloths 1 Application(s) Topical daily  clopidogrel Tablet 75 milliGRAM(s) Oral daily  dextrose 5%. 1000 milliLiter(s) (50 mL/Hr) IV Continuous <Continuous>  dextrose 5%. 1000 milliLiter(s) (100 mL/Hr) IV Continuous <Continuous>  dextrose 50% Injectable 25 Gram(s) IV Push once  dextrose 50% Injectable 12.5 Gram(s) IV Push once  dextrose 50% Injectable 25 Gram(s) IV Push once  epoetin nyasia-epbx (RETACRIT) Injectable 27357 Unit(s) IV Push <User Schedule>  glucagon  Injectable 1 milliGRAM(s) IntraMuscular once  heparin   Injectable 5000 Unit(s) SubCutaneous every 8 hours  influenza  Vaccine (HIGH DOSE) 0.7 milliLiter(s) IntraMuscular once  insulin lispro (ADMELOG) corrective regimen sliding scale   SubCutaneous at bedtime  insulin lispro (ADMELOG) corrective regimen sliding scale   SubCutaneous three times a day before meals  linezolid  IVPB      linezolid  IVPB 600 milliGRAM(s) IV Intermittent every 12 hours  mupirocin 2% Ointment 1 Application(s) Both Nostrils two times a day  Nephro-zack 1 Tablet(s) Oral daily  pantoprazole    Tablet 40 milliGRAM(s) Oral before breakfast  piperacillin/tazobactam IVPB.. 3.375 Gram(s) IV Intermittent every 12 hours  polyethylene glycol 3350 17 Gram(s) Oral daily    MEDICATIONS  (PRN):  acetaminophen     Tablet .. 650 milliGRAM(s) Oral every 6 hours PRN Temp greater or equal to 38C (100.4F), Mild Pain (1 - 3)  dextrose Oral Gel 15 Gram(s) Oral once PRN Blood Glucose LESS THAN 70 milliGRAM(s)/deciliter  oxyCODONE    IR 5 milliGRAM(s) Oral every 4 hours PRN Severe Pain (7 - 10)      __________________________________________________  REVIEW OF SYSTEMS:    CONSTITUTIONAL: No fever,   EYES: no acute visual disturbances  NECK: No pain or stiffness  RESPIRATORY: No cough; No shortness of breath  CARDIOVASCULAR: No chest pain, no palpitations  GASTROINTESTINAL: No pain. No nausea or vomiting; No diarrhea   NEUROLOGICAL: No headache or numbness, no tremors  MUSCULOSKELETAL: No joint pain, no muscle pain  GENITOURINARY: no dysuria, no frequency, no hesitancy  PSYCHIATRY: no depression , no anxiety  ALL OTHER  ROS negative        Vital Signs Last 24 Hrs  T(C): 36.8 (11 Nov 2022 04:41), Max: 37.7 (10 Nov 2022 20:54)  T(F): 98.3 (11 Nov 2022 04:41), Max: 99.8 (10 Nov 2022 20:54)  HR: 87 (11 Nov 2022 04:41) (79 - 88)  BP: 132/68 (11 Nov 2022 04:41) (122/60 - 132/68)  BP(mean): --  RR: 20 (11 Nov 2022 04:41) (16 - 20)  SpO2: 100% (11 Nov 2022 04:41) (100% - 100%)    Parameters below as of 11 Nov 2022 04:41  Patient On (Oxygen Delivery Method): nasal cannula  O2 Flow (L/min): 2      ________________________________________________  PHYSICAL EXAM:  GENERAL: NAD  HEENT: atraumatic, Normocephalic;  conjunctivae and sclerae clear;   NECK : supple, no JVD  CHEST/LUNG: Clear and diminished to auscultation bilaterally  HEART: S1 S2 RRR, no murmurs, gallops or rubs  ABDOMEN: Soft, Nontender, Nondistended; Bowel sounds present and hypoactive  EXTREMITIES: no cyanosis; no edema to BUE or LLE, 2+ radial pulses, RLE wrapped in kerlex and coban with splint.  Clean dry and intact.  SKIN: warm and dry; no rash  NERVOUS SYSTEM:  Awake and alert; Oriented  to place, person and time ; no new deficits  _________________________________________________  LABS:                        10.0   13.82 )-----------( 196      ( 10 Nov 2022 05:35 )             31.3     11-10    139  |  104  |  34<H>  ----------------------------<  234<H>  4.1   |  26  |  3.67<H>    Ca    9.0      10 Nov 2022 05:35          CAPILLARY BLOOD GLUCOSE      POCT Blood Glucose.: 206 mg/dL (11 Nov 2022 07:40)  POCT Blood Glucose.: 229 mg/dL (10 Nov 2022 21:19)  POCT Blood Glucose.: 253 mg/dL (10 Nov 2022 16:47)  POCT Blood Glucose.: 277 mg/dL (10 Nov 2022 11:30)        RADIOLOGY & ADDITIONAL TESTS:   < from: Xray Foot AP + Lateral + Oblique, Right (11.08.22 @ 19:08) >  IMPRESSION:  1.  Swelling with soft tissue ulcer posterior to calcaneus.  2.  No plain film evidence of osteomyelitis.  3.  Obtain MRI as warranted clinically.    --- End of Report ---        < end of copied text >    Imaging Personally Reviewed:  YES/    Consultant(s) Notes Reviewed:   YES/    Plan of care was discussed with patient and /or primary care giver; all questions and concerns were addressed and care was aligned with patient's wishes.

## 2022-11-12 LAB
ANION GAP SERPL CALC-SCNC: 12 MMOL/L — SIGNIFICANT CHANGE UP (ref 5–17)
BUN SERPL-MCNC: 57 MG/DL — HIGH (ref 7–18)
CALCIUM SERPL-MCNC: 7.9 MG/DL — LOW (ref 8.4–10.5)
CHLORIDE SERPL-SCNC: 101 MMOL/L — SIGNIFICANT CHANGE UP (ref 96–108)
CO2 SERPL-SCNC: 22 MMOL/L — SIGNIFICANT CHANGE UP (ref 22–31)
CREAT SERPL-MCNC: 5.91 MG/DL — HIGH (ref 0.5–1.3)
EGFR: 9 ML/MIN/1.73M2 — LOW
GLUCOSE BLDC GLUCOMTR-MCNC: 162 MG/DL — HIGH (ref 70–99)
GLUCOSE BLDC GLUCOMTR-MCNC: 219 MG/DL — HIGH (ref 70–99)
GLUCOSE BLDC GLUCOMTR-MCNC: 265 MG/DL — HIGH (ref 70–99)
GLUCOSE BLDC GLUCOMTR-MCNC: 321 MG/DL — HIGH (ref 70–99)
GLUCOSE SERPL-MCNC: 253 MG/DL — HIGH (ref 70–99)
HCT VFR BLD CALC: 24.3 % — LOW (ref 39–50)
HGB BLD-MCNC: 7.7 G/DL — LOW (ref 13–17)
MCHC RBC-ENTMCNC: 30.1 PG — SIGNIFICANT CHANGE UP (ref 27–34)
MCHC RBC-ENTMCNC: 31.7 GM/DL — LOW (ref 32–36)
MCV RBC AUTO: 94.9 FL — SIGNIFICANT CHANGE UP (ref 80–100)
NRBC # BLD: 0 /100 WBCS — SIGNIFICANT CHANGE UP (ref 0–0)
PHOSPHATE SERPL-MCNC: 8.1 MG/DL — HIGH (ref 2.5–4.5)
PLATELET # BLD AUTO: 199 K/UL — SIGNIFICANT CHANGE UP (ref 150–400)
POTASSIUM SERPL-MCNC: 4.3 MMOL/L — SIGNIFICANT CHANGE UP (ref 3.5–5.3)
POTASSIUM SERPL-SCNC: 4.3 MMOL/L — SIGNIFICANT CHANGE UP (ref 3.5–5.3)
RBC # BLD: 2.56 M/UL — LOW (ref 4.2–5.8)
RBC # FLD: 15.4 % — HIGH (ref 10.3–14.5)
SODIUM SERPL-SCNC: 135 MMOL/L — SIGNIFICANT CHANGE UP (ref 135–145)
WBC # BLD: 8.77 K/UL — SIGNIFICANT CHANGE UP (ref 3.8–10.5)
WBC # FLD AUTO: 8.77 K/UL — SIGNIFICANT CHANGE UP (ref 3.8–10.5)

## 2022-11-12 RX ORDER — SEVELAMER CARBONATE 2400 MG/1
1600 POWDER, FOR SUSPENSION ORAL
Refills: 0 | Status: DISCONTINUED | OUTPATIENT
Start: 2022-11-12 | End: 2022-11-15

## 2022-11-12 RX ADMIN — CLOPIDOGREL BISULFATE 75 MILLIGRAM(S): 75 TABLET, FILM COATED ORAL at 13:43

## 2022-11-12 RX ADMIN — PANTOPRAZOLE SODIUM 40 MILLIGRAM(S): 20 TABLET, DELAYED RELEASE ORAL at 06:02

## 2022-11-12 RX ADMIN — HEPARIN SODIUM 5000 UNIT(S): 5000 INJECTION INTRAVENOUS; SUBCUTANEOUS at 05:02

## 2022-11-12 RX ADMIN — LINEZOLID 300 MILLIGRAM(S): 600 INJECTION, SOLUTION INTRAVENOUS at 14:11

## 2022-11-12 RX ADMIN — Medication 2: at 08:16

## 2022-11-12 RX ADMIN — SEVELAMER CARBONATE 1600 MILLIGRAM(S): 2400 POWDER, FOR SUSPENSION ORAL at 17:30

## 2022-11-12 RX ADMIN — HEPARIN SODIUM 5000 UNIT(S): 5000 INJECTION INTRAVENOUS; SUBCUTANEOUS at 14:11

## 2022-11-12 RX ADMIN — MUPIROCIN 1 APPLICATION(S): 20 OINTMENT TOPICAL at 05:01

## 2022-11-12 RX ADMIN — Medication 1: at 22:20

## 2022-11-12 RX ADMIN — Medication 81 MILLIGRAM(S): at 13:41

## 2022-11-12 RX ADMIN — PIPERACILLIN AND TAZOBACTAM 25 GRAM(S): 4; .5 INJECTION, POWDER, LYOPHILIZED, FOR SOLUTION INTRAVENOUS at 17:30

## 2022-11-12 RX ADMIN — HEPARIN SODIUM 5000 UNIT(S): 5000 INJECTION INTRAVENOUS; SUBCUTANEOUS at 21:32

## 2022-11-12 RX ADMIN — PIPERACILLIN AND TAZOBACTAM 25 GRAM(S): 4; .5 INJECTION, POWDER, LYOPHILIZED, FOR SOLUTION INTRAVENOUS at 05:01

## 2022-11-12 RX ADMIN — Medication 4: at 17:29

## 2022-11-12 RX ADMIN — CHLORHEXIDINE GLUCONATE 1 APPLICATION(S): 213 SOLUTION TOPICAL at 13:41

## 2022-11-12 RX ADMIN — LINEZOLID 300 MILLIGRAM(S): 600 INJECTION, SOLUTION INTRAVENOUS at 21:32

## 2022-11-12 RX ADMIN — Medication 1 TABLET(S): at 13:41

## 2022-11-12 RX ADMIN — ERYTHROPOIETIN 10000 UNIT(S): 10000 INJECTION, SOLUTION INTRAVENOUS; SUBCUTANEOUS at 10:39

## 2022-11-12 RX ADMIN — MUPIROCIN 1 APPLICATION(S): 20 OINTMENT TOPICAL at 17:30

## 2022-11-12 RX ADMIN — POLYETHYLENE GLYCOL 3350 17 GRAM(S): 17 POWDER, FOR SOLUTION ORAL at 13:42

## 2022-11-12 NOTE — PROGRESS NOTE ADULT - SUBJECTIVE AND OBJECTIVE BOX
PATIENT SEEN AND EXAMINED ON :- 11/12/2022  DATE OF SERVICE:     11/12/2022        Interim events noted,Labs ,Radiological studies and Cardiology tests reviewed .       HOSPITAL COURSE: HPI:  72 year old male, coming from [facility], with medical history of ESRD (T-TH-S), and DM coming to ED c/o R foot pain. Patient has severe peripheral arterial disease. He was admitted on September 26 2022 for gangrene of right foot. At that time, patient was treated with IV abx. MRI was negative for OM. He had R LE angiogram on 10/3 w/ no targets for re vascularization but showed extensive distal small vessel disease, there was no acute vascular interventions done. Patient was recommended a R BKA but wanted a medical management instead. Patient now coming in c/o R foot pain. He states that he wants surgery now for his foot. He denies any fevers, chills, headaches, dizziness, chest pain, cough, SOB, abd pain, n/v, diarrhea, constipation, hematuria, dysuria, numbness, and weakness.    in the ed VS: T 98, HR 88, /60, RR 18, Spo2 96%RA  Hb 8.1   s/p 1 dose vanc and zosyn in Ed    (08 Nov 2022 21:31)      INTERIM EVENTS:Patient seen at bedside ,interim events noted.      PMH -reviewed admission note, no change since admission  HEART FAILURE: Acute[ ]Chronic[ ] Systolic[ ] Diastolic[ ] Combined Systolic and Diastolic[ ]  CAD[ ] CABG[ ] PCI[ ]  DEVICES[ ] PPM[ ] ICD[ ] ILR[ ]  ATRIAL FIBRILLATION[ ] Paroxysmal[ ] Permanent[ ] CHADS2-[  ]  CANDE[ ] CKD1[ ] CKD2[ ] CKD3[ ] CKD4[ ] ESRD[ ]  COPD[ ] HTN[ ]   DM[ ] Type1[ ] Type 2[ ]   CVA[ ] Paresis[ ]    AMBULATION: Assisted[ ] Cane/walker[ ] Independent[ ]    MEDICATIONS  (STANDING):  aspirin enteric coated 81 milliGRAM(s) Oral daily  chlorhexidine 2% Cloths 1 Application(s) Topical daily  clopidogrel Tablet 75 milliGRAM(s) Oral daily  dextrose 5%. 1000 milliLiter(s) (50 mL/Hr) IV Continuous <Continuous>  dextrose 5%. 1000 milliLiter(s) (100 mL/Hr) IV Continuous <Continuous>  dextrose 50% Injectable 25 Gram(s) IV Push once  dextrose 50% Injectable 12.5 Gram(s) IV Push once  dextrose 50% Injectable 25 Gram(s) IV Push once  epoetin nyasia-epbx (RETACRIT) Injectable 78622 Unit(s) IV Push <User Schedule>  glucagon  Injectable 1 milliGRAM(s) IntraMuscular once  heparin   Injectable 5000 Unit(s) SubCutaneous every 8 hours  influenza  Vaccine (HIGH DOSE) 0.7 milliLiter(s) IntraMuscular once  insulin lispro (ADMELOG) corrective regimen sliding scale   SubCutaneous at bedtime  insulin lispro (ADMELOG) corrective regimen sliding scale   SubCutaneous three times a day before meals  linezolid  IVPB      linezolid  IVPB 600 milliGRAM(s) IV Intermittent every 12 hours  mupirocin 2% Ointment 1 Application(s) Both Nostrils two times a day  Nephro-zack 1 Tablet(s) Oral daily  pantoprazole    Tablet 40 milliGRAM(s) Oral before breakfast  piperacillin/tazobactam IVPB.. 3.375 Gram(s) IV Intermittent every 12 hours  polyethylene glycol 3350 17 Gram(s) Oral daily    MEDICATIONS  (PRN):  acetaminophen     Tablet .. 650 milliGRAM(s) Oral every 6 hours PRN Temp greater or equal to 38C (100.4F), Mild Pain (1 - 3)  dextrose Oral Gel 15 Gram(s) Oral once PRN Blood Glucose LESS THAN 70 milliGRAM(s)/deciliter  oxyCODONE    IR 5 milliGRAM(s) Oral every 4 hours PRN Severe Pain (7 - 10)            REVIEW OF SYSTEMS:  Constitutional: [ ] fever, [ ]weight loss,  [ ]fatigue [ ]weight gain  Eyes: [ ] visual changes  Respiratory: [ ]shortness of breath;  [ ] cough, [ ]wheezing, [ ]chills, [ ]hemoptysis  Cardiovascular: [ ] chest pain, [ ]palpitations, [ ]dizziness,  [ ]leg swelling[ ]orthopnea[ ]PND  Gastrointestinal: [ ] abdominal pain, [ ]nausea, [ ]vomiting,  [ ]diarrhea [ ]Constipation [ ]Melena  Genitourinary: [ ] dysuria, [ ] hematuria [ ]Garcia  Neurologic: [ ] headaches [ ] tremors[ ]weakness [ ]Paralysis Right[ ] Left[ ]  Skin: [ ] itching, [ ]burning, [ ] rashes  Endocrine: [ ] heat or cold intolerance  Musculoskeletal: [ ] joint pain or swelling; [ ] muscle, back, or extremity pain  Psychiatric: [ ] depression, [ ]anxiety, [ ]mood swings, or [ ]difficulty sleeping  Hematologic: [ ] easy bruising, [ ] bleeding gums    [ ] All remaining systems negative except as per above.   [ ]Unable to obtain.  [x] No change in ROS since admission      Vital Signs Last 24 Hrs  T(C): 36.6 (12 Nov 2022 13:42), Max: 37.8 (11 Nov 2022 20:24)  T(F): 97.9 (12 Nov 2022 13:42), Max: 100 (11 Nov 2022 20:24)  HR: 101 (12 Nov 2022 13:42) (70 - 101)  BP: 120/59 (12 Nov 2022 13:42) (107/59 - 131/71)  BP(mean): --  RR: 18 (12 Nov 2022 13:42) (17 - 18)  SpO2: 100% (12 Nov 2022 13:42) (98% - 100%)    Parameters below as of 12 Nov 2022 13:42  Patient On (Oxygen Delivery Method): room air      I&O's Summary    12 Nov 2022 07:01  -  12 Nov 2022 13:49  --------------------------------------------------------  IN: 900 mL / OUT: 2210 mL / NET: -1310 mL        PHYSICAL EXAM:  General: No acute distress BMI-  HEENT: EOMI, PERRL  Neck: Supple, [ ] JVD  Lungs: Equal air entry bilaterally; [ ] rales [ ] wheezing [ ] rhonchi  Heart: Regular rate and rhythm; [x ] murmur   2/6 [ x] systolic [ ] diastolic [ ] radiation[ ] rubs [ ]  gallops  Abdomen: Nontender, bowel sounds present  Extremities: No clubbing, cyanosis, [ ] edema [ ]Pulses  equal and intact  Nervous system:  Alert & Oriented X3, no focal deficits  Psychiatric: Normal affect  Skin: No rashes or lesions    LABS:  11-12    135  |  101  |  57<H>  ----------------------------<  253<H>  4.3   |  22  |  5.91<H>    Ca    7.9<L>      12 Nov 2022 10:00  Phos  8.1     11-12      Creatinine Trend: 5.91<--, 4.92<--, 3.67<--, 3.66<--, 2.78<--, 5.85<--                        7.7    8.77  )-----------( 199      ( 12 Nov 2022 10:00 )             24.3

## 2022-11-12 NOTE — PROGRESS NOTE ADULT - ASSESSMENT
Patient is a 73yo Male with ESRD on HD, DM a/w Rt foot gangrene. Nephrology consulted for ESRD status.   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       1) ESRD:  Last HD 11/12, with mild intradialytic hypotension with net 1310 ml removed. Plan for next HD 11/15.  Monitor electrolytes.  Pt with Left UE AVF- no thrill no bruit- f/u Vascular Surgeon Dr. Dutta as an outpt.   2) Anemia of renal disease: hgb low with adequate iron stores. s/p 2 unit prbc on 11/9. c/w Epogen 10k units IV tiw in HD. Monitor Hb.  3) Hyperphosphatemia: Serum phosphorus elevated. c/w low phos diet. Start Sevelamer 1600mg PO tid with meals. Monitor serum calcium and phosphorus.  4) Rt foot gangrene: s/p Rt BKA 11/9 by Dr. Billy. Abx as per ID. Vascular/ ID following.     [facility] NEPHROLOGY  Wili Hopkins M.D.  Kiran Feng D.O.  Sujatha Dumont M.D.  Radha Miller, MSN, ANP-C  [phone]    [address] [place] [address]

## 2022-11-12 NOTE — PROGRESS NOTE ADULT - ASSESSMENT
71 y/o male from [facility], with PMHx of ESRD (T-TH-S), DM, s/p Rt foot angiogram 10/03 confirmed severe PAD and  no targets for re vascularization, vascular followed and recommended a Right BKA at that time but patient refused. Patient presented back to ED on 11/8/22 with c/o R foot pain and admitted to medicine. Noted with right foot gangrene of the 2nd , 3rd digit and heel- S/P R BKA 11/9, today is POD 2.  Plan for dressing change on 11/12 by vascular and HD tomorrow on 11/12, will likely return back to [facility] once optimized

## 2022-11-12 NOTE — PROGRESS NOTE ADULT - SUBJECTIVE AND OBJECTIVE BOX
Patient is a 72y old  Male who presents with a chief complaint of toe gangrene (11 Nov 2022 09:32)      INTERVAL HPI/OVERNIGHT EVENTS: POD # 3 offer no acute medical complaints     MEDICATIONS  (STANDING):  aspirin enteric coated 81 milliGRAM(s) Oral daily  chlorhexidine 2% Cloths 1 Application(s) Topical daily  clopidogrel Tablet 75 milliGRAM(s) Oral daily  dextrose 5%. 1000 milliLiter(s) (50 mL/Hr) IV Continuous <Continuous>  dextrose 5%. 1000 milliLiter(s) (100 mL/Hr) IV Continuous <Continuous>  dextrose 50% Injectable 25 Gram(s) IV Push once  dextrose 50% Injectable 12.5 Gram(s) IV Push once  dextrose 50% Injectable 25 Gram(s) IV Push once  epoetin nyasia-epbx (RETACRIT) Injectable 13824 Unit(s) IV Push <User Schedule>  glucagon  Injectable 1 milliGRAM(s) IntraMuscular once  heparin   Injectable 5000 Unit(s) SubCutaneous every 8 hours  influenza  Vaccine (HIGH DOSE) 0.7 milliLiter(s) IntraMuscular once  insulin lispro (ADMELOG) corrective regimen sliding scale   SubCutaneous three times a day before meals  insulin lispro (ADMELOG) corrective regimen sliding scale   SubCutaneous at bedtime  linezolid  IVPB      linezolid  IVPB 600 milliGRAM(s) IV Intermittent every 12 hours  mupirocin 2% Ointment 1 Application(s) Both Nostrils two times a day  Nephro-zack 1 Tablet(s) Oral daily  pantoprazole    Tablet 40 milliGRAM(s) Oral before breakfast  piperacillin/tazobactam IVPB.. 3.375 Gram(s) IV Intermittent every 12 hours  polyethylene glycol 3350 17 Gram(s) Oral daily  sevelamer carbonate 1600 milliGRAM(s) Oral three times a day with meals    MEDICATIONS  (PRN):  acetaminophen     Tablet .. 650 milliGRAM(s) Oral every 6 hours PRN Temp greater or equal to 38C (100.4F), Mild Pain (1 - 3)  dextrose Oral Gel 15 Gram(s) Oral once PRN Blood Glucose LESS THAN 70 milliGRAM(s)/deciliter  oxyCODONE    IR 5 milliGRAM(s) Oral every 4 hours PRN Severe Pain (7 - 10)    __________________________________________________  REVIEW OF SYSTEMS:    CONSTITUTIONAL: No fever,   EYES: no acute visual disturbances  NECK: No pain or stiffness  RESPIRATORY: No cough; No shortness of breath  CARDIOVASCULAR: No chest pain, no palpitations  GASTROINTESTINAL: No pain. No nausea or vomiting; No diarrhea   NEUROLOGICAL: No headache or numbness, no tremors  MUSCULOSKELETAL: No joint pain, no muscle pain  GENITOURINARY: no dysuria, no frequency, no hesitancy  PSYCHIATRY: no depression , no anxiety  ALL OTHER  ROS negative      Vital Signs Last 24 Hrs  T(C): 36.6 (11-12-22 @ 13:42), Max: 37.8 (11-11-22 @ 20:24)  T(F): 97.9 (11-12-22 @ 13:42), Max: 100 (11-11-22 @ 20:24)  HR: 101 (11-12-22 @ 13:42) (70 - 101)  BP: 120/59 (11-12-22 @ 13:42) (107/59 - 131/71)  BP(mean): --  RR: 18 (11-12-22 @ 13:42) (17 - 18)  SpO2: 100% (11-12-22 @ 13:42) (98% - 100%)        ________________________________________________  PHYSICAL EXAM:  GENERAL: NAD  HEENT: atraumatic, Normocephalic;  conjunctivae and sclerae clear;   NECK : supple, no JVD  CHEST/LUNG: Clear and diminished to auscultation bilaterally  HEART: S1 S2 RRR, no murmurs, gallops or rubs  ABDOMEN: Soft, Nontender, Nondistended; Bowel sounds present and hypoactive  EXTREMITIES: no cyanosis; no edema to BUE or LLE, 2+ radial pulses, RLE wrapped in kerlex and coban with splint.  Clean dry and intact.  SKIN: warm and dry; no rash  NERVOUS SYSTEM:  Awake and alert; Oriented  to place, person and time ; no new deficits  _________________________________________________  LABS:                        10.0   13.82 )-----------( 196      ( 10 Nov 2022 05:35 )             31.3     11-10    139  |  104  |  34<H>  ----------------------------<  234<H>  4.1   |  26  |  3.67<H>    Ca    9.0      10 Nov 2022 05:35          CAPILLARY BLOOD GLUCOSE      POCT Blood Glucose.: 206 mg/dL (11 Nov 2022 07:40)  POCT Blood Glucose.: 229 mg/dL (10 Nov 2022 21:19)  POCT Blood Glucose.: 253 mg/dL (10 Nov 2022 16:47)  POCT Blood Glucose.: 277 mg/dL (10 Nov 2022 11:30)        RADIOLOGY & ADDITIONAL TESTS:   < from: Xray Foot AP + Lateral + Oblique, Right (11.08.22 @ 19:08) >  IMPRESSION:  1.  Swelling with soft tissue ulcer posterior to calcaneus.  2.  No plain film evidence of osteomyelitis.  3.  Obtain MRI as warranted clinically.    --- End of Report ---        < end of copied text >    Imaging Personally Reviewed:  YES/    Consultant(s) Notes Reviewed:   YES/    Plan of care was discussed with patient and /or primary care giver; all questions and concerns were addressed and care was aligned with patient's wishes.

## 2022-11-12 NOTE — PROGRESS NOTE ADULT - ASSESSMENT
73 y/o male from [facility], with PMHx of ESRD (T-TH-S), DM, s/p Rt foot angiogram 10/03 confirmed severe PAD and  no targets for re vascularization, vascular followed and recommended a Right BKA at that time but patient refused. Patient presented back to ED on 11/8/22 with c/o R foot pain and admitted to medicine. Noted with right foot gangrene of the 2nd , 3rd digit and heel- S/P R BKA 11/9, today is POD 2.  Plan for dressing change on 11/12 by vascular and HD tomorrow on 11/12, will likely return back to [facility] once optimized

## 2022-11-12 NOTE — PROGRESS NOTE ADULT - SUBJECTIVE AND OBJECTIVE BOX
71 y/o male from [facility], with PMHx of ESRD (T-TH-S), DM, s/p Rt foot angiogram 10/03 presents to the ED c/o R foot pain Patient has severe peripheral arterial disease. and gangrene of the 2nd , 3rd digit and heel of rt foot; s/p Right sided BKA POD #3.  Patient seen and examined at the bedside, doing well, pain has been well controlled. Tolerating diet, denies any nausea or vomiting.     Vital Signs Last 24 Hrs  T(C): 36.2 (12 Nov 2022 04:50), Max: 37.8 (11 Nov 2022 20:24)  T(F): 97.2 (12 Nov 2022 04:50), Max: 100 (11 Nov 2022 20:24)  HR: 86 (12 Nov 2022 04:50) (64 - 93)  BP: 131/71 (12 Nov 2022 04:50) (129/68 - 136/72)  BP(mean): --  RR: 18 (12 Nov 2022 04:50) (18 - 18)  SpO2: 100% (12 Nov 2022 04:50) (94% - 100%)    Parameters below as of 12 Nov 2022 04:50  Patient On (Oxygen Delivery Method): room air    No acute distress, AAOX3  non labored breathing, saturating well on room air  Right sided BKA dressing removed, Stump site healing well, skin well approximated with rosy, Stump is warm , no underlying hematoma appreciated, no erythema or discharge                          7.7    8.77  )-----------( 199      ( 12 Nov 2022 10:00 )             24.3   11-12    135  |  101  |  57<H>  ----------------------------<  253<H>  4.3   |  22  |  5.91<H>    Ca    7.9<L>      12 Nov 2022 10:00  Phos  8.1     11-12

## 2022-11-12 NOTE — PROGRESS NOTE ADULT - ASSESSMENT
72 Y M POD # 3 s/p Right sided BKA   -doing well post operatively, stump healing well  -dressing change performed  -continue knee immobilizer  -staples to be removed in two weeks, if still in house will remove otherwise can follow up in vascular clinic for removal.

## 2022-11-12 NOTE — PROGRESS NOTE ADULT - ASSESSMENT
Patient is a 72y old  Male who is coming from [facility], with medical history of ESRD (T-TH-S), and DM, presents to the ER  for evaluation of Right foot pain. Patient has severe peripheral arterial disease. He was admitted on September 26, 2022 for gangrene of right foot. At that time, patient was treated with IV abx. MRI was negative for OM. He had Right LE angiogram on 10/3 w/ no targets for re vascularization but showed extensive distal small vessel disease, there was no acute vascular interventions done. Patient was recommended a R BKA but wanted a medical management instead. Patient now coming in c/o R foot pain. He states that he wants surgery now for his foot. On admission, he has no fever , no leukocytosis. He has evaluated by Vascular team and taken to OR for Right BKA. He has started on Vancomycin and Zosyn, and the ID consult requested to assist with further evaluation and antibiotic management.    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 Wound care as per Vascular Sx  2.  Pain management as needed  3. Continue linezolid and Zosyn for a short course for surrounding skin and soft tissue infection  4. Monitor kidney function     Attending Attestation:    Spent more than 45 minutes on total encounter, more than 50 % of the visit was spent counseling and/or coordinating care by the Attending physician.   Patient is a 72y old  Male who is coming from [facility], with medical history of ESRD (T-TH-S), and DM, presents to the ER  for evaluation of Right foot pain. Patient has severe peripheral arterial disease. He was admitted on September 26, 2022 for gangrene of right foot. At that time, patient was treated with IV abx. MRI was negative for OM. He had Right LE angiogram on 10/3 w/ no targets for re vascularization but showed extensive distal small vessel disease, there was no acute vascular interventions done. Patient was recommended a R BKA but wanted a medical management instead. Patient now coming in c/o R foot pain. He states that he wants surgery now for his foot. On admission, he has no fever , no leukocytosis. He has evaluated by Vascular team and taken to OR for Right BKA. He has started on Vancomycin and Zosyn, and the ID consult requested to assist with further evaluation and antibiotic management.    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  OOB to chair   2. Wound care as per Vascular Sx  3.  Pain management as needed  4. Continue linezolid and Zosyn until 11/15/22,  for surrounding skin and soft tissue infection  5. Monitor kidney function     Attending Attestation:    Spent more than 35 minutes on total encounter, more than 50 % of the visit was spent counseling and/or coordinating care by the Attending physician.

## 2022-11-12 NOTE — PROGRESS NOTE ADULT - SUBJECTIVE AND OBJECTIVE BOX
Patient is seen and examined at the bed side, is afebrile. He mentioned doing better and  pain is controlled . The WBC count elevated, most likely reactive.       REVIEW OF SYSTEMS: All other review systems are negative      ALLERGIES: No Known Allergies      Vital Signs Last 24 Hrs  T(C): 36.6 (2022 13:42), Max: 37.8 (2022 20:24)  T(F): 97.9 (:42), Max: 100 (2022 20:24)  HR: 101 (2022 13:42) (70 - 101)  BP: 120/59 (2022 13:42) (107/59 - 131/71)  BP(mean): --  RR: 18 (:42) (17 - 18)  SpO2: 100% (:42) (98% - 100%)    Parameters below as of 2022 13:42  Patient On (Oxygen Delivery Method): room air        PHYSICAL EXAM:    General: Not in distress  CVS: s1 and s2 present   RESP: Not using accessory muscles   GI: abdomen non distended   EXT: Positive Right BKA, bandage and immobilizer is in placed   CNS: Awake and Alert      LABS:                        7.7    8.77  )-----------( 199      ( 2022 10:00 )             24.3                           10.0   13.82 )-----------( 196      ( 10 Nov 2022 05:35 )             31.3                      135  |  101  |  57<H>  ----------------------------<  253<H>  4.3   |  22  |  5.91<H>    Ca    7.9<L>      2022 10:00  Phos  8.1           11-10    139  |  104  |  34<H>  ----------------------------<  234<H>  4.1   |  26  |  3.67<H>    Ca    9.0      10 Nov 2022 05:35  Phos  3.9       Mg     2.0         TPro  6.4  /  Alb  2.8<L>  /  TBili  0.3  /  DBili  x   /  AST  15  /  ALT  20  /  AlkPhos  57  11-0    PT/INR - ( 2022 06:40 )   PT: 13.6 sec;   INR: 1.14 ratio    PTT - ( 2022 06:40 )  PTT:37.0 sec        CAPILLARY BLOOD GLUCOSE  POCT Blood Glucose.: 153 mg/dL (2022 09:54)        Urinalysis Basic - ( 2022 18:00 )Color: Yellow / Appearance: Clear / S.005 / pH: x  Gluc: x / Ketone: Negative  / Bili: Negative / Urobili: Negative   Blood: x / Protein: 30 mg/dL / Nitrite: Negative   Leuk Esterase: Negative / RBC: 0-2 /HPF / WBC 0-2 /HPF   Sq Epi: x / Non Sq Epi: Occasional /HPF / Bacteria: Few /HPF        MEDICATIONS  (STANDING):    aspirin enteric coated 81 milliGRAM(s) Oral daily  chlorhexidine 2% Cloths 1 Application(s) Topical daily  clopidogrel Tablet 75 milliGRAM(s) Oral daily  epoetin nysaia-epbx (RETACRIT) Injectable 30129 Unit(s) IV Push <User Schedule>  glucagon  Injectable 1 milliGRAM(s) IntraMuscular once  heparin   Injectable 5000 Unit(s) SubCutaneous every 8 hours  influenza  Vaccine (HIGH DOSE) 0.7 milliLiter(s) IntraMuscular once  insulin lispro (ADMELOG) corrective regimen sliding scale   SubCutaneous three times a day before meals  insulin lispro (ADMELOG) corrective regimen sliding scale   SubCutaneous at bedtime  linezolid  IVPB      linezolid  IVPB 600 milliGRAM(s) IV Intermittent every 12 hours  mupirocin 2% Ointment 1 Application(s) Both Nostrils two times a day  Nephro-zack 1 Tablet(s) Oral daily  pantoprazole    Tablet 40 milliGRAM(s) Oral before breakfast  piperacillin/tazobactam IVPB.. 3.375 Gram(s) IV Intermittent every 12 hours  polyethylene glycol 3350 17 Gram(s) Oral daily        RADIOLOGY & ADDITIONAL TESTS:    < from: Xray Foot AP + Lateral + Oblique, Right (22 @ 19:08) >  1.  Swelling with soft tissue ulcer posterior to calcaneus.  2.  No plain film evidence of osteomyelitis.  3.  Obtain MRI as warranted clinically.        MICROBIOLOGY DATA:    MRSA/MSSA PCR (22 @ 10:30)   MRSA PCR Result.: NotDetec:    Culture - Blood (22 @ 15:20)   Specimen Source: .Blood Blood-Peripheral   Culture Results: No growth to date.     Culture - Blood (22 @ 15:10)   Specimen Source: .Blood Blood-Peripheral   Culture Results: No growth to date.     Culture - Urine (22 @ 18:00)   Specimen Source: Clean Catch Clean Catch (Midstream)   Culture Results:   <10,000 CFU/mL Normal Urogenital Martha     COVID-19 PCR (22 @ 15:10)   COVID-19 PCR: NotDetec:                 Patient is seen and examined at the bed side, is afebrile. He has no new complaints. The Leukocytosis trended down to normal.         REVIEW OF SYSTEMS: All other review systems are negative      ALLERGIES: No Known Allergies      Vital Signs Last 24 Hrs  T(C): 36.6 (2022 13:42), Max: 37.8 (2022 20:24)  T(F): 97.9 (:42), Max: 100 (2022 20:24)  HR: 101 (:42) (70 - 101)  BP: 120/59 (:42) (107/59 - 131/71)  BP(mean): --  RR: 18 (:42) (17 - 18)  SpO2: 100% (:42) (98% - 100%)    Parameters below as of 2022 13:42  Patient On (Oxygen Delivery Method): room air        PHYSICAL EXAM:    General: Not in distress  CVS: s1 and s2 present   RESP: Not using accessory muscles   GI: abdomen non distended   EXT: Positive Right BKA, bandage and immobilizer is in placed   CNS: Awake and Alert      LABS:                        7.7    8.77  )-----------( 199      ( 2022 10:00 )             24.3                           10.0   13.82 )-----------( 196      ( 10 Nov 2022 05:35 )             31.3                      135  |  101  |  57<H>  ----------------------------<  253<H>  4.3   |  22  |  5.91<H>    Ca    7.9<L>      2022 10:00  Phos  8.1           11-10    139  |  104  |  34<H>  ----------------------------<  234<H>  4.1   |  26  |  3.67<H>    Ca    9.0      10 Nov 2022 05:35  Phos  3.9       Mg     2.0         TPro  6.4  /  Alb  2.8<L>  /  TBili  0.3  /  DBili  x   /  AST  15  /  ALT  20  /  AlkPhos  57  11-0    PT/INR - ( 2022 06:40 )   PT: 13.6 sec;   INR: 1.14 ratio    PTT - ( 2022 06:40 )  PTT:37.0 sec        CAPILLARY BLOOD GLUCOSE  POCT Blood Glucose.: 153 mg/dL (2022 09:54)        Urinalysis Basic - ( 2022 18:00 )Color: Yellow / Appearance: Clear / S.005 / pH: x  Gluc: x / Ketone: Negative  / Bili: Negative / Urobili: Negative   Blood: x / Protein: 30 mg/dL / Nitrite: Negative   Leuk Esterase: Negative / RBC: 0-2 /HPF / WBC 0-2 /HPF   Sq Epi: x / Non Sq Epi: Occasional /HPF / Bacteria: Few /HPF        MEDICATIONS  (STANDING):    aspirin enteric coated 81 milliGRAM(s) Oral daily  chlorhexidine 2% Cloths 1 Application(s) Topical daily  clopidogrel Tablet 75 milliGRAM(s) Oral daily  epoetin nyasia-epbx (RETACRIT) Injectable 73739 Unit(s) IV Push <User Schedule>  glucagon  Injectable 1 milliGRAM(s) IntraMuscular once  heparin   Injectable 5000 Unit(s) SubCutaneous every 8 hours  influenza  Vaccine (HIGH DOSE) 0.7 milliLiter(s) IntraMuscular once  insulin lispro (ADMELOG) corrective regimen sliding scale   SubCutaneous three times a day before meals  insulin lispro (ADMELOG) corrective regimen sliding scale   SubCutaneous at bedtime  linezolid  IVPB      linezolid  IVPB 600 milliGRAM(s) IV Intermittent every 12 hours  mupirocin 2% Ointment 1 Application(s) Both Nostrils two times a day  Nephro-zack 1 Tablet(s) Oral daily  pantoprazole    Tablet 40 milliGRAM(s) Oral before breakfast  piperacillin/tazobactam IVPB.. 3.375 Gram(s) IV Intermittent every 12 hours  polyethylene glycol 3350 17 Gram(s) Oral daily        RADIOLOGY & ADDITIONAL TESTS:    < from: Xray Foot AP + Lateral + Oblique, Right (22 @ 19:08) >  1.  Swelling with soft tissue ulcer posterior to calcaneus.  2.  No plain film evidence of osteomyelitis.  3.  Obtain MRI as warranted clinically.        MICROBIOLOGY DATA:    MRSA/MSSA PCR (22 @ 10:30)   MRSA PCR Result.: NotDetec:    Culture - Blood (22 @ 15:20)   Specimen Source: .Blood Blood-Peripheral   Culture Results: No growth to date.     Culture - Blood (22 @ 15:10)   Specimen Source: .Blood Blood-Peripheral   Culture Results: No growth to date.     Culture - Urine (22 @ 18:00)   Specimen Source: Clean Catch Clean Catch (Midstream)   Culture Results:   <10,000 CFU/mL Normal Urogenital Martha     COVID-19 PCR (22 @ 15:10)   COVID-19 PCR: NotDetec:

## 2022-11-12 NOTE — PROGRESS NOTE ADULT - SUBJECTIVE AND OBJECTIVE BOX
[facility] NEPHROLOGY- PROGRESS NOTE    Patient is a 73yo Male with ESRD on HD, DM a/w Rt foot gangrene. Nephrology consulted for ESRD status.   s/p OR  with Rt BKA and received 2 units prbc in OR.       Hospital Medications: Medications reviewed.  REVIEW OF SYSTEMS:  CONSTITUTIONAL: No fevers or chills  RESPIRATORY: No shortness of breath  CARDIOVASCULAR: No chest pain.  GASTROINTESTINAL: No nausea, vomiting, diarrhea or abdominal pain.   VASCULAR: No left lower extremity edema. +denies RLE pain at this time    VITALS:  T(F): 97.9 (22 @ 13:42), Max: 100 (22 @ 20:24)  HR: 101 (22 @ 13:42)  BP: 120/59 (22 @ 13:42)  RR: 18 (22 @ 13:42)  SpO2: 100% (22 @ 13:42)  Wt(kg): --     @ 07:01  -   @ 16:14  --------------------------------------------------------  IN: 900 mL / OUT: 2210 mL / NET: -1310 mL      PHYSICAL EXAM:  Gen: NAD, calm  HEENT: anicteric  Neck: no JVD  Cards: RRR, +S1/S2, no M/G/R  Resp: CTA B/L  GI: soft, NT/ND, NABS  Extremities: no LLE edema  Derm: s/p Rt BKA wrapped  Access: Rt IJ tunneled HD catheter- benign  Left upper arm AVF- no thrill no bruit      LABS:      135  |  101  |  57<H>  ----------------------------<  253<H>  4.3   |  22  |  5.91<H>    Ca    7.9<L>      2022 10:00  Phos  8.1           Creatinine Trend: 5.91 <--, 4.92 <--, 3.67 <--, 3.66 <--, 2.78 <--                        7.7    8.77  )-----------( 199      ( 2022 10:00 )             24.3     Urine Studies:  Urinalysis Basic - ( 2022 18:00 )    Color: Yellow / Appearance: Clear / S.005 / pH:   Gluc:  / Ketone: Negative  / Bili: Negative / Urobili: Negative   Blood:  / Protein: 30 mg/dL / Nitrite: Negative   Leuk Esterase: Negative / RBC: 0-2 /HPF / WBC 0-2 /HPF   Sq Epi:  / Non Sq Epi: Occasional /HPF / Bacteria: Few /HPF

## 2022-11-13 LAB
ANION GAP SERPL CALC-SCNC: 10 MMOL/L — SIGNIFICANT CHANGE UP (ref 5–17)
BUN SERPL-MCNC: 38 MG/DL — HIGH (ref 7–18)
CALCIUM SERPL-MCNC: 8.4 MG/DL — SIGNIFICANT CHANGE UP (ref 8.4–10.5)
CHLORIDE SERPL-SCNC: 100 MMOL/L — SIGNIFICANT CHANGE UP (ref 96–108)
CO2 SERPL-SCNC: 26 MMOL/L — SIGNIFICANT CHANGE UP (ref 22–31)
CREAT SERPL-MCNC: 4.42 MG/DL — HIGH (ref 0.5–1.3)
CULTURE RESULTS: SIGNIFICANT CHANGE UP
CULTURE RESULTS: SIGNIFICANT CHANGE UP
EGFR: 13 ML/MIN/1.73M2 — LOW
GLUCOSE BLDC GLUCOMTR-MCNC: 204 MG/DL — HIGH (ref 70–99)
GLUCOSE BLDC GLUCOMTR-MCNC: 245 MG/DL — HIGH (ref 70–99)
GLUCOSE BLDC GLUCOMTR-MCNC: 273 MG/DL — HIGH (ref 70–99)
GLUCOSE BLDC GLUCOMTR-MCNC: 328 MG/DL — HIGH (ref 70–99)
GLUCOSE SERPL-MCNC: 273 MG/DL — HIGH (ref 70–99)
HCT VFR BLD CALC: 27.8 % — LOW (ref 39–50)
HGB BLD-MCNC: 8.8 G/DL — LOW (ref 13–17)
MCHC RBC-ENTMCNC: 30.1 PG — SIGNIFICANT CHANGE UP (ref 27–34)
MCHC RBC-ENTMCNC: 31.7 GM/DL — LOW (ref 32–36)
MCV RBC AUTO: 95.2 FL — SIGNIFICANT CHANGE UP (ref 80–100)
NRBC # BLD: 0 /100 WBCS — SIGNIFICANT CHANGE UP (ref 0–0)
PLATELET # BLD AUTO: 246 K/UL — SIGNIFICANT CHANGE UP (ref 150–400)
POTASSIUM SERPL-MCNC: 3.9 MMOL/L — SIGNIFICANT CHANGE UP (ref 3.5–5.3)
POTASSIUM SERPL-SCNC: 3.9 MMOL/L — SIGNIFICANT CHANGE UP (ref 3.5–5.3)
RBC # BLD: 2.92 M/UL — LOW (ref 4.2–5.8)
RBC # FLD: 15.7 % — HIGH (ref 10.3–14.5)
SARS-COV-2 RNA SPEC QL NAA+PROBE: DETECTED
SODIUM SERPL-SCNC: 136 MMOL/L — SIGNIFICANT CHANGE UP (ref 135–145)
SPECIMEN SOURCE: SIGNIFICANT CHANGE UP
SPECIMEN SOURCE: SIGNIFICANT CHANGE UP
WBC # BLD: 11.18 K/UL — HIGH (ref 3.8–10.5)
WBC # FLD AUTO: 11.18 K/UL — HIGH (ref 3.8–10.5)

## 2022-11-13 RX ADMIN — Medication 2: at 17:09

## 2022-11-13 RX ADMIN — CHLORHEXIDINE GLUCONATE 1 APPLICATION(S): 213 SOLUTION TOPICAL at 13:02

## 2022-11-13 RX ADMIN — Medication 1 TABLET(S): at 12:50

## 2022-11-13 RX ADMIN — HEPARIN SODIUM 5000 UNIT(S): 5000 INJECTION INTRAVENOUS; SUBCUTANEOUS at 21:13

## 2022-11-13 RX ADMIN — PIPERACILLIN AND TAZOBACTAM 25 GRAM(S): 4; .5 INJECTION, POWDER, LYOPHILIZED, FOR SOLUTION INTRAVENOUS at 05:16

## 2022-11-13 RX ADMIN — Medication 1: at 21:14

## 2022-11-13 RX ADMIN — PANTOPRAZOLE SODIUM 40 MILLIGRAM(S): 20 TABLET, DELAYED RELEASE ORAL at 05:20

## 2022-11-13 RX ADMIN — Medication 81 MILLIGRAM(S): at 12:50

## 2022-11-13 RX ADMIN — SEVELAMER CARBONATE 1600 MILLIGRAM(S): 2400 POWDER, FOR SUSPENSION ORAL at 17:10

## 2022-11-13 RX ADMIN — Medication 4: at 12:46

## 2022-11-13 RX ADMIN — MUPIROCIN 1 APPLICATION(S): 20 OINTMENT TOPICAL at 05:17

## 2022-11-13 RX ADMIN — SEVELAMER CARBONATE 1600 MILLIGRAM(S): 2400 POWDER, FOR SUSPENSION ORAL at 08:17

## 2022-11-13 RX ADMIN — PIPERACILLIN AND TAZOBACTAM 25 GRAM(S): 4; .5 INJECTION, POWDER, LYOPHILIZED, FOR SOLUTION INTRAVENOUS at 17:10

## 2022-11-13 RX ADMIN — CLOPIDOGREL BISULFATE 75 MILLIGRAM(S): 75 TABLET, FILM COATED ORAL at 12:50

## 2022-11-13 RX ADMIN — MUPIROCIN 1 APPLICATION(S): 20 OINTMENT TOPICAL at 17:10

## 2022-11-13 RX ADMIN — Medication 2: at 08:36

## 2022-11-13 RX ADMIN — POLYETHYLENE GLYCOL 3350 17 GRAM(S): 17 POWDER, FOR SOLUTION ORAL at 12:49

## 2022-11-13 RX ADMIN — HEPARIN SODIUM 5000 UNIT(S): 5000 INJECTION INTRAVENOUS; SUBCUTANEOUS at 05:16

## 2022-11-13 RX ADMIN — SEVELAMER CARBONATE 1600 MILLIGRAM(S): 2400 POWDER, FOR SUSPENSION ORAL at 12:50

## 2022-11-13 RX ADMIN — LINEZOLID 300 MILLIGRAM(S): 600 INJECTION, SOLUTION INTRAVENOUS at 21:13

## 2022-11-13 RX ADMIN — HEPARIN SODIUM 5000 UNIT(S): 5000 INJECTION INTRAVENOUS; SUBCUTANEOUS at 15:22

## 2022-11-13 RX ADMIN — LINEZOLID 300 MILLIGRAM(S): 600 INJECTION, SOLUTION INTRAVENOUS at 10:10

## 2022-11-13 NOTE — PROGRESS NOTE ADULT - ASSESSMENT
71 y/o male from [facility], with PMHx of ESRD (T-TH-S), DM, s/p Rt foot angiogram 10/03 confirmed severe PAD and  no targets for re vascularization, vascular followed and recommended a Right BKA at that time but patient refused. Patient presented back to ED on 11/8/22 with c/o R foot pain and admitted to medicine. Noted with right foot gangrene of the 2nd , 3rd digit and heel- S/P R BKA 11/9, today is POD 2.  Plan for dressing change on 11/12 by vascular and HD tomorrow on 11/12, will likely return back to [facility] once optimized

## 2022-11-13 NOTE — PROGRESS NOTE ADULT - ASSESSMENT
Patient is a 71yo Male with ESRD on HD, DM a/w Rt foot gangrene. Nephrology consulted for ESRD status.   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       1) ESRD:  Last HD 11/12, with mild intradialytic hypotension with net 1310 ml removed. Plan for next HD 11/15.  Monitor electrolytes.  Pt with Left UE AVF- no thrill no bruit- f/u Vascular Surgeon Dr. Dutta as an outpt.   2) Anemia of renal disease: hgb low with adequate iron stores. s/p 2 unit prbc on 11/9. c/w Epogen 10k units IV tiw in HD. Monitor Hb.  3) Hyperphosphatemia: last serum phosphorus elevated. c/w low phos diet. c/w Sevelamer 1600mg PO tid with meals. Monitor serum calcium and phosphorus.  4) Rt foot gangrene: s/p Rt BKA 11/9 by Dr. Billy. Abx as per ID. Vascular/ ID following.     [facility] NEPHROLOGY  Wili Hopkins M.D.  Kiran Feng D.O.  Sujatha Dumont M.D.  Radha Miller, MSN, ANP-C  [phone]    [address] [place] [address]

## 2022-11-13 NOTE — PROGRESS NOTE ADULT - ASSESSMENT
71 y/o male from [facility], with PMHx of ESRD (T-TH-S), DM, s/p Rt foot angiogram 10/03 confirmed severe PAD and  no targets for re vascularization, vascular followed and recommended a Right BKA at that time but patient refused. Patient presented back to ED on 11/8/22 with c/o R foot pain and admitted to medicine. Noted with right foot gangrene of the 2nd , 3rd digit and heel- S/P R BKA 11/9, today is POD 2.  Plan for dressing change on 11/12 by vascular and HD tomorrow on 11/12, will likely return back to [facility] once optimized      (2) cough or sneeze

## 2022-11-13 NOTE — PROGRESS NOTE ADULT - SUBJECTIVE AND OBJECTIVE BOX
[facility] NEPHROLOGY- PROGRESS NOTE    Patient is a 71yo Male with ESRD on HD, DM a/w Rt foot gangrene. Nephrology consulted for ESRD status.   s/p OR  with Rt BKA and received 2 units prbc in OR.     : COVID +    Hospital Medications: Medications reviewed.  REVIEW OF SYSTEMS:  CONSTITUTIONAL: No fevers or chills  RESPIRATORY: No shortness of breath  CARDIOVASCULAR: No chest pain.  GASTROINTESTINAL: No nausea, vomiting, diarrhea or abdominal pain.   VASCULAR: No left lower extremity edema. +RLE pain intermittent    VITALS:  T(F): 98.4 (22 @ 04:54), Max: 99.5 (22 @ 20:46)  HR: 70 (22 @ 04:54)  BP: 133/59 (22 @ 04:54)  RR: 18 (22 @ 04:54)  SpO2: 100% (22 @ 04:54)  Wt(kg): --     @ 07:01  -   @ 07:00  --------------------------------------------------------  IN: 900 mL / OUT: 2210 mL / NET: -1310 mL      PHYSICAL EXAM:  Gen: NAD, calm  HEENT: anicteric  Neck: no JVD  Cards: RRR, +S1/S2, no M/G/R  Resp: CTA B/L  GI: soft, NT/ND, NABS  Extremities: no LLE edema  Derm: s/p Rt BKA wrapped  Access: Rt IJ tunneled HD catheter- benign  Left upper arm AVF- no thrill no bruit      LABS:      136  |  100  |  38<H>  ----------------------------<  273<H>  3.9   |  26  |  4.42<H>    Ca    8.4      2022 06:00  Phos  8.1           Creatinine Trend: 4.42 <--, 5.91 <--, 4.92 <--, 3.67 <--, 3.66 <--, 2.78 <--                        8.8    11.18 )-----------( 246      ( 2022 06:00 )             27.8     Urine Studies:  Urinalysis Basic - ( 2022 18:00 )    Color: Yellow / Appearance: Clear / S.005 / pH:   Gluc:  / Ketone: Negative  / Bili: Negative / Urobili: Negative   Blood:  / Protein: 30 mg/dL / Nitrite: Negative   Leuk Esterase: Negative / RBC: 0-2 /HPF / WBC 0-2 /HPF   Sq Epi:  / Non Sq Epi: Occasional /HPF / Bacteria: Few /HPF        Hemoglobin: 8.8 g/dL ( @ 06:00)  Hemoglobin: 7.7 g/dL ( @ 10:00)  Hemoglobin: 9.3 g/dL ( @ 10:25)  Hemoglobin: 10.0 g/dL (11-10 @ 05:35)  Hemoglobin: 7.2 g/dL ( @ 06:40)

## 2022-11-13 NOTE — PROGRESS NOTE ADULT - PROBLEM SELECTOR PLAN 3
on HD   will get dialysis Sat 11/12  HD to return to [facility] next week

## 2022-11-13 NOTE — PROGRESS NOTE ADULT - PROBLEM SELECTOR PLAN 3
on HD   will get dialysis Sat 11/12  HD to return to [facility] next week

## 2022-11-13 NOTE — PROGRESS NOTE ADULT - SUBJECTIVE AND OBJECTIVE BOX
Patient is a 72y old  Male who presents with a chief complaint of toe gangrene (2022 09:32)      INTERVAL HPI/OVERNIGHT EVENTS: pt seen and examined    MEDICATIONS  (STANDING):  aspirin enteric coated 81 milliGRAM(s) Oral daily  chlorhexidine 2% Cloths 1 Application(s) Topical daily  clopidogrel Tablet 75 milliGRAM(s) Oral daily  dextrose 5%. 1000 milliLiter(s) (100 mL/Hr) IV Continuous <Continuous>  dextrose 5%. 1000 milliLiter(s) (50 mL/Hr) IV Continuous <Continuous>  dextrose 50% Injectable 25 Gram(s) IV Push once  dextrose 50% Injectable 12.5 Gram(s) IV Push once  dextrose 50% Injectable 25 Gram(s) IV Push once  epoetin nyasia-epbx (RETACRIT) Injectable 86848 Unit(s) IV Push <User Schedule>  glucagon  Injectable 1 milliGRAM(s) IntraMuscular once  heparin   Injectable 5000 Unit(s) SubCutaneous every 8 hours  influenza  Vaccine (HIGH DOSE) 0.7 milliLiter(s) IntraMuscular once  insulin lispro (ADMELOG) corrective regimen sliding scale   SubCutaneous at bedtime  insulin lispro (ADMELOG) corrective regimen sliding scale   SubCutaneous three times a day before meals  linezolid  IVPB      linezolid  IVPB 600 milliGRAM(s) IV Intermittent every 12 hours  mupirocin 2% Ointment 1 Application(s) Both Nostrils two times a day  Nephro-zack 1 Tablet(s) Oral daily  pantoprazole    Tablet 40 milliGRAM(s) Oral before breakfast  piperacillin/tazobactam IVPB.. 3.375 Gram(s) IV Intermittent every 12 hours  polyethylene glycol 3350 17 Gram(s) Oral daily  sevelamer carbonate 1600 milliGRAM(s) Oral three times a day with meals    MEDICATIONS  (PRN):  acetaminophen     Tablet .. 650 milliGRAM(s) Oral every 6 hours PRN Temp greater or equal to 38C (100.4F), Mild Pain (1 - 3)  dextrose Oral Gel 15 Gram(s) Oral once PRN Blood Glucose LESS THAN 70 milliGRAM(s)/deciliter  oxyCODONE    IR 5 milliGRAM(s) Oral every 4 hours PRN Severe Pain (7 - 10)    __________________________________________________  REVIEW OF SYSTEMS:    CONSTITUTIONAL: No fever,   EYES: no acute visual disturbances  NECK: No pain or stiffness  RESPIRATORY: No cough; No shortness of breath  CARDIOVASCULAR: No chest pain, no palpitations  GASTROINTESTINAL: No pain. No nausea or vomiting; No diarrhea   NEUROLOGICAL: No headache or numbness, no tremors  MUSCULOSKELETAL: No joint pain, no muscle pain  GENITOURINARY: no dysuria, no frequency, no hesitancy  PSYCHIATRY: no depression , no anxiety  ALL OTHER  ROS negative      Vital Signs Last 24 Hrs  T(C): 37.2 (22 @ 21:48), Max: 37.3 (22 @ 14:31)  T(F): 99 (22 @ 21:48), Max: 99.2 (22 @ 14:31)  HR: 98 (22 @ 21:48) (70 - 98)  BP: 125/68 (22 @ 21:48) (115/60 - 133/59)  BP(mean): --  RR: 16 (22 @ 21:48) (16 - 18)  SpO2: 100% (22 @ 21:48) (100% - 100%)        ________________________________________________  PHYSICAL EXAM:  GENERAL: NAD  HEENT: atraumatic, Normocephalic;  conjunctivae and sclerae clear;   NECK : supple, no JVD  CHEST/LUNG: Clear and diminished to auscultation bilaterally  HEART: S1 S2 RRR, no murmurs, gallops or rubs  ABDOMEN: Soft, Nontender, Nondistended; Bowel sounds present and hypoactive  EXTREMITIES: no cyanosis; no edema to BUE or LLE, 2+ radial pulses, RLE wrapped in kerlex and coban with splint.  Clean dry and intact.  SKIN: warm and dry; no rash  NERVOUS SYSTEM:  Awake and alert; Oriented  to place, person and time ; no new deficits  _________________________________________________  LABS:      136  |  100  |  38<H>  ----------------------------<  273<H>  3.9   |  26  |  4.42<H>    Ca    8.4      2022 06:00  Phos  8.1     11-12      Creatinine Trend: 4.42 <--, 5.91 <--, 4.92 <--, 3.67 <--, 3.66 <--, 2.78 <--                        8.8    11.18 )-----------( 246      ( 2022 06:00 )             27.8     Urine Studies:  Urinalysis Basic - ( 2022 18:00 )    Color: Yellow / Appearance: Clear / S.005 / pH:   Gluc:  / Ketone: Negative  / Bili: Negative / Urobili: Negative   Blood:  / Protein: 30 mg/dL / Nitrite: Negative   Leuk Esterase: Negative / RBC: 0-2 /HPF / WBC 0-2 /HPF   Sq Epi:  / Non Sq Epi: Occasional /HPF / Bacteria: Few /HPF                        RADIOLOGY & ADDITIONAL TESTS:   < from: Xray Foot AP + Lateral + Oblique, Right (22 @ 19:08) >  IMPRESSION:  1.  Swelling with soft tissue ulcer posterior to calcaneus.  2.  No plain film evidence of osteomyelitis.  3.  Obtain MRI as warranted clinically.    --- End of Report ---        < end of copied text >    Imaging Personally Reviewed:  YES/    Consultant(s) Notes Reviewed:   YES/    Plan of care was discussed with patient and /or primary care giver; all questions and concerns were addressed and care was aligned with patient's wishes.

## 2022-11-13 NOTE — PROGRESS NOTE ADULT - SUBJECTIVE AND OBJECTIVE BOX
PATIENT SEEN AND EXAMINED ON :- 11/13/22  DATE OF SERVICE:  11/13/22           Interim events noted,Labs ,Radiological studies and Cardiology tests reviewed .       HOSPITAL COURSE: HPI:  72 year old male, coming from [facility], with medical history of ESRD (T-TH-S), and DM coming to ED c/o R foot pain. Patient has severe peripheral arterial disease. He was admitted on September 26 2022 for gangrene of right foot. At that time, patient was treated with IV abx. MRI was negative for OM. He had R LE angiogram on 10/3 w/ no targets for re vascularization but showed extensive distal small vessel disease, there was no acute vascular interventions done. Patient was recommended a R BKA but wanted a medical management instead. Patient now coming in c/o R foot pain. He states that he wants surgery now for his foot. He denies any fevers, chills, headaches, dizziness, chest pain, cough, SOB, abd pain, n/v, diarrhea, constipation, hematuria, dysuria, numbness, and weakness.    in the ed VS: T 98, HR 88, /60, RR 18, Spo2 96%RA  Hb 8.1   s/p 1 dose vanc and zosyn in Ed    (08 Nov 2022 21:31)      INTERIM EVENTS:Patient seen at bedside ,interim events noted.      PMH -reviewed admission note, no change since admission  HEART FAILURE: Acute[ ]Chronic[ ] Systolic[ ] Diastolic[ ] Combined Systolic and Diastolic[ ]  CAD[ ] CABG[ ] PCI[ ]  DEVICES[ ] PPM[ ] ICD[ ] ILR[ ]  ATRIAL FIBRILLATION[ ] Paroxysmal[ ] Permanent[ ] CHADS2-[  ]  CANDE[ ] CKD1[ ] CKD2[ ] CKD3[ ] CKD4[ ] ESRD[ ]  COPD[ ] HTN[ ]   DM[ ] Type1[ ] Type 2[ ]   CVA[ ] Paresis[ ]    AMBULATION: Assisted[ ] Cane/walker[ ] Independent[ ]    MEDICATIONS  (STANDING):  aspirin enteric coated 81 milliGRAM(s) Oral daily  chlorhexidine 2% Cloths 1 Application(s) Topical daily  clopidogrel Tablet 75 milliGRAM(s) Oral daily  dextrose 5%. 1000 milliLiter(s) (100 mL/Hr) IV Continuous <Continuous>  dextrose 5%. 1000 milliLiter(s) (50 mL/Hr) IV Continuous <Continuous>  dextrose 50% Injectable 25 Gram(s) IV Push once  dextrose 50% Injectable 12.5 Gram(s) IV Push once  dextrose 50% Injectable 25 Gram(s) IV Push once  epoetin nyasia-epbx (RETACRIT) Injectable 63222 Unit(s) IV Push <User Schedule>  glucagon  Injectable 1 milliGRAM(s) IntraMuscular once  heparin   Injectable 5000 Unit(s) SubCutaneous every 8 hours  influenza  Vaccine (HIGH DOSE) 0.7 milliLiter(s) IntraMuscular once  insulin lispro (ADMELOG) corrective regimen sliding scale   SubCutaneous at bedtime  insulin lispro (ADMELOG) corrective regimen sliding scale   SubCutaneous three times a day before meals  linezolid  IVPB      linezolid  IVPB 600 milliGRAM(s) IV Intermittent every 12 hours  mupirocin 2% Ointment 1 Application(s) Both Nostrils two times a day  Nephro-zack 1 Tablet(s) Oral daily  pantoprazole    Tablet 40 milliGRAM(s) Oral before breakfast  piperacillin/tazobactam IVPB.. 3.375 Gram(s) IV Intermittent every 12 hours  polyethylene glycol 3350 17 Gram(s) Oral daily  sevelamer carbonate 1600 milliGRAM(s) Oral three times a day with meals    MEDICATIONS  (PRN):  acetaminophen     Tablet .. 650 milliGRAM(s) Oral every 6 hours PRN Temp greater or equal to 38C (100.4F), Mild Pain (1 - 3)  dextrose Oral Gel 15 Gram(s) Oral once PRN Blood Glucose LESS THAN 70 milliGRAM(s)/deciliter  oxyCODONE    IR 5 milliGRAM(s) Oral every 4 hours PRN Severe Pain (7 - 10)            REVIEW OF SYSTEMS:  Constitutional: [ ] fever, [ ]weight loss,  [ ]fatigue [ ]weight gain  Eyes: [ ] visual changes  Respiratory: [ ]shortness of breath;  [ ] cough, [ ]wheezing, [ ]chills, [ ]hemoptysis  Cardiovascular: [ ] chest pain, [ ]palpitations, [ ]dizziness,  [ ]leg swelling[ ]orthopnea[ ]PND  Gastrointestinal: [ ] abdominal pain, [ ]nausea, [ ]vomiting,  [ ]diarrhea [ ]Constipation [ ]Melena  Genitourinary: [ ] dysuria, [ ] hematuria [ ]Garcia  Neurologic: [ ] headaches [ ] tremors[ ]weakness [ ]Paralysis Right[ ] Left[ ]  Skin: [ ] itching, [ ]burning, [ ] rashes  Endocrine: [ ] heat or cold intolerance  Musculoskeletal: [ ] joint pain or swelling; [ ] muscle, back, or extremity pain  Psychiatric: [ ] depression, [ ]anxiety, [ ]mood swings, or [ ]difficulty sleeping  Hematologic: [ ] easy bruising, [ ] bleeding gums    [ ] All remaining systems negative except as per above.   [ ]Unable to obtain.  [x] No change in ROS since admission      Vital Signs Last 24 Hrs  T(C): 37.3 (13 Nov 2022 14:31), Max: 37.5 (12 Nov 2022 20:46)  T(F): 99.2 (13 Nov 2022 14:31), Max: 99.5 (12 Nov 2022 20:46)  HR: 92 (13 Nov 2022 14:31) (70 - 100)  BP: 115/60 (13 Nov 2022 14:31) (115/60 - 133/60)  BP(mean): --  RR: 16 (13 Nov 2022 14:31) (16 - 18)  SpO2: 100% (13 Nov 2022 14:31) (100% - 100%)    Parameters below as of 13 Nov 2022 14:31  Patient On (Oxygen Delivery Method): room air      I&O's Summary    12 Nov 2022 07:01  -  13 Nov 2022 07:00  --------------------------------------------------------  IN: 900 mL / OUT: 2210 mL / NET: -1310 mL        PHYSICAL EXAM:  General: No acute distress BMI-  HEENT: EOMI, PERRL  Neck: Supple, [ ] JVD  Lungs: Equal air entry bilaterally; [ ] rales [ ] wheezing [ ] rhonchi  Heart: Regular rate and rhythm; [x ] murmur   2/6 [ x] systolic [ ] diastolic [ ] radiation[ ] rubs [ ]  gallops  Abdomen: Nontender, bowel sounds present  Extremities: No clubbing, cyanosis, [ ] edema [ ]Pulses  equal and intact  Nervous system:  Alert & Oriented X3, no focal deficits  Psychiatric: Normal affect  Skin: No rashes or lesions    LABS:  11-13    136  |  100  |  38<H>  ----------------------------<  273<H>  3.9   |  26  |  4.42<H>    Ca    8.4      13 Nov 2022 06:00  Phos  8.1     11-12      Creatinine Trend: 4.42<--, 5.91<--, 4.92<--, 3.67<--, 3.66<--, 2.78<--                        8.8    11.18 )-----------( 246      ( 13 Nov 2022 06:00 )             27.8

## 2022-11-14 LAB
ANION GAP SERPL CALC-SCNC: 12 MMOL/L — SIGNIFICANT CHANGE UP (ref 5–17)
BUN SERPL-MCNC: 45 MG/DL — HIGH (ref 7–18)
CALCIUM SERPL-MCNC: 8.1 MG/DL — LOW (ref 8.4–10.5)
CHLORIDE SERPL-SCNC: 99 MMOL/L — SIGNIFICANT CHANGE UP (ref 96–108)
CO2 SERPL-SCNC: 24 MMOL/L — SIGNIFICANT CHANGE UP (ref 22–31)
CREAT SERPL-MCNC: 5.37 MG/DL — HIGH (ref 0.5–1.3)
EGFR: 11 ML/MIN/1.73M2 — LOW
GLUCOSE BLDC GLUCOMTR-MCNC: 177 MG/DL — HIGH (ref 70–99)
GLUCOSE BLDC GLUCOMTR-MCNC: 182 MG/DL — HIGH (ref 70–99)
GLUCOSE BLDC GLUCOMTR-MCNC: 228 MG/DL — HIGH (ref 70–99)
GLUCOSE BLDC GLUCOMTR-MCNC: 230 MG/DL — HIGH (ref 70–99)
GLUCOSE BLDC GLUCOMTR-MCNC: 258 MG/DL — HIGH (ref 70–99)
GLUCOSE BLDC GLUCOMTR-MCNC: 261 MG/DL — HIGH (ref 70–99)
GLUCOSE BLDC GLUCOMTR-MCNC: 277 MG/DL — HIGH (ref 70–99)
GLUCOSE SERPL-MCNC: 220 MG/DL — HIGH (ref 70–99)
HCT VFR BLD CALC: 25.6 % — LOW (ref 39–50)
HGB BLD-MCNC: 8.2 G/DL — LOW (ref 13–17)
MCHC RBC-ENTMCNC: 30.4 PG — SIGNIFICANT CHANGE UP (ref 27–34)
MCHC RBC-ENTMCNC: 32 GM/DL — SIGNIFICANT CHANGE UP (ref 32–36)
MCV RBC AUTO: 94.8 FL — SIGNIFICANT CHANGE UP (ref 80–100)
NRBC # BLD: 0 /100 WBCS — SIGNIFICANT CHANGE UP (ref 0–0)
PLATELET # BLD AUTO: 255 K/UL — SIGNIFICANT CHANGE UP (ref 150–400)
POTASSIUM SERPL-MCNC: 3.7 MMOL/L — SIGNIFICANT CHANGE UP (ref 3.5–5.3)
POTASSIUM SERPL-SCNC: 3.7 MMOL/L — SIGNIFICANT CHANGE UP (ref 3.5–5.3)
RBC # BLD: 2.7 M/UL — LOW (ref 4.2–5.8)
RBC # FLD: 15.2 % — HIGH (ref 10.3–14.5)
SODIUM SERPL-SCNC: 135 MMOL/L — SIGNIFICANT CHANGE UP (ref 135–145)
WBC # BLD: 7.95 K/UL — SIGNIFICANT CHANGE UP (ref 3.8–10.5)
WBC # FLD AUTO: 7.95 K/UL — SIGNIFICANT CHANGE UP (ref 3.8–10.5)

## 2022-11-14 RX ORDER — ERYTHROPOIETIN 10000 [IU]/ML
12000 INJECTION, SOLUTION INTRAVENOUS; SUBCUTANEOUS
Refills: 0 | Status: DISCONTINUED | OUTPATIENT
Start: 2022-11-14 | End: 2022-11-22

## 2022-11-14 RX ADMIN — SEVELAMER CARBONATE 1600 MILLIGRAM(S): 2400 POWDER, FOR SUSPENSION ORAL at 17:37

## 2022-11-14 RX ADMIN — Medication 1 TABLET(S): at 12:29

## 2022-11-14 RX ADMIN — HEPARIN SODIUM 5000 UNIT(S): 5000 INJECTION INTRAVENOUS; SUBCUTANEOUS at 14:59

## 2022-11-14 RX ADMIN — Medication 81 MILLIGRAM(S): at 12:30

## 2022-11-14 RX ADMIN — LINEZOLID 300 MILLIGRAM(S): 600 INJECTION, SOLUTION INTRAVENOUS at 12:28

## 2022-11-14 RX ADMIN — HEPARIN SODIUM 5000 UNIT(S): 5000 INJECTION INTRAVENOUS; SUBCUTANEOUS at 22:22

## 2022-11-14 RX ADMIN — MUPIROCIN 1 APPLICATION(S): 20 OINTMENT TOPICAL at 17:37

## 2022-11-14 RX ADMIN — LINEZOLID 300 MILLIGRAM(S): 600 INJECTION, SOLUTION INTRAVENOUS at 22:26

## 2022-11-14 RX ADMIN — SEVELAMER CARBONATE 1600 MILLIGRAM(S): 2400 POWDER, FOR SUSPENSION ORAL at 08:19

## 2022-11-14 RX ADMIN — CLOPIDOGREL BISULFATE 75 MILLIGRAM(S): 75 TABLET, FILM COATED ORAL at 12:30

## 2022-11-14 RX ADMIN — HEPARIN SODIUM 5000 UNIT(S): 5000 INJECTION INTRAVENOUS; SUBCUTANEOUS at 05:12

## 2022-11-14 RX ADMIN — CHLORHEXIDINE GLUCONATE 1 APPLICATION(S): 213 SOLUTION TOPICAL at 12:31

## 2022-11-14 RX ADMIN — PIPERACILLIN AND TAZOBACTAM 25 GRAM(S): 4; .5 INJECTION, POWDER, LYOPHILIZED, FOR SOLUTION INTRAVENOUS at 17:37

## 2022-11-14 RX ADMIN — PANTOPRAZOLE SODIUM 40 MILLIGRAM(S): 20 TABLET, DELAYED RELEASE ORAL at 05:13

## 2022-11-14 RX ADMIN — Medication 3: at 17:36

## 2022-11-14 RX ADMIN — SEVELAMER CARBONATE 1600 MILLIGRAM(S): 2400 POWDER, FOR SUSPENSION ORAL at 12:29

## 2022-11-14 RX ADMIN — Medication 3: at 12:27

## 2022-11-14 RX ADMIN — MUPIROCIN 1 APPLICATION(S): 20 OINTMENT TOPICAL at 05:12

## 2022-11-14 RX ADMIN — PIPERACILLIN AND TAZOBACTAM 25 GRAM(S): 4; .5 INJECTION, POWDER, LYOPHILIZED, FOR SOLUTION INTRAVENOUS at 05:12

## 2022-11-14 RX ADMIN — Medication 2: at 08:19

## 2022-11-14 NOTE — PROGRESS NOTE ADULT - PROBLEM SELECTOR PLAN 1
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

## 2022-11-14 NOTE — PROGRESS NOTE ADULT - ASSESSMENT
Patient is a 72y old  Male who is coming from [facility], with medical history of ESRD (T-TH-S), and DM, presents to the ER  for evaluation of Right foot pain. Patient has severe peripheral arterial disease. He was admitted on September 26, 2022 for gangrene of right foot. At that time, patient was treated with IV abx. MRI was negative for OM. He had Right LE angiogram on 10/3 w/ no targets for re vascularization but showed extensive distal small vessel disease, there was no acute vascular interventions done. Patient was recommended a R BKA but wanted a medical management instead. Patient now coming in c/o R foot pain. He states that he wants surgery now for his foot. On admission, he has no fever , no leukocytosis. He has evaluated by Vascular team and taken to OR for Right BKA. He has started on Vancomycin and Zosyn, and the ID consult requested to assist with further evaluation and antibiotic management.    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  Monitor kidney function    2. Wound care as per Vascular Sx  3. Pain management as needed  4. Continue linezolid and Zosyn until 11/15/22,  for surrounding skin and soft tissue infection X 1 more day  5. PT/ OOB to chair     Attending Attestation:    Spent more than 35 minutes on total encounter, more than 50 % of the visit was spent counseling and/or coordinating care by the Attending physician.

## 2022-11-14 NOTE — PROGRESS NOTE ADULT - SUBJECTIVE AND OBJECTIVE BOX
PATIENT SEEN AND EXAMINED ON :- 11/14/22  DATE OF SERVICE: 11/14/22            Interim events noted,Labs ,Radiological studies and Cardiology tests reviewed .     HOSPITAL COURSE: HPI:  72 year old male, coming from [facility], with medical history of ESRD (T-TH-S), and DM coming to ED c/o R foot pain. Patient has severe peripheral arterial disease. He was admitted on September 26 2022 for gangrene of right foot. At that time, patient was treated with IV abx. MRI was negative for OM. He had R LE angiogram on 10/3 w/ no targets for re vascularization but showed extensive distal small vessel disease, there was no acute vascular interventions done. Patient was recommended a R BKA but wanted a medical management instead. Patient now coming in c/o R foot pain. He states that he wants surgery now for his foot. He denies any fevers, chills, headaches, dizziness, chest pain, cough, SOB, abd pain, n/v, diarrhea, constipation, hematuria, dysuria, numbness, and weakness.    in the ed VS: T 98, HR 88, /60, RR 18, Spo2 96%RA  Hb 8.1   s/p 1 dose vanc and zosyn in Ed    (08 Nov 2022 21:31)      INTERIM EVENTS:Patient seen at bedside ,interim events noted.      PMH -reviewed admission note, no change since admission  HEART FAILURE: Acute[ ]Chronic[ ] Systolic[ ] Diastolic[ ] Combined Systolic and Diastolic[ ]  CAD[ ] CABG[ ] PCI[ ]  DEVICES[ ] PPM[ ] ICD[ ] ILR[ ]  ATRIAL FIBRILLATION[ ] Paroxysmal[ ] Permanent[ ] CHADS2-[  ]  CANDE[ ] CKD1[ ] CKD2[ ] CKD3[ ] CKD4[ ] ESRD[ ]  COPD[ ] HTN[ ]   DM[ ] Type1[ ] Type 2[ ]   CVA[ ] Paresis[ ]    AMBULATION: Assisted[ ] Cane/walker[ ] Independent[ ]    MEDICATIONS  (STANDING):  aspirin enteric coated 81 milliGRAM(s) Oral daily  chlorhexidine 2% Cloths 1 Application(s) Topical daily  clopidogrel Tablet 75 milliGRAM(s) Oral daily  dextrose 5%. 1000 milliLiter(s) (50 mL/Hr) IV Continuous <Continuous>  dextrose 5%. 1000 milliLiter(s) (100 mL/Hr) IV Continuous <Continuous>  dextrose 50% Injectable 25 Gram(s) IV Push once  dextrose 50% Injectable 12.5 Gram(s) IV Push once  dextrose 50% Injectable 25 Gram(s) IV Push once  epoetin nyasia-epbx (RETACRIT) Injectable 03062 Unit(s) IV Push <User Schedule>  glucagon  Injectable 1 milliGRAM(s) IntraMuscular once  heparin   Injectable 5000 Unit(s) SubCutaneous every 8 hours  influenza  Vaccine (HIGH DOSE) 0.7 milliLiter(s) IntraMuscular once  insulin lispro (ADMELOG) corrective regimen sliding scale   SubCutaneous at bedtime  insulin lispro (ADMELOG) corrective regimen sliding scale   SubCutaneous three times a day before meals  linezolid  IVPB      linezolid  IVPB 600 milliGRAM(s) IV Intermittent every 12 hours  mupirocin 2% Ointment 1 Application(s) Both Nostrils two times a day  Nephro-zack 1 Tablet(s) Oral daily  pantoprazole    Tablet 40 milliGRAM(s) Oral before breakfast  piperacillin/tazobactam IVPB.. 3.375 Gram(s) IV Intermittent every 12 hours  polyethylene glycol 3350 17 Gram(s) Oral daily  sevelamer carbonate 1600 milliGRAM(s) Oral three times a day with meals    MEDICATIONS  (PRN):  acetaminophen     Tablet .. 650 milliGRAM(s) Oral every 6 hours PRN Temp greater or equal to 38C (100.4F), Mild Pain (1 - 3)  dextrose Oral Gel 15 Gram(s) Oral once PRN Blood Glucose LESS THAN 70 milliGRAM(s)/deciliter  oxyCODONE    IR 5 milliGRAM(s) Oral every 4 hours PRN Severe Pain (7 - 10)            REVIEW OF SYSTEMS:  Constitutional: [ ] fever, [ ]weight loss,  [ ]fatigue [ ]weight gain  Eyes: [ ] visual changes  Respiratory: [ ]shortness of breath;  [ ] cough, [ ]wheezing, [ ]chills, [ ]hemoptysis  Cardiovascular: [ ] chest pain, [ ]palpitations, [ ]dizziness,  [ ]leg swelling[ ]orthopnea[ ]PND  Gastrointestinal: [ ] abdominal pain, [ ]nausea, [ ]vomiting,  [ ]diarrhea [ ]Constipation [ ]Melena  Genitourinary: [ ] dysuria, [ ] hematuria [ ]Garcia  Neurologic: [ ] headaches [ ] tremors[ ]weakness [ ]Paralysis Right[ ] Left[ ]  Skin: [ ] itching, [ ]burning, [ ] rashes  Endocrine: [ ] heat or cold intolerance  Musculoskeletal: [ ] joint pain or swelling; [ ] muscle, back, or extremity pain  Psychiatric: [ ] depression, [ ]anxiety, [ ]mood swings, or [ ]difficulty sleeping  Hematologic: [ ] easy bruising, [ ] bleeding gums    [ ] All remaining systems negative except as per above.   [ ]Unable to obtain.  [x] No change in ROS since admission      Vital Signs Last 24 Hrs  T(C): 36.6 (14 Nov 2022 13:21), Max: 37.2 (13 Nov 2022 21:48)  T(F): 97.8 (14 Nov 2022 13:21), Max: 99 (13 Nov 2022 21:48)  HR: 81 (14 Nov 2022 15:31) (81 - 98)  BP: 139/73 (14 Nov 2022 13:21) (125/68 - 139/73)  BP(mean): --  RR: 18 (14 Nov 2022 13:21) (16 - 18)  SpO2: 96% (14 Nov 2022 15:31) (96% - 100%)    Parameters below as of 14 Nov 2022 15:31  Patient On (Oxygen Delivery Method): room air      I&O's Summary      PHYSICAL EXAM:  General: No acute distress BMI-  HEENT: EOMI, PERRL  Neck: Supple, [ ] JVD  Lungs: Equal air entry bilaterally; [ ] rales [ ] wheezing [ ] rhonchi  Heart: Regular rate and rhythm; [x ] murmur   2/6 [ x] systolic [ ] diastolic [ ] radiation[ ] rubs [ ]  gallops  Abdomen: Nontender, bowel sounds present  Extremities: No clubbing, cyanosis, [ ] edema [ ]Pulses  equal and intact  Nervous system:  Alert & Oriented X3, no focal deficits  Psychiatric: Normal affect  Skin: No rashes or lesions    LABS:  11-14    135  |  99  |  45<H>  ----------------------------<  220<H>  3.7   |  24  |  5.37<H>    Ca    8.1<L>      14 Nov 2022 07:09      Creatinine Trend: 5.37<--, 4.42<--, 5.91<--, 4.92<--, 3.67<--, 3.66<--                        8.2    7.95  )-----------( 255      ( 14 Nov 2022 07:09 )             25.6

## 2022-11-14 NOTE — PROGRESS NOTE ADULT - ASSESSMENT
Patient is a 71yo Male with ESRD on HD, DM a/w Rt foot gangrene. Nephrology consulted for ESRD status.   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       1) ESRD:  Last HD 11/12, with mild intradialytic hypotension with net 1310 ml removed. Plan for next HD 11/15.  Monitor electrolytes.  Pt with Left UE AVF- no thrill no bruit- f/u Vascular Surgeon Dr. Dutta as an outpt.   2) Anemia of renal disease: hgb low with adequate iron stores. s/p 2 unit prbc on 11/9. Will increase Epogen to 12k units IV tiw in HD. Transfuse prbc prn. Monitor Hb.  3) Hyperphosphatemia: last serum phosphorus elevated. Check serum phos. c/w low phos diet. c/w Sevelamer 1600mg PO tid with meals. Monitor serum calcium and phosphorus.  4) Rt foot gangrene: s/p Rt BKA 11/9 by Dr. Billy. Abx as per ID. Vascular/ ID following.     [facility]  Wili Hopkins M.D.  Kiran Feng D.O.  Sujatha Dumont M.D.  Radha Miller, MSN, ANP-C  [phone]    [id number] [id number]

## 2022-11-14 NOTE — PROGRESS NOTE ADULT - PROBLEM SELECTOR PLAN 6
from [facility]  PT reccs: ANGELA   Covid conversion on 11/13  will likely return to [facility] once he completes quarantine period

## 2022-11-14 NOTE — PROGRESS NOTE ADULT - SUBJECTIVE AND OBJECTIVE BOX
Patient is seen and examined at the bed side, is afebrile. The WBC count stay normal. The events noted regarding he tested positive for COVID PCR.       REVIEW OF SYSTEMS: All other review systems are negative      ALLERGIES: No Known Allergies      Vital Signs Last 24 Hrs  T(C): 36.6 (2022 13:21), Max: 37.2 (2022 21:48)  T(F): 97.8 (:21), Max: 99 (2022 21:48)  HR: 81 (2022 15:31) (81 - 98)  BP: 139/73 (2022 13:21) (125/68 - 139/73)  BP(mean): --  RR: 18 (2022 13:21) (16 - 18)  SpO2: 96% (2022 15:31) (96% - 100%)    Parameters below as of 2022 15:31  Patient On (Oxygen Delivery Method): room air        PHYSICAL EXAM:  General: Not in distress  CVS: s1 and s2 present   RESP: Not using accessory muscles   GI: abdomen non distended   EXT: Positive Right BKA, bandage and immobilizer is in placed   CNS: Awake and Alert      LABS:                        8.2    7.95  )-----------( 255      ( 2022 07:09 )             25.6                10.0   13.82 )-----------( 196      ( 10 Nov 2022 05:35 )             31.3                  11-14    135  |  99  |  45<H>  ----------------------------<  220<H>  3.7   |  24  |  5.37<H>    Ca    8.1<L>      2022 07:09      11-12    135  |  101  |  57<H>  ----------------------------<  253<H>  4.3   |  22  |  5.91<H>    Ca    7.9<L>      2022 10:00  Phos  8.1     11-12    TPro  6.4  /  Alb  2.8<L>  /  TBili  0.3  /  DBili  x   /  AST  15  /  ALT  20  /  AlkPhos  57  11-0    PT/INR - ( 2022 06:40 )   PT: 13.6 sec;   INR: 1.14 ratio    PTT - ( 2022 06:40 )  PTT:37.0 sec        CAPILLARY BLOOD GLUCOSE  POCT Blood Glucose.: 153 mg/dL (2022 09:54)        Urinalysis Basic - ( 2022 18:00 )Color: Yellow / Appearance: Clear / S.005 / pH: x  Gluc: x / Ketone: Negative  / Bili: Negative / Urobili: Negative   Blood: x / Protein: 30 mg/dL / Nitrite: Negative   Leuk Esterase: Negative / RBC: 0-2 /HPF / WBC 0-2 /HPF   Sq Epi: x / Non Sq Epi: Occasional /HPF / Bacteria: Few /HPF        MEDICATIONS  (STANDING):    aspirin enteric coated 81 milliGRAM(s) Oral daily  chlorhexidine 2% Cloths 1 Application(s) Topical daily  clopidogrel Tablet 75 milliGRAM(s) Oral daily  epoetin nyasia-epbx (RETACRIT) Injectable 59539 Unit(s) IV Push <User Schedule>  glucagon  Injectable 1 milliGRAM(s) IntraMuscular once  heparin   Injectable 5000 Unit(s) SubCutaneous every 8 hours  influenza  Vaccine (HIGH DOSE) 0.7 milliLiter(s) IntraMuscular once  insulin lispro (ADMELOG) corrective regimen sliding scale   SubCutaneous three times a day before meals  insulin lispro (ADMELOG) corrective regimen sliding scale   SubCutaneous at bedtime  linezolid  IVPB      linezolid  IVPB 600 milliGRAM(s) IV Intermittent every 12 hours  mupirocin 2% Ointment 1 Application(s) Both Nostrils two times a day  Nephro-zack 1 Tablet(s) Oral daily  pantoprazole    Tablet 40 milliGRAM(s) Oral before breakfast  piperacillin/tazobactam IVPB.. 3.375 Gram(s) IV Intermittent every 12 hours  polyethylene glycol 3350 17 Gram(s) Oral daily  sevelamer carbonate 1600 milliGRAM(s) Oral three times a day with meals        RADIOLOGY & ADDITIONAL TESTS:    22: Xray Foot AP + Lateral + Oblique, Right (22 @ 19:08) >  1.  Swelling with soft tissue ulcer posterior to calcaneus.  2.  No plain film evidence of osteomyelitis.  3.  Obtain MRI as warranted clinically.        MICROBIOLOGY DATA:    COVID-19 PCR . (22 @ 06:27)   COVID-19 PCR: Detected:    MRSA/MSSA PCR (22 @ 10:30)   MRSA PCR Result.: NotDetec:    Culture - Blood (22 @ 15:20)   Specimen Source: .Blood Blood-Peripheral   Culture Results: No growth to date.     Culture - Blood (22 @ 15:10)   Specimen Source: .Blood Blood-Peripheral   Culture Results: No growth to date.     Culture - Urine (22 @ 18:00)   Specimen Source: Clean Catch Clean Catch (Midstream)   Culture Results:   <10,000 CFU/mL Normal Urogenital Martha     COVID-19 PCR (22 @ 15:10)   COVID-19 PCR: NotDetec:

## 2022-11-14 NOTE — PROGRESS NOTE ADULT - ASSESSMENT
73 y/o male from [facility], with PMHx of ESRD (T-TH-S), DM, s/p Rt foot angiogram 10/03 confirmed severe PAD and  no targets for re vascularization, vascular followed and recommended a Right BKA at that time but patient refused. Patient presented back to ED on 11/8/22 with c/o R foot pain and admitted to medicine. 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 C/w with IV Linezolid & Zosyn. ID Dr. Babcock following. PT recommends ANGELA.   Hospital course complicated by Covid + conversion on 11/13, on RA. Will likely return back to [facility] once optimized.

## 2022-11-14 NOTE — PROGRESS NOTE ADULT - ASSESSMENT
71 y/o male from [facility], with PMHx of ESRD (T-TH-S), DM, s/p Rt foot angiogram 10/03 confirmed severe PAD and  no targets for re vascularization, vascular followed and recommended a Right BKA at that time but patient refused. Patient presented back to ED on 11/8/22 with c/o R foot pain and admitted to medicine. 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 C/w with IV Linezolid & Zosyn. ID Dr. Babcock following. PT recommends ANGELA.   Hospital course complicated by Covid + conversion on 11/13, on RA. Will likely return back to [facility] once optimized.

## 2022-11-14 NOTE — PROGRESS NOTE ADULT - PROBLEM SELECTOR PLAN 6
from [facility]  PT reccs: ANGELA   Covid conversion on 11/13  will likely return to [facility] once he completes quarantine period

## 2022-11-14 NOTE — PROGRESS NOTE ADULT - SUBJECTIVE AND OBJECTIVE BOX
NP Note discussed with  primary attending    Patient is a 72y old  Male who presents with a chief complaint of toe gangrene (14 Nov 2022 13:27)      INTERVAL HPI/OVERNIGHT EVENTS: no new complaints    MEDICATIONS  (STANDING):  aspirin enteric coated 81 milliGRAM(s) Oral daily  chlorhexidine 2% Cloths 1 Application(s) Topical daily  clopidogrel Tablet 75 milliGRAM(s) Oral daily  dextrose 5%. 1000 milliLiter(s) (100 mL/Hr) IV Continuous <Continuous>  dextrose 5%. 1000 milliLiter(s) (50 mL/Hr) IV Continuous <Continuous>  dextrose 50% Injectable 25 Gram(s) IV Push once  dextrose 50% Injectable 12.5 Gram(s) IV Push once  dextrose 50% Injectable 25 Gram(s) IV Push once  epoetin nyasia-epbx (RETACRIT) Injectable 08816 Unit(s) IV Push <User Schedule>  glucagon  Injectable 1 milliGRAM(s) IntraMuscular once  heparin   Injectable 5000 Unit(s) SubCutaneous every 8 hours  influenza  Vaccine (HIGH DOSE) 0.7 milliLiter(s) IntraMuscular once  insulin lispro (ADMELOG) corrective regimen sliding scale   SubCutaneous three times a day before meals  insulin lispro (ADMELOG) corrective regimen sliding scale   SubCutaneous at bedtime  linezolid  IVPB      linezolid  IVPB 600 milliGRAM(s) IV Intermittent every 12 hours  mupirocin 2% Ointment 1 Application(s) Both Nostrils two times a day  Nephro-zack 1 Tablet(s) Oral daily  pantoprazole    Tablet 40 milliGRAM(s) Oral before breakfast  piperacillin/tazobactam IVPB.. 3.375 Gram(s) IV Intermittent every 12 hours  polyethylene glycol 3350 17 Gram(s) Oral daily  sevelamer carbonate 1600 milliGRAM(s) Oral three times a day with meals    MEDICATIONS  (PRN):  acetaminophen     Tablet .. 650 milliGRAM(s) Oral every 6 hours PRN Temp greater or equal to 38C (100.4F), Mild Pain (1 - 3)  dextrose Oral Gel 15 Gram(s) Oral once PRN Blood Glucose LESS THAN 70 milliGRAM(s)/deciliter  oxyCODONE    IR 5 milliGRAM(s) Oral every 4 hours PRN Severe Pain (7 - 10)      __________________________________________________  REVIEW OF SYSTEMS:    CONSTITUTIONAL: No fever,   EYES: no acute visual disturbances  NECK: No pain or stiffness  RESPIRATORY: No cough; No shortness of breath  CARDIOVASCULAR: No chest pain, no palpitations  GASTROINTESTINAL: No pain. No nausea or vomiting; No diarrhea   NEUROLOGICAL: No headache or numbness, no tremors  MUSCULOSKELETAL: No joint pain, no muscle pain  GENITOURINARY: no dysuria, no frequency, no hesitancy  PSYCHIATRY: no depression , no anxiety  ALL OTHER  ROS negative        Vital Signs Last 24 Hrs  T(C): 36.6 (14 Nov 2022 13:21), Max: 37.2 (13 Nov 2022 21:48)  T(F): 97.8 (14 Nov 2022 13:21), Max: 99 (13 Nov 2022 21:48)  HR: 84 (14 Nov 2022 13:21) (84 - 98)  BP: 139/73 (14 Nov 2022 13:21) (125/68 - 139/73)  BP(mean): --  RR: 18 (14 Nov 2022 13:21) (16 - 18)  SpO2: 100% (14 Nov 2022 13:21) (99% - 100%)    Parameters below as of 14 Nov 2022 13:21  Patient On (Oxygen Delivery Method): room air        ________________________________________________  PHYSICAL EXAM:  GENERAL: NAD  HEENT: Normocephalic;  conjunctivae and sclerae clear; moist mucous membranes;   NECK : supple  CHEST/LUNG: Clear to ausculitation bilaterally with good air entry   HEART: S1 S2  regular; no murmurs, gallops or rubs  ABDOMEN: Soft, Nontender, Nondistended; Bowel sounds present  EXTREMITIES: Right BKA   SKIN: warm and dry; no rash  NERVOUS SYSTEM:  Awake and alert; Oriented  to place, person and time ; no new deficits    _________________________________________________  LABS:                        8.2    7.95  )-----------( 255      ( 14 Nov 2022 07:09 )             25.6     11-14    135  |  99  |  45<H>  ----------------------------<  220<H>  3.7   |  24  |  5.37<H>    Ca    8.1<L>      14 Nov 2022 07:09          CAPILLARY BLOOD GLUCOSE      POCT Blood Glucose.: 277 mg/dL (14 Nov 2022 11:32)  POCT Blood Glucose.: 228 mg/dL (14 Nov 2022 07:58)  POCT Blood Glucose.: 273 mg/dL (13 Nov 2022 21:02)  POCT Blood Glucose.: 204 mg/dL (13 Nov 2022 16:38)        RADIOLOGY & ADDITIONAL TESTS:  < from: Xray Foot AP + Lateral + Oblique, Right (11.08.22 @ 19:08) >  FINDINGS:    There is no fracture, dislocation or joint effusion.  No degenerative changes.  Moderate atherosclerotic changes.  No radiopaque foreign body.    IMPRESSION:  1.  Swelling with soft tissue ulcer posterior to calcaneus.  2.  No plain film evidence of osteomyelitis.  3.  Obtain MRI as warranted clinically.    --- End of Report ---      < end of copied text >    Imaging Personally Reviewed:  YES    Consultant(s) Notes Reviewed:   YES    Care Discussed with Consultants :     Plan of care was discussed with patient and /or primary care giver; all questions and concerns were addressed and care was aligned with patient's wishes.

## 2022-11-14 NOTE — PROGRESS NOTE ADULT - PROBLEM SELECTOR PLAN 6
from [facility]  PT reccs: ANGELA   Covid conversion on 11/13  will likely return to [facility] once he completes quarantine period

## 2022-11-14 NOTE — PROGRESS NOTE ADULT - SUBJECTIVE AND OBJECTIVE BOX
[facility] NEPHROLOGY- PROGRESS NOTE    Patient is a 73yo Male with ESRD on HD, DM a/w Rt foot gangrene. Nephrology consulted for ESRD status.   s/p OR  with Rt BKA and received 2 units prbc in OR.     : COVID +    Hospital Medications: Medications reviewed.  REVIEW OF SYSTEMS:  CONSTITUTIONAL: No fevers or chills  RESPIRATORY: No shortness of breath  CARDIOVASCULAR: No chest pain.  GASTROINTESTINAL: No nausea, vomiting, diarrhea or abdominal pain.   VASCULAR: No left lower extremity edema. +RLE pain intermittent    VITALS:  T(F): 97.8 (22 @ 13:21), Max: 99 (22 @ 21:48)  HR: 81 (22 @ 15:31)  BP: 139/73 (22 @ 13:21)  RR: 18 (22 @ 13:21)  SpO2: 96% (22 @ 15:31)  Wt(kg): --    PHYSICAL EXAM:  Gen: NAD, calm  HEENT: anicteric  Neck: no JVD  Cards: RRR, +S1/S2, no M/G/R  Resp: CTA B/L  GI: soft, NT/ND, NABS  Extremities: no LLE edema  Derm: s/p Rt BKA wrapped  Access: Rt IJ tunneled HD catheter- benign  Left upper arm AVF- no thrill no bruit      LABS:      135  |  99  |  45<H>  ----------------------------<  220<H>  3.7   |  24  |  5.37<H>    Ca    8.1<L>      2022 07:09      Creatinine Trend: 5.37 <--, 4.42 <--, 5.91 <--, 4.92 <--, 3.67 <--, 3.66 <--, 2.78 <--                        8.2    7.95  )-----------( 255      ( 2022 07:09 )             25.6     Urine Studies:  Urinalysis Basic - ( 2022 18:00 )    Color: Yellow / Appearance: Clear / S.005 / pH:   Gluc:  / Ketone: Negative  / Bili: Negative / Urobili: Negative   Blood:  / Protein: 30 mg/dL / Nitrite: Negative   Leuk Esterase: Negative / RBC: 0-2 /HPF / WBC 0-2 /HPF   Sq Epi:  / Non Sq Epi: Occasional /HPF / Bacteria: Few /HPF

## 2022-11-14 NOTE — PROGRESS NOTE ADULT - SUBJECTIVE AND OBJECTIVE BOX
Patient is a 72y old  Male who presents with a chief complaint of toe gangrene (14 Nov 2022 13:27)      INTERVAL HPI/OVERNIGHT EVENTS: pt seen and examined      MEDICATIONS  (STANDING):  aspirin enteric coated 81 milliGRAM(s) Oral daily  chlorhexidine 2% Cloths 1 Application(s) Topical daily  clopidogrel Tablet 75 milliGRAM(s) Oral daily  dextrose 5%. 1000 milliLiter(s) (100 mL/Hr) IV Continuous <Continuous>  dextrose 5%. 1000 milliLiter(s) (50 mL/Hr) IV Continuous <Continuous>  dextrose 50% Injectable 25 Gram(s) IV Push once  dextrose 50% Injectable 12.5 Gram(s) IV Push once  dextrose 50% Injectable 25 Gram(s) IV Push once  epoetin nyasia-epbx (RETACRIT) Injectable 92322 Unit(s) IV Push <User Schedule>  glucagon  Injectable 1 milliGRAM(s) IntraMuscular once  heparin   Injectable 5000 Unit(s) SubCutaneous every 8 hours  influenza  Vaccine (HIGH DOSE) 0.7 milliLiter(s) IntraMuscular once  insulin lispro (ADMELOG) corrective regimen sliding scale   SubCutaneous three times a day before meals  insulin lispro (ADMELOG) corrective regimen sliding scale   SubCutaneous at bedtime  linezolid  IVPB      linezolid  IVPB 600 milliGRAM(s) IV Intermittent every 12 hours  mupirocin 2% Ointment 1 Application(s) Both Nostrils two times a day  Nephro-zack 1 Tablet(s) Oral daily  pantoprazole    Tablet 40 milliGRAM(s) Oral before breakfast  piperacillin/tazobactam IVPB.. 3.375 Gram(s) IV Intermittent every 12 hours  polyethylene glycol 3350 17 Gram(s) Oral daily  sevelamer carbonate 1600 milliGRAM(s) Oral three times a day with meals    MEDICATIONS  (PRN):  acetaminophen     Tablet .. 650 milliGRAM(s) Oral every 6 hours PRN Temp greater or equal to 38C (100.4F), Mild Pain (1 - 3)  dextrose Oral Gel 15 Gram(s) Oral once PRN Blood Glucose LESS THAN 70 milliGRAM(s)/deciliter  oxyCODONE    IR 5 milliGRAM(s) Oral every 4 hours PRN Severe Pain (7 - 10)      __________________________________________________  REVIEW OF SYSTEMS:    CONSTITUTIONAL: No fever,   EYES: no acute visual disturbances  NECK: No pain or stiffness  RESPIRATORY: No cough; No shortness of breath  CARDIOVASCULAR: No chest pain, no palpitations  GASTROINTESTINAL: No pain. No nausea or vomiting; No diarrhea   NEUROLOGICAL: No headache or numbness, no tremors  MUSCULOSKELETAL: No joint pain, no muscle pain  GENITOURINARY: no dysuria, no frequency, no hesitancy  PSYCHIATRY: no depression , no anxiety  ALL OTHER  ROS negative        Vital Signs Last 24 Hrs  T(C): 36.6 (14 Nov 2022 13:21), Max: 37.2 (13 Nov 2022 21:48)  T(F): 97.8 (14 Nov 2022 13:21), Max: 99 (13 Nov 2022 21:48)  HR: 84 (14 Nov 2022 13:21) (84 - 98)  BP: 139/73 (14 Nov 2022 13:21) (125/68 - 139/73)  BP(mean): --  RR: 18 (14 Nov 2022 13:21) (16 - 18)  SpO2: 100% (14 Nov 2022 13:21) (99% - 100%)    Parameters below as of 14 Nov 2022 13:21  Patient On (Oxygen Delivery Method): room air        ________________________________________________  PHYSICAL EXAM:  GENERAL: NAD  HEENT: Normocephalic;  conjunctivae and sclerae clear; moist mucous membranes;   NECK : supple  CHEST/LUNG: dec breath sounds a bases  HEART: S1 S2  regular; no murmurs, gallops or rubs  ABDOMEN: Soft, Nontender, Nondistended; Bowel sounds present  EXTREMITIES: Right BKA   SKIN: warm and dry; no rash  NERVOUS SYSTEM:  Awake and alert;  _________________________________________________  LABS:                        8.2    7.95  )-----------( 255      ( 14 Nov 2022 07:09 )             25.6     11-14    135  |  99  |  45<H>  ----------------------------<  220<H>  3.7   |  24  |  5.37<H>    Ca    8.1<L>      14 Nov 2022 07:09          CAPILLARY BLOOD GLUCOSE      POCT Blood Glucose.: 277 mg/dL (14 Nov 2022 11:32)  POCT Blood Glucose.: 228 mg/dL (14 Nov 2022 07:58)  POCT Blood Glucose.: 273 mg/dL (13 Nov 2022 21:02)  POCT Blood Glucose.: 204 mg/dL (13 Nov 2022 16:38)        RADIOLOGY & ADDITIONAL TESTS:  < from: Xray Foot AP + Lateral + Oblique, Right (11.08.22 @ 19:08) >  FINDINGS:    There is no fracture, dislocation or joint effusion.  No degenerative changes.  Moderate atherosclerotic changes.  No radiopaque foreign body.    IMPRESSION:  1.  Swelling with soft tissue ulcer posterior to calcaneus.  2.  No plain film evidence of osteomyelitis.  3.  Obtain MRI as warranted clinically.    --- End of Report ---      < end of copied text >    Imaging Personally Reviewed:  YES    Consultant(s) Notes Reviewed:   YES    Care Discussed with Consultants :     Plan of care was discussed with patient and /or primary care giver; all questions and concerns were addressed and care was aligned with patient's wishes.

## 2022-11-14 NOTE — PROGRESS NOTE ADULT - ASSESSMENT
71 y/o male from [facility], with PMHx of ESRD (T-TH-S), DM, s/p Rt foot angiogram 10/03 confirmed severe PAD and  no targets for re vascularization, vascular followed and recommended a Right BKA at that time but patient refused. Patient presented back to ED on 11/8/22 with c/o R foot pain and admitted to medicine. 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 C/w with IV Linezolid & Zosyn. ID Dr. Babcock following. PT recommends ANGELA.   Hospital course complicated by Covid + conversion on 11/13, on RA. Will likely return back to [facility] once optimized.

## 2022-11-15 LAB
ANION GAP SERPL CALC-SCNC: 12 MMOL/L — SIGNIFICANT CHANGE UP (ref 5–17)
BUN SERPL-MCNC: 53 MG/DL — HIGH (ref 7–18)
CALCIUM SERPL-MCNC: 8.7 MG/DL — SIGNIFICANT CHANGE UP (ref 8.4–10.5)
CHLORIDE SERPL-SCNC: 99 MMOL/L — SIGNIFICANT CHANGE UP (ref 96–108)
CO2 SERPL-SCNC: 24 MMOL/L — SIGNIFICANT CHANGE UP (ref 22–31)
CREAT SERPL-MCNC: 5.94 MG/DL — HIGH (ref 0.5–1.3)
EGFR: 9 ML/MIN/1.73M2 — LOW
GLUCOSE BLDC GLUCOMTR-MCNC: 131 MG/DL — HIGH (ref 70–99)
GLUCOSE BLDC GLUCOMTR-MCNC: 247 MG/DL — HIGH (ref 70–99)
GLUCOSE BLDC GLUCOMTR-MCNC: 298 MG/DL — HIGH (ref 70–99)
GLUCOSE BLDC GLUCOMTR-MCNC: 338 MG/DL — HIGH (ref 70–99)
GLUCOSE SERPL-MCNC: 278 MG/DL — HIGH (ref 70–99)
HCT VFR BLD CALC: 28.1 % — LOW (ref 39–50)
HGB BLD-MCNC: 8.7 G/DL — LOW (ref 13–17)
MCHC RBC-ENTMCNC: 29.3 PG — SIGNIFICANT CHANGE UP (ref 27–34)
MCHC RBC-ENTMCNC: 31 GM/DL — LOW (ref 32–36)
MCV RBC AUTO: 94.6 FL — SIGNIFICANT CHANGE UP (ref 80–100)
NRBC # BLD: 0 /100 WBCS — SIGNIFICANT CHANGE UP (ref 0–0)
PHOSPHATE SERPL-MCNC: 7.4 MG/DL — HIGH (ref 2.5–4.5)
PLATELET # BLD AUTO: 278 K/UL — SIGNIFICANT CHANGE UP (ref 150–400)
POTASSIUM SERPL-MCNC: 3.9 MMOL/L — SIGNIFICANT CHANGE UP (ref 3.5–5.3)
POTASSIUM SERPL-SCNC: 3.9 MMOL/L — SIGNIFICANT CHANGE UP (ref 3.5–5.3)
RBC # BLD: 2.97 M/UL — LOW (ref 4.2–5.8)
RBC # FLD: 14.9 % — HIGH (ref 10.3–14.5)
SODIUM SERPL-SCNC: 135 MMOL/L — SIGNIFICANT CHANGE UP (ref 135–145)
WBC # BLD: 7.45 K/UL — SIGNIFICANT CHANGE UP (ref 3.8–10.5)
WBC # FLD AUTO: 7.45 K/UL — SIGNIFICANT CHANGE UP (ref 3.8–10.5)

## 2022-11-15 RX ORDER — SEVELAMER CARBONATE 2400 MG/1
2400 POWDER, FOR SUSPENSION ORAL
Refills: 0 | Status: DISCONTINUED | OUTPATIENT
Start: 2022-11-15 | End: 2022-11-22

## 2022-11-15 RX ADMIN — HEPARIN SODIUM 5000 UNIT(S): 5000 INJECTION INTRAVENOUS; SUBCUTANEOUS at 22:25

## 2022-11-15 RX ADMIN — Medication 81 MILLIGRAM(S): at 11:11

## 2022-11-15 RX ADMIN — MUPIROCIN 1 APPLICATION(S): 20 OINTMENT TOPICAL at 06:05

## 2022-11-15 RX ADMIN — Medication 2: at 08:13

## 2022-11-15 RX ADMIN — HEPARIN SODIUM 5000 UNIT(S): 5000 INJECTION INTRAVENOUS; SUBCUTANEOUS at 14:16

## 2022-11-15 RX ADMIN — PANTOPRAZOLE SODIUM 40 MILLIGRAM(S): 20 TABLET, DELAYED RELEASE ORAL at 06:18

## 2022-11-15 RX ADMIN — SEVELAMER CARBONATE 1600 MILLIGRAM(S): 2400 POWDER, FOR SUSPENSION ORAL at 08:26

## 2022-11-15 RX ADMIN — PIPERACILLIN AND TAZOBACTAM 25 GRAM(S): 4; .5 INJECTION, POWDER, LYOPHILIZED, FOR SOLUTION INTRAVENOUS at 06:06

## 2022-11-15 RX ADMIN — SEVELAMER CARBONATE 2400 MILLIGRAM(S): 2400 POWDER, FOR SUSPENSION ORAL at 12:34

## 2022-11-15 RX ADMIN — Medication 1 TABLET(S): at 11:11

## 2022-11-15 RX ADMIN — ERYTHROPOIETIN 12000 UNIT(S): 10000 INJECTION, SOLUTION INTRAVENOUS; SUBCUTANEOUS at 16:53

## 2022-11-15 RX ADMIN — LINEZOLID 300 MILLIGRAM(S): 600 INJECTION, SOLUTION INTRAVENOUS at 11:11

## 2022-11-15 RX ADMIN — CLOPIDOGREL BISULFATE 75 MILLIGRAM(S): 75 TABLET, FILM COATED ORAL at 11:11

## 2022-11-15 RX ADMIN — LINEZOLID 300 MILLIGRAM(S): 600 INJECTION, SOLUTION INTRAVENOUS at 22:26

## 2022-11-15 RX ADMIN — CHLORHEXIDINE GLUCONATE 1 APPLICATION(S): 213 SOLUTION TOPICAL at 12:32

## 2022-11-15 RX ADMIN — HEPARIN SODIUM 5000 UNIT(S): 5000 INJECTION INTRAVENOUS; SUBCUTANEOUS at 06:06

## 2022-11-15 RX ADMIN — Medication 3: at 16:43

## 2022-11-15 RX ADMIN — Medication 4: at 12:29

## 2022-11-15 NOTE — PROGRESS NOTE ADULT - ASSESSMENT
73 y/o male from [facility], with PMHx of ESRD (T-TH-S), DM, s/p Rt foot angiogram 10/03 confirmed severe PAD and  no targets for re vascularization, vascular followed and recommended a Right BKA at that time but patient refused. Patient presented back to ED on 11/8/22 with c/o R foot pain and admitted to medicine. 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 C/w with IV Linezolid & Zosyn until 11/15. ID Dr. Babcock following. PT recommends ANGELA.   Hospital course complicated by Covid + conversion on 11/13, on RA. Will likely return back to [facility] once quarantine period is completed.

## 2022-11-15 NOTE — PROGRESS NOTE ADULT - PROBLEM SELECTOR PLAN 2
likely anemia of chronic disease   HBG 8.7 - No acute bleeding noted   on epogen with HD   trend CBC

## 2022-11-15 NOTE — PROGRESS NOTE ADULT - ASSESSMENT
Patient is a 73yo Male with ESRD on HD, DM a/w Rt foot gangrene. Nephrology consulted for ESRD status.   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       1) ESRD:  Last HD 11/12, with mild intradialytic hypotension with net 1310 ml removed. Plan for next HD today; 11/15.  Monitor electrolytes.  Pt with Left UE AVF- no thrill no bruit- f/u Vascular Surgeon Dr. Dutta as an outpt.   2) Anemia of renal disease: hgb low with adequate iron stores. s/p 2 unit prbc on 11/9. c/w Epogen 12k units IV tiw in HD (increased 11/15). Transfuse prbc prn. Monitor Hb.  3) Hyperphosphatemia: serum phosphorus elevated. c/w low phos diet. Will increase Sevelamer to 2400mg PO tid with meals. Monitor serum calcium and phosphorus.  4) Rt foot gangrene: s/p Rt BKA 11/9 by Dr. Billy. Abx as per ID. Vascular/ ID following.     [facility] NEPHROLOGY  Wili Hopkins M.D.  Kiran Feng D.O.  Sujatha Dumont M.D.  Radha Miller, MSN, ANP-C  [phone]    [address] [place] [address]

## 2022-11-15 NOTE — PROGRESS NOTE ADULT - PROBLEM SELECTOR PLAN 6
from [facility]  PT reccs: ANGELA   Covid conversion on 11/13  will likely return to [facility] once he completes quarantine period

## 2022-11-15 NOTE — PROGRESS NOTE ADULT - SUBJECTIVE AND OBJECTIVE BOX
[facility] NEPHROLOGY- PROGRESS NOTE    Patient is a 71yo Male with ESRD on HD, DM a/w Rt foot gangrene. Nephrology consulted for ESRD status.   s/p OR  with Rt BKA and received 2 units prbc in OR.     : COVID +    Hospital Medications: Medications reviewed.  REVIEW OF SYSTEMS:  CONSTITUTIONAL: No fevers or chills  RESPIRATORY: No shortness of breath  CARDIOVASCULAR: No chest pain.  GASTROINTESTINAL: No nausea, vomiting, diarrhea or abdominal pain.   VASCULAR: No left lower extremity edema. +RLE pain intermittent    VITALS:  T(F): 98 (11-15-22 @ 05:30), Max: 98 (11-15-22 @ 05:30)  HR: 81 (11-15-22 @ 05:30)  BP: 135/79 (11-15-22 @ 05:30)  RR: 18 (11-15-22 @ 05:30)  SpO2: 100% (11-15-22 @ 05:30)  Wt(kg): --        PHYSICAL EXAM:  Gen: NAD, calm  HEENT: anicteric  Neck: no JVD  Cards: RRR, +S1/S2, no M/G/R  Resp: CTA B/L  GI: soft, NT/ND, NABS  Extremities: no LLE edema  Derm: s/p Rt BKA wrapped  Access: Rt IJ tunneled HD catheter- benign  Left upper arm AVF- no thrill no bruit      LABS:  11-15    135  |  99  |  53<H>  ----------------------------<  278<H>  3.9   |  24  |  5.94<H>    Ca    8.7      15 Nov 2022 08:40  Phos  7.4     11-15      Creatinine Trend: 5.94 <--, 5.37 <--, 4.42 <--, 5.91 <--, 4.92 <--, 3.67 <--, 3.66 <--, 2.78 <--                        8.7    7.45  )-----------( 278      ( 15 Nov 2022 08:40 )             28.1     Urine Studies:  Urinalysis Basic - ( 2022 18:00 )    Color: Yellow / Appearance: Clear / S.005 / pH:   Gluc:  / Ketone: Negative  / Bili: Negative / Urobili: Negative   Blood:  / Protein: 30 mg/dL / Nitrite: Negative   Leuk Esterase: Negative / RBC: 0-2 /HPF / WBC 0-2 /HPF   Sq Epi:  / Non Sq Epi: Occasional /HPF / Bacteria: Few /HPF

## 2022-11-15 NOTE — PROGRESS NOTE ADULT - SUBJECTIVE AND OBJECTIVE BOX
Patient is seen and examined at the bed side, is afebrile. He is doing better, no new complaints, tolerating ABx well.       REVIEW OF SYSTEMS: All other review systems are negative      ALLERGIES: No Known Allergies      Vital Signs Last 24 Hrs  T(C): 36.6 (15 Nov 2022 12:45), Max: 36.7 (15 Nov 2022 05:30)  T(F): 97.8 (15 Nov 2022 12:45), Max: 98 (15 Nov 2022 05:30)  HR: 88 (15 Nov 2022 12:45) (81 - 90)  BP: 137/70 (15 Nov 2022 12:45) (135/79 - 138/72)  BP(mean): --  RR: 18 (15 Nov 2022 12:45) (17 - 18)  SpO2: 100% (15 Nov 2022 12:45) (98% - 100%)    Parameters below as of 15 Nov 2022 12:45  Patient On (Oxygen Delivery Method): room air        PHYSICAL EXAM:  General: Not in distress  CVS: s1 and s2 present   RESP: Not using accessory muscles   GI: abdomen non distended   EXT: Positive Right BKA, bandage and immobilizer is in placed   CNS: Awake and Alert      LABS:                        8.7    7.45  )-----------( 278      ( 15 Nov 2022 08:40 )             28.1                10.0   13.82 )-----------( 196      ( 10 Nov 2022 05:35 )             31.3       11-15    135  |  99  |  53<H>  ----------------------------<  278<H>  3.9   |  24  |  5.94<H>    Ca    8.7      15 Nov 2022 08:40  Phos  7.4     11-15               11-14    135  |  99  |  45<H>  ----------------------------<  220<H>  3.7   |  24  |  5.37<H>    Ca    8.1<L>      2022 07:09      TPro  6.4  /  Alb  2.8<L>  /  TBili  0.3  /  DBili  x   /  AST  15  /  ALT  20  /  AlkPhos  57  0    PT/INR - ( 2022 06:40 )   PT: 13.6 sec;   INR: 1.14 ratio    PTT - ( 2022 06:40 )  PTT:37.0 sec        CAPILLARY BLOOD GLUCOSE  POCT Blood Glucose.: 153 mg/dL (2022 09:54)        Urinalysis Basic - ( 2022 18:00 )Color: Yellow / Appearance: Clear / S.005 / pH: x  Gluc: x / Ketone: Negative  / Bili: Negative / Urobili: Negative   Blood: x / Protein: 30 mg/dL / Nitrite: Negative   Leuk Esterase: Negative / RBC: 0-2 /HPF / WBC 0-2 /HPF   Sq Epi: x / Non Sq Epi: Occasional /HPF / Bacteria: Few /HPF        MEDICATIONS  (STANDING):    aspirin enteric coated 81 milliGRAM(s) Oral daily  chlorhexidine 2% Cloths 1 Application(s) Topical daily  clopidogrel Tablet 75 milliGRAM(s) Oral daily  epoetin nyasia-epbx (RETACRIT) Injectable 06909 Unit(s) IV Push <User Schedule>  glucagon  Injectable 1 milliGRAM(s) IntraMuscular once  heparin   Injectable 5000 Unit(s) SubCutaneous every 8 hours  influenza  Vaccine (HIGH DOSE) 0.7 milliLiter(s) IntraMuscular once  insulin lispro (ADMELOG) corrective regimen sliding scale   SubCutaneous at bedtime  insulin lispro (ADMELOG) corrective regimen sliding scale   SubCutaneous three times a day before meals  linezolid  IVPB 600 milliGRAM(s) IV Intermittent every 12 hours  linezolid  IVPB      Nephro-zack 1 Tablet(s) Oral daily  pantoprazole    Tablet 40 milliGRAM(s) Oral before breakfast  piperacillin/tazobactam IVPB.. 3.375 Gram(s) IV Intermittent every 12 hours  polyethylene glycol 3350 17 Gram(s) Oral daily  sevelamer carbonate 2400 milliGRAM(s) Oral three times a day with meals        RADIOLOGY & ADDITIONAL TESTS:    22: Xray Foot AP + Lateral + Oblique, Right (22 @ 19:08) >  1.  Swelling with soft tissue ulcer posterior to calcaneus.  2.  No plain film evidence of osteomyelitis.  3.  Obtain MRI as warranted clinically.        MICROBIOLOGY DATA:    COVID-19 PCR . (22 @ 06:27)   COVID-19 PCR: Detected:    MRSA/MSSA PCR (22 @ 10:30)   MRSA PCR Result.: NotDetec:    Culture - Blood (22 @ 15:20)   Specimen Source: .Blood Blood-Peripheral   Culture Results: No growth to date.     Culture - Blood (22 @ 15:10)   Specimen Source: .Blood Blood-Peripheral   Culture Results: No growth to date.     Culture - Urine (22 @ 18:00)   Specimen Source: Clean Catch Clean Catch (Midstream)   Culture Results:   <10,000 CFU/mL Normal Urogenital Martha     COVID-19 PCR (22 @ 15:10)   COVID-19 PCR: NotDetec:

## 2022-11-15 NOTE — PROGRESS NOTE ADULT - SUBJECTIVE AND OBJECTIVE BOX
NP Note discussed with  primary attending    Patient is a 72y old  Male who presents with a chief complaint of toe gangrene (15 Nov 2022 13:54)      INTERVAL HPI/OVERNIGHT EVENTS: no new complaints    MEDICATIONS  (STANDING):  aspirin enteric coated 81 milliGRAM(s) Oral daily  chlorhexidine 2% Cloths 1 Application(s) Topical daily  clopidogrel Tablet 75 milliGRAM(s) Oral daily  dextrose 5%. 1000 milliLiter(s) (100 mL/Hr) IV Continuous <Continuous>  dextrose 5%. 1000 milliLiter(s) (50 mL/Hr) IV Continuous <Continuous>  dextrose 50% Injectable 25 Gram(s) IV Push once  dextrose 50% Injectable 12.5 Gram(s) IV Push once  dextrose 50% Injectable 25 Gram(s) IV Push once  epoetin nyasia-epbx (RETACRIT) Injectable 54923 Unit(s) IV Push <User Schedule>  glucagon  Injectable 1 milliGRAM(s) IntraMuscular once  heparin   Injectable 5000 Unit(s) SubCutaneous every 8 hours  influenza  Vaccine (HIGH DOSE) 0.7 milliLiter(s) IntraMuscular once  insulin lispro (ADMELOG) corrective regimen sliding scale   SubCutaneous at bedtime  insulin lispro (ADMELOG) corrective regimen sliding scale   SubCutaneous three times a day before meals  linezolid  IVPB 600 milliGRAM(s) IV Intermittent every 12 hours  linezolid  IVPB      Nephro-zack 1 Tablet(s) Oral daily  pantoprazole    Tablet 40 milliGRAM(s) Oral before breakfast  piperacillin/tazobactam IVPB.. 3.375 Gram(s) IV Intermittent every 12 hours  polyethylene glycol 3350 17 Gram(s) Oral daily  sevelamer carbonate 2400 milliGRAM(s) Oral three times a day with meals    MEDICATIONS  (PRN):  acetaminophen     Tablet .. 650 milliGRAM(s) Oral every 6 hours PRN Temp greater or equal to 38C (100.4F), Mild Pain (1 - 3)  dextrose Oral Gel 15 Gram(s) Oral once PRN Blood Glucose LESS THAN 70 milliGRAM(s)/deciliter  oxyCODONE    IR 5 milliGRAM(s) Oral every 4 hours PRN Severe Pain (7 - 10)      __________________________________________________  REVIEW OF SYSTEMS:    CONSTITUTIONAL: No fever,   EYES: no acute visual disturbances  NECK: No pain or stiffness  RESPIRATORY: No cough; No shortness of breath  CARDIOVASCULAR: No chest pain, no palpitations  GASTROINTESTINAL: No pain. No nausea or vomiting; No diarrhea   NEUROLOGICAL: No headache or numbness, no tremors  MUSCULOSKELETAL: No joint pain, no muscle pain  GENITOURINARY: no dysuria, no frequency, no hesitancy  PSYCHIATRY: no depression , no anxiety  ALL OTHER  ROS negative        Vital Signs Last 24 Hrs  T(C): 36.6 (15 Nov 2022 12:45), Max: 36.7 (15 Nov 2022 05:30)  T(F): 97.8 (15 Nov 2022 12:45), Max: 98 (15 Nov 2022 05:30)  HR: 88 (15 Nov 2022 12:45) (81 - 90)  BP: 137/70 (15 Nov 2022 12:45) (135/79 - 138/72)  BP(mean): --  RR: 18 (15 Nov 2022 12:45) (17 - 18)  SpO2: 100% (15 Nov 2022 12:45) (96% - 100%)    Parameters below as of 15 Nov 2022 12:45  Patient On (Oxygen Delivery Method): room air        ________________________________________________  PHYSICAL EXAM:  GENERAL: NAD  HEENT: Normocephalic;  conjunctivae and sclerae clear; moist mucous membranes;   NECK : supple  CHEST/LUNG: Clear to ausculitation bilaterally with good air entry   HEART: S1 S2  regular; no murmurs, gallops or rubs  ABDOMEN: Soft, Nontender, Nondistended; Bowel sounds present  EXTREMITIES: Right BKA, dressing C/D/I  SKIN: warm and dry; no rash  NERVOUS SYSTEM:  A&Ox2    _________________________________________________  LABS:                        8.7    7.45  )-----------( 278      ( 15 Nov 2022 08:40 )             28.1     11-15    135  |  99  |  53<H>  ----------------------------<  278<H>  3.9   |  24  |  5.94<H>    Ca    8.7      15 Nov 2022 08:40  Phos  7.4     11-15          CAPILLARY BLOOD GLUCOSE      POCT Blood Glucose.: 338 mg/dL (15 Nov 2022 11:39)  POCT Blood Glucose.: 247 mg/dL (15 Nov 2022 07:57)  POCT Blood Glucose.: 230 mg/dL (14 Nov 2022 21:42)  POCT Blood Glucose.: 261 mg/dL (14 Nov 2022 16:40)        RADIOLOGY & ADDITIONAL TESTS:    Imaging Personally Reviewed:  YES    Consultant(s) Notes Reviewed:   YES    Care Discussed with Consultants :     Plan of care was discussed with patient and /or primary care giver; all questions and concerns were addressed and care was aligned with patient's wishes.

## 2022-11-15 NOTE — PROGRESS NOTE ADULT - SUBJECTIVE AND OBJECTIVE BOX
Patient is a 72y old  Male who presents with a chief complaint of toe gangrene (15 Nov 2022 13:54)      INTERVAL HPI/OVERNIGHT EVENTS: pt seen and examined    MEDICATIONS  (STANDING):  aspirin enteric coated 81 milliGRAM(s) Oral daily  chlorhexidine 2% Cloths 1 Application(s) Topical daily  clopidogrel Tablet 75 milliGRAM(s) Oral daily  dextrose 5%. 1000 milliLiter(s) (100 mL/Hr) IV Continuous <Continuous>  dextrose 5%. 1000 milliLiter(s) (50 mL/Hr) IV Continuous <Continuous>  dextrose 50% Injectable 25 Gram(s) IV Push once  dextrose 50% Injectable 12.5 Gram(s) IV Push once  dextrose 50% Injectable 25 Gram(s) IV Push once  epoetin nyasia-epbx (RETACRIT) Injectable 72129 Unit(s) IV Push <User Schedule>  glucagon  Injectable 1 milliGRAM(s) IntraMuscular once  heparin   Injectable 5000 Unit(s) SubCutaneous every 8 hours  influenza  Vaccine (HIGH DOSE) 0.7 milliLiter(s) IntraMuscular once  insulin lispro (ADMELOG) corrective regimen sliding scale   SubCutaneous at bedtime  insulin lispro (ADMELOG) corrective regimen sliding scale   SubCutaneous three times a day before meals  linezolid  IVPB 600 milliGRAM(s) IV Intermittent every 12 hours  linezolid  IVPB      Nephro-zack 1 Tablet(s) Oral daily  pantoprazole    Tablet 40 milliGRAM(s) Oral before breakfast  piperacillin/tazobactam IVPB.. 3.375 Gram(s) IV Intermittent every 12 hours  polyethylene glycol 3350 17 Gram(s) Oral daily  sevelamer carbonate 2400 milliGRAM(s) Oral three times a day with meals    MEDICATIONS  (PRN):  acetaminophen     Tablet .. 650 milliGRAM(s) Oral every 6 hours PRN Temp greater or equal to 38C (100.4F), Mild Pain (1 - 3)  dextrose Oral Gel 15 Gram(s) Oral once PRN Blood Glucose LESS THAN 70 milliGRAM(s)/deciliter  oxyCODONE    IR 5 milliGRAM(s) Oral every 4 hours PRN Severe Pain (7 - 10)      __________________________________________________  REVIEW OF SYSTEMS:    CONSTITUTIONAL: No fever,   EYES: no acute visual disturbances  NECK: No pain or stiffness  RESPIRATORY: No cough; No shortness of breath  CARDIOVASCULAR: No chest pain, no palpitations  GASTROINTESTINAL: No pain. No nausea or vomiting; No diarrhea   NEUROLOGICAL: No headache or numbness, no tremors  MUSCULOSKELETAL: No joint pain, no muscle pain  GENITOURINARY: no dysuria, no frequency, no hesitancy  PSYCHIATRY: no depression , no anxiety  ALL OTHER  ROS negative        Vital Signs Last 24 Hrs  T(C): 36.6 (15 Nov 2022 12:45), Max: 36.7 (15 Nov 2022 05:30)  T(F): 97.8 (15 Nov 2022 12:45), Max: 98 (15 Nov 2022 05:30)  HR: 88 (15 Nov 2022 12:45) (81 - 90)  BP: 137/70 (15 Nov 2022 12:45) (135/79 - 138/72)  BP(mean): --  RR: 18 (15 Nov 2022 12:45) (17 - 18)  SpO2: 100% (15 Nov 2022 12:45) (96% - 100%)    Parameters below as of 15 Nov 2022 12:45  Patient On (Oxygen Delivery Method): room air        ________________________________________________  PHYSICAL EXAM:  GENERAL: NAD  HEENT: Normocephalic;  conjunctivae and sclerae clear; moist mucous membranes;   NECK : supple  CHEST/LUNG: dec breath sounds at bases  HEART: S1 S2  regular; no murmurs, gallops or rubs  ABDOMEN: Soft, Nontender, Nondistended; Bowel sounds present  EXTREMITIES: Right BKA, dressing C/D/I  SKIN: warm and dry; no rash  NERVOUS SYSTEM:  Alert awake    _________________________________________________  LABS:                        8.7    7.45  )-----------( 278      ( 15 Nov 2022 08:40 )             28.1     11-15    135  |  99  |  53<H>  ----------------------------<  278<H>  3.9   |  24  |  5.94<H>    Ca    8.7      15 Nov 2022 08:40  Phos  7.4     11-15          CAPILLARY BLOOD GLUCOSE      POCT Blood Glucose.: 338 mg/dL (15 Nov 2022 11:39)  POCT Blood Glucose.: 247 mg/dL (15 Nov 2022 07:57)  POCT Blood Glucose.: 230 mg/dL (14 Nov 2022 21:42)  POCT Blood Glucose.: 261 mg/dL (14 Nov 2022 16:40)        RADIOLOGY & ADDITIONAL TESTS:    Imaging Personally Reviewed:  YES    Consultant(s) Notes Reviewed:   YES    Care Discussed with Consultants :     Plan of care was discussed with patient and /or primary care giver; all questions and concerns were addressed and care was aligned with patient's wishes.

## 2022-11-15 NOTE — PROGRESS NOTE ADULT - PROBLEM SELECTOR PLAN 6
from [facility]  PT reccs: ANGELA   Covid conversion on 11/13  will likely return to [facility] once he completes quarantine period

## 2022-11-15 NOTE — PROGRESS NOTE ADULT - ASSESSMENT
Patient is a 72y old  Male who is coming from [facility], with medical history of ESRD (T-TH-S), and DM, presents to the ER  for evaluation of Right foot pain. Patient has severe peripheral arterial disease. He was admitted on September 26, 2022 for gangrene of right foot. At that time, patient was treated with IV abx. MRI was negative for OM. He had Right LE angiogram on 10/3 w/ no targets for re vascularization but showed extensive distal small vessel disease, there was no acute vascular interventions done. Patient was recommended a R BKA but wanted a medical management instead. Patient now coming in c/o R foot pain. He states that he wants surgery now for his foot. On admission, he has no fever , no leukocytosis. He has evaluated by Vascular team and taken to OR for Right BKA. He has started on Vancomycin and Zosyn, and the ID consult requested to assist with further evaluation and antibiotic management.    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  PT/ OOB to chair    2. Wound care as per Vascular Sx  3. Pain management as needed  4. Please discontinue linezolid and Zosyn after Today's doses  5. Isolation as per hospital protocol     d/w Covering Enrique MARCUS    Attending Attestation:    Spent more than 35 minutes on total encounter, more than 50 % of the visit was spent counseling and/or coordinating care by the Attending physician.

## 2022-11-15 NOTE — PROGRESS NOTE ADULT - ASSESSMENT
73 y/o male from [facility], with PMHx of ESRD (T-TH-S), DM, s/p Rt foot angiogram 10/03 confirmed severe PAD and  no targets for re vascularization, vascular followed and recommended a Right BKA at that time but patient refused. Patient presented back to ED on 11/8/22 with c/o R foot pain and admitted to medicine. 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 C/w with IV Linezolid & Zosyn until 11/15. ID Dr. Babcock following. PT recommends ANGELA.   Hospital course complicated by Covid + conversion on 11/13, on RA. Will likely return back to [facility] once quarantine period is completed.

## 2022-11-15 NOTE — PROGRESS NOTE ADULT - SUBJECTIVE AND OBJECTIVE BOX
PATIENT SEEN AND EXAMINED ON :- 11/15/22  DATE OF SERVICE:   11/15/22          Interim events noted,Labs ,Radiological studies and Cardiology tests reviewed        HOSPITAL COURSE: HPI:  72 year old male, coming from [facility], with medical history of ESRD (T-TH-S), and DM coming to ED c/o R foot pain. Patient has severe peripheral arterial disease. He was admitted on September 26 2022 for gangrene of right foot. At that time, patient was treated with IV abx. MRI was negative for OM. He had R LE angiogram on 10/3 w/ no targets for re vascularization but showed extensive distal small vessel disease, there was no acute vascular interventions done. Patient was recommended a R BKA but wanted a medical management instead. Patient now coming in c/o R foot pain. He states that he wants surgery now for his foot. He denies any fevers, chills, headaches, dizziness, chest pain, cough, SOB, abd pain, n/v, diarrhea, constipation, hematuria, dysuria, numbness, and weakness.    in the ed VS: T 98, HR 88, /60, RR 18, Spo2 96%RA  Hb 8.1   s/p 1 dose vanc and zosyn in Ed    (08 Nov 2022 21:31)      INTERIM EVENTS:Patient seen at bedside ,interim events noted.      PMH -reviewed admission note, no change since admission  HEART FAILURE: Acute[ ]Chronic[ ] Systolic[ ] Diastolic[ ] Combined Systolic and Diastolic[ ]  CAD[ ] CABG[ ] PCI[ ]  DEVICES[ ] PPM[ ] ICD[ ] ILR[ ]  ATRIAL FIBRILLATION[ ] Paroxysmal[ ] Permanent[ ] CHADS2-[  ]  CANDE[ ] CKD1[ ] CKD2[ ] CKD3[ ] CKD4[ ] ESRD[ ]  COPD[ ] HTN[ ]   DM[ ] Type1[ ] Type 2[ ]   CVA[ ] Paresis[ ]    AMBULATION: Assisted[ ] Cane/walker[ ] Independent[ ]    MEDICATIONS  (STANDING):  aspirin enteric coated 81 milliGRAM(s) Oral daily  chlorhexidine 2% Cloths 1 Application(s) Topical daily  clopidogrel Tablet 75 milliGRAM(s) Oral daily  dextrose 5%. 1000 milliLiter(s) (50 mL/Hr) IV Continuous <Continuous>  dextrose 5%. 1000 milliLiter(s) (100 mL/Hr) IV Continuous <Continuous>  dextrose 50% Injectable 25 Gram(s) IV Push once  dextrose 50% Injectable 12.5 Gram(s) IV Push once  dextrose 50% Injectable 25 Gram(s) IV Push once  epoetin nyasia-epbx (RETACRIT) Injectable 30261 Unit(s) IV Push <User Schedule>  glucagon  Injectable 1 milliGRAM(s) IntraMuscular once  heparin   Injectable 5000 Unit(s) SubCutaneous every 8 hours  influenza  Vaccine (HIGH DOSE) 0.7 milliLiter(s) IntraMuscular once  insulin lispro (ADMELOG) corrective regimen sliding scale   SubCutaneous three times a day before meals  insulin lispro (ADMELOG) corrective regimen sliding scale   SubCutaneous at bedtime  linezolid  IVPB 600 milliGRAM(s) IV Intermittent every 12 hours  linezolid  IVPB      Nephro-zack 1 Tablet(s) Oral daily  pantoprazole    Tablet 40 milliGRAM(s) Oral before breakfast  piperacillin/tazobactam IVPB.. 3.375 Gram(s) IV Intermittent every 12 hours  polyethylene glycol 3350 17 Gram(s) Oral daily  sevelamer carbonate 2400 milliGRAM(s) Oral three times a day with meals    MEDICATIONS  (PRN):  acetaminophen     Tablet .. 650 milliGRAM(s) Oral every 6 hours PRN Temp greater or equal to 38C (100.4F), Mild Pain (1 - 3)  dextrose Oral Gel 15 Gram(s) Oral once PRN Blood Glucose LESS THAN 70 milliGRAM(s)/deciliter  oxyCODONE    IR 5 milliGRAM(s) Oral every 4 hours PRN Severe Pain (7 - 10)            REVIEW OF SYSTEMS:  Constitutional: [ ] fever, [ ]weight loss,  [ ]fatigue [ ]weight gain  Eyes: [ ] visual changes  Respiratory: [ ]shortness of breath;  [ ] cough, [ ]wheezing, [ ]chills, [ ]hemoptysis  Cardiovascular: [ ] chest pain, [ ]palpitations, [ ]dizziness,  [ ]leg swelling[ ]orthopnea[ ]PND  Gastrointestinal: [ ] abdominal pain, [ ]nausea, [ ]vomiting,  [ ]diarrhea [ ]Constipation [ ]Melena  Genitourinary: [ ] dysuria, [ ] hematuria [ ]Garcia  Neurologic: [ ] headaches [ ] tremors[ ]weakness [ ]Paralysis Right[ ] Left[ ]  Skin: [ ] itching, [ ]burning, [ ] rashes  Endocrine: [ ] heat or cold intolerance  Musculoskeletal: [ ] joint pain or swelling; [ ] muscle, back, or extremity pain  Psychiatric: [ ] depression, [ ]anxiety, [ ]mood swings, or [ ]difficulty sleeping  Hematologic: [ ] easy bruising, [ ] bleeding gums    [ ] All remaining systems negative except as per above.   [ ]Unable to obtain.  [x] No change in ROS since admission      Vital Signs Last 24 Hrs  T(C): 36.8 (15 Nov 2022 21:14), Max: 36.8 (15 Nov 2022 21:14)  T(F): 98.3 (15 Nov 2022 21:14), Max: 98.3 (15 Nov 2022 21:14)  HR: 92 (15 Nov 2022 21:14) (81 - 92)  BP: 132/60 (15 Nov 2022 21:14) (132/60 - 139/73)  BP(mean): --  RR: 17 (15 Nov 2022 21:14) (16 - 18)  SpO2: 97% (15 Nov 2022 21:14) (97% - 100%)    Parameters below as of 15 Nov 2022 21:14  Patient On (Oxygen Delivery Method): room air      I&O's Summary      PHYSICAL EXAM:  General: No acute distress BMI-  HEENT: EOMI, PERRL  Neck: Supple, [ ] JVD  Lungs: Equal air entry bilaterally; [ ] rales [ ] wheezing [ ] rhonchi  Heart: Regular rate and rhythm; [x ] murmur   2/6 [ x] systolic [ ] diastolic [ ] radiation[ ] rubs [ ]  gallops  Abdomen: Nontender, bowel sounds present  Extremities: No clubbing, cyanosis, [ ] edema [ ]Pulses  equal and intact  Nervous system:  Alert & Oriented X3, no focal deficits  Psychiatric: Normal affect  Skin: No rashes or lesions    LABS:  11-15    135  |  99  |  53<H>  ----------------------------<  278<H>  3.9   |  24  |  5.94<H>    Ca    8.7      15 Nov 2022 08:40  Phos  7.4     11-15      Creatinine Trend: 5.94<--, 5.37<--, 4.42<--, 5.91<--, 4.92<--, 3.67<--                        8.7    7.45  )-----------( 278      ( 15 Nov 2022 08:40 )             28.1

## 2022-11-15 NOTE — PROGRESS NOTE ADULT - PROBLEM SELECTOR PLAN 6
from [facility]  PT reccs: ANGELA   Covid conversion on 11/13  will likely return to [facility] once he completes quarantine period

## 2022-11-15 NOTE — PROGRESS NOTE ADULT - PROBLEM SELECTOR PLAN 1
S/P R BKA on 11/9  afebrile  pain well controlled  continue linezolid + zosyn until today 11/15  ID Babcock   Vascular follows

## 2022-11-15 NOTE — PROGRESS NOTE ADULT - ASSESSMENT
73 y/o male from [facility], with PMHx of ESRD (T-TH-S), DM, s/p Rt foot angiogram 10/03 confirmed severe PAD and  no targets for re vascularization, vascular followed and recommended a Right BKA at that time but patient refused. Patient presented back to ED on 11/8/22 with c/o R foot pain and admitted to medicine. 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 C/w with IV Linezolid & Zosyn until 11/15. ID Dr. Babcock following. PT recommends ANGELA.   Hospital course complicated by Covid + conversion on 11/13, on RA. Will likely return back to [facility] once quarantine period is completed.

## 2022-11-16 LAB
ANION GAP SERPL CALC-SCNC: 11 MMOL/L — SIGNIFICANT CHANGE UP (ref 5–17)
BUN SERPL-MCNC: 32 MG/DL — HIGH (ref 7–18)
CALCIUM SERPL-MCNC: 8.5 MG/DL — SIGNIFICANT CHANGE UP (ref 8.4–10.5)
CHLORIDE SERPL-SCNC: 100 MMOL/L — SIGNIFICANT CHANGE UP (ref 96–108)
CO2 SERPL-SCNC: 26 MMOL/L — SIGNIFICANT CHANGE UP (ref 22–31)
CREAT SERPL-MCNC: 4.05 MG/DL — HIGH (ref 0.5–1.3)
EGFR: 15 ML/MIN/1.73M2 — LOW
GLUCOSE BLDC GLUCOMTR-MCNC: 222 MG/DL — HIGH (ref 70–99)
GLUCOSE BLDC GLUCOMTR-MCNC: 249 MG/DL — HIGH (ref 70–99)
GLUCOSE BLDC GLUCOMTR-MCNC: 291 MG/DL — HIGH (ref 70–99)
GLUCOSE BLDC GLUCOMTR-MCNC: 309 MG/DL — HIGH (ref 70–99)
GLUCOSE SERPL-MCNC: 317 MG/DL — HIGH (ref 70–99)
HCT VFR BLD CALC: 25.7 % — LOW (ref 39–50)
HGB BLD-MCNC: 8.1 G/DL — LOW (ref 13–17)
MCHC RBC-ENTMCNC: 30.1 PG — SIGNIFICANT CHANGE UP (ref 27–34)
MCHC RBC-ENTMCNC: 31.5 GM/DL — LOW (ref 32–36)
MCV RBC AUTO: 95.5 FL — SIGNIFICANT CHANGE UP (ref 80–100)
NRBC # BLD: 0 /100 WBCS — SIGNIFICANT CHANGE UP (ref 0–0)
PHOSPHATE SERPL-MCNC: 4.4 MG/DL — SIGNIFICANT CHANGE UP (ref 2.5–4.5)
PLATELET # BLD AUTO: 262 K/UL — SIGNIFICANT CHANGE UP (ref 150–400)
POTASSIUM SERPL-MCNC: 3.7 MMOL/L — SIGNIFICANT CHANGE UP (ref 3.5–5.3)
POTASSIUM SERPL-SCNC: 3.7 MMOL/L — SIGNIFICANT CHANGE UP (ref 3.5–5.3)
RBC # BLD: 2.69 M/UL — LOW (ref 4.2–5.8)
RBC # FLD: 15 % — HIGH (ref 10.3–14.5)
SODIUM SERPL-SCNC: 137 MMOL/L — SIGNIFICANT CHANGE UP (ref 135–145)
WBC # BLD: 7.47 K/UL — SIGNIFICANT CHANGE UP (ref 3.8–10.5)
WBC # FLD AUTO: 7.47 K/UL — SIGNIFICANT CHANGE UP (ref 3.8–10.5)

## 2022-11-16 RX ADMIN — HEPARIN SODIUM 5000 UNIT(S): 5000 INJECTION INTRAVENOUS; SUBCUTANEOUS at 16:38

## 2022-11-16 RX ADMIN — Medication 1 TABLET(S): at 12:26

## 2022-11-16 RX ADMIN — HEPARIN SODIUM 5000 UNIT(S): 5000 INJECTION INTRAVENOUS; SUBCUTANEOUS at 05:24

## 2022-11-16 RX ADMIN — Medication 4: at 08:11

## 2022-11-16 RX ADMIN — Medication 81 MILLIGRAM(S): at 12:26

## 2022-11-16 RX ADMIN — SEVELAMER CARBONATE 2400 MILLIGRAM(S): 2400 POWDER, FOR SUSPENSION ORAL at 16:39

## 2022-11-16 RX ADMIN — PIPERACILLIN AND TAZOBACTAM 25 GRAM(S): 4; .5 INJECTION, POWDER, LYOPHILIZED, FOR SOLUTION INTRAVENOUS at 05:24

## 2022-11-16 RX ADMIN — PANTOPRAZOLE SODIUM 40 MILLIGRAM(S): 20 TABLET, DELAYED RELEASE ORAL at 05:23

## 2022-11-16 RX ADMIN — HEPARIN SODIUM 5000 UNIT(S): 5000 INJECTION INTRAVENOUS; SUBCUTANEOUS at 22:07

## 2022-11-16 RX ADMIN — SEVELAMER CARBONATE 2400 MILLIGRAM(S): 2400 POWDER, FOR SUSPENSION ORAL at 12:27

## 2022-11-16 RX ADMIN — SEVELAMER CARBONATE 2400 MILLIGRAM(S): 2400 POWDER, FOR SUSPENSION ORAL at 08:24

## 2022-11-16 RX ADMIN — Medication 3: at 12:25

## 2022-11-16 RX ADMIN — CLOPIDOGREL BISULFATE 75 MILLIGRAM(S): 75 TABLET, FILM COATED ORAL at 12:26

## 2022-11-16 RX ADMIN — Medication 2: at 16:41

## 2022-11-16 RX ADMIN — CHLORHEXIDINE GLUCONATE 1 APPLICATION(S): 213 SOLUTION TOPICAL at 12:26

## 2022-11-16 NOTE — PROGRESS NOTE ADULT - ASSESSMENT
71 y/o male from [facility], with PMHx of ESRD (T-TH-S), DM, s/p Rt foot angiogram 10/03 confirmed severe PAD and  no targets for re vascularization, vascular followed and recommended a Right BKA at that time but patient refused. Patient presented back to ED on 11/8/22 with c/o R foot pain and admitted to medicine. 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 C/w with IV Linezolid & Zosyn until 11/15. ID Dr. Babcock following. PT recommends ANGELA.   Hospital course complicated by Covid + conversion on 11/13, on RA. Will likely return back to [facility] once quarantine period is completed on 11/23

## 2022-11-16 NOTE — PROGRESS NOTE ADULT - PROBLEM SELECTOR PLAN 6
from [facility]  PT reccs: ANGELA   Covid conversion on 11/13, asymptomatic  will likely return to [facility] once he completes quarantine period on 11/23

## 2022-11-16 NOTE — PROGRESS NOTE ADULT - PROBLEM SELECTOR PLAN 6
from [facility]  PT reccs: ANGELA   Covid conversion on 11/13  will likely return to [facility] once he completes quarantine period

## 2022-11-16 NOTE — PROGRESS NOTE ADULT - PROBLEM SELECTOR PLAN 1
S/P R BKA on 11/9  afebrile  pain well controlled  S/P linezolid + zosyn, completed 11/15  ID Babcock   Vascular follows

## 2022-11-16 NOTE — PROGRESS NOTE ADULT - PROBLEM SELECTOR PLAN 6
from [facility]  PT reccs: ANGELA   Covid conversion on 11/13, asymptomatic  will likely return to [facility] once he completes quarantine period on 11/23

## 2022-11-16 NOTE — PROGRESS NOTE ADULT - ASSESSMENT
73 y/o male from [facility], with PMHx of ESRD (T-TH-S), DM, s/p Rt foot angiogram 10/03 confirmed severe PAD and  no targets for re vascularization, vascular followed and recommended a Right BKA at that time but patient refused. Patient presented back to ED on 11/8/22 with c/o R foot pain and admitted to medicine. 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 C/w with IV Linezolid & Zosyn until 11/15. ID Dr. Babcock following. PT recommends ANGELA.   Hospital course complicated by Covid + conversion on 11/13, on RA. Will likely return back to [facility] once quarantine period is completed on 11/23

## 2022-11-16 NOTE — PROGRESS NOTE ADULT - ASSESSMENT
Patient is a 72y old  Male who is coming from [facility], with medical history of ESRD (T-TH-S), and DM, presents to the ER  for evaluation of Right foot pain. Patient has severe peripheral arterial disease. He was admitted on September 26, 2022 for gangrene of right foot. At that time, patient was treated with IV abx. MRI was negative for OM. He had Right LE angiogram on 10/3 w/ no targets for re vascularization but showed extensive distal small vessel disease, there was no acute vascular interventions done. Patient was recommended a R BKA but wanted a medical management instead. Patient now coming in c/o R foot pain. He states that he wants surgery now for his foot. On admission, he has no fever , no leukocytosis. He has evaluated by Vascular team and taken to OR for Right BKA. He has started on Vancomycin and Zosyn, and the ID consult requested to assist with further evaluation and antibiotic management.    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 Monitor off Abx as he completed the course  2. Wound care as per Vascular Sx  3. Pain management as needed  4. Isolation as per hospital protocol     d/w Covering Enrique MARCUS    Attending Attestation:    Spent more than 35 minutes on total encounter, more than 50 % of the visit was spent counseling and/or coordinating care by the Attending physician.

## 2022-11-16 NOTE — PROGRESS NOTE ADULT - ASSESSMENT
Patient is a 71yo Male with ESRD on HD, DM a/w Rt foot gangrene. Nephrology consulted for ESRD status.   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       1) ESRD:  Last HD 11/15, tolerated well with net 1.5L . Plan for next HD 11/17.  Monitor electrolytes.  Pt with Left UE AVF- no thrill no bruit- f/u Vascular Surgeon Dr. Dutta as an outpt.   2) Anemia of renal disease: hgb low with adequate iron stores. s/p 2 unit prbc on 11/9. c/w Epogen 12k units IV tiw in HD (increased 11/15). Transfuse prbc prn. Monitor Hb.  3) Hyperphosphatemia: serum phosphorus acceptable . c/w low phos diet and Sevelamer 2400mg PO tid with meals. Monitor serum calcium and phosphorus.  4) Rt foot gangrene: s/p Rt BKA 11/9 by Dr. Billy. Finished Linezolid. Vascular/ ID following.     [facility] NEPHROLOGY  Wili Hopkins M.D.  Kiran Feng D.O.  Sujatha Dumont M.D.  Radha Miller, MSN, ANP-C  [phone]    [address] [place] [address]

## 2022-11-16 NOTE — PROGRESS NOTE ADULT - SUBJECTIVE AND OBJECTIVE BOX
Patient is a 72y old  Male who presents with a chief complaint of toe gangrene (15 Nov 2022 13:54)      INTERVAL HPI/OVERNIGHT EVENTS: pt seen and examined    MEDICATIONS  (STANDING):  aspirin enteric coated 81 milliGRAM(s) Oral daily  chlorhexidine 2% Cloths 1 Application(s) Topical daily  clopidogrel Tablet 75 milliGRAM(s) Oral daily  dextrose 5%. 1000 milliLiter(s) (100 mL/Hr) IV Continuous <Continuous>  dextrose 5%. 1000 milliLiter(s) (50 mL/Hr) IV Continuous <Continuous>  dextrose 50% Injectable 25 Gram(s) IV Push once  dextrose 50% Injectable 12.5 Gram(s) IV Push once  dextrose 50% Injectable 25 Gram(s) IV Push once  epoetin nyasia-epbx (RETACRIT) Injectable 24331 Unit(s) IV Push <User Schedule>  glucagon  Injectable 1 milliGRAM(s) IntraMuscular once  heparin   Injectable 5000 Unit(s) SubCutaneous every 8 hours  influenza  Vaccine (HIGH DOSE) 0.7 milliLiter(s) IntraMuscular once  insulin lispro (ADMELOG) corrective regimen sliding scale   SubCutaneous at bedtime  insulin lispro (ADMELOG) corrective regimen sliding scale   SubCutaneous three times a day before meals  linezolid  IVPB 600 milliGRAM(s) IV Intermittent every 12 hours  linezolid  IVPB      Nephro-zack 1 Tablet(s) Oral daily  pantoprazole    Tablet 40 milliGRAM(s) Oral before breakfast  piperacillin/tazobactam IVPB.. 3.375 Gram(s) IV Intermittent every 12 hours  polyethylene glycol 3350 17 Gram(s) Oral daily  sevelamer carbonate 2400 milliGRAM(s) Oral three times a day with meals    MEDICATIONS  (PRN):  acetaminophen     Tablet .. 650 milliGRAM(s) Oral every 6 hours PRN Temp greater or equal to 38C (100.4F), Mild Pain (1 - 3)  dextrose Oral Gel 15 Gram(s) Oral once PRN Blood Glucose LESS THAN 70 milliGRAM(s)/deciliter  oxyCODONE    IR 5 milliGRAM(s) Oral every 4 hours PRN Severe Pain (7 - 10)      __________________________________________________  REVIEW OF SYSTEMS:    CONSTITUTIONAL: No fever,   EYES: no acute visual disturbances  NECK: No pain or stiffness  RESPIRATORY: No cough; No shortness of breath  CARDIOVASCULAR: No chest pain, no palpitations  GASTROINTESTINAL: No pain. No nausea or vomiting; No diarrhea   NEUROLOGICAL: No headache or numbness, no tremors  MUSCULOSKELETAL: No joint pain, no muscle pain  GENITOURINARY: no dysuria, no frequency, no hesitancy  PSYCHIATRY: no depression , no anxiety  ALL OTHER  ROS negative        Vital Signs Last 24 Hrs  T(C): 36.6 (15 Nov 2022 12:45), Max: 36.7 (15 Nov 2022 05:30)  T(F): 97.8 (15 Nov 2022 12:45), Max: 98 (15 Nov 2022 05:30)  HR: 88 (15 Nov 2022 12:45) (81 - 90)  BP: 137/70 (15 Nov 2022 12:45) (135/79 - 138/72)  BP(mean): --  RR: 18 (15 Nov 2022 12:45) (17 - 18)  SpO2: 100% (15 Nov 2022 12:45) (96% - 100%)    Parameters below as of 15 Nov 2022 12:45  Patient On (Oxygen Delivery Method): room air        ________________________________________________  PHYSICAL EXAM:  GENERAL: NAD  HEENT: Normocephalic;  conjunctivae and sclerae clear; moist mucous membranes;   NECK : supple  CHEST/LUNG: dec breath sounds at bases  HEART: S1 S2  regular; no murmurs, gallops or rubs  ABDOMEN: Soft, Nontender, Nondistended; Bowel sounds present  EXTREMITIES: Right BKA, dressing C/D/I  SKIN: warm and dry; no rash  NERVOUS SYSTEM:  A&Ox2    _________________________________________________  LABS:                        8.7    7.45  )-----------( 278      ( 15 Nov 2022 08:40 )             28.1     11-15    135  |  99  |  53<H>  ----------------------------<  278<H>  3.9   |  24  |  5.94<H>    Ca    8.7      15 Nov 2022 08:40  Phos  7.4     11-15          CAPILLARY BLOOD GLUCOSE      POCT Blood Glucose.: 338 mg/dL (15 Nov 2022 11:39)  POCT Blood Glucose.: 247 mg/dL (15 Nov 2022 07:57)  POCT Blood Glucose.: 230 mg/dL (14 Nov 2022 21:42)  POCT Blood Glucose.: 261 mg/dL (14 Nov 2022 16:40)        RADIOLOGY & ADDITIONAL TESTS:    Imaging Personally Reviewed:  YES    Consultant(s) Notes Reviewed:   YES    Care Discussed with Consultants :     Plan of care was discussed with patient and /or primary care giver; all questions and concerns were addressed and care was aligned with patient's wishes.

## 2022-11-16 NOTE — PROGRESS NOTE ADULT - SUBJECTIVE AND OBJECTIVE BOX
PATIENT SEEN AND EXAMINED ON :- 11/16/22  DATE OF SERVICE:       11/16/22      Interim events noted,Labs ,Radiological studies and Cardiology tests reviewed .       HOSPITAL COURSE: HPI:  72 year old male, coming from [facility], with medical history of ESRD (T-TH-S), and DM coming to ED c/o R foot pain. Patient has severe peripheral arterial disease. He was admitted on September 26 2022 for gangrene of right foot. At that time, patient was treated with IV abx. MRI was negative for OM. He had R LE angiogram on 10/3 w/ no targets for re vascularization but showed extensive distal small vessel disease, there was no acute vascular interventions done. Patient was recommended a R BKA but wanted a medical management instead. Patient now coming in c/o R foot pain. He states that he wants surgery now for his foot. He denies any fevers, chills, headaches, dizziness, chest pain, cough, SOB, abd pain, n/v, diarrhea, constipation, hematuria, dysuria, numbness, and weakness.    in the ed VS: T 98, HR 88, /60, RR 18, Spo2 96%RA  Hb 8.1   s/p 1 dose vanc and zosyn in Ed    (08 Nov 2022 21:31)      INTERIM EVENTS:Patient seen at bedside ,interim events noted.      PMH -reviewed admission note, no change since admission  HEART FAILURE: Acute[ ]Chronic[ ] Systolic[ ] Diastolic[ ] Combined Systolic and Diastolic[ ]  CAD[ ] CABG[ ] PCI[ ]  DEVICES[ ] PPM[ ] ICD[ ] ILR[ ]  ATRIAL FIBRILLATION[ ] Paroxysmal[ ] Permanent[ ] CHADS2-[  ]  CANDE[ ] CKD1[ ] CKD2[ ] CKD3[ ] CKD4[ ] ESRD[ ]  COPD[ ] HTN[ ]   DM[ ] Type1[ ] Type 2[ ]   CVA[ ] Paresis[ ]    AMBULATION: Assisted[ ] Cane/walker[ ] Independent[ ]    MEDICATIONS  (STANDING):  aspirin enteric coated 81 milliGRAM(s) Oral daily  chlorhexidine 2% Cloths 1 Application(s) Topical daily  clopidogrel Tablet 75 milliGRAM(s) Oral daily  dextrose 5%. 1000 milliLiter(s) (50 mL/Hr) IV Continuous <Continuous>  dextrose 5%. 1000 milliLiter(s) (100 mL/Hr) IV Continuous <Continuous>  dextrose 50% Injectable 25 Gram(s) IV Push once  dextrose 50% Injectable 12.5 Gram(s) IV Push once  dextrose 50% Injectable 25 Gram(s) IV Push once  epoetin nyasia-epbx (RETACRIT) Injectable 92162 Unit(s) IV Push <User Schedule>  glucagon  Injectable 1 milliGRAM(s) IntraMuscular once  heparin   Injectable 5000 Unit(s) SubCutaneous every 8 hours  influenza  Vaccine (HIGH DOSE) 0.7 milliLiter(s) IntraMuscular once  insulin lispro (ADMELOG) corrective regimen sliding scale   SubCutaneous at bedtime  insulin lispro (ADMELOG) corrective regimen sliding scale   SubCutaneous three times a day before meals  Nephro-zack 1 Tablet(s) Oral daily  pantoprazole    Tablet 40 milliGRAM(s) Oral before breakfast  polyethylene glycol 3350 17 Gram(s) Oral daily  sevelamer carbonate 2400 milliGRAM(s) Oral three times a day with meals    MEDICATIONS  (PRN):  acetaminophen     Tablet .. 650 milliGRAM(s) Oral every 6 hours PRN Temp greater or equal to 38C (100.4F), Mild Pain (1 - 3)  dextrose Oral Gel 15 Gram(s) Oral once PRN Blood Glucose LESS THAN 70 milliGRAM(s)/deciliter  oxyCODONE    IR 5 milliGRAM(s) Oral every 4 hours PRN Severe Pain (7 - 10)            REVIEW OF SYSTEMS:  Constitutional: [ ] fever, [ ]weight loss,  [ ]fatigue [ ]weight gain  Eyes: [ ] visual changes  Respiratory: [ ]shortness of breath;  [ ] cough, [ ]wheezing, [ ]chills, [ ]hemoptysis  Cardiovascular: [ ] chest pain, [ ]palpitations, [ ]dizziness,  [ ]leg swelling[ ]orthopnea[ ]PND  Gastrointestinal: [ ] abdominal pain, [ ]nausea, [ ]vomiting,  [ ]diarrhea [ ]Constipation [ ]Melena  Genitourinary: [ ] dysuria, [ ] hematuria [ ]Garcia  Neurologic: [ ] headaches [ ] tremors[ ]weakness [ ]Paralysis Right[ ] Left[ ]  Skin: [ ] itching, [ ]burning, [ ] rashes  Endocrine: [ ] heat or cold intolerance  Musculoskeletal: [ ] joint pain or swelling; [ ] muscle, back, or extremity pain  Psychiatric: [ ] depression, [ ]anxiety, [ ]mood swings, or [ ]difficulty sleeping  Hematologic: [ ] easy bruising, [ ] bleeding gums    [ ] All remaining systems negative except as per above.   [ ]Unable to obtain.  [x] No change in ROS since admission      Vital Signs Last 24 Hrs  T(C): 37 (16 Nov 2022 13:15), Max: 37.1 (16 Nov 2022 04:55)  T(F): 98.6 (16 Nov 2022 13:15), Max: 98.8 (16 Nov 2022 04:55)  HR: 86 (16 Nov 2022 15:28) (80 - 95)  BP: 127/59 (16 Nov 2022 13:15) (117/69 - 132/60)  BP(mean): --  RR: 18 (16 Nov 2022 13:15) (17 - 18)  SpO2: 98% (16 Nov 2022 15:28) (97% - 100%)    Parameters below as of 16 Nov 2022 15:28  Patient On (Oxygen Delivery Method): room air      I&O's Summary    16 Nov 2022 07:01  -  16 Nov 2022 20:52  --------------------------------------------------------  IN: 720 mL / OUT: 400 mL / NET: 320 mL        PHYSICAL EXAM:  General: No acute distress BMI-  HEENT: EOMI, PERRL  Neck: Supple, [ ] JVD  Lungs: Equal air entry bilaterally; [ ] rales [ ] wheezing [ ] rhonchi  Heart: Regular rate and rhythm; [x ] murmur   2/6 [ x] systolic [ ] diastolic [ ] radiation[ ] rubs [ ]  gallops  Abdomen: Nontender, bowel sounds present  Extremities: No clubbing, cyanosis, [ ] edema [ ]Pulses  equal and intact  Nervous system:  Alert & Oriented X3, no focal deficits  Psychiatric: Normal affect  Skin: No rashes or lesions    LABS:  11-16    137  |  100  |  32<H>  ----------------------------<  317<H>  3.7   |  26  |  4.05<H>    Ca    8.5      16 Nov 2022 05:35  Phos  4.4     11-16      Creatinine Trend: 4.05<--, 5.94<--, 5.37<--, 4.42<--, 5.91<--, 4.92<--                        8.1    7.47  )-----------( 262      ( 16 Nov 2022 05:35 )             25.7

## 2022-11-16 NOTE — PROGRESS NOTE ADULT - SUBJECTIVE AND OBJECTIVE BOX
[facility] NEPHROLOGY- PROGRESS NOTE    Patient is a 73yo Male with ESRD on HD, DM a/w Rt foot gangrene. Nephrology consulted for ESRD status.   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     11/13: COVID +    Hospital Medications: Medications reviewed.  REVIEW OF SYSTEMS:  CONSTITUTIONAL: No fevers or chills  RESPIRATORY: No shortness of breath  CARDIOVASCULAR: No chest pain.  GASTROINTESTINAL: No nausea, vomiting, diarrhea or abdominal pain.   VASCULAR: No left lower extremity edema. +RLE - denies pain    VITALS:  T(F): 98.6 (11-16-22 @ 13:15), Max: 98.8 (11-16-22 @ 04:55)  HR: 86 (11-16-22 @ 15:28)  BP: 127/59 (11-16-22 @ 13:15)  RR: 18 (11-16-22 @ 13:15)  SpO2: 98% (11-16-22 @ 15:28)  Wt(kg): --    PHYSICAL EXAM:  Gen: NAD, calm  HEENT: anicteric  Neck: no JVD  Cards: RRR, +S1/S2, no M/G/R  Resp: CTA B/L  GI: soft, NT/ND, NABS  Extremities: no LLE edema  Derm: s/p Rt BKA wrapped  Access: Rt IJ tunneled HD catheter- benign  Left upper arm AVF- no thrill no bruit      LABS:  11-16    137  |  100  |  32<H>  ----------------------------<  317<H>  3.7   |  26  |  4.05<H>    Ca    8.5      16 Nov 2022 05:35  Phos  4.4     11-16      Creatinine Trend: 4.05 <--, 5.94 <--, 5.37 <--, 4.42 <--, 5.91 <--, 4.92 <--, 3.67 <--                        8.1    7.47  )-----------( 262      ( 16 Nov 2022 05:35 )             25.7     Urine Studies:

## 2022-11-16 NOTE — PROGRESS NOTE ADULT - SUBJECTIVE AND OBJECTIVE BOX
Patient is seen and examined at the bed side, is afebrile. He completed the course of Abx.      REVIEW OF SYSTEMS: All other review systems are negative      ALLERGIES: No Known Allergies      Vital Signs Last 24 Hrs  T(C): 37 (2022 13:15), Max: 37.1 (2022 04:55)  T(F): 98.6 (:15), Max: 98.8 (2022 04:55)  HR: 86 (2022 15:28) (80 - 95)  BP: 127/59 (:15) (117/69 - 139/73)  BP(mean): --  RR: 18 (:15) (16 - 18)  SpO2: 98% (2022 15:) (97% - 100%)    Parameters below as of 2022 15:  Patient On (Oxygen Delivery Method): room air        PHYSICAL EXAM:  General: Not in distress  CVS: s1 and s2 present   RESP: Not using accessory muscles   GI: abdomen non distended   EXT: Positive Right BKA, bandage and immobilizer is in placed   CNS: Awake and Alert      LABS:                        8.1    7.47  )-----------( 262      ( 2022 05:35 )             25.7                10.0   13.82 )-----------( 196      ( 10 Nov 2022 05:35 )             31.3           137  |  100  |  32<H>  ----------------------------<  317<H>  3.7   |  26  |  4.05<H>    Ca    8.5      2022 05:35  Phos  4.4     11-16      11-15    135  |  99  |  53<H>  ----------------------------<  278<H>  3.9   |  24  |  5.94<H>    Ca    8.7      15 Nov 2022 08:40  Phos  7.4     11-15    TPro  6.4  /  Alb  2.8<L>  /  TBili  0.3  /  DBili  x   /  AST  15  /  ALT  20  /  AlkPhos  57  11-0    PT/INR - ( 2022 06:40 )   PT: 13.6 sec;   INR: 1.14 ratio    PTT - ( 2022 06:40 )  PTT:37.0 sec        CAPILLARY BLOOD GLUCOSE  POCT Blood Glucose.: 153 mg/dL (2022 09:54)        Urinalysis Basic - ( 2022 18:00 )Color: Yellow / Appearance: Clear / S.005 / pH: x  Gluc: x / Ketone: Negative  / Bili: Negative / Urobili: Negative   Blood: x / Protein: 30 mg/dL / Nitrite: Negative   Leuk Esterase: Negative / RBC: 0-2 /HPF / WBC 0-2 /HPF   Sq Epi: x / Non Sq Epi: Occasional /HPF / Bacteria: Few /HPF        MEDICATIONS  (STANDING):    aspirin enteric coated 81 milliGRAM(s) Oral daily  chlorhexidine 2% Cloths 1 Application(s) Topical daily  clopidogrel Tablet 75 milliGRAM(s) Oral daily  epoetin nyasia-epbx (RETACRIT) Injectable 87300 Unit(s) IV Push <User Schedule>  glucagon  Injectable 1 milliGRAM(s) IntraMuscular once  heparin   Injectable 5000 Unit(s) SubCutaneous every 8 hours  influenza  Vaccine (HIGH DOSE) 0.7 milliLiter(s) IntraMuscular once  insulin lispro (ADMELOG) corrective regimen sliding scale   SubCutaneous at bedtime  insulin lispro (ADMELOG) corrective regimen sliding scale   SubCutaneous three times a day before meals  Nephro-zack 1 Tablet(s) Oral daily  pantoprazole    Tablet 40 milliGRAM(s) Oral before breakfast  polyethylene glycol 3350 17 Gram(s) Oral daily  sevelamer carbonate 2400 milliGRAM(s) Oral three times a day with meals        RADIOLOGY & ADDITIONAL TESTS:    22: Xray Foot AP + Lateral + Oblique, Right (22 @ 19:08) >  1.  Swelling with soft tissue ulcer posterior to calcaneus.  2.  No plain film evidence of osteomyelitis.  3.  Obtain MRI as warranted clinically.        MICROBIOLOGY DATA:    COVID-19 PCR . (22 @ 06:27)   COVID-19 PCR: Detected:    MRSA/MSSA PCR (22 @ 10:30)   MRSA PCR Result.: NotDetec:    Culture - Blood (22 @ 15:20)   Specimen Source: .Blood Blood-Peripheral   Culture Results: No growth to date.     Culture - Blood (22 @ 15:10)   Specimen Source: .Blood Blood-Peripheral   Culture Results: No growth to date.     Culture - Urine (22 @ 18:00)   Specimen Source: Clean Catch Clean Catch (Midstream)   Culture Results: <10,000 CFU/mL Normal Urogenital Martha     COVID-19 PCR (22 @ 15:10)   COVID-19 PCR: NotDetec:

## 2022-11-16 NOTE — PROGRESS NOTE ADULT - SUBJECTIVE AND OBJECTIVE BOX
NP Note discussed with  primary attending    Patient is a 72y old  Male who presents with a chief complaint of toe gangrene (16 Nov 2022 14:49)      INTERVAL HPI/OVERNIGHT EVENTS: no new complaints    MEDICATIONS  (STANDING):  aspirin enteric coated 81 milliGRAM(s) Oral daily  chlorhexidine 2% Cloths 1 Application(s) Topical daily  clopidogrel Tablet 75 milliGRAM(s) Oral daily  dextrose 5%. 1000 milliLiter(s) (50 mL/Hr) IV Continuous <Continuous>  dextrose 5%. 1000 milliLiter(s) (100 mL/Hr) IV Continuous <Continuous>  dextrose 50% Injectable 25 Gram(s) IV Push once  dextrose 50% Injectable 12.5 Gram(s) IV Push once  dextrose 50% Injectable 25 Gram(s) IV Push once  epoetin nyasia-epbx (RETACRIT) Injectable 52373 Unit(s) IV Push <User Schedule>  glucagon  Injectable 1 milliGRAM(s) IntraMuscular once  heparin   Injectable 5000 Unit(s) SubCutaneous every 8 hours  influenza  Vaccine (HIGH DOSE) 0.7 milliLiter(s) IntraMuscular once  insulin lispro (ADMELOG) corrective regimen sliding scale   SubCutaneous three times a day before meals  insulin lispro (ADMELOG) corrective regimen sliding scale   SubCutaneous at bedtime  Nephro-zack 1 Tablet(s) Oral daily  pantoprazole    Tablet 40 milliGRAM(s) Oral before breakfast  polyethylene glycol 3350 17 Gram(s) Oral daily  sevelamer carbonate 2400 milliGRAM(s) Oral three times a day with meals    MEDICATIONS  (PRN):  acetaminophen     Tablet .. 650 milliGRAM(s) Oral every 6 hours PRN Temp greater or equal to 38C (100.4F), Mild Pain (1 - 3)  dextrose Oral Gel 15 Gram(s) Oral once PRN Blood Glucose LESS THAN 70 milliGRAM(s)/deciliter  oxyCODONE    IR 5 milliGRAM(s) Oral every 4 hours PRN Severe Pain (7 - 10)      __________________________________________________  REVIEW OF SYSTEMS:    CONSTITUTIONAL: No fever,   EYES: no acute visual disturbances  NECK: No pain or stiffness  RESPIRATORY: No cough; No shortness of breath  CARDIOVASCULAR: No chest pain, no palpitations  GASTROINTESTINAL: No pain. No nausea or vomiting; No diarrhea   NEUROLOGICAL: No headache or numbness, no tremors  MUSCULOSKELETAL: No joint pain, no muscle pain  GENITOURINARY: no dysuria, no frequency, no hesitancy  PSYCHIATRY: no depression , no anxiety  ALL OTHER  ROS negative        Vital Signs Last 24 Hrs  T(C): 37 (16 Nov 2022 13:15), Max: 37.1 (16 Nov 2022 04:55)  T(F): 98.6 (16 Nov 2022 13:15), Max: 98.8 (16 Nov 2022 04:55)  HR: 86 (16 Nov 2022 15:28) (80 - 95)  BP: 127/59 (16 Nov 2022 13:15) (117/69 - 139/73)  BP(mean): --  RR: 18 (16 Nov 2022 13:15) (16 - 18)  SpO2: 98% (16 Nov 2022 15:28) (97% - 100%)    Parameters below as of 16 Nov 2022 15:28  Patient On (Oxygen Delivery Method): room air        ________________________________________________  PHYSICAL EXAM:  GENERAL: NAD  HEENT: Normocephalic;  conjunctivae and sclerae clear; moist mucous membranes;   NECK : supple  CHEST/LUNG: Clear to ausculitation bilaterally with good air entry   HEART: S1 S2  regular; no murmurs, gallops or rubs  ABDOMEN: Soft, Nontender, Nondistended; Bowel sounds present  EXTREMITIES: Right BKA  SKIN: warm and dry; no rash  NERVOUS SYSTEM:  A&Ox2    _________________________________________________  LABS:                        8.1    7.47  )-----------( 262      ( 16 Nov 2022 05:35 )             25.7     11-16    137  |  100  |  32<H>  ----------------------------<  317<H>  3.7   |  26  |  4.05<H>    Ca    8.5      16 Nov 2022 05:35  Phos  4.4     11-16          CAPILLARY BLOOD GLUCOSE      POCT Blood Glucose.: 291 mg/dL (16 Nov 2022 11:53)  POCT Blood Glucose.: 309 mg/dL (16 Nov 2022 07:32)  POCT Blood Glucose.: 131 mg/dL (15 Nov 2022 21:06)  POCT Blood Glucose.: 298 mg/dL (15 Nov 2022 16:32)        RADIOLOGY & ADDITIONAL TESTS:    Imaging Personally Reviewed:  YES    Consultant(s) Notes Reviewed:   YES    Care Discussed with Consultants :     Plan of care was discussed with patient and /or primary care giver; all questions and concerns were addressed and care was aligned with patient's wishes.

## 2022-11-17 LAB
GLUCOSE BLDC GLUCOMTR-MCNC: 151 MG/DL — HIGH (ref 70–99)
GLUCOSE BLDC GLUCOMTR-MCNC: 152 MG/DL — HIGH (ref 70–99)
GLUCOSE BLDC GLUCOMTR-MCNC: 162 MG/DL — HIGH (ref 70–99)
GLUCOSE BLDC GLUCOMTR-MCNC: 164 MG/DL — HIGH (ref 70–99)
GLUCOSE BLDC GLUCOMTR-MCNC: 251 MG/DL — HIGH (ref 70–99)
GLUCOSE BLDC GLUCOMTR-MCNC: 279 MG/DL — HIGH (ref 70–99)
GLUCOSE BLDC GLUCOMTR-MCNC: 287 MG/DL — HIGH (ref 70–99)

## 2022-11-17 RX ADMIN — PANTOPRAZOLE SODIUM 40 MILLIGRAM(S): 20 TABLET, DELAYED RELEASE ORAL at 06:13

## 2022-11-17 RX ADMIN — HEPARIN SODIUM 5000 UNIT(S): 5000 INJECTION INTRAVENOUS; SUBCUTANEOUS at 22:28

## 2022-11-17 RX ADMIN — Medication 3: at 16:46

## 2022-11-17 RX ADMIN — HEPARIN SODIUM 5000 UNIT(S): 5000 INJECTION INTRAVENOUS; SUBCUTANEOUS at 06:13

## 2022-11-17 RX ADMIN — Medication 81 MILLIGRAM(S): at 13:50

## 2022-11-17 RX ADMIN — Medication 3: at 07:45

## 2022-11-17 RX ADMIN — CLOPIDOGREL BISULFATE 75 MILLIGRAM(S): 75 TABLET, FILM COATED ORAL at 13:50

## 2022-11-17 RX ADMIN — CHLORHEXIDINE GLUCONATE 1 APPLICATION(S): 213 SOLUTION TOPICAL at 13:53

## 2022-11-17 RX ADMIN — HEPARIN SODIUM 5000 UNIT(S): 5000 INJECTION INTRAVENOUS; SUBCUTANEOUS at 13:50

## 2022-11-17 RX ADMIN — SEVELAMER CARBONATE 2400 MILLIGRAM(S): 2400 POWDER, FOR SUSPENSION ORAL at 07:46

## 2022-11-17 RX ADMIN — SEVELAMER CARBONATE 2400 MILLIGRAM(S): 2400 POWDER, FOR SUSPENSION ORAL at 16:47

## 2022-11-17 RX ADMIN — Medication 1: at 22:01

## 2022-11-17 RX ADMIN — POLYETHYLENE GLYCOL 3350 17 GRAM(S): 17 POWDER, FOR SOLUTION ORAL at 13:49

## 2022-11-17 RX ADMIN — Medication 1 TABLET(S): at 13:49

## 2022-11-17 RX ADMIN — ERYTHROPOIETIN 12000 UNIT(S): 10000 INJECTION, SOLUTION INTRAVENOUS; SUBCUTANEOUS at 11:51

## 2022-11-17 RX ADMIN — SEVELAMER CARBONATE 2400 MILLIGRAM(S): 2400 POWDER, FOR SUSPENSION ORAL at 13:49

## 2022-11-17 NOTE — PROGRESS NOTE ADULT - PROBLEM SELECTOR PLAN 6
from [facility]  PT reccs: ANGELA   Covid conversion on 11/13, asymptomatic  will return to [facility] once he completes quarantine period on 11/23

## 2022-11-17 NOTE — PROGRESS NOTE ADULT - ASSESSMENT
71 y/o male from [facility], with PMHx of ESRD (T-TH-S), DM, s/p Rt foot angiogram 10/03 confirmed severe PAD and  no targets for re vascularization, vascular followed and recommended a Right BKA at that time but patient refused. Patient presented back to ED on 11/8/22 with c/o R foot pain and admitted to medicine. 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 C/w with IV Linezolid & Zosyn until 11/15. ID Dr. Babcock following. PT recommends ANGELA.   Hospital course complicated by Covid + conversion on 11/13, on RA. Will likely return back to [facility] once quarantine period is completed on 11/23

## 2022-11-17 NOTE — PROGRESS NOTE ADULT - PROBLEM SELECTOR PLAN 6
from [facility]  PT reccs: ANGELA   Covid conversion on 11/13, asymptomatic  will return to [facility] once he completes quarantine period on 11/23

## 2022-11-17 NOTE — PROGRESS NOTE ADULT - ASSESSMENT
Patient is a 73yo Male with ESRD on HD, DM a/w Rt foot gangrene. Nephrology consulted for ESRD status.   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       1) ESRD:  Last HD 11/15, tolerated well with net 1.5L . Plan for next HD today; 11/17.  Monitor electrolytes.  Pt with Left UE AVF- no thrill no bruit- f/u Vascular Surgeon Dr. Dutta as an outpt.   2) Anemia of renal disease: hgb low with adequate iron stores. s/p 2 unit prbc on 11/9. c/w Epogen 12k units IV tiw in HD (increased 11/15). Transfuse prbc prn. Monitor Hb.  3) Hyperphosphatemia: serum phosphorus acceptable . c/w low phos diet and Sevelamer 2400mg PO tid with meals. Monitor serum calcium and phosphorus.  4) Rt foot gangrene: s/p Rt BKA 11/9 by Dr. Billy. Finished Linezolid. Vascular/ ID following.     [facility] NEPHROLOGY  Wili Hopkins M.D.  Kiran Feng D.O.  Sujatha Dumont M.D.  Radha Miller, MSN, ANP-C  [phone]    [id number] [place] [address]

## 2022-11-17 NOTE — PROGRESS NOTE ADULT - SUBJECTIVE AND OBJECTIVE BOX
Patient is a 72y old  Male who presents with a chief complaint of toe gangrene (17 Nov 2022 15:49)      INTERVAL HPI/OVERNIGHT EVENTS: pt seen and examined    MEDICATIONS  (STANDING):  aspirin enteric coated 81 milliGRAM(s) Oral daily  chlorhexidine 2% Cloths 1 Application(s) Topical daily  clopidogrel Tablet 75 milliGRAM(s) Oral daily  dextrose 5%. 1000 milliLiter(s) (50 mL/Hr) IV Continuous <Continuous>  dextrose 5%. 1000 milliLiter(s) (100 mL/Hr) IV Continuous <Continuous>  dextrose 50% Injectable 25 Gram(s) IV Push once  dextrose 50% Injectable 12.5 Gram(s) IV Push once  dextrose 50% Injectable 25 Gram(s) IV Push once  epoetin nyasia-epbx (RETACRIT) Injectable 84398 Unit(s) IV Push <User Schedule>  glucagon  Injectable 1 milliGRAM(s) IntraMuscular once  heparin   Injectable 5000 Unit(s) SubCutaneous every 8 hours  influenza  Vaccine (HIGH DOSE) 0.7 milliLiter(s) IntraMuscular once  insulin lispro (ADMELOG) corrective regimen sliding scale   SubCutaneous three times a day before meals  insulin lispro (ADMELOG) corrective regimen sliding scale   SubCutaneous at bedtime  Nephro-zack 1 Tablet(s) Oral daily  pantoprazole    Tablet 40 milliGRAM(s) Oral before breakfast  polyethylene glycol 3350 17 Gram(s) Oral daily  sevelamer carbonate 2400 milliGRAM(s) Oral three times a day with meals    MEDICATIONS  (PRN):  acetaminophen     Tablet .. 650 milliGRAM(s) Oral every 6 hours PRN Temp greater or equal to 38C (100.4F), Mild Pain (1 - 3)  dextrose Oral Gel 15 Gram(s) Oral once PRN Blood Glucose LESS THAN 70 milliGRAM(s)/deciliter      __________________________________________________  REVIEW OF SYSTEMS: Unable to elicit due to declined cognition      Vital Signs Last 24 Hrs  T(C): 36.8 (17 Nov 2022 13:18), Max: 37.1 (16 Nov 2022 21:52)  T(F): 98.2 (17 Nov 2022 13:18), Max: 98.8 (16 Nov 2022 21:52)  HR: 93 (17 Nov 2022 13:18) (75 - 93)  BP: 136/64 (17 Nov 2022 13:18) (130/69 - 139/65)  BP(mean): --  RR: 18 (17 Nov 2022 13:18) (17 - 18)  SpO2: 100% (17 Nov 2022 13:18) (98% - 100%)    Parameters below as of 17 Nov 2022 13:18  Patient On (Oxygen Delivery Method): room air        ________________________________________________  PHYSICAL EXAM:  GENERAL: NAD  HEENT: Normocephalic;  conjunctivae and sclerae clear; moist mucous membranes;   NECK : supple  CHEST/LUNG: dec breath sounds at bases  HEART: S1 S2  regular; no murmurs, gallops or rubs  ABDOMEN: Soft, Nontender, Nondistended; Bowel sounds present  EXTREMITIES: + right foot wound, dressing C/D/I   SKIN: warm and dry; no rash  NERVOUS SYSTEM:  Awake and alert;  _________________________________________________  LABS:                        8.1    7.47  )-----------( 262      ( 16 Nov 2022 05:35 )             25.7     11-16    137  |  100  |  32<H>  ----------------------------<  317<H>  3.7   |  26  |  4.05<H>    Ca    8.5      16 Nov 2022 05:35  Phos  4.4     11-16          CAPILLARY BLOOD GLUCOSE      POCT Blood Glucose.: 279 mg/dL (17 Nov 2022 16:27)  POCT Blood Glucose.: 164 mg/dL (17 Nov 2022 12:47)  POCT Blood Glucose.: 152 mg/dL (17 Nov 2022 11:34)  POCT Blood Glucose.: 151 mg/dL (17 Nov 2022 11:31)  POCT Blood Glucose.: 162 mg/dL (17 Nov 2022 11:14)  POCT Blood Glucose.: 251 mg/dL (17 Nov 2022 07:39)  POCT Blood Glucose.: 249 mg/dL (16 Nov 2022 21:16)        RADIOLOGY & ADDITIONAL TESTS:    Imaging Personally Reviewed:  YES    Consultant(s) Notes Reviewed:   YES/    Care Discussed with Consultants :     Plan of care was discussed with patient and /or primary care giver; all questions and concerns were addressed and care was aligned with patient's wishes.

## 2022-11-17 NOTE — PROGRESS NOTE ADULT - ASSESSMENT
71 y/o male from [facility], with PMHx of ESRD (T-TH-S), DM, s/p Rt foot angiogram 10/03 confirmed severe PAD and  no targets for re vascularization, vascular followed and recommended a Right BKA at that time but patient refused. Patient presented back to ED on 11/8/22 with c/o R foot pain and admitted to medicine. 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 C/w with IV Linezolid & Zosyn until 11/15. ID Dr. Babcock following. PT recommends ANGELA.   Hospital course complicated by Covid + conversion on 11/13, on RA. Will likely return back to [facility] once quarantine period is completed on 11/23

## 2022-11-17 NOTE — PROGRESS NOTE ADULT - SUBJECTIVE AND OBJECTIVE BOX
PATIENT SEEN AND EXAMINED ON :- 11/17/22  DATE OF SERVICE:    11/17/22         Interim events noted,Labs ,Radiological studies and Cardiology tests reviewed .       HOSPITAL COURSE: HPI:  72 year old male, coming from [facility], with medical history of ESRD (T-TH-S), and DM coming to ED c/o R foot pain. Patient has severe peripheral arterial disease. He was admitted on September 26 2022 for gangrene of right foot. At that time, patient was treated with IV abx. MRI was negative for OM. He had R LE angiogram on 10/3 w/ no targets for re vascularization but showed extensive distal small vessel disease, there was no acute vascular interventions done. Patient was recommended a R BKA but wanted a medical management instead. Patient now coming in c/o R foot pain. He states that he wants surgery now for his foot. He denies any fevers, chills, headaches, dizziness, chest pain, cough, SOB, abd pain, n/v, diarrhea, constipation, hematuria, dysuria, numbness, and weakness.    in the ed VS: T 98, HR 88, /60, RR 18, Spo2 96%RA  Hb 8.1   s/p 1 dose vanc and zosyn in Ed    (08 Nov 2022 21:31)      INTERIM EVENTS:Patient seen at bedside ,interim events noted.      PMH -reviewed admission note, no change since admission  HEART FAILURE: Acute[ ]Chronic[ ] Systolic[ ] Diastolic[ ] Combined Systolic and Diastolic[ ]  CAD[ ] CABG[ ] PCI[ ]  DEVICES[ ] PPM[ ] ICD[ ] ILR[ ]  ATRIAL FIBRILLATION[ ] Paroxysmal[ ] Permanent[ ] CHADS2-[  ]  CANDE[ ] CKD1[ ] CKD2[ ] CKD3[ ] CKD4[ ] ESRD[ ]  COPD[ ] HTN[ ]   DM[ ] Type1[ ] Type 2[ ]   CVA[ ] Paresis[ ]    AMBULATION: Assisted[ ] Cane/walker[ ] Independent[ ]    MEDICATIONS  (STANDING):  aspirin enteric coated 81 milliGRAM(s) Oral daily  chlorhexidine 2% Cloths 1 Application(s) Topical daily  clopidogrel Tablet 75 milliGRAM(s) Oral daily  dextrose 5%. 1000 milliLiter(s) (100 mL/Hr) IV Continuous <Continuous>  dextrose 5%. 1000 milliLiter(s) (50 mL/Hr) IV Continuous <Continuous>  dextrose 50% Injectable 25 Gram(s) IV Push once  dextrose 50% Injectable 25 Gram(s) IV Push once  dextrose 50% Injectable 12.5 Gram(s) IV Push once  epoetin nyasia-epbx (RETACRIT) Injectable 91822 Unit(s) IV Push <User Schedule>  glucagon  Injectable 1 milliGRAM(s) IntraMuscular once  heparin   Injectable 5000 Unit(s) SubCutaneous every 8 hours  influenza  Vaccine (HIGH DOSE) 0.7 milliLiter(s) IntraMuscular once  insulin lispro (ADMELOG) corrective regimen sliding scale   SubCutaneous at bedtime  insulin lispro (ADMELOG) corrective regimen sliding scale   SubCutaneous three times a day before meals  Nephro-zack 1 Tablet(s) Oral daily  pantoprazole    Tablet 40 milliGRAM(s) Oral before breakfast  polyethylene glycol 3350 17 Gram(s) Oral daily  sevelamer carbonate 2400 milliGRAM(s) Oral three times a day with meals    MEDICATIONS  (PRN):  acetaminophen     Tablet .. 650 milliGRAM(s) Oral every 6 hours PRN Temp greater or equal to 38C (100.4F), Mild Pain (1 - 3)  dextrose Oral Gel 15 Gram(s) Oral once PRN Blood Glucose LESS THAN 70 milliGRAM(s)/deciliter            REVIEW OF SYSTEMS:  Constitutional: [ ] fever, [ ]weight loss,  [ ]fatigue [ ]weight gain  Eyes: [ ] visual changes  Respiratory: [ ]shortness of breath;  [ ] cough, [ ]wheezing, [ ]chills, [ ]hemoptysis  Cardiovascular: [ ] chest pain, [ ]palpitations, [ ]dizziness,  [ ]leg swelling[ ]orthopnea[ ]PND  Gastrointestinal: [ ] abdominal pain, [ ]nausea, [ ]vomiting,  [ ]diarrhea [ ]Constipation [ ]Melena  Genitourinary: [ ] dysuria, [ ] hematuria [ ]Garcia  Neurologic: [ ] headaches [ ] tremors[ ]weakness [ ]Paralysis Right[ ] Left[ ]  Skin: [ ] itching, [ ]burning, [ ] rashes  Endocrine: [ ] heat or cold intolerance  Musculoskeletal: [ ] joint pain or swelling; [ ] muscle, back, or extremity pain  Psychiatric: [ ] depression, [ ]anxiety, [ ]mood swings, or [ ]difficulty sleeping  Hematologic: [ ] easy bruising, [ ] bleeding gums    [ ] All remaining systems negative except as per above.   [ ]Unable to obtain.  [x] No change in ROS since admission      Vital Signs Last 24 Hrs  T(C): 36.8 (17 Nov 2022 13:18), Max: 37.1 (16 Nov 2022 21:52)  T(F): 98.2 (17 Nov 2022 13:18), Max: 98.8 (16 Nov 2022 21:52)  HR: 93 (17 Nov 2022 13:18) (75 - 93)  BP: 136/64 (17 Nov 2022 13:18) (130/69 - 139/65)  BP(mean): --  RR: 18 (17 Nov 2022 13:18) (17 - 18)  SpO2: 100% (17 Nov 2022 13:18) (98% - 100%)    Parameters below as of 17 Nov 2022 13:18  Patient On (Oxygen Delivery Method): room air      I&O's Summary    16 Nov 2022 07:01  -  17 Nov 2022 07:00  --------------------------------------------------------  IN: 720 mL / OUT: 400 mL / NET: 320 mL    17 Nov 2022 07:01  -  17 Nov 2022 21:24  --------------------------------------------------------  IN: 0 mL / OUT: 1800 mL / NET: -1800 mL        PHYSICAL EXAM:  General: No acute distress BMI-  HEENT: EOMI, PERRL  Neck: Supple, [ ] JVD  Lungs: Equal air entry bilaterally; [ ] rales [ ] wheezing [ ] rhonchi  Heart: Regular rate and rhythm; [x ] murmur   2/6 [ x] systolic [ ] diastolic [ ] radiation[ ] rubs [ ]  gallops  Abdomen: Nontender, bowel sounds present  Extremities: No clubbing, cyanosis, [ ] edema [ ]Pulses  equal and intact  Nervous system:  Alert & Oriented X3, no focal deficits  Psychiatric: Normal affect  Skin: No rashes or lesions    LABS:  11-16    137  |  100  |  32<H>  ----------------------------<  317<H>  3.7   |  26  |  4.05<H>    Ca    8.5      16 Nov 2022 05:35  Phos  4.4     11-16      Creatinine Trend: 4.05<--, 5.94<--, 5.37<--, 4.42<--, 5.91<--, 4.92<--                        8.1    7.47  )-----------( 262      ( 16 Nov 2022 05:35 )             25.7

## 2022-11-17 NOTE — PROGRESS NOTE ADULT - SUBJECTIVE AND OBJECTIVE BOX
NP Note discussed with  primary attending    Patient is a 72y old  Male who presents with a chief complaint of toe gangrene (17 Nov 2022 15:49)      INTERVAL HPI/OVERNIGHT EVENTS: no new complaints    MEDICATIONS  (STANDING):  aspirin enteric coated 81 milliGRAM(s) Oral daily  chlorhexidine 2% Cloths 1 Application(s) Topical daily  clopidogrel Tablet 75 milliGRAM(s) Oral daily  dextrose 5%. 1000 milliLiter(s) (50 mL/Hr) IV Continuous <Continuous>  dextrose 5%. 1000 milliLiter(s) (100 mL/Hr) IV Continuous <Continuous>  dextrose 50% Injectable 25 Gram(s) IV Push once  dextrose 50% Injectable 12.5 Gram(s) IV Push once  dextrose 50% Injectable 25 Gram(s) IV Push once  epoetin nyasia-epbx (RETACRIT) Injectable 88929 Unit(s) IV Push <User Schedule>  glucagon  Injectable 1 milliGRAM(s) IntraMuscular once  heparin   Injectable 5000 Unit(s) SubCutaneous every 8 hours  influenza  Vaccine (HIGH DOSE) 0.7 milliLiter(s) IntraMuscular once  insulin lispro (ADMELOG) corrective regimen sliding scale   SubCutaneous three times a day before meals  insulin lispro (ADMELOG) corrective regimen sliding scale   SubCutaneous at bedtime  Nephro-zack 1 Tablet(s) Oral daily  pantoprazole    Tablet 40 milliGRAM(s) Oral before breakfast  polyethylene glycol 3350 17 Gram(s) Oral daily  sevelamer carbonate 2400 milliGRAM(s) Oral three times a day with meals    MEDICATIONS  (PRN):  acetaminophen     Tablet .. 650 milliGRAM(s) Oral every 6 hours PRN Temp greater or equal to 38C (100.4F), Mild Pain (1 - 3)  dextrose Oral Gel 15 Gram(s) Oral once PRN Blood Glucose LESS THAN 70 milliGRAM(s)/deciliter      __________________________________________________  REVIEW OF SYSTEMS: Unable to elicit due to declined cognition      Vital Signs Last 24 Hrs  T(C): 36.8 (17 Nov 2022 13:18), Max: 37.1 (16 Nov 2022 21:52)  T(F): 98.2 (17 Nov 2022 13:18), Max: 98.8 (16 Nov 2022 21:52)  HR: 93 (17 Nov 2022 13:18) (75 - 93)  BP: 136/64 (17 Nov 2022 13:18) (130/69 - 139/65)  BP(mean): --  RR: 18 (17 Nov 2022 13:18) (17 - 18)  SpO2: 100% (17 Nov 2022 13:18) (98% - 100%)    Parameters below as of 17 Nov 2022 13:18  Patient On (Oxygen Delivery Method): room air        ________________________________________________  PHYSICAL EXAM:  GENERAL: NAD  HEENT: Normocephalic;  conjunctivae and sclerae clear; moist mucous membranes;   NECK : supple  CHEST/LUNG: Clear to auscultation bilaterally with good air entry   HEART: S1 S2  regular; no murmurs, gallops or rubs  ABDOMEN: Soft, Nontender, Nondistended; Bowel sounds present  EXTREMITIES: + right foot wound, dressing C/D/I   SKIN: warm and dry; no rash  NERVOUS SYSTEM:  Awake and alert; Oriented  to place, person and time ; no new deficits    _________________________________________________  LABS:                        8.1    7.47  )-----------( 262      ( 16 Nov 2022 05:35 )             25.7     11-16    137  |  100  |  32<H>  ----------------------------<  317<H>  3.7   |  26  |  4.05<H>    Ca    8.5      16 Nov 2022 05:35  Phos  4.4     11-16          CAPILLARY BLOOD GLUCOSE      POCT Blood Glucose.: 279 mg/dL (17 Nov 2022 16:27)  POCT Blood Glucose.: 164 mg/dL (17 Nov 2022 12:47)  POCT Blood Glucose.: 152 mg/dL (17 Nov 2022 11:34)  POCT Blood Glucose.: 151 mg/dL (17 Nov 2022 11:31)  POCT Blood Glucose.: 162 mg/dL (17 Nov 2022 11:14)  POCT Blood Glucose.: 251 mg/dL (17 Nov 2022 07:39)  POCT Blood Glucose.: 249 mg/dL (16 Nov 2022 21:16)        RADIOLOGY & ADDITIONAL TESTS:    Imaging Personally Reviewed:  YES    Consultant(s) Notes Reviewed:   YES/    Care Discussed with Consultants :     Plan of care was discussed with patient and /or primary care giver; all questions and concerns were addressed and care was aligned with patient's wishes.

## 2022-11-17 NOTE — PROGRESS NOTE ADULT - SUBJECTIVE AND OBJECTIVE BOX
[facility] NEPHROLOGY- PROGRESS NOTE    Patient is a 73yo Male with ESRD on HD, DM a/w Rt foot gangrene. Nephrology consulted for ESRD status.   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     11/13: COVID +    Hospital Medications: Medications reviewed.  REVIEW OF SYSTEMS:  CONSTITUTIONAL: No fevers or chills  RESPIRATORY: No shortness of breath  CARDIOVASCULAR: No chest pain.  GASTROINTESTINAL: No nausea, vomiting, diarrhea or abdominal pain.   VASCULAR: No left lower extremity edema. +RLE - denies pain    VITALS:  T(F): 98.2 (11-17-22 @ 13:18), Max: 98.8 (11-16-22 @ 21:52)  HR: 93 (11-17-22 @ 13:18)  BP: 136/64 (11-17-22 @ 13:18)  RR: 18 (11-17-22 @ 13:18)  SpO2: 100% (11-17-22 @ 13:18)  Wt(kg): --    11-16 @ 07:01  -  11-17 @ 07:00  --------------------------------------------------------  IN: 720 mL / OUT: 400 mL / NET: 320 mL    11-17 @ 07:01  -  11-17 @ 15:49  --------------------------------------------------------  IN: 0 mL / OUT: 1500 mL / NET: -1500 mL        PHYSICAL EXAM:  Gen: NAD, calm  HEENT: anicteric  Neck: no JVD  Cards: RRR, +S1/S2, no M/G/R  Resp: CTA B/L  GI: soft, NT/ND, NABS  Extremities: no LLE edema  Derm: s/p Rt BKA wrapped  Access: Rt IJ tunneled HD catheter- benign  Left upper arm AVF- no thrill no bruit      LABS:  11-16    137  |  100  |  32<H>  ----------------------------<  317<H>  3.7   |  26  |  4.05<H>    Ca    8.5      16 Nov 2022 05:35  Phos  4.4     11-16      Creatinine Trend: 4.05 <--, 5.94 <--, 5.37 <--, 4.42 <--, 5.91 <--, 4.92 <--                        8.1    7.47  )-----------( 262      ( 16 Nov 2022 05:35 )             25.7     Urine Studies:

## 2022-11-17 NOTE — PROGRESS NOTE ADULT - ASSESSMENT
71 y/o male from [facility], with PMHx of ESRD (T-TH-S), DM, s/p Rt foot angiogram 10/03 confirmed severe PAD and  no targets for re vascularization, vascular followed and recommended a Right BKA at that time but patient refused. Patient presented back to ED on 11/8/22 with c/o R foot pain and admitted to medicine. 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 C/w with IV Linezolid & Zosyn until 11/15. ID Dr. Babcock following. PT recommends ANGELA.   Hospital course complicated by Covid + conversion on 11/13, on RA. Will likely return back to [facility] once quarantine period is completed on 11/23

## 2022-11-17 NOTE — PROGRESS NOTE ADULT - PROBLEM SELECTOR PLAN 6
from [facility]  PT reccs: ANGELA   Covid conversion on 11/13, asymptomatic  will return to [facility] once he completes quarantine period on 11/23

## 2022-11-17 NOTE — PROGRESS NOTE ADULT - PROBLEM SELECTOR PLAN 1
CC: Well woman exam    Patricia Chau is a 29 y.o. female  presents for well woman exam.  LMP: No LMP recorded. (Menstrual status: Birth Control)..  No issues, problems, or complaints.  Would like STD testinf as recently found out her boyfriend was cheating.  Reports some fatigue.  Is due for pap this year.    Past Medical History:   Diagnosis Date    Abnormal Pap smear     [id number]    Abnormal Pap smear of cervix     ADD (attention deficit disorder)      Past Surgical History:   Procedure Laterality Date    APPENDECTOMY       Social History     Socioeconomic History    Marital status: Single     Spouse name: Not on file    Number of children: Not on file    Years of education: Not on file    Highest education level: Not on file   Occupational History    Not on file   Social Needs    Financial resource strain: Not on file    Food insecurity:     Worry: Not on file     Inability: Not on file    Transportation needs:     Medical: Not on file     Non-medical: Not on file   Tobacco Use    Smoking status: Never Smoker    Smokeless tobacco: Never Used   Substance and Sexual Activity    Alcohol use: Yes     Comment: socially    Drug use: No     Comment: occasional    Sexual activity: Yes     Partners: Male     Birth control/protection: IUD, Condom     Comment: Mirena IUD   Lifestyle    Physical activity:     Days per week: Not on file     Minutes per session: Not on file    Stress: Not on file   Relationships    Social connections:     Talks on phone: Not on file     Gets together: Not on file     Attends Gnosticism service: Not on file     Active member of club or organization: Not on file     Attends meetings of clubs or organizations: Not on file     Relationship status: Not on file   Other Topics Concern    Not on file   Social History Narrative    Not on file     Family History   Problem Relation Age of Onset    Breast cancer Maternal Grandmother     Cancer Mother     Ovarian cancer  "Mother     Heart disease Father     Hypertension Father     Colon cancer Neg Hx      OB History        1    Para        Term                AB   1    Living           SAB        TAB   1    Ectopic        Multiple        Live Births                     /80   Ht 5' 5" (1.651 m)   Wt 63.3 kg (139 lb 8.8 oz)   BMI 23.22 kg/m²       ROS:  GENERAL: Denies weight gain or weight loss. Feeling well overall.   SKIN: Denies rash or lesions.   HEAD: Denies head injury or headache.   NODES: Denies enlarged lymph nodes.   CHEST: Denies chest pain or shortness of breath.   CARDIOVASCULAR: Denies palpitations or left sided chest pain.   ABDOMEN: No abdominal pain, constipation, diarrhea, nausea, vomiting or rectal bleeding.   URINARY: No frequency, dysuria, hematuria, or burning on urination.  REPRODUCTIVE: See HPI.   BREASTS: The patient performs breast self-examination and denies pain, lumps, or nipple discharge.   HEMATOLOGIC: No easy bruisability or excessive bleeding.   MUSCULOSKELETAL: Denies joint pain or swelling.   NEUROLOGIC: Denies syncope or weakness.   PSYCHIATRIC: Denies depression, anxiety or mood swings.    PHYSICAL EXAM:  APPEARANCE: Well nourished, well developed, in no acute distress.  AFFECT: WNL, alert and oriented x 3  SKIN: No acne or hirsutism  NECK: Neck symmetric without masses or thyromegaly  NODES: No inguinal, cervical, axillary, or femoral lymph node enlargement  CHEST: Good respiratory effect  ABDOMEN: Soft.  No tenderness or masses.  No hepatosplenomegaly.  No hernias.  BREASTS: Symmetrical, no skin changes or visible lesions.  No palpable masses, nipple discharge bilaterally.  PELVIC: Normal external genitalia without lesions.  Normal hair distribution.  Adequate perineal body, normal urethral meatus.  Vagina moist and well rugated without lesions or discharge.  Cervix pink, without lesions, discharge or tenderness.  No significant cystocele or rectocele.  Bimanual exam " shows uterus to be normal size, regular, mobile and nontender.  Adnexa without masses or tenderness.    EXTREMITIES: No edema.    Well woman exam with routine gynecological exam  -     Liquid-based pap smear, screening  -     HPV High Risk Genotypes, PCR  -     TSH; Future; Expected date: 02/19/2019  -     T4, free; Future; Expected date: 02/19/2019  -     CBC auto differential; Future; Expected date: 02/19/2019    Screen for STD (sexually transmitted disease)  -     C. trachomatis/N. gonorrhoeae by AMP DNA            Patient was counseled today on A.C.S. Pap guidelines and recommendations for yearly pelvic exams, mammograms and monthly self breast exams; to see her PCP for other health maintenance.     No follow-ups on file.                   S/P R BKA on 11/9  afebrile  pain well controlled  S/P linezolid + zosyn, completed 11/15  ID Babcock   Vascular follows

## 2022-11-18 LAB
GLUCOSE BLDC GLUCOMTR-MCNC: 180 MG/DL — HIGH (ref 70–99)
GLUCOSE BLDC GLUCOMTR-MCNC: 213 MG/DL — HIGH (ref 70–99)
GLUCOSE BLDC GLUCOMTR-MCNC: 274 MG/DL — HIGH (ref 70–99)
GLUCOSE BLDC GLUCOMTR-MCNC: 296 MG/DL — HIGH (ref 70–99)

## 2022-11-18 RX ADMIN — SEVELAMER CARBONATE 2400 MILLIGRAM(S): 2400 POWDER, FOR SUSPENSION ORAL at 11:50

## 2022-11-18 RX ADMIN — SEVELAMER CARBONATE 2400 MILLIGRAM(S): 2400 POWDER, FOR SUSPENSION ORAL at 17:34

## 2022-11-18 RX ADMIN — HEPARIN SODIUM 5000 UNIT(S): 5000 INJECTION INTRAVENOUS; SUBCUTANEOUS at 06:09

## 2022-11-18 RX ADMIN — CLOPIDOGREL BISULFATE 75 MILLIGRAM(S): 75 TABLET, FILM COATED ORAL at 11:51

## 2022-11-18 RX ADMIN — HEPARIN SODIUM 5000 UNIT(S): 5000 INJECTION INTRAVENOUS; SUBCUTANEOUS at 15:16

## 2022-11-18 RX ADMIN — Medication 3: at 11:50

## 2022-11-18 RX ADMIN — Medication 1: at 17:34

## 2022-11-18 RX ADMIN — Medication 81 MILLIGRAM(S): at 11:51

## 2022-11-18 RX ADMIN — POLYETHYLENE GLYCOL 3350 17 GRAM(S): 17 POWDER, FOR SOLUTION ORAL at 11:51

## 2022-11-18 RX ADMIN — CHLORHEXIDINE GLUCONATE 1 APPLICATION(S): 213 SOLUTION TOPICAL at 11:52

## 2022-11-18 RX ADMIN — Medication 3: at 08:30

## 2022-11-18 RX ADMIN — HEPARIN SODIUM 5000 UNIT(S): 5000 INJECTION INTRAVENOUS; SUBCUTANEOUS at 21:57

## 2022-11-18 RX ADMIN — SEVELAMER CARBONATE 2400 MILLIGRAM(S): 2400 POWDER, FOR SUSPENSION ORAL at 08:30

## 2022-11-18 RX ADMIN — Medication 1 TABLET(S): at 11:51

## 2022-11-18 RX ADMIN — PANTOPRAZOLE SODIUM 40 MILLIGRAM(S): 20 TABLET, DELAYED RELEASE ORAL at 06:10

## 2022-11-18 NOTE — PROGRESS NOTE ADULT - ASSESSMENT
71 y/o male from [facility], with PMHx of ESRD (T-TH-S), DM, s/p Rt foot angiogram 10/03 confirmed severe PAD and  no targets for re vascularization, vascular followed and recommended a Right BKA at that time but patient refused. Patient presented back to ED on 11/8/22 with c/o R foot pain and admitted to medicine. 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 Complete abx course IV Linezolid & Zosyn on 11/15. ID Dr. Babcock followed. PT recommends ANGELA.   Hospital course complicated by Covid + conversion on 11/13, on RA. Will likely return back to [facility] once quarantine period is completed on 11/23

## 2022-11-18 NOTE — PROGRESS NOTE ADULT - SUBJECTIVE AND OBJECTIVE BOX
NP Note discussed with  Primary Attending    Patient is a 72y old  Male who presents with a chief complaint of toe gangrene (18 Nov 2022 13:39)      INTERVAL HPI/OVERNIGHT EVENTS: no acute overnight events    MEDICATIONS  (STANDING):  aspirin enteric coated 81 milliGRAM(s) Oral daily  chlorhexidine 2% Cloths 1 Application(s) Topical daily  clopidogrel Tablet 75 milliGRAM(s) Oral daily  dextrose 5%. 1000 milliLiter(s) (50 mL/Hr) IV Continuous <Continuous>  dextrose 5%. 1000 milliLiter(s) (100 mL/Hr) IV Continuous <Continuous>  dextrose 50% Injectable 25 Gram(s) IV Push once  dextrose 50% Injectable 12.5 Gram(s) IV Push once  dextrose 50% Injectable 25 Gram(s) IV Push once  epoetin nyasia-epbx (RETACRIT) Injectable 64439 Unit(s) IV Push <User Schedule>  glucagon  Injectable 1 milliGRAM(s) IntraMuscular once  heparin   Injectable 5000 Unit(s) SubCutaneous every 8 hours  influenza  Vaccine (HIGH DOSE) 0.7 milliLiter(s) IntraMuscular once  insulin lispro (ADMELOG) corrective regimen sliding scale   SubCutaneous three times a day before meals  insulin lispro (ADMELOG) corrective regimen sliding scale   SubCutaneous at bedtime  Nephro-zack 1 Tablet(s) Oral daily  pantoprazole    Tablet 40 milliGRAM(s) Oral before breakfast  polyethylene glycol 3350 17 Gram(s) Oral daily  sevelamer carbonate 2400 milliGRAM(s) Oral three times a day with meals    MEDICATIONS  (PRN):  acetaminophen     Tablet .. 650 milliGRAM(s) Oral every 6 hours PRN Temp greater or equal to 38C (100.4F), Mild Pain (1 - 3)  dextrose Oral Gel 15 Gram(s) Oral once PRN Blood Glucose LESS THAN 70 milliGRAM(s)/deciliter      __________________________________________________  REVIEW OF SYSTEMS:    CONSTITUTIONAL: No fever,   EYES: no acute visual disturbances  NECK: No pain or stiffness  RESPIRATORY: No cough; No shortness of breath  CARDIOVASCULAR: No chest pain, no palpitations  GASTROINTESTINAL: No pain. No nausea or vomiting; No diarrhea   NEUROLOGICAL: No headache or numbness, no tremors  MUSCULOSKELETAL: No joint pain, no muscle pain  GENITOURINARY: no dysuria, no frequency, no hesitancy  PSYCHIATRY: no depression , no anxiety  ALL OTHER  ROS negative        Vital Signs Last 24 Hrs  T(C): 36.2 (18 Nov 2022 14:14), Max: 37.4 (17 Nov 2022 21:54)  T(F): 97.2 (18 Nov 2022 14:14), Max: 99.4 (17 Nov 2022 21:54)  HR: 86 (18 Nov 2022 14:14) (86 - 91)  BP: 141/76 (18 Nov 2022 14:14) (127/65 - 141/76)  BP(mean): --  RR: 18 (18 Nov 2022 14:14) (17 - 18)  SpO2: 95% (18 Nov 2022 14:14) (95% - 99%)    Parameters below as of 18 Nov 2022 14:14  Patient On (Oxygen Delivery Method): room air        ________________________________________________  PHYSICAL EXAM:  GENERAL: NAD  HEENT: Normocephalic;  conjunctivae and sclerae clear  NECK : supple  CHEST/LUNG: Clear to auscultation bilaterally  HEART: S1 S2  RRR  ABDOMEN: Soft, Nontender, Nondistended; Bowel sounds present  EXTREMITIES: no cyanosis; no edema; + R BKA  SKIN: warm and dry; no rash  NERVOUS SYSTEM:  Awake and alert; Oriented  to person, place, time, Forgetful    _________________________________________________  LABS:              CAPILLARY BLOOD GLUCOSE      POCT Blood Glucose.: 296 mg/dL (18 Nov 2022 11:35)  POCT Blood Glucose.: 274 mg/dL (18 Nov 2022 08:05)  POCT Blood Glucose.: 287 mg/dL (17 Nov 2022 21:08)  POCT Blood Glucose.: 279 mg/dL (17 Nov 2022 16:27)        RADIOLOGY & ADDITIONAL TESTS:  < from: Xray Foot AP + Lateral + Oblique, Right (11.08.22 @ 19:08) >  IMPRESSION:  1.  Swelling with soft tissue ulcer posterior to calcaneus.  2.  No plain film evidence of osteomyelitis.  3.  Obtain MRI as warranted clinically.    < end of copied text >      Imaging Personally Reviewed:  YES    Consultant(s) Notes Reviewed:   YES        Plan of care was discussed with patient and /or primary care giver; all questions and concerns were addressed and care was aligned with patient's wishes.

## 2022-11-18 NOTE — PROGRESS NOTE ADULT - PROBLEM SELECTOR PLAN 6
- from [facility]  - PT reccs: ANGELA   - Covid conversion on 11/13, asymptomatic  - will return to [facility] once he completes quarantine period on 11/23

## 2022-11-18 NOTE — PROGRESS NOTE ADULT - ASSESSMENT
Patient is a 72y old  Male who is coming from [facility], with medical history of ESRD (T-TH-S), and DM, presents to the ER  for evaluation of Right foot pain. Patient has severe peripheral arterial disease. He was admitted on September 26, 2022 for gangrene of right foot. At that time, patient was treated with IV abx. MRI was negative for OM. He had Right LE angiogram on 10/3 w/ no targets for re vascularization but showed extensive distal small vessel disease, there was no acute vascular interventions done. Patient was recommended a R BKA but wanted a medical management instead. Patient now coming in c/o R foot pain. He states that he wants surgery now for his foot. On admission, he has no fever , no leukocytosis. He has evaluated by Vascular team and taken to OR for Right BKA. He has started on Vancomycin and Zosyn, and the ID consult requested to assist with further evaluation and antibiotic management.    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 Wound care as per Vascular Sx  2. Monitor off Abx as he completed the course   3. Pain management as needed  4. Isolation as per hospital protocol     d/w Covering Suki MARCUS    - Majo from ID stand point     Attending Attestation:    Spent more than 35 minutes on total encounter, more than 50 % of the visit was spent counseling and/or coordinating care by the Attending physician.

## 2022-11-18 NOTE — PROGRESS NOTE ADULT - ASSESSMENT
71 y/o male from [facility], with PMHx of ESRD (T-TH-S), DM, s/p Rt foot angiogram 10/03 confirmed severe PAD and  no targets for re vascularization, vascular followed and recommended a Right BKA at that time but patient refused. Patient presented back to ED on 11/8/22 with c/o R foot pain and admitted to medicine. 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 Complete abx course IV Linezolid & Zosyn on 11/15. ID Dr. Babcock followed. PT recommends ANGELA.   Hospital course complicated by Covid + conversion on 11/13, on RA. Will likely return back to [facility] once quarantine period is completed on 11/23

## 2022-11-18 NOTE — PROGRESS NOTE ADULT - PROBLEM SELECTOR PLAN 3
- on HD   - HD (T/Th/S)  - Nephro Dr. Dumont following Area H Indication Text: Tumors in this location are included in Area H (eyelids, eyebrows, nose, lips, chin, ear, pre-auricular, post-auricular, temple, genitalia, hands, feet, ankles and areola).  Tissue conservation is critical in these anatomic locations.

## 2022-11-18 NOTE — PROGRESS NOTE ADULT - SUBJECTIVE AND OBJECTIVE BOX
Patient is a 72y old  Male who presents with a chief complaint of toe gangrene (18 Nov 2022 13:39)      INTERVAL HPI/OVERNIGHT EVENTS: no acute overnight events    MEDICATIONS  (STANDING):  aspirin enteric coated 81 milliGRAM(s) Oral daily  chlorhexidine 2% Cloths 1 Application(s) Topical daily  clopidogrel Tablet 75 milliGRAM(s) Oral daily  dextrose 5%. 1000 milliLiter(s) (50 mL/Hr) IV Continuous <Continuous>  dextrose 5%. 1000 milliLiter(s) (100 mL/Hr) IV Continuous <Continuous>  dextrose 50% Injectable 25 Gram(s) IV Push once  dextrose 50% Injectable 12.5 Gram(s) IV Push once  dextrose 50% Injectable 25 Gram(s) IV Push once  epoetin nyasia-epbx (RETACRIT) Injectable 72025 Unit(s) IV Push <User Schedule>  glucagon  Injectable 1 milliGRAM(s) IntraMuscular once  heparin   Injectable 5000 Unit(s) SubCutaneous every 8 hours  influenza  Vaccine (HIGH DOSE) 0.7 milliLiter(s) IntraMuscular once  insulin lispro (ADMELOG) corrective regimen sliding scale   SubCutaneous three times a day before meals  insulin lispro (ADMELOG) corrective regimen sliding scale   SubCutaneous at bedtime  Nephro-zack 1 Tablet(s) Oral daily  pantoprazole    Tablet 40 milliGRAM(s) Oral before breakfast  polyethylene glycol 3350 17 Gram(s) Oral daily  sevelamer carbonate 2400 milliGRAM(s) Oral three times a day with meals    MEDICATIONS  (PRN):  acetaminophen     Tablet .. 650 milliGRAM(s) Oral every 6 hours PRN Temp greater or equal to 38C (100.4F), Mild Pain (1 - 3)  dextrose Oral Gel 15 Gram(s) Oral once PRN Blood Glucose LESS THAN 70 milliGRAM(s)/deciliter      __________________________________________________  REVIEW OF SYSTEMS:    CONSTITUTIONAL: No fever,   EYES: no acute visual disturbances  NECK: No pain or stiffness  RESPIRATORY: No cough; No shortness of breath  CARDIOVASCULAR: No chest pain, no palpitations  GASTROINTESTINAL: No pain. No nausea or vomiting; No diarrhea   NEUROLOGICAL: No headache or numbness, no tremors  MUSCULOSKELETAL: No joint pain, no muscle pain  GENITOURINARY: no dysuria, no frequency, no hesitancy  PSYCHIATRY: no depression , no anxiety  ALL OTHER  ROS negative        Vital Signs Last 24 Hrs  T(C): 36.2 (18 Nov 2022 14:14), Max: 37.4 (17 Nov 2022 21:54)  T(F): 97.2 (18 Nov 2022 14:14), Max: 99.4 (17 Nov 2022 21:54)  HR: 86 (18 Nov 2022 14:14) (86 - 91)  BP: 141/76 (18 Nov 2022 14:14) (127/65 - 141/76)  BP(mean): --  RR: 18 (18 Nov 2022 14:14) (17 - 18)  SpO2: 95% (18 Nov 2022 14:14) (95% - 99%)    Parameters below as of 18 Nov 2022 14:14  Patient On (Oxygen Delivery Method): room air        ________________________________________________  PHYSICAL EXAM:  GENERAL: NAD  HEENT: Normocephalic;  conjunctivae and sclerae clear  NECK : supple  CHEST/LUNG: Clear to auscultation bilaterally  HEART: S1 S2  RRR  ABDOMEN: Soft, Nontender, Nondistended; Bowel sounds present  EXTREMITIES: no cyanosis; no edema; + R BKA  SKIN: warm and dry; no rash  NERVOUS SYSTEM:  Awake and alert; Oriented  to person, place, time, Forgetful    _________________________________________________  LABS:              CAPILLARY BLOOD GLUCOSE      POCT Blood Glucose.: 296 mg/dL (18 Nov 2022 11:35)  POCT Blood Glucose.: 274 mg/dL (18 Nov 2022 08:05)  POCT Blood Glucose.: 287 mg/dL (17 Nov 2022 21:08)  POCT Blood Glucose.: 279 mg/dL (17 Nov 2022 16:27)        RADIOLOGY & ADDITIONAL TESTS:  < from: Xray Foot AP + Lateral + Oblique, Right (11.08.22 @ 19:08) >  IMPRESSION:  1.  Swelling with soft tissue ulcer posterior to calcaneus.  2.  No plain film evidence of osteomyelitis.  3.  Obtain MRI as warranted clinically.    < end of copied text >      Imaging Personally Reviewed:  YES    Consultant(s) Notes Reviewed:   YES        Plan of care was discussed with patient and /or primary care giver; all questions and concerns were addressed and care was aligned with patient's wishes.

## 2022-11-18 NOTE — PROGRESS NOTE ADULT - PROBLEM SELECTOR PLAN 2
- likely anemia of chronic disease   - Hgb stable - No acute bleeding noted   - on epogen with HD   - trend CBC

## 2022-11-18 NOTE — PROGRESS NOTE ADULT - PROBLEM SELECTOR PLAN 6
- from [facility]  - PT reccs: ANGELA   - Covid conversion on 11/13, asymptomatic  - will return to [facility] once he completes quarantine period on 11/23

## 2022-11-18 NOTE — CHART NOTE - NSCHARTNOTEFT_GEN_A_CORE
POST OP CHECK     Patient seen and examined at the bedside. He is POD0 from R BKA. Patient is doing well, complaining of pain in his R leg. He is currently in dialysis. Patient is hemodynamically stable, afebrile.     Vital Signs Last 24 Hrs  T(C): 36.5 (09 Nov 2022 14:56), Max: 37.2 (08 Nov 2022 20:50)  T(F): 97.7 (09 Nov 2022 14:56), Max: 98.9 (08 Nov 2022 20:50)  HR: 62 (09 Nov 2022 14:56) (60 - 82)  BP: 148/74 (09 Nov 2022 14:56) (124/59 - 148/74)  BP(mean): 80 (09 Nov 2022 14:30) (79 - 84)  RR: 16 (09 Nov 2022 14:56) (13 - 24)  SpO2: 98% (09 Nov 2022 14:56) (97% - 100%)    Parameters below as of 09 Nov 2022 14:56  Patient On (Oxygen Delivery Method): nasal cannula  O2 Flow (L/min): 2    Physical exam:   Gen: NAD, AAOx3  Resp: normal respiratory effort, no accessory muscle usage  MSK: RLE in knee immobilizer, dressings are c/d/i                          7.2    6.27  )-----------( 205      ( 09 Nov 2022 06:40 )             22.4   11-09    135  |  102  |  44<H>  ----------------------------<  178<H>  3.9   |  24  |  3.66<H>    Ca    8.6      09 Nov 2022 06:40  Phos  3.9     11-09  Mg     2.0     11-09    TPro  6.4  /  Alb  2.8<L>  /  TBili  0.3  /  DBili  x   /  AST  15  /  ALT  20  /  AlkPhos  57  11-09    A+P:  72 M s/p R BKA. Patient is endorsing moderate to severe pain in his RLE:   - Please provide adequate multimodal pain control for this patient, including neuropathic pain control   - Dressing to be changed 11/12  - Ok to continue ASA/plavix/heparin
Pt. converted to COVID+ this am, placed on contact/airborne isolation, moved to private room.  Pt;'s sister Darshana Kong contacted at [phone], notified of pt.'s conversion.  Sister is in [place] and was planning to travel to [place] tomorrow via train,   Pt.'s sister informed she will only be aloud to view pt. from outside of room to which she expressed great disappointment.
Assessment:   72yMalePatient is a 72y old  Male who presents with a chief complaint of toe gangrene (18 Nov 2022 13:39) Pt is On airborne isolation. Observed Pt via Glass door. Pt asleep. D/W RN Pt is eating Good also on Nepro BID which pt drinks well. Pt is Receiving Large Portions w Meals.    Labs noted. Labs noted. Nephro notes noted. Meds Noted On Renvela TID, On Nephro-Zack.       Factors impacting intake: [ ] none [ ] nausea  [ ] vomiting [ ] diarrhea [ ] constipation  [ ]chewing problems [ ] swallowing issues  [ ] other:     Diet Prescription: Diet, Regular:   Consistent Carbohydrate {Evening Snacks}  For patients receiving Renal Replacement - No Protein Restr, No Conc K, No Conc Phos, Low Sodium (RENAL)  Supplement Feeding Modality:  Oral  Nepro Cans or Servings Per Day:  1       Frequency:  Two Times a day (11-11-22 @ 10:53)    Intake:  ~75%     Current Weight:   % Weight Change    Pertinent Medications: MEDICATIONS  (STANDING):  aspirin enteric coated 81 milliGRAM(s) Oral daily  chlorhexidine 2% Cloths 1 Application(s) Topical daily  clopidogrel Tablet 75 milliGRAM(s) Oral daily  dextrose 5%. 1000 milliLiter(s) (50 mL/Hr) IV Continuous <Continuous>  dextrose 5%. 1000 milliLiter(s) (100 mL/Hr) IV Continuous <Continuous>  dextrose 50% Injectable 25 Gram(s) IV Push once  dextrose 50% Injectable 12.5 Gram(s) IV Push once  dextrose 50% Injectable 25 Gram(s) IV Push once  epoetin nyasia-epbx (RETACRIT) Injectable 24253 Unit(s) IV Push <User Schedule>  glucagon  Injectable 1 milliGRAM(s) IntraMuscular once  heparin   Injectable 5000 Unit(s) SubCutaneous every 8 hours  influenza  Vaccine (HIGH DOSE) 0.7 milliLiter(s) IntraMuscular once  insulin lispro (ADMELOG) corrective regimen sliding scale   SubCutaneous three times a day before meals  insulin lispro (ADMELOG) corrective regimen sliding scale   SubCutaneous at bedtime  Nephro-zack 1 Tablet(s) Oral daily  pantoprazole    Tablet 40 milliGRAM(s) Oral before breakfast  polyethylene glycol 3350 17 Gram(s) Oral daily  sevelamer carbonate 2400 milliGRAM(s) Oral three times a day with meals    MEDICATIONS  (PRN):  acetaminophen     Tablet .. 650 milliGRAM(s) Oral every 6 hours PRN Temp greater or equal to 38C (100.4F), Mild Pain (1 - 3)  dextrose Oral Gel 15 Gram(s) Oral once PRN Blood Glucose LESS THAN 70 milliGRAM(s)/deciliter    Pertinent Labs: 11-16 Na137 mmol/L Glu 317 mg/dL<H> K+ 3.7 mmol/L Cr  4.05 mg/dL<H> BUN 32 mg/dL<H> 11-16 Phos 4.4 mg/dL     CAPILLARY BLOOD GLUCOSE      POCT Blood Glucose.: 296 mg/dL (18 Nov 2022 11:35)  POCT Blood Glucose.: 274 mg/dL (18 Nov 2022 08:05)  POCT Blood Glucose.: 287 mg/dL (17 Nov 2022 21:08)  POCT Blood Glucose.: 279 mg/dL (17 Nov 2022 16:27)    Skin:     Estimated Needs:   [ ] no change since previous assessment  [ ] recalculated:     Previous Nutrition Diagnosis:   [ ] Inadequate Energy Intake [ ]Inadequate Oral Intake [ ] Excessive Energy Intake   [ ] Underweight [x ] Increased Nutrient Needs [ ] Overweight/Obesity   [ ] Altered GI Function [ ] Unintended Weight Loss [ ] Food & Nutrition Related Knowledge Deficit [ ] Malnutrition     Nutrition Diagnosis is [x ] ongoing  [ ] resolved [ ] not applicable     New Nutrition Diagnosis: [ ] not applicable       Interventions:   Recommend  [ ] Change Diet To:  [ x] Nutrition Supplement Continue with Nepro BID.   [ ] Nutrition Support  [ ] Other:     Monitoring and Evaluation:   [ ] PO intake [ x ] Tolerance to diet prescription [ x ] weights [ x ] labs[ x ] follow up per protocol  [ ] other:
Patient is a 72y old  Male who presents with a chief complaint of toe gangrene. Called by RN to evaluate patient right stump. Patient is post op day 1 s/p for Right BKA. Site is oozing dressing is saturated. Reached out to surgery coverage overnight to include patient in am rounding for right BKA assessment and possible dressing change. CBC sent to evaluate patient h/h. will endorse to day team to follow up on patient      Vital Signs Last 24 Hrs  T(C): 37.4 (10 Nov 2022 05:08), Max: 37.4 (10 Nov 2022 05:08)  T(F): 99.4 (10 Nov 2022 05:08), Max: 99.4 (10 Nov 2022 05:08)  HR: 78 (10 Nov 2022 05:08) (60 - 83)  BP: 149/66 (10 Nov 2022 05:08) (99/63 - 153/60)  BP(mean): 80 (09 Nov 2022 14:30) (79 - 84)  RR: 18 (10 Nov 2022 05:08) (13 - 24)  SpO2: 100% (10 Nov 2022 05:08) (94% - 100%)    Parameters below as of 10 Nov 2022 05:08  Patient On (Oxygen Delivery Method): nasal cannula  O2 Flow (L/min): 2
Patient seen and examined at the bedside. Writer called to bedside for complains of dressing saturation by night nursing staff. Knee immobilizer removed and dressing examined. Some mild staining seen under the coban, but otherwise dressings c/d/i with no saturation or concern for excessive bleeding. Patient's dressing will be changed 11/12, but for now should remain in place.

## 2022-11-18 NOTE — PROGRESS NOTE ADULT - SUBJECTIVE AND OBJECTIVE BOX
Patient is seen and examined at the bed side, is afebrile. N new events, awaiting to complete the quariintine  before transferred  back to NH.      REVIEW OF SYSTEMS: All other review systems are negative      ALLERGIES: No Known Allergies      Vital Signs Last 24 Hrs  T(C): 36.2 (2022 14:14), Max: 37.4 (2022 21:54)  T(F): 97.2 (2022 14:14), Max: 99.4 (2022 21:54)  HR: 86 (2022 14:14) (86 - 91)  BP: 141/76 (2022 14:14) (127/65 - 141/76)  BP(mean): --  RR: 18 (2022 14:14) (17 - 18)  SpO2: 95% (2022 14:14) (95% - 99%)    Parameters below as of 2022 14:14  Patient On (Oxygen Delivery Method): room air        PHYSICAL EXAM:  General: Not in distress  RESP: Not using accessory muscles   EXT: Positive Right BKA,  CNS: Awake and Alert      LABS: No new Labs                           8.1    7.47  )-----------( 262      ( 2022 05:35 )             25.7                10.0   13.82 )-----------( 196      ( 10 Nov 2022 05:35 )             31.3           137  |  100  |  32<H>  ----------------------------<  317<H>  3.7   |  26  |  4.05<H>    Ca    8.5      2022 05:35  Phos  4.4           11-15    135  |  99  |  53<H>  ----------------------------<  278<H>  3.9   |  24  |  5.94<H>    Ca    8.7      15 Nov 2022 08:40  Phos  7.4     11-15    TPro  6.4  /  Alb  2.8<L>  /  TBili  0.3  /  DBili  x   /  AST  15  /  ALT  20  /  AlkPhos  57  11-0    PT/INR - ( 2022 06:40 )   PT: 13.6 sec;   INR: 1.14 ratio    PTT - ( 2022 06:40 )  PTT:37.0 sec        CAPILLARY BLOOD GLUCOSE  POCT Blood Glucose.: 153 mg/dL (2022 09:54)        Urinalysis Basic - ( 2022 18:00 )Color: Yellow / Appearance: Clear / S.005 / pH: x  Gluc: x / Ketone: Negative  / Bili: Negative / Urobili: Negative   Blood: x / Protein: 30 mg/dL / Nitrite: Negative   Leuk Esterase: Negative / RBC: 0-2 /HPF / WBC 0-2 /HPF   Sq Epi: x / Non Sq Epi: Occasional /HPF / Bacteria: Few /HPF        MEDICATIONS  (STANDING):    aspirin enteric coated 81 milliGRAM(s) Oral daily  chlorhexidine 2% Cloths 1 Application(s) Topical daily  clopidogrel Tablet 75 milliGRAM(s) Oral daily  epoetin nyasia-epbx (RETACRIT) Injectable 25162 Unit(s) IV Push <User Schedule>  glucagon  Injectable 1 milliGRAM(s) IntraMuscular once  heparin   Injectable 5000 Unit(s) SubCutaneous every 8 hours  influenza  Vaccine (HIGH DOSE) 0.7 milliLiter(s) IntraMuscular once  insulin lispro (ADMELOG) corrective regimen sliding scale   SubCutaneous at bedtime  insulin lispro (ADMELOG) corrective regimen sliding scale   SubCutaneous three times a day before meals  Nephro-zack 1 Tablet(s) Oral daily  pantoprazole    Tablet 40 milliGRAM(s) Oral before breakfast  polyethylene glycol 3350 17 Gram(s) Oral daily  sevelamer carbonate 2400 milliGRAM(s) Oral three times a day with meals        RADIOLOGY & ADDITIONAL TESTS:    22: Xray Foot AP + Lateral + Oblique, Right (22 @ 19:08) >  1.  Swelling with soft tissue ulcer posterior to calcaneus.  2.  No plain film evidence of osteomyelitis.  3.  Obtain MRI as warranted clinically.        MICROBIOLOGY DATA:    COVID-19 PCR . (22 @ 06:27)   COVID-19 PCR: Detected:    MRSA/MSSA PCR (22 @ 10:30)   MRSA PCR Result.: NotDetec:    Culture - Blood (22 @ 15:20)   Specimen Source: .Blood Blood-Peripheral   Culture Results: No growth to date.     Culture - Blood (22 @ 15:10)   Specimen Source: .Blood Blood-Peripheral   Culture Results: No growth to date.     Culture - Urine (22 @ 18:00)   Specimen Source: Clean Catch Clean Catch (Midstream)   Culture Results: <10,000 CFU/mL Normal Urogenital Martha     COVID-19 PCR (22 @ 15:10)   COVID-19 PCR: NotDetec:

## 2022-11-18 NOTE — PROGRESS NOTE ADULT - SUBJECTIVE AND OBJECTIVE BOX
[facility] NEPHROLOGY- PROGRESS NOTE    Patient is a 73yo Male with ESRD on HD, DM a/w Rt foot gangrene. Nephrology consulted for ESRD status.   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     11/13: COVID +    Hospital Medications: Medications reviewed.  REVIEW OF SYSTEMS:  CONSTITUTIONAL: No fevers or chills  RESPIRATORY: No shortness of breath  CARDIOVASCULAR: No chest pain.  GASTROINTESTINAL: No nausea, vomiting, diarrhea or abdominal pain.   VASCULAR: No left lower extremity edema. +RLE - denies pain    VITALS:  T(F): 97.2 (11-18-22 @ 14:14), Max: 99.4 (11-17-22 @ 21:54)  HR: 86 (11-18-22 @ 14:14)  BP: 141/76 (11-18-22 @ 14:14)  RR: 18 (11-18-22 @ 14:14)  SpO2: 95% (11-18-22 @ 14:14)  Wt(kg): --    11-17 @ 07:01  -  11-18 @ 07:00  --------------------------------------------------------  IN: 0 mL / OUT: 1800 mL / NET: -1800 mL      PHYSICAL EXAM:  Gen: NAD, calm  HEENT: anicteric  Neck: no JVD  Cards: RRR, +S1/S2, no M/G/R  Resp: CTA B/L  GI: soft, NT/ND, NABS  Extremities: no LLE edema  Derm: s/p Rt BKA wrapped  Access: Rt IJ tunneled HD catheter- benign  Left upper arm AVF- no thrill no bruit      LABS:        Creatinine Trend: 4.05 <--, 5.94 <--, 5.37 <--, 4.42 <--, 5.91 <--    Urine Studies:

## 2022-11-18 NOTE — PROGRESS NOTE ADULT - PROBLEM SELECTOR PLAN 1
- S/P R BKA on 11/9  - afebrile  - pain well controlled  - S/P linezolid + zosyn, completed 11/15  - ID Babcock followed  - Vascular followed

## 2022-11-18 NOTE — PROGRESS NOTE ADULT - ASSESSMENT
71 y/o male from [facility], with PMHx of ESRD (T-TH-S), DM, s/p Rt foot angiogram 10/03 confirmed severe PAD and  no targets for re vascularization, vascular followed and recommended a Right BKA at that time but patient refused. Patient presented back to ED on 11/8/22 with c/o R foot pain and admitted to medicine. 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 Complete abx course IV Linezolid & Zosyn on 11/15. ID Dr. Babcock followed. PT recommends ANGELA.   Hospital course complicated by Covid + conversion on 11/13, on RA. Will likely return back to [facility] once quarantine period is completed on 11/23

## 2022-11-18 NOTE — PROGRESS NOTE ADULT - PROBLEM SELECTOR PLAN 6
- from [facility]  - PT reccs: ANGELA   - Covid conversion on 11/13, asymptomatic  - will return to [facility] once he completes quarantine period on 11/23

## 2022-11-18 NOTE — PROGRESS NOTE ADULT - SUBJECTIVE AND OBJECTIVE BOX
PATIENT SEEN AND EXAMINED ON :- 11/18/22  DATE OF SERVICE:  11/18/22           Interim events noted,Labs ,Radiological studies and Cardiology tests reviewed .       HOSPITAL COURSE: HPI:  72 year old male, coming from [facility], with medical history of ESRD (T-TH-S), and DM coming to ED c/o R foot pain. Patient has severe peripheral arterial disease. He was admitted on September 26 2022 for gangrene of right foot. At that time, patient was treated with IV abx. MRI was negative for OM. He had R LE angiogram on 10/3 w/ no targets for re vascularization but showed extensive distal small vessel disease, there was no acute vascular interventions done. Patient was recommended a R BKA but wanted a medical management instead. Patient now coming in c/o R foot pain. He states that he wants surgery now for his foot. He denies any fevers, chills, headaches, dizziness, chest pain, cough, SOB, abd pain, n/v, diarrhea, constipation, hematuria, dysuria, numbness, and weakness.    in the ed VS: T 98, HR 88, /60, RR 18, Spo2 96%RA  Hb 8.1   s/p 1 dose vanc and zosyn in Ed    (08 Nov 2022 21:31)      INTERIM EVENTS:Patient seen at bedside ,interim events noted.      PMH -reviewed admission note, no change since admission  HEART FAILURE: Acute[ ]Chronic[ ] Systolic[ ] Diastolic[ ] Combined Systolic and Diastolic[ ]  CAD[ ] CABG[ ] PCI[ ]  DEVICES[ ] PPM[ ] ICD[ ] ILR[ ]  ATRIAL FIBRILLATION[ ] Paroxysmal[ ] Permanent[ ] CHADS2-[  ]  CANDE[ ] CKD1[ ] CKD2[ ] CKD3[ ] CKD4[ ] ESRD[ ]  COPD[ ] HTN[ ]   DM[ ] Type1[ ] Type 2[ ]   CVA[ ] Paresis[ ]    AMBULATION: Assisted[ ] Cane/walker[ ] Independent[ ]    MEDICATIONS  (STANDING):  aspirin enteric coated 81 milliGRAM(s) Oral daily  chlorhexidine 2% Cloths 1 Application(s) Topical daily  clopidogrel Tablet 75 milliGRAM(s) Oral daily  dextrose 5%. 1000 milliLiter(s) (50 mL/Hr) IV Continuous <Continuous>  dextrose 5%. 1000 milliLiter(s) (100 mL/Hr) IV Continuous <Continuous>  dextrose 50% Injectable 25 Gram(s) IV Push once  dextrose 50% Injectable 12.5 Gram(s) IV Push once  dextrose 50% Injectable 25 Gram(s) IV Push once  epoetin nyasia-epbx (RETACRIT) Injectable 17223 Unit(s) IV Push <User Schedule>  glucagon  Injectable 1 milliGRAM(s) IntraMuscular once  heparin   Injectable 5000 Unit(s) SubCutaneous every 8 hours  influenza  Vaccine (HIGH DOSE) 0.7 milliLiter(s) IntraMuscular once  insulin lispro (ADMELOG) corrective regimen sliding scale   SubCutaneous three times a day before meals  insulin lispro (ADMELOG) corrective regimen sliding scale   SubCutaneous at bedtime  Nephro-zack 1 Tablet(s) Oral daily  pantoprazole    Tablet 40 milliGRAM(s) Oral before breakfast  polyethylene glycol 3350 17 Gram(s) Oral daily  sevelamer carbonate 2400 milliGRAM(s) Oral three times a day with meals    MEDICATIONS  (PRN):  acetaminophen     Tablet .. 650 milliGRAM(s) Oral every 6 hours PRN Temp greater or equal to 38C (100.4F), Mild Pain (1 - 3)  dextrose Oral Gel 15 Gram(s) Oral once PRN Blood Glucose LESS THAN 70 milliGRAM(s)/deciliter            REVIEW OF SYSTEMS:  Constitutional: [ ] fever, [ ]weight loss,  [ ]fatigue [ ]weight gain  Eyes: [ ] visual changes  Respiratory: [ ]shortness of breath;  [ ] cough, [ ]wheezing, [ ]chills, [ ]hemoptysis  Cardiovascular: [ ] chest pain, [ ]palpitations, [ ]dizziness,  [ ]leg swelling[ ]orthopnea[ ]PND  Gastrointestinal: [ ] abdominal pain, [ ]nausea, [ ]vomiting,  [ ]diarrhea [ ]Constipation [ ]Melena  Genitourinary: [ ] dysuria, [ ] hematuria [ ]Garcia  Neurologic: [ ] headaches [ ] tremors[ ]weakness [ ]Paralysis Right[ ] Left[ ]  Skin: [ ] itching, [ ]burning, [ ] rashes  Endocrine: [ ] heat or cold intolerance  Musculoskeletal: [ ] joint pain or swelling; [ ] muscle, back, or extremity pain  Psychiatric: [ ] depression, [ ]anxiety, [ ]mood swings, or [ ]difficulty sleeping  Hematologic: [ ] easy bruising, [ ] bleeding gums    [ ] All remaining systems negative except as per above.   [ ]Unable to obtain.  [x] No change in ROS since admission      Vital Signs Last 24 Hrs  T(C): 35.9 (18 Nov 2022 20:59), Max: 37.4 (17 Nov 2022 21:54)  T(F): 96.7 (18 Nov 2022 20:59), Max: 99.4 (17 Nov 2022 21:54)  HR: 79 (18 Nov 2022 20:59) (79 - 91)  BP: 152/72 (18 Nov 2022 20:59) (127/65 - 152/72)  BP(mean): --  RR: 18 (18 Nov 2022 20:59) (17 - 18)  SpO2: 100% (18 Nov 2022 20:59) (95% - 100%)    Parameters below as of 18 Nov 2022 20:59  Patient On (Oxygen Delivery Method): room air      I&O's Summary    17 Nov 2022 07:01  -  18 Nov 2022 07:00  --------------------------------------------------------  IN: 0 mL / OUT: 1800 mL / NET: -1800 mL        PHYSICAL EXAM:  General: No acute distress BMI-  HEENT: EOMI, PERRL  Neck: Supple, [ ] JVD  Lungs: Equal air entry bilaterally; [ ] rales [ ] wheezing [ ] rhonchi  Heart: Regular rate and rhythm; [x ] murmur   2/6 [ x] systolic [ ] diastolic [ ] radiation[ ] rubs [ ]  gallops  Abdomen: Nontender, bowel sounds present  Extremities: No clubbing, cyanosis, [ ] edema [ ]Pulses  equal and intact  Nervous system:  Alert & Oriented X3, no focal deficits  Psychiatric: Normal affect  Skin: No rashes or lesions    LABS:        Creatinine Trend: 4.05<--, 5.94<--, 5.37<--, 4.42<--, 5.91<--, 4.92<--

## 2022-11-18 NOTE — PROGRESS NOTE ADULT - ASSESSMENT
Patient is a 73yo Male with ESRD on HD, DM a/w Rt foot gangrene. Nephrology consulted for ESRD status.   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       1) ESRD:  Last HD 11/17, tolerated well with net 1.5L . Plan for next HD 11/19.  Monitor electrolytes.  Pt with Left UE AVF- no thrill no bruit- f/u Vascular Surgeon Dr. Dutta as an outpt.   2) Anemia of renal disease: hgb low with adequate iron stores. s/p 2 unit prbc on 11/9. c/w Epogen 12k units IV tiw in HD (increased 11/15). Transfuse prbc prn. Monitor Hb.  3) Hyperphosphatemia: serum phosphorus acceptable . c/w low phos diet and Sevelamer 2400mg PO tid with meals. Monitor serum calcium and phosphorus.  4) Rt foot gangrene: s/p Rt BKA 11/9 by Dr. Billy. Finished Linezolid. Vascular/ ID following.     [facility] NEPHROLOGY  Wili Hopkins M.D.  Kiran Feng D.O.  Sujatha Dumont M.D.  Radha Miller, MSN, ANP-C  [phone]    [id number] [place] [address]

## 2022-11-19 LAB
ANION GAP SERPL CALC-SCNC: 8 MMOL/L — SIGNIFICANT CHANGE UP (ref 5–17)
BUN SERPL-MCNC: 59 MG/DL — HIGH (ref 7–18)
CALCIUM SERPL-MCNC: 8.6 MG/DL — SIGNIFICANT CHANGE UP (ref 8.4–10.5)
CHLORIDE SERPL-SCNC: 102 MMOL/L — SIGNIFICANT CHANGE UP (ref 96–108)
CO2 SERPL-SCNC: 27 MMOL/L — SIGNIFICANT CHANGE UP (ref 22–31)
CREAT SERPL-MCNC: 5.22 MG/DL — HIGH (ref 0.5–1.3)
EGFR: 11 ML/MIN/1.73M2 — LOW
GLUCOSE BLDC GLUCOMTR-MCNC: 182 MG/DL — HIGH (ref 70–99)
GLUCOSE BLDC GLUCOMTR-MCNC: 185 MG/DL — HIGH (ref 70–99)
GLUCOSE BLDC GLUCOMTR-MCNC: 186 MG/DL — HIGH (ref 70–99)
GLUCOSE BLDC GLUCOMTR-MCNC: 222 MG/DL — HIGH (ref 70–99)
GLUCOSE BLDC GLUCOMTR-MCNC: 226 MG/DL — HIGH (ref 70–99)
GLUCOSE SERPL-MCNC: 227 MG/DL — HIGH (ref 70–99)
HCT VFR BLD CALC: 25.3 % — LOW (ref 39–50)
HGB BLD-MCNC: 8 G/DL — LOW (ref 13–17)
MAGNESIUM SERPL-MCNC: 2.4 MG/DL — SIGNIFICANT CHANGE UP (ref 1.6–2.6)
MCHC RBC-ENTMCNC: 30.4 PG — SIGNIFICANT CHANGE UP (ref 27–34)
MCHC RBC-ENTMCNC: 31.6 GM/DL — LOW (ref 32–36)
MCV RBC AUTO: 96.2 FL — SIGNIFICANT CHANGE UP (ref 80–100)
NRBC # BLD: 0 /100 WBCS — SIGNIFICANT CHANGE UP (ref 0–0)
PHOSPHATE SERPL-MCNC: 4.2 MG/DL — SIGNIFICANT CHANGE UP (ref 2.5–4.5)
PLATELET # BLD AUTO: 336 K/UL — SIGNIFICANT CHANGE UP (ref 150–400)
POTASSIUM SERPL-MCNC: 4.5 MMOL/L — SIGNIFICANT CHANGE UP (ref 3.5–5.3)
POTASSIUM SERPL-SCNC: 4.5 MMOL/L — SIGNIFICANT CHANGE UP (ref 3.5–5.3)
RBC # BLD: 2.63 M/UL — LOW (ref 4.2–5.8)
RBC # FLD: 15 % — HIGH (ref 10.3–14.5)
SODIUM SERPL-SCNC: 137 MMOL/L — SIGNIFICANT CHANGE UP (ref 135–145)
WBC # BLD: 8.12 K/UL — SIGNIFICANT CHANGE UP (ref 3.8–10.5)
WBC # FLD AUTO: 8.12 K/UL — SIGNIFICANT CHANGE UP (ref 3.8–10.5)

## 2022-11-19 RX ADMIN — Medication 1: at 18:15

## 2022-11-19 RX ADMIN — HEPARIN SODIUM 5000 UNIT(S): 5000 INJECTION INTRAVENOUS; SUBCUTANEOUS at 22:37

## 2022-11-19 RX ADMIN — CLOPIDOGREL BISULFATE 75 MILLIGRAM(S): 75 TABLET, FILM COATED ORAL at 12:45

## 2022-11-19 RX ADMIN — CHLORHEXIDINE GLUCONATE 1 APPLICATION(S): 213 SOLUTION TOPICAL at 12:45

## 2022-11-19 RX ADMIN — Medication 1 TABLET(S): at 12:44

## 2022-11-19 RX ADMIN — Medication 2: at 12:45

## 2022-11-19 RX ADMIN — HEPARIN SODIUM 5000 UNIT(S): 5000 INJECTION INTRAVENOUS; SUBCUTANEOUS at 06:13

## 2022-11-19 RX ADMIN — Medication 2: at 08:36

## 2022-11-19 RX ADMIN — SEVELAMER CARBONATE 2400 MILLIGRAM(S): 2400 POWDER, FOR SUSPENSION ORAL at 18:18

## 2022-11-19 RX ADMIN — Medication 81 MILLIGRAM(S): at 12:43

## 2022-11-19 RX ADMIN — SEVELAMER CARBONATE 2400 MILLIGRAM(S): 2400 POWDER, FOR SUSPENSION ORAL at 12:42

## 2022-11-19 RX ADMIN — ERYTHROPOIETIN 12000 UNIT(S): 10000 INJECTION, SOLUTION INTRAVENOUS; SUBCUTANEOUS at 11:23

## 2022-11-19 RX ADMIN — PANTOPRAZOLE SODIUM 40 MILLIGRAM(S): 20 TABLET, DELAYED RELEASE ORAL at 06:13

## 2022-11-19 NOTE — PROGRESS NOTE ADULT - PROBLEM SELECTOR PLAN 4
- A1C 6.2   - continue ISS   - monitor FS 5-Fu Pregnancy And Lactation Text: This medication is Pregnancy Category X and contraindicated in pregnancy and in women who may become pregnant. It is unknown if this medication is excreted in breast milk. Valtrex Counseling: I discussed with the patient the risks of valacyclovir including but not limited to kidney damage, nausea, vomiting and severe allergy. The patient understands that if the infection seems to be worsening or is not improving, they are to call. Wartpeel Counseling:  I discussed with the patient the risks of Wartpeel including but not limited to erythema, scaling, itching, weeping, crusting, and pain. Itraconazole Pregnancy And Lactation Text: This medication is Pregnancy Category C and it isn't know if it is safe during pregnancy. It is also excreted in breast milk. Finasteride Pregnancy And Lactation Text: This medication is absolutely contraindicated during pregnancy. It is unknown if it is excreted in breast milk. Cimzia Counseling:  I discussed with the patient the risks of Cimzia including but not limited to immunosuppression, allergic reactions and infections. The patient understands that monitoring is required including a PPD at baseline and must alert us or the primary physician if symptoms of infection or other concerning signs are noted. Bactrim Pregnancy And Lactation Text: This medication is Pregnancy Category D and is known to cause fetal risk. It is also excreted in breast milk. Picato Counseling:  I discussed with the patient the risks of Picato including but not limited to erythema, scaling, itching, weeping, crusting, and pain. Otezla Pregnancy And Lactation Text: This medication is Pregnancy Category C and it isn't known if it is safe during pregnancy. It is unknown if it is excreted in breast milk. Gabapentin Counseling: I discussed with the patient the risks of gabapentin including but not limited to dizziness, somnolence, fatigue and ataxia. Siliq Counseling:  I discussed with the patient the risks of Siliq including but not limited to new or worsening depression, suicidal thoughts and behavior, immunosuppression, malignancy, posterior leukoencephalopathy syndrome, and serious infections. The patient understands that monitoring is required including a PPD at baseline and must alert us or the primary physician if symptoms of infection or other concerning signs are noted. There is also a special program designed to monitor depression which is required with Siliq. Acitretin Pregnancy And Lactation Text: This medication is Pregnancy Category X and should not be given to women who are pregnant or may become pregnant in the future. This medication is excreted in breast milk. Picato Pregnancy And Lactation Text: This medication is Pregnancy Category C. It is unknown if this medication is excreted in breast milk. Ivermectin Pregnancy And Lactation Text: This medication is Pregnancy Category C and it isn't known if it is safe during pregnancy. It is also excreted in breast milk. Xolair Pregnancy And Lactation Text: This medication is Pregnancy Category B and is considered safe during pregnancy. This medication is excreted in breast milk. Cimzia Pregnancy And Lactation Text: This medication crosses the placenta but can be considered safe in certain situations. Cimzia may be excreted in breast milk. Drysol Counseling:  I discussed with the patient the risks of drysol/aluminum chloride including but not limited to skin rash, itching, irritation, burning. Ketoconazole Counseling:   Patient counseled regarding improving absorption with orange juice. Adverse effects include but are not limited to breast enlargement, headache, diarrhea, nausea, upset stomach, liver function test abnormalities, taste disturbance, and stomach pain. There is a rare possibility of liver failure that can occur when taking ketoconazole. The patient understands that monitoring of LFTs may be required, especially at baseline. The patient verbalized understanding of the proper use and possible adverse effects of ketoconazole. All of the patient's questions and concerns were addressed. Cephalexin Counseling: I counseled the patient regarding use of cephalexin as an antibiotic for prophylactic and/or therapeutic purposes. Cephalexin (commonly prescribed under brand name Keflex) is a cephalosporin antibiotic which is active against numerous classes of bacteria, including most skin bacteria. Side effects may include nausea, diarrhea, gastrointestinal upset, rash, hives, yeast infections, and in rare cases, hepatitis, kidney disease, seizures, fever, confusion, neurologic symptoms, and others. Patients with severe allergies to penicillin medications are cautioned that there is about a 10% incidence of cross-reactivity with cephalosporins. When possible, patients with penicillin allergies should use alternatives to cephalosporins for antibiotic therapy. Valtrex Pregnancy And Lactation Text: this medication is Pregnancy Category B and is considered safe during pregnancy. This medication is not directly found in breast milk but it's metabolite acyclovir is present. Siliq Pregnancy And Lactation Text: The risk during pregnancy and breastfeeding is uncertain with this medication. Bexarotene Counseling:  I discussed with the patient the risks of bexarotene including but not limited to hair loss, dry lips/skin/eyes, liver abnormalities, hyperlipidemia, pancreatitis, depression/suicidal ideation, photosensitivity, drug rash/allergic reactions, hypothyroidism, anemia, leukopenia, infection, cataracts, and teratogenicity. Patient understands that they will need regular blood tests to check lipid profile, liver function tests, white blood cell count, thyroid function tests and pregnancy test if applicable. Oxybutynin Counseling:  I discussed with the patient the risks of oxybutynin including but not limited to skin rash, drowsiness, dry mouth, difficulty urinating, and blurred vision. Opioid Counseling: I discussed with the patient the potential side effects of opioids including but not limited to addiction, altered mental status, and depression. I stressed avoiding alcohol, benzodiazepines, muscle relaxants and sleep aids unless specifically okayed by a physician. The patient verbalized understanding of the proper use and possible adverse effects of opioids. All of the patient's questions and concerns were addressed. They were instructed to flush the remaining pills down the toilet if they did not need them for pain. Cosentyx Counseling:  I discussed with the patient the risks of Cosentyx including but not limited to worsening of Crohn's disease, immunosuppression, allergic reactions and infections. The patient understands that monitoring is required including a PPD at baseline and must alert us or the primary physician if symptoms of infection or other concerning signs are noted. Use Enhanced Medication Counseling?: No Ketoconazole Pregnancy And Lactation Text: This medication is Pregnancy Category C and it isn't know if it is safe during pregnancy. It is also excreted in breast milk and breast feeding isn't recommended. Azathioprine Counseling:  I discussed with the patient the risks of azathioprine including but not limited to myelosuppression, immunosuppression, hepatotoxicity, lymphoma, and infections. The patient understands that monitoring is required including baseline LFTs, Creatinine, possible TPMP genotyping and weekly CBCs for the first month and then every 2 weeks thereafter. The patient verbalized understanding of the proper use and possible adverse effects of azathioprine. All of the patient's questions and concerns were addressed. Gabapentin Pregnancy And Lactation Text: This medication is Pregnancy Category C and isn't considered safe during pregnancy. It is excreted in breast milk. Cephalexin Pregnancy And Lactation Text: This medication is Pregnancy Category B and considered safe during pregnancy. It is also excreted in breast milk but can be used safely for shorter doses. Protopic Counseling: Patient may experience a mild burning sensation during topical application. Protopic is not approved in children less than 3years of age. There have been case reports of hematologic and skin malignancies in patients using topical calcineurin inhibitors although causality is questionable. Bexarotene Pregnancy And Lactation Text: This medication is Pregnancy Category X and should not be given to women who are pregnant or may become pregnant. This medication should not be used if you are breast feeding. Protopic Pregnancy And Lactation Text: This medication is Pregnancy Category C. It is unknown if this medication is excreted in breast milk when applied topically. Oxybutynin Pregnancy And Lactation Text: This medication is Pregnancy Category B and is considered safe during pregnancy. It is unknown if it is excreted in breast milk. Opioid Pregnancy And Lactation Text: These medications can lead to premature delivery and should be avoided during pregnancy. These medications are also present in breast milk in small amounts. Drysol Pregnancy And Lactation Text: This medication is considered safe during pregnancy and breast feeding. Azathioprine Pregnancy And Lactation Text: This medication is Pregnancy Category D and isn't considered safe during pregnancy. It is unknown if this medication is excreted in breast milk. Glycopyrrolate Counseling:  I discussed with the patient the risks of glycopyrrolate including but not limited to skin rash, drowsiness, dry mouth, difficulty urinating, and blurred vision. Terbinafine Counseling: Patient counseling regarding adverse effects of terbinafine including but not limited to headache, diarrhea, rash, upset stomach, liver function test abnormalities, itching, taste/smell disturbance, nausea, abdominal pain, and flatulence. There is a rare possibility of liver failure that can occur when taking terbinafine. The patient understands that a baseline LFT and kidney function test may be required. The patient verbalized understanding of the proper use and possible adverse effects of terbinafine. All of the patient's questions and concerns were addressed. Cosentyx Pregnancy And Lactation Text: This medication is Pregnancy Category B and is considered safe during pregnancy. It is unknown if this medication is excreted in breast milk. Elidel Counseling: Patient may experience a mild burning sensation during topical application. Elidel is not approved in children less than 3years of age. There have been case reports of hematologic and skin malignancies in patients using topical calcineurin inhibitors although causality is questionable. Clindamycin Counseling: I counseled the patient regarding use of clindamycin as an antibiotic for prophylactic and/or therapeutic purposes. Clindamycin is active against numerous classes of bacteria, including skin bacteria. Side effects may include nausea, diarrhea, gastrointestinal upset, rash, hives, yeast infections, and in rare cases, colitis. Simponi Counseling:  I discussed with the patient the risks of golimumab including but not limited to myelosuppression, immunosuppression, autoimmune hepatitis, demyelinating diseases, lymphoma, and serious infections. The patient understands that monitoring is required including a PPD at baseline and must alert us or the primary physician if symptoms of infection or other concerning signs are noted. Isotretinoin Counseling: Patient should get monthly blood tests, not donate blood, not drive at night if vision affected, not share medication, and not undergo elective surgery for 6 months after tx completed. Side effects reviewed, pt to contact office should one occur. Rifampin Counseling: I discussed with the patient the risks of rifampin including but not limited to liver damage, kidney damage, red-orange body fluids, nausea/vomiting and severe allergy. Propranolol Counseling:  I discussed with the patient the risks of propranolol including but not limited to low heart rate, low blood pressure, low blood sugar, restlessness and increased cold sensitivity. They should call the office if they experience any of these side effects. Clindamycin Pregnancy And Lactation Text: This medication can be used in pregnancy if certain situations. Clindamycin is also present in breast milk. Dupixent Counseling: I discussed with the patient the risks of dupilumab including but not limited to eye infection and irritation, cold sores, injection site reactions, worsening of asthma, allergic reactions and increased risk of parasitic infection. Live vaccines should be avoided while taking dupilumab. Dupilumab will also interact with certain medications such as warfarin and cyclosporine. The patient understands that monitoring is required and they must alert us or the primary physician if symptoms of infection or other concerning signs are noted. Glycopyrrolate Pregnancy And Lactation Text: This medication is Pregnancy Category B and is considered safe during pregnancy. It is unknown if it is excreted breast milk. Terbinafine Pregnancy And Lactation Text: This medication is Pregnancy Category B and is considered safe during pregnancy. It is also excreted in breast milk and breast feeding isn't recommended. Rhofade Counseling: Rhofade is a topical medication which can decrease superficial blood flow where applied. Side effects are uncommon and include stinging, redness and allergic reactions. Rhofade Pregnancy And Lactation Text: This medication has not been assigned a Pregnancy Risk Category. It is unknown if the medication is excreted in breast milk. Propranolol Pregnancy And Lactation Text: This medication is Pregnancy Category C and it isn't known if it is safe during pregnancy. It is excreted in breast milk. Cellcept Counseling:  I discussed with the patient the risks of mycophenolate mofetil including but not limited to infection/immunosuppression, GI upset, hypokalemia, hypercholesterolemia, bone marrow suppression, lymphoproliferative disorders, malignancy, GI ulceration/bleed/perforation, colitis, interstitial lung disease, kidney failure, progressive multifocal leukoencephalopathy, and birth defects. The patient understands that monitoring is required including a baseline creatinine and regular CBC testing. In addition, patient must alert us immediately if symptoms of infection or other concerning signs are noted. Rifampin Pregnancy And Lactation Text: This medication is Pregnancy Category C and it isn't know if it is safe during pregnancy. It is also excreted in breast milk and should not be used if you are breast feeding. Arava Counseling:  Patient counseled regarding adverse effects of Arava including but not limited to nausea, vomiting, abnormalities in liver function tests. Patients may develop mouth sores, rash, diarrhea, and abnormalities in blood counts. The patient understands that monitoring is required including LFTs and blood counts. There is a rare possibility of scarring of the liver and lung problems that can occur when taking methotrexate. Persistent nausea, loss of appetite, pale stools, dark urine, cough, and shortness of breath should be reported immediately. Patient advised to discontinue Arava treatment and consult with a physician prior to attempting conception. The patient will have to undergo a treatment to eliminate Arava from the body prior to conception. Eucrisa Counseling: Patient may experience a mild burning sensation during topical application. Mathieu Valdez is not approved in children less than 3years of age. Hydroxychloroquine Counseling:  I discussed with the patient that a baseline ophthalmologic exam is needed at the start of therapy and every year thereafter while on therapy. A CBC may also be warranted for monitoring. The side effects of this medication were discussed with the patient, including but not limited to agranulocytosis, aplastic anemia, seizures, rashes, retinopathy, and liver toxicity. Patient instructed to call the office should any adverse effect occur. The patient verbalized understanding of the proper use and possible adverse effects of Plaquenil. All the patient's questions and concerns were addressed. Doxycycline Counseling:  Patient counseled regarding possible photosensitivity and increased risk for sunburn. Patient instructed to avoid sunlight, if possible. When exposed to sunlight, patients should wear protective clothing, sunglasses, and sunscreen. The patient was instructed to call the office immediately if the following severe adverse effects occur:  hearing changes, easy bruising/bleeding, severe headache, or vision changes. The patient verbalized understanding of the proper use and possible adverse effects of doxycycline. All of the patient's questions and concerns were addressed. Isotretinoin Pregnancy And Lactation Text: This medication is Pregnancy Category X and is considered extremely dangerous during pregnancy. It is unknown if it is excreted in breast milk. Skyrizi Counseling: I discussed with the patient the risks of risankizumab-rzaa including but not limited to immunosuppression, and serious infections. The patient understands that monitoring is required including a PPD at baseline and must alert us or the primary physician if symptoms of infection or other concerning signs are noted. Arava Pregnancy And Lactation Text: This medication is Pregnancy Category X and is absolutely contraindicated during pregnancy. It is unknown if it is excreted in breast milk. Sarecycline Counseling: Patient advised regarding possible photosensitivity and discoloration of the teeth, skin, lips, tongue and gums. Patient instructed to avoid sunlight, if possible. When exposed to sunlight, patients should wear protective clothing, sunglasses, and sunscreen. The patient was instructed to call the office immediately if the following severe adverse effects occur:  hearing changes, easy bruising/bleeding, severe headache, or vision changes. The patient verbalized understanding of the proper use and possible adverse effects of sarecycline. All of the patient's questions and concerns were addressed. High Dose Vitamin A Counseling: Side effects reviewed, pt to contact office should one occur. Dupixent Pregnancy And Lactation Text: This medication likely crosses the placenta but the risk for the fetus is uncertain. This medication is excreted in breast milk. Birth Control Pills Counseling: Birth Control Pill Counseling: I discussed with the patient the potential side effects of OCPs including but not limited to increased risk of stroke, heart attack, thrombophlebitis, deep venous thrombosis, hepatic adenomas, breast changes, GI upset, headaches, and depression. The patient verbalized understanding of the proper use and possible adverse effects of OCPs. All of the patient's questions and concerns were addressed. Doxycycline Pregnancy And Lactation Text: This medication is Pregnancy Category D and not consider safe during pregnancy. It is also excreted in breast milk but is considered safe for shorter treatment courses. Cimetidine Counseling:  I discussed with the patient the risks of Cimetidine including but not limited to gynecomastia, headache, diarrhea, nausea, drowsiness, arrhythmias, pancreatitis, skin rashes, psychosis, bone marrow suppression and kidney toxicity. Enbrel Counseling:  I discussed with the patient the risks of etanercept including but not limited to myelosuppression, immunosuppression, autoimmune hepatitis, demyelinating diseases, lymphoma, and infections. The patient understands that monitoring is required including a PPD at baseline and must alert us or the primary physician if symptoms of infection or other concerning signs are noted. Solaraze Counseling:  I discussed with the patient the risks of Solaraze including but not limited to erythema, scaling, itching, weeping, crusting, and pain. Hydroxychloroquine Pregnancy And Lactation Text: This medication has been shown to cause fetal harm but it isn't assigned a Pregnancy Risk Category. There are small amounts excreted in breast milk. Solaraze Pregnancy And Lactation Text: This medication is Pregnancy Category B and is considered safe. There is some data to suggest avoiding during the third trimester. It is unknown if this medication is excreted in breast milk. Birth Control Pills Pregnancy And Lactation Text: This medication should be avoided if pregnant and for the first 30 days post-partum. Cyclophosphamide Counseling:  I discussed with the patient the risks of cyclophosphamide including but not limited to hair loss, hormonal abnormalities, decreased fertility, abdominal pain, diarrhea, nausea and vomiting, bone marrow suppression and infection. The patient understands that monitoring is required while taking this medication. Clofazimine Counseling:  I discussed with the patient the risks of clofazimine including but not limited to skin and eye pigmentation, liver damage, nausea/vomiting, gastrointestinal bleeding and allergy. Sarecycline Pregnancy And Lactation Text: This medication is Pregnancy Category D and not consider safe during pregnancy. It is also excreted in breast milk. Eucrisa Pregnancy And Lactation Text: This medication has not been assigned a Pregnancy Risk Category but animal studies failed to show danger with the topical medication. It is unknown if the medication is excreted in breast milk. Hydroquinone Counseling:  Patient advised that medication may result in skin irritation, lightening (hypopigmentation), dryness, and burning. In the event of skin irritation, the patient was advised to reduce the amount of the drug applied or use it less frequently. Rarely, spots that are treated with hydroquinone can become darker (pseudoochronosis). Should this occur, patient instructed to stop medication and call the office. The patient verbalized understanding of the proper use and possible adverse effects of hydroquinone. All of the patient's questions and concerns were addressed. Libtayo Counseling- I discussed with the patient the risks of Libtayo including but not limited to nausea, vomiting, diarrhea, and bone or muscle pain. The patient verbalized understanding of the proper use and possible adverse effects of Libtayo. All of the patient's questions and concerns were addressed. Stelara Counseling:  I discussed with the patient the risks of ustekinumab including but not limited to immunosuppression, malignancy, posterior leukoencephalopathy syndrome, and serious infections. The patient understands that monitoring is required including a PPD at baseline and must alert us or the primary physician if symptoms of infection or other concerning signs are noted. High Dose Vitamin A Pregnancy And Lactation Text: High dose vitamin A therapy is contraindicated during pregnancy and breast feeding. Spironolactone Counseling: Patient advised regarding risks of diarrhea, abdominal pain, hyperkalemia, birth defects (for female patients), liver toxicity and renal toxicity. The patient may need blood work to monitor liver and kidney function and potassium levels while on therapy. The patient verbalized understanding of the proper use and possible adverse effects of spironolactone. All of the patient's questions and concerns were addressed. Tetracycline Counseling: Patient counseled regarding possible photosensitivity and increased risk for sunburn. Patient instructed to avoid sunlight, if possible. When exposed to sunlight, patients should wear protective clothing, sunglasses, and sunscreen. The patient was instructed to call the office immediately if the following severe adverse effects occur:  hearing changes, easy bruising/bleeding, severe headache, or vision changes. The patient verbalized understanding of the proper use and possible adverse effects of tetracycline. All of the patient's questions and concerns were addressed. Patient understands to avoid pregnancy while on therapy due to potential birth defects. Erythromycin Counseling:  I discussed with the patient the risks of erythromycin including but not limited to GI upset, allergic reaction, drug rash, diarrhea, increase in liver enzymes, and yeast infections. Cyclophosphamide Pregnancy And Lactation Text: This medication is Pregnancy Category D and it isn't considered safe during pregnancy. This medication is excreted in breast milk. Erythromycin Pregnancy And Lactation Text: This medication is Pregnancy Category B and is considered safe during pregnancy. It is also excreted in breast milk. Humira Counseling:  I discussed with the patient the risks of adalimumab including but not limited to myelosuppression, immunosuppression, autoimmune hepatitis, demyelinating diseases, lymphoma, and serious infections. The patient understands that monitoring is required including a PPD at baseline and must alert us or the primary physician if symptoms of infection or other concerning signs are noted. Doxepin Counseling:  Patient advised that the medication is sedating and not to drive a car after taking this medication. Patient informed of potential adverse effects including but not limited to dry mouth, urinary retention, and blurry vision. The patient verbalized understanding of the proper use and possible adverse effects of doxepin. All of the patient's questions and concerns were addressed. Topical Retinoid counseling:  Patient advised to apply a pea-sized amount only at bedtime and wait 30 minutes after washing their face before applying. If too drying, patient may add a non-comedogenic moisturizer. The patient verbalized understanding of the proper use and possible adverse effects of retinoids. All of the patient's questions and concerns were addressed. Libtayo Pregnancy And Lactation Text: This medication is contraindicated in pregnancy and when breast feeding. Spironolactone Pregnancy And Lactation Text: This medication can cause feminization of the male fetus and should be avoided during pregnancy. The active metabolite is also found in breast milk. Cyclosporine Counseling:  I discussed with the patient the risks of cyclosporine including but not limited to hypertension, gingival hyperplasia,myelosuppression, immunosuppression, liver damage, kidney damage, neurotoxicity, lymphoma, and serious infections. The patient understands that monitoring is required including baseline blood pressure, CBC, CMP, lipid panel and uric acid, and then 1-2 times monthly CMP and blood pressure. Colchicine Counseling:  Patient counseled regarding adverse effects including but not limited to stomach upset (nausea, vomiting, stomach pain, or diarrhea). Patient instructed to limit alcohol consumption while taking this medication. Colchicine may reduce blood counts especially with prolonged use. The patient understands that monitoring of kidney function and blood counts may be required, especially at baseline. The patient verbalized understanding of the proper use and possible adverse effects of colchicine. All of the patient's questions and concerns were addressed. Imiquimod Counseling:  I discussed with the patient the risks of imiquimod including but not limited to erythema, scaling, itching, weeping, crusting, and pain. Patient understands that the inflammatory response to imiquimod is variable from person to person and was educated regarded proper titration schedule. If flu-like symptoms develop, patient knows to discontinue the medication and contact us. Niacinamide Counseling: I recommended taking niacin or niacinamide, also know as vitamin B3, twice daily. Recent evidence suggests that taking vitamin B3 (500 mg twice daily) can reduce the risk of actinic keratoses and non-melanoma skin cancers. Side effects of vitamin B3 include flushing and headache. Taltz Counseling: I discussed with the patient the risks of ixekizumab including but not limited to immunosuppression, serious infections, worsening of inflammatory bowel disease and drug reactions. The patient understands that monitoring is required including a PPD at baseline and must alert us or the primary physician if symptoms of infection or other concerning signs are noted. Benzoyl Peroxide Counseling: Patient counseled that medicine may cause skin irritation and bleach clothing. In the event of skin irritation, the patient was advised to reduce the amount of the drug applied or use it less frequently. The patient verbalized understanding of the proper use and possible adverse effects of benzoyl peroxide. All of the patient's questions and concerns were addressed. Benzoyl Peroxide Pregnancy And Lactation Text: This medication is Pregnancy Category C. It is unknown if benzoyl peroxide is excreted in breast milk. SSKI Counseling:  I discussed with the patient the risks of SSKI including but not limited to thyroid abnormalities, metallic taste, GI upset, fever, headache, acne, arthralgias, paraesthesias, lymphadenopathy, easy bleeding, arrhythmias, and allergic reaction. Metronidazole Counseling:  I discussed with the patient the risks of metronidazole including but not limited to seizures, nausea/vomiting, a metallic taste in the mouth, nausea/vomiting and severe allergy. Cyclosporine Pregnancy And Lactation Text: This medication is Pregnancy Category C and it isn't know if it is safe during pregnancy. This medication is excreted in breast milk. Doxepin Pregnancy And Lactation Text: This medication is Pregnancy Category C and it isn't known if it is safe during pregnancy. It is also excreted in breast milk and breast feeding isn't recommended. Hydroxyzine Counseling: Patient advised that the medication is sedating and not to drive a car after taking this medication. Patient informed of potential adverse effects including but not limited to dry mouth, urinary retention, and blurry vision. The patient verbalized understanding of the proper use and possible adverse effects of hydroxyzine. All of the patient's questions and concerns were addressed. Niacinamide Pregnancy And Lactation Text: These medications are considered safe during pregnancy. Ilumya Counseling: I discussed with the patient the risks of tildrakizumab including but not limited to immunosuppression, malignancy, posterior leukoencephalopathy syndrome, and serious infections. The patient understands that monitoring is required including a PPD at baseline and must alert us or the primary physician if symptoms of infection or other concerning signs are noted. Metronidazole Pregnancy And Lactation Text: This medication is Pregnancy Category B and considered safe during pregnancy. It is also excreted in breast milk. Tazorac Counseling:  Patient advised that medication is irritating and drying. Patient may need to apply sparingly and wash off after an hour before eventually leaving it on overnight. The patient verbalized understanding of the proper use and possible adverse effects of tazorac. All of the patient's questions and concerns were addressed. Detail Level: Detailed Methotrexate Counseling:  Patient counseled regarding adverse effects of methotrexate including but not limited to nausea, vomiting, abnormalities in liver function tests. Patients may develop mouth sores, rash, diarrhea, and abnormalities in blood counts. The patient understands that monitoring is required including LFT's and blood counts. There is a rare possibility of scarring of the liver and lung problems that can occur when taking methotrexate. Persistent nausea, loss of appetite, pale stools, dark urine, cough, and shortness of breath should be reported immediately. Patient advised to discontinue methotrexate treatment at least three months before attempting to become pregnant. I discussed the need for folate supplements while taking methotrexate. These supplements can decrease side effects during methotrexate treatment. The patient verbalized understanding of the proper use and possible adverse effects of methotrexate. All of the patient's questions and concerns were addressed. Fluconazole Counseling:  Patient counseled regarding adverse effects of fluconazole including but not limited to headache, diarrhea, nausea, upset stomach, liver function test abnormalities, taste disturbance, and stomach pain. There is a rare possibility of liver failure that can occur when taking fluconazole. The patient understands that monitoring of LFTs and kidney function test may be required, especially at baseline. The patient verbalized understanding of the proper use and possible adverse effects of fluconazole. All of the patient's questions and concerns were addressed. Dapsone Counseling: I discussed with the patient the risks of dapsone including but not limited to hemolytic anemia, agranulocytosis, rashes, methemoglobinemia, kidney failure, peripheral neuropathy, headaches, GI upset, and liver toxicity. Patients who start dapsone require monitoring including baseline LFTs and weekly CBCs for the first month, then every month thereafter. The patient verbalized understanding of the proper use and possible adverse effects of dapsone. All of the patient's questions and concerns were addressed. Tazorac Pregnancy And Lactation Text: This medication is not safe during pregnancy. It is unknown if this medication is excreted in breast milk. Nsaids Counseling: NSAID Counseling: I discussed with the patient that NSAIDs should be taken with food. Prolonged use of NSAIDs can result in the development of stomach ulcers. Patient advised to stop taking NSAIDs if abdominal pain occurs. The patient verbalized understanding of the proper use and possible adverse effects of NSAIDs. All of the patient's questions and concerns were addressed. Tremfya Counseling: I discussed with the patient the risks of guselkumab including but not limited to immunosuppression, serious infections, worsening of inflammatory bowel disease and drug reactions. The patient understands that monitoring is required including a PPD at baseline and must alert us or the primary physician if symptoms of infection or other concerning signs are noted. Carac Counseling:  I discussed with the patient the risks of Carac including but not limited to erythema, scaling, itching, weeping, crusting, and pain. Sski Pregnancy And Lactation Text: This medication is Pregnancy Category D and isn't considered safe during pregnancy. It is excreted in breast milk. Thalidomide Counseling: I discussed with the patient the risks of thalidomide including but not limited to birth defects, anxiety, weakness, chest pain, dizziness, cough and severe allergy. Dapsone Pregnancy And Lactation Text: This medication is Pregnancy Category C and is not considered safe during pregnancy or breast feeding. Minoxidil Counseling: Minoxidil is a topical medication which can increase blood flow where it is applied. It is uncertain how this medication increases hair growth. Side effects are uncommon and include stinging and allergic reactions. Methotrexate Pregnancy And Lactation Text: This medication is Pregnancy Category X and is known to cause fetal harm. This medication is excreted in breast milk. Minocycline Counseling: Patient advised regarding possible photosensitivity and discoloration of the teeth, skin, lips, tongue and gums. Patient instructed to avoid sunlight, if possible. When exposed to sunlight, patients should wear protective clothing, sunglasses, and sunscreen. The patient was instructed to call the office immediately if the following severe adverse effects occur:  hearing changes, easy bruising/bleeding, severe headache, or vision changes. The patient verbalized understanding of the proper use and possible adverse effects of minocycline. All of the patient's questions and concerns were addressed. Hydroxyzine Pregnancy And Lactation Text: This medication is not safe during pregnancy and should not be taken. It is also excreted in breast milk and breast feeding isn't recommended. Nsaids Pregnancy And Lactation Text: These medications are considered safe up to 30 weeks gestation. It is excreted in breast milk. Topical Clindamycin Counseling: Patient counseled that this medication may cause skin irritation or allergic reactions. In the event of skin irritation, the patient was advised to reduce the amount of the drug applied or use it less frequently. The patient verbalized understanding of the proper use and possible adverse effects of clindamycin. All of the patient's questions and concerns were addressed. Griseofulvin Counseling:  I discussed with the patient the risks of griseofulvin including but not limited to photosensitivity, cytopenia, liver damage, nausea/vomiting and severe allergy. The patient understands that this medication is best absorbed when taken with a fatty meal (e.g., ice cream or french fries). Erivedge Counseling- I discussed with the patient the risks of Erivedge including but not limited to nausea, vomiting, diarrhea, constipation, weight loss, changes in the sense of taste, decreased appetite, muscle spasms, and hair loss. The patient verbalized understanding of the proper use and possible adverse effects of Erivedge. All of the patient's questions and concerns were addressed. Infliximab Counseling:  I discussed with the patient the risks of infliximab including but not limited to myelosuppression, immunosuppression, autoimmune hepatitis, demyelinating diseases, lymphoma, and serious infections. The patient understands that monitoring is required including a PPD at baseline and must alert us or the primary physician if symptoms of infection or other concerning signs are noted. Azithromycin Counseling:  I discussed with the patient the risks of azithromycin including but not limited to GI upset, allergic reaction, drug rash, diarrhea, and yeast infections. Prednisone Counseling:  I discussed with the patient the risks of prolonged use of prednisone including but not limited to weight gain, insomnia, osteoporosis, mood changes, diabetes, susceptibility to infection, glaucoma and high blood pressure. In cases where prednisone use is prolonged, patients should be monitored with blood pressure checks, serum glucose levels and an eye exam.  Additionally, the patient may need to be placed on GI prophylaxis, PCP prophylaxis, and calcium and vitamin D supplementation and/or a bisphosphonate. The patient verbalized understanding of the proper use and the possible adverse effects of prednisone. All of the patient's questions and concerns were addressed. Albendazole Counseling:  I discussed with the patient the risks of albendazole including but not limited to cytopenia, kidney damage, nausea/vomiting and severe allergy. The patient understands that this medication is being used in an off-label manner. Xeljanz Counseling: Duana Schlatter Counseling: I discussed with the patient the risks of Duana Schlatter therapy including increased risk of infection, liver issues, headache, diarrhea, or cold symptoms. Live vaccines should be avoided. They were instructed to call if they have any problems. Calcipotriene Counseling:  I discussed with the patient the risks of calcipotriene including but not limited to erythema, scaling, itching, and irritation. Tranexamic Acid Counseling:  Patient advised of the small risk of bleeding problems with tranexamic acid. They were also instructed to call if they developed any nausea, vomiting or diarrhea. All of the patient's questions and concerns were addressed. Odomzo Counseling- I discussed with the patient the risks of Odomzo including but not limited to nausea, vomiting, diarrhea, constipation, weight loss, changes in the sense of taste, decreased appetite, muscle spasms, and hair loss. The patient verbalized understanding of the proper use and possible adverse effects of Odomzo. All of the patient's questions and concerns were addressed. Quinolones Counseling:  I discussed with the patient the risks of fluoroquinolones including but not limited to GI upset, allergic reaction, drug rash, diarrhea, dizziness, photosensitivity, yeast infections, liver function test abnormalities, tendonitis/tendon rupture. Mirvaso Counseling: Levander Bream is a topical medication which can decrease superficial blood flow where applied. Side effects are uncommon and include stinging, redness and allergic reactions. Xelamishz Pregnancy And Lactation Text: This medication is Pregnancy Category D and is not considered safe during pregnancy. The risk during breast feeding is also uncertain. Zyclara Counseling:  I discussed with the patient the risks of imiquimod including but not limited to erythema, scaling, itching, weeping, crusting, and pain. Patient understands that the inflammatory response to imiquimod is variable from person to person and was educated regarded proper titration schedule. If flu-like symptoms develop, patient knows to discontinue the medication and contact us. Topical Sulfur Applications Counseling: Topical Sulfur Counseling: Patient counseled that this medication may cause skin irritation or allergic reactions. In the event of skin irritation, the patient was advised to reduce the amount of the drug applied or use it less frequently. The patient verbalized understanding of the proper use and possible adverse effects of topical sulfur application. All of the patient's questions and concerns were addressed. Calcipotriene Pregnancy And Lactation Text: This medication has not been proven safe during pregnancy. It is unknown if this medication is excreted in breast milk. Griseofulvin Pregnancy And Lactation Text: This medication is Pregnancy Category X and is known to cause serious birth defects. It is unknown if this medication is excreted in breast milk but breast feeding should be avoided. Azithromycin Pregnancy And Lactation Text: This medication is considered safe during pregnancy and is also secreted in breast milk. Finasteride Counseling:  I discussed with the patient the risks of use of finasteride including but not limited to decreased libido, decreased ejaculate volume, gynecomastia, and depression. Women should not handle medication. All of the patient's questions and concerns were addressed. Itraconazole Counseling:  I discussed with the patient the risks of itraconazole including but not limited to liver damage, nausea/vomiting, neuropathy, and severe allergy. The patient understands that this medication is best absorbed when taken with acidic beverages such as non-diet cola or ginger ale. The patient understands that monitoring is required including baseline LFTs and repeat LFTs at intervals. The patient understands that they are to contact us or the primary physician if concerning signs are noted. Rituxan Counseling:  I discussed with the patient the risks of Rituxan infusions. Side effects can include infusion reactions, severe drug rashes including mucocutaneous reactions, reactivation of latent hepatitis and other infections and rarely progressive multifocal leukoencephalopathy. All of the patient's questions and concerns were addressed. Bactrim Counseling:  I discussed with the patient the risks of sulfa antibiotics including but not limited to GI upset, allergic reaction, drug rash, diarrhea, dizziness, photosensitivity, and yeast infections. Rarely, more serious reactions can occur including but not limited to aplastic anemia, agranulocytosis, methemoglobinemia, blood dyscrasias, liver or kidney failure, lung infiltrates or desquamative/blistering drug rashes. Ivermectin Counseling:  Patient instructed to take medication on an empty stomach with a full glass of water. Patient informed of potential adverse effects including but not limited to nausea, diarrhea, dizziness, itching, and swelling of the extremities or lymph nodes. The patient verbalized understanding of the proper use and possible adverse effects of ivermectin. All of the patient's questions and concerns were addressed. Xolair Counseling:  Patient informed of potential adverse effects including but not limited to fever, muscle aches, rash and allergic reactions. The patient verbalized understanding of the proper use and possible adverse effects of Xolair. All of the patient's questions and concerns were addressed. 5-Fu Counseling: 5-Fluorouracil Counseling:  I discussed with the patient the risks of 5-fluorouracil including but not limited to erythema, scaling, itching, weeping, crusting, and pain. Tranexamic Acid Pregnancy And Lactation Text: It is unknown if this medication is safe during pregnancy or breast feeding. Rituxan Pregnancy And Lactation Text: This medication is Pregnancy Category C and it isn't know if it is safe during pregnancy. It is unknown if this medication is excreted in breast milk but similar antibodies are known to be excreted. Acitretin Counseling:  I discussed with the patient the risks of acitretin including but not limited to hair loss, dry lips/skin/eyes, liver damage, hyperlipidemia, depression/suicidal ideation, photosensitivity. Serious rare side effects can include but are not limited to pancreatitis, pseudotumor cerebri, bony changes, clot formation/stroke/heart attack. Patient understands that alcohol is contraindicated since it can result in liver toxicity and significantly prolong the elimination of the drug by many years. Otezla Counseling: Louisa Cluck Counseling: The side effects of Louisa Cluck were discussed with the patient, including but not limited to worsening or new depression, weight loss, diarrhea, nausea, upper respiratory tract infection, and headache. Patient instructed to call the office should any adverse effect occur. The patient verbalized understanding of the proper use and possible adverse effects of Otezla. All the patient's questions and concerns were addressed. Topical Sulfur Applications Pregnancy And Lactation Text: This medication is Pregnancy Category C and has an unknown safety profile during pregnancy. It is unknown if this topical medication is excreted in breast milk.

## 2022-11-19 NOTE — PROGRESS NOTE ADULT - ASSESSMENT
73 y/o male from [facility], with PMHx of ESRD (T-TH-S), DM, s/p Rt foot angiogram 10/03 confirmed severe PAD and  no targets for re vascularization, vascular followed and recommended a Right BKA at that time but patient refused. Patient presented back to ED on 11/8/22 with c/o R foot pain and admitted to medicine. 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 Complete abx course IV Linezolid & Zosyn on 11/15. ID Dr. Babcock followed. PT recommends ANGELA.   Hospital course complicated by Covid + conversion on 11/13, on RA. Will likely return back to [facility] once quarantine period is completed on 11/23

## 2022-11-19 NOTE — PROGRESS NOTE ADULT - PROBLEM SELECTOR PLAN 6
- from [facility]  - PT reccs: ANGELA   - Covid conversion on 11/13, asymptomatic  - will return to [facility] once he completes quarantine period on 11/23

## 2022-11-19 NOTE — PROGRESS NOTE ADULT - ASSESSMENT
Patient is a 73yo Male with ESRD on HD, DM a/w Rt foot gangrene. Nephrology consulted for ESRD status.   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       1) ESRD:  Last HD 11/17, tolerated well with net 1.5L . Plan for next HD today.  Monitor electrolytes.  Pt with Left UE AVF- no thrill no bruit- f/u Vascular Surgeon Dr. Dutta as an outpt.   2) Anemia of renal disease: hgb low with adequate iron stores. s/p 2 unit prbc on 11/9. c/w Epogen 12k units IV tiw in HD (increased 11/15). Transfuse prbc prn. Monitor Hb.  3) Hyperphosphatemia: serum phosphorus acceptable . c/w low phos diet and Sevelamer 2400mg PO tid with meals. Monitor serum calcium and phosphorus.  4) Rt foot gangrene: s/p Rt BKA 11/9 by Dr. Billy. Finished Linezolid. Vascular/ ID following.     [facility] NEPHROLOGY  Wili Hopkins M.D.  Kiran Feng D.O.  Sujatha Dumont M.D.  Radha Miller, MSN, ANP-C  [phone]    [id number] [place] [address]

## 2022-11-19 NOTE — PROGRESS NOTE ADULT - PROBLEM SELECTOR PLAN 6
- from [facility]  - PT reccs: ANGELA   - Covid conversion on 11/13, asymptomatic  - will return to [facility] once he completes quarantine period on 11/23 Initiate Treatment: Triamcinolone cream: apply twice daily for two weeks then use as needed for flares. \\nMedrol dose pack. \\[facility] skin care Initiate Treatment: Triamcinolone cream: apply twice daily for two weeks then use as needed for flares. \\nMedrol dose pack. \\[facility] skin care

## 2022-11-19 NOTE — PROGRESS NOTE ADULT - SUBJECTIVE AND OBJECTIVE BOX
Patient is a 72y old  Male who presents with a chief complaint of toe gangrene (18 Nov 2022 13:39)      INTERVAL HPI/OVERNIGHT EVENTS: pt seen and examined    MEDICATIONS  (STANDING):  aspirin enteric coated 81 milliGRAM(s) Oral daily  chlorhexidine 2% Cloths 1 Application(s) Topical daily  clopidogrel Tablet 75 milliGRAM(s) Oral daily  dextrose 5%. 1000 milliLiter(s) (50 mL/Hr) IV Continuous <Continuous>  dextrose 5%. 1000 milliLiter(s) (100 mL/Hr) IV Continuous <Continuous>  dextrose 50% Injectable 25 Gram(s) IV Push once  dextrose 50% Injectable 12.5 Gram(s) IV Push once  dextrose 50% Injectable 25 Gram(s) IV Push once  epoetin nyasia-epbx (RETACRIT) Injectable 44070 Unit(s) IV Push <User Schedule>  glucagon  Injectable 1 milliGRAM(s) IntraMuscular once  heparin   Injectable 5000 Unit(s) SubCutaneous every 8 hours  influenza  Vaccine (HIGH DOSE) 0.7 milliLiter(s) IntraMuscular once  insulin lispro (ADMELOG) corrective regimen sliding scale   SubCutaneous at bedtime  insulin lispro (ADMELOG) corrective regimen sliding scale   SubCutaneous three times a day before meals  Nephro-zack 1 Tablet(s) Oral daily  pantoprazole    Tablet 40 milliGRAM(s) Oral before breakfast  polyethylene glycol 3350 17 Gram(s) Oral daily  sevelamer carbonate 2400 milliGRAM(s) Oral three times a day with meals    MEDICATIONS  (PRN):  acetaminophen     Tablet .. 650 milliGRAM(s) Oral every 6 hours PRN Temp greater or equal to 38C (100.4F), Mild Pain (1 - 3)  dextrose Oral Gel 15 Gram(s) Oral once PRN Blood Glucose LESS THAN 70 milliGRAM(s)/deciliter      __________________________________________________  REVIEW OF SYSTEMS:    CONSTITUTIONAL: No fever,   EYES: no acute visual disturbances  NECK: No pain or stiffness  RESPIRATORY: No cough; No shortness of breath  CARDIOVASCULAR: No chest pain, no palpitations  GASTROINTESTINAL: No pain. No nausea or vomiting; No diarrhea   NEUROLOGICAL: No headache or numbness, no tremors  MUSCULOSKELETAL: No joint pain, no muscle pain  GENITOURINARY: no dysuria, no frequency, no hesitancy  PSYCHIATRY: no depression , no anxiety  ALL OTHER  ROS negative      Vital Signs Last 24 Hrs  T(C): 37.1 (11-19-22 @ 22:06), Max: 37.1 (11-19-22 @ 22:06)  T(F): 98.8 (11-19-22 @ 22:06), Max: 98.8 (11-19-22 @ 22:06)  HR: 88 (11-19-22 @ 22:06) (73 - 94)  BP: 140/74 (11-19-22 @ 22:06) (130/56 - 149/80)  BP(mean): --  RR: 17 (11-19-22 @ 22:06) (15 - 18)  SpO2: 100% (11-19-22 @ 22:06) (100% - 100%)          ________________________________________________  PHYSICAL EXAM:  GENERAL: NAD  HEENT: Normocephalic;  conjunctivae and sclerae clear  NECK : supple  CHEST/LUNG: dec breath sounds at bases  HEART: S1 S2  RRR  ABDOMEN: Soft, Nontender, Nondistended; Bowel sounds present  EXTREMITIES: no cyanosis; no edema; + R BKA  SKIN: warm and dry; no rash  NERVOUS SYSTEM:  Awake and alert;   LABS:  11-19    137  |  102  |  59<H>  ----------------------------<  227<H>  4.5   |  27  |  5.22<H>    Ca    8.6      19 Nov 2022 08:50  Phos  4.2     11-19  Mg     2.4     11-19      Creatinine Trend: 5.22 <--, 4.05 <--, 5.94 <--, 5.37 <--, 4.42 <--                        8.0    8.12  )-----------( 336      ( 19 Nov 2022 08:50 )             25.3     Urine Studies:                    RADIOLOGY & ADDITIONAL TESTS:  < from: Xray Foot AP + Lateral + Oblique, Right (11.08.22 @ 19:08) >  IMPRESSION:  1.  Swelling with soft tissue ulcer posterior to calcaneus.  2.  No plain film evidence of osteomyelitis.  3.  Obtain MRI as warranted clinically.    < end of copied text >      Imaging Personally Reviewed:  YES    Consultant(s) Notes Reviewed:   YES        Plan of care was discussed with patient and /or primary care giver; all questions and concerns were addressed and care was aligned with patient's wishes.

## 2022-11-19 NOTE — PROGRESS NOTE ADULT - SUBJECTIVE AND OBJECTIVE BOX
[facility] NEPHROLOGY- PROGRESS NOTE    Patient is a 71yo Male with ESRD on HD, DM a/w Rt foot gangrene. Nephrology consulted for ESRD status.   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     11/13: COVID +    Hospital Medications: Medications reviewed.  REVIEW OF SYSTEMS:  CONSTITUTIONAL: No fevers or chills  RESPIRATORY: No shortness of breath  CARDIOVASCULAR: No chest pain.  GASTROINTESTINAL: No nausea, vomiting, diarrhea or abdominal pain.   VASCULAR: No left lower extremity edema. +RLE - denies pain      VITALS:  T(F): 97.5 (11-19-22 @ 08:50), Max: 97.5 (11-19-22 @ 08:50)  HR: 73 (11-19-22 @ 08:50)  BP: 143/75 (11-19-22 @ 08:50)  RR: 16 (11-19-22 @ 08:50)  SpO2: 100% (11-19-22 @ 05:45)  Wt(kg): --    11-18 @ 07:01  -  11-19 @ 07:00  --------------------------------------------------------  IN: 0 mL / OUT: 350 mL / NET: -350 mL        PHYSICAL EXAM:  Gen: NAD, calm  HEENT: anicteric  Neck: no JVD  Cards: RRR, +S1/S2, no M/G/R  Resp: CTA B/L  GI: soft, NT/ND, NABS  Extremities: no LLE edema  Derm: s/p Rt BKA wrapped  Access: Rt IJ tunneled HD catheter- benign  Left upper arm AVF- no thrill no bruit      LABS:  11-19    137  |  102  |  59<H>  ----------------------------<  227<H>  4.5   |  27  |  5.22<H>    Ca    8.6      19 Nov 2022 08:50  Phos  4.2     11-19  Mg     2.4     11-19      Creatinine Trend: 5.22 <--, 4.05 <--, 5.94 <--, 5.37 <--, 4.42 <--                        8.0    8.12  )-----------( 336      ( 19 Nov 2022 08:50 )             25.3     Urine Studies:

## 2022-11-19 NOTE — PROGRESS NOTE ADULT - SUBJECTIVE AND OBJECTIVE BOX
PATIENT SEEN AND EXAMINED ON :- 11/19/22  DATE OF SERVICE:  11/19/22           Interim events noted,Labs ,Radiological studies and Cardiology tests reviewed .       HOSPITAL COURSE: HPI:  72 year old male, coming from [facility], with medical history of ESRD (T-TH-S), and DM coming to ED c/o R foot pain. Patient has severe peripheral arterial disease. He was admitted on September 26 2022 for gangrene of right foot. At that time, patient was treated with IV abx. MRI was negative for OM. He had R LE angiogram on 10/3 w/ no targets for re vascularization but showed extensive distal small vessel disease, there was no acute vascular interventions done. Patient was recommended a R BKA but wanted a medical management instead. Patient now coming in c/o R foot pain. He states that he wants surgery now for his foot. He denies any fevers, chills, headaches, dizziness, chest pain, cough, SOB, abd pain, n/v, diarrhea, constipation, hematuria, dysuria, numbness, and weakness.    in the ed VS: T 98, HR 88, /60, RR 18, Spo2 96%RA  Hb 8.1   s/p 1 dose vanc and zosyn in Ed    (08 Nov 2022 21:31)      INTERIM EVENTS:Patient seen at bedside ,interim events noted.      PMH -reviewed admission note, no change since admission  HEART FAILURE: Acute[ ]Chronic[ ] Systolic[ ] Diastolic[ ] Combined Systolic and Diastolic[ ]  CAD[ ] CABG[ ] PCI[ ]  DEVICES[ ] PPM[ ] ICD[ ] ILR[ ]  ATRIAL FIBRILLATION[ ] Paroxysmal[ ] Permanent[ ] CHADS2-[  ]  CANDE[ ] CKD1[ ] CKD2[ ] CKD3[ ] CKD4[ ] ESRD[ ]  COPD[ ] HTN[ ]   DM[ ] Type1[ ] Type 2[ ]   CVA[ ] Paresis[ ]    AMBULATION: Assisted[ ] Cane/walker[ ] Independent[ ]    MEDICATIONS  (STANDING):  aspirin enteric coated 81 milliGRAM(s) Oral daily  chlorhexidine 2% Cloths 1 Application(s) Topical daily  clopidogrel Tablet 75 milliGRAM(s) Oral daily  dextrose 5%. 1000 milliLiter(s) (50 mL/Hr) IV Continuous <Continuous>  dextrose 5%. 1000 milliLiter(s) (100 mL/Hr) IV Continuous <Continuous>  dextrose 50% Injectable 25 Gram(s) IV Push once  dextrose 50% Injectable 12.5 Gram(s) IV Push once  dextrose 50% Injectable 25 Gram(s) IV Push once  epoetin nyasia-epbx (RETACRIT) Injectable 89907 Unit(s) IV Push <User Schedule>  glucagon  Injectable 1 milliGRAM(s) IntraMuscular once  heparin   Injectable 5000 Unit(s) SubCutaneous every 8 hours  influenza  Vaccine (HIGH DOSE) 0.7 milliLiter(s) IntraMuscular once  insulin lispro (ADMELOG) corrective regimen sliding scale   SubCutaneous at bedtime  insulin lispro (ADMELOG) corrective regimen sliding scale   SubCutaneous three times a day before meals  Nephro-zack 1 Tablet(s) Oral daily  pantoprazole    Tablet 40 milliGRAM(s) Oral before breakfast  polyethylene glycol 3350 17 Gram(s) Oral daily  sevelamer carbonate 2400 milliGRAM(s) Oral three times a day with meals    MEDICATIONS  (PRN):  acetaminophen     Tablet .. 650 milliGRAM(s) Oral every 6 hours PRN Temp greater or equal to 38C (100.4F), Mild Pain (1 - 3)  dextrose Oral Gel 15 Gram(s) Oral once PRN Blood Glucose LESS THAN 70 milliGRAM(s)/deciliter            REVIEW OF SYSTEMS:  Constitutional: [ ] fever, [ ]weight loss,  [ ]fatigue [ ]weight gain  Eyes: [ ] visual changes  Respiratory: [ ]shortness of breath;  [ ] cough, [ ]wheezing, [ ]chills, [ ]hemoptysis  Cardiovascular: [ ] chest pain, [ ]palpitations, [ ]dizziness,  [ ]leg swelling[ ]orthopnea[ ]PND  Gastrointestinal: [ ] abdominal pain, [ ]nausea, [ ]vomiting,  [ ]diarrhea [ ]Constipation [ ]Melena  Genitourinary: [ ] dysuria, [ ] hematuria [ ]Garcia  Neurologic: [ ] headaches [ ] tremors[ ]weakness [ ]Paralysis Right[ ] Left[ ]  Skin: [ ] itching, [ ]burning, [ ] rashes  Endocrine: [ ] heat or cold intolerance  Musculoskeletal: [ ] joint pain or swelling; [ ] muscle, back, or extremity pain  Psychiatric: [ ] depression, [ ]anxiety, [ ]mood swings, or [ ]difficulty sleeping  Hematologic: [ ] easy bruising, [ ] bleeding gums    [ ] All remaining systems negative except as per above.   [ ]Unable to obtain.  [x] No change in ROS since admission      Vital Signs Last 24 Hrs  T(C): 36.7 (19 Nov 2022 13:00), Max: 36.7 (19 Nov 2022 13:00)  T(F): 98 (19 Nov 2022 13:00), Max: 98 (19 Nov 2022 13:00)  HR: 94 (19 Nov 2022 13:00) (73 - 94)  BP: 130/56 (19 Nov 2022 13:00) (130/56 - 149/80)  BP(mean): --  RR: 17 (19 Nov 2022 13:00) (15 - 18)  SpO2: 100% (19 Nov 2022 13:00) (100% - 100%)    Parameters below as of 19 Nov 2022 13:00  Patient On (Oxygen Delivery Method): room air      I&O's Summary    18 Nov 2022 07:01  -  19 Nov 2022 07:00  --------------------------------------------------------  IN: 0 mL / OUT: 350 mL / NET: -350 mL    19 Nov 2022 07:01  -  19 Nov 2022 22:02  --------------------------------------------------------  IN: 700 mL / OUT: 2200 mL / NET: -1500 mL        PHYSICAL EXAM:  General: No acute distress BMI-  HEENT: EOMI, PERRL  Neck: Supple, [ ] JVD  Lungs: Equal air entry bilaterally; [ ] rales [ ] wheezing [ ] rhonchi  Heart: Regular rate and rhythm; [x ] murmur   2/6 [ x] systolic [ ] diastolic [ ] radiation[ ] rubs [ ]  gallops  Abdomen: Nontender, bowel sounds present  Extremities: No clubbing, cyanosis, [ ] edema [ ]Pulses  equal and intact  Nervous system:  Alert & Oriented X3, no focal deficits  Psychiatric: Normal affect  Skin: No rashes or lesions    LABS:  11-19    137  |  102  |  59<H>  ----------------------------<  227<H>  4.5   |  27  |  5.22<H>    Ca    8.6      19 Nov 2022 08:50  Phos  4.2     11-19  Mg     2.4     11-19      Creatinine Trend: 5.22<--, 4.05<--, 5.94<--, 5.37<--, 4.42<--, 5.91<--                        8.0    8.12  )-----------( 336      ( 19 Nov 2022 08:50 )             25.3

## 2022-11-20 LAB
ANION GAP SERPL CALC-SCNC: 8 MMOL/L — SIGNIFICANT CHANGE UP (ref 5–17)
BUN SERPL-MCNC: 49 MG/DL — HIGH (ref 7–18)
CALCIUM SERPL-MCNC: 8.9 MG/DL — SIGNIFICANT CHANGE UP (ref 8.4–10.5)
CHLORIDE SERPL-SCNC: 100 MMOL/L — SIGNIFICANT CHANGE UP (ref 96–108)
CO2 SERPL-SCNC: 28 MMOL/L — SIGNIFICANT CHANGE UP (ref 22–31)
CREAT SERPL-MCNC: 4.34 MG/DL — HIGH (ref 0.5–1.3)
EGFR: 14 ML/MIN/1.73M2 — LOW
GLUCOSE BLDC GLUCOMTR-MCNC: 211 MG/DL — HIGH (ref 70–99)
GLUCOSE BLDC GLUCOMTR-MCNC: 225 MG/DL — HIGH (ref 70–99)
GLUCOSE BLDC GLUCOMTR-MCNC: 247 MG/DL — HIGH (ref 70–99)
GLUCOSE BLDC GLUCOMTR-MCNC: 321 MG/DL — HIGH (ref 70–99)
GLUCOSE SERPL-MCNC: 243 MG/DL — HIGH (ref 70–99)
HCT VFR BLD CALC: 29.4 % — LOW (ref 39–50)
HGB BLD-MCNC: 9.2 G/DL — LOW (ref 13–17)
MAGNESIUM SERPL-MCNC: 2.5 MG/DL — SIGNIFICANT CHANGE UP (ref 1.6–2.6)
MCHC RBC-ENTMCNC: 30.3 PG — SIGNIFICANT CHANGE UP (ref 27–34)
MCHC RBC-ENTMCNC: 31.3 GM/DL — LOW (ref 32–36)
MCV RBC AUTO: 96.7 FL — SIGNIFICANT CHANGE UP (ref 80–100)
NRBC # BLD: 0 /100 WBCS — SIGNIFICANT CHANGE UP (ref 0–0)
PHOSPHATE SERPL-MCNC: 3 MG/DL — SIGNIFICANT CHANGE UP (ref 2.5–4.5)
PLATELET # BLD AUTO: 349 K/UL — SIGNIFICANT CHANGE UP (ref 150–400)
POTASSIUM SERPL-MCNC: 4.5 MMOL/L — SIGNIFICANT CHANGE UP (ref 3.5–5.3)
POTASSIUM SERPL-SCNC: 4.5 MMOL/L — SIGNIFICANT CHANGE UP (ref 3.5–5.3)
RBC # BLD: 3.04 M/UL — LOW (ref 4.2–5.8)
RBC # FLD: 15.8 % — HIGH (ref 10.3–14.5)
SODIUM SERPL-SCNC: 136 MMOL/L — SIGNIFICANT CHANGE UP (ref 135–145)
WBC # BLD: 9 K/UL — SIGNIFICANT CHANGE UP (ref 3.8–10.5)
WBC # FLD AUTO: 9 K/UL — SIGNIFICANT CHANGE UP (ref 3.8–10.5)

## 2022-11-20 RX ADMIN — Medication 2: at 16:52

## 2022-11-20 RX ADMIN — HEPARIN SODIUM 5000 UNIT(S): 5000 INJECTION INTRAVENOUS; SUBCUTANEOUS at 22:05

## 2022-11-20 RX ADMIN — SEVELAMER CARBONATE 2400 MILLIGRAM(S): 2400 POWDER, FOR SUSPENSION ORAL at 08:35

## 2022-11-20 RX ADMIN — Medication 81 MILLIGRAM(S): at 11:49

## 2022-11-20 RX ADMIN — POLYETHYLENE GLYCOL 3350 17 GRAM(S): 17 POWDER, FOR SOLUTION ORAL at 11:47

## 2022-11-20 RX ADMIN — Medication 2: at 07:51

## 2022-11-20 RX ADMIN — HEPARIN SODIUM 5000 UNIT(S): 5000 INJECTION INTRAVENOUS; SUBCUTANEOUS at 13:24

## 2022-11-20 RX ADMIN — Medication 1 TABLET(S): at 11:47

## 2022-11-20 RX ADMIN — HEPARIN SODIUM 5000 UNIT(S): 5000 INJECTION INTRAVENOUS; SUBCUTANEOUS at 06:43

## 2022-11-20 RX ADMIN — PANTOPRAZOLE SODIUM 40 MILLIGRAM(S): 20 TABLET, DELAYED RELEASE ORAL at 06:43

## 2022-11-20 RX ADMIN — CLOPIDOGREL BISULFATE 75 MILLIGRAM(S): 75 TABLET, FILM COATED ORAL at 11:47

## 2022-11-20 RX ADMIN — SEVELAMER CARBONATE 2400 MILLIGRAM(S): 2400 POWDER, FOR SUSPENSION ORAL at 16:53

## 2022-11-20 RX ADMIN — CHLORHEXIDINE GLUCONATE 1 APPLICATION(S): 213 SOLUTION TOPICAL at 11:49

## 2022-11-20 RX ADMIN — Medication 4: at 12:31

## 2022-11-20 RX ADMIN — SEVELAMER CARBONATE 2400 MILLIGRAM(S): 2400 POWDER, FOR SUSPENSION ORAL at 11:47

## 2022-11-20 NOTE — PROGRESS NOTE ADULT - ASSESSMENT
73 y/o male from [facility], with PMHx of ESRD (T-TH-S), DM, s/p Rt foot angiogram 10/03 confirmed severe PAD and  no targets for re vascularization, vascular followed and recommended a Right BKA at that time but patient refused. Patient presented back to ED on 11/8/22 with c/o R foot pain and admitted to medicine. 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 Complete abx course IV Linezolid & Zosyn on 11/15. ID Dr. Babcock followed. PT recommends ANGELA.   Hospital course complicated by Covid + conversion on 11/13, on RA. Will likely return back to [facility] once quarantine period is completed on 11/23

## 2022-11-20 NOTE — PROGRESS NOTE ADULT - SUBJECTIVE AND OBJECTIVE BOX
Patient is a 72y old  Male who presents with a chief complaint of toe gangrene (18 Nov 2022 13:39)      INTERVAL HPI/OVERNIGHT EVENTS: pt seen and examined    MEDICATIONS  (STANDING):  aspirin enteric coated 81 milliGRAM(s) Oral daily  chlorhexidine 2% Cloths 1 Application(s) Topical daily  clopidogrel Tablet 75 milliGRAM(s) Oral daily  dextrose 5%. 1000 milliLiter(s) (50 mL/Hr) IV Continuous <Continuous>  dextrose 5%. 1000 milliLiter(s) (100 mL/Hr) IV Continuous <Continuous>  dextrose 50% Injectable 25 Gram(s) IV Push once  dextrose 50% Injectable 12.5 Gram(s) IV Push once  dextrose 50% Injectable 25 Gram(s) IV Push once  epoetin nyasia-epbx (RETACRIT) Injectable 38265 Unit(s) IV Push <User Schedule>  glucagon  Injectable 1 milliGRAM(s) IntraMuscular once  heparin   Injectable 5000 Unit(s) SubCutaneous every 8 hours  influenza  Vaccine (HIGH DOSE) 0.7 milliLiter(s) IntraMuscular once  insulin lispro (ADMELOG) corrective regimen sliding scale   SubCutaneous three times a day before meals  insulin lispro (ADMELOG) corrective regimen sliding scale   SubCutaneous at bedtime  Nephro-zack 1 Tablet(s) Oral daily  pantoprazole    Tablet 40 milliGRAM(s) Oral before breakfast  polyethylene glycol 3350 17 Gram(s) Oral daily  sevelamer carbonate 2400 milliGRAM(s) Oral three times a day with meals    MEDICATIONS  (PRN):  acetaminophen     Tablet .. 650 milliGRAM(s) Oral every 6 hours PRN Temp greater or equal to 38C (100.4F), Mild Pain (1 - 3)  dextrose Oral Gel 15 Gram(s) Oral once PRN Blood Glucose LESS THAN 70 milliGRAM(s)/deciliter    __________________________________________________  REVIEW OF SYSTEMS:    CONSTITUTIONAL: No fever,   EYES: no acute visual disturbances  NECK: No pain or stiffness  RESPIRATORY: No cough; No shortness of breath  CARDIOVASCULAR: No chest pain, no palpitations  GASTROINTESTINAL: No pain. No nausea or vomiting; No diarrhea   NEUROLOGICAL: No headache or numbness, no tremors  MUSCULOSKELETAL: No joint pain, no muscle pain  GENITOURINARY: no dysuria, no frequency, no hesitancy  PSYCHIATRY: no depression , no anxiety  ALL OTHER  ROS negative      Vital Signs Last 24 Hrs  T(C): 36.5 (11-20-22 @ 21:43), Max: 36.7 (11-20-22 @ 05:30)  T(F): 97.7 (11-20-22 @ 21:43), Max: 98.1 (11-20-22 @ 05:30)  HR: 86 (11-20-22 @ 21:43) (80 - 86)  BP: 110/93 (11-20-22 @ 21:43) (110/93 - 132/77)  BP(mean): --  RR: 16 (11-20-22 @ 21:43) (16 - 17)  SpO2: 98% (11-20-22 @ 21:43) (98% - 100%)            ________________________________________________  PHYSICAL EXAM:  GENERAL: NAD  HEENT: Normocephalic;  conjunctivae and sclerae clear  NECK : supple  CHEST/LUNG: dec breath sounds at bases  HEART: S1 S2  RRR  ABDOMEN: Soft, Nontender, Nondistended; Bowel sounds present  EXTREMITIES: no cyanosis; no edema; + R BKA  SKIN: warm and dry; no rash  NERVOUS SYSTEM:  Awake and alert;       LABS:  11-20    136  |  100  |  49<H>  ----------------------------<  243<H>  4.5   |  28  |  4.34<H>    Ca    8.9      20 Nov 2022 07:41  Phos  3.0     11-20  Mg     2.5     11-20      Creatinine Trend: 4.34 <--, 5.22 <--, 4.05 <--, 5.94 <--, 5.37 <--                        9.2    9.00  )-----------( 349      ( 20 Nov 2022 07:41 )             29.4     Urine Studies:                                RADIOLOGY & ADDITIONAL TESTS:  < from: Xray Foot AP + Lateral + Oblique, Right (11.08.22 @ 19:08) >  IMPRESSION:  1.  Swelling with soft tissue ulcer posterior to calcaneus.  2.  No plain film evidence of osteomyelitis.  3.  Obtain MRI as warranted clinically.    < end of copied text >      Imaging Personally Reviewed:  YES    Consultant(s) Notes Reviewed:   YES        Plan of care was discussed with patient and /or primary care giver; all questions and concerns were addressed and care was aligned with patient's wishes.

## 2022-11-20 NOTE — PROGRESS NOTE ADULT - ASSESSMENT
No Patient is a 72y old  Male who is coming from [facility], with medical history of ESRD (T-TH-S), and DM, presents to the ER  for evaluation of Right foot pain. Patient has severe peripheral arterial disease. He was admitted on September 26, 2022 for gangrene of right foot. At that time, patient was treated with IV abx. MRI was negative for OM. He had Right LE angiogram on 10/3 w/ no targets for re vascularization but showed extensive distal small vessel disease, there was no acute vascular interventions done. Patient was recommended a R BKA but wanted a medical management instead. Patient now coming in c/o R foot pain. He states that he wants surgery now for his foot. On admission, he has no fever , no leukocytosis. He has evaluated by Vascular team and taken to OR for Right BKA. He has started on Vancomycin and Zosyn, and the ID consult requested to assist with further evaluation and antibiotic management.    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 Wound care as per Vascular Sx  2. Monitor off Abx as he completed the course   3. Pain management as needed  4. Isolation as per hospital protocol     - Stable from ID stand point     Attending Attestation:    Spent more than 35 minutes on total encounter, more than 50 % of the visit was spent counseling and/or coordinating care by the Attending physician.

## 2022-11-20 NOTE — PROGRESS NOTE ADULT - ASSESSMENT
Patient is a 73yo Male with ESRD on HD, DM a/w Rt foot gangrene. Nephrology consulted for ESRD status.   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       1) ESRD:  Last HD 11/19, with intradialytic hypotension and 1L removed. Plan for next HD on 11/21 due to holiday schedule.  Monitor electrolytes.  Pt with Left UE AVF- no thrill no bruit- f/u Vascular Surgeon Dr. Dutta as an outpt.   2) Anemia of renal disease: hgb low with adequate iron stores. s/p 2 unit prbc on 11/9. c/w Epogen 12k units IV tiw in HD (increased 11/15). Transfuse prbc prn. Monitor Hb.  3) Hyperphosphatemia: serum phosphorus acceptable . c/w low phos diet and Sevelamer 2400mg PO tid with meals. Monitor serum calcium and phosphorus.  4) Rt foot gangrene: s/p Rt BKA 11/9 by Dr. Billy. Finished Linezolid. Vascular/ ID following.     [facility] NEPHROLOGY  Wili Hopkins M.D.  Kiran Feng D.O.  Sujatha Dumont M.D.  Radha Miller, MSN, ANP-C  [phone]    [id number] [id number]

## 2022-11-20 NOTE — PROGRESS NOTE ADULT - SUBJECTIVE AND OBJECTIVE BOX
[facility] NEPHROLOGY- PROGRESS NOTE    Patient is a 71yo Male with ESRD on HD, DM a/w Rt foot gangrene. Nephrology consulted for ESRD status.   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     11/13: COVID +    Hospital Medications: Medications reviewed.  REVIEW OF SYSTEMS:  CONSTITUTIONAL: No fevers or chills  RESPIRATORY: No shortness of breath  CARDIOVASCULAR: No chest pain.  GASTROINTESTINAL: No nausea, vomiting, diarrhea or abdominal pain.   VASCULAR: No left lower extremity edema. +RLE - denies pain      VITALS:  T(F): 98.1 (11-20-22 @ 05:30), Max: 98.8 (11-19-22 @ 22:06)  HR: 80 (11-20-22 @ 05:30)  BP: 132/77 (11-20-22 @ 05:30)  RR: 17 (11-20-22 @ 05:30)  SpO2: 100% (11-20-22 @ 05:30)  Wt(kg): --    11-19 @ 07:01  -  11-20 @ 07:00  --------------------------------------------------------  IN: 700 mL / OUT: 2200 mL / NET: -1500 mL        PHYSICAL EXAM:  Gen: NAD, calm  HEENT: anicteric  Neck: no JVD  Cards: RRR, +S1/S2, no M/G/R  Resp: CTA B/L  GI: soft, NT/ND, NABS  Extremities: no LLE edema  Derm: s/p Rt BKA wrapped  Access: Rt IJ tunneled HD catheter- benign  Left upper arm AVF- no thrill no bruit      LABS:  11-20    136  |  100  |  49<H>  ----------------------------<  243<H>  4.5   |  28  |  4.34<H>    Ca    8.9      20 Nov 2022 07:41  Phos  3.0     11-20  Mg     2.5     11-20      Creatinine Trend: 4.34 <--, 5.22 <--, 4.05 <--, 5.94 <--, 5.37 <--                        9.2    9.00  )-----------( 349      ( 20 Nov 2022 07:41 )             29.4     Urine Studies:

## 2022-11-20 NOTE — PROGRESS NOTE ADULT - SUBJECTIVE AND OBJECTIVE BOX
Patient is seen and examined at the bed side, is afebrile. He is saturating well in Room air. The WBC count is normal.      REVIEW OF SYSTEMS: All other review systems are negative      ALLERGIES: No Known Allergies      Vital Signs Last 24 Hrs  T(C): 36.5 (2022 12:54), Max: 37.1 (2022 22:06)  T(F): 97.7 (2022 12:54), Max: 98.8 (2022 22:06)  HR: 82 (:54) (80 - 88)  BP: 124/60 (:54) (124/60 - 140/74)  BP(mean): --  RR: 17 (:54) (17 - 17)  SpO2: 100% (:54) (100% - 100%)    Parameters below as of :54  Patient On (Oxygen Delivery Method): room air        PHYSICAL EXAM:  General: Not in distress  RESP: Not using accessory muscles   EXT: Positive Right BKA,  CNS: Awake and Alert      LABS:                           9.2    9.00  )-----------( 349      ( 2022 07:41 )             29.4                10.0   13.82 )-----------( 196      ( 10 Nov 2022 05:35 )             31.3             136  |  100  |  49<H>  ----------------------------<  243<H>  4.5   |  28  |  4.34<H>    Ca    8.9      2022 07:41  Phos  3.0       Mg     2.5         137  |  100  |  32<H>  ----------------------------<  317<H>  3.7   |  26  |  4.05<H>    Ca    8.5      2022 05:35  Phos  4.4     11-16      11-15    135  |  99  |  53<H>  ----------------------------<  278<H>  3.9   |  24  |  5.94<H>    Ca    8.7      15 Nov 2022 08:40  Phos  7.4     11-15    TPro  6.4  /  Alb  2.8<L>  /  TBili  0.3  /  DBili  x   /  AST  15  /  ALT  20  /  AlkPhos  57  11-0    PT/INR - ( 2022 06:40 )   PT: 13.6 sec;   INR: 1.14 ratio    PTT - ( 2022 06:40 )  PTT:37.0 sec        CAPILLARY BLOOD GLUCOSE  POCT Blood Glucose.: 153 mg/dL (2022 09:54)        Urinalysis Basic - ( 2022 18:00 )Color: Yellow / Appearance: Clear / S.005 / pH: x  Gluc: x / Ketone: Negative  / Bili: Negative / Urobili: Negative   Blood: x / Protein: 30 mg/dL / Nitrite: Negative   Leuk Esterase: Negative / RBC: 0-2 /HPF / WBC 0-2 /HPF   Sq Epi: x / Non Sq Epi: Occasional /HPF / Bacteria: Few /HPF        MEDICATIONS  (STANDING):    aspirin enteric coated 81 milliGRAM(s) Oral daily  chlorhexidine 2% Cloths 1 Application(s) Topical daily  clopidogrel Tablet 75 milliGRAM(s) Oral daily  epoetin nyasia-epbx (RETACRIT) Injectable 71610 Unit(s) IV Push <User Schedule>  glucagon  Injectable 1 milliGRAM(s) IntraMuscular once  heparin   Injectable 5000 Unit(s) SubCutaneous every 8 hours  influenza  Vaccine (HIGH DOSE) 0.7 milliLiter(s) IntraMuscular once  insulin lispro (ADMELOG) corrective regimen sliding scale   SubCutaneous at bedtime  insulin lispro (ADMELOG) corrective regimen sliding scale   SubCutaneous three times a day before meals  Nephro-zack 1 Tablet(s) Oral daily  pantoprazole    Tablet 40 milliGRAM(s) Oral before breakfast  polyethylene glycol 3350 17 Gram(s) Oral daily  sevelamer carbonate 2400 milliGRAM(s) Oral three times a day with meals      RADIOLOGY & ADDITIONAL TESTS:    22: Xray Foot AP + Lateral + Oblique, Right (22 @ 19:08) >  1.  Swelling with soft tissue ulcer posterior to calcaneus.  2.  No plain film evidence of osteomyelitis.  3.  Obtain MRI as warranted clinically.        MICROBIOLOGY DATA:    COVID-19 PCR . (22 @ 06:27)   COVID-19 PCR: Detected:    MRSA/MSSA PCR (22 @ 10:30)   MRSA PCR Result.: NotDetec:    Culture - Blood (22 @ 15:20)   Specimen Source: .Blood Blood-Peripheral   Culture Results: No growth to date.     Culture - Blood (22 @ 15:10)   Specimen Source: .Blood Blood-Peripheral   Culture Results: No growth to date.     Culture - Urine (22 @ 18:00)   Specimen Source: Clean Catch Clean Catch (Midstream)   Culture Results: <10,000 CFU/mL Normal Urogenital Martha     COVID-19 PCR (22 @ 15:10)   COVID-19 PCR: NotDetec:

## 2022-11-20 NOTE — PROGRESS NOTE ADULT - PROBLEM SELECTOR PLAN 6
Dispo: d/c planning  - will return to [facility] once he completes quarantine period on 11/23

## 2022-11-20 NOTE — PROGRESS NOTE ADULT - ASSESSMENT
73 y/o male from [facility], with PMHx of ESRD (T-TH-S), DM, s/p Rt foot angiogram 10/03 confirmed severe PAD and  no targets for re vascularization, vascular followed and recommended a Right BKA at that time but patient refused. Patient presented back to ED on 11/8/22 with c/o R foot pain and admitted to medicine. 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 Complete abx course IV Linezolid & Zosyn on 11/15. ID Dr. Babcock followed. PT recommends ANGELA.   Hospital course complicated by Covid + conversion on 11/13, on RA. Will likely return back to [facility] once quarantine period is completed on 11/23

## 2022-11-20 NOTE — PROGRESS NOTE ADULT - PROBLEM SELECTOR PLAN 6
- from [facility]  - PT reccs: ANGELA   - Covid conversion on 11/13, asymptomatic  - will return to [facility] once he completes quarantine period on 11/23

## 2022-11-20 NOTE — PROGRESS NOTE ADULT - SUBJECTIVE AND OBJECTIVE BOX
PATIENT SEEN AND EXAMINED ON :- 11/20/22  DATE OF SERVICE: 11/20/22             Interim events noted,Labs ,Radiological studies and Cardiology tests reviewed .       HOSPITAL COURSE: HPI:  72 year old male, coming from [facility], with medical history of ESRD (T-TH-S), and DM coming to ED c/o R foot pain. Patient has severe peripheral arterial disease. He was admitted on September 26 2022 for gangrene of right foot. At that time, patient was treated with IV abx. MRI was negative for OM. He had R LE angiogram on 10/3 w/ no targets for re vascularization but showed extensive distal small vessel disease, there was no acute vascular interventions done. Patient was recommended a R BKA but wanted a medical management instead. Patient now coming in c/o R foot pain. He states that he wants surgery now for his foot. He denies any fevers, chills, headaches, dizziness, chest pain, cough, SOB, abd pain, n/v, diarrhea, constipation, hematuria, dysuria, numbness, and weakness.    in the ed VS: T 98, HR 88, /60, RR 18, Spo2 96%RA  Hb 8.1   s/p 1 dose vanc and zosyn in Ed    (08 Nov 2022 21:31)      INTERIM EVENTS:Patient seen at bedside ,interim events noted.      PMH -reviewed admission note, no change since admission  HEART FAILURE: Acute[ ]Chronic[ ] Systolic[ ] Diastolic[ ] Combined Systolic and Diastolic[ ]  CAD[ ] CABG[ ] PCI[ ]  DEVICES[ ] PPM[ ] ICD[ ] ILR[ ]  ATRIAL FIBRILLATION[ ] Paroxysmal[ ] Permanent[ ] CHADS2-[  ]  CANDE[ ] CKD1[ ] CKD2[ ] CKD3[ ] CKD4[ ] ESRD[ ]  COPD[ ] HTN[ ]   DM[ ] Type1[ ] Type 2[ ]   CVA[ ] Paresis[ ]    AMBULATION: Assisted[ ] Cane/walker[ ] Independent[ ]    MEDICATIONS  (STANDING):  aspirin enteric coated 81 milliGRAM(s) Oral daily  chlorhexidine 2% Cloths 1 Application(s) Topical daily  clopidogrel Tablet 75 milliGRAM(s) Oral daily  dextrose 5%. 1000 milliLiter(s) (100 mL/Hr) IV Continuous <Continuous>  dextrose 5%. 1000 milliLiter(s) (50 mL/Hr) IV Continuous <Continuous>  dextrose 50% Injectable 25 Gram(s) IV Push once  dextrose 50% Injectable 12.5 Gram(s) IV Push once  dextrose 50% Injectable 25 Gram(s) IV Push once  epoetin nyasia-epbx (RETACRIT) Injectable 84111 Unit(s) IV Push <User Schedule>  glucagon  Injectable 1 milliGRAM(s) IntraMuscular once  heparin   Injectable 5000 Unit(s) SubCutaneous every 8 hours  influenza  Vaccine (HIGH DOSE) 0.7 milliLiter(s) IntraMuscular once  insulin lispro (ADMELOG) corrective regimen sliding scale   SubCutaneous at bedtime  insulin lispro (ADMELOG) corrective regimen sliding scale   SubCutaneous three times a day before meals  Nephro-zack 1 Tablet(s) Oral daily  pantoprazole    Tablet 40 milliGRAM(s) Oral before breakfast  polyethylene glycol 3350 17 Gram(s) Oral daily  sevelamer carbonate 2400 milliGRAM(s) Oral three times a day with meals    MEDICATIONS  (PRN):  acetaminophen     Tablet .. 650 milliGRAM(s) Oral every 6 hours PRN Temp greater or equal to 38C (100.4F), Mild Pain (1 - 3)  dextrose Oral Gel 15 Gram(s) Oral once PRN Blood Glucose LESS THAN 70 milliGRAM(s)/deciliter            REVIEW OF SYSTEMS:  Constitutional: [ ] fever, [ ]weight loss,  [ ]fatigue [ ]weight gain  Eyes: [ ] visual changes  Respiratory: [ ]shortness of breath;  [ ] cough, [ ]wheezing, [ ]chills, [ ]hemoptysis  Cardiovascular: [ ] chest pain, [ ]palpitations, [ ]dizziness,  [ ]leg swelling[ ]orthopnea[ ]PND  Gastrointestinal: [ ] abdominal pain, [ ]nausea, [ ]vomiting,  [ ]diarrhea [ ]Constipation [ ]Melena  Genitourinary: [ ] dysuria, [ ] hematuria [ ]Garcia  Neurologic: [ ] headaches [ ] tremors[ ]weakness [ ]Paralysis Right[ ] Left[ ]  Skin: [ ] itching, [ ]burning, [ ] rashes  Endocrine: [ ] heat or cold intolerance  Musculoskeletal: [ ] joint pain or swelling; [ ] muscle, back, or extremity pain  Psychiatric: [ ] depression, [ ]anxiety, [ ]mood swings, or [ ]difficulty sleeping  Hematologic: [ ] easy bruising, [ ] bleeding gums    [ ] All remaining systems negative except as per above.   [ ]Unable to obtain.  [x] No change in ROS since admission      Vital Signs Last 24 Hrs  T(C): 36.5 (20 Nov 2022 12:54), Max: 37.1 (19 Nov 2022 22:06)  T(F): 97.7 (20 Nov 2022 12:54), Max: 98.8 (19 Nov 2022 22:06)  HR: 82 (20 Nov 2022 12:54) (80 - 88)  BP: 124/60 (20 Nov 2022 12:54) (124/60 - 140/74)  BP(mean): --  RR: 17 (20 Nov 2022 12:54) (17 - 17)  SpO2: 100% (20 Nov 2022 12:54) (100% - 100%)    Parameters below as of 20 Nov 2022 12:54  Patient On (Oxygen Delivery Method): room air      I&O's Summary    19 Nov 2022 07:01  -  20 Nov 2022 07:00  --------------------------------------------------------  IN: 700 mL / OUT: 2200 mL / NET: -1500 mL        PHYSICAL EXAM:  General: No acute distress BMI-  HEENT: EOMI, PERRL  Neck: Supple, [ ] JVD  Lungs: Equal air entry bilaterally; [ ] rales [ ] wheezing [ ] rhonchi  Heart: Regular rate and rhythm; [x ] murmur   2/6 [ x] systolic [ ] diastolic [ ] radiation[ ] rubs [ ]  gallops  Abdomen: Nontender, bowel sounds present  Extremities: No clubbing, cyanosis, [ ] edema [ ]Pulses  equal and intact  Nervous system:  Alert & Oriented X3, no focal deficits  Psychiatric: Normal affect  Skin: No rashes or lesions    LABS:  11-20    136  |  100  |  49<H>  ----------------------------<  243<H>  4.5   |  28  |  4.34<H>    Ca    8.9      20 Nov 2022 07:41  Phos  3.0     11-20  Mg     2.5     11-20      Creatinine Trend: 4.34<--, 5.22<--, 4.05<--, 5.94<--, 5.37<--, 4.42<--                        9.2    9.00  )-----------( 349      ( 20 Nov 2022 07:41 )             29.4

## 2022-11-21 LAB
ANION GAP SERPL CALC-SCNC: 11 MMOL/L — SIGNIFICANT CHANGE UP (ref 5–17)
BUN SERPL-MCNC: 78 MG/DL — HIGH (ref 7–18)
CALCIUM SERPL-MCNC: 8.9 MG/DL — SIGNIFICANT CHANGE UP (ref 8.4–10.5)
CHLORIDE SERPL-SCNC: 98 MMOL/L — SIGNIFICANT CHANGE UP (ref 96–108)
CO2 SERPL-SCNC: 25 MMOL/L — SIGNIFICANT CHANGE UP (ref 22–31)
CREAT SERPL-MCNC: 5.61 MG/DL — HIGH (ref 0.5–1.3)
EGFR: 10 ML/MIN/1.73M2 — LOW
GLUCOSE BLDC GLUCOMTR-MCNC: 245 MG/DL — HIGH (ref 70–99)
GLUCOSE BLDC GLUCOMTR-MCNC: 247 MG/DL — HIGH (ref 70–99)
GLUCOSE BLDC GLUCOMTR-MCNC: 373 MG/DL — HIGH (ref 70–99)
GLUCOSE SERPL-MCNC: 184 MG/DL — HIGH (ref 70–99)
HCT VFR BLD CALC: 27.5 % — LOW (ref 39–50)
HGB BLD-MCNC: 8.7 G/DL — LOW (ref 13–17)
MAGNESIUM SERPL-MCNC: 2.5 MG/DL — SIGNIFICANT CHANGE UP (ref 1.6–2.6)
MCHC RBC-ENTMCNC: 30.4 PG — SIGNIFICANT CHANGE UP (ref 27–34)
MCHC RBC-ENTMCNC: 31.6 GM/DL — LOW (ref 32–36)
MCV RBC AUTO: 96.2 FL — SIGNIFICANT CHANGE UP (ref 80–100)
NRBC # BLD: 0 /100 WBCS — SIGNIFICANT CHANGE UP (ref 0–0)
PHOSPHATE SERPL-MCNC: 3.4 MG/DL — SIGNIFICANT CHANGE UP (ref 2.5–4.5)
PLATELET # BLD AUTO: 459 K/UL — HIGH (ref 150–400)
POTASSIUM SERPL-MCNC: 4.8 MMOL/L — SIGNIFICANT CHANGE UP (ref 3.5–5.3)
POTASSIUM SERPL-SCNC: 4.8 MMOL/L — SIGNIFICANT CHANGE UP (ref 3.5–5.3)
RBC # BLD: 2.86 M/UL — LOW (ref 4.2–5.8)
RBC # FLD: 15.9 % — HIGH (ref 10.3–14.5)
SARS-COV-2 RNA SPEC QL NAA+PROBE: SIGNIFICANT CHANGE UP
SODIUM SERPL-SCNC: 134 MMOL/L — LOW (ref 135–145)
WBC # BLD: 8.42 K/UL — SIGNIFICANT CHANGE UP (ref 3.8–10.5)
WBC # FLD AUTO: 8.42 K/UL — SIGNIFICANT CHANGE UP (ref 3.8–10.5)

## 2022-11-21 RX ADMIN — HEPARIN SODIUM 5000 UNIT(S): 5000 INJECTION INTRAVENOUS; SUBCUTANEOUS at 05:48

## 2022-11-21 RX ADMIN — Medication 2: at 08:39

## 2022-11-21 RX ADMIN — SEVELAMER CARBONATE 2400 MILLIGRAM(S): 2400 POWDER, FOR SUSPENSION ORAL at 12:01

## 2022-11-21 RX ADMIN — SEVELAMER CARBONATE 2400 MILLIGRAM(S): 2400 POWDER, FOR SUSPENSION ORAL at 07:58

## 2022-11-21 RX ADMIN — Medication 5: at 12:00

## 2022-11-21 RX ADMIN — CLOPIDOGREL BISULFATE 75 MILLIGRAM(S): 75 TABLET, FILM COATED ORAL at 12:01

## 2022-11-21 RX ADMIN — POLYETHYLENE GLYCOL 3350 17 GRAM(S): 17 POWDER, FOR SOLUTION ORAL at 12:00

## 2022-11-21 RX ADMIN — Medication 81 MILLIGRAM(S): at 12:01

## 2022-11-21 RX ADMIN — ERYTHROPOIETIN 12000 UNIT(S): 10000 INJECTION, SOLUTION INTRAVENOUS; SUBCUTANEOUS at 17:57

## 2022-11-21 RX ADMIN — PANTOPRAZOLE SODIUM 40 MILLIGRAM(S): 20 TABLET, DELAYED RELEASE ORAL at 06:51

## 2022-11-21 RX ADMIN — CHLORHEXIDINE GLUCONATE 1 APPLICATION(S): 213 SOLUTION TOPICAL at 12:01

## 2022-11-21 RX ADMIN — Medication 1 TABLET(S): at 12:01

## 2022-11-21 RX ADMIN — HEPARIN SODIUM 5000 UNIT(S): 5000 INJECTION INTRAVENOUS; SUBCUTANEOUS at 22:13

## 2022-11-21 NOTE — PROGRESS NOTE ADULT - ASSESSMENT
73 y/o male from [facility], with PMHx of ESRD (T-TH-S), DM, s/p Rt foot angiogram 10/03 confirmed severe PAD and  no targets for re vascularization, vascular followed and recommended a Right BKA at that time but patient refused. Patient presented back to ED on 11/8/22 with c/o R foot pain and admitted to medicine. 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 Complete abx course IV Linezolid & Zosyn on 11/15. ID Dr. Babcock followed. PT recommends ANGELA.   Hospital course complicated by Covid + conversion on 11/13, on RA. Will likely return back to [facility] once quarantine period is completed on 11/24

## 2022-11-21 NOTE — PROGRESS NOTE ADULT - PROBLEM SELECTOR PLAN 6
- from [facility]  - PT reccs: ANGELA   - Covid conversion on 11/13, asymptomatic  - will return to [facility] once he completes quarantine period on 11/24

## 2022-11-21 NOTE — PROGRESS NOTE ADULT - SUBJECTIVE AND OBJECTIVE BOX
NP Note discussed with  Primary Attending    Patient is a 72y old  Male who presents with a chief complaint of toe gangrene (20 Nov 2022 14:45)      INTERVAL HPI/OVERNIGHT EVENTS: no acute events overnight    MEDICATIONS  (STANDING):  aspirin enteric coated 81 milliGRAM(s) Oral daily  chlorhexidine 2% Cloths 1 Application(s) Topical daily  clopidogrel Tablet 75 milliGRAM(s) Oral daily  dextrose 5%. 1000 milliLiter(s) (100 mL/Hr) IV Continuous <Continuous>  dextrose 5%. 1000 milliLiter(s) (50 mL/Hr) IV Continuous <Continuous>  dextrose 50% Injectable 25 Gram(s) IV Push once  dextrose 50% Injectable 12.5 Gram(s) IV Push once  dextrose 50% Injectable 25 Gram(s) IV Push once  epoetin nyasia-epbx (RETACRIT) Injectable 84648 Unit(s) IV Push <User Schedule>  glucagon  Injectable 1 milliGRAM(s) IntraMuscular once  heparin   Injectable 5000 Unit(s) SubCutaneous every 8 hours  influenza  Vaccine (HIGH DOSE) 0.7 milliLiter(s) IntraMuscular once  insulin lispro (ADMELOG) corrective regimen sliding scale   SubCutaneous three times a day before meals  insulin lispro (ADMELOG) corrective regimen sliding scale   SubCutaneous at bedtime  Nephro-zack 1 Tablet(s) Oral daily  pantoprazole    Tablet 40 milliGRAM(s) Oral before breakfast  polyethylene glycol 3350 17 Gram(s) Oral daily  sevelamer carbonate 2400 milliGRAM(s) Oral three times a day with meals    MEDICATIONS  (PRN):  acetaminophen     Tablet .. 650 milliGRAM(s) Oral every 6 hours PRN Temp greater or equal to 38C (100.4F), Mild Pain (1 - 3)  dextrose Oral Gel 15 Gram(s) Oral once PRN Blood Glucose LESS THAN 70 milliGRAM(s)/deciliter      __________________________________________________  REVIEW OF SYSTEMS:    CONSTITUTIONAL: No fever,   EYES: no acute visual disturbances  NECK: No pain or stiffness  RESPIRATORY: No cough; No shortness of breath  CARDIOVASCULAR: No chest pain, no palpitations  GASTROINTESTINAL: No pain. No nausea or vomiting; No diarrhea   NEUROLOGICAL: No headache or numbness, no tremors  MUSCULOSKELETAL: No joint pain, no muscle pain  GENITOURINARY: no dysuria, no frequency, no hesitancy  PSYCHIATRY: no depression , no anxiety  ALL OTHER  ROS negative        Vital Signs Last 24 Hrs  T(C): 36.2 (21 Nov 2022 05:33), Max: 36.5 (20 Nov 2022 12:54)  T(F): 97.2 (21 Nov 2022 05:33), Max: 97.7 (20 Nov 2022 12:54)  HR: 73 (21 Nov 2022 05:33) (73 - 86)  BP: 143/80 (21 Nov 2022 05:33) (110/93 - 143/80)  BP(mean): --  RR: 18 (21 Nov 2022 05:33) (16 - 18)  SpO2: 98% (21 Nov 2022 05:33) (98% - 100%)    Parameters below as of 21 Nov 2022 05:33  Patient On (Oxygen Delivery Method): room air        ________________________________________________  PHYSICAL EXAM:  GENERAL: NAD  HEENT: Normocephalic;  conjunctivae and sclerae clear  NECK : supple  CHEST/LUNG: Clear to auscultation bilaterally  HEART: S1 S2  RRR  ABDOMEN: Soft, Nontender, Nondistended; Bowel sounds present  EXTREMITIES: no cyanosis; no edema; + R BKA wrapped in gauze  SKIN: warm and dry; no rash  NERVOUS SYSTEM:  Awake and alert; Oriented  to person, place, time, Forgetful      _________________________________________________  LABS:                        9.2    9.00  )-----------( 349      ( 20 Nov 2022 07:41 )             29.4     11-20    136  |  100  |  49<H>  ----------------------------<  243<H>  4.5   |  28  |  4.34<H>    Ca    8.9      20 Nov 2022 07:41  Phos  3.0     11-20  Mg     2.5     11-20          CAPILLARY BLOOD GLUCOSE      POCT Blood Glucose.: 373 mg/dL (21 Nov 2022 11:52)  POCT Blood Glucose.: 247 mg/dL (21 Nov 2022 08:36)  POCT Blood Glucose.: 247 mg/dL (20 Nov 2022 21:17)  POCT Blood Glucose.: 211 mg/dL (20 Nov 2022 16:44)  POCT Blood Glucose.: 321 mg/dL (20 Nov 2022 12:14)        RADIOLOGY & ADDITIONAL TESTS:  < from: Xray Foot AP + Lateral + Oblique, Right (11.08.22 @ 19:08) >  IMPRESSION:  1.  Swelling with soft tissue ulcer posterior to calcaneus.  2.  No plain film evidence of osteomyelitis.  3.  Obtain MRI as warranted clinically.    < end of copied text >        Imaging Personally Reviewed:  YES    Consultant(s) Notes Reviewed:   YES      Plan of care was discussed with patient and /or primary care giver; all questions and concerns were addressed and care was aligned with patient's wishes.

## 2022-11-21 NOTE — PROGRESS NOTE ADULT - ASSESSMENT
73 y/o male from [facility], with PMHx of ESRD (T-TH-S), DM, s/p Rt foot angiogram 10/03 confirmed severe PAD and  no targets for re vascularization, vascular followed and recommended a Right BKA at that time but patient refused. Patient presented back to ED on 11/8/22 with c/o R foot pain and admitted to medicine. 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 Complete abx course IV Linezolid & Zosyn on 11/15. ID Dr. Babcock followed. PT recommends ANGELA.   Hospital course complicated by Covid + conversion on 11/13, on RA. Will likely return back to [facility] once quarantine period is completed on 11/24

## 2022-11-21 NOTE — PROGRESS NOTE ADULT - SUBJECTIVE AND OBJECTIVE BOX
PATIENT SEEN AND EXAMINED ON :- 11/21/22  DATE OF SERVICE:   11/21/22          Interim events noted,Labs ,Radiological studies and Cardiology tests reviewed        HOSPITAL COURSE: HPI:  72 year old male, coming from [facility], with medical history of ESRD (T-TH-S), and DM coming to ED c/o R foot pain. Patient has severe peripheral arterial disease. He was admitted on September 26 2022 for gangrene of right foot. At that time, patient was treated with IV abx. MRI was negative for OM. He had R LE angiogram on 10/3 w/ no targets for re vascularization but showed extensive distal small vessel disease, there was no acute vascular interventions done. Patient was recommended a R BKA but wanted a medical management instead. Patient now coming in c/o R foot pain. He states that he wants surgery now for his foot. He denies any fevers, chills, headaches, dizziness, chest pain, cough, SOB, abd pain, n/v, diarrhea, constipation, hematuria, dysuria, numbness, and weakness.    in the ed VS: T 98, HR 88, /60, RR 18, Spo2 96%RA  Hb 8.1   s/p 1 dose vanc and zosyn in Ed    (08 Nov 2022 21:31)      INTERIM EVENTS:Patient seen at bedside ,interim events noted.      PMH -reviewed admission note, no change since admission  HEART FAILURE: Acute[ ]Chronic[ ] Systolic[ ] Diastolic[ ] Combined Systolic and Diastolic[ ]  CAD[ ] CABG[ ] PCI[ ]  DEVICES[ ] PPM[ ] ICD[ ] ILR[ ]  ATRIAL FIBRILLATION[ ] Paroxysmal[ ] Permanent[ ] CHADS2-[  ]  CANDE[ ] CKD1[ ] CKD2[ ] CKD3[ ] CKD4[ ] ESRD[ ]  COPD[ ] HTN[ ]   DM[ ] Type1[ ] Type 2[ ]   CVA[ ] Paresis[ ]    AMBULATION: Assisted[ ] Cane/walker[ ] Independent[ ]    MEDICATIONS  (STANDING):  aspirin enteric coated 81 milliGRAM(s) Oral daily  chlorhexidine 2% Cloths 1 Application(s) Topical daily  clopidogrel Tablet 75 milliGRAM(s) Oral daily  dextrose 5%. 1000 milliLiter(s) (100 mL/Hr) IV Continuous <Continuous>  dextrose 5%. 1000 milliLiter(s) (50 mL/Hr) IV Continuous <Continuous>  dextrose 50% Injectable 25 Gram(s) IV Push once  dextrose 50% Injectable 12.5 Gram(s) IV Push once  dextrose 50% Injectable 25 Gram(s) IV Push once  epoetin nyasia-epbx (RETACRIT) Injectable 24417 Unit(s) IV Push <User Schedule>  glucagon  Injectable 1 milliGRAM(s) IntraMuscular once  heparin   Injectable 5000 Unit(s) SubCutaneous every 8 hours  influenza  Vaccine (HIGH DOSE) 0.7 milliLiter(s) IntraMuscular once  insulin lispro (ADMELOG) corrective regimen sliding scale   SubCutaneous three times a day before meals  insulin lispro (ADMELOG) corrective regimen sliding scale   SubCutaneous at bedtime  Nephro-zack 1 Tablet(s) Oral daily  pantoprazole    Tablet 40 milliGRAM(s) Oral before breakfast  polyethylene glycol 3350 17 Gram(s) Oral daily  sevelamer carbonate 2400 milliGRAM(s) Oral three times a day with meals    MEDICATIONS  (PRN):  acetaminophen     Tablet .. 650 milliGRAM(s) Oral every 6 hours PRN Temp greater or equal to 38C (100.4F), Mild Pain (1 - 3)  dextrose Oral Gel 15 Gram(s) Oral once PRN Blood Glucose LESS THAN 70 milliGRAM(s)/deciliter            REVIEW OF SYSTEMS:  Constitutional: [ ] fever, [ ]weight loss,  [ ]fatigue [ ]weight gain  Eyes: [ ] visual changes  Respiratory: [ ]shortness of breath;  [ ] cough, [ ]wheezing, [ ]chills, [ ]hemoptysis  Cardiovascular: [ ] chest pain, [ ]palpitations, [ ]dizziness,  [ ]leg swelling[ ]orthopnea[ ]PND  Gastrointestinal: [ ] abdominal pain, [ ]nausea, [ ]vomiting,  [ ]diarrhea [ ]Constipation [ ]Melena  Genitourinary: [ ] dysuria, [ ] hematuria [ ]Garcia  Neurologic: [ ] headaches [ ] tremors[ ]weakness [ ]Paralysis Right[ ] Left[ ]  Skin: [ ] itching, [ ]burning, [ ] rashes  Endocrine: [ ] heat or cold intolerance  Musculoskeletal: [ ] joint pain or swelling; [ ] muscle, back, or extremity pain  Psychiatric: [ ] depression, [ ]anxiety, [ ]mood swings, or [ ]difficulty sleeping  Hematologic: [ ] easy bruising, [ ] bleeding gums    [ ] All remaining systems negative except as per above.   [ ]Unable to obtain.  [x] No change in ROS since admission      Vital Signs Last 24 Hrs  T(C): 36.8 (21 Nov 2022 12:34), Max: 36.8 (21 Nov 2022 12:34)  T(F): 98.3 (21 Nov 2022 12:34), Max: 98.3 (21 Nov 2022 12:34)  HR: 90 (21 Nov 2022 12:34) (73 - 90)  BP: 141/72 (21 Nov 2022 12:34) (110/93 - 143/80)  BP(mean): --  RR: 18 (21 Nov 2022 12:34) (16 - 18)  SpO2: 100% (21 Nov 2022 12:34) (98% - 100%)    Parameters below as of 21 Nov 2022 12:34  Patient On (Oxygen Delivery Method): room air      I&O's Summary    20 Nov 2022 07:01  -  21 Nov 2022 07:00  --------------------------------------------------------  IN: 0 mL / OUT: 350 mL / NET: -350 mL        PHYSICAL EXAM:  General: No acute distress BMI-  HEENT: EOMI, PERRL  Neck: Supple, [ ] JVD  Lungs: Equal air entry bilaterally; [ ] rales [ ] wheezing [ ] rhonchi  Heart: Regular rate and rhythm; [x ] murmur   2/6 [ x] systolic [ ] diastolic [ ] radiation[ ] rubs [ ]  gallops  Abdomen: Nontender, bowel sounds present  Extremities: No clubbing, cyanosis, [ ] edema [ ]Pulses  equal and intact  Nervous system:  Alert & Oriented X3, no focal deficits  Psychiatric: Normal affect  Skin: No rashes or lesions    LABS:  11-20    136  |  100  |  49<H>  ----------------------------<  243<H>  4.5   |  28  |  4.34<H>    Ca    8.9      20 Nov 2022 07:41  Phos  3.0     11-20  Mg     2.5     11-20      Creatinine Trend: 4.34<--, 5.22<--, 4.05<--, 5.94<--, 5.37<--, 4.42<--                        9.2    9.00  )-----------( 349      ( 20 Nov 2022 07:41 )             29.4

## 2022-11-21 NOTE — PROGRESS NOTE ADULT - SUBJECTIVE AND OBJECTIVE BOX
Patient is a 72y old  Male who presents with a chief complaint of toe gangrene (20 Nov 2022 14:45)      INTERVAL HPI/OVERNIGHT EVENTS: no acute events overnight    MEDICATIONS  (STANDING):  aspirin enteric coated 81 milliGRAM(s) Oral daily  chlorhexidine 2% Cloths 1 Application(s) Topical daily  clopidogrel Tablet 75 milliGRAM(s) Oral daily  dextrose 5%. 1000 milliLiter(s) (100 mL/Hr) IV Continuous <Continuous>  dextrose 5%. 1000 milliLiter(s) (50 mL/Hr) IV Continuous <Continuous>  dextrose 50% Injectable 25 Gram(s) IV Push once  dextrose 50% Injectable 12.5 Gram(s) IV Push once  dextrose 50% Injectable 25 Gram(s) IV Push once  epoetin nyasia-epbx (RETACRIT) Injectable 64606 Unit(s) IV Push <User Schedule>  glucagon  Injectable 1 milliGRAM(s) IntraMuscular once  heparin   Injectable 5000 Unit(s) SubCutaneous every 8 hours  influenza  Vaccine (HIGH DOSE) 0.7 milliLiter(s) IntraMuscular once  insulin lispro (ADMELOG) corrective regimen sliding scale   SubCutaneous three times a day before meals  insulin lispro (ADMELOG) corrective regimen sliding scale   SubCutaneous at bedtime  Nephro-zack 1 Tablet(s) Oral daily  pantoprazole    Tablet 40 milliGRAM(s) Oral before breakfast  polyethylene glycol 3350 17 Gram(s) Oral daily  sevelamer carbonate 2400 milliGRAM(s) Oral three times a day with meals    MEDICATIONS  (PRN):  acetaminophen     Tablet .. 650 milliGRAM(s) Oral every 6 hours PRN Temp greater or equal to 38C (100.4F), Mild Pain (1 - 3)  dextrose Oral Gel 15 Gram(s) Oral once PRN Blood Glucose LESS THAN 70 milliGRAM(s)/deciliter      __________________________________________________  REVIEW OF SYSTEMS:    CONSTITUTIONAL: No fever,   EYES: no acute visual disturbances  NECK: No pain or stiffness  RESPIRATORY: No cough; No shortness of breath  CARDIOVASCULAR: No chest pain, no palpitations  GASTROINTESTINAL: No pain. No nausea or vomiting; No diarrhea   NEUROLOGICAL: No headache or numbness, no tremors  MUSCULOSKELETAL: No joint pain, no muscle pain  GENITOURINARY: no dysuria, no frequency, no hesitancy  PSYCHIATRY: no depression , no anxiety  ALL OTHER  ROS negative        Vital Signs Last 24 Hrs  T(C): 36.2 (21 Nov 2022 05:33), Max: 36.5 (20 Nov 2022 12:54)  T(F): 97.2 (21 Nov 2022 05:33), Max: 97.7 (20 Nov 2022 12:54)  HR: 73 (21 Nov 2022 05:33) (73 - 86)  BP: 143/80 (21 Nov 2022 05:33) (110/93 - 143/80)  BP(mean): --  RR: 18 (21 Nov 2022 05:33) (16 - 18)  SpO2: 98% (21 Nov 2022 05:33) (98% - 100%)    Parameters below as of 21 Nov 2022 05:33  Patient On (Oxygen Delivery Method): room air        ________________________________________________  PHYSICAL EXAM:  GENERAL: NAD  HEENT: Normocephalic;  conjunctivae and sclerae clear  NECK : supple  CHEST/LUNG: dec breath sounds at bases  HEART: S1 S2  RRR  ABDOMEN: Soft, Nontender, Nondistended; Bowel sounds present  EXTREMITIES: no cyanosis; no edema; + R BKA wrapped in gauze  SKIN: warm and dry; no rash  NERVOUS SYSTEM:  Awake and alert; Oriented  to person, place, time, Forgetful      _________________________________________________  LABS:                        9.2    9.00  )-----------( 349      ( 20 Nov 2022 07:41 )             29.4     11-20    136  |  100  |  49<H>  ----------------------------<  243<H>  4.5   |  28  |  4.34<H>    Ca    8.9      20 Nov 2022 07:41  Phos  3.0     11-20  Mg     2.5     11-20          CAPILLARY BLOOD GLUCOSE      POCT Blood Glucose.: 373 mg/dL (21 Nov 2022 11:52)  POCT Blood Glucose.: 247 mg/dL (21 Nov 2022 08:36)  POCT Blood Glucose.: 247 mg/dL (20 Nov 2022 21:17)  POCT Blood Glucose.: 211 mg/dL (20 Nov 2022 16:44)  POCT Blood Glucose.: 321 mg/dL (20 Nov 2022 12:14)        RADIOLOGY & ADDITIONAL TESTS:  < from: Xray Foot AP + Lateral + Oblique, Right (11.08.22 @ 19:08) >  IMPRESSION:  1.  Swelling with soft tissue ulcer posterior to calcaneus.  2.  No plain film evidence of osteomyelitis.  3.  Obtain MRI as warranted clinically.    < end of copied text >        Imaging Personally Reviewed:  YES    Consultant(s) Notes Reviewed:   YES      Plan of care was discussed with patient and /or primary care giver; all questions and concerns were addressed and care was aligned with patient's wishes.

## 2022-11-21 NOTE — PROGRESS NOTE ADULT - ASSESSMENT
Patient is a 73yo Male with ESRD on HD, DM a/w Rt foot gangrene. Nephrology consulted for ESRD status.   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       1) ESRD:  Last HD 11/19, with intradialytic hypotension and 1L removed. Plan for next HD today 11/21 due to holiday schedule.  Monitor electrolytes.  Pt with Left UE AVF- no thrill no bruit- f/u Vascular Surgeon Dr. Dutta as an outpt.   2) Anemia of renal disease: hgb low with adequate iron stores. s/p 2 unit prbc on 11/9. c/w Epogen 12k units IV tiw in HD (increased 11/15). Transfuse prbc prn. Monitor Hb.  3) Hyperphosphatemia: serum phosphorus acceptable . c/w low phos diet and Sevelamer 2400mg PO tid with meals. Monitor serum calcium and phosphorus.  4) Rt foot gangrene: s/p Rt BKA 11/9 by Dr. Billy. Finished Linezolid. Vascular/ ID following.     [facility] NEPHROLOGY  Wili Hopkins M.D.  Kiran Feng D.O.  Sujatha Dumont M.D.  Radha Miller, MSN, ANP-C  [phone]    [address] [place] [id number]

## 2022-11-21 NOTE — PROGRESS NOTE ADULT - ASSESSMENT
73 y/o male from [facility], with PMHx of ESRD (T-TH-S), DM, s/p Rt foot angiogram 10/03 confirmed severe PAD and  no targets for re vascularization, vascular followed and recommended a Right BKA at that time but patient refused. Patient presented back to ED on 11/8/22 with c/o R foot pain and admitted to medicine. 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 Complete abx course IV Linezolid & Zosyn on 11/15. ID Dr. Babcock followed. PT recommends ANGELA.   Hospital course complicated by Covid + conversion on 11/13, on RA. Will likely return back to [facility] once quarantine period is completed on 11/24

## 2022-11-21 NOTE — PROGRESS NOTE ADULT - PROBLEM SELECTOR PLAN 6
- from [facility]  - PT reccs: ANGELA   - Covid conversion on 11/13, asymptomatic  - will return to [facility] once he completes quarantine period on 11/24

## 2022-11-21 NOTE — PROGRESS NOTE ADULT - PROBLEM SELECTOR PLAN 6
- from [facility]  - PT reccs: ANGELA   - Covid conversion on 11/13, asymptomatic  - will return to [facility] once he completes quarantine period on 11/24

## 2022-11-21 NOTE — PROGRESS NOTE ADULT - SUBJECTIVE AND OBJECTIVE BOX
[facility] NEPHROLOGY- PROGRESS NOTE    Patient is a 71yo Male with ESRD on HD, DM a/w Rt foot gangrene. Nephrology consulted for ESRD status.   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     11/13: COVID +    Hospital Medications: Medications reviewed.  REVIEW OF SYSTEMS:  CONSTITUTIONAL: No fevers or chills  RESPIRATORY: No shortness of breath  CARDIOVASCULAR: No chest pain.  GASTROINTESTINAL: No nausea, vomiting, diarrhea or abdominal pain.   VASCULAR: No left lower extremity edema. +RLE - denies pain      VITALS:  T(F): 98.5 (11-21-22 @ 21:42), Max: 98.5 (11-21-22 @ 21:42)  HR: 99 (11-21-22 @ 21:42)  BP: 119/66 (11-21-22 @ 21:42)  RR: 17 (11-21-22 @ 21:42)  SpO2: 97% (11-21-22 @ 21:42)  Wt(kg): --    11-20 @ 07:01  -  11-21 @ 07:00  --------------------------------------------------------  IN: 0 mL / OUT: 350 mL / NET: -350 mL    11-21 @ 07:01  -  11-21 @ 23:07  --------------------------------------------------------  IN: 400 mL / OUT: 1500 mL / NET: -1100 mL      PHYSICAL EXAM:  Gen: NAD, calm  HEENT: anicteric  Neck: no JVD  Cards: RRR, +S1/S2, no M/G/R  Resp: CTA B/L  GI: soft, NT/ND, NABS  Extremities: no LLE edema  Derm: s/p Rt BKA wrapped  Access: Rt IJ tunneled HD catheter- benign  Left upper arm AVF- no thrill no bruit      LABS:  11-21    134<L>  |  98  |  78<H>  ----------------------------<  184<H>  4.8   |  25  |  5.61<H>    Ca    8.9      21 Nov 2022 15:41  Phos  3.4     11-21  Mg     2.5     11-21      Creatinine Trend: 5.61 <--, 4.34 <--, 5.22 <--, 4.05 <--, 5.94 <--                        8.7    8.42  )-----------( 459      ( 21 Nov 2022 15:35 )             27.5

## 2022-11-22 ENCOUNTER — TRANSCRIPTION ENCOUNTER (OUTPATIENT)
Age: 72
End: 2022-11-22

## 2022-11-22 VITALS
RESPIRATION RATE: 18 BRPM | TEMPERATURE: 97 F | HEART RATE: 84 BPM | DIASTOLIC BLOOD PRESSURE: 67 MMHG | SYSTOLIC BLOOD PRESSURE: 111 MMHG | OXYGEN SATURATION: 98 %

## 2022-11-22 LAB
GLUCOSE BLDC GLUCOMTR-MCNC: 147 MG/DL — HIGH (ref 70–99)
GLUCOSE BLDC GLUCOMTR-MCNC: 219 MG/DL — HIGH (ref 70–99)
GLUCOSE BLDC GLUCOMTR-MCNC: 265 MG/DL — HIGH (ref 70–99)

## 2022-11-22 PROCEDURE — 87340 HEPATITIS B SURFACE AG IA: CPT

## 2022-11-22 PROCEDURE — 99261: CPT

## 2022-11-22 PROCEDURE — 83605 ASSAY OF LACTIC ACID: CPT

## 2022-11-22 PROCEDURE — 87640 STAPH A DNA AMP PROBE: CPT

## 2022-11-22 PROCEDURE — 83735 ASSAY OF MAGNESIUM: CPT

## 2022-11-22 PROCEDURE — 93005 ELECTROCARDIOGRAM TRACING: CPT

## 2022-11-22 PROCEDURE — 80053 COMPREHEN METABOLIC PANEL: CPT

## 2022-11-22 PROCEDURE — 87641 MR-STAPH DNA AMP PROBE: CPT

## 2022-11-22 PROCEDURE — 84100 ASSAY OF PHOSPHORUS: CPT

## 2022-11-22 PROCEDURE — 86850 RBC ANTIBODY SCREEN: CPT

## 2022-11-22 PROCEDURE — P9040: CPT

## 2022-11-22 PROCEDURE — C9399: CPT

## 2022-11-22 PROCEDURE — 83540 ASSAY OF IRON: CPT

## 2022-11-22 PROCEDURE — 99285 EMERGENCY DEPT VISIT HI MDM: CPT | Mod: 25

## 2022-11-22 PROCEDURE — 85610 PROTHROMBIN TIME: CPT

## 2022-11-22 PROCEDURE — 82728 ASSAY OF FERRITIN: CPT

## 2022-11-22 PROCEDURE — 85027 COMPLETE CBC AUTOMATED: CPT

## 2022-11-22 PROCEDURE — 87040 BLOOD CULTURE FOR BACTERIA: CPT

## 2022-11-22 PROCEDURE — 97161 PT EVAL LOW COMPLEX 20 MIN: CPT

## 2022-11-22 PROCEDURE — 88307 TISSUE EXAM BY PATHOLOGIST: CPT

## 2022-11-22 PROCEDURE — 86140 C-REACTIVE PROTEIN: CPT

## 2022-11-22 PROCEDURE — 83550 IRON BINDING TEST: CPT

## 2022-11-22 PROCEDURE — 82962 GLUCOSE BLOOD TEST: CPT

## 2022-11-22 PROCEDURE — 85025 COMPLETE CBC W/AUTO DIFF WBC: CPT

## 2022-11-22 PROCEDURE — 96374 THER/PROPH/DIAG INJ IV PUSH: CPT

## 2022-11-22 PROCEDURE — 81001 URINALYSIS AUTO W/SCOPE: CPT

## 2022-11-22 PROCEDURE — 80048 BASIC METABOLIC PNL TOTAL CA: CPT

## 2022-11-22 PROCEDURE — 87635 SARS-COV-2 COVID-19 AMP PRB: CPT

## 2022-11-22 PROCEDURE — 86923 COMPATIBILITY TEST ELECTRIC: CPT

## 2022-11-22 PROCEDURE — 84466 ASSAY OF TRANSFERRIN: CPT

## 2022-11-22 PROCEDURE — 36415 COLL VENOUS BLD VENIPUNCTURE: CPT

## 2022-11-22 PROCEDURE — 36430 TRANSFUSION BLD/BLD COMPNT: CPT

## 2022-11-22 PROCEDURE — 86901 BLOOD TYPING SEROLOGIC RH(D): CPT

## 2022-11-22 PROCEDURE — 73630 X-RAY EXAM OF FOOT: CPT

## 2022-11-22 PROCEDURE — 87086 URINE CULTURE/COLONY COUNT: CPT

## 2022-11-22 PROCEDURE — 85652 RBC SED RATE AUTOMATED: CPT

## 2022-11-22 PROCEDURE — 97110 THERAPEUTIC EXERCISES: CPT

## 2022-11-22 PROCEDURE — 85730 THROMBOPLASTIN TIME PARTIAL: CPT

## 2022-11-22 PROCEDURE — 86900 BLOOD TYPING SEROLOGIC ABO: CPT

## 2022-11-22 RX ORDER — ERYTHROPOIETIN 10000 [IU]/ML
12000 INJECTION, SOLUTION INTRAVENOUS; SUBCUTANEOUS
Qty: 0 | Refills: 0 | DISCHARGE
Start: 2022-11-22

## 2022-11-22 RX ORDER — PANTOPRAZOLE SODIUM 20 MG/1
1 TABLET, DELAYED RELEASE ORAL
Qty: 0 | Refills: 0 | DISCHARGE
Start: 2022-11-22

## 2022-11-22 RX ADMIN — CHLORHEXIDINE GLUCONATE 1 APPLICATION(S): 213 SOLUTION TOPICAL at 11:22

## 2022-11-22 RX ADMIN — Medication 1 TABLET(S): at 11:23

## 2022-11-22 RX ADMIN — HEPARIN SODIUM 5000 UNIT(S): 5000 INJECTION INTRAVENOUS; SUBCUTANEOUS at 13:29

## 2022-11-22 RX ADMIN — Medication 81 MILLIGRAM(S): at 11:23

## 2022-11-22 RX ADMIN — HEPARIN SODIUM 5000 UNIT(S): 5000 INJECTION INTRAVENOUS; SUBCUTANEOUS at 05:29

## 2022-11-22 RX ADMIN — Medication 3: at 12:15

## 2022-11-22 RX ADMIN — SEVELAMER CARBONATE 2400 MILLIGRAM(S): 2400 POWDER, FOR SUSPENSION ORAL at 07:56

## 2022-11-22 RX ADMIN — POLYETHYLENE GLYCOL 3350 17 GRAM(S): 17 POWDER, FOR SOLUTION ORAL at 11:23

## 2022-11-22 RX ADMIN — Medication 2: at 07:55

## 2022-11-22 RX ADMIN — PANTOPRAZOLE SODIUM 40 MILLIGRAM(S): 20 TABLET, DELAYED RELEASE ORAL at 07:54

## 2022-11-22 RX ADMIN — CLOPIDOGREL BISULFATE 75 MILLIGRAM(S): 75 TABLET, FILM COATED ORAL at 11:23

## 2022-11-22 RX ADMIN — SEVELAMER CARBONATE 2400 MILLIGRAM(S): 2400 POWDER, FOR SUSPENSION ORAL at 12:16

## 2022-11-22 RX ADMIN — SEVELAMER CARBONATE 2400 MILLIGRAM(S): 2400 POWDER, FOR SUSPENSION ORAL at 17:07

## 2022-11-22 NOTE — PROGRESS NOTE ADULT - PROBLEM SELECTOR PROBLEM 5
Prophylactic measure

## 2022-11-22 NOTE — PROGRESS NOTE ADULT - PROBLEM SELECTOR PROBLEM 2
Anemia

## 2022-11-22 NOTE — PROGRESS NOTE ADULT - REASON FOR ADMISSION
toe gangrene

## 2022-11-22 NOTE — PROGRESS NOTE ADULT - SUBJECTIVE AND OBJECTIVE BOX
[facility] NEPHROLOGY- PROGRESS NOTE    Patient is a 73yo Male with ESRD on HD, DM a/w Rt foot gangrene. Nephrology consulted for ESRD status.   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     11/13: COVID +    Hospital Medications: Medications reviewed.  REVIEW OF SYSTEMS:  CONSTITUTIONAL: No fevers or chills  RESPIRATORY: No shortness of breath  CARDIOVASCULAR: No chest pain.  GASTROINTESTINAL: No nausea, vomiting, diarrhea or abdominal pain.   VASCULAR: No left lower extremity edema. +RLE - denies pain      VITALS:  T(F): 97.2 (11-22-22 @ 12:54), Max: 98.5 (11-22-22 @ 04:50)  HR: 84 (11-22-22 @ 12:54)  BP: 111/67 (11-22-22 @ 12:54)  RR: 18 (11-22-22 @ 12:54)  SpO2: 98% (11-22-22 @ 12:54)    11-21 @ 07:01  -  11-22 @ 07:00  --------------------------------------------------------  IN: 400 mL / OUT: 1500 mL / NET: -1100 mL      PHYSICAL EXAM:  Gen: NAD, calm  HEENT: anicteric  Neck: no JVD  Cards: RRR, +S1/S2, no M/G/R  Resp: CTA B/L  GI: soft, NT/ND, NABS  Extremities: no LLE edema  Derm: s/p Rt BKA wrapped  Access: Rt IJ tunneled HD catheter- benign  Left upper arm AVF- no thrill no bruit      LABS:  11-21    134<L>  |  98  |  78<H>  ----------------------------<  184<H>  4.8   |  25  |  5.61<H>    Ca    8.9      21 Nov 2022 15:41  Phos  3.4     11-21  Mg     2.5     11-21      Creatinine Trend: 5.61 <--, 4.34 <--, 5.22 <--, 4.05 <--                        8.7    8.42  )-----------( 459      ( 21 Nov 2022 15:35 )             27.5

## 2022-11-22 NOTE — PROGRESS NOTE ADULT - PROBLEM SELECTOR PROBLEM 1
Gangrene of toe of right foot

## 2022-11-22 NOTE — PROGRESS NOTE ADULT - ASSESSMENT
71 y/o male from [facility], with PMHx of ESRD (T-TH-S), DM, s/p Rt foot angiogram 10/03 confirmed severe PAD and  no targets for re vascularization, vascular followed and recommended a Right BKA at that time but patient refused. Patient presented back to ED on 11/8/22 with c/o R foot pain and admitted to medicine. 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 Complete abx course IV Linezolid & Zosyn on 11/15. ID Dr. Babcock followed. PT recommends ANGELA.   Hospital course complicated by Covid + conversion on 11/13, on RA. Will likely return back to [facility] once quarantine period is completed on 11/24

## 2022-11-22 NOTE — DISCHARGE NOTE NURSING/CASE MANAGEMENT/SOCIAL WORK - PATIENT PORTAL LINK FT
You can access the FollowMyHealth Patient Portal offered by [facility] by registering at the following website: [email or web address]. By joining [facility]’s FollowMyHealth portal, you will also be able to view your health information using other applications (apps) compatible with our system.

## 2022-11-22 NOTE — PROGRESS NOTE ADULT - PROBLEM SELECTOR PLAN 5
DVT PPX: on heparin  PUD PPX: PPI
DVT PPX: on heparin  PUD PPX: protonix
DVT PPX: on heparin  PUD PPX: PPI
DVT PPX: on heparin  PUD PPX: protonix
DVT PPX: Heparin  PUD PPX: PPI
DVT PPX: on heparin  PUD PPX: protonix
DVT PPX: on heparin  PUD PPX: PPI
DVT PPX: on heparin  PUD PPX: protonix
DVT PPX: Heparin  PUD PPX: PPI
DVT PPX: on heparin  PUD PPX: PPI
DVT PPX: on heparin  PUD PPX: protonix
DVT PPX: c/w heparin postop  PUD PPX:  protonix
DVT PPX: on heparin  PUD PPX: protonix
DVT PPX: on heparin  PUD PPX: PPI
DVT PPX: on heparin  PUD PPX: PPI
DVT PPX: c/w heparin postop  PUD PPX:  protonix
DVT PPX: on heparin  PUD PPX: PPI
DVT PPX: on heparin  PUD PPX: protonix
DVT PPX: on heparin  PUD PPX: PPI
DVT PPX: Heparin  PUD PPX: PPI
DVT PPX: on heparin  PUD PPX: protonix
DVT PPX: on heparin  PUD PPX: PPI
DVT PPX: Heparin  PUD PPX: PPI
DVT PPX: on heparin  PUD PPX: protonix

## 2022-11-22 NOTE — PROGRESS NOTE ADULT - TIME BILLING
- Review of records, telemetry, vital signs and daily labs.   - General and cardiovascular physical examination.  - Generation of cardiovascular treatment plan.  - Coordination of care.      Patient was seen and examined by me on, 11/20/22 interim events noted,labs and radiology studies reviewed.  Mumtaz Babcock MD,FACC.  [address] [place], [place] [id number].  [phone]
- Review of records, telemetry, vital signs and daily labs.   - General and cardiovascular physical examination.  - Generation of cardiovascular treatment plan.  - Coordination of care.      Patient was seen and examined by me on 11/12/22,interim events noted,labs and radiology studies reviewed.  Mumtaz Babcock MD,FACC.  [address] [place], [place] [id number].  [phone]
- Review of records, telemetry, vital signs and daily labs.   - General and cardiovascular physical examination.  - Generation of cardiovascular treatment plan.  - Coordination of care.      Patient was seen and examined by me on 11/11/22,interim events noted,labs and radiology studies reviewed.  Mumtaz Babcock MD,FACC.  [address] [place], [place] [id number].  [phone]
- Review of records, telemetry, vital signs and daily labs.   - General and cardiovascular physical examination.  - Generation of cardiovascular treatment plan.  - Coordination of care.      Patient was seen and examined by me on 11/13/22,interim events noted,labs and radiology studies reviewed.  Mumtaz Babcock MD,FACC.  [address] [place], [place] [id number].  [phone]
- Review of records, telemetry, vital signs and daily labs.   - General and cardiovascular physical examination.  - Generation of cardiovascular treatment plan.  - Coordination of care.      Patient was seen and examined by me on 11/17/22,interim events noted,labs and radiology studies reviewed.  Mumtaz Babcock MD,FACC.  [address] [place], [place] [id number].  [phone]
- Review of records, telemetry, vital signs and daily labs.   - General and cardiovascular physical examination.  - Generation of cardiovascular treatment plan.  - Coordination of care.      Patient was seen and examined by me on, 11/18/22 interim events noted,labs and radiology studies reviewed.  Mumtaz Babcock MD,FACC.  [address] [place], [place] [id number].  [phone]
- Review of records, telemetry, vital signs and daily labs.   - General and cardiovascular physical examination.  - Generation of cardiovascular treatment plan.  - Coordination of care.      Patient was seen and examined by me on 11/16/22,interim events noted,labs and radiology studies reviewed.  Mumtaz Babcock MD,FACC.  [address] [place], [place] [id number].  [phone]
- Review of records, telemetry, vital signs and daily labs.   - General and cardiovascular physical examination.  - Generation of cardiovascular treatment plan.  - Coordination of care.      Patient was seen and examined by me on 11/10/22,interim events noted,labs and radiology studies reviewed.  Mumtaz Babcock MD,FACC.  [address] [place], [place] [id number].  [phone]
- Review of records, telemetry, vital signs and daily labs.   - General and cardiovascular physical examination.  - Generation of cardiovascular treatment plan.  - Coordination of care.      Patient was seen and examined by me on 11/21/22,interim events noted,labs and radiology studies reviewed.  Mumtaz Babcock MD,FACC.  [address] [place], [place] [phone].  [phone]
- Review of records, telemetry, vital signs and daily labs.   - General and cardiovascular physical examination.  - Generation of cardiovascular treatment plan.  - Coordination of care.      Patient was seen and examined by me on, 11/19/22 interim events noted,labs and radiology studies reviewed.  Mumtaz Babcock MD,FACC.  [address] [place], [place] [id number].  [phone]
- Review of records, telemetry, vital signs and daily labs.   - General and cardiovascular physical examination.  - Generation of cardiovascular treatment plan.  - Coordination of care.      Patient was seen and examined by me on 11/22/22,interim events noted,labs and radiology studies reviewed.  Mumtaz Babcock MD,FACC.  [address] [place], [place] [id number].  [phone]
- Review of records, telemetry, vital signs and daily labs.   - General and cardiovascular physical examination.  - Generation of cardiovascular treatment plan.  - Coordination of care.      Patient was seen and examined by me on 11/14/22,interim events noted,labs and radiology studies reviewed.  Mumtaz Babcock MD,FACC.  [address] [place], [place] [id number].  [phone]
- Review of records, telemetry, vital signs and daily labs.   - General and cardiovascular physical examination.  - Generation of cardiovascular treatment plan.  - Coordination of care.      Patient was seen and examined by me on 11/15/22,interim events noted,labs and radiology studies reviewed.  Mumtaz Babcock MD,FACC.  [address] [place], [place] [id number].  [phone]

## 2022-11-22 NOTE — PROGRESS NOTE ADULT - ASSESSMENT
Patient is a 73yo Male with ESRD on HD, DM a/w Rt foot gangrene. Nephrology consulted for ESRD status.   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       1) ESRD:  Last HD 11/21. Plan for next HD 11/23 due to holiday schedule.  Monitor electrolytes.  Pt with Left UE AVF- no thrill no bruit- f/u Vascular Surgeon Dr. Dutta as an outpt.   2) Anemia of renal disease: hgb low with adequate iron stores. s/p 2 unit prbc on 11/9. c/w Epogen 12k units IV tiw in HD (increased 11/15). Transfuse prbc prn. Monitor Hb.  3) Hyperphosphatemia: serum phosphorus acceptable . c/w low phos diet and Sevelamer 2400mg PO tid with meals. Monitor serum calcium and phosphorus.  4) Rt foot gangrene: s/p Rt BKA 11/9 by Dr. Billy. Finished Linezolid. Vascular/ ID following.     [facility] NEPHROLOGY  Wili Hopkins M.D.  Kiran Feng D.O.  Sujatha Dumont M.D.  Radha Miller, MSN, ANP-C  [phone]    [address] [place] [address]

## 2022-11-22 NOTE — PROGRESS NOTE ADULT - SUBJECTIVE AND OBJECTIVE BOX
66 PATIENT SEEN AND EXAMINED ON :- 11/22/22  DATE OF SERVICE:      11/22/22       Interim events noted,Labs ,Radiological studies and Cardiology tests reviewed .     HOSPITAL COURSE: HPI:  72 year old male, coming from [facility], with medical history of ESRD (T-TH-S), and DM coming to ED c/o R foot pain. Patient has severe peripheral arterial disease. He was admitted on September 26 2022 for gangrene of right foot. At that time, patient was treated with IV abx. MRI was negative for OM. He had R LE angiogram on 10/3 w/ no targets for re vascularization but showed extensive distal small vessel disease, there was no acute vascular interventions done. Patient was recommended a R BKA but wanted a medical management instead. Patient now coming in c/o R foot pain. He states that he wants surgery now for his foot. He denies any fevers, chills, headaches, dizziness, chest pain, cough, SOB, abd pain, n/v, diarrhea, constipation, hematuria, dysuria, numbness, and weakness.    in the ed VS: T 98, HR 88, /60, RR 18, Spo2 96%RA  Hb 8.1   s/p 1 dose vanc and zosyn in Ed    (08 Nov 2022 21:31)      INTERIM EVENTS:Patient seen at bedside ,interim events noted.      PMH -reviewed admission note, no change since admission  HEART FAILURE: Acute[ ]Chronic[ ] Systolic[ ] Diastolic[ ] Combined Systolic and Diastolic[ ]  CAD[ ] CABG[ ] PCI[ ]  DEVICES[ ] PPM[ ] ICD[ ] ILR[ ]  ATRIAL FIBRILLATION[ ] Paroxysmal[ ] Permanent[ ] CHADS2-[  ]  CANDE[ ] CKD1[ ] CKD2[ ] CKD3[ ] CKD4[ ] ESRD[ ]  COPD[ ] HTN[ ]   DM[ ] Type1[ ] Type 2[ ]   CVA[ ] Paresis[ ]    AMBULATION: Assisted[ ] Cane/walker[ ] Independent[ ]    MEDICATIONS  (STANDING):  aspirin enteric coated 81 milliGRAM(s) Oral daily  chlorhexidine 2% Cloths 1 Application(s) Topical daily  clopidogrel Tablet 75 milliGRAM(s) Oral daily  dextrose 5%. 1000 milliLiter(s) (50 mL/Hr) IV Continuous <Continuous>  dextrose 5%. 1000 milliLiter(s) (100 mL/Hr) IV Continuous <Continuous>  dextrose 50% Injectable 25 Gram(s) IV Push once  dextrose 50% Injectable 25 Gram(s) IV Push once  dextrose 50% Injectable 12.5 Gram(s) IV Push once  epoetin nyasia-epbx (RETACRIT) Injectable 24328 Unit(s) IV Push <User Schedule>  glucagon  Injectable 1 milliGRAM(s) IntraMuscular once  heparin   Injectable 5000 Unit(s) SubCutaneous every 8 hours  influenza  Vaccine (HIGH DOSE) 0.7 milliLiter(s) IntraMuscular once  insulin lispro (ADMELOG) corrective regimen sliding scale   SubCutaneous three times a day before meals  insulin lispro (ADMELOG) corrective regimen sliding scale   SubCutaneous at bedtime  Nephro-zack 1 Tablet(s) Oral daily  pantoprazole    Tablet 40 milliGRAM(s) Oral before breakfast  polyethylene glycol 3350 17 Gram(s) Oral daily  sevelamer carbonate 2400 milliGRAM(s) Oral three times a day with meals    MEDICATIONS  (PRN):  acetaminophen     Tablet .. 650 milliGRAM(s) Oral every 6 hours PRN Temp greater or equal to 38C (100.4F), Mild Pain (1 - 3)  dextrose Oral Gel 15 Gram(s) Oral once PRN Blood Glucose LESS THAN 70 milliGRAM(s)/deciliter      REVIEW OF SYSTEMS:  Constitutional: [ ] fever, [ ]weight loss,  [ ]fatigue [ ]weight gain  Eyes: [ ] visual changes  Respiratory: [ ]shortness of breath;  [ ] cough, [ ]wheezing, [ ]chills, [ ]hemoptysis  Cardiovascular: [ ] chest pain, [ ]palpitations, [ ]dizziness,  [ ]leg swelling[ ]orthopnea[ ]PND  Gastrointestinal: [ ] abdominal pain, [ ]nausea, [ ]vomiting,  [ ]diarrhea [ ]Constipation [ ]Melena  Genitourinary: [ ] dysuria, [ ] hematuria [ ]Garcia  Neurologic: [ ] headaches [ ] tremors[ ]weakness [ ]Paralysis Right[ ] Left[ ]  Skin: [ ] itching, [ ]burning, [ ] rashes  Endocrine: [ ] heat or cold intolerance  Musculoskeletal: [ ] joint pain or swelling; [ ] muscle, back, or extremity pain  Psychiatric: [ ] depression, [ ]anxiety, [ ]mood swings, or [ ]difficulty sleeping  Hematologic: [ ] easy bruising, [ ] bleeding gums    [ ] All remaining systems negative except as per above.   [ ]Unable to obtain.  [x] No change in ROS since admission      Vital Signs Last 24 Hrs  T(C): 36.2 (22 Nov 2022 12:54), Max: 36.9 (21 Nov 2022 21:42)  T(F): 97.2 (22 Nov 2022 12:54), Max: 98.5 (21 Nov 2022 21:42)  HR: 84 (22 Nov 2022 12:54) (82 - 99)  BP: 111/67 (22 Nov 2022 12:54) (111/67 - 130/72)  BP(mean): --  RR: 18 (22 Nov 2022 12:54) (17 - 18)  SpO2: 98% (22 Nov 2022 12:54) (97% - 100%)    Parameters below as of 22 Nov 2022 12:54  Patient On (Oxygen Delivery Method): room air      I&O's Summary    21 Nov 2022 07:01  -  22 Nov 2022 07:00  --------------------------------------------------------  IN: 400 mL / OUT: 1500 mL / NET: -1100 mL        PHYSICAL EXAM:  General: No acute distress BMI-  HEENT: EOMI, PERRL  Neck: Supple, [ ] JVD  Lungs: Equal air entry bilaterally; [ ] rales [ ] wheezing [ ] rhonchi  Heart: Regular rate and rhythm; [x ] murmur   2/6 [ x] systolic [ ] diastolic [ ] radiation[ ] rubs [ ]  gallops  Abdomen: Nontender, bowel sounds present  Extremities: No clubbing, cyanosis, [ ] edema [ ]Pulses  equal and intact  Nervous system:  Alert & Oriented X3, no focal deficits  Psychiatric: Normal affect  Skin: No rashes or lesions    LABS:  11-21    134<L>  |  98  |  78<H>  ----------------------------<  184<H>  4.8   |  25  |  5.61<H>    Ca    8.9      21 Nov 2022 15:41  Phos  3.4     11-21  Mg     2.5     11-21      Creatinine Trend: 5.61<--, 4.34<--, 5.22<--, 4.05<--, 5.94<--, 5.37<--                        8.7    8.42  )-----------( 459      ( 21 Nov 2022 15:35 )             27.5

## 2022-11-22 NOTE — PROGRESS NOTE ADULT - PROVIDER SPECIALTY LIST ADULT
Infectious Disease
Internal Medicine
Internal Medicine
Nephrology
Surgery
Infectious Disease
Internal Medicine
Internal Medicine
Nephrology
Nephrology
Internal Medicine
Internal Medicine
Nephrology
Cardiology
Nephrology
Cardiology
Internal Medicine
Cardiology
Cardiology
Internal Medicine
Cardiology
Internal Medicine
Cardiology
Cardiology
Internal Medicine
Cardiology
Internal Medicine
Intervent Cardiology
Cardiology
Cardiology
Internal Medicine
Internal Medicine
Cardiology
Internal Medicine
Cardiology
Internal Medicine
Cardiology
Internal Medicine
Internal Medicine

## 2022-11-22 NOTE — PROGRESS NOTE ADULT - PROBLEM SELECTOR PLAN 6
- from [facility]  - PT reccs: ANGELA   - Covid conversion on 11/13, asymptomatic  - will return to [facility] once he completes quarantine period on 11/24

## 2022-11-22 NOTE — PROGRESS NOTE ADULT - PROBLEM SELECTOR PROBLEM 3
ESRD (end stage renal disease)

## 2022-11-22 NOTE — PROGRESS NOTE ADULT - PROBLEM SELECTOR PROBLEM 6
Discharge planning issues

## 2022-11-22 NOTE — DISCHARGE NOTE NURSING/CASE MANAGEMENT/SOCIAL WORK - NSDCPEPTCAREGIVEDUMATLIST _GEN_ALL_CORE
Diabetes BUE at least 3+/5 as observed functionally against gravity and during functional mobility assessment/grossly assessed due to

## 2022-12-06 ENCOUNTER — INPATIENT (INPATIENT)
Facility: HOSPITAL | Age: 72
LOS: 7 days | Discharge: NURSING, REHAB OR LONG-TERM CARE | DRG: 299 | End: 2022-12-14
Attending: INTERNAL MEDICINE | Admitting: INTERNAL MEDICINE
Payer: MEDICARE

## 2022-12-06 VITALS
SYSTOLIC BLOOD PRESSURE: 119 MMHG | DIASTOLIC BLOOD PRESSURE: 72 MMHG | OXYGEN SATURATION: 98 % | TEMPERATURE: 98 F | HEIGHT: 74 IN | WEIGHT: 179.9 LBS | HEART RATE: 82 BPM | RESPIRATION RATE: 16 BRPM

## 2022-12-06 PROCEDURE — 99285 EMERGENCY DEPT VISIT HI MDM: CPT

## 2022-12-06 NOTE — ED ADULT TRIAGE NOTE - CHIEF COMPLAINT QUOTE
kayla from [facility] with c/o left big toe discoloration x 2 days and for hemo dialysis for TTHS as per ems, BGL

## 2022-12-06 NOTE — ED ADULT NURSE NOTE - CHIEF COMPLAINT QUOTE
kayla from [facility] with c/o left big toe discoloration x 2 days and for hemo dialysis for TTHS as per ems, BGL

## 2022-12-07 DIAGNOSIS — I73.9 PERIPHERAL VASCULAR DISEASE, UNSPECIFIED: ICD-10-CM

## 2022-12-07 DIAGNOSIS — E11.9 TYPE 2 DIABETES MELLITUS WITHOUT COMPLICATIONS: ICD-10-CM

## 2022-12-07 DIAGNOSIS — N18.6 END STAGE RENAL DISEASE: ICD-10-CM

## 2022-12-07 DIAGNOSIS — Z29.9 ENCOUNTER FOR PROPHYLACTIC MEASURES, UNSPECIFIED: ICD-10-CM

## 2022-12-07 LAB
ALBUMIN SERPL ELPH-MCNC: 2.6 G/DL — LOW (ref 3.5–5)
ALP SERPL-CCNC: 66 U/L — SIGNIFICANT CHANGE UP (ref 40–120)
ALT FLD-CCNC: 21 U/L DA — SIGNIFICANT CHANGE UP (ref 10–60)
ANION GAP SERPL CALC-SCNC: 10 MMOL/L — SIGNIFICANT CHANGE UP (ref 5–17)
ANION GAP SERPL CALC-SCNC: 9 MMOL/L — SIGNIFICANT CHANGE UP (ref 5–17)
APTT BLD: 35.7 SEC — HIGH (ref 27.5–35.5)
AST SERPL-CCNC: 53 U/L — HIGH (ref 10–40)
BASOPHILS # BLD AUTO: 0.03 K/UL — SIGNIFICANT CHANGE UP (ref 0–0.2)
BASOPHILS NFR BLD AUTO: 0.5 % — SIGNIFICANT CHANGE UP (ref 0–2)
BILIRUB SERPL-MCNC: 0.3 MG/DL — SIGNIFICANT CHANGE UP (ref 0.2–1.2)
BUN SERPL-MCNC: 66 MG/DL — HIGH (ref 7–18)
BUN SERPL-MCNC: 72 MG/DL — HIGH (ref 7–18)
CALCIUM SERPL-MCNC: 8.7 MG/DL — SIGNIFICANT CHANGE UP (ref 8.4–10.5)
CALCIUM SERPL-MCNC: 9.2 MG/DL — SIGNIFICANT CHANGE UP (ref 8.4–10.5)
CHLORIDE SERPL-SCNC: 102 MMOL/L — SIGNIFICANT CHANGE UP (ref 96–108)
CHLORIDE SERPL-SCNC: 103 MMOL/L — SIGNIFICANT CHANGE UP (ref 96–108)
CO2 SERPL-SCNC: 20 MMOL/L — LOW (ref 22–31)
CO2 SERPL-SCNC: 24 MMOL/L — SIGNIFICANT CHANGE UP (ref 22–31)
CREAT SERPL-MCNC: 4.61 MG/DL — HIGH (ref 0.5–1.3)
CREAT SERPL-MCNC: 4.66 MG/DL — HIGH (ref 0.5–1.3)
EGFR: 13 ML/MIN/1.73M2 — LOW
EGFR: 13 ML/MIN/1.73M2 — LOW
EOSINOPHIL # BLD AUTO: 0.24 K/UL — SIGNIFICANT CHANGE UP (ref 0–0.5)
EOSINOPHIL NFR BLD AUTO: 3.9 % — SIGNIFICANT CHANGE UP (ref 0–6)
FLUAV AG NPH QL: SIGNIFICANT CHANGE UP
FLUBV AG NPH QL: SIGNIFICANT CHANGE UP
GLUCOSE BLDC GLUCOMTR-MCNC: 190 MG/DL — HIGH (ref 70–99)
GLUCOSE SERPL-MCNC: 216 MG/DL — HIGH (ref 70–99)
GLUCOSE SERPL-MCNC: 345 MG/DL — HIGH (ref 70–99)
HCT VFR BLD CALC: 37.3 % — LOW (ref 39–50)
HGB BLD-MCNC: 11.5 G/DL — LOW (ref 13–17)
IMM GRANULOCYTES NFR BLD AUTO: 0.7 % — SIGNIFICANT CHANGE UP (ref 0–0.9)
INR BLD: 1.12 RATIO — SIGNIFICANT CHANGE UP (ref 0.88–1.16)
LYMPHOCYTES # BLD AUTO: 1 K/UL — SIGNIFICANT CHANGE UP (ref 1–3.3)
LYMPHOCYTES # BLD AUTO: 16.4 % — SIGNIFICANT CHANGE UP (ref 13–44)
MAGNESIUM SERPL-MCNC: 2.4 MG/DL — SIGNIFICANT CHANGE UP (ref 1.6–2.6)
MCHC RBC-ENTMCNC: 30.4 PG — SIGNIFICANT CHANGE UP (ref 27–34)
MCHC RBC-ENTMCNC: 30.8 GM/DL — LOW (ref 32–36)
MCV RBC AUTO: 98.7 FL — SIGNIFICANT CHANGE UP (ref 80–100)
MONOCYTES # BLD AUTO: 0.68 K/UL — SIGNIFICANT CHANGE UP (ref 0–0.9)
MONOCYTES NFR BLD AUTO: 11.2 % — SIGNIFICANT CHANGE UP (ref 2–14)
NEUTROPHILS # BLD AUTO: 4.1 K/UL — SIGNIFICANT CHANGE UP (ref 1.8–7.4)
NEUTROPHILS NFR BLD AUTO: 67.3 % — SIGNIFICANT CHANGE UP (ref 43–77)
NRBC # BLD: 0 /100 WBCS — SIGNIFICANT CHANGE UP (ref 0–0)
PHOSPHATE SERPL-MCNC: 4.8 MG/DL — HIGH (ref 2.5–4.5)
PLATELET # BLD AUTO: 235 K/UL — SIGNIFICANT CHANGE UP (ref 150–400)
POTASSIUM SERPL-MCNC: 3.9 MMOL/L — SIGNIFICANT CHANGE UP (ref 3.5–5.3)
POTASSIUM SERPL-MCNC: 5.9 MMOL/L — HIGH (ref 3.5–5.3)
POTASSIUM SERPL-SCNC: 3.9 MMOL/L — SIGNIFICANT CHANGE UP (ref 3.5–5.3)
POTASSIUM SERPL-SCNC: 5.9 MMOL/L — HIGH (ref 3.5–5.3)
PROT SERPL-MCNC: 6.6 G/DL — SIGNIFICANT CHANGE UP (ref 6–8.3)
PROTHROM AB SERPL-ACNC: 13.3 SEC — SIGNIFICANT CHANGE UP (ref 10.5–13.4)
RBC # BLD: 3.78 M/UL — LOW (ref 4.2–5.8)
RBC # FLD: 16.5 % — HIGH (ref 10.3–14.5)
SARS-COV-2 RNA SPEC QL NAA+PROBE: SIGNIFICANT CHANGE UP
SODIUM SERPL-SCNC: 132 MMOL/L — LOW (ref 135–145)
SODIUM SERPL-SCNC: 136 MMOL/L — SIGNIFICANT CHANGE UP (ref 135–145)
WBC # BLD: 6.09 K/UL — SIGNIFICANT CHANGE UP (ref 3.8–10.5)
WBC # FLD AUTO: 6.09 K/UL — SIGNIFICANT CHANGE UP (ref 3.8–10.5)

## 2022-12-07 RX ORDER — POLYETHYLENE GLYCOL 3350 17 G/17G
17 POWDER, FOR SOLUTION ORAL DAILY
Refills: 0 | Status: DISCONTINUED | OUTPATIENT
Start: 2022-12-07 | End: 2022-12-14

## 2022-12-07 RX ORDER — PANTOPRAZOLE SODIUM 20 MG/1
40 TABLET, DELAYED RELEASE ORAL
Refills: 0 | Status: DISCONTINUED | OUTPATIENT
Start: 2022-12-07 | End: 2022-12-14

## 2022-12-07 RX ORDER — SEVELAMER CARBONATE 2400 MG/1
800 POWDER, FOR SUSPENSION ORAL
Refills: 0 | Status: DISCONTINUED | OUTPATIENT
Start: 2022-12-07 | End: 2022-12-08

## 2022-12-07 RX ORDER — INSULIN LISPRO 100/ML
VIAL (ML) SUBCUTANEOUS
Refills: 0 | Status: DISCONTINUED | OUTPATIENT
Start: 2022-12-07 | End: 2022-12-14

## 2022-12-07 RX ORDER — ACETAMINOPHEN 500 MG
650 TABLET ORAL EVERY 6 HOURS
Refills: 0 | Status: DISCONTINUED | OUTPATIENT
Start: 2022-12-07 | End: 2022-12-14

## 2022-12-07 RX ORDER — HEPARIN SODIUM 5000 [USP'U]/ML
5000 INJECTION INTRAVENOUS; SUBCUTANEOUS EVERY 8 HOURS
Refills: 0 | Status: DISCONTINUED | OUTPATIENT
Start: 2022-12-07 | End: 2022-12-09

## 2022-12-07 RX ORDER — INFLUENZA VIRUS VACCINE 15; 15; 15; 15 UG/.5ML; UG/.5ML; UG/.5ML; UG/.5ML
0.7 SUSPENSION INTRAMUSCULAR ONCE
Refills: 0 | Status: DISCONTINUED | OUTPATIENT
Start: 2022-12-07 | End: 2022-12-14

## 2022-12-07 RX ADMIN — SEVELAMER CARBONATE 800 MILLIGRAM(S): 2400 POWDER, FOR SUSPENSION ORAL at 12:01

## 2022-12-07 RX ADMIN — SEVELAMER CARBONATE 800 MILLIGRAM(S): 2400 POWDER, FOR SUSPENSION ORAL at 21:39

## 2022-12-07 RX ADMIN — HEPARIN SODIUM 5000 UNIT(S): 5000 INJECTION INTRAVENOUS; SUBCUTANEOUS at 21:40

## 2022-12-07 RX ADMIN — HEPARIN SODIUM 5000 UNIT(S): 5000 INJECTION INTRAVENOUS; SUBCUTANEOUS at 13:57

## 2022-12-07 RX ADMIN — Medication 3: at 11:14

## 2022-12-07 RX ADMIN — POLYETHYLENE GLYCOL 3350 17 GRAM(S): 17 POWDER, FOR SOLUTION ORAL at 12:01

## 2022-12-07 RX ADMIN — Medication 1: at 17:25

## 2022-12-07 NOTE — ED PROVIDER NOTE - MUSCULOSKELETAL, MLM
Right below knee amputation. Left 5 toes are all black. No pain. Positive pulses with fingers and dopplers.

## 2022-12-07 NOTE — H&P ADULT - ASSESSMENT
72 year old male, coming from [facility], with medical history of ESRD (T-TH-S),  DM, PVD, gangrene right foot, (s/p L BKA 11/9) coming to ED c/o R toe discoloration. Admitted for further management

## 2022-12-07 NOTE — ED PROVIDER NOTE - PROGRESS NOTE DETAILS
Called by patients primary Dr Huertas requesting patient be admitted to her service for further workup of vascular disease and need for possible surgical intervention, discharge reversed, will admit, bloodwork and COVID testing ordered.  Hollis Bojorquez M.D.

## 2022-12-07 NOTE — H&P ADULT - NSHPLABSRESULTS_GEN_ALL_CORE
LABS:                        11.5   6.09  )-----------( 235      ( 07 Dec 2022 09:00 )             37.3     12-07    136  |  103  |  72<H>  ----------------------------<  216<H>  3.9   |  24  |  4.66<H>    Ca    9.2      07 Dec 2022 14:05  Phos  4.8     12-07  Mg     2.4     12-07    TPro  6.6  /  Alb  2.6<L>  /  TBili  0.3  /  DBili  x   /  AST  53<H>  /  ALT  21  /  AlkPhos  66  12-07    PT/INR - ( 07 Dec 2022 09:00 )   PT: 13.3 sec;   INR: 1.12 ratio         PTT - ( 07 Dec 2022 09:00 )  PTT:35.7 sec    LIVER FUNCTIONS - ( 07 Dec 2022 10:50 )  Alb: 2.6 g/dL / Pro: 6.6 g/dL / ALK PHOS: 66 U/L / ALT: 21 U/L DA / AST: 53 U/L / GGT: x

## 2022-12-07 NOTE — ED PROVIDER NOTE - PATIENT PORTAL LINK FT
You can access the FollowMyHealth Patient Portal offered by [facility] by registering at the following website: [email or web address]. By joining [employer]’s FollowMyHealth portal, you will also be able to view your health information using other applications (apps) compatible with our system.

## 2022-12-07 NOTE — H&P ADULT - PROBLEM SELECTOR PLAN 4
IMPROVE VTE Individual Risk Assessment          RISK                                                          Points  [  ] Previous VTE                                                3  [  ] Thrombophilia                                             2  [  ] Lower limb paralysis                                   2        (unable to hold up >15 seconds)    [  ] Current Cancer                                             2         (within 6 months)  [ x ] Immobilization > 24 hrs                              1  [  ] ICU/CCU stay > 24 hours                             1  [x  ] Age > 60                                                         1    IMPROVE VTE Score:         [    2     ]    Heparin SubQ Bilateral Helical Rim Advancement Flap Text: The defect edges were debeveled with a #15 blade scalpel.  Given the location of the defect and the proximity to free margins (helical rim) a bilateral helical rim advancement flap was deemed most appropriate.  Using a sterile surgical marker, the appropriate advancement flaps were drawn incorporating the defect and placing the expected incisions between the helical rim and antihelix where possible.  The area thus outlined was incised through and through with a #15 scalpel blade.  With a skin hook and iris scissors, the flaps were gently and sharply undermined and freed up.

## 2022-12-07 NOTE — ED PROVIDER NOTE - NSFOLLOWUPINSTRUCTIONS_ED_ALL_ED_FT
You have no pain in your foot, all your toes are the same color and there are strong pulses by doppler loss of blood flow is unlikely.  Thank you for allowing us to be a part of your care.

## 2022-12-07 NOTE — CONSULT NOTE ADULT - SUBJECTIVE AND OBJECTIVE BOX
[facility] NEPHROLOGY- CONSULTATION NOTE    Patient is a 73yo Male with ESRD on HD, failed Left AVF x2, DM, recently admitted with  Rt foot gangrene s/p Rt BKA presents with left toe discoloration. Nephrology consulted for ESRD status.     Last HD 12/6 @ [facility]. Pt denies any SOB, chest pain, n/v/d, or LE edema. No foot pain    PAST MEDICAL & SURGICAL HISTORY:  ESRD on dialysis      DM (diabetes mellitus)      No significant past surgical history        No Known Allergies    Home Medications Reviewed  Hospital Medications: Reviewed    SOCIAL HISTORY: +smoker  Denies ETOh,  or drug use  FAMILY HISTORY:  No pertinent family history in first degree relatives        REVIEW OF SYSTEMS:  Gen: no changes in weight  HEENT: no rhinorrhea  Neck: no sore throat  Cards: no chest pain  Resp: no dyspnea  GI: no nausea or vomiting or diarrhea  : no dysuria or hematuria  Vascular: no LE edema    Derm: left toe discoloration  Neuro: no numbness/tingling  All other review of systems is negative unless indicated above.    VITALS:  T(F): 97.3 (12-07-22 @ 07:33), Max: 97.8 (12-06-22 @ 21:01)  HR: 81 (12-07-22 @ 07:33)  BP: 132/73 (12-07-22 @ 07:33)  RR: 18 (12-07-22 @ 07:33)  SpO2: 100% (12-07-22 @ 07:33)  Wt(kg): --    Height (cm): 188 (12-06 @ 21:01)  Weight (kg): 81.6 (12-06 @ 21:01)  BMI (kg/m2): 23.1 (12-06 @ 21:01)  BSA (m2): 2.08 (12-06 @ 21:01)    PHYSICAL EXAM:  Gen: NAD, calm  HEENT: anicteric  Neck: no JVD  Cards: RRR, +S1/S2, no M/G/R  Resp: CTA B/L  GI: soft, NT/ND, NABS  : no CVA tenderness  Extremities: no LLE edema  Derm: s/p Rt BKA wrapped  Access: Rt IJ tunneled HD catheter- benign  Left upper arm AVF- no thrill no bruit    LABS:        Creatinine Trend:                         11.5   6.09  )-----------( 235      ( 07 Dec 2022 09:00 )             37.3     Urine Studies:

## 2022-12-07 NOTE — ED PROVIDER NOTE - OBJECTIVE STATEMENT
72 year old male with a PMHx of ESRD on dialysis Tue-Thu-Sat at [facility], (BKA 11/9), PVD presents to the ED sent for discoloration for PVD workup. Patient denies having any pain.

## 2022-12-07 NOTE — H&P ADULT - NSHPPHYSICALEXAM_GEN_ALL_CORE
PHYSICAL EXAMINATION:  GENERAL: NAD,   HEAD:  Atraumatic, Normocephalic  EYES:  conjunctiva and sclera clear  NECK: Supple, No JVD, Normal thyroid  CHEST/LUNG: Clear to auscultation. Clear to percussion bilaterally; No rales, rhonchi, wheezing, or rubs  HEART: Regular rate and rhythm; No murmurs, rubs, or gallops  ABDOMEN: Soft, Nontender, Nondistended; Bowel sounds present  NERVOUS SYSTEM:  Alert & Oriented X3,    EXTREMITIES:  2+ Peripheral Pulses, No clubbing, cyanosis, or edema  SKIN: warm dry PHYSICAL EXAMINATION:  GENERAL: NAD,   HEAD:  Atraumatic, Normocephalic  EYES:  conjunctiva and sclera clear  NECK: Supple, No JVD, Normal thyroid  CHEST/LUNG: Clear to auscultation. Clear to percussion bilaterally; No rales, rhonchi, wheezing, or rubs  HEART: Regular rate and rhythm; No murmurs, rubs, or gallops  ABDOMEN: Soft, Nontender, Nondistended; Bowel sounds present  NERVOUS SYSTEM:  Alert & Oriented X3,    EXTREMITIES:  R BKA, left foot no discoloration, palpable pulses   SKIN: warm dry

## 2022-12-07 NOTE — H&P ADULT - PROBLEM SELECTOR PLAN 1
c/o left toe discoloration  hx of severe PVD, s/p R BKA 11/9  [facility]US 1.43  PE: normal hyperpigmentation of toes, pulses intact  Will U/S doppler  of LE  Hold ASA, plavix in case of surgical intervention

## 2022-12-07 NOTE — PATIENT PROFILE ADULT - FALL HARM RISK - HARM RISK INTERVENTIONS
Assistance with ambulation/Assistance OOB with selected safe patient handling equipment/Communicate Risk of Fall with Harm to all staff/Discuss with provider need for PT consult/Monitor gait and stability/Reinforce activity limits and safety measures with patient and family/Tailored Fall Risk Interventions/Visual Cue: Yellow wristband and red socks/Bed in lowest position, wheels locked, appropriate side rails in place/Call bell, personal items and telephone in reach/Instruct patient to call for assistance before getting out of bed or chair/Non-slip footwear when patient is out of bed/[place] to call system/Physically safe environment - no spills, clutter or unnecessary equipment/Purposeful Proactive Rounding/Room/bathroom lighting operational, light cord in reach

## 2022-12-08 ENCOUNTER — TRANSCRIPTION ENCOUNTER (OUTPATIENT)
Age: 72
End: 2022-12-08

## 2022-12-08 LAB
ANION GAP SERPL CALC-SCNC: 13 MMOL/L — SIGNIFICANT CHANGE UP (ref 5–17)
BUN SERPL-MCNC: 78 MG/DL — HIGH (ref 7–18)
CALCIUM SERPL-MCNC: 9.4 MG/DL — SIGNIFICANT CHANGE UP (ref 8.4–10.5)
CHLORIDE SERPL-SCNC: 105 MMOL/L — SIGNIFICANT CHANGE UP (ref 96–108)
CO2 SERPL-SCNC: 21 MMOL/L — LOW (ref 22–31)
CREAT SERPL-MCNC: 5.75 MG/DL — HIGH (ref 0.5–1.3)
EGFR: 10 ML/MIN/1.73M2 — LOW
GLUCOSE BLDC GLUCOMTR-MCNC: 153 MG/DL — HIGH (ref 70–99)
GLUCOSE BLDC GLUCOMTR-MCNC: 161 MG/DL — HIGH (ref 70–99)
GLUCOSE BLDC GLUCOMTR-MCNC: 186 MG/DL — HIGH (ref 70–99)
GLUCOSE BLDC GLUCOMTR-MCNC: 194 MG/DL — HIGH (ref 70–99)
GLUCOSE BLDC GLUCOMTR-MCNC: 204 MG/DL — HIGH (ref 70–99)
GLUCOSE SERPL-MCNC: 199 MG/DL — HIGH (ref 70–99)
HBV SURFACE AG SER-ACNC: SIGNIFICANT CHANGE UP
HCT VFR BLD CALC: 35.7 % — LOW (ref 39–50)
HGB BLD-MCNC: 11.1 G/DL — LOW (ref 13–17)
MAGNESIUM SERPL-MCNC: 2.4 MG/DL — SIGNIFICANT CHANGE UP (ref 1.6–2.6)
MCHC RBC-ENTMCNC: 30.6 PG — SIGNIFICANT CHANGE UP (ref 27–34)
MCHC RBC-ENTMCNC: 31.1 GM/DL — LOW (ref 32–36)
MCV RBC AUTO: 98.3 FL — SIGNIFICANT CHANGE UP (ref 80–100)
MRSA PCR RESULT.: SIGNIFICANT CHANGE UP
NRBC # BLD: 0 /100 WBCS — SIGNIFICANT CHANGE UP (ref 0–0)
PHOSPHATE SERPL-MCNC: 5.9 MG/DL — HIGH (ref 2.5–4.5)
PLATELET # BLD AUTO: 233 K/UL — SIGNIFICANT CHANGE UP (ref 150–400)
POTASSIUM SERPL-MCNC: 3.9 MMOL/L — SIGNIFICANT CHANGE UP (ref 3.5–5.3)
POTASSIUM SERPL-SCNC: 3.9 MMOL/L — SIGNIFICANT CHANGE UP (ref 3.5–5.3)
RBC # BLD: 3.63 M/UL — LOW (ref 4.2–5.8)
RBC # FLD: 16.1 % — HIGH (ref 10.3–14.5)
S AUREUS DNA NOSE QL NAA+PROBE: SIGNIFICANT CHANGE UP
SODIUM SERPL-SCNC: 139 MMOL/L — SIGNIFICANT CHANGE UP (ref 135–145)
WBC # BLD: 6.01 K/UL — SIGNIFICANT CHANGE UP (ref 3.8–10.5)
WBC # FLD AUTO: 6.01 K/UL — SIGNIFICANT CHANGE UP (ref 3.8–10.5)

## 2022-12-08 PROCEDURE — 99221 1ST HOSP IP/OBS SF/LOW 40: CPT

## 2022-12-08 PROCEDURE — 99232 SBSQ HOSP IP/OBS MODERATE 35: CPT | Mod: 24

## 2022-12-08 RX ORDER — SEVELAMER CARBONATE 2400 MG/1
1600 POWDER, FOR SUSPENSION ORAL
Refills: 0 | Status: DISCONTINUED | OUTPATIENT
Start: 2022-12-08 | End: 2022-12-14

## 2022-12-08 RX ORDER — CHLORHEXIDINE GLUCONATE 213 G/1000ML
1 SOLUTION TOPICAL
Refills: 0 | Status: DISCONTINUED | OUTPATIENT
Start: 2022-12-08 | End: 2022-12-14

## 2022-12-08 RX ADMIN — Medication 2: at 17:06

## 2022-12-08 RX ADMIN — HEPARIN SODIUM 5000 UNIT(S): 5000 INJECTION INTRAVENOUS; SUBCUTANEOUS at 05:10

## 2022-12-08 RX ADMIN — Medication 1: at 08:15

## 2022-12-08 RX ADMIN — Medication 1: at 13:42

## 2022-12-08 RX ADMIN — SEVELAMER CARBONATE 1600 MILLIGRAM(S): 2400 POWDER, FOR SUSPENSION ORAL at 13:38

## 2022-12-08 RX ADMIN — SEVELAMER CARBONATE 1600 MILLIGRAM(S): 2400 POWDER, FOR SUSPENSION ORAL at 17:07

## 2022-12-08 RX ADMIN — Medication 1 APPLICATION(S): at 17:07

## 2022-12-08 RX ADMIN — PANTOPRAZOLE SODIUM 40 MILLIGRAM(S): 20 TABLET, DELAYED RELEASE ORAL at 06:43

## 2022-12-08 RX ADMIN — HEPARIN SODIUM 5000 UNIT(S): 5000 INJECTION INTRAVENOUS; SUBCUTANEOUS at 21:28

## 2022-12-08 RX ADMIN — HEPARIN SODIUM 5000 UNIT(S): 5000 INJECTION INTRAVENOUS; SUBCUTANEOUS at 13:37

## 2022-12-08 RX ADMIN — SEVELAMER CARBONATE 800 MILLIGRAM(S): 2400 POWDER, FOR SUSPENSION ORAL at 08:22

## 2022-12-08 NOTE — DISCHARGE NOTE PROVIDER - NSDCMRMEDTOKEN_GEN_ALL_CORE_FT
acetaminophen 325 mg oral tablet: 2 tab(s) orally every 6 hours, As needed, Temp greater or equal to 38C (100.4F), Mild Pain (1 - 3)  aspirin 81 mg oral delayed release tablet: 1 tab(s) orally once a day  epoetin nyasia: 15070 unit(s) intravenous Tuesday, Thursday, Saturday  insulin lispro 100 units/mL injectable solution (obsolete): insulin Sliding scale  Januvia 25 mg oral tablet: 1 tab(s) orally once a day  Nephro-Killian oral tablet: 1 tab(s) orally once a day  pantoprazole 40 mg oral delayed release tablet: 1 tab(s) orally once a day (before a meal)  Plavix 75 mg oral tablet: 1 tab(s) orally once a day  polyethylene glycol 3350 oral powder for reconstitution: 1 pad(s) orally once a day  sevelamer carbonate 800 mg oral tablet: 1 tab(s) orally 3 times a day (with meals)   acetaminophen 325 mg oral tablet: 2 tab(s) orally every 6 hours, As needed, Temp greater or equal to 38C (100.4F), Mild Pain (1 - 3)  aspirin 81 mg oral delayed release tablet: 1 tab(s) orally once a day  bisacodyl 5 mg oral delayed release tablet: 1 tab(s) orally every 12 hours, As needed, Constipation  clotrimazole 1% topical cream: 1 application topically 2 times a day  epoetin nyasia: 66590 unit(s) intravenous Tuesday, Thursday, Saturday  insulin lispro 100 units/mL injectable solution (obsolete): Monitor glucose three times daily before meals and at bedtime.   Cover with following Admelog scale:     1 Unit(s) if Glucose 151 - 200  2 Unit(s) if Glucose 201 - 250  3 Unit(s) if Glucose 251 - 300  4 Unit(s) if Glucose 301 - 350  5 Unit(s) if Glucose 351 - 400  6 Unit(s) if Glucose Greater Than 400  Januvia 25 mg oral tablet: 1 tab(s) orally once a day  midodrine 5 mg oral tablet: 1 tab(s) orally predialysis (Tuesday, Thursday, Saturday)  Nephro-Killian oral tablet: 1 tab(s) orally once a day  pantoprazole 40 mg oral delayed release tablet: 1 tab(s) orally once a day (before a meal)  Plavix 75 mg oral tablet: 1 tab(s) orally once a day  polyethylene glycol 3350 oral powder for reconstitution: 1 pad(s) orally once a day  senna leaf extract oral tablet: 2 tab(s) orally once a day (at bedtime)  sevelamer carbonate 800 mg oral tablet: 1 tab(s) orally 3 times a day (with meals)

## 2022-12-08 NOTE — PROGRESS NOTE ADULT - PROBLEM SELECTOR PLAN 1
c/o left great toe discoloration  hx of severe PVD, s/p R BKA 11/9  [facility] JESUS 1.43  Hold ASA, plavix in case of surgical intervention  vascular and podiatry consulted  Id Dr. Toni Iqbal consulted.

## 2022-12-08 NOTE — PROGRESS NOTE ADULT - SUBJECTIVE AND OBJECTIVE BOX
[facility] NEPHROLOGY- PROGRESS NOTE    Patient is a 71yo Male with ESRD on HD, failed Left AVF x2, DM, recently admitted with  Rt foot gangrene s/p Rt BKA presents with left toe discoloration. Nephrology consulted for ESRD status.     Hospital Medications: Medications reviewed.  REVIEW OF SYSTEMS:  CONSTITUTIONAL: No fevers or chills  RESPIRATORY: No shortness of breath  CARDIOVASCULAR: No chest pain.  GASTROINTESTINAL: No nausea, vomiting, diarrhea or abdominal pain.   VASCULAR: No left lower extremity edema.     VITALS:  T(F): 97.9 (12-08-22 @ 09:26), Max: 98 (12-07-22 @ 21:25)  HR: 75 (12-08-22 @ 09:26)  BP: 128/73 (12-08-22 @ 09:26)  RR: 16 (12-08-22 @ 09:26)  SpO2: 100% (12-08-22 @ 09:26)  Wt(kg): --  Height (cm): 188 (12-06 @ 21:01), 188 (11-08 @ 14:02)  Weight (kg): 81.6 (12-06 @ 21:01), 64.9 (11-08 @ 14:02)  BMI (kg/m2): 23.1 (12-06 @ 21:01), 18.4 (11-08 @ 14:02)  BSA (m2): 2.08 (12-06 @ 21:01), 1.89 (11-08 @ 14:02)      PHYSICAL EXAM:  Gen: NAD, calm  HEENT: anicteric  Cards: RRR, +S1/S2, no M/G/R  Resp: CTA B/L  GI: soft, NT/ND, NABS  Extremities: no LLE edema  Derm: s/p Rt BKA wrapped  Access: Rt IJ tunneled HD catheter- benign  Left upper arm AVF- no thrill no bruit    LABS:  12-08    139  |  105  |  78<H>  ----------------------------<  199<H>  3.9   |  21<L>  |  5.75<H>    Ca    9.4      08 Dec 2022 09:25  Phos  5.9     12-08  Mg     2.4     12-08    TPro  6.6  /  Alb  2.6<L>  /  TBili  0.3  /  DBili      /  AST  53<H>  /  ALT  21  /  AlkPhos  66  12-07    Creatinine Trend: 5.75 <--, 4.66 <--, 4.61 <--                        11.1   6.01  )-----------( 233      ( 08 Dec 2022 09:25 )             35.7     Urine Studies:      RADIOLOGY & ADDITIONAL STUDIES:

## 2022-12-08 NOTE — CONSULT NOTE ADULT - ATTENDING COMMENTS
Seen ex  ed dw'ed res.  Agree w most above w following edits.  Pt known  Adm'ed from rehab due to reported painless L toes dark discoloration.  Has been non-ambulatory.  Recent R BKA 1 mo ago- 1st post op eval - C/D/I- Staples DC'ed today        Seen in [facility] Seen ex  ed dw'ed res.  Agree w most above w following edits.  Pt known  Adm'ed from rehab due to reported painless L toes dark discoloration.  Has been non-ambulatory.  Recent R BKA 1 mo ago- 1st post op eval - C/D/I- Julianna DC'ed today.  L LE: S/NT, Warm intact skin.  Hyperpig toes- viable (look similar to last month appearance).  No ulcers or infeciton.  +FEm/pop pulses.  L pedal pulses NP as before.    Recent CTA/JESUS reviewed: Athero present.  L LE w occ dz but overall decent art perf.    A/P:   1. PAD/athero.  L toes hyperpigm.  Limb does not appear to be imminently threatened.  2. R BKA: Healing well.  3. ESRD: Access mgmtm per [employer].    REc:   Med mgmt  L foot care/protection/close podiatry fu.  Outpt vasc surveillance.  If new pain/tissue loss develops then will consider angio/revasc.  R BKA incision care: Keep clean.  May start prosthesis fitment. P.T. Seen ex  ed dw'ed res.  Agree w most above w following edits.  Pt known  Adm'ed from rehab due to reported painless L toes dark discoloration.  Has been non-ambulatory.  Recent R BKA 1 mo ago- 1st post op eval - C/D/I- Julianna DC'ed today.  L LE: S/NT, Warm intact skin.  Hyperpig toes- viable (look similar to last month appearance).  No ulcers or infeciton.  +FEm/pop pulses.  L pedal pulses NP as before.    Recent CTA/JESUS reviewed: Athero present.  L LE w occ dz but overall decent art perf.    A/P:   1. PAD/athero.  L toes hyperpigm.  Limb does not appear to be imminently threatened.  2. R BKA: Healing well.  3. ESRD: Access mgmtm per [employer].    REc:   Med mgmt  L foot care/protection/close podiatry fu.  Outpt vasc surveillance.  If new pain/tissue loss develops then will consider angio/revasc.  R BKA incision care: Keep clean.  May start prosthesis fitment. P.T.  DW'ed pt's sister

## 2022-12-08 NOTE — DISCHARGE NOTE PROVIDER - HOSPITAL COURSE
72 year old male, coming from [facility], with medical history of ESRD (T-TH-S),  DM, PVD, gangrene right foot, (s/p R BKA 11/9) coming to ED c/o L toe discoloration. Admitted for further management. vascular recc: No acute vascular surgery intervention currently indicated , podiatry f/u recc: and ID consulted recc: Apply ketoconazole cream BID to L interdigital area for 7-14 days(for tinea pedis), Follow vascular for LE PAD. nephro following for HD. pt. stable for d/c, plan discussed with Dr. Huertas.        Please note that this a brief summary of hospital course please refer to daily progress notes and consult notes for full course and events       72 year old male, coming from [facility], with medical history of ESRD (T-TH-S),  DM, PVD, gangrene right foot, (s/p R BKA 11/9) coming to ED c/o L toe discoloration, hyperpigmentation. Admitted to medicine for left toes PAD with infection. Vascular, Podiatry and ID consulted for further management. vascular recc: No acute vascular surgery intervention currently indicated , podiatry recommending Left hallux small stable subungual hematoma, not clinically infected. Continue decubitus precautions, follow-up outpt with vascular and podiatry. ID consulted recommending to continue wound care to left toe as there is not sign of infection: Apply ketoconazole cream BID to L interdigital area for 7-14 days(for tinea pedis), Follow vascular for LE PAD. nephro following for HD.   Hospital course complicated by Hematuria, Urology consulted, started on CBI treatment. Recommended CT urogram, cystoscopy for bladder tumors. Hematuria resolved passed TOV, repeat bladder scan 77ml, however patient is a HD patient.   Patient HD left AVF no thrill or bruit, Vascular recommending outpatient follow up for further mange ment.         Please note that this a brief summary of hospital course please refer to daily progress notes and consult notes for full course and events       72 year old male, coming from [facility], with medical history of ESRD (T-TH-S),  DM, PVD, gangrene right foot, (s/p R BKA 11/9) coming to ED c/o L toe discoloration, hyperpigmentation. Admitted to medicine for left toes PAD with infection. Vascular, Podiatry and ID consulted for further management. vascular recc: No acute vascular surgery intervention currently indicated , podiatry recommending Left hallux small stable subungual hematoma, not clinically infected. Continue decubitus precautions, follow-up outpt with vascular and podiatry. ID consulted recommending to continue wound care to left toe as there is not sign of infection: Apply ketoconazole cream BID to L interdigital area for 7-14 days(for tinea pedis), Follow vascular for LE PAD. nephro following for HD.   Hospital course complicated by Hematuria, Urology consulted, started on CBI treatment. Recommended CT urogram, cystoscopy for bladder tumors. Hematuria resolved passed TOV, repeat bladder scan 77ml, however patient is a HD patient. PVR 230ml . Ditropan discontinued . Monitor PVR.  following   Patient HD left AVF no thrill or bruit, Vascular recommending outpatient follow up for further management.           Please note that this a brief summary of hospital course please refer to daily progress notes and consult notes for full course and events       72 year old male, coming from [facility], with medical history of ESRD (T-TH-S),  DM, PVD, gangrene right foot, (s/p R BKA 11/9) coming to ED c/o L toe discoloration, hyperpigmentation. Admitted to medicine for left toes PAD with infection. Vascular, Podiatry and ID consulted for further management. vascular recc: No acute vascular surgery intervention currently indicated , podiatry recommending Left hallux small stable subungual hematoma, not clinically infected. Continue decubitus precautions, follow-up outpt with vascular and podiatry. ID consulted recommending to continue wound care to left toe as there is not sign of infection: Apply ketoconazole cream BID to L interdigital area for 7-14 days(for tinea pedis). Left foot xray reviewed by Podiatry. Monitor for gangrenous changes and capillary refill. If dusky , recommend Vascular consult. Follow vascular for LE PAD. nephro following for HD.   Hospital course complicated by Hematuria, Urology consulted, started on CBI treatment. Recommended CT urogram, cystoscopy for bladder tumors. Hematuria resolved passed TOV, repeat bladder scan 77ml, however patient is a HD patient. PVR 230ml . Ditropan discontinued . Monitor PVR.  following   Patient HD left AVF no thrill or bruit, Vascular recommending outpatient follow up for further management.           Please note that this a brief summary of hospital course please refer to daily progress notes and consult notes for full course and events

## 2022-12-08 NOTE — PROGRESS NOTE ADULT - SUBJECTIVE AND OBJECTIVE BOX
Patient is a 72y old  Male who presents with a chief complaint of left toe discoloration (07 Dec 2022 11:30)      INTERVAL HPI/OVERNIGHT EVENTS: no overnight events    I&O's Summary    Vital Signs Last 24 Hrs  T(C): 36.6 (08 Dec 2022 09:26), Max: 36.7 (07 Dec 2022 21:25)  T(F): 97.9 (08 Dec 2022 09:26), Max: 98 (07 Dec 2022 21:25)  HR: 75 (08 Dec 2022 09:26) (75 - 79)  BP: 128/73 (08 Dec 2022 09:26) (128/73 - 148/78)  BP(mean): --  RR: 16 (08 Dec 2022 09:26) (16 - 18)  SpO2: 100% (08 Dec 2022 09:26) (99% - 100%)    Parameters below as of 08 Dec 2022 09:26  Patient On (Oxygen Delivery Method): room air      PAST MEDICAL & SURGICAL HISTORY:  ESRD on dialysis      DM (diabetes mellitus)      PAD (peripheral artery disease)      No significant past surgical history          SOCIAL HISTORY  Alcohol:  Tobacco:  Illicit substance use:      FAMILY HISTORY:      LABS:                        11.1   6.01  )-----------( 233      ( 08 Dec 2022 09:25 )             35.7     12-08    139  |  105  |  78<H>  ----------------------------<  199<H>  3.9   |  21<L>  |  5.75<H>    Ca    9.4      08 Dec 2022 09:25  Phos  5.9     12-08  Mg     2.4     12-08    TPro  6.6  /  Alb  2.6<L>  /  TBili  0.3  /  DBili  x   /  AST  53<H>  /  ALT  21  /  AlkPhos  66  12-07    PT/INR - ( 07 Dec 2022 09:00 )   PT: 13.3 sec;   INR: 1.12 ratio         PTT - ( 07 Dec 2022 09:00 )  PTT:35.7 sec    CAPILLARY BLOOD GLUCOSE      POCT Blood Glucose.: 186 mg/dL (08 Dec 2022 07:50)  POCT Blood Glucose.: 190 mg/dL (07 Dec 2022 17:19)            MEDICATIONS  (STANDING):  chlorhexidine 2% Cloths 1 Application(s) Topical <User Schedule>  heparin   Injectable 5000 Unit(s) SubCutaneous every 8 hours  influenza  Vaccine (HIGH DOSE) 0.7 milliLiter(s) IntraMuscular once  insulin lispro (ADMELOG) corrective regimen sliding scale   SubCutaneous three times a day before meals  pantoprazole    Tablet 40 milliGRAM(s) Oral before breakfast  polyethylene glycol 3350 17 Gram(s) Oral daily  sevelamer carbonate 800 milliGRAM(s) Oral three times a day with meals    MEDICATIONS  (PRN):  acetaminophen     Tablet .. 650 milliGRAM(s) Oral every 6 hours PRN Temp greater or equal to 38C (100.4F), Mild Pain (1 - 3)      REVIEW OF SYSTEMS:  CONSTITUTIONAL: No fever  EYES: No eye pain  ENMT:  No sinus or throat pain  NECK: No pain   RESPIRATORY: No cough; No shortness of breath  CARDIOVASCULAR: No chest pain  GASTROINTESTINAL: No abdominal pain.   GENITOURINARY: No dysuria  NEUROLOGICAL: No headaches  SKIN: No rashes,  MUSCULOSKELETAL: + Rt great toe pain and swelling    RADIOLOGY & ADDITIONAL TESTS:    Imaging Personally Reviewed:  [ ] YES  [ ] NO    Consultant(s) Notes Reviewed:  [x ] YES  [ ] NO    PHYSICAL EXAM:  GENERAL: NAD  HEAD:  Atraumatic, Normocephalic  EYES:  conjunctiva and sclera clear  ENMT: Moist mucous membranes  NECK: Supple  NERVOUS SYSTEM:  Alert & Oriented X3, Good concentration  CHEST/LUNG: CTA bilaterally  HEART: Regular rate and rhythm  ABDOMEN: Soft, Nontender, Nondistended; Bowel sounds present  EXTREMITIES:  2+ Peripheral Pulse LLE, No clubbing, cyanosis, or edema  SKIN: No rashes, Rt BKA    Care Collaborated Discussed with Consultants/Other Providers [x ] YES  [ ] NO

## 2022-12-08 NOTE — DISCHARGE NOTE PROVIDER - NSFOLLOWUPCLINICS_GEN_ALL_ED_FT
[facility]  Podiatry/Wound Care  [address] [place] [address]  Phone: [phone]  Fax: [phone]

## 2022-12-08 NOTE — PROGRESS NOTE ADULT - ASSESSMENT
72 year old male, coming from [facility], with medical history of ESRD (T-TH-S),  DM, PVD, gangrene right foot, (s/p R BKA 11/9) coming to ED c/o L toe discoloration. Admitted for further management. vascular, podiatry and ID consulted. nephro following for HD.

## 2022-12-08 NOTE — CONSULT NOTE ADULT - SUBJECTIVE AND OBJECTIVE BOX
Podiatry HPI: Patient is a 72y old  Male who presents with a chief complaint of left toe discoloration (08 Dec 2022 17:44). Patient is known to podiatry service. Recently underwent right BKA due to no healing potential of right gangrenous digit changes. Patient admits no pain in left toes. Admits no pain in left big toe. Reports he is not sure why he is at the hospital. Does not have any other pedal complaints. Does not have constitutional symptoms      HPI:  72 year old male, coming from [facility], with medical history of ESRD (T-TH-S),  DM, PVD (s/p L BKA 11/9) coming to ED c/o R toe discoloration. Patient states that he noticed his toes are darker than usual for the past week, has no other complaints.  He denies any pain in the toes, fevers, chills, headaches, dizziness, chest pain, cough, SOB, abd pain, n/v, diarrhea, constipation, hematuria, dysuria, numbness, and weakness.    In the ED:  Afebrile, hemodynamically stable  Strong pulses w/ bedside doppler    (07 Dec 2022 09:23)      Podiatry HPI    PMH:ESRD on dialysis    DM (diabetes mellitus)    PAD (peripheral artery disease)      Allergies: No Known Allergies    Medications: acetaminophen     Tablet .. 650 milliGRAM(s) Oral every 6 hours PRN  chlorhexidine 2% Cloths 1 Application(s) Topical <User Schedule>  clotrimazole 1% Cream 1 Application(s) Topical two times a day  heparin   Injectable 5000 Unit(s) SubCutaneous every 8 hours  influenza  Vaccine (HIGH DOSE) 0.7 milliLiter(s) IntraMuscular once  insulin lispro (ADMELOG) corrective regimen sliding scale   SubCutaneous three times a day before meals  pantoprazole    Tablet 40 milliGRAM(s) Oral before breakfast  polyethylene glycol 3350 17 Gram(s) Oral daily  sevelamer carbonate 1600 milliGRAM(s) Oral three times a day with meals    FH:No pertinent family history in first degree relatives      PSX: No significant past surgical history      SH: acetaminophen     Tablet .. 650 milliGRAM(s) Oral every 6 hours PRN  chlorhexidine 2% Cloths 1 Application(s) Topical <User Schedule>  clotrimazole 1% Cream 1 Application(s) Topical two times a day  heparin   Injectable 5000 Unit(s) SubCutaneous every 8 hours  influenza  Vaccine (HIGH DOSE) 0.7 milliLiter(s) IntraMuscular once  insulin lispro (ADMELOG) corrective regimen sliding scale   SubCutaneous three times a day before meals  pantoprazole    Tablet 40 milliGRAM(s) Oral before breakfast  polyethylene glycol 3350 17 Gram(s) Oral daily  sevelamer carbonate 1600 milliGRAM(s) Oral three times a day with meals      Vital Signs Last 24 Hrs  T(C): 36.6 (08 Dec 2022 14:15), Max: 36.7 (07 Dec 2022 21:25)  T(F): 97.8 (08 Dec 2022 14:15), Max: 98 (07 Dec 2022 21:25)  HR: 89 (08 Dec 2022 14:15) (75 - 89)  BP: 113/70 (08 Dec 2022 14:15) (113/70 - 148/78)  BP(mean): --  RR: 16 (08 Dec 2022 14:15) (16 - 18)  SpO2: 100% (08 Dec 2022 14:15) (100% - 100%)    Parameters below as of 08 Dec 2022 14:15  Patient On (Oxygen Delivery Method): room air        LABS                        11.1   6.01  )-----------( 233      ( 08 Dec 2022 09:25 )             35.7               12-08    139  |  105  |  78<H>  ----------------------------<  199<H>  3.9   |  21<L>  |  5.75<H>    Ca    9.4      08 Dec 2022 09:25  Phos  5.9     12-08  Mg     2.4     12-08    TPro  6.6  /  Alb  2.6<L>  /  TBili  0.3  /  DBili  x   /  AST  53<H>  /  ALT  21  /  AlkPhos  66  12-07      ROS  REVIEW OF SYSTEMS: unremarkable outside of PE    PHYSICAL EXAM  LE Focused:  left  Vasc:  DP and PT non palpable. TG: warm to cold. CFT < 5 secs x 5. Pedal hair absent. No varicosities or edema  Derm: trophic skin changes at left foot and leg. No IDM. Xerosis at left plantar digits 1-5. Nail is well adhered, subungual hematoma at left hallux  Neuro: protective sensation intact  MSK: right BKA, patient is bedbound      IMAGING: ordered, pending      A:  Left hallucal subungual hematoma    P:   Patient evaluated and Chart reviewed, no leukocytosis  Discussed diagnosis and treatment with patient  X-rays ordered  Previous JESUS/PVR reviewed  Continue to follow-up with vascular services   Podiatry to follow while in house  Discussed with Attending Dr. Gusman       Podiatry HPI: Patient is a 72y old  Male who presents with a chief complaint of left toe discoloration (08 Dec 2022 17:44). Patient is known to podiatry service. Recently underwent right BKA due to no healing potential of right gangrenous digit changes. Patient admits no pain in left toes. Admits no pain in left big toe. Reports he is not sure why he is at the hospital. Does not have any other pedal complaints. Does not have constitutional symptoms    In the ED:  Afebrile, hemodynamically stable  Strong pulses w/ bedside doppler    (07 Dec 2022 09:23)      Podiatry HPI    PMH:ESRD on dialysis    DM (diabetes mellitus)    PAD (peripheral artery disease)      Allergies: No Known Allergies    Medications: acetaminophen     Tablet .. 650 milliGRAM(s) Oral every 6 hours PRN  chlorhexidine 2% Cloths 1 Application(s) Topical <User Schedule>  clotrimazole 1% Cream 1 Application(s) Topical two times a day  heparin   Injectable 5000 Unit(s) SubCutaneous every 8 hours  influenza  Vaccine (HIGH DOSE) 0.7 milliLiter(s) IntraMuscular once  insulin lispro (ADMELOG) corrective regimen sliding scale   SubCutaneous three times a day before meals  pantoprazole    Tablet 40 milliGRAM(s) Oral before breakfast  polyethylene glycol 3350 17 Gram(s) Oral daily  sevelamer carbonate 1600 milliGRAM(s) Oral three times a day with meals    FH:No pertinent family history in first degree relatives      PSX: No significant past surgical history      SH: acetaminophen     Tablet .. 650 milliGRAM(s) Oral every 6 hours PRN  chlorhexidine 2% Cloths 1 Application(s) Topical <User Schedule>  clotrimazole 1% Cream 1 Application(s) Topical two times a day  heparin   Injectable 5000 Unit(s) SubCutaneous every 8 hours  influenza  Vaccine (HIGH DOSE) 0.7 milliLiter(s) IntraMuscular once  insulin lispro (ADMELOG) corrective regimen sliding scale   SubCutaneous three times a day before meals  pantoprazole    Tablet 40 milliGRAM(s) Oral before breakfast  polyethylene glycol 3350 17 Gram(s) Oral daily  sevelamer carbonate 1600 milliGRAM(s) Oral three times a day with meals      Vital Signs Last 24 Hrs  T(C): 36.6 (08 Dec 2022 14:15), Max: 36.7 (07 Dec 2022 21:25)  T(F): 97.8 (08 Dec 2022 14:15), Max: 98 (07 Dec 2022 21:25)  HR: 89 (08 Dec 2022 14:15) (75 - 89)  BP: 113/70 (08 Dec 2022 14:15) (113/70 - 148/78)  BP(mean): --  RR: 16 (08 Dec 2022 14:15) (16 - 18)  SpO2: 100% (08 Dec 2022 14:15) (100% - 100%)    Parameters below as of 08 Dec 2022 14:15  Patient On (Oxygen Delivery Method): room air        LABS                        11.1   6.01  )-----------( 233      ( 08 Dec 2022 09:25 )             35.7               12-08    139  |  105  |  78<H>  ----------------------------<  199<H>  3.9   |  21<L>  |  5.75<H>    Ca    9.4      08 Dec 2022 09:25  Phos  5.9     12-08  Mg     2.4     12-08    TPro  6.6  /  Alb  2.6<L>  /  TBili  0.3  /  DBili  x   /  AST  53<H>  /  ALT  21  /  AlkPhos  66  12-07      ROS  REVIEW OF SYSTEMS: unremarkable outside of PE    PHYSICAL EXAM  LE Focused:  left  Vasc:  DP and PT non palpable. TG: warm to cold. CFT < 5 secs x 5. Pedal hair absent. No varicosities or edema  Derm: trophic skin changes at left foot and leg. No IDM. Xerosis at left plantar digits 1-5. Nail is well adhered, subungual hematoma at left hallux  Neuro: protective sensation intact  MSK: right BKA, patient is bedbound      IMAGING: ordered, pending      A:  Left hallucal subungual hematoma    P:   Patient evaluated and Chart reviewed, no leukocytosis  Discussed diagnosis and treatment with patient  X-rays ordered  Previous JESUS/PVR reviewed  Continue to follow-up with vascular services   Podiatry to follow while in house  Discussed with Attending Dr. Gusman   Podiatry HPI: Patient is a 72y old  Male who presents with a chief complaint of left toe discoloration (08 Dec 2022 17:44). Patient is known to podiatry service. Recently underwent right BKA due to no healing potential of right gangrenous digit changes. Patient admits no pain in left toes. Admits no pain in left big toe. Reports he is not sure why he is at the hospital. Does not have any other pedal complaints. Does not have constitutional symptoms    In the ED:  Afebrile, hemodynamically stable  Strong pulses w/ bedside doppler    (07 Dec 2022 09:23)      Podiatry HPI    PMH:ESRD on dialysis    DM (diabetes mellitus)    PAD (peripheral artery disease)      Allergies: No Known Allergies    Medications: acetaminophen     Tablet .. 650 milliGRAM(s) Oral every 6 hours PRN  chlorhexidine 2% Cloths 1 Application(s) Topical <User Schedule>  clotrimazole 1% Cream 1 Application(s) Topical two times a day  heparin   Injectable 5000 Unit(s) SubCutaneous every 8 hours  influenza  Vaccine (HIGH DOSE) 0.7 milliLiter(s) IntraMuscular once  insulin lispro (ADMELOG) corrective regimen sliding scale   SubCutaneous three times a day before meals  pantoprazole    Tablet 40 milliGRAM(s) Oral before breakfast  polyethylene glycol 3350 17 Gram(s) Oral daily  sevelamer carbonate 1600 milliGRAM(s) Oral three times a day with meals    FH:No pertinent family history in first degree relatives      PSX: right BKA       SH: acetaminophen     Tablet .. 650 milliGRAM(s) Oral every 6 hours PRN  chlorhexidine 2% Cloths 1 Application(s) Topical <User Schedule>  clotrimazole 1% Cream 1 Application(s) Topical two times a day  heparin   Injectable 5000 Unit(s) SubCutaneous every 8 hours  influenza  Vaccine (HIGH DOSE) 0.7 milliLiter(s) IntraMuscular once  insulin lispro (ADMELOG) corrective regimen sliding scale   SubCutaneous three times a day before meals  pantoprazole    Tablet 40 milliGRAM(s) Oral before breakfast  polyethylene glycol 3350 17 Gram(s) Oral daily  sevelamer carbonate 1600 milliGRAM(s) Oral three times a day with meals      Vital Signs Last 24 Hrs  T(C): 36.6 (08 Dec 2022 14:15), Max: 36.7 (07 Dec 2022 21:25)  T(F): 97.8 (08 Dec 2022 14:15), Max: 98 (07 Dec 2022 21:25)  HR: 89 (08 Dec 2022 14:15) (75 - 89)  BP: 113/70 (08 Dec 2022 14:15) (113/70 - 148/78)  BP(mean): --  RR: 16 (08 Dec 2022 14:15) (16 - 18)  SpO2: 100% (08 Dec 2022 14:15) (100% - 100%)    Parameters below as of 08 Dec 2022 14:15  Patient On (Oxygen Delivery Method): room air        LABS                        11.1   6.01  )-----------( 233      ( 08 Dec 2022 09:25 )             35.7               12-08    139  |  105  |  78<H>  ----------------------------<  199<H>  3.9   |  21<L>  |  5.75<H>    Ca    9.4      08 Dec 2022 09:25  Phos  5.9     12-08  Mg     2.4     12-08    TPro  6.6  /  Alb  2.6<L>  /  TBili  0.3  /  DBili  x   /  AST  53<H>  /  ALT  21  /  AlkPhos  66  12-07      ROS  REVIEW OF SYSTEMS: unremarkable outside of PE    PHYSICAL EXAM  LE Focused:  left  Vasc:  DP and PT non palpable. TG: warm to cold. CFT < 5 secs x 5. Pedal hair absent. No varicosities or edema  Derm: trophic skin changes at left foot and leg. No IDM. Xerosis at left plantar digits 1-5. Nail is well adhered, subungual hematoma at left hallux  Neuro: protective sensation intact  MSK: right BKA, patient is bedbound      IMAGING: ordered, pending      A:  Left hallucal subungual hematoma    P:   Patient evaluated and Chart reviewed, no leukocytosis  Discussed diagnosis and treatment with patient  X-rays ordered  Previous JESUS/PVR reviewed  Continue to follow-up with vascular services   Podiatry to follow while in house  Discussed with Attending Dr. Gusman   Podiatry HPI: Patient is a 72y old  Male who presents with a chief complaint of left toe discoloration (08 Dec 2022 17:44). Patient is known to podiatry service. Recently underwent right BKA due to no healing potential of right gangrenous digit changes. Patient admits no pain in left toes. Admits no pain in left big toe. Reports he is not sure why he is at the hospital. Does not have any other pedal complaints. Does not have constitutional symptoms    In the ED:  Afebrile, hemodynamically stable  Strong pulses w/ bedside doppler    (07 Dec 2022 09:23)      Podiatry HPI    PMH:ESRD on dialysis    DM (diabetes mellitus)    PAD (peripheral artery disease)      Allergies: No Known Allergies    Medications: acetaminophen     Tablet .. 650 milliGRAM(s) Oral every 6 hours PRN  chlorhexidine 2% Cloths 1 Application(s) Topical <User Schedule>  clotrimazole 1% Cream 1 Application(s) Topical two times a day  heparin   Injectable 5000 Unit(s) SubCutaneous every 8 hours  influenza  Vaccine (HIGH DOSE) 0.7 milliLiter(s) IntraMuscular once  insulin lispro (ADMELOG) corrective regimen sliding scale   SubCutaneous three times a day before meals  pantoprazole    Tablet 40 milliGRAM(s) Oral before breakfast  polyethylene glycol 3350 17 Gram(s) Oral daily  sevelamer carbonate 1600 milliGRAM(s) Oral three times a day with meals    FH:No pertinent family history in first degree relatives      PSX: right BKA       SH: acetaminophen     Tablet .. 650 milliGRAM(s) Oral every 6 hours PRN  chlorhexidine 2% Cloths 1 Application(s) Topical <User Schedule>  clotrimazole 1% Cream 1 Application(s) Topical two times a day  heparin   Injectable 5000 Unit(s) SubCutaneous every 8 hours  influenza  Vaccine (HIGH DOSE) 0.7 milliLiter(s) IntraMuscular once  insulin lispro (ADMELOG) corrective regimen sliding scale   SubCutaneous three times a day before meals  pantoprazole    Tablet 40 milliGRAM(s) Oral before breakfast  polyethylene glycol 3350 17 Gram(s) Oral daily  sevelamer carbonate 1600 milliGRAM(s) Oral three times a day with meals      Vital Signs Last 24 Hrs  T(C): 36.6 (08 Dec 2022 14:15), Max: 36.7 (07 Dec 2022 21:25)  T(F): 97.8 (08 Dec 2022 14:15), Max: 98 (07 Dec 2022 21:25)  HR: 89 (08 Dec 2022 14:15) (75 - 89)  BP: 113/70 (08 Dec 2022 14:15) (113/70 - 148/78)  BP(mean): --  RR: 16 (08 Dec 2022 14:15) (16 - 18)  SpO2: 100% (08 Dec 2022 14:15) (100% - 100%)    Parameters below as of 08 Dec 2022 14:15  Patient On (Oxygen Delivery Method): room air        LABS                        11.1   6.01  )-----------( 233      ( 08 Dec 2022 09:25 )             35.7               12-08    139  |  105  |  78<H>  ----------------------------<  199<H>  3.9   |  21<L>  |  5.75<H>    Ca    9.4      08 Dec 2022 09:25  Phos  5.9     12-08  Mg     2.4     12-08    TPro  6.6  /  Alb  2.6<L>  /  TBili  0.3  /  DBili  x   /  AST  53<H>  /  ALT  21  /  AlkPhos  66  12-07      ROS  REVIEW OF SYSTEMS: unremarkable outside of PE    PHYSICAL EXAM  LE Focused:  left  Vasc:  DP and PT non palpable. TG: warm to cold. CFT 3 secs x 5. Pedal hair absent. No varicosities or edema  Derm: trophic skin changes at left foot and leg, skin hyperpigmentation of the left digits 1-5. No IDM. Xerosis at left plantar digits 1-5. Nail is well adhered, subungual hematoma at left hallux 2mm x 4mm. No open wounds/ fluctuance/ erythema/ edema/ pain/ soft tissue crepitus  Neuro: protective sensation intact  MSK: s/p healed right BKA, patient is bedbound      IMAGING: ordered, pending      A:  Left hallucal subungual hematoma, PAD    P:   Patient evaluated and Chart reviewed, no leukocytosis, vitals stable. No clinical infection on physical exam.  Discussed diagnosis and treatment with patient  X-rays ordered.  Previous JESUS/PVR reviewed. Vascular recs appreciated.  Recommend heel offloading with pillows to prevent decubitous injury.   Podiatry to follow while in house  Discussed with Attending Dr. Gusman

## 2022-12-08 NOTE — DISCHARGE NOTE PROVIDER - CARE PROVIDER_API CALL
Gabriela Dutta)  Surgery; Vascular Surgery  [address] [facility], [address]  [place] [address]  Phone: [phone]  Fax: [phone]  Established Patient  Follow Up Time: 1-3 days   Gabriela Dutta)  Surgery; Vascular Surgery  [address] [facility], [address]  [place] [address]  Phone: [phone]  Fax: [phone]  Established Patient  Follow Up Time: 1-3 days    Jorge Huertas)  Internal Medicine  [address] [place] [address]  Phone: [phone]  Fax: [phone]  Follow Up Time:    Gabriela Dutta)  Surgery; Vascular Surgery  [address] [facility], [address]  [place] [address]  Phone: [phone]  Fax: [phone]  Established Patient  Follow Up Time: 1-3 days    Jorge Huertas)  Internal Medicine  [address] [place] [address]  Phone: [phone]  Fax: [phone]  Follow Up Time:     Dr. Dutta, Vascular  Phone: (   )    -  Fax: (   )    -  Follow Up Time:

## 2022-12-08 NOTE — DISCHARGE NOTE PROVIDER - NSDCCPCAREPLAN_GEN_ALL_CORE_FT
PRINCIPAL DISCHARGE DIAGNOSIS  Diagnosis: Vascular disorder of extremity  Assessment and Plan of Treatment: YOu presented to the Ed with concerns of discoloration to the left great toe. you were seen by vascular who found oulses in your extremety and no indication for vascular interventions. you were seen by an infectious dieases doctor who started you on an antifungal cream four possible athletes foot , lastly you were seen by podiatry they reccomends.............        SECONDARY DISCHARGE DIAGNOSES  Diagnosis: PAD (peripheral artery disease)  Assessment and Plan of Treatment:     Diagnosis: DM (diabetes mellitus)  Assessment and Plan of Treatment: Continue to follow with your primary care MD or your endocrinologist. Discuss what the goal hemoglobin A1C level is for you.  Follow a heart healthy diabetic diet. If you check your fingerstick glucose at home, call your MD if it is greater than 250mg/dL on 2 occasions or less than 100mg/dL on 2 occasions. Know signs of low blood sugar, such as: dizziness, shakiness, sweating, confusion, hunger, nervousness- drink 4 ounces apple juice if occurs and call your doctor. Know early signs of high blood sugar, such as: frequent urination, increased thirst, blurry vision, fatigue, headache - call your doctor if this occurs.      Diagnosis: ESRD on dialysis  Assessment and Plan of Treatment:      PRINCIPAL DISCHARGE DIAGNOSIS  Diagnosis: Vascular disorder of extremity  Assessment and Plan of Treatment: YOu presented to the Ed with concerns of discoloration to the left great toe. you were seen by vascular who found oulses in your extremety and no indication for vascular interventions. you were seen by an infectious dieases doctor who started you on an antifungal cream four possible athletes foot , lastly you were seen by podiatry they reccomends.............        SECONDARY DISCHARGE DIAGNOSES  Diagnosis: PAD (peripheral artery disease)  Assessment and Plan of Treatment: Peripheral artery disease (PAD) is narrow, weak, or blocked arteries. PAD is usually the result of a buildup of fat and cholesterol, also called plaque, along your artery walls.PAD prevents normal blood flow to your legs and arms. You are at risk of an amputation if poor blood flow keeps wounds from healing or causes gangrene (tissue death).   DISCHARGE INSTRUCTIONS:  Call your local emergency number (911 in the ) if:  You have any of the following signs of a heart attack:  Squeezing, pressure, or pain in your chest  You may also have any of the following:  Discomfort or pain in your back, neck, jaw, stomach, or arm  Shortness of breath  Nausea or vomiting  Lightheadedness or a sudden cold sweat  You have any of the following signs of a stroke:  Numbness or drooping on one side of your face  Weakness in an arm or leg  Confusion or difficulty speaking  Dizziness, a severe headache, or vision loss  Seek care immediately if:  You have sores or wounds that will not heal.  You notice black or discolored skin on your arm or leg.  Your skin is cool to the touch.  Manage PAD:  Do not smoke. Nicotine and other chemicals in cigarettes and cigars can worsen PAD.  Manage other health conditions. Take your medicines as directed and follow your healthcare provider's instructions if you have high blood pressure or high cholesterol. Perform foot care and check your blood sugar levels as directed if you have diabetes.  Eat heart-healthy foods.   Medicines: You may need any of the following:  Antiplatelets, such as aspirin, help prevent blood clots. Take your antiplatelet medicine exactly as directed. These medicines make it more likely for you to bleed or bruise. If you are told to take aspirin, do not take acetaminophen or ibuprofen instead.  Statin medicine helps lower your cholesterol and prevents PAD from getting worse. Take your medicine as directed.   Follow up with your doctor as directed    Diagnosis: DM (diabetes mellitus)  Assessment and Plan of Treatment: Continue to follow with your primary care MD or your endocrinologist. Discuss what the goal hemoglobin A1C level is for you.  Follow a heart healthy diabetic diet. If you check your fingerstick glucose at home, call your MD if it is greater than 250mg/dL on 2 occasions or less than 100mg/dL on 2 occasions. Know signs of low blood sugar, such as: dizziness, shakiness, sweating, confusion, hunger, nervousness- drink 4 ounces apple juice if occurs and call your doctor. Know early signs of high blood sugar, such as: frequent urination, increased thirst, blurry vision, fatigue, headache - call your doctor if this occurs.      Diagnosis: ESRD on dialysis  Assessment and Plan of Treatment: End-stage kidney disease (ESRD) is when your kidney function is so poor that you need dialysis treatments   DISCHARGE INSTRUCTIONS:  Seek care immediately if:  You have shortness of breath or chest pain.  You have a rash, or a new wound that is very painful.  You have severe muscle cramps or pain.  Your heart is beating faster than normal for you.  Contact your healthcare provider if:  You urinate less than is normal for you.  You gain or lose more weight than your healthcare provider told you is okay.  You are more tired or drowsy.  You have increased nausea or vomiting.  You have pain that does not decrease, even after you take medicine.  Manage ESRD:  Protect your dialysis access site. Do not let anyone take blood or blood pressure readings on the arm where you have your arteriovenous fistula or graft. Do not touch the catheter.  Limit fluids to 1 liter a day (about 34 ounces), or as directed by your healthcare provider. This can help you manage swelling between dialysis appointments.  Weigh yourself at the same time every day. Use the same scale, and wear the same amount of clothing. Record your weight and bring it with you to follow-up appointments.  Do not use NSAIDs or aspirin. They can increase the risk of bleeding in your stomach.  Self-care:  Manage other health conditions, such as high blood pressure, diabetes, and heart disease. These conditions can make your ESRD worse.       PRINCIPAL DISCHARGE DIAGNOSIS  Diagnosis: Vascular disorder of extremity  Assessment and Plan of Treatment: You presented to the Ed with concerns of discoloration to the left great toe. you were seen by vascular who found oulses in your extremety and no indication for vascular interventions. you were seen by an infectious dieases doctor who started you on an antifungal cream four possible athletes foot , lastly you were seen by podiatry recommending outpatient follow up with Podiatry and Vascular for further management.         SECONDARY DISCHARGE DIAGNOSES  Diagnosis: ESRD on dialysis  Assessment and Plan of Treatment: Please follow up your Vascular Physician for furhter management with HD AV fistula, no thrill or bruit in 1 week.      End-stage kidney disease (ESRD) is when your kidney function is so poor that you need dialysis treatments   DISCHARGE INSTRUCTIONS:  Seek care immediately if:  You have shortness of breath or chest pain.  You have a rash, or a new wound that is very painful.  You have severe muscle cramps or pain.  Your heart is beating faster than normal for you.  Contact your healthcare provider if:  You urinate less than is normal for you.  You gain or lose more weight than your healthcare provider told you is okay.  You are more tired or drowsy.  You have increased nausea or vomiting.  You have pain that does not decrease, even after you take medicine.  Manage ESRD:  Protect your dialysis access site. Do not let anyone take blood or blood pressure readings on the arm where you have your arteriovenous fistula or graft. Do not touch the catheter.  Limit fluids to 1 liter a day (about 34 ounces), or as directed by your healthcare provider. This can help you manage swelling between dialysis appointments.  Weigh yourself at the same time every day. Use the same scale, and wear the same amount of clothing. Record your weight and bring it with you to follow-up appointments.  Do not use NSAIDs or aspirin. They can increase the risk of bleeding in your stomach.  Self-care:  Manage other health conditions, such as high blood pressure, diabetes, and heart disease. These conditions can make your ESRD worse.      Diagnosis: DM (diabetes mellitus)  Assessment and Plan of Treatment: Continue to follow with your primary care MD or your endocrinologist. Discuss what the goal hemoglobin A1C level is for you.  Follow a heart healthy diabetic diet. If you check your fingerstick glucose at home, call your MD if it is greater than 250mg/dL on 2 occasions or less than 100mg/dL on 2 occasions. Know signs of low blood sugar, such as: dizziness, shakiness, sweating, confusion, hunger, nervousness- drink 4 ounces apple juice if occurs and call your doctor. Know early signs of high blood sugar, such as: frequent urination, increased thirst, blurry vision, fatigue, headache - call your doctor if this occurs.  Please continue taking your medication as prescribed. If you have any questions or concerns about your medication please direct them to your prescribing Healthcare Provider.    Diagnosis: PAD (peripheral artery disease)  Assessment and Plan of Treatment: Peripheral artery disease (PAD) is narrow, weak, or blocked arteries. PAD is usually the result of a buildup of fat and cholesterol, also called plaque, along your artery walls.PAD prevents normal blood flow to your legs and arms. You are at risk of an amputation if poor blood flow keeps wounds from healing or causes gangrene (tissue death).   DISCHARGE INSTRUCTIONS:  Call your local emergency number (911 in the ) if:  You have any of the following signs of a heart attack:  Squeezing, pressure, or pain in your chest  You may also have any of the following:  Discomfort or pain in your back, neck, jaw, stomach, or arm  Shortness of breath  Nausea or vomiting  Lightheadedness or a sudden cold sweat  You have any of the following signs of a stroke:  Numbness or drooping on one side of your face  Weakness in an arm or leg  Confusion or difficulty speaking  Dizziness, a severe headache, or vision loss  Seek care immediately if:  You have sores or wounds that will not heal.  You notice black or discolored skin on your arm or leg.  Your skin is cool to the touch.  Manage PAD:  Do not smoke. Nicotine and other chemicals in cigarettes and cigars can worsen PAD.  Manage other health conditions. Take your medicines as directed and follow your healthcare provider's instructions if you have high blood pressure or high cholesterol. Perform foot care and check your blood sugar levels as directed if you have diabetes.  Eat heart-healthy foods.   Medicines: You may need any of the following:  Antiplatelets, such as aspirin, help prevent blood clots. Take your antiplatelet medicine exactly as directed. These medicines make it more likely for you to bleed or bruise. If you are told to take aspirin, do not take acetaminophen or ibuprofen instead.  Statin medicine helps lower your cholesterol and prevents PAD from getting worse. Take your medicine as directed.   Follow up with your doctor as directed     PRINCIPAL DISCHARGE DIAGNOSIS  Diagnosis: Vascular disorder of extremity  Assessment and Plan of Treatment: You presented to the ED with concerns of discoloration to the left great toe. you were seen by vascular who found oulses in your extremety and no indication for vascular interventions. you were seen by an infectious dieases doctor who started you on an antifungal cream four possible athletes foot , lastly you were seen by podiatry recommending outpatient follow up with Podiatry and Vascular for further management.         SECONDARY DISCHARGE DIAGNOSES  Diagnosis: Painful hematuria  Assessment and Plan of Treatment: You were treated with continuous bladder irrigation .   Resolved.   Your blood count /hemoglobin have been stable.   Monitor post void residual at facility.   Continue further treatment and management per medical staff at facility.   Follow-up with your primary care physician       Diagnosis: ESRD on dialysis  Assessment and Plan of Treatment: Please follow up your Vascular Physician Dr. Dutta  for further management with HD AV fistula, no thrill or bruit in 1 week.      End-stage kidney disease (ESRD) is when your kidney function is so poor that you need dialysis treatments   DISCHARGE INSTRUCTIONS:  Seek care immediately if:  You have shortness of breath or chest pain.  You have a rash, or a new wound that is very painful.  You have severe muscle cramps or pain.  Your heart is beating faster than normal for you.  Contact your healthcare provider if:  You urinate less than is normal for you.  You gain or lose more weight than your healthcare provider told you is okay.  You are more tired or drowsy.  You have increased nausea or vomiting.  You have pain that does not decrease, even after you take medicine.  Manage ESRD:  Protect your dialysis access site. Do not let anyone take blood or blood pressure readings on the arm where you have your arteriovenous fistula or graft. Do not touch the catheter.  Limit fluids to 1 liter a day (about 34 ounces), or as directed by your healthcare provider. This can help you manage swelling between dialysis appointments.  Weigh yourself at the same time every day. Use the same scale, and wear the same amount of clothing. Record your weight and bring it with you to follow-up appointments.  Do not use NSAIDs or aspirin. They can increase the risk of bleeding in your stomach.  Self-care:  Manage other health conditions, such as high blood pressure, diabetes, and heart disease. These conditions can make your ESRD worse.    Diagnosis: DM (diabetes mellitus)  Assessment and Plan of Treatment: Continue to follow with your primary care MD or your endocrinologist. Discuss what the goal hemoglobin A1C level is for you.  Follow a heart healthy diabetic diet. If you check your fingerstick glucose at home, call your MD if it is greater than 250mg/dL on 2 occasions or less than 100mg/dL on 2 occasions. Know signs of low blood sugar, such as: dizziness, shakiness, sweating, confusion, hunger, nervousness- drink 4 ounces apple juice if occurs and call your doctor. Know early signs of high blood sugar, such as: frequent urination, increased thirst, blurry vision, fatigue, headache - call your doctor if this occurs.  Please continue taking your medication as prescribed. If you have any questions or concerns about your medication please direct them to your prescribing Healthcare Provider.    Diagnosis: PAD (peripheral artery disease)  Assessment and Plan of Treatment: Peripheral artery disease (PAD) is narrow, weak, or blocked arteries. PAD is usually the result of a buildup of fat and cholesterol, also called plaque, along your artery walls.PAD prevents normal blood flow to your legs and arms. You are at risk of an amputation if poor blood flow keeps wounds from healing or causes gangrene (tissue death).   DISCHARGE INSTRUCTIONS:  Call your local emergency number (911 in the [place]) if:  You have any of the following signs of a heart attack:  Squeezing, pressure, or pain in your chest  You may also have any of the following:  Discomfort or pain in your back, neck, jaw, stomach, or arm  Shortness of breath  Nausea or vomiting  Lightheadedness or a sudden cold sweat  You have any of the following signs of a stroke:  Numbness or drooping on one side of your face  Weakness in an arm or leg  Confusion or difficulty speaking  Dizziness, a severe headache, or vision loss  Seek care immediately if:  You have sores or wounds that will not heal.  You notice black or discolored skin on your arm or leg.  Your skin is cool to the touch.  Manage PAD:  Do not smoke. Nicotine and other chemicals in cigarettes and cigars can worsen PAD.  Manage other health conditions. Take your medicines as directed and follow your healthcare provider's instructions if you have high blood pressure or high cholesterol. Perform foot care and check your blood sugar levels as directed if you have diabetes.  Eat heart-healthy foods.   Medicines: You may need any of the following:  Antiplatelets, such as aspirin, help prevent blood clots. Take your antiplatelet medicine exactly as directed. These medicines make it more likely for you to bleed or bruise. If you are told to take aspirin, do not take acetaminophen or ibuprofen instead.  Statin medicine helps lower your cholesterol and prevents PAD from getting worse. Take your medicine as directed.   Follow up with your doctor as directed     PRINCIPAL DISCHARGE DIAGNOSIS  Diagnosis: Vascular disorder of extremity  Assessment and Plan of Treatment: You presented to the ED with concerns of discoloration to the left great toe. you were seen by vascular who found oulses in your extremety and no indication for vascular interventions. you were seen by an infectious dieases doctor who started you on an antifungal cream four possible athletes foot , lastly you were seen by podiatry recommending outpatient follow up with Podiatry and Vascular for further management.   Monitor for gangrenous changes and capillary refill. If dusky , recommend Vascular consult.         SECONDARY DISCHARGE DIAGNOSES  Diagnosis: Painful hematuria  Assessment and Plan of Treatment: You were treated with continuous bladder irrigation .   Resolved.   Your blood count /hemoglobin have been stable.   Monitor post void residual at facility.   Continue further treatment and management per medical staff at facility.   Follow-up with your primary care physician       Diagnosis: ESRD on dialysis  Assessment and Plan of Treatment: Please follow up your Vascular Physician Dr. Dutta  for further management with HD AV fistula, no thrill or bruit in 1 week.      End-stage kidney disease (ESRD) is when your kidney function is so poor that you need dialysis treatments   DISCHARGE INSTRUCTIONS:  Seek care immediately if:  You have shortness of breath or chest pain.  You have a rash, or a new wound that is very painful.  You have severe muscle cramps or pain.  Your heart is beating faster than normal for you.  Contact your healthcare provider if:  You urinate less than is normal for you.  You gain or lose more weight than your healthcare provider told you is okay.  You are more tired or drowsy.  You have increased nausea or vomiting.  You have pain that does not decrease, even after you take medicine.  Manage ESRD:  Protect your dialysis access site. Do not let anyone take blood or blood pressure readings on the arm where you have your arteriovenous fistula or graft. Do not touch the catheter.  Limit fluids to 1 liter a day (about 34 ounces), or as directed by your healthcare provider. This can help you manage swelling between dialysis appointments.  Weigh yourself at the same time every day. Use the same scale, and wear the same amount of clothing. Record your weight and bring it with you to follow-up appointments.  Do not use NSAIDs or aspirin. They can increase the risk of bleeding in your stomach.  Self-care:  Manage other health conditions, such as high blood pressure, diabetes, and heart disease. These conditions can make your ESRD worse.    Diagnosis: DM (diabetes mellitus)  Assessment and Plan of Treatment: Continue to follow with your primary care MD or your endocrinologist. Discuss what the goal hemoglobin A1C level is for you.  Follow a heart healthy diabetic diet. If you check your fingerstick glucose at home, call your MD if it is greater than 250mg/dL on 2 occasions or less than 100mg/dL on 2 occasions. Know signs of low blood sugar, such as: dizziness, shakiness, sweating, confusion, hunger, nervousness- drink 4 ounces apple juice if occurs and call your doctor. Know early signs of high blood sugar, such as: frequent urination, increased thirst, blurry vision, fatigue, headache - call your doctor if this occurs.  Please continue taking your medication as prescribed. If you have any questions or concerns about your medication please direct them to your prescribing Healthcare Provider.    Diagnosis: PAD (peripheral artery disease)  Assessment and Plan of Treatment: Peripheral artery disease (PAD) is narrow, weak, or blocked arteries. PAD is usually the result of a buildup of fat and cholesterol, also called plaque, along your artery walls.PAD prevents normal blood flow to your legs and arms. You are at risk of an amputation if poor blood flow keeps wounds from healing or causes gangrene (tissue death).   DISCHARGE INSTRUCTIONS:  Call your local emergency number (911 in the [place]) if:  You have any of the following signs of a heart attack:  Squeezing, pressure, or pain in your chest  You may also have any of the following:  Discomfort or pain in your back, neck, jaw, stomach, or arm  Shortness of breath  Nausea or vomiting  Lightheadedness or a sudden cold sweat  You have any of the following signs of a stroke:  Numbness or drooping on one side of your face  Weakness in an arm or leg  Confusion or difficulty speaking  Dizziness, a severe headache, or vision loss  Seek care immediately if:  You have sores or wounds that will not heal.  You notice black or discolored skin on your arm or leg.  Your skin is cool to the touch.  Manage PAD:  Do not smoke. Nicotine and other chemicals in cigarettes and cigars can worsen PAD.  Manage other health conditions. Take your medicines as directed and follow your healthcare provider's instructions if you have high blood pressure or high cholesterol. Perform foot care and check your blood sugar levels as directed if you have diabetes.  Eat heart-healthy foods.   Medicines: You may need any of the following:  Antiplatelets, such as aspirin, help prevent blood clots. Take your antiplatelet medicine exactly as directed. These medicines make it more likely for you to bleed or bruise. If you are told to take aspirin, do not take acetaminophen or ibuprofen instead.  Statin medicine helps lower your cholesterol and prevents PAD from getting worse. Take your medicine as directed.   Follow up with your doctor as directed

## 2022-12-08 NOTE — DISCHARGE NOTE PROVIDER - PROVIDER TOKENS
PROVIDER:[TOKEN:[[id number]],FOLLOWUP:[1-3 days],ESTABLISHEDPATIENT:[T]] PROVIDER:[TOKEN:[[id number]],FOLLOWUP:[1-3 days],ESTABLISHEDPATIENT:[T]],PROVIDER:[TOKEN:[[id number]]] PROVIDER:[TOKEN:[[id number]],FOLLOWUP:[1-3 days],ESTABLISHEDPATIENT:[T]],PROVIDER:[TOKEN:[[id number]]],FREE:[LAST:[Dr. Dutta],FIRST:[Vascular],PHONE:[(   )    -],FAX:[(   )    -]]

## 2022-12-08 NOTE — PROGRESS NOTE ADULT - SUBJECTIVE AND OBJECTIVE BOX
Patient is a 72y old  Male who presents with a chief complaint of left toe discoloration (07 Dec 2022 11:30)      INTERVAL HPI/OVERNIGHT EVENTS: no overnight events    MEDICATIONS  (STANDING):  chlorhexidine 2% Cloths 1 Application(s) Topical <User Schedule>  clotrimazole 1% Cream 1 Application(s) Topical two times a day  heparin   Injectable 5000 Unit(s) SubCutaneous every 8 hours  influenza  Vaccine (HIGH DOSE) 0.7 milliLiter(s) IntraMuscular once  insulin lispro (ADMELOG) corrective regimen sliding scale   SubCutaneous three times a day before meals  pantoprazole    Tablet 40 milliGRAM(s) Oral before breakfast  polyethylene glycol 3350 17 Gram(s) Oral daily  sevelamer carbonate 1600 milliGRAM(s) Oral three times a day with meals    MEDICATIONS  (PRN):  acetaminophen     Tablet .. 650 milliGRAM(s) Oral every 6 hours PRN Temp greater or equal to 38C (100.4F), Mild Pain (1 - 3)    Vital Signs Last 24 Hrs  T(C): 36.6 (12-08-22 @ 14:15), Max: 36.7 (12-07-22 @ 21:25)  T(F): 97.8 (12-08-22 @ 14:15), Max: 98 (12-07-22 @ 21:25)  HR: 89 (12-08-22 @ 14:15) (75 - 89)  BP: 113/70 (12-08-22 @ 14:15) (113/70 - 148/78)  BP(mean): --  RR: 16 (12-08-22 @ 14:15) (16 - 18)  SpO2: 100% (12-08-22 @ 14:15) (99% - 100%)        PAST MEDICAL & SURGICAL HISTORY:  ESRD on dialysis      DM (diabetes mellitus)      PAD (peripheral artery disease)      No significant past surgical history          SOCIAL HISTORY  Alcohol:  Tobacco:  Illicit substance use:      FAMILY HISTORY:        REVIEW OF SYSTEMS:  CONSTITUTIONAL: No fever  EYES: No eye pain  ENMT:  No sinus or throat pain  NECK: No pain   RESPIRATORY: No cough; No shortness of breath  CARDIOVASCULAR: No chest pain  GASTROINTESTINAL: No abdominal pain.   GENITOURINARY: No dysuria  NEUROLOGICAL: No headaches  SKIN: No rashes,  MUSCULOSKELETAL: + Rt great toe pain and swelling    RADIOLOGY & ADDITIONAL TESTS:    Imaging Personally Reviewed:  [ ] YES  [ ] NO    Consultant(s) Notes Reviewed:  [x ] YES  [ ] NO    PHYSICAL EXAM:  GENERAL: NAD  HEAD:  Atraumatic, Normocephalic  EYES:  conjunctiva and sclera clear  ENMT: Moist mucous membranes  NECK: Supple  NERVOUS SYSTEM:  Alert awake   CHEST/LUNG:dec breath sounds at bases  HEART: Regular rate and rhythm  ABDOMEN: Soft, Nontender, Nondistended; Bowel sounds present  EXTREMITIES: left toes discolored , Rt BKA    Care Collaborated Discussed with Consultants/Other Providers [x ] YES  [ ] NO

## 2022-12-08 NOTE — PROGRESS NOTE ADULT - ASSESSMENT
Patient is a 71yo Male with ESRD on HD, failed Left AVF x2, DM, recently admitted with  Rt foot gangrene s/p Rt BKA presents with left toe discoloration. Nephrology consulted for ESRD status.     1) ESRD: Last HD 12/6 @ [facility]. Plan for next maintenance HD today; 12/8. c/w  TTS schedule. Monitor electrolytes.  Pt with Left UE AVF- no thrill no bruit- f/u Vascular Surgeon Dr. Dutta as an outpt.   2) Anemia of renal disease: hgb acceptable. Will hold JERED for now. Monitor Hb.  3) Hyperphosphatemia: Serum phosphorus elevated. c/w low phos diet. Will increase Sevelamer to 1600mg PO tid with meals for now. Monitor serum calcium and phosphorus.  4) PAD- s/p Rt BKA 11/9 by Dr. Billy due to gangrene. Pt now with left toe discoloration. Seen by Vascular; no acute surgical intervention but will need close f/u as an outpt. Plan as per primary team/ vascular surgery.      [facility] NEPHROLOGY  Wili Hopkins M.D.  Kiran Feng D.O.  Sujatha Dumont M.D.  Radha Miller, MSN, ANP-C  [phone]    [address] [place] [address]

## 2022-12-08 NOTE — CONSULT NOTE ADULT - SUBJECTIVE AND OBJECTIVE BOX
S: Patient is a 72M s/p R BKA 2/2 PAD 11/9/22 presenting for discoloration in L distal toes noticed by sister when visiting pt in rehab. Pt denies any pain the in LLE. Per daughter, pt has not been ambulating or OOB as frequently as expected in rehab. Patient denies any acute complaints.    O:  Vital Signs Last 24 Hrs  T(C): 36.6 (08 Dec 2022 09:26), Max: 36.7 (07 Dec 2022 21:25)  T(F): 97.9 (08 Dec 2022 09:26), Max: 98 (07 Dec 2022 21:25)  HR: 75 (08 Dec 2022 09:26) (75 - 79)  BP: 128/73 (08 Dec 2022 09:26) (128/73 - 148/78)  BP(mean): --  RR: 16 (08 Dec 2022 09:26) (16 - 18)  SpO2: 100% (08 Dec 2022 09:26) (99% - 100%)    Parameters below as of 08 Dec 2022 09:26  Patient On (Oxygen Delivery Method): room air    Physical Examination:  Gen: Awake, alert, oriented x3 but intermittently mildly confused to questioning but redirectable w/persistent questioning  RLE: Amputation site CDI w/staple line intact, no signs of dehiscence. No erythema or purulence. No bleeding. No TTP.  LLE: Mild mottling to distal toes dorsal aspect w/subcentimeter very superficial ulceration at DIP joints of 2nd and 3rd digits. DP pulse palpable. Sensation and strength intact.                          11.1   6.01  )-----------( 233      ( 08 Dec 2022 09:25 )             35.7   12-08    139  |  105  |  78<H>  ----------------------------<  199<H>  3.9   |  21<L>  |  5.75<H>    Ca    9.4      08 Dec 2022 09:25  Phos  5.9     12-08  Mg     2.4     12-08    TPro  6.6  /  Alb  2.6<L>  /  TBili  0.3  /  DBili  x   /  AST  53<H>  /  ALT  21  /  AlkPhos  66  12-07    CTA 9/27/22: LEFT LOWER EXTREMITY: The common, internal and external iliac arteries   are patent without stenosis. The common femoral artery is patent without   stenosis. Mild stenoses at the distal superficial femoral artery. The   popliteal artery is patent with mild multifocal stenoses.    At least two-vessel runoff in the left calf via the anterior tibial and   peroneal arteries. The posterior tibial artery is difficult to evaluate   due to extensive vascular calcifications. Moderate to severe stenosis at   the tibioperoneal trunk artery. Multifocal stenoses at the anterior   tibial and peroneal arteries.    In the left foot, the dorsalis pedis and posterior tibial arteries are   difficult to evaluate due to extensive vascular calcifications.    JESUS LLE 9/26/22: 1.43    A: 72M w/PAD s/p R BKA now w/LLE discoloration but optimized arterial inflow clinically    P:  - No acute vascular surgery intervention currently indicated  - LLE w/palpable DP pulse and no pain, indicating arterial supply is currently optimized in setting of known PAD on recent CTA  - Recommend close f/u w/medical team and podiatry  - Vascular surgery to follow - please call with any further concerns

## 2022-12-08 NOTE — PROGRESS NOTE ADULT - ASSESSMENT
72 year old male, coming from [facility], with medical history of ESRD (T-TH-S),  DM, PVD, gangrene right foot, (s/p R BKA 11/9) coming to ED c/o L toe discoloration. Admitted for further management. vascular, podiatry and ID consulted. nephro following for HD.

## 2022-12-08 NOTE — CONSULT NOTE ADULT - ASSESSMENT
HPI:  72 year old male, coming from [facility], with medical history of ESRD (TTS),  DM, PVD (s/p R BKA 11/9) coming to ED as per pt sent for LLE doppler, he reported also L toes pigmentation. Patient states that he noticed his toes are darker than usual for the past week, has no other complaints.  He denies any pain in the toes, fevers, chills, headaches, dizziness, chest pain, cough, SOB, abd pain, n/v, diarrhea, constipation, hematuria, dysuria, numbness, and weakness.    In the ED:  Afebrile, hemodynamically stable  Strong pulses w/ bedside doppler    (07 Dec 2022 09:23)    Allergies: No Known Allergies    PAST MEDICAL & SURGICAL HISTORY:  ESRD on dialysis R perm cath  DM (diabetes mellitus)  PAD (peripheral artery disease)  RLE BKA 11/2022 by vascular sx      SOCIAL HISTORY: [x] smoking [ ] alcohol [ ] IVDU  FAMILY HISTORY: no pertinent related medical history  Drug Dosing Weight  Height (cm): 188 (06 Dec 2022 21:01)  Weight (kg): 81.6 (06 Dec 2022 21:01)  BMI (kg/m2): 23.1 (06 Dec 2022 21:01)  BSA (m2): 2.08 (06 Dec 2022 21:01)  ANTIMICROBIALS: none    OTHER MEDS:  acetaminophen     Tablet .. 650 milliGRAM(s) Oral every 6 hours PRN  chlorhexidine 2% Cloths 1 Application(s) Topical <User Schedule>  heparin   Injectable 5000 Unit(s) SubCutaneous every 8 hours  influenza  Vaccine (HIGH DOSE) 0.7 milliLiter(s) IntraMuscular once  insulin lispro (ADMELOG) corrective regimen sliding scale   SubCutaneous three times a day before meals  pantoprazole    Tablet 40 milliGRAM(s) Oral before breakfast  polyethylene glycol 3350 17 Gram(s) Oral daily  sevelamer carbonate 800 milliGRAM(s) Oral three times a day with meals    REVIEW OF SYSTEMS:  CONSTITUTIONAL:  No weakness, fevers or chills  EYES/ENT:  No visual changes;  No vertigo or throat pain   NECK:  No pain or stiffness  RESPIRATORY:  No cough, wheezing, hemoptysis; No shortness of breath  CARDIOVASCULAR:  No chest pain or palpitations  GASTROINTESTINAL:  No abdominal or epigastric pain. No nausea, vomiting, or hematemesis; No diarrhea or constipation. No melena or hematochezia.  GENITOURINARY:  No dysuria, frequency or hematuria  NEUROLOGICAL:  No numbness or weakness  MSK: no back pain, no joint pain  SKIN:  No itching, rashes, toes skin changes      PE:  Vital Signs Last 24 Hrs  T(C): 36.6 (08 Dec 2022 09:26), Max: 36.7 (07 Dec 2022 21:25)  T(F): 97.9 (08 Dec 2022 09:26), Max: 98 (07 Dec 2022 21:25)  HR: 75 (08 Dec 2022 09:26) (75 - 79)  BP: 128/73 (08 Dec 2022 09:26) (128/73 - 148/78)  RR: 16 (08 Dec 2022 09:26) (16 - 18)  SpO2: 100% (08 Dec 2022 09:26) (99% - 100%)    Parameters below as of 08 Dec 2022 09:26  Patient On (Oxygen Delivery Method): room air    Gen: AOx3, NAD, non-toxic, pleasant  HEAD:  Atraumatic, Normocephalic  EYES: EOMI, PERRLA, conjunctiva and sclera clear  ENT: Moist mucous membranes  NECK: Supple, No JVD  CV: S1+S2 normal, nontachycardic  RUE Perm cath site OK receiving HD  Resp: Clear bilat, no resp distress, no crackles/wheezes  Abd: Soft, nontender, +BS  Ext: No LE edema, no wounds, Pulses + weak L DPP, normal skin tamp LE  : No Garcia  IV/Skin: No thrombophlebitis  S/P R BKA surgical site healing well, stiches removed, no pain, no induration, no fluctuation no signs of infection  L foot toes with some hyperpigmented changes, no wounds, no temp changes, no cold extremities, pulses weak but +  Msk: No low back pain, no arthralgias, no joint swelling  Neuro: AAOx3. No sensory deficits, no motor deficits    LABS:                      11.1   6.01  )-----------( 233      ( 08 Dec 2022 09:25 )             35.7       12-08    139  |  105  |  78<H>  ----------------------------<  199<H>  3.9   |  21<L>  |  5.75<H>    Ca    9.4      08 Dec 2022 09:25  Phos  5.9     12-08  Mg     2.4     12-08    TPro  6.6  /  Alb  2.6<L>  /  TBili  0.3  /  DBili  x   /  AST  53<H>  /  ALT  21  /  AlkPhos  66  12-07    MICROBIOLOGY:  v  Clean Catch Clean Catch (Midstream)  11-08-22   <10,000 CFU/mL Normal Urogenital Martha  --  --      .Blood Blood-Peripheral  11-08-22   No Growth Final  --  --      .Blood Blood-Peripheral  11-08-22   No Growth Final  --  --    RADIOLOGY:   --CT Angio Abd Aorta w/run-off w/ IV Cont (09.27.22 @ 12:04)   LEFT LOWER EXTREMITY: The common, internal and external iliac arteries are patent without stenosis. The common femoral artery is patent without stenosis. Mild stenoses at the distal superficial femoral artery. The popliteal artery is patent with mild multifocal stenoses.    At least two-vessel runoff in the left calf via the anterior tibial and peroneal arteries. The posterior tibial artery is difficult to evaluate due to extensive vascular calcifications. Moderate to severe stenosis at   the tibioperoneal trunk artery. Multifocal stenoses at the anterior tibial and peroneal arteries.    In the left foot, the dorsalis pedis and posterior tibial arteries are difficult to evaluate due to extensive vascular calcifications.    --VA Physiol Extremity Lower 3+ Level, BI (09.26.22 @ 16:49)   FINDINGS: There are biphasic changes bilaterally, dampened at the level of the metatarsals and left digit. No discernible waveform at the right digit. Segmental pressures were not obtained at the level of the thighs.  Right JESUS = 1.44  Left JESUS = 1.43    IMPRESSION: Abnormally elevated ABIs compatiblewith calcified vessels.No discernible pulse volume recording at the level of the right digit.    S/P R BKA 11/09     IMPRESSION:  71 yo male from [facility] with H/O ESRD TTS, DM, severe PAD, active smoker, R BKA 11/09/2022 due to gangrene R foot due to PAD who presented to the hospital for L toes skin color changes not associated to any LLE pain, L ext warm and perfused although from previous vascular studies noted he has calcified vessels(on CTA from 9/2022 left foot dorsalis pedis and posterior tibial arteries were difficult to evaluate due to extensive vascular calcifications).  LLE JESUS 1.43.    -Athlete foot  -LLE toes hyperpigmentation(skin changes) with no signs of Acute limb ischemia, no signs of bacterial infection  -Severe PAD s/p R BKA 11/09/2022, L foot dorsalis with vascular calcifications on most recent CTA studies  -Chronic active smoker  -ESRD on HD  -DM    PLAN:  Apply ketoconazole cream BID to L interdigital areas for athlete foot for 7-14 days  Follow vascular for LE PAD  R stump site wound care(healing well)  Diabetes control  Smoking cessation  HD as per nephrology  Discussed with medicine  Follow up OP with PMD    Please reach ID with any questions or concerns  Dr. Kanchan Iqbal  Available in Teams     HPI:  72 year old male, coming from [facility], with medical history of ESRD (TTS),  DM, PVD (s/p R BKA 11/9) coming to ED as per pt sent for LLE doppler, he reported also L toes pigmentation. Patient states that he noticed his toes are darker than usual for the past week, has no other complaints.  He denies any pain in the toes, fevers, chills, headaches, dizziness, chest pain, cough, SOB, abd pain, n/v, diarrhea, constipation, hematuria, dysuria, numbness, and weakness.    In the ED:  Afebrile, hemodynamically stable  Strong pulses w/ bedside doppler    (07 Dec 2022 09:23)    Allergies: No Known Allergies    PAST MEDICAL & SURGICAL HISTORY:  ESRD on dialysis R perm cath  DM (diabetes mellitus)  PAD (peripheral artery disease)  RLE BKA 11/2022 by vascular sx      SOCIAL HISTORY: [x] smoking [ ] alcohol [ ] IVDU  FAMILY HISTORY: no pertinent related medical history  Drug Dosing Weight  Height (cm): 188 (06 Dec 2022 21:01)  Weight (kg): 81.6 (06 Dec 2022 21:01)  BMI (kg/m2): 23.1 (06 Dec 2022 21:01)  BSA (m2): 2.08 (06 Dec 2022 21:01)  ANTIMICROBIALS: none    OTHER MEDS:  acetaminophen     Tablet .. 650 milliGRAM(s) Oral every 6 hours PRN  chlorhexidine 2% Cloths 1 Application(s) Topical <User Schedule>  heparin   Injectable 5000 Unit(s) SubCutaneous every 8 hours  influenza  Vaccine (HIGH DOSE) 0.7 milliLiter(s) IntraMuscular once  insulin lispro (ADMELOG) corrective regimen sliding scale   SubCutaneous three times a day before meals  pantoprazole    Tablet 40 milliGRAM(s) Oral before breakfast  polyethylene glycol 3350 17 Gram(s) Oral daily  sevelamer carbonate 800 milliGRAM(s) Oral three times a day with meals    REVIEW OF SYSTEMS:  CONSTITUTIONAL:  No weakness, fevers or chills  EYES/ENT:  No visual changes;  No vertigo or throat pain   NECK:  No pain or stiffness  RESPIRATORY:  No cough, wheezing, hemoptysis; No shortness of breath  CARDIOVASCULAR:  No chest pain or palpitations  GASTROINTESTINAL:  No abdominal or epigastric pain. No nausea, vomiting, or hematemesis; No diarrhea or constipation. No melena or hematochezia.  GENITOURINARY:  No dysuria, frequency or hematuria  NEUROLOGICAL:  No numbness or weakness  MSK: no back pain, no joint pain  SKIN:  No itching, rashes, toes skin changes      PE:  Vital Signs Last 24 Hrs  T(C): 36.6 (08 Dec 2022 09:26), Max: 36.7 (07 Dec 2022 21:25)  T(F): 97.9 (08 Dec 2022 09:26), Max: 98 (07 Dec 2022 21:25)  HR: 75 (08 Dec 2022 09:26) (75 - 79)  BP: 128/73 (08 Dec 2022 09:26) (128/73 - 148/78)  RR: 16 (08 Dec 2022 09:26) (16 - 18)  SpO2: 100% (08 Dec 2022 09:26) (99% - 100%)    Parameters below as of 08 Dec 2022 09:26  Patient On (Oxygen Delivery Method): room air    Gen: AOx3, NAD, non-toxic, pleasant  HEAD:  Atraumatic, Normocephalic  EYES: EOMI, PERRLA, conjunctiva and sclera clear  ENT: Moist mucous membranes  NECK: Supple, No JVD  CV: S1+S2 normal, nontachycardic  RUE Perm cath site OK receiving HD  Resp: Clear bilat, no resp distress, no crackles/wheezes  Abd: Soft, nontender, +BS  Ext: No LE edema, no wounds, Pulses + weak L DPP, normal skin tamp LE  : No Garcia  IV/Skin: No thrombophlebitis  S/P R BKA surgical site healing well, stiches removed, no pain, no induration, no fluctuation no signs of infection  L foot toes with some hyperpigmented changes, no wounds, no temp changes, no cold extremities, pulses weak but +  Msk: No low back pain, no arthralgias, no joint swelling  Neuro: AAOx3. No sensory deficits, no motor deficits    LABS:                      11.1   6.01  )-----------( 233      ( 08 Dec 2022 09:25 )             35.7       12-08    139  |  105  |  78<H>  ----------------------------<  199<H>  3.9   |  21<L>  |  5.75<H>    Ca    9.4      08 Dec 2022 09:25  Phos  5.9     12-08  Mg     2.4     12-08    TPro  6.6  /  Alb  2.6<L>  /  TBili  0.3  /  DBili  x   /  AST  53<H>  /  ALT  21  /  AlkPhos  66  12-07    MICROBIOLOGY:  v  Clean Catch Clean Catch (Midstream)  11-08-22   <10,000 CFU/mL Normal Urogenital Martha  --  --      .Blood Blood-Peripheral  11-08-22   No Growth Final  --  --      .Blood Blood-Peripheral  11-08-22   No Growth Final  --  --    RADIOLOGY:   --CT Angio Abd Aorta w/run-off w/ IV Cont (09.27.22 @ 12:04)   LEFT LOWER EXTREMITY: The common, internal and external iliac arteries are patent without stenosis. The common femoral artery is patent without stenosis. Mild stenoses at the distal superficial femoral artery. The popliteal artery is patent with mild multifocal stenoses.    At least two-vessel runoff in the left calf via the anterior tibial and peroneal arteries. The posterior tibial artery is difficult to evaluate due to extensive vascular calcifications. Moderate to severe stenosis at   the tibioperoneal trunk artery. Multifocal stenoses at the anterior tibial and peroneal arteries.    In the left foot, the dorsalis pedis and posterior tibial arteries are difficult to evaluate due to extensive vascular calcifications.    --VA Physiol Extremity Lower 3+ Level, BI (09.26.22 @ 16:49)   FINDINGS: There are biphasic changes bilaterally, dampened at the level of the metatarsals and left digit. No discernible waveform at the right digit. Segmental pressures were not obtained at the level of the thighs.  Right JESUS = 1.44  Left JESUS = 1.43    IMPRESSION: Abnormally elevated ABIs compatiblewith calcified vessels.No discernible pulse volume recording at the level of the right digit.    S/P R BKA 11/09     IMPRESSION:  73 yo male from [facility] with H/O ESRD TTS, DM, severe PAD, active smoker, R BKA 11/09/2022 due to gangrene R foot due to PAD who presented to the hospital for L toes skin color changes not associated to any LLE pain, L ext warm and perfused although from previous vascular studies noted he has calcified vessels(on CTA from 9/2022 left foot dorsalis pedis and posterior tibial arteries were difficult to evaluate due to extensive vascular calcifications).  LLE JESUS 1.43.    -Tinea Pedis   -LLE toes hyperpigmentation(skin changes) with no signs of Acute limb ischemia, no signs of bacterial infection  -Severe PAD s/p R BKA 11/09/2022, L foot dorsalis with vascular calcifications on most recent CTA studies  -Chronic active smoker  -ESRD on HD  -DM    PLAN:  Apply ketoconazole cream BID to L interdigital area for 7-14 days(for tinea pedis)  Follow vascular for LE PAD  R stump site wound care(healing well)  Diabetes control  Diabetic foot care  Smoking cessation  HD as per nephrology  Discussed with medicine  Follow up OP with PMD    Please reach ID with any questions or concerns  Dr. Kanchan Iqbal  Available in Teams

## 2022-12-08 NOTE — PROGRESS NOTE ADULT - PROBLEM SELECTOR PLAN 2
HD TThS  c/w HD- Rt CW permacath  c/w Sevelamer   F/u outpt Dr. Dutta- Left AVF no thrill/Bruit  Monitor serum calcium and phosphorus- nephro recc  Dr. Dumont Nephro

## 2022-12-09 DIAGNOSIS — R31.9 HEMATURIA, UNSPECIFIED: ICD-10-CM

## 2022-12-09 DIAGNOSIS — Z02.9 ENCOUNTER FOR ADMINISTRATIVE EXAMINATIONS, UNSPECIFIED: ICD-10-CM

## 2022-12-09 LAB
ANION GAP SERPL CALC-SCNC: 10 MMOL/L — SIGNIFICANT CHANGE UP (ref 5–17)
BUN SERPL-MCNC: 53 MG/DL — HIGH (ref 7–18)
CALCIUM SERPL-MCNC: 9.2 MG/DL — SIGNIFICANT CHANGE UP (ref 8.4–10.5)
CHLORIDE SERPL-SCNC: 104 MMOL/L — SIGNIFICANT CHANGE UP (ref 96–108)
CO2 SERPL-SCNC: 24 MMOL/L — SIGNIFICANT CHANGE UP (ref 22–31)
CREAT SERPL-MCNC: 4.51 MG/DL — HIGH (ref 0.5–1.3)
EGFR: 13 ML/MIN/1.73M2 — LOW
GLUCOSE BLDC GLUCOMTR-MCNC: 164 MG/DL — HIGH (ref 70–99)
GLUCOSE BLDC GLUCOMTR-MCNC: 182 MG/DL — HIGH (ref 70–99)
GLUCOSE BLDC GLUCOMTR-MCNC: 186 MG/DL — HIGH (ref 70–99)
GLUCOSE BLDC GLUCOMTR-MCNC: 225 MG/DL — HIGH (ref 70–99)
GLUCOSE SERPL-MCNC: 222 MG/DL — HIGH (ref 70–99)
HCT VFR BLD CALC: 39.9 % — SIGNIFICANT CHANGE UP (ref 39–50)
HGB BLD-MCNC: 12.2 G/DL — LOW (ref 13–17)
MCHC RBC-ENTMCNC: 30.6 GM/DL — LOW (ref 32–36)
MCHC RBC-ENTMCNC: 30.9 PG — SIGNIFICANT CHANGE UP (ref 27–34)
MCV RBC AUTO: 101 FL — HIGH (ref 80–100)
NRBC # BLD: 0 /100 WBCS — SIGNIFICANT CHANGE UP (ref 0–0)
PLATELET # BLD AUTO: 146 K/UL — LOW (ref 150–400)
POTASSIUM SERPL-MCNC: 5.1 MMOL/L — SIGNIFICANT CHANGE UP (ref 3.5–5.3)
POTASSIUM SERPL-SCNC: 5.1 MMOL/L — SIGNIFICANT CHANGE UP (ref 3.5–5.3)
RBC # BLD: 3.95 M/UL — LOW (ref 4.2–5.8)
RBC # FLD: 16.4 % — HIGH (ref 10.3–14.5)
SODIUM SERPL-SCNC: 138 MMOL/L — SIGNIFICANT CHANGE UP (ref 135–145)
WBC # BLD: 5.59 K/UL — SIGNIFICANT CHANGE UP (ref 3.8–10.5)
WBC # FLD AUTO: 5.59 K/UL — SIGNIFICANT CHANGE UP (ref 3.8–10.5)

## 2022-12-09 PROCEDURE — 99231 SBSQ HOSP IP/OBS SF/LOW 25: CPT | Mod: GC

## 2022-12-09 PROCEDURE — 99223 1ST HOSP IP/OBS HIGH 75: CPT

## 2022-12-09 RX ORDER — LIDOCAINE HCL 20 MG/ML
10 VIAL (ML) INJECTION ONCE
Refills: 0 | Status: COMPLETED | OUTPATIENT
Start: 2022-12-09 | End: 2022-12-09

## 2022-12-09 RX ORDER — OXYBUTYNIN CHLORIDE 5 MG
5 TABLET ORAL THREE TIMES A DAY
Refills: 0 | Status: DISCONTINUED | OUTPATIENT
Start: 2022-12-09 | End: 2022-12-13

## 2022-12-09 RX ORDER — LIDOCAINE 4 G/100G
1 CREAM TOPICAL THREE TIMES A DAY
Refills: 0 | Status: DISCONTINUED | OUTPATIENT
Start: 2022-12-09 | End: 2022-12-09

## 2022-12-09 RX ADMIN — SEVELAMER CARBONATE 1600 MILLIGRAM(S): 2400 POWDER, FOR SUSPENSION ORAL at 12:05

## 2022-12-09 RX ADMIN — Medication 1 APPLICATION(S): at 05:38

## 2022-12-09 RX ADMIN — Medication 5 MILLIGRAM(S): at 13:45

## 2022-12-09 RX ADMIN — Medication 10 MILLILITER(S): at 16:00

## 2022-12-09 RX ADMIN — Medication 650 MILLIGRAM(S): at 12:08

## 2022-12-09 RX ADMIN — Medication 2: at 17:18

## 2022-12-09 RX ADMIN — SEVELAMER CARBONATE 1600 MILLIGRAM(S): 2400 POWDER, FOR SUSPENSION ORAL at 17:20

## 2022-12-09 RX ADMIN — CHLORHEXIDINE GLUCONATE 1 APPLICATION(S): 213 SOLUTION TOPICAL at 05:37

## 2022-12-09 RX ADMIN — Medication 1: at 08:17

## 2022-12-09 RX ADMIN — Medication 1 APPLICATION(S): at 17:21

## 2022-12-09 RX ADMIN — Medication 1: at 12:03

## 2022-12-09 RX ADMIN — SEVELAMER CARBONATE 1600 MILLIGRAM(S): 2400 POWDER, FOR SUSPENSION ORAL at 08:22

## 2022-12-09 RX ADMIN — Medication 5 MILLIGRAM(S): at 21:14

## 2022-12-09 RX ADMIN — Medication 650 MILLIGRAM(S): at 13:20

## 2022-12-09 RX ADMIN — PANTOPRAZOLE SODIUM 40 MILLIGRAM(S): 20 TABLET, DELAYED RELEASE ORAL at 07:00

## 2022-12-09 NOTE — PROGRESS NOTE ADULT - ASSESSMENT
72 year old male, coming from [facility], with medical history of ESRD (T-TH-S),  DM, PVD, gangrene right foot, (s/p R BKA 11/9) coming to ED c/o L toe discoloration. Admitted for further management. vascular recc: no interventions, podiatry recc xray left foot, and ID consulted no abx. nephro following for HD. hospital course c/b hematuria,  hgb stable, urology consulted.

## 2022-12-09 NOTE — CONSULT NOTE ADULT - ASSESSMENT
72 year old male, coming from [facility], with medical history of ESRD (T-TH-S),  DM, PVD, gangrene right foot, (s/p R BKA 11/9) coming to ED c/o L toe discoloration. Admitted for further management. vascular recc: no interventions, podiatry recc xray left foot, and ID consulted no abx. nephro following for HD. hospital course c/b hematuria,  hgb stable, urology consulted.  - Patient's bladder washed out and neumann placed  - Patient needs gross hematuria workup  - Needs either CT urogram or MR urogram for upper tract imaging  - Recommend coordination with radiology and dialysis to have imaging just prior to dialysis session while in house  - Will then need outpatient cystoscopy for gross hematuria workup completion after discharge  - Discussed with Dr. St 72 year old male, coming from [facility], with medical history of ESRD (T-TH-S),  DM, PVD, gangrene right foot, (s/p R BKA 11/9) coming to ED c/o L toe discoloration. Admitted for further management. vascular recc: no interventions, podiatry recc xray left foot, and ID consulted no abx. nephro following for HD. hospital course c/b hematuria,  hgb stable, urology consulted.  - Patient's bladder washed out and neumann placed  - Patient needs gross hematuria workup  - CT urogram with dialysis   - Recommend coordination with radiology and dialysis to have imaging just prior to dialysis session while in house  - Will then need outpatient cystoscopy for gross hematuria workup completion after discharge  - Discussed with Dr. St

## 2022-12-09 NOTE — PROGRESS NOTE ADULT - PROBLEM SELECTOR PLAN 6
pending x ray left foot  pt eval    pending urology consult pending x ray left foot  pt eval    Further recc from urology pending x ray left foot  pt eval    pending CT urogram

## 2022-12-09 NOTE — CONSULT NOTE ADULT - REASON FOR ADMISSION
left toe discoloration

## 2022-12-09 NOTE — PROGRESS NOTE ADULT - ATTENDING COMMENTS
Left hallux small stable subungual hematoma, not clinically infected. Continue decubitous precautions, follow-up outpt with vascular and podiatry

## 2022-12-09 NOTE — PROGRESS NOTE ADULT - ASSESSMENT
72 year old male, coming from [facility], with medical history of ESRD (T-TH-S),  DM, PVD, gangrene right foot, (s/p R BKA 11/9) coming to ED c/o L toe discoloration. Admitted for further management. vascular recc: no interventions, podiatry recc xray left foot, and ID consulted no abx. nephro following for HD. hospital course c/b hematuria,  hgb stable, urology consulted.

## 2022-12-09 NOTE — CONSULT NOTE ADULT - SUBJECTIVE AND OBJECTIVE BOX
HPI  72 year old male, coming from [facility], with medical history of ESRD (T-TH-S),  DM, PVD (s/p L BKA 11/9) coming to ED c/o R toe discoloration. Patient states that he noticed his toes are darker than usual for the past week, has no other complaints.  He denies any pain in the toes, fevers, chills, headaches, dizziness, chest pain, cough, SOB, abd pain, n/v, diarrhea, constipation, hematuria, dysuria, numbness, and weakness.    In the ED:  Afebrile, hemodynamically stable  Strong pulses w/ bedside doppler     Urology consulted today since overnight the patient endorsed gross hematuria and passing clots per urethra with dark red urine. PVR at 6 AM was 142 and most recently was 150 about an hour ago. He states he has never had gross hematuria before. Recent U/A on 12/8 was clean. Is ESRD on HD. States he typically makes very little urine    PAST MEDICAL & SURGICAL HISTORY:  ESRD on dialysis      DM (diabetes mellitus)      PAD (peripheral artery disease)      No significant past surgical history          MEDICATIONS  (STANDING):  chlorhexidine 2% Cloths 1 Application(s) Topical <User Schedule>  clotrimazole 1% Cream 1 Application(s) Topical two times a day  influenza  Vaccine (HIGH DOSE) 0.7 milliLiter(s) IntraMuscular once  insulin lispro (ADMELOG) corrective regimen sliding scale   SubCutaneous three times a day before meals  pantoprazole    Tablet 40 milliGRAM(s) Oral before breakfast  polyethylene glycol 3350 17 Gram(s) Oral daily  sevelamer carbonate 1600 milliGRAM(s) Oral three times a day with meals    MEDICATIONS  (PRN):  acetaminophen     Tablet .. 650 milliGRAM(s) Oral every 6 hours PRN Temp greater or equal to 38C (100.4F), Mild Pain (1 - 3)      FAMILY HISTORY:      Allergies    No Known Allergies    Intolerances        SOCIAL HISTORY:    REVIEW OF SYSTEMS: Otherwise negative as stated in HPI    Physical Exam  Vital signs  T(C): 36.7 (12-09-22 @ 04:49), Max: 36.8 (12-08-22 @ 21:08)  HR: 91 (12-09-22 @ 04:49)  BP: 105/55 (12-09-22 @ 04:49)  SpO2: 100% (12-09-22 @ 04:49)  Wt(kg): --    Output      Gen: NAD  Pulm: No respiratory distress	  CV: RRR  GI: S/ND/NT  : No urine seen but small clot noticed coming from tip of penis.  MSK: moves all 4 limbs spontaneously    LABS:      12-09 @ 10:30    WBC 5.59  / Hct 39.9  / SCr --       12-08 @ 09:25    WBC 6.01  / Hct 35.7  / SCr 5.75     12-08    139  |  105  |  78<H>  ----------------------------<  199<H>  3.9   |  21<L>  |  5.75<H>    Ca    9.4      08 Dec 2022 09:25  Phos  5.9     12-08  Mg     2.4     12-08

## 2022-12-09 NOTE — PROGRESS NOTE ADULT - SUBJECTIVE AND OBJECTIVE BOX
Patient is a 72y old  Male who presents with a chief complaint of left toe discoloration (08 Dec 2022 19:54)      INTERVAL HPI/OVERNIGHT EVENTS: overnight hematuria x 2 episodes w/ clots    I&O's Summary    Vital Signs Last 24 Hrs  T(C): 36.7 (09 Dec 2022 04:49), Max: 36.8 (08 Dec 2022 21:08)  T(F): 98 (09 Dec 2022 04:49), Max: 98.3 (08 Dec 2022 21:08)  HR: 91 (09 Dec 2022 04:49) (84 - 91)  BP: 105/55 (09 Dec 2022 04:49) (105/55 - 125/64)  BP(mean): --  RR: 16 (09 Dec 2022 04:49) (16 - 16)  SpO2: 100% (09 Dec 2022 04:49) (99% - 100%)    Parameters below as of 09 Dec 2022 04:49  Patient On (Oxygen Delivery Method): room air      PAST MEDICAL & SURGICAL HISTORY:  ESRD on dialysis      DM (diabetes mellitus)      PAD (peripheral artery disease)      No significant past surgical history          SOCIAL HISTORY  Alcohol:  Tobacco:  Illicit substance use:      FAMILY HISTORY:      LABS:                        12.2   5.59  )-----------( 146      ( 09 Dec 2022 10:30 )             39.9     12-08    139  |  105  |  78<H>  ----------------------------<  199<H>  3.9   |  21<L>  |  5.75<H>    Ca    9.4      08 Dec 2022 09:25  Phos  5.9     12-08  Mg     2.4     12-08          CAPILLARY BLOOD GLUCOSE      POCT Blood Glucose.: 182 mg/dL (09 Dec 2022 08:05)  POCT Blood Glucose.: 161 mg/dL (08 Dec 2022 21:17)  POCT Blood Glucose.: 204 mg/dL (08 Dec 2022 16:45)  POCT Blood Glucose.: 153 mg/dL (08 Dec 2022 13:31)  POCT Blood Glucose.: 194 mg/dL (08 Dec 2022 11:32)      MEDICATIONS  (STANDING):  chlorhexidine 2% Cloths 1 Application(s) Topical <User Schedule>  clotrimazole 1% Cream 1 Application(s) Topical two times a day  influenza  Vaccine (HIGH DOSE) 0.7 milliLiter(s) IntraMuscular once  insulin lispro (ADMELOG) corrective regimen sliding scale   SubCutaneous three times a day before meals  pantoprazole    Tablet 40 milliGRAM(s) Oral before breakfast  polyethylene glycol 3350 17 Gram(s) Oral daily  sevelamer carbonate 1600 milliGRAM(s) Oral three times a day with meals    MEDICATIONS  (PRN):  acetaminophen     Tablet .. 650 milliGRAM(s) Oral every 6 hours PRN Temp greater or equal to 38C (100.4F), Mild Pain (1 - 3)      REVIEW OF SYSTEMS:  CONSTITUTIONAL: No fever  EYES: No eye pain  ENMT:  No sinus or throat pain  NECK: No pain  RESPIRATORY: No cough; No shortness of breath  CARDIOVASCULAR: No chest pain  GASTROINTESTINAL: No abdominal pain. No nausea, vomiting; No diarrhea or constipation  GENITOURINARY: No dysuria, frequency.  + hematuria  NEUROLOGICAL: No headaches  SKIN: No rashes  MUSCULOSKELETAL: No joint pain    RADIOLOGY & ADDITIONAL TESTS:  < from: Xray Foot AP + Lateral + Oblique, Right (11.08.22 @ 19:08) >    ACC: [id number] EXAM:  XR FOOT COMP MIN 3 VIEWS RT                          PROCEDURE DATE:  11/08/2022          INTERPRETATION:  XR FOOT 3 VIEWS RIGHT    Clinical History: Dry gangrene first second and third toes. Right heel   ulcer    Right foot 3 views with comparison to 9/26/2022      FINDINGS:    There is no fracture, dislocation or joint effusion.  No degenerative changes.  Moderate atherosclerotic changes.  No radiopaque foreign body.    IMPRESSION:  1.  Swelling with soft tissue ulcer posterior to calcaneus.  2.  No plain film evidence of osteomyelitis.  3.  Obtain MRI as warranted clinically.    --- End of Report ---       COLT CABRERA DO; Attending Radiologist  This document has been electronically signed. Nov 9 2022 11:51AM    < end of copied text >    Imaging Personally Reviewed:  [ x] YES  [ ] NO    Consultant(s) Notes Reviewed:  [x ] YES  [ ] NO    PHYSICAL EXAM:  GENERAL: NAD  HEAD:  Atraumatic, Normocephalic  EYES: conjunctiva and sclera clear  ENMT:  Moist mucous membranes  NECK: Supple  NERVOUS SYSTEM:  Alert , awake  CHEST/LUNG: dec breath sounds at bases  HEART: Regular rate and rhythm  ABDOMEN: Soft, Nontender, Nondistended; Bowel sounds present  EXTREMITIES: rt BKA, + pulse lle  SKIN: no rash    Care Collaborated Discussed with Consultants/Other Providers [x ] YES  [ ] NO

## 2022-12-09 NOTE — CONSULT NOTE ADULT - ATTENDING COMMENTS
73 yo male ESRD on dialysis with gross hematuria. He had his bladder irrigated with a lot of clots evacuated and started on CBI. For gross hematuria, he will need imaging with CT Urogram, which will need to be done with dialysis. He will also need cystoscopy to assess bladder for tumors. Currently on CBI. This can be decreased and if still light color can be d/c'd in the morning.

## 2022-12-09 NOTE — PROGRESS NOTE ADULT - SUBJECTIVE AND OBJECTIVE BOX
Patient is a 72y old  Male who presents with a chief complaint of left toe discoloration (08 Dec 2022 19:54)      INTERVAL HPI/OVERNIGHT EVENTS: overnight hematuria x 2 episodes w/ clots    I&O's Summary    Vital Signs Last 24 Hrs  T(C): 36.7 (09 Dec 2022 04:49), Max: 36.8 (08 Dec 2022 21:08)  T(F): 98 (09 Dec 2022 04:49), Max: 98.3 (08 Dec 2022 21:08)  HR: 91 (09 Dec 2022 04:49) (84 - 91)  BP: 105/55 (09 Dec 2022 04:49) (105/55 - 125/64)  BP(mean): --  RR: 16 (09 Dec 2022 04:49) (16 - 16)  SpO2: 100% (09 Dec 2022 04:49) (99% - 100%)    Parameters below as of 09 Dec 2022 04:49  Patient On (Oxygen Delivery Method): room air      PAST MEDICAL & SURGICAL HISTORY:  ESRD on dialysis      DM (diabetes mellitus)      PAD (peripheral artery disease)      No significant past surgical history          SOCIAL HISTORY  Alcohol:  Tobacco:  Illicit substance use:      FAMILY HISTORY:      LABS:                        12.2   5.59  )-----------( 146      ( 09 Dec 2022 10:30 )             39.9     12-08    139  |  105  |  78<H>  ----------------------------<  199<H>  3.9   |  21<L>  |  5.75<H>    Ca    9.4      08 Dec 2022 09:25  Phos  5.9     12-08  Mg     2.4     12-08          CAPILLARY BLOOD GLUCOSE      POCT Blood Glucose.: 182 mg/dL (09 Dec 2022 08:05)  POCT Blood Glucose.: 161 mg/dL (08 Dec 2022 21:17)  POCT Blood Glucose.: 204 mg/dL (08 Dec 2022 16:45)  POCT Blood Glucose.: 153 mg/dL (08 Dec 2022 13:31)  POCT Blood Glucose.: 194 mg/dL (08 Dec 2022 11:32)      MEDICATIONS  (STANDING):  chlorhexidine 2% Cloths 1 Application(s) Topical <User Schedule>  clotrimazole 1% Cream 1 Application(s) Topical two times a day  influenza  Vaccine (HIGH DOSE) 0.7 milliLiter(s) IntraMuscular once  insulin lispro (ADMELOG) corrective regimen sliding scale   SubCutaneous three times a day before meals  pantoprazole    Tablet 40 milliGRAM(s) Oral before breakfast  polyethylene glycol 3350 17 Gram(s) Oral daily  sevelamer carbonate 1600 milliGRAM(s) Oral three times a day with meals    MEDICATIONS  (PRN):  acetaminophen     Tablet .. 650 milliGRAM(s) Oral every 6 hours PRN Temp greater or equal to 38C (100.4F), Mild Pain (1 - 3)      REVIEW OF SYSTEMS:  CONSTITUTIONAL: No fever  EYES: No eye pain  ENMT:  No sinus or throat pain  NECK: No pain  RESPIRATORY: No cough; No shortness of breath  CARDIOVASCULAR: No chest pain  GASTROINTESTINAL: No abdominal pain. No nausea, vomiting; No diarrhea or constipation  GENITOURINARY: No dysuria, frequency.  + hematuria  NEUROLOGICAL: No headaches  SKIN: No rashes  MUSCULOSKELETAL: No joint pain    RADIOLOGY & ADDITIONAL TESTS:  < from: Xray Foot AP + Lateral + Oblique, Right (11.08.22 @ 19:08) >    ACC: [id number] EXAM:  XR FOOT COMP MIN 3 VIEWS RT                          PROCEDURE DATE:  11/08/2022          INTERPRETATION:  XR FOOT 3 VIEWS RIGHT    Clinical History: Dry gangrene first second and third toes. Right heel   ulcer    Right foot 3 views with comparison to 9/26/2022      FINDINGS:    There is no fracture, dislocation or joint effusion.  No degenerative changes.  Moderate atherosclerotic changes.  No radiopaque foreign body.    IMPRESSION:  1.  Swelling with soft tissue ulcer posterior to calcaneus.  2.  No plain film evidence of osteomyelitis.  3.  Obtain MRI as warranted clinically.    --- End of Report ---       COLT CABRERA DO; Attending Radiologist  This document has been electronically signed. Nov 9 2022 11:51AM    < end of copied text >    Imaging Personally Reviewed:  [ x] YES  [ ] NO    Consultant(s) Notes Reviewed:  [x ] YES  [ ] NO    PHYSICAL EXAM:  GENERAL: NAD  HEAD:  Atraumatic, Normocephalic  EYES: conjunctiva and sclera clear  ENMT:  Moist mucous membranes  NECK: Supple  NERVOUS SYSTEM:  Alert & Oriented X3  CHEST/LUNG: CTA bilaterally  HEART: Regular rate and rhythm  ABDOMEN: Soft, Nontender, Nondistended; Bowel sounds present  EXTREMITIES: rt BKA, + pulse lle  SKIN: no rash    Care Collaborated Discussed with Consultants/Other Providers [x ] YES  [ ] NO

## 2022-12-09 NOTE — H&P ADULT - ATTENDING COMMENTS
Statement Selected
pt was evaluated by writer earlier during th day   labs, vitals reviewed   vascular / podiatry / ID eval

## 2022-12-09 NOTE — PROGRESS NOTE ADULT - SUBJECTIVE AND OBJECTIVE BOX
Podiatry Interval: patient is seen in bed resting, reports he does not have pain to his left foot, answers questions by nodding his head only, does not voice today. Admits no other complaints.     Podiatry HPI: Patient is a 72y old  Male who presents with a chief complaint of left toe discoloration (08 Dec 2022 17:44). Patient is known to podiatry service. Recently underwent right BKA due to no healing potential of right gangrenous digit changes. Patient admits no pain in left toes. Admits no pain in left big toe. Reports he is not sure why he is at the hospital. Does not have any other pedal complaints. Does not have constitutional symptoms    In the ED:  Afebrile, hemodynamically stable  Strong pulses w/ bedside doppler    (07 Dec 2022 09:23)    Patient admits to  (-) Fevers, (-) Chills, (-) Nausea, (-) Vomiting, (-) Shortness of Breath (-) calf pain (-) chest pain     Medications acetaminophen     Tablet .. 650 milliGRAM(s) Oral every 6 hours PRN  chlorhexidine 2% Cloths 1 Application(s) Topical <User Schedule>  clotrimazole 1% Cream 1 Application(s) Topical two times a day  influenza  Vaccine (HIGH DOSE) 0.7 milliLiter(s) IntraMuscular once  insulin lispro (ADMELOG) corrective regimen sliding scale   SubCutaneous three times a day before meals  oxybutynin 5 milliGRAM(s) Oral three times a day  pantoprazole    Tablet 40 milliGRAM(s) Oral before breakfast  polyethylene glycol 3350 17 Gram(s) Oral daily  sevelamer carbonate 1600 milliGRAM(s) Oral three times a day with meals    FHNo pertinent family history in first degree relatives    ,   PMHESRD on dialysis    DM (diabetes mellitus)    PAD (peripheral artery disease)       PSHNo significant past surgical history        Labs                          12.2   5.59  )-----------( 146      ( 09 Dec 2022 10:30 )             39.9      12-09    138  |  104  |  53<H>  ----------------------------<  222<H>  5.1   |  24  |  4.51<H>    Ca    9.2      09 Dec 2022 10:30  Phos  5.9     12-08  Mg     2.4     12-08       Vital Signs Last 24 Hrs  T(C): 36.4 (09 Dec 2022 14:31), Max: 36.8 (08 Dec 2022 21:08)  T(F): 97.6 (09 Dec 2022 14:31), Max: 98.3 (08 Dec 2022 21:08)  HR: 82 (09 Dec 2022 14:31) (82 - 91)  BP: 130/61 (09 Dec 2022 14:31) (105/55 - 130/61)  BP(mean): --  RR: 18 (09 Dec 2022 14:31) (16 - 18)  SpO2: 99% (09 Dec 2022 14:31) (99% - 100%)    Parameters below as of 09 Dec 2022 14:31  Patient On (Oxygen Delivery Method): room air      Sedimentation Rate, Erythrocyte: 56 mm/Hr (11-08-22 @ 23:00)  Sedimentation Rate, Erythrocyte: 19 mm/Hr (09-26-22 @ 19:00)         C-Reactive Protein, Serum: 10 mg/L (11-08-22 @ 23:00)  C-Reactive Protein, Serum: 9 mg/L (09-26-22 @ 19:00)   WBC Count: 5.59 K/uL (12-09-22 @ 10:30)    ROS  REVIEW OF SYSTEMS: unremarkable outside of PE    PHYSICAL EXAM  LE Focused:  left  Vasc:  DP and PT non palpable. TG: warm to cold. CFT 3 secs x 5. Pedal hair absent. No varicosities or edema  Derm: trophic skin changes at left foot and leg, skin hyperpigmentation of the left digits 1-5. No IDM. Xerosis at left plantar digits 1-5. Nail is well adhered, subungual hematoma at left hallux 2mm x 4mm. No open wounds/ fluctuance/ erythema/ edema/ pain/ soft tissue crepitus  Neuro: protective sensation intact  MSK: s/p healed right BKA, patient is bedbound      IMAGING:   < from: Xray Foot AP + Lateral + Oblique, Right (11.08.22 @ 19:08) >  ACC: [id number] EXAM:  XR FOOT COMP MIN 3 VIEWS RT                          PROCEDURE DATE:  11/08/2022          INTERPRETATION:  XR FOOT 3 VIEWS RIGHT    Clinical History: Dry gangrene first second and third toes. Right heel   ulcer    Right foot 3 views with comparison to 9/26/2022      FINDINGS:    There is no fracture, dislocation or joint effusion.  No degenerative changes.  Moderate atherosclerotic changes.  No radiopaque foreign body.    IMPRESSION:  1.  Swelling with soft tissue ulcer posterior to calcaneus.  2.  No plain film evidence of osteomyelitis.  3.  Obtain MRI as warranted clinically.    --- End of Report ---      < end of copied text >        A:  Left hallucal subungual hematoma, PAD    P:   Patient evaluated and Chart reviewed, no leukocytosis, vitals stable. No clinical infection on physical exam  Discussed diagnosis and treatment with patient  X-ray results reviewed as above  Previous JESUS/PVR reviewed. Vascular recs appreciated  Recommend heel offloading with pillows to prevent decubitus injury  Podiatry to follow while in house  Seen bedside and discussed with Attending Dr. Gusman   Podiatry Interval: patient is seen in bed resting, reports he does not have pain to his left foot, answers questions by nodding his head only, does not voice today. Admits no other complaints.     Podiatry HPI: Patient is a 72y old  Male who presents with a chief complaint of left toe discoloration (08 Dec 2022 17:44). Patient is known to podiatry service. Recently underwent right BKA due to no healing potential of right gangrenous digit changes. Patient admits no pain in left toes. Admits no pain in left big toe. Reports he is not sure why he is at the hospital. Does not have any other pedal complaints. Does not have constitutional symptoms    In the ED:  Afebrile, hemodynamically stable  Strong pulses w/ bedside doppler    (07 Dec 2022 09:23)    Patient admits to  (-) Fevers, (-) Chills, (-) Nausea, (-) Vomiting, (-) Shortness of Breath (-) calf pain (-) chest pain     Medications acetaminophen     Tablet .. 650 milliGRAM(s) Oral every 6 hours PRN  chlorhexidine 2% Cloths 1 Application(s) Topical <User Schedule>  clotrimazole 1% Cream 1 Application(s) Topical two times a day  influenza  Vaccine (HIGH DOSE) 0.7 milliLiter(s) IntraMuscular once  insulin lispro (ADMELOG) corrective regimen sliding scale   SubCutaneous three times a day before meals  oxybutynin 5 milliGRAM(s) Oral three times a day  pantoprazole    Tablet 40 milliGRAM(s) Oral before breakfast  polyethylene glycol 3350 17 Gram(s) Oral daily  sevelamer carbonate 1600 milliGRAM(s) Oral three times a day with meals    FHNo pertinent family history in first degree relatives    ,   PMHESRD on dialysis    DM (diabetes mellitus)    PAD (peripheral artery disease)       PSHb right BKA        Labs                          12.2   5.59  )-----------( 146      ( 09 Dec 2022 10:30 )             39.9      12-09    138  |  104  |  53<H>  ----------------------------<  222<H>  5.1   |  24  |  4.51<H>    Ca    9.2      09 Dec 2022 10:30  Phos  5.9     12-08  Mg     2.4     12-08       Vital Signs Last 24 Hrs  T(C): 36.4 (09 Dec 2022 14:31), Max: 36.8 (08 Dec 2022 21:08)  T(F): 97.6 (09 Dec 2022 14:31), Max: 98.3 (08 Dec 2022 21:08)  HR: 82 (09 Dec 2022 14:31) (82 - 91)  BP: 130/61 (09 Dec 2022 14:31) (105/55 - 130/61)  BP(mean): --  RR: 18 (09 Dec 2022 14:31) (16 - 18)  SpO2: 99% (09 Dec 2022 14:31) (99% - 100%)    Parameters below as of 09 Dec 2022 14:31  Patient On (Oxygen Delivery Method): room air      Sedimentation Rate, Erythrocyte: 56 mm/Hr (11-08-22 @ 23:00)  Sedimentation Rate, Erythrocyte: 19 mm/Hr (09-26-22 @ 19:00)         C-Reactive Protein, Serum: 10 mg/L (11-08-22 @ 23:00)  C-Reactive Protein, Serum: 9 mg/L (09-26-22 @ 19:00)   WBC Count: 5.59 K/uL (12-09-22 @ 10:30)    ROS  REVIEW OF SYSTEMS: unremarkable outside of PE    PHYSICAL EXAM  LE Focused:  left  Vasc:  DP and PT non palpable. TG: warm to cold. CFT 3 secs x 5. Pedal hair absent. No varicosities or edema  Derm: trophic skin changes at left foot and leg, skin hyperpigmentation of the left digits 1-5. No IDM. Xerosis at left plantar digits 1-5. Nail is well adhered, subungual hematoma at left hallux 2mm x 4mm. No open wounds/ fluctuance/ erythema/ edema/ pain/ soft tissue crepitus  Neuro: protective sensation intact  MSK: s/p healed right BKA, patient is bedbound      IMAGING:   < from: Xray Foot AP + Lateral + Oblique, Right (11.08.22 @ 19:08) >  ACC: [id number] EXAM:  XR FOOT COMP MIN 3 VIEWS RT                          PROCEDURE DATE:  11/08/2022          INTERPRETATION:  XR FOOT 3 VIEWS RIGHT    Clinical History: Dry gangrene first second and third toes. Right heel   ulcer    Right foot 3 views with comparison to 9/26/2022      FINDINGS:    There is no fracture, dislocation or joint effusion.  No degenerative changes.  Moderate atherosclerotic changes.  No radiopaque foreign body.    IMPRESSION:  1.  Swelling with soft tissue ulcer posterior to calcaneus.  2.  No plain film evidence of osteomyelitis.  3.  Obtain MRI as warranted clinically.    --- End of Report ---      < end of copied text >        A:  Left hallucal subungual hematoma, PAD    P:   Patient evaluated and Chart reviewed, no leukocytosis, vitals stable. No clinical infection on physical exam  Discussed diagnosis and treatment with patient  X-ray results reviewed as above. There is no heel ulcer on clinical exam.   Previous JESUS/PVR reviewed. Vascular recs appreciated  Recommend heel offloading with pillows to prevent decubitus injury  Podiatry to follow while in house  Seen bedside and discussed with Attending Dr. Gusman

## 2022-12-09 NOTE — PROGRESS NOTE ADULT - PROBLEM SELECTOR PLAN 2
HD TThS  c/w HD- Rt CW permacath  c/w Sevelamer   F/u outpt Dr. Dutta- Left AVF no thrill/Bruit  Monitor serum calcium and phosphorus- nephro recc  Dr. Dumont Nephro overnight painful hematuria x 2 episodes w/ clots  hgb 12.2 stable  bladder scan 142 cc  urology consulted  f/u urology recc overnight painful hematuria x 2 episodes w/ clots  hgb 12.2 stable  bladder scan 142 cc  3 way neumann placed by urology  c/w moderate drip CBI  pending Ct urogram- urology recc ( coordinate to have exam done just before HD)  monitor h/h  keep active t&s  transfuse if Hgb < 7  urology consulted overnight painful hematuria x 2 episodes w/ clots  hgb 12.2 stable  bladder scan 142 cc  3 way neumann placed by urology  c/w moderate drip CBI  pending Ct urogram- urology recc ( coordinate to have exam done just before HD)  monitor h/h  keep active t&s  transfuse if Hgb < 7  urology consulted recc outpt cystoscopy

## 2022-12-09 NOTE — PROGRESS NOTE ADULT - ASSESSMENT
Patient is a 71yo Male with ESRD on HD, failed Left AVF x2, DM, recently admitted with  Rt foot gangrene s/p Rt BKA presents with left toe discoloration. Nephrology consulted for ESRD status.     1) ESRD: Last HD 12/8 with intradialytic hypotension with net 1.4L removed. Ok to pursue CT urogram with IV contrast. Plan for next maintenance HD 12/10. c/w  TTS schedule. Monitor electrolytes.  Pt with Left UE AVF- no thrill no bruit- f/u Vascular Surgeon Dr. Dutta as an outpt.   2) Anemia of renal disease: hgb acceptable. Continue to hold JERED. Monitor Hb.  3) Hyperphosphatemia: Serum phosphorus elevated. c/w low phos diet. c/w Sevelamer 1600mg PO tid with meals (increased 12/8). Monitor serum calcium and phosphorus.  4) PAD- s/p Rt BKA 11/9 by Dr. Billy due to gangrene. Pt now with left toe discoloration. Seen by Vascular; no acute surgical intervention but will need close f/u as an outpt. Plan as per primary team/ vascular surgery.    5) Hematuria- pt now on CBI. Urology recc CT urogram with IV contrast; ok to pursue today since pt euvolemic. Will plan for next HD 12/10  [facility]  Wili Hopkins M.D.  Kiran Feng D.O.  Sujatha Dumont M.D.  Radha Miller, MSN, ANP-C  [phone]    [address] [place] [address]

## 2022-12-09 NOTE — PROGRESS NOTE ADULT - PROBLEM SELECTOR PLAN 3
on Januvia and ss at home  hold januvia inpatient  ISS  Diabetic diet  Monitor BS HD TThS  c/w HD- Rt CW permacath  c/w Sevelamer   F/u outpt Dr. Dutta- Left AVF no thrill/Bruit  Monitor serum calcium and phosphorus- nephro recc  Dr. Dumont Nephro

## 2022-12-09 NOTE — PROGRESS NOTE ADULT - PROBLEM SELECTOR PLAN 1
c/o left great toe discoloration  hx of severe PVD, s/p R BKA 11/9  [facility] 9/22 JESUS 1.43  Hold ASA, plavix in case of surgical intervention  vascular and podiatry consulted  Id Dr. Toni Iqbal consulted. c/o left great toe discoloration  hx of severe PVD, s/p R BKA 11/9  [facility] 9/22 JESUS 1.43  Hold ASA, plavix in case of surgical intervention  pending xray left foot- podiatry recc  vascular and podiatry following  ID Dr. Toni Iqbal

## 2022-12-09 NOTE — PROGRESS NOTE ADULT - PROBLEM SELECTOR PLAN 2
overnight painful hematuria x 2 episodes w/ clots  hgb 12.2 stable  bladder scan 142 cc  3 way neumann placed by urology  c/w moderate drip CBI  pending Ct urogram- urology recc ( coordinate to have exam done just before HD)  monitor h/h  keep active t&s  transfuse if Hgb < 7  urology consulted recc outpt cystoscopy

## 2022-12-09 NOTE — PROGRESS NOTE ADULT - SUBJECTIVE AND OBJECTIVE BOX
[facility] NEPHROLOGY- PROGRESS NOTE    Patient is a 73yo Male with ESRD on HD, failed Left AVF x2, DM, recently admitted with  Rt foot gangrene s/p Rt BKA presents with left toe discoloration. Nephrology consulted for ESRD status.  Hosp cb hematuria     Hospital Medications: Medications reviewed.  REVIEW OF SYSTEMS:  CONSTITUTIONAL: No fevers or chills  RESPIRATORY: No shortness of breath  CARDIOVASCULAR: No chest pain.  GASTROINTESTINAL: No nausea, vomiting, diarrhea or abdominal pain.   : +hematuria with suprapubic pain  VASCULAR: No left lower extremity edema.     VITALS:  T(F): 97.6 (12-09-22 @ 14:31), Max: 98.3 (12-08-22 @ 21:08)  HR: 82 (12-09-22 @ 14:31)  BP: 130/61 (12-09-22 @ 14:31)  RR: 18 (12-09-22 @ 14:31)  SpO2: 99% (12-09-22 @ 14:31)  Wt(kg): --      PHYSICAL EXAM:  Gen: NAD, calm  HEENT: anicteric  Cards: RRR, +S1/S2, no M/G/R  Resp: CTA B/L  GI: soft, NT/ND, NABS  : +neumann with hematuria on CBI  Extremities: no LLE edema  Derm: s/p Rt BKA wrapped  Access: Rt IJ tunneled HD catheter- benign  Left upper arm AVF- no thrill no bruit    LABS:  12-09    138  |  104  |  53<H>  ----------------------------<  222<H>  5.1   |  24  |  4.51<H>    Ca    9.2      09 Dec 2022 10:30  Phos  5.9     12-08  Mg     2.4     12-08      Creatinine Trend: 4.51 <--, 5.75 <--, 4.66 <--, 4.61 <--                        12.2   5.59  )-----------( 146      ( 09 Dec 2022 10:30 )             39.9     Urine Studies:         [facility] NEPHROLOGY- PROGRESS NOTE    Patient is a 73yo Male with ESRD on HD, failed Left AVF x2, DM, recently admitted with  Rt foot gangrene s/p Rt BKA presents with left toe discoloration. Nephrology consulted for ESRD status.  Hosp cb hematuria     Hospital Medications: Medications reviewed.  REVIEW OF SYSTEMS:  CONSTITUTIONAL: No fevers or chills  RESPIRATORY: No shortness of breath  CARDIOVASCULAR: No chest pain.  GASTROINTESTINAL: No nausea, vomiting, diarrhea or abdominal pain.   : +hematuria with suprapubic pain  VASCULAR: No left lower extremity edema.     VITALS:  T(F): 97.6 (12-09-22 @ 14:31), Max: 98.3 (12-08-22 @ 21:08)  HR: 82 (12-09-22 @ 14:31)  BP: 130/61 (12-09-22 @ 14:31)  RR: 18 (12-09-22 @ 14:31)  SpO2: 99% (12-09-22 @ 14:31)  Wt(kg): --      PHYSICAL EXAM:  Gen: NAD, calm  HEENT: anicteric  Cards: RRR, +S1/S2, no M/G/R  Resp: CTA B/L  GI: soft, NT/ND, NABS  : +neumann with hematuria on CBI  Extremities: no LLE edema  Derm: s/p Rt BKA   Access: Rt IJ tunneled HD catheter- benign  Left upper arm AVF- no thrill no bruit    LABS:  12-09    138  |  104  |  53<H>  ----------------------------<  222<H>  5.1   |  24  |  4.51<H>    Ca    9.2      09 Dec 2022 10:30  Phos  5.9     12-08  Mg     2.4     12-08      Creatinine Trend: 4.51 <--, 5.75 <--, 4.66 <--, 4.61 <--                        12.2   5.59  )-----------( 146      ( 09 Dec 2022 10:30 )             39.9     Urine Studies:

## 2022-12-09 NOTE — PROGRESS NOTE ADULT - PROBLEM SELECTOR PLAN 1
c/o left great toe discoloration  hx of severe PVD, s/p R BKA 11/9  [facility] phys[facility] 9/22 JESUS 1.43  Hold ASA, plavix in case of surgical intervention  pending xray left foot- podiatry recc  vascular and podiatry following  ID Dr. Toni Iqbal

## 2022-12-09 NOTE — PROGRESS NOTE ADULT - SUBJECTIVE AND OBJECTIVE BOX
S: Patient seen and examined at bedside. No acute overnight events. Hemodynamically stable, afebrile. Pt denies any pain in LLE. No other acute complaints.    O:  Vital Signs Last 24 Hrs  T(C): 36.7 (09 Dec 2022 04:49), Max: 36.8 (08 Dec 2022 21:08)  T(F): 98 (09 Dec 2022 04:49), Max: 98.3 (08 Dec 2022 21:08)  HR: 91 (09 Dec 2022 04:49) (86 - 91)  BP: 105/55 (09 Dec 2022 04:49) (105/55 - 119/69)  BP(mean): --  RR: 16 (09 Dec 2022 04:49) (16 - 16)  SpO2: 100% (09 Dec 2022 04:49) (99% - 100%)    Parameters below as of 09 Dec 2022 04:49  Patient On (Oxygen Delivery Method): room air    Physical Examination:  Gen: Awake, alert, oriented x3 and in no acute distress  RLE: RLE amputation site is CDI w/no signs of dehiscence, surrounding erythema, or purulence  LLE: Femoral and popliteal pulses palpable. DP pulse w/strong doppler signal. PT pulse w/moderate doppler signal. L distal toes w/hyperpigmentation, no ulceration. No TTP. Full ROM, no painful ROM.                          12.2   5.59  )-----------( 146      ( 09 Dec 2022 10:30 )             39.9   12-09    138  |  104  |  53<H>  ----------------------------<  222<H>  5.1   |  24  |  4.51<H>    Ca    9.2      09 Dec 2022 10:30  Phos  5.9     12-08  Mg     2.4     12-08    A: A: 72M w/PAD s/p R BKA now w/LLE discoloration but decent arterial inflow clinically    P:  - No acute vascular surgery intervention currently indicated  - LLE w/palpable fem/pop pulses. Dopplerable DP/PT pulse and no pain, indicating arterial supply is currently decent in setting of known PAD on recent CTA  - Recommend close f/u w/medical team and podiatry  - No current pain or tissue loss - rec conservative management and f/u outpatient vascular surgery w/Dr. Billy  - Vascular surgery to sign off - please call surgery with any further concerns

## 2022-12-10 LAB
ANION GAP SERPL CALC-SCNC: 9 MMOL/L — SIGNIFICANT CHANGE UP (ref 5–17)
BLD GP AB SCN SERPL QL: SIGNIFICANT CHANGE UP
BUN SERPL-MCNC: 74 MG/DL — HIGH (ref 7–18)
CALCIUM SERPL-MCNC: 10.1 MG/DL — SIGNIFICANT CHANGE UP (ref 8.4–10.5)
CHLORIDE SERPL-SCNC: 103 MMOL/L — SIGNIFICANT CHANGE UP (ref 96–108)
CO2 SERPL-SCNC: 24 MMOL/L — SIGNIFICANT CHANGE UP (ref 22–31)
CREAT SERPL-MCNC: 5.69 MG/DL — HIGH (ref 0.5–1.3)
EGFR: 10 ML/MIN/1.73M2 — LOW
GLUCOSE BLDC GLUCOMTR-MCNC: 139 MG/DL — HIGH (ref 70–99)
GLUCOSE BLDC GLUCOMTR-MCNC: 146 MG/DL — HIGH (ref 70–99)
GLUCOSE BLDC GLUCOMTR-MCNC: 183 MG/DL — HIGH (ref 70–99)
GLUCOSE BLDC GLUCOMTR-MCNC: 203 MG/DL — HIGH (ref 70–99)
GLUCOSE BLDC GLUCOMTR-MCNC: 209 MG/DL — HIGH (ref 70–99)
GLUCOSE BLDC GLUCOMTR-MCNC: 295 MG/DL — HIGH (ref 70–99)
GLUCOSE SERPL-MCNC: 230 MG/DL — HIGH (ref 70–99)
HCT VFR BLD CALC: 35.8 % — LOW (ref 39–50)
HGB BLD-MCNC: 11 G/DL — LOW (ref 13–17)
MCHC RBC-ENTMCNC: 30.6 PG — SIGNIFICANT CHANGE UP (ref 27–34)
MCHC RBC-ENTMCNC: 30.7 GM/DL — LOW (ref 32–36)
MCV RBC AUTO: 99.4 FL — SIGNIFICANT CHANGE UP (ref 80–100)
NRBC # BLD: 0 /100 WBCS — SIGNIFICANT CHANGE UP (ref 0–0)
PLATELET # BLD AUTO: 263 K/UL — SIGNIFICANT CHANGE UP (ref 150–400)
POTASSIUM SERPL-MCNC: 4.1 MMOL/L — SIGNIFICANT CHANGE UP (ref 3.5–5.3)
POTASSIUM SERPL-SCNC: 4.1 MMOL/L — SIGNIFICANT CHANGE UP (ref 3.5–5.3)
RBC # BLD: 3.6 M/UL — LOW (ref 4.2–5.8)
RBC # FLD: 16.8 % — HIGH (ref 10.3–14.5)
SODIUM SERPL-SCNC: 136 MMOL/L — SIGNIFICANT CHANGE UP (ref 135–145)
WBC # BLD: 8.65 K/UL — SIGNIFICANT CHANGE UP (ref 3.8–10.5)
WBC # FLD AUTO: 8.65 K/UL — SIGNIFICANT CHANGE UP (ref 3.8–10.5)

## 2022-12-10 PROCEDURE — 74178 CT ABD&PLV WO CNTR FLWD CNTR: CPT | Mod: 26

## 2022-12-10 RX ORDER — MINERAL OIL
133 OIL (ML) MISCELLANEOUS ONCE
Refills: 0 | Status: COMPLETED | OUTPATIENT
Start: 2022-12-10 | End: 2022-12-10

## 2022-12-10 RX ORDER — SENNA PLUS 8.6 MG/1
2 TABLET ORAL AT BEDTIME
Refills: 0 | Status: DISCONTINUED | OUTPATIENT
Start: 2022-12-10 | End: 2022-12-14

## 2022-12-10 RX ADMIN — Medication 1 APPLICATION(S): at 17:12

## 2022-12-10 RX ADMIN — Medication 133 MILLILITER(S): at 16:49

## 2022-12-10 RX ADMIN — CHLORHEXIDINE GLUCONATE 1 APPLICATION(S): 213 SOLUTION TOPICAL at 06:07

## 2022-12-10 RX ADMIN — Medication 650 MILLIGRAM(S): at 19:27

## 2022-12-10 RX ADMIN — POLYETHYLENE GLYCOL 3350 17 GRAM(S): 17 POWDER, FOR SOLUTION ORAL at 12:32

## 2022-12-10 RX ADMIN — Medication 5 MILLIGRAM(S): at 14:16

## 2022-12-10 RX ADMIN — Medication 650 MILLIGRAM(S): at 20:21

## 2022-12-10 RX ADMIN — Medication 5 MILLIGRAM(S): at 21:19

## 2022-12-10 RX ADMIN — SEVELAMER CARBONATE 1600 MILLIGRAM(S): 2400 POWDER, FOR SUSPENSION ORAL at 16:49

## 2022-12-10 RX ADMIN — SENNA PLUS 2 TABLET(S): 8.6 TABLET ORAL at 21:19

## 2022-12-10 RX ADMIN — SEVELAMER CARBONATE 1600 MILLIGRAM(S): 2400 POWDER, FOR SUSPENSION ORAL at 12:29

## 2022-12-10 RX ADMIN — Medication 2: at 12:14

## 2022-12-10 RX ADMIN — PANTOPRAZOLE SODIUM 40 MILLIGRAM(S): 20 TABLET, DELAYED RELEASE ORAL at 06:06

## 2022-12-10 RX ADMIN — Medication 1: at 08:20

## 2022-12-10 RX ADMIN — SEVELAMER CARBONATE 1600 MILLIGRAM(S): 2400 POWDER, FOR SUSPENSION ORAL at 12:24

## 2022-12-10 RX ADMIN — Medication 1 APPLICATION(S): at 06:02

## 2022-12-10 RX ADMIN — Medication 5 MILLIGRAM(S): at 06:06

## 2022-12-10 NOTE — PROGRESS NOTE ADULT - SUBJECTIVE AND OBJECTIVE BOX
[facility] NEPHROLOGY- PROGRESS NOTE    Patient is a 73yo Male with ESRD on HD, failed Left AVF x2, DM, recently admitted with  Rt foot gangrene s/p Rt BKA presents with left toe discoloration. Nephrology consulted for ESRD status.  Hosp cb hematuria     Hospital Medications: Medications reviewed.  REVIEW OF SYSTEMS:  CONSTITUTIONAL: No fevers or chills  RESPIRATORY: No shortness of breath  CARDIOVASCULAR: No chest pain.  GASTROINTESTINAL: No nausea, vomiting, diarrhea or abdominal pain.   : +hematuria with suprapubic pain  VASCULAR: No left lower extremity edema.       VITALS:  T(F): 98.1 (12-10-22 @ 06:31), Max: 98.1 (12-10-22 @ 06:31)  HR: 82 (12-10-22 @ 06:31)  BP: 133/71 (12-10-22 @ 06:31)  RR: 18 (12-10-22 @ 06:31)  SpO2: 100% (12-10-22 @ 06:31)  Wt(kg): --    12-09 @ 07:01  -  12-10 @ 07:00  --------------------------------------------------------  IN: 0 mL / OUT: 2600 mL / NET: -2600 mL      PHYSICAL EXAM:  Gen: NAD, calm  HEENT: anicteric  Cards: RRR, +S1/S2, no M/G/R  Resp: CTA B/L  GI: soft, NT/ND, NABS  : +neumann with hematuria on CBI  Extremities: no LLE edema  Derm: s/p Rt BKA   Access: Rt IJ tunneled HD catheter- benign  Left upper arm AVF- no thrill no bruit      LABS:  12-09    138  |  104  |  53<H>  ----------------------------<  222<H>  5.1   |  24  |  4.51<H>    Ca    9.2      09 Dec 2022 10:30      Creatinine Trend: 4.51 <--, 5.75 <--, 4.66 <--, 4.61 <--                        12.2   5.59  )-----------( 146      ( 09 Dec 2022 10:30 )             39.9     Urine Studies:

## 2022-12-10 NOTE — PROGRESS NOTE ADULT - SUBJECTIVE AND OBJECTIVE BOX
Patient is a 72y old  Male who presents with a chief complaint of left toe discoloration (08 Dec 2022 19:54)      INTERVAL HPI/OVERNIGHT EVENTS: pt seen and examined    MEDICATIONS  (STANDING):  chlorhexidine 2% Cloths 1 Application(s) Topical <User Schedule>  clotrimazole 1% Cream 1 Application(s) Topical two times a day  influenza  Vaccine (HIGH DOSE) 0.7 milliLiter(s) IntraMuscular once  insulin lispro (ADMELOG) corrective regimen sliding scale   SubCutaneous three times a day before meals  oxybutynin 5 milliGRAM(s) Oral three times a day  pantoprazole    Tablet 40 milliGRAM(s) Oral before breakfast  polyethylene glycol 3350 17 Gram(s) Oral daily  senna 2 Tablet(s) Oral at bedtime  sevelamer carbonate 1600 milliGRAM(s) Oral three times a day with meals    MEDICATIONS  (PRN):  acetaminophen     Tablet .. 650 milliGRAM(s) Oral every 6 hours PRN Temp greater or equal to 38C (100.4F), Mild Pain (1 - 3)  bisacodyl 5 milliGRAM(s) Oral every 12 hours PRN Constipation    Vital Signs Last 24 Hrs  T(C): 36.3 (12-10-22 @ 16:38), Max: 37.1 (12-10-22 @ 14:30)  T(F): 97.3 (12-10-22 @ 16:38), Max: 98.8 (12-10-22 @ 14:30)  HR: 82 (12-10-22 @ 16:38) (67 - 86)  BP: 129/63 (12-10-22 @ 16:38) (109/54 - 134/68)  BP(mean): --  RR: 18 (12-10-22 @ 16:38) (18 - 18)  SpO2: 100% (12-10-22 @ 16:38) (100% - 100%)        PAST MEDICAL & SURGICAL HISTORY:  ESRD on dialysis      DM (diabetes mellitus)      PAD (peripheral artery disease)      No significant past surgical history          SOCIAL HISTORY  Alcohol:  Tobacco:  Illicit substance use:      FAMILY HISTORY:    LABS:  12-10    136  |  103  |  74<H>  ----------------------------<  230<H>  4.1   |  24  |  5.69<H>    Ca    10.1      10 Dec 2022 14:42      Creatinine Trend: 5.69 <--, 4.51 <--, 5.75 <--, 4.66 <--, 4.61 <--                        11.0   8.65  )-----------( 263      ( 10 Dec 2022 14:42 )             35.8     Urine Studies:                    REVIEW OF SYSTEMS:  CONSTITUTIONAL: No fever  EYES: No eye pain  ENMT:  No sinus or throat pain  NECK: No pain  RESPIRATORY: No cough; No shortness of breath  CARDIOVASCULAR: No chest pain  GASTROINTESTINAL: No abdominal pain. No nausea, vomiting; No diarrhea or constipation  GENITOURINARY: No dysuria, frequency.  + hematuria  NEUROLOGICAL: No headaches  SKIN: No rashes  MUSCULOSKELETAL: No joint pain    RADIOLOGY & ADDITIONAL TESTS:  < from: Xray Foot AP + Lateral + Oblique, Right (11.08.22 @ 19:08) >    ACC: [id number] EXAM:  XR FOOT COMP MIN 3 VIEWS RT                          PROCEDURE DATE:  11/08/2022          INTERPRETATION:  XR FOOT 3 VIEWS RIGHT    Clinical History: Dry gangrene first second and third toes. Right heel   ulcer    Right foot 3 views with comparison to 9/26/2022      FINDINGS:    There is no fracture, dislocation or joint effusion.  No degenerative changes.  Moderate atherosclerotic changes.  No radiopaque foreign body.    IMPRESSION:  1.  Swelling with soft tissue ulcer posterior to calcaneus.  2.  No plain film evidence of osteomyelitis.  3.  Obtain MRI as warranted clinically.    --- End of Report ---       COLT CABRERA DO; Attending Radiologist  This document has been electronically signed. Nov 9 2022 11:51AM    < end of copied text >    Imaging Personally Reviewed:  [ x] YES  [ ] NO    Consultant(s) Notes Reviewed:  [x ] YES  [ ] NO    PHYSICAL EXAM:  GENERAL: NAD  HEAD:  Atraumatic, Normocephalic  EYES: conjunctiva and sclera clear  ENMT:  Moist mucous membranes  NECK: Supple  NERVOUS SYSTEM:  Alert , awake  CHEST/LUNG: dec breath sounds at bases  HEART: Regular rate and rhythm  ABDOMEN: Soft, Nontender, Nondistended; Bowel sounds present  EXTREMITIES: rt BKA, + pulse lle  SKIN: no rash    Care Collaborated Discussed with Consultants/Other Providers [x ] YES  [ ] NO

## 2022-12-10 NOTE — PROGRESS NOTE ADULT - ASSESSMENT
Patient is a 71yo Male with ESRD on HD, failed Left AVF x2, DM, recently admitted with  Rt foot gangrene s/p Rt BKA presents with left toe discoloration. Nephrology consulted for ESRD status.     1) ESRD: Last HD 12/8 with intradialytic hypotension with net 1.4L removed. Plan for next maintenance HD today. c/w  TTS schedule. Monitor electrolytes.  Pt with Left UE AVF- no thrill no bruit- f/u Vascular Surgeon Dr. Dutta as an outpt.   2) Anemia of renal disease: hgb acceptable. Continue to hold JERED. Monitor Hb.  3) Hyperphosphatemia: Serum phosphorus elevated. c/w low phos diet. c/w Sevelamer 1600mg PO tid with meals (increased 12/8). Monitor serum calcium and phosphorus.  4) PAD- s/p Rt BKA 11/9 by Dr. Billy due to gangrene. Pt now with left toe discoloration. Seen by Vascular; no acute surgical intervention but will need close f/u as an outpt. Plan as per primary team/ vascular surgery.    5) Hematuria- pt now on CBI. F/U CT urogram with IV contrast      [facility] NEPHROLOGY  Wili Hopkins M.D.  Kiran Feng D.O.  Sujatha Dumont M.D.  Radha Miller, MSN, ANP-C  [phone]    [address] [place] [address]

## 2022-12-10 NOTE — PROGRESS NOTE ADULT - ASSESSMENT
72 year old male, coming from [facility], with medical history of ESRD (T-TH-S),  DM, PVD, gangrene right foot, (s/p R BKA 11/9) coming to ED c/o L toe discoloration. Admitted for further management. vascular recc: no interventions, podiatry recc xray left foot, and ID consulted no abx. nephro following for HD. hospital course c/b hematuria,  hgb stable, urology consulted.

## 2022-12-11 LAB
ANION GAP SERPL CALC-SCNC: 13 MMOL/L — SIGNIFICANT CHANGE UP (ref 5–17)
BUN SERPL-MCNC: 71 MG/DL — HIGH (ref 7–18)
CALCIUM SERPL-MCNC: 9.6 MG/DL — SIGNIFICANT CHANGE UP (ref 8.4–10.5)
CHLORIDE SERPL-SCNC: 100 MMOL/L — SIGNIFICANT CHANGE UP (ref 96–108)
CO2 SERPL-SCNC: 23 MMOL/L — SIGNIFICANT CHANGE UP (ref 22–31)
CREAT SERPL-MCNC: 5.65 MG/DL — HIGH (ref 0.5–1.3)
EGFR: 10 ML/MIN/1.73M2 — LOW
GLUCOSE BLDC GLUCOMTR-MCNC: 110 MG/DL — HIGH (ref 70–99)
GLUCOSE BLDC GLUCOMTR-MCNC: 128 MG/DL — HIGH (ref 70–99)
GLUCOSE BLDC GLUCOMTR-MCNC: 179 MG/DL — HIGH (ref 70–99)
GLUCOSE BLDC GLUCOMTR-MCNC: 209 MG/DL — HIGH (ref 70–99)
GLUCOSE SERPL-MCNC: 183 MG/DL — HIGH (ref 70–99)
HCT VFR BLD CALC: 37.5 % — LOW (ref 39–50)
HGB BLD-MCNC: 11.4 G/DL — LOW (ref 13–17)
MCHC RBC-ENTMCNC: 30.4 GM/DL — LOW (ref 32–36)
MCHC RBC-ENTMCNC: 30.8 PG — SIGNIFICANT CHANGE UP (ref 27–34)
MCV RBC AUTO: 101.4 FL — HIGH (ref 80–100)
NRBC # BLD: 0 /100 WBCS — SIGNIFICANT CHANGE UP (ref 0–0)
PLATELET # BLD AUTO: 232 K/UL — SIGNIFICANT CHANGE UP (ref 150–400)
POTASSIUM SERPL-MCNC: 4.2 MMOL/L — SIGNIFICANT CHANGE UP (ref 3.5–5.3)
POTASSIUM SERPL-SCNC: 4.2 MMOL/L — SIGNIFICANT CHANGE UP (ref 3.5–5.3)
RBC # BLD: 3.7 M/UL — LOW (ref 4.2–5.8)
RBC # FLD: 16.8 % — HIGH (ref 10.3–14.5)
SODIUM SERPL-SCNC: 136 MMOL/L — SIGNIFICANT CHANGE UP (ref 135–145)
WBC # BLD: 10.27 K/UL — SIGNIFICANT CHANGE UP (ref 3.8–10.5)
WBC # FLD AUTO: 10.27 K/UL — SIGNIFICANT CHANGE UP (ref 3.8–10.5)

## 2022-12-11 RX ORDER — MIDODRINE HYDROCHLORIDE 2.5 MG/1
5 TABLET ORAL
Refills: 0 | Status: DISCONTINUED | OUTPATIENT
Start: 2022-12-11 | End: 2022-12-14

## 2022-12-11 RX ADMIN — PANTOPRAZOLE SODIUM 40 MILLIGRAM(S): 20 TABLET, DELAYED RELEASE ORAL at 06:12

## 2022-12-11 RX ADMIN — SEVELAMER CARBONATE 1600 MILLIGRAM(S): 2400 POWDER, FOR SUSPENSION ORAL at 17:22

## 2022-12-11 RX ADMIN — CHLORHEXIDINE GLUCONATE 1 APPLICATION(S): 213 SOLUTION TOPICAL at 06:11

## 2022-12-11 RX ADMIN — Medication 2: at 07:56

## 2022-12-11 RX ADMIN — Medication 5 MILLIGRAM(S): at 21:49

## 2022-12-11 RX ADMIN — SENNA PLUS 2 TABLET(S): 8.6 TABLET ORAL at 21:49

## 2022-12-11 RX ADMIN — POLYETHYLENE GLYCOL 3350 17 GRAM(S): 17 POWDER, FOR SOLUTION ORAL at 13:01

## 2022-12-11 RX ADMIN — Medication 1 APPLICATION(S): at 06:12

## 2022-12-11 RX ADMIN — SEVELAMER CARBONATE 1600 MILLIGRAM(S): 2400 POWDER, FOR SUSPENSION ORAL at 07:57

## 2022-12-11 RX ADMIN — Medication 5 MILLIGRAM(S): at 14:20

## 2022-12-11 RX ADMIN — Medication 5 MILLIGRAM(S): at 06:12

## 2022-12-11 RX ADMIN — Medication 1 APPLICATION(S): at 17:23

## 2022-12-11 RX ADMIN — SEVELAMER CARBONATE 1600 MILLIGRAM(S): 2400 POWDER, FOR SUSPENSION ORAL at 13:02

## 2022-12-11 NOTE — PROGRESS NOTE ADULT - SUBJECTIVE AND OBJECTIVE BOX
Patient is a 72y old  Male who presents with a chief complaint of left toe discoloration (08 Dec 2022 19:54)      INTERVAL HPI/OVERNIGHT EVENTS: pt seen and examined    MEDICATIONS  (STANDING):  chlorhexidine 2% Cloths 1 Application(s) Topical <User Schedule>  clotrimazole 1% Cream 1 Application(s) Topical two times a day  influenza  Vaccine (HIGH DOSE) 0.7 milliLiter(s) IntraMuscular once  insulin lispro (ADMELOG) corrective regimen sliding scale   SubCutaneous three times a day before meals  midodrine. 5 milliGRAM(s) Oral <User Schedule>  oxybutynin 5 milliGRAM(s) Oral three times a day  pantoprazole    Tablet 40 milliGRAM(s) Oral before breakfast  polyethylene glycol 3350 17 Gram(s) Oral daily  senna 2 Tablet(s) Oral at bedtime  sevelamer carbonate 1600 milliGRAM(s) Oral three times a day with meals    MEDICATIONS  (PRN):  acetaminophen     Tablet .. 650 milliGRAM(s) Oral every 6 hours PRN Temp greater or equal to 38C (100.4F), Mild Pain (1 - 3)  bisacodyl 5 milliGRAM(s) Oral every 12 hours PRN Constipation    Vital Signs Last 24 Hrs  T(C): 36.6 (12-11-22 @ 13:46), Max: 36.6 (12-11-22 @ 13:46)  T(F): 97.8 (12-11-22 @ 13:46), Max: 97.8 (12-11-22 @ 13:46)  HR: 90 (12-11-22 @ 13:46) (87 - 90)  BP: 128/60 (12-11-22 @ 13:46) (126/64 - 128/60)  BP(mean): --  RR: 20 (12-11-22 @ 13:46) (18 - 20)  SpO2: 96% (12-11-22 @ 13:46) (96% - 99%)          PAST MEDICAL & SURGICAL HISTORY:  ESRD on dialysis      DM (diabetes mellitus)      PAD (peripheral artery disease)      No significant past surgical history          SOCIAL HISTORY  Alcohol:  Tobacco:  Illicit substance use:      FAMILY HISTORY:    LABS:  12-11    136  |  100  |  71<H>  ----------------------------<  183<H>  4.2   |  23  |  5.65<H>    Ca    9.6      11 Dec 2022 05:55      Creatinine Trend: 5.65 <--, 5.69 <--, 4.51 <--, 5.75 <--, 4.66 <--, 4.61 <--                        11.4   10.27 )-----------( 232      ( 11 Dec 2022 05:55 )             37.5     Urine Studies:                                REVIEW OF SYSTEMS:  CONSTITUTIONAL: No fever  EYES: No eye pain  ENMT:  No sinus or throat pain  NECK: No pain  RESPIRATORY: No cough; No shortness of breath  CARDIOVASCULAR: No chest pain  GASTROINTESTINAL: No abdominal pain. No nausea, vomiting; No diarrhea or constipation  GENITOURINARY: No dysuria, frequency.  + hematuria  NEUROLOGICAL: No headaches  SKIN: No rashes  MUSCULOSKELETAL: No joint pain    RADIOLOGY & ADDITIONAL TESTS:  < from: Xray Foot AP + Lateral + Oblique, Right (11.08.22 @ 19:08) >    ACC: [id number] EXAM:  XR FOOT COMP MIN 3 VIEWS RT                          PROCEDURE DATE:  11/08/2022          INTERPRETATION:  XR FOOT 3 VIEWS RIGHT    Clinical History: Dry gangrene first second and third toes. Right heel   ulcer    Right foot 3 views with comparison to 9/26/2022      FINDINGS:    There is no fracture, dislocation or joint effusion.  No degenerative changes.  Moderate atherosclerotic changes.  No radiopaque foreign body.    IMPRESSION:  1.  Swelling with soft tissue ulcer posterior to calcaneus.  2.  No plain film evidence of osteomyelitis.  3.  Obtain MRI as warranted clinically.    --- End of Report ---       COTL CABRERA DO; Attending Radiologist  This document has been electronically signed. Nov 9 2022 11:51AM    < end of copied text >    Imaging Personally Reviewed:  [ x] YES  [ ] NO    Consultant(s) Notes Reviewed:  [x ] YES  [ ] NO    PHYSICAL EXAM:  GENERAL: NAD  HEAD:  Atraumatic, Normocephalic  EYES: conjunctiva and sclera clear  ENMT:  Moist mucous membranes  NECK: Supple  NERVOUS SYSTEM:  Alert , awake  CHEST/LUNG: dec breath sounds at bases  HEART: Regular rate and rhythm  ABDOMEN: Soft, Nontender, Nondistended; Bowel sounds present  EXTREMITIES: rt BKA, + pulse lle  SKIN: no rash    Care Collaborated Discussed with Consultants/Other Providers [x ] YES  [ ] NO

## 2022-12-11 NOTE — PROGRESS NOTE ADULT - SUBJECTIVE AND OBJECTIVE BOX
INTERVAL HPI/OVERNIGHT EVENTS:    No acute events overnight.   Pt resting comfortably. No acute complaints.       MEDICATIONS  (STANDING):  chlorhexidine 2% Cloths 1 Application(s) Topical <User Schedule>  clotrimazole 1% Cream 1 Application(s) Topical two times a day  influenza  Vaccine (HIGH DOSE) 0.7 milliLiter(s) IntraMuscular once  insulin lispro (ADMELOG) corrective regimen sliding scale   SubCutaneous three times a day before meals  oxybutynin 5 milliGRAM(s) Oral three times a day  pantoprazole    Tablet 40 milliGRAM(s) Oral before breakfast  polyethylene glycol 3350 17 Gram(s) Oral daily  senna 2 Tablet(s) Oral at bedtime  sevelamer carbonate 1600 milliGRAM(s) Oral three times a day with meals    MEDICATIONS  (PRN):  acetaminophen     Tablet .. 650 milliGRAM(s) Oral every 6 hours PRN Temp greater or equal to 38C (100.4F), Mild Pain (1 - 3)  bisacodyl 5 milliGRAM(s) Oral every 12 hours PRN Constipation      Vital Signs Last 24 Hrs  T(C): 36.5 (11 Dec 2022 05:01), Max: 37.1 (10 Dec 2022 14:30)  T(F): 97.7 (11 Dec 2022 05:01), Max: 98.8 (10 Dec 2022 14:30)  HR: 87 (11 Dec 2022 05:01) (67 - 87)  BP: 126/64 (11 Dec 2022 05:01) (109/54 - 134/68)  BP(mean): --  RR: 18 (11 Dec 2022 05:01) (18 - 18)  SpO2: 99% (11 Dec 2022 05:01) (95% - 100%)    Parameters below as of 11 Dec 2022 05:01  Patient On (Oxygen Delivery Method): room air          PHYSICAL EXAM  General: Alert and oriented, not in acute distress  Resp: Breathing unlabored  : neumann clear, CBI bags completed        I&O's Detail    10 Dec 2022 07:01  -  11 Dec 2022 07:00  --------------------------------------------------------  IN:    Continuous Bladder Irrigation (mL): 78255 mL  Total IN: 04746 mL    OUT:    Continuous Bladder Irrigation (mL): 88367 mL  Total OUT: 57068 mL    Total NET: 2600 mL          LABS:                        11.4   10.27 )-----------( 232      ( 11 Dec 2022 05:55 )             37.5             12-11    136  |  100  |  71<H>  ----------------------------<  183<H>  4.2   |  23  |  5.65<H>    Ca    9.6      11 Dec 2022 05:55

## 2022-12-11 NOTE — PHYSICAL THERAPY INITIAL EVALUATION ADULT - GENERAL OBSERVATIONS, REHAB EVAL
Consult received,EMR, radiology and labs reviewed. Patient received supine in bed, Left BKA 11/9 . CBI clamped by urology.  Patient agreed to EVALUATION from Physical Therapist.

## 2022-12-11 NOTE — PROGRESS NOTE ADULT - ASSESSMENT
72 yoM with hematuria    CBI bags completed, urine remains clear   clamped  monitor for UO and color  irrigate prn

## 2022-12-11 NOTE — PROGRESS NOTE ADULT - ASSESSMENT
Patient is a 71yo Male with ESRD on HD, failed Left AVF x2, DM, recently admitted with  Rt foot gangrene s/p Rt BKA presents with left toe discoloration. Nephrology consulted for ESRD status.     1) ESRD: HD on 12/10 terminated after 15 minutes due to hypotension despite fluid boluses with HD. No need for HD today. Will plan for next maintenance HD on 12/12. Monitor electrolytes.  Pt with Left UE AVF- no thrill no bruit- f/u Vascular Surgeon Dr. Dutta as an outpt.   2) Anemia of renal disease: hgb acceptable. Continue to hold JERED. Monitor Hb.  3) Hyperphosphatemia: Serum phosphorus elevated. c/w low phos diet. c/w Sevelamer 1600mg PO tid with meals (increased 12/8). Monitor serum calcium and phosphorus.  4) PAD- s/p Rt BKA 11/9 by Dr. Billy due to gangrene. Pt now with left toe discoloration. Seen by Vascular; no acute surgical intervention but will need close f/u as an outpt. Plan as per primary team/ vascular surgery.    5) Hematuria- S/P CBI. Suspect likely due to stones on CT. Patient with bilateral hydronephrosis but no intervention necessary given atrophic kidneys as patient on HD. F/U Urology.      [facility] NEPHROLOGY  Wili Hopkins M.D.  Kiran Feng D.O.  Sujatha Dumont M.D.  Radha Miller, KENYATTA, ANP-C  [phone]    [address] [place] [address]   Patient is a 71yo Male with ESRD on HD, failed Left AVF x2, DM, recently admitted with  Rt foot gangrene s/p Rt BKA presents with left toe discoloration. Nephrology consulted for ESRD status.     1) ESRD: HD on 12/10 terminated after 15 minutes due to hypotension despite fluid boluses with HD. No need for HD today. Will plan for next maintenance HD on 12/12. Start midodrine 5 mg pre-HD on HD days to help avoid intradialytic hypotension. Monitor electrolytes.  Pt with Left UE AVF- no thrill no bruit- f/u Vascular Surgeon Dr. Dutta as an outpt.   2) Anemia of renal disease: hgb acceptable. Continue to hold JERED. Monitor Hb.  3) Hyperphosphatemia: Serum phosphorus elevated. c/w low phos diet. c/w Sevelamer 1600mg PO tid with meals (increased 12/8). Monitor serum calcium and phosphorus.  4) PAD- s/p Rt BKA 11/9 by Dr. Billy due to gangrene. Pt now with left toe discoloration. Seen by Vascular; no acute surgical intervention but will need close f/u as an outpt. Plan as per primary team/ vascular surgery.    5) Hematuria- S/P CBI. Suspect likely due to stones on CT. Patient with bilateral hydronephrosis but no intervention necessary given atrophic kidneys as patient on HD. F/U Urology.      [facility] NEPHROLOGY  Wili Hopkins M.D.  Kiran Feng D.O.  Sujatha Dumont M.D.  Radha Miller, MSN, ANP-C  [phone]    [address] [place] [address]

## 2022-12-11 NOTE — PROGRESS NOTE ADULT - ASSESSMENT
72 year old male, coming from [facility], with medical history of ESRD (T-TH-S),  DM, PVD, gangrene right foot, (s/p R BKA 11/9) coming to ED c/o L toe discoloration. Admitted for further management. vascular recc: no interventions, podiatry recc xray left foot, and ID consulted no abx. nephro following for HD. hospital course c/b hematuria,  hgb stable, urology consulted.

## 2022-12-11 NOTE — PROGRESS NOTE ADULT - SUBJECTIVE AND OBJECTIVE BOX
[facility] NEPHROLOGY- PROGRESS NOTE    Patient is a 71yo Male with ESRD on HD, failed Left AVF x2, DM, recently admitted with  Rt foot gangrene s/p Rt BKA presents with left toe discoloration. Nephrology consulted for ESRD status.  Hosp cb hematuria     Hospital Medications: Medications reviewed.  REVIEW OF SYSTEMS:  CONSTITUTIONAL: No fevers or chills  RESPIRATORY: No shortness of breath  CARDIOVASCULAR: No chest pain.  GASTROINTESTINAL: No nausea, vomiting, diarrhea or abdominal pain.   : no further gross hematuria  VASCULAR: No left lower extremity edema.         VITALS:  T(F): 97.7 (12-11-22 @ 05:01), Max: 98.8 (12-10-22 @ 14:30)  HR: 87 (12-11-22 @ 05:01)  BP: 126/64 (12-11-22 @ 05:01)  RR: 18 (12-11-22 @ 05:01)  SpO2: 99% (12-11-22 @ 05:01)  Wt(kg): --    12-10 @ 07:01  -  12-11 @ 07:00  --------------------------------------------------------  IN: 89930 mL / OUT: 58141 mL / NET: 2600 mL      PHYSICAL EXAM:  Gen: NAD, calm  HEENT: anicteric  Cards: RRR, +S1/S2, no M/G/R  Resp: CTA B/L  GI: soft, NT/ND, NABS  : +neumann with yellow urine, CBI off  Extremities: no LLE edema  Derm: s/p Rt BKA   Access: Rt IJ tunneled HD catheter- benign  Left upper arm AVF- no thrill no bruit      LABS:  12-11    136  |  100  |  71<H>  ----------------------------<  183<H>  4.2   |  23  |  5.65<H>    Ca    9.6      11 Dec 2022 05:55      Creatinine Trend: 5.65 <--, 5.69 <--, 4.51 <--, 5.75 <--, 4.66 <--, 4.61 <--                        11.4   10.27 )-----------( 232      ( 11 Dec 2022 05:55 )             37.5     Urine Studies:        < from: CT Abdomen and Pelvis w/wo IV Cont (12.10.22 @ 11:39) >  IMPRESSION: Moderate left and mild right hydronephrosis.    Limited evaluation for urothelial lesion. Mild debris in the bladder.    A few punctate renal calculi.    Large colonic stool burden.    < end of copied text >

## 2022-12-11 NOTE — PHYSICAL THERAPY INITIAL EVALUATION ADULT - ACTIVE RANGE OF MOTION EXAMINATION, REHAB EVAL
Right BKA , AAROM  WFL grossly assessed/bilateral upper extremity Active ROM was WFL (within functional limits)/Left LE Active ROM was WFL (within functional limits)

## 2022-12-12 LAB
ANION GAP SERPL CALC-SCNC: 11 MMOL/L — SIGNIFICANT CHANGE UP (ref 5–17)
BUN SERPL-MCNC: 75 MG/DL — HIGH (ref 7–18)
CALCIUM SERPL-MCNC: 9.2 MG/DL — SIGNIFICANT CHANGE UP (ref 8.4–10.5)
CHLORIDE SERPL-SCNC: 102 MMOL/L — SIGNIFICANT CHANGE UP (ref 96–108)
CO2 SERPL-SCNC: 23 MMOL/L — SIGNIFICANT CHANGE UP (ref 22–31)
CREAT SERPL-MCNC: 6.61 MG/DL — HIGH (ref 0.5–1.3)
EGFR: 8 ML/MIN/1.73M2 — LOW
GLUCOSE BLDC GLUCOMTR-MCNC: 158 MG/DL — HIGH (ref 70–99)
GLUCOSE BLDC GLUCOMTR-MCNC: 190 MG/DL — HIGH (ref 70–99)
GLUCOSE BLDC GLUCOMTR-MCNC: 214 MG/DL — HIGH (ref 70–99)
GLUCOSE BLDC GLUCOMTR-MCNC: 218 MG/DL — HIGH (ref 70–99)
GLUCOSE SERPL-MCNC: 200 MG/DL — HIGH (ref 70–99)
HCT VFR BLD CALC: 32.7 % — LOW (ref 39–50)
HGB BLD-MCNC: 10 G/DL — LOW (ref 13–17)
MCHC RBC-ENTMCNC: 30.2 PG — SIGNIFICANT CHANGE UP (ref 27–34)
MCHC RBC-ENTMCNC: 30.6 GM/DL — LOW (ref 32–36)
MCV RBC AUTO: 98.8 FL — SIGNIFICANT CHANGE UP (ref 80–100)
NRBC # BLD: 0 /100 WBCS — SIGNIFICANT CHANGE UP (ref 0–0)
PHOSPHATE SERPL-MCNC: 4.9 MG/DL — HIGH (ref 2.5–4.5)
PLATELET # BLD AUTO: 219 K/UL — SIGNIFICANT CHANGE UP (ref 150–400)
POTASSIUM SERPL-MCNC: 4.4 MMOL/L — SIGNIFICANT CHANGE UP (ref 3.5–5.3)
POTASSIUM SERPL-SCNC: 4.4 MMOL/L — SIGNIFICANT CHANGE UP (ref 3.5–5.3)
RBC # BLD: 3.31 M/UL — LOW (ref 4.2–5.8)
RBC # FLD: 16.4 % — HIGH (ref 10.3–14.5)
SARS-COV-2 RNA SPEC QL NAA+PROBE: SIGNIFICANT CHANGE UP
SODIUM SERPL-SCNC: 136 MMOL/L — SIGNIFICANT CHANGE UP (ref 135–145)
WBC # BLD: 7.84 K/UL — SIGNIFICANT CHANGE UP (ref 3.8–10.5)
WBC # FLD AUTO: 7.84 K/UL — SIGNIFICANT CHANGE UP (ref 3.8–10.5)

## 2022-12-12 RX ADMIN — Medication 1 APPLICATION(S): at 17:12

## 2022-12-12 RX ADMIN — PANTOPRAZOLE SODIUM 40 MILLIGRAM(S): 20 TABLET, DELAYED RELEASE ORAL at 06:27

## 2022-12-12 RX ADMIN — CHLORHEXIDINE GLUCONATE 1 APPLICATION(S): 213 SOLUTION TOPICAL at 06:27

## 2022-12-12 RX ADMIN — Medication 1: at 16:52

## 2022-12-12 RX ADMIN — Medication 5 MILLIGRAM(S): at 13:38

## 2022-12-12 RX ADMIN — Medication 1 APPLICATION(S): at 06:28

## 2022-12-12 RX ADMIN — SEVELAMER CARBONATE 1600 MILLIGRAM(S): 2400 POWDER, FOR SUSPENSION ORAL at 16:53

## 2022-12-12 RX ADMIN — POLYETHYLENE GLYCOL 3350 17 GRAM(S): 17 POWDER, FOR SOLUTION ORAL at 12:30

## 2022-12-12 RX ADMIN — SENNA PLUS 2 TABLET(S): 8.6 TABLET ORAL at 22:14

## 2022-12-12 RX ADMIN — Medication 2: at 07:42

## 2022-12-12 RX ADMIN — SEVELAMER CARBONATE 1600 MILLIGRAM(S): 2400 POWDER, FOR SUSPENSION ORAL at 12:30

## 2022-12-12 RX ADMIN — Medication 5 MILLIGRAM(S): at 06:27

## 2022-12-12 RX ADMIN — Medication 5 MILLIGRAM(S): at 22:14

## 2022-12-12 RX ADMIN — MIDODRINE HYDROCHLORIDE 5 MILLIGRAM(S): 2.5 TABLET ORAL at 07:42

## 2022-12-12 NOTE — PROGRESS NOTE ADULT - PROBLEM SELECTOR PLAN 4
on Januvia and ss at home  [facility]  Diabetic diet  Monitor BS.

## 2022-12-12 NOTE — PROGRESS NOTE ADULT - ASSESSMENT
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 Admitted for further management. vascular recc: no interventions, podiatry rec xray left foot, and ID consulted no abx. nephro following for HD. Hospital course c/b hematuria,  hgb stable, urology consulted.

## 2022-12-12 NOTE — PROGRESS NOTE ADULT - PROBLEM SELECTOR PLAN 1
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 Toni Iqbal.

## 2022-12-12 NOTE — PROGRESS NOTE ADULT - PROBLEM SELECTOR PLAN 3
s/p HD today via R CW  permacath  c/w Sevelamer   F/u outpt Dr. Dutta- Left AVF no thrill/Bruit  Monitor serum calcium and phosphorus- nephro recc  Dr. Dumont Nephro.

## 2022-12-12 NOTE — PROGRESS NOTE ADULT - ASSESSMENT
72 yoM with hematuria    CBI bags completed, urine remains clear   clamped  Can remove neumann  monitor for UO and color  irrigate prn 72 yoM with hematuria    CBI bags completed, urine remains clear   clamped  Please remove neumann catheter and give patient trial of void  monitor for UO and color  irrigate prn

## 2022-12-12 NOTE — PROGRESS NOTE ADULT - SUBJECTIVE AND OBJECTIVE BOX
NP Note discussed with primary attending.     Patient is a 72y old  Male who presents with a chief complaint of left toe discoloration (12 Dec 2022 07:05)      INTERVAL HPI/OVERNIGHT EVENTS: Returned from HD. Indwelling neumann catheter removed per  recs.   Pt seen and examined . Pt awake/alert, endorses feeling okay. Pt denies SOb, chest discomfort, N/V/abdominal discomfort.     MEDICATIONS  (STANDING):  chlorhexidine 2% Cloths 1 Application(s) Topical <User Schedule>  clotrimazole 1% Cream 1 Application(s) Topical two times a day  influenza  Vaccine (HIGH DOSE) 0.7 milliLiter(s) IntraMuscular once  insulin lispro (ADMELOG) corrective regimen sliding scale   SubCutaneous three times a day before meals  midodrine. 5 milliGRAM(s) Oral <User Schedule>  oxybutynin 5 milliGRAM(s) Oral three times a day  pantoprazole    Tablet 40 milliGRAM(s) Oral before breakfast  polyethylene glycol 3350 17 Gram(s) Oral daily  senna 2 Tablet(s) Oral at bedtime  sevelamer carbonate 1600 milliGRAM(s) Oral three times a day with meals    MEDICATIONS  (PRN):  acetaminophen     Tablet .. 650 milliGRAM(s) Oral every 6 hours PRN Temp greater or equal to 38C (100.4F), Mild Pain (1 - 3)  bisacodyl 5 milliGRAM(s) Oral every 12 hours PRN Constipation      __________________________________________________  REVIEW OF SYSTEMS:    CONSTITUTIONAL: No fever,   EYES: no acute visual disturbances  NECK: No pain or stiffness  RESPIRATORY: No cough; No shortness of breath  CARDIOVASCULAR: No chest pain, no palpitations  GASTROINTESTINAL: No pain. No nausea or vomiting; No diarrhea   NEUROLOGICAL: No headache or numbness, no tremors  MUSCULOSKELETAL: No joint pain, no muscle pain  GENITOURINARY: no dysuria, no frequency, no hesitancy  PSYCHIATRY: no depression , no anxiety  ALL OTHER  ROS negative        Vital Signs Last 24 Hrs  T(C): 36.2 (12 Dec 2022 11:45), Max: 37.2 (12 Dec 2022 05:23)  T(F): 97.2 (12 Dec 2022 11:45), Max: 99 (12 Dec 2022 05:23)  HR: 86 (12 Dec 2022 11:45) (85 - 102)  BP: 114/- (12 Dec 2022 11:45) (110/58 - 117/65)  BP(mean): --  RR: 22 (12 Dec 2022 11:45) (18 - 22)  SpO2: 100% (12 Dec 2022 11:45) (99% - 100%)    Parameters below as of 12 Dec 2022 11:45  Patient On (Oxygen Delivery Method): room air    ________________________________________________  PHYSICAL EXAM:  GENERAL: NAD, cachectic  HEENT: Normocephalic;  conjunctivae and sclerae clear; moist mucous membranes;   NECK : supple  CHEST/LUNG: Clear to auscultation bilaterally with good air entry   HEART: S1 S2  regular; no murmurs, gallops or rubs  ABDOMEN: Soft, Nontender, Nondistended; Bowel sounds present  EXTREMITIES: no cyanosis; no edema; no calf tenderness  SKIN: warm and dry; no rash.   NERVOUS SYSTEM:  Awake and alert; no new deficits    _________________________________________________  LABS:                        10.0   7.84  )-----------( 219      ( 12 Dec 2022 05:58 )             32.7     12-12    136  |  102  |  75<H>  ----------------------------<  200<H>  4.4   |  23  |  6.61<H>    Ca    9.2      12 Dec 2022 05:58  Phos  4.9     12-12    CAPILLARY BLOOD GLUCOSE      POCT Blood Glucose.: 158 mg/dL (12 Dec 2022 11:09)  POCT Blood Glucose.: 214 mg/dL (12 Dec 2022 07:32)  POCT Blood Glucose.: 179 mg/dL (11 Dec 2022 21:43)  POCT Blood Glucose.: 110 mg/dL (11 Dec 2022 16:40)        RADIOLOGY & ADDITIONAL TESTS:    < from: CT Abdomen and Pelvis w/wo IV Cont (12.10.22 @ 11:39) >  PROCEDURE DATE:  12/10/2022          INTERPRETATION:  CLINICAL INFORMATION: please do CT urogram Admitting   Dxs: I73.9 PERIPHERAL VASCULAR DISEASE, UNSPECIFIED HCT    ADDITIONAL CLINICAL INFORMATION: Other, Non-specified    COMPARISON: 9.27.22    IV Contrast: Omnipaque 350  90 cc administered   10 cc discarded  Oral Contrast: NONE  Complications: None reported at time of study completion    PROCEDURE:  CT of the Abdomenand Pelvis was performed.  Precontrast, Nephrographic and Excretory phases were acquired (CT   UROGRAM).  10 mg of Lasix was administered.  Sagittal and coronal reformats were performed.    FINDINGS:    LOWER CHEST: Within normal limits.    LIVER: Within normal limits.  BILE DUCTS: Normal caliber.  GALLBLADDER: Within normal limits.  SPLEEN: Within normal limits.  PANCREAS: Within normal limits.  ADRENALS: Within normal limits.  KIDNEYS/URETERS: Atrophic kidneys, greater on the right. A few punctate   calculi. Left lower pole parenchymal calcification. Bilateral cysts are   noted. There is mild right and moderate left hydronephrosis. Suboptimal   excretion of contrast into the collecting systems limits evaluation.    BLADDER: Neumann catheter in place. Mild debris is noted in the bladder.  REPRODUCTIVE ORGANS: Within normal limits.    BOWEL: No bowel obstruction. The appendix is normal.  Large amount of   colonic stool. The rectum is distended by stool.  PERITONEUM: No ascites.  VESSELS:  Within normal limits.  RETROPERITONEUM/LYMPH NODES: No lymphadenopathy.  ABDOMINAL WALL: Within normal limits.  BONES: Within normal limits.    IMPRESSION: Moderate left and mild right hydronephrosis.    Limited evaluation for urothelial lesion. Mild debris in the bladder.    A few punctate renal calculi.    Large colonic stool burden.    < end of copied text >      Consultant(s) Notes Reviewed:   YES/ No    Care Discussed with Consultants :     Plan of care was discussed with patient and /or primary care giver; all questions and concerns were addressed and care was aligned with patient's wishes.

## 2022-12-12 NOTE — PROGRESS NOTE ADULT - PROBLEM SELECTOR PLAN 2
s/p CBI   Resolved.   Hgb stable  3way Indwelling neumann catheter discontinued. TOV today  Monitor urine output  CT AP w/wo IV findings Moderate left and mild right hydronephrosis. Limited evaluation for urothelial lesion. Mild debris in the bladder. A few punctate renal calculi.   following

## 2022-12-12 NOTE — PROGRESS NOTE ADULT - SUBJECTIVE AND OBJECTIVE BOX
[facility] NEPHROLOGY- PROGRESS NOTE    Patient is a 73yo Male with ESRD on HD, failed Left AVF x2, DM, recently admitted with  Rt foot gangrene s/p Rt BKA presents with left toe discoloration. Nephrology consulted for ESRD status.  Hosp cb hematuria     Hospital Medications: Medications reviewed.  REVIEW OF SYSTEMS:  CONSTITUTIONAL: No fevers or chills  RESPIRATORY: No shortness of breath  CARDIOVASCULAR: No chest pain.  GASTROINTESTINAL: No nausea, vomiting, diarrhea or abdominal pain.   : no further gross hematuria  VASCULAR: No left lower extremity edema.       VITALS:  T(F): 97.2 (12-12-22 @ 11:45), Max: 99 (12-12-22 @ 05:23)  HR: 86 (12-12-22 @ 11:45)  BP: 114/- (12-12-22 @ 11:45)  RR: 22 (12-12-22 @ 11:45)  SpO2: 100% (12-12-22 @ 11:45)  Wt(kg): --    12-11 @ 07:01  -  12-12 @ 07:00  --------------------------------------------------------  IN: 0 mL / OUT: 700 mL / NET: -700 mL    12-12 @ 07:01  -  12-12 @ 14:39  --------------------------------------------------------  IN: 500 mL / OUT: 1200 mL / NET: -700 mL          PHYSICAL EXAM:  Gen: NAD, calm  HEENT: anicteric  Cards: RRR, +S1/S2, no M/G/R  Resp: CTA B/L  GI: soft, NT/ND, NABS  : no neumann  Extremities: no LLE edema  Derm: s/p Rt BKA   Access: Rt IJ tunneled HD catheter- benign  Left upper arm AVF- no thrill no bruit      LABS:  12-12    136  |  102  |  75<H>  ----------------------------<  200<H>  4.4   |  23  |  6.61<H>    Ca    9.2      12 Dec 2022 05:58  Phos  4.9     12-12      Creatinine Trend: 6.61 <--, 5.65 <--, 5.69 <--, 4.51 <--, 5.75 <--, 4.66 <--, 4.61 <--                        10.0   7.84  )-----------( 219      ( 12 Dec 2022 05:58 )             32.7     Urine Studies:

## 2022-12-12 NOTE — PROGRESS NOTE ADULT - SUBJECTIVE AND OBJECTIVE BOX
Subjective  Patient seen and examined, no new complaints. No acute events overnight.    Objective    Vital signs  T(F): , Max: 99 (12-12-22 @ 05:23)  HR: 102 (12-12-22 @ 05:23)  BP: 115/64 (12-12-22 @ 05:23)  SpO2: 100% (12-12-22 @ 05:23)  Wt(kg): --    Output     OUT:    Indwelling Catheter - Urethral (mL): 700 mL  Total OUT: 700 mL    Total NET: -700 mL          Gen: NAD  Abd: soft, nontender, nondistended  : neumann secured in place draining clear urine    Labs      12-12 @ 05:58    WBC 7.84  / Hct 32.7  / SCr --       12-11 @ 05:55    WBC 10.27 / Hct 37.5  / SCr 5.65     Imaging  < from: CT Abdomen and Pelvis w/wo IV Cont (12.10.22 @ 11:39) >    ACC: [id number] EXAM:  CT ABDOMEN AND PELVIS WAW IC                          PROCEDURE DATE:  12/10/2022          INTERPRETATION:  CLINICAL INFORMATION: please do CT urogram Admitting   Dxs: I73.9 PERIPHERAL VASCULAR DISEASE, UNSPECIFIED HCT    ADDITIONAL CLINICAL INFORMATION: Other, Non-specified    COMPARISON: 9.27.22    IV Contrast: Omnipaque 350  90 cc administered   10 cc discarded  Oral Contrast: NONE  Complications: None reported at time of study completion    PROCEDURE:  CT of the Abdomenand Pelvis was performed.  Precontrast, Nephrographic and Excretory phases were acquired (CT   UROGRAM).  10 mg of Lasix was administered.  Sagittal and coronal reformats were performed.    FINDINGS:    LOWER CHEST: Within normal limits.    LIVER: Within normal limits.  BILE DUCTS: Normal caliber.  GALLBLADDER: Within normal limits.  SPLEEN: Within normal limits.  PANCREAS: Within normal limits.  ADRENALS: Within normal limits.  KIDNEYS/URETERS: Atrophic kidneys, greater on the right. A few punctate   calculi. Left lower pole parenchymal calcification. Bilateral cysts are   noted. There is mild right and moderate left hydronephrosis. Suboptimal   excretion of contrast into the collecting systems limits evaluation.    BLADDER: Neumann catheter in place. Mild debris is noted in the bladder.  REPRODUCTIVE ORGANS: Within normal limits.    BOWEL: No bowel obstruction. The appendix is normal.  Large amount of   colonic stool. The rectum is distended by stool.  PERITONEUM: No ascites.  VESSELS:  Within normal limits.  RETROPERITONEUM/LYMPH NODES: No lymphadenopathy.  ABDOMINAL WALL: Within normal limits.  BONES: Within normal limits.    IMPRESSION: Moderate left and mild right hydronephrosis.    Limited evaluation for urothelial lesion. Mild debris in the bladder.    A few punctate renal calculi.    Large colonic stool burden.        --- End of Report ---            HECTOR MCCAIN MD; Attending Radiologist  This document has been electronically signed. Dec 10 2022  2:54PM    < end of copied text >

## 2022-12-12 NOTE — PROGRESS NOTE ADULT - PROBLEM SELECTOR PLAN 4
on Januvia and ss at home  [facility]  Diabetic diet  Monitor BS.

## 2022-12-12 NOTE — PROGRESS NOTE ADULT - SUBJECTIVE AND OBJECTIVE BOX
Patient is a 72y old  Male who presents with a chief complaint of left toe discoloration (12 Dec 2022 07:05)      INTERVAL HPI/OVERNIGHT EVENTS: Returned from HD. Indwelling neumann catheter removed per JOAQUÍN umanzor.   Pt seen and examined . Pt awake/alert, endorses feeling okay. Pt denies SOb, chest discomfort, N/V/abdominal discomfort.     MEDICATIONS  (STANDING):  chlorhexidine 2% Cloths 1 Application(s) Topical <User Schedule>  clotrimazole 1% Cream 1 Application(s) Topical two times a day  influenza  Vaccine (HIGH DOSE) 0.7 milliLiter(s) IntraMuscular once  insulin lispro (ADMELOG) corrective regimen sliding scale   SubCutaneous three times a day before meals  midodrine. 5 milliGRAM(s) Oral <User Schedule>  oxybutynin 5 milliGRAM(s) Oral three times a day  pantoprazole    Tablet 40 milliGRAM(s) Oral before breakfast  polyethylene glycol 3350 17 Gram(s) Oral daily  senna 2 Tablet(s) Oral at bedtime  sevelamer carbonate 1600 milliGRAM(s) Oral three times a day with meals    MEDICATIONS  (PRN):  acetaminophen     Tablet .. 650 milliGRAM(s) Oral every 6 hours PRN Temp greater or equal to 38C (100.4F), Mild Pain (1 - 3)  bisacodyl 5 milliGRAM(s) Oral every 12 hours PRN Constipation      __________________________________________________  REVIEW OF SYSTEMS:    CONSTITUTIONAL: No fever,   EYES: no acute visual disturbances  NECK: No pain or stiffness  RESPIRATORY: No cough; No shortness of breath  CARDIOVASCULAR: No chest pain, no palpitations  GASTROINTESTINAL: No pain. No nausea or vomiting; No diarrhea   NEUROLOGICAL: No headache or numbness, no tremors  MUSCULOSKELETAL: No joint pain, no muscle pain  GENITOURINARY: no dysuria, no frequency, no hesitancy  PSYCHIATRY: no depression , no anxiety  ALL OTHER  ROS negative        Vital Signs Last 24 Hrs  T(C): 36.2 (12 Dec 2022 11:45), Max: 37.2 (12 Dec 2022 05:23)  T(F): 97.2 (12 Dec 2022 11:45), Max: 99 (12 Dec 2022 05:23)  HR: 86 (12 Dec 2022 11:45) (85 - 102)  BP: 114/- (12 Dec 2022 11:45) (110/58 - 117/65)  BP(mean): --  RR: 22 (12 Dec 2022 11:45) (18 - 22)  SpO2: 100% (12 Dec 2022 11:45) (99% - 100%)    Parameters below as of 12 Dec 2022 11:45  Patient On (Oxygen Delivery Method): room air    ________________________________________________  PHYSICAL EXAM:  GENERAL: NAD, cachectic  HEENT: Normocephalic;  conjunctivae and sclerae clear; moist mucous membranes;   NECK : supple  CHEST/LUNG: dec breath sounds at bases  HEART: S1 S2  regular; no murmurs, gallops or rubs  ABDOMEN: Soft, Nontender, Nondistended; Bowel sounds present  EXTREMITIES: rt bka+, left toe discolored  SKIN: warm and dry; no rash.   NERVOUS SYSTEM:  Awake and alert; no new deficits    _________________________________________________  LABS:                        10.0   7.84  )-----------( 219      ( 12 Dec 2022 05:58 )             32.7     12-12    136  |  102  |  75<H>  ----------------------------<  200<H>  4.4   |  23  |  6.61<H>    Ca    9.2      12 Dec 2022 05:58  Phos  4.9     12-12    CAPILLARY BLOOD GLUCOSE      POCT Blood Glucose.: 158 mg/dL (12 Dec 2022 11:09)  POCT Blood Glucose.: 214 mg/dL (12 Dec 2022 07:32)  POCT Blood Glucose.: 179 mg/dL (11 Dec 2022 21:43)  POCT Blood Glucose.: 110 mg/dL (11 Dec 2022 16:40)        RADIOLOGY & ADDITIONAL TESTS:    < from: CT Abdomen and Pelvis w/wo IV Cont (12.10.22 @ 11:39) >  PROCEDURE DATE:  12/10/2022          INTERPRETATION:  CLINICAL INFORMATION: please do CT urogram Admitting   Dxs: I73.9 PERIPHERAL VASCULAR DISEASE, UNSPECIFIED HCT    ADDITIONAL CLINICAL INFORMATION: Other, Non-specified    COMPARISON: 9.27.22    IV Contrast: Omnipaque 350  90 cc administered   10 cc discarded  Oral Contrast: NONE  Complications: None reported at time of study completion    PROCEDURE:  CT of the Abdomenand Pelvis was performed.  Precontrast, Nephrographic and Excretory phases were acquired (CT   UROGRAM).  10 mg of Lasix was administered.  Sagittal and coronal reformats were performed.    FINDINGS:    LOWER CHEST: Within normal limits.    LIVER: Within normal limits.  BILE DUCTS: Normal caliber.  GALLBLADDER: Within normal limits.  SPLEEN: Within normal limits.  PANCREAS: Within normal limits.  ADRENALS: Within normal limits.  KIDNEYS/URETERS: Atrophic kidneys, greater on the right. A few punctate   calculi. Left lower pole parenchymal calcification. Bilateral cysts are   noted. There is mild right and moderate left hydronephrosis. Suboptimal   excretion of contrast into the collecting systems limits evaluation.    BLADDER: Neumann catheter in place. Mild debris is noted in the bladder.  REPRODUCTIVE ORGANS: Within normal limits.    BOWEL: No bowel obstruction. The appendix is normal.  Large amount of   colonic stool. The rectum is distended by stool.  PERITONEUM: No ascites.  VESSELS:  Within normal limits.  RETROPERITONEUM/LYMPH NODES: No lymphadenopathy.  ABDOMINAL WALL: Within normal limits.  BONES: Within normal limits.    IMPRESSION: Moderate left and mild right hydronephrosis.    Limited evaluation for urothelial lesion. Mild debris in the bladder.    A few punctate renal calculi.    Large colonic stool burden.    < end of copied text >      Consultant(s) Notes Reviewed:   YES/ No    Care Discussed with Consultants :     Plan of care was discussed with patient and /or primary care giver; all questions and concerns were addressed and care was aligned with patient's wishes.

## 2022-12-12 NOTE — PROGRESS NOTE ADULT - ASSESSMENT
Patient is a 71yo Male with ESRD on HD, failed Left AVF x2, DM, recently admitted with  Rt foot gangrene s/p Rt BKA presents with left toe discoloration. Nephrology consulted for ESRD status.     1) ESRD: Last HD earlier today (12/12), tolerated well with net 400ml removed. Will plan for next maintenance HD on 12/14 vs 12/15. c/w midodrine 5 mg pre-HD on HD days to help avoid intradialytic hypotension. Monitor electrolytes.  Pt with Left UE AVF- no thrill no bruit- f/u Vascular Surgeon Dr. Dutta as an outpt.   2) Anemia of renal disease: hgb acceptable. Will resume Epogen 8000 u IV tiw with HD. Monitor Hb.  3) Hyperphosphatemia: Serum phosphorus acceptable. c/w low phos diet. c/w Sevelamer 1600mg PO tid with meals (increased 12/8). Monitor serum calcium and phosphorus.  4) PAD- s/p Rt BKA 11/9 by Dr. Billy due to gangrene. Pt now with left toe discoloration. Seen by Vascular; no acute surgical intervention but will need close f/u as an outpt. Plan as per primary team/ vascular surgery.    5) Hematuria- S/P CBI. Suspect likely due to stones on CT. Patient with bilateral hydronephrosis but no intervention necessary given atrophic kidneys as patient on HD. Garcia removed; TOV.  F/U Urology.      [facility]  Wili Hopkins M.D.  Kiran Feng D.O.  Sujatha Dumont M.D.  Radha Miller, KENYATTA, ANP-C  [phone]    [address] [id number]

## 2022-12-12 NOTE — PROGRESS NOTE ADULT - ASSESSMENT
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 Admitted for further management. vascular recc: no interventions, podiatry rec xray left foot, and ID consulted no abx. nephro following for HD. Hospital course c/b hematuria,  hgb stable, urology consulted.

## 2022-12-13 ENCOUNTER — TRANSCRIPTION ENCOUNTER (OUTPATIENT)
Age: 72
End: 2022-12-13

## 2022-12-13 DIAGNOSIS — S90.222A CONTUSION OF LEFT LESSER TOE(S) WITH DAMAGE TO NAIL, INITIAL ENCOUNTER: ICD-10-CM

## 2022-12-13 LAB
GLUCOSE BLDC GLUCOMTR-MCNC: 147 MG/DL — HIGH (ref 70–99)
GLUCOSE BLDC GLUCOMTR-MCNC: 178 MG/DL — HIGH (ref 70–99)
GLUCOSE BLDC GLUCOMTR-MCNC: 224 MG/DL — HIGH (ref 70–99)
GLUCOSE BLDC GLUCOMTR-MCNC: 230 MG/DL — HIGH (ref 70–99)

## 2022-12-13 PROCEDURE — 73630 X-RAY EXAM OF FOOT: CPT | Mod: 26,LT

## 2022-12-13 PROCEDURE — 99231 SBSQ HOSP IP/OBS SF/LOW 25: CPT | Mod: GC

## 2022-12-13 RX ORDER — INSULIN LISPRO 100/ML
VIAL (ML) SUBCUTANEOUS AT BEDTIME
Refills: 0 | Status: DISCONTINUED | OUTPATIENT
Start: 2022-12-13 | End: 2022-12-14

## 2022-12-13 RX ORDER — MIDODRINE HYDROCHLORIDE 2.5 MG/1
1 TABLET ORAL
Qty: 0 | Refills: 0 | DISCHARGE
Start: 2022-12-13

## 2022-12-13 RX ORDER — SENNA PLUS 8.6 MG/1
2 TABLET ORAL
Qty: 0 | Refills: 0 | DISCHARGE
Start: 2022-12-13

## 2022-12-13 RX ADMIN — Medication 1 APPLICATION(S): at 06:17

## 2022-12-13 RX ADMIN — Medication 1 APPLICATION(S): at 17:07

## 2022-12-13 RX ADMIN — Medication 5 MILLIGRAM(S): at 06:14

## 2022-12-13 RX ADMIN — CHLORHEXIDINE GLUCONATE 1 APPLICATION(S): 213 SOLUTION TOPICAL at 06:14

## 2022-12-13 RX ADMIN — SEVELAMER CARBONATE 1600 MILLIGRAM(S): 2400 POWDER, FOR SUSPENSION ORAL at 12:25

## 2022-12-13 RX ADMIN — SENNA PLUS 2 TABLET(S): 8.6 TABLET ORAL at 22:04

## 2022-12-13 RX ADMIN — PANTOPRAZOLE SODIUM 40 MILLIGRAM(S): 20 TABLET, DELAYED RELEASE ORAL at 06:14

## 2022-12-13 RX ADMIN — SEVELAMER CARBONATE 1600 MILLIGRAM(S): 2400 POWDER, FOR SUSPENSION ORAL at 17:07

## 2022-12-13 RX ADMIN — Medication 2: at 12:26

## 2022-12-13 RX ADMIN — Medication 1: at 08:08

## 2022-12-13 RX ADMIN — SEVELAMER CARBONATE 1600 MILLIGRAM(S): 2400 POWDER, FOR SUSPENSION ORAL at 08:08

## 2022-12-13 RX ADMIN — POLYETHYLENE GLYCOL 3350 17 GRAM(S): 17 POWDER, FOR SOLUTION ORAL at 12:26

## 2022-12-13 NOTE — PROGRESS NOTE ADULT - REASON FOR ADMISSION
left toe discoloration

## 2022-12-13 NOTE — PROGRESS NOTE ADULT - ASSESSMENT
72 yoM with hematuria    F/u bladder scan to ensure patient passes TOV  Patient is ESRD on HD and does not make too much urine  monitor for UO and color  irrigate prn

## 2022-12-13 NOTE — PROGRESS NOTE ADULT - ASSESSMENT
Patient is a 73yo Male with ESRD on HD, failed Left AVF x2, DM, recently admitted with  Rt foot gangrene s/p Rt BKA presents with left toe discoloration. Nephrology consulted for ESRD status.     1) ESRD: Last HD 12/12, tolerated well with net 400ml removed. Will plan for next maintenance HD 12/15. c/w midodrine 5 mg pre-HD on HD days to help avoid intradialytic hypotension. Monitor electrolytes.  Pt with Left UE AVF- no thrill no bruit- f/u Vascular Surgeon Dr. Dutta as an outpt.   2) Anemia of renal disease: hgb acceptable. c/w Epogen 8000 u IV tiw with HD. Monitor Hb.  3) Hyperphosphatemia: Serum phosphorus acceptable. c/w low phos diet. c/w Sevelamer 1600mg PO tid with meals (increased 12/8). Monitor serum calcium and phosphorus.  4) PAD- s/p Rt BKA 11/9 by Dr. Billy due to gangrene. Pt now with left toe discoloration. Seen by Vascular; no acute surgical intervention but will need close f/u as an outpt. Plan as per primary team/ vascular surgery.    5) Hematuria- S/P CBI. Suspect likely due to stones on CT. Patient with bilateral hydronephrosis but no intervention necessary given atrophic kidneys as patient on HD. Garcia removed; check post void bladder scan.  F/U Urology.    Plan discussed with Lisa MARCUS      [facility] NEPHROLOG[facility]  Wili Hopkins M.D.  Kiran Feng D.O.  Sujatha Dumont M.D.  Radha Miller, KENYATTA, ANP-C  [phone]    [address] [place], [place] [id number]

## 2022-12-13 NOTE — PROGRESS NOTE ADULT - ATTENDING COMMENTS
Patient with gross hematuria. The patient can be discharged and follow up for remainder of workup that will include cystoscopy as an outpatient

## 2022-12-13 NOTE — PROGRESS NOTE ADULT - SUBJECTIVE AND OBJECTIVE BOX
Podiatry Interval: patient is seen in bed resting, reports he is being discharged back to nursing home. Reports he does not have pain to his left foot, answers questions today, in better disposition overall. Admits no other complaints.     Podiatry HPI: Patient is a 72y old  Male who presents with a chief complaint of left toe discoloration (08 Dec 2022 17:44). Patient is known to podiatry service. Recently underwent right BKA due to no healing potential of right gangrenous digit changes. Patient admits no pain in left toes. Admits no pain in left big toe. Reports he is not sure why he is at the hospital. Does not have any other pedal complaints. Does not have constitutional symptoms    In the ED:  Afebrile, hemodynamically stable  Strong pulses w/ bedside doppler    (07 Dec 2022 09:23)    Patient admits to  (-) Fevers, (-) Chills, (-) Nausea, (-) Vomiting, (-) Shortness of Breath (-) calf pain (-) chest pain     Medications acetaminophen     Tablet .. 650 milliGRAM(s) Oral every 6 hours PRN  bisacodyl 5 milliGRAM(s) Oral every 12 hours PRN  chlorhexidine 2% Cloths 1 Application(s) Topical <User Schedule>  clotrimazole 1% Cream 1 Application(s) Topical two times a day  influenza  Vaccine (HIGH DOSE) 0.7 milliLiter(s) IntraMuscular once  insulin lispro (ADMELOG) corrective regimen sliding scale   SubCutaneous three times a day before meals  midodrine. 5 milliGRAM(s) Oral <User Schedule>  pantoprazole    Tablet 40 milliGRAM(s) Oral before breakfast  polyethylene glycol 3350 17 Gram(s) Oral daily  senna 2 Tablet(s) Oral at bedtime  sevelamer carbonate 1600 milliGRAM(s) Oral three times a day with meals    FHNo pertinent family history in first degree relatives    ,   PMHESRD on dialysis    DM (diabetes mellitus)    PAD (peripheral artery disease)       PSHNo significant past surgical history        Labs                          10.0   7.84  )-----------( 219      ( 12 Dec 2022 05:58 )             32.7      12-12    136  |  102  |  75<H>  ----------------------------<  200<H>  4.4   |  23  |  6.61<H>    Ca    9.2      12 Dec 2022 05:58  Phos  4.9     12-12       Vital Signs Last 24 Hrs  T(C): 37.1 (13 Dec 2022 14:23), Max: 37.3 (12 Dec 2022 21:01)  T(F): 98.8 (13 Dec 2022 14:23), Max: 99.1 (12 Dec 2022 21:01)  HR: 72 (13 Dec 2022 14:23) (72 - 88)  BP: 129/64 (13 Dec 2022 14:23) (105/55 - 129/64)  BP(mean): --  RR: 18 (13 Dec 2022 14:23) (18 - 19)  SpO2: 100% (13 Dec 2022 14:23) (99% - 100%)    Parameters below as of 13 Dec 2022 14:23  Patient On (Oxygen Delivery Method): room air      Sedimentation Rate, Erythrocyte: 56 mm/Hr (11-08-22 @ 23:00)  Sedimentation Rate, Erythrocyte: 19 mm/Hr (09-26-22 @ 19:00)         C-Reactive Protein, Serum: 10 mg/L (11-08-22 @ 23:00)  C-Reactive Protein, Serum: 9 mg/L (09-26-22 @ 19:00)         ROS  REVIEW OF SYSTEMS: unremarkable outside of PE    PHYSICAL EXAM  LE Focused:  left  Vasc:  DP and PT non palpable. TG: warm to cold. CFT = 5 secs x 5. Pedal hair absent. No varicosities or edema  Derm: trophic skin changes at left foot and leg, skin hyperpigmentation of the left digits 1-5. No IDM. Xerosis at left plantar digits 1-5. Nail is well adhered, subungual hematoma at left hallux 2mm x 4mm. No open wounds/ fluctuance/ erythema/ edema/ pain/ soft tissue crepitus  Neuro: protective sensation intact  MSK: s/p healed right BKA, patient is bedbound      IMAGING:   < from: Xray Foot AP + Lateral + Oblique, Right (11.08.22 @ 19:08) >  ACC: [id number] EXAM:  XR FOOT COMP MIN 3 VIEWS RT                          PROCEDURE DATE:  11/08/2022          INTERPRETATION:  XR FOOT 3 VIEWS RIGHT    Clinical History: Dry gangrene first second and third toes. Right heel   ulcer    Right foot 3 views with comparison to 9/26/2022      FINDINGS:    There is no fracture, dislocation or joint effusion.  No degenerative changes.  Moderate atherosclerotic changes.  No radiopaque foreign body.    IMPRESSION:  1.  Swelling with soft tissue ulcer posterior to calcaneus.  2.  No plain film evidence of osteomyelitis.  3.  Obtain MRI as warranted clinically.    --- End of Report ---      < end of copied text >        A:  Left hallucal subungual hematoma, PAD    P:   Patient evaluated and Chart reviewed, no leukocytosis, vitals stable. No clinical infection on physical exam  Discussed diagnosis and treatment with patient  X-ray results reviewed as above. There is no heel ulcer on clinical exam.   Previous JESUS/PVR reviewed. Vascular recs appreciated  Recommend heel offloading with pillows to prevent decubitus injury  Podiatry to follow while in house  Discussed with Attending Dr. Gusman

## 2022-12-13 NOTE — PROGRESS NOTE ADULT - SUBJECTIVE AND OBJECTIVE BOX
Patient is a 72y old  Male who presents with a chief complaint of left toe discoloration (13 Dec 2022 12:22)    INTERVAL HPI/OVERNIGHT EVENTS: -230ml.   Pt seen and examined. Pt awake/alert, in bed, endorses anxious to be discharged.   Pt denies abdominal discomfort, left foot discomfort.     MEDICATIONS  (STANDING):  chlorhexidine 2% Cloths 1 Application(s) Topical <User Schedule>  clotrimazole 1% Cream 1 Application(s) Topical two times a day  influenza  Vaccine (HIGH DOSE) 0.7 milliLiter(s) IntraMuscular once  insulin lispro (ADMELOG) corrective regimen sliding scale   SubCutaneous three times a day before meals  midodrine. 5 milliGRAM(s) Oral <User Schedule>  pantoprazole    Tablet 40 milliGRAM(s) Oral before breakfast  polyethylene glycol 3350 17 Gram(s) Oral daily  senna 2 Tablet(s) Oral at bedtime  sevelamer carbonate 1600 milliGRAM(s) Oral three times a day with meals    MEDICATIONS  (PRN):  acetaminophen     Tablet .. 650 milliGRAM(s) Oral every 6 hours PRN Temp greater or equal to 38C (100.4F), Mild Pain (1 - 3)  bisacodyl 5 milliGRAM(s) Oral every 12 hours PRN Constipation      __________________________________________________  REVIEW OF SYSTEMS:    CONSTITUTIONAL: No fever,   EYES: no acute visual disturbances  NECK: No pain or stiffness  RESPIRATORY: No cough; No shortness of breath  CARDIOVASCULAR: No chest pain, no palpitations  GASTROINTESTINAL: No pain. No nausea or vomiting; No diarrhea   NEUROLOGICAL: No headache or numbness, no tremors  MUSCULOSKELETAL: No joint pain, no muscle pain  GENITOURINARY: no dysuria, no frequency, no hesitancy  PSYCHIATRY: no depression , no anxiety  ALL OTHER  ROS negative        Vital Signs Last 24 Hrs  T(C): 37.1 (13 Dec 2022 14:23), Max: 37.3 (12 Dec 2022 21:01)  T(F): 98.8 (13 Dec 2022 14:23), Max: 99.1 (12 Dec 2022 21:01)  HR: 72 (13 Dec 2022 14:23) (72 - 88)  BP: 129/64 (13 Dec 2022 14:23) (105/55 - 129/64)  BP(mean): --  RR: 18 (13 Dec 2022 14:23) (18 - 19)  SpO2: 100% (13 Dec 2022 14:23) (99% - 100%)    Parameters below as of 13 Dec 2022 14:23  Patient On (Oxygen Delivery Method): room air  ________________________________________________  PHYSICAL EXAM:  GENERAL: NAD, cachectic  HEENT: Normocephalic;  conjunctivae and sclerae clear; moist mucous membranes;   NECK : supple  CHEST/LUNG: dec breath sounds at bases  HEART: S1 S2  regular; no murmurs, gallops or rubs  ABDOMEN: Soft, Nontender, Nondistended; Bowel sounds present  EXTREMITIES: rt bka+, left toe discoloration+  SKIN: warm and dry; no rash  NERVOUS SYSTEM:  Awake and alert; no new deficits    _________________________________________________  LABS:                        10.0   7.84  )-----------( 219      ( 12 Dec 2022 05:58 )             32.7     12-12    136  |  102  |  75<H>  ----------------------------<  200<H>  4.4   |  23  |  6.61<H>    Ca    9.2      12 Dec 2022 05:58  Phos  4.9     12-12          CAPILLARY BLOOD GLUCOSE      POCT Blood Glucose.: 147 mg/dL (13 Dec 2022 16:39)  POCT Blood Glucose.: 224 mg/dL (13 Dec 2022 11:46)  POCT Blood Glucose.: 178 mg/dL (13 Dec 2022 07:48)  POCT Blood Glucose.: 218 mg/dL (12 Dec 2022 21:24)        RADIOLOGY & ADDITIONAL TESTS:    < from: CT Abdomen and Pelvis w/wo IV Cont (12.10.22 @ 11:39) >  PROCEDURE DATE:  12/10/2022          INTERPRETATION:  CLINICAL INFORMATION: please do CT urogram Admitting   Dxs: I73.9 PERIPHERAL VASCULAR DISEASE, UNSPECIFIED HCT    ADDITIONAL CLINICAL INFORMATION: Other, Non-specified    COMPARISON: 9.27.22    IV Contrast: Omnipaque 350  90 cc administered   10 cc discarded  Oral Contrast: NONE  Complications: None reported at time of study completion    PROCEDURE:  CT of the Abdomenand Pelvis was performed.  Precontrast, Nephrographic and Excretory phases were acquired (CT   UROGRAM).  10 mg of Lasix was administered.  Sagittal and coronal reformats were performed.    FINDINGS:    LOWER CHEST: Within normal limits.    LIVER: Within normal limits.  BILE DUCTS: Normal caliber.  GALLBLADDER: Within normal limits.  SPLEEN: Within normal limits.  PANCREAS: Within normal limits.  ADRENALS: Within normal limits.  KIDNEYS/URETERS: Atrophic kidneys, greater on the right. A few punctate   calculi. Left lower pole parenchymal calcification. Bilateral cysts are   noted. There is mild right and moderate left hydronephrosis. Suboptimal   excretion of contrast into the collecting systems limits evaluation.    BLADDER: Garcia catheter in place. Mild debris is noted in the bladder.  REPRODUCTIVE ORGANS: Within normal limits.    BOWEL: No bowel obstruction. The appendix is normal.  Large amount of   colonic stool. The rectum is distended by stool.  PERITONEUM: No ascites.  VESSELS:  Within normal limits.  RETROPERITONEUM/LYMPH NODES: No lymphadenopathy.  ABDOMINAL WALL: Within normal limits.  BONES: Within normal limits.    IMPRESSION: Moderate left and mild right hydronephrosis.    Limited evaluation for urothelial lesion. Mild debris in the bladder.    A few punctate renal calculi.    Large colonic stool burden.    < end of copied text >      Consultant(s) Notes Reviewed:   YES    Care Discussed with Consultants :     Plan of care was discussed with patient and /or primary care giver; all questions and concerns were addressed and care was aligned with patient's wishes.

## 2022-12-13 NOTE — DIETITIAN INITIAL EVALUATION ADULT - DIET TYPE
consistent carbohydrate (evening snack)/renal replacement pts:no protein restr,no conc K & phos, low sodium/supplement (specify)

## 2022-12-13 NOTE — PROGRESS NOTE ADULT - SUBJECTIVE AND OBJECTIVE BOX
NP Note discussed with primary attending.     Patient is a 72y old  Male who presents with a chief complaint of left toe discoloration (13 Dec 2022 12:22)    INTERVAL HPI/OVERNIGHT EVENTS: -230ml.   Pt seen and examined. Pt awake/alert, in bed, endorses anxious to be discharged.   Pt denies abdominal discomfort, left foot discomfort.     MEDICATIONS  (STANDING):  chlorhexidine 2% Cloths 1 Application(s) Topical <User Schedule>  clotrimazole 1% Cream 1 Application(s) Topical two times a day  influenza  Vaccine (HIGH DOSE) 0.7 milliLiter(s) IntraMuscular once  insulin lispro (ADMELOG) corrective regimen sliding scale   SubCutaneous three times a day before meals  midodrine. 5 milliGRAM(s) Oral <User Schedule>  pantoprazole    Tablet 40 milliGRAM(s) Oral before breakfast  polyethylene glycol 3350 17 Gram(s) Oral daily  senna 2 Tablet(s) Oral at bedtime  sevelamer carbonate 1600 milliGRAM(s) Oral three times a day with meals    MEDICATIONS  (PRN):  acetaminophen     Tablet .. 650 milliGRAM(s) Oral every 6 hours PRN Temp greater or equal to 38C (100.4F), Mild Pain (1 - 3)  bisacodyl 5 milliGRAM(s) Oral every 12 hours PRN Constipation      __________________________________________________  REVIEW OF SYSTEMS:    CONSTITUTIONAL: No fever,   EYES: no acute visual disturbances  NECK: No pain or stiffness  RESPIRATORY: No cough; No shortness of breath  CARDIOVASCULAR: No chest pain, no palpitations  GASTROINTESTINAL: No pain. No nausea or vomiting; No diarrhea   NEUROLOGICAL: No headache or numbness, no tremors  MUSCULOSKELETAL: No joint pain, no muscle pain  GENITOURINARY: no dysuria, no frequency, no hesitancy  PSYCHIATRY: no depression , no anxiety  ALL OTHER  ROS negative        Vital Signs Last 24 Hrs  T(C): 37.1 (13 Dec 2022 14:23), Max: 37.3 (12 Dec 2022 21:01)  T(F): 98.8 (13 Dec 2022 14:23), Max: 99.1 (12 Dec 2022 21:01)  HR: 72 (13 Dec 2022 14:23) (72 - 88)  BP: 129/64 (13 Dec 2022 14:23) (105/55 - 129/64)  BP(mean): --  RR: 18 (13 Dec 2022 14:23) (18 - 19)  SpO2: 100% (13 Dec 2022 14:23) (99% - 100%)    Parameters below as of 13 Dec 2022 14:23  Patient On (Oxygen Delivery Method): room air  ________________________________________________  PHYSICAL EXAM:  GENERAL: NAD, cachectic  HEENT: Normocephalic;  conjunctivae and sclerae clear; moist mucous membranes;   NECK : supple  CHEST/LUNG: Clear to auscultation bilaterally with good air entry   HEART: S1 S2  regular; no murmurs, gallops or rubs  ABDOMEN: Soft, Nontender, Nondistended; Bowel sounds present  EXTREMITIES: Muscle wasting. no cyanosis; no edema; no calf tenderness  SKIN: warm and dry; no rash  NERVOUS SYSTEM:  Awake and alert; no new deficits    _________________________________________________  LABS:                        10.0   7.84  )-----------( 219      ( 12 Dec 2022 05:58 )             32.7     12-12    136  |  102  |  75<H>  ----------------------------<  200<H>  4.4   |  23  |  6.61<H>    Ca    9.2      12 Dec 2022 05:58  Phos  4.9     12-12          CAPILLARY BLOOD GLUCOSE      POCT Blood Glucose.: 147 mg/dL (13 Dec 2022 16:39)  POCT Blood Glucose.: 224 mg/dL (13 Dec 2022 11:46)  POCT Blood Glucose.: 178 mg/dL (13 Dec 2022 07:48)  POCT Blood Glucose.: 218 mg/dL (12 Dec 2022 21:24)        RADIOLOGY & ADDITIONAL TESTS:    < from: CT Abdomen and Pelvis w/wo IV Cont (12.10.22 @ 11:39) >  PROCEDURE DATE:  12/10/2022          INTERPRETATION:  CLINICAL INFORMATION: please do CT urogram Admitting   Dxs: I73.9 PERIPHERAL VASCULAR DISEASE, UNSPECIFIED HCT    ADDITIONAL CLINICAL INFORMATION: Other, Non-specified    COMPARISON: 9.27.22    IV Contrast: Omnipaque 350  90 cc administered   10 cc discarded  Oral Contrast: NONE  Complications: None reported at time of study completion    PROCEDURE:  CT of the Abdomenand Pelvis was performed.  Precontrast, Nephrographic and Excretory phases were acquired (CT   UROGRAM).  10 mg of Lasix was administered.  Sagittal and coronal reformats were performed.    FINDINGS:    LOWER CHEST: Within normal limits.    LIVER: Within normal limits.  BILE DUCTS: Normal caliber.  GALLBLADDER: Within normal limits.  SPLEEN: Within normal limits.  PANCREAS: Within normal limits.  ADRENALS: Within normal limits.  KIDNEYS/URETERS: Atrophic kidneys, greater on the right. A few punctate   calculi. Left lower pole parenchymal calcification. Bilateral cysts are   noted. There is mild right and moderate left hydronephrosis. Suboptimal   excretion of contrast into the collecting systems limits evaluation.    BLADDER: Garcia catheter in place. Mild debris is noted in the bladder.  REPRODUCTIVE ORGANS: Within normal limits.    BOWEL: No bowel obstruction. The appendix is normal.  Large amount of   colonic stool. The rectum is distended by stool.  PERITONEUM: No ascites.  VESSELS:  Within normal limits.  RETROPERITONEUM/LYMPH NODES: No lymphadenopathy.  ABDOMINAL WALL: Within normal limits.  BONES: Within normal limits.    IMPRESSION: Moderate left and mild right hydronephrosis.    Limited evaluation for urothelial lesion. Mild debris in the bladder.    A few punctate renal calculi.    Large colonic stool burden.    < end of copied text >      Consultant(s) Notes Reviewed:   YES    Care Discussed with Consultants :     Plan of care was discussed with patient and /or primary care giver; all questions and concerns were addressed and care was aligned with patient's wishes.

## 2022-12-13 NOTE — DIETITIAN INITIAL EVALUATION ADULT - PERTINENT MEDS FT
MEDICATIONS  (STANDING):  chlorhexidine 2% Cloths 1 Application(s) Topical <User Schedule>  clotrimazole 1% Cream 1 Application(s) Topical two times a day  influenza  Vaccine (HIGH DOSE) 0.7 milliLiter(s) IntraMuscular once  insulin lispro (ADMELOG) corrective regimen sliding scale   SubCutaneous three times a day before meals  midodrine. 5 milliGRAM(s) Oral <User Schedule>  oxybutynin 5 milliGRAM(s) Oral three times a day  pantoprazole    Tablet 40 milliGRAM(s) Oral before breakfast  polyethylene glycol 3350 17 Gram(s) Oral daily  senna 2 Tablet(s) Oral at bedtime  sevelamer carbonate 1600 milliGRAM(s) Oral three times a day with meals    MEDICATIONS  (PRN):  acetaminophen     Tablet .. 650 milliGRAM(s) Oral every 6 hours PRN Temp greater or equal to 38C (100.4F), Mild Pain (1 - 3)  bisacodyl 5 milliGRAM(s) Oral every 12 hours PRN Constipation

## 2022-12-13 NOTE — PROGRESS NOTE ADULT - PROBLEM SELECTOR PROBLEM 6
Discharge planning issues
Discharge planning issues
Painful hematuria
Discharge planning issues
Painful hematuria
Discharge planning issues

## 2022-12-13 NOTE — DIETITIAN INITIAL EVALUATION ADULT - PERTINENT LABORATORY DATA
12-12    136  |  102  |  75<H>  ----------------------------<  200<H>  4.4   |  23  |  6.61<H>    Ca    9.2      12 Dec 2022 05:58  Phos  4.9     12-12    POCT Blood Glucose.: 178 mg/dL (12-13-22 @ 07:48)  A1C with Estimated Average Glucose Result: 6.2 % (09-26-22 @ 19:00)

## 2022-12-13 NOTE — DISCHARGE NOTE NURSING/CASE MANAGEMENT/SOCIAL WORK - PATIENT PORTAL LINK FT
You can access the FollowMyHealth Patient Portal offered by [facility] by registering at the following website: [email or web address]. By joining [facility]’s FollowMyHealth portal, you will also be able to view your health information using other applications (apps) compatible with our system.

## 2022-12-13 NOTE — PROGRESS NOTE ADULT - PROVIDER SPECIALTY LIST ADULT
Internal Medicine
Nephrology
Internal Medicine
Nephrology
Nephrology
Podiatry
Nephrology
Podiatry
Surgery
Urology
Internal Medicine
Urology
Urology
Internal Medicine

## 2022-12-13 NOTE — PROGRESS NOTE ADULT - PROBLEM SELECTOR PROBLEM 5
Discharge planning issues
DVT prophylaxis
Discharge planning issues
DVT prophylaxis

## 2022-12-13 NOTE — PROGRESS NOTE ADULT - ASSESSMENT
72 year old male, coming from [facility], with medical history of ESRD (T-TH-S),  DM, PVD, gangrene right foot, (s/p R BKA 11/9) coming to ED c/o L toe discoloration. Admitted for further management. vascular recc: no interventions, podiatry rec xray left foot, and ID consulted no abx. nephro following for HD. Hospital course c/b hematuria,  hgb stable, urology consulted.

## 2022-12-13 NOTE — PROGRESS NOTE ADULT - PROBLEM SELECTOR PLAN 1
p/w left great toe discoloration  hx of severe PVD, s/p R BKA 11/9  [facility] JESUS 1.43  Resume ASA and plavix at discharge.   xray left foot reviewed by Podiatry. Monitor foot for gangrenous changes and capillary refill.   Outpt f/u with Vascular if dusky.   Vascular and Podiatry following  ID Dr. Toni Iqbal.

## 2022-12-13 NOTE — PROGRESS NOTE ADULT - SUBJECTIVE AND OBJECTIVE BOX
Subjective  Garcia removed overnight. Patient did not void overnight and had AM bladder scan near 200 cc. Patient not uncomfortable.    Objective    Vital signs  T(F): , Max: 99.1 (12-12-22 @ 21:01)  HR: 88 (12-13-22 @ 06:04)  BP: 105/55 (12-13-22 @ 06:04)  SpO2: 99% (12-13-22 @ 06:04)  Wt(kg): --    Output     OUT:    Indwelling Catheter - Urethral (mL): 200 mL  Total OUT: 200 mL    Total NET: -200 mL          Gen: NAD  Abd: soft, nontender, nondistended  : Garcia removed yesterday    Labs      12-12 @ 05:58    WBC 7.84  / Hct 32.7  / SCr 6.61

## 2022-12-13 NOTE — DISCHARGE NOTE NURSING/CASE MANAGEMENT/SOCIAL WORK - NSDCPEFALRISK_GEN_ALL_CORE
For information on Fall & Injury Prevention, visit: [email or web address] OR  [email or web address] OR  https://www.cdc.gov/steadi/patient.html

## 2022-12-13 NOTE — PROGRESS NOTE ADULT - ASSESSMENT
72 year old male, coming from [facility], with medical history of ESRD (T-TH-S),  DM, PVD, gangrene right foot, (s/p R BKA 11/9) coming to ED c/o L toe discoloration. Admitted for further management. vascular recc: no interventions, podiatry rec xray left foot, and ID consulted no abx. nephro following for HD. Hospital course c/b hematuria,  hgb stable, urology consulted.

## 2022-12-13 NOTE — PROGRESS NOTE ADULT - PROBLEM SELECTOR PLAN 2
s/p HD 12/13  Continue Midodrine 5mg predialysis on HD days.   c/w Sevelamer   F/u outpt Dr. Dutta- Left AVF no thrill/Bruit  Monitor serum calcium and phosphorus- nephro recc  Dr. Dumont Nephro.

## 2022-12-13 NOTE — DIETITIAN INITIAL EVALUATION ADULT - OTHER INFO
Pt Visited. Pt is alert and verbal. Pt is  from NH. Pt reports Fair appetite. Food choices Obtained. No chewing or swallowing difficulty Reported. Per  Pt NKFA. No chewing or swallowing Difficulty Reported. Pt Reports H/O HD x ~ 2 Years , H/O DM x long time. Per Pt HT 6 feet 2 inches,  lb ( unable recall time frame). Bed scale 138 lb. S/P L BKA . Pt was recently Admitted in NOV. Offered Nutritional supplement agreed to try. Pt is also forgetful. Pt stated he is from Home.  D/W NSG. Labs noted

## 2022-12-13 NOTE — CHART NOTE - NSCHARTNOTEFT_GEN_A_CORE
EVENT:  72 year old male, coming from [facility], with medical history of ESRD (T-TH-S),  DM, PVD (s/p L BKA 11/9) coming to ED c/o R toe discoloration.     SUBJECTIVE:  Patient noted with bloody urine and clots-as seen in the urinal  Alert and oriented, in no acute distress    OBJECTIVE:  Vital Signs Last 24 Hrs  T(C): 36.7 (09 Dec 2022 04:49), Max: 36.8 (08 Dec 2022 21:08)  T(F): 98 (09 Dec 2022 04:49), Max: 98.3 (08 Dec 2022 21:08)  HR: 91 (09 Dec 2022 04:49) (75 - 91)  BP: 105/55 (09 Dec 2022 04:49) (105/55 - 128/73)  BP(mean): --  RR: 16 (09 Dec 2022 04:49) (16 - 16)  SpO2: 100% (09 Dec 2022 04:49) (99% - 100%)    Parameters below as of 09 Dec 2022 04:49  Patient On (Oxygen Delivery Method): room air    LABS:                        11.1   6.01  )-----------( 233      ( 08 Dec 2022 09:25 )             35.7     12-08    139  |  105  |  78<H>  ----------------------------<  199<H>  3.9   |  21<L>  |  5.75<H>    Ca    9.4      08 Dec 2022 09:25  Phos  5.9     12-08  Mg     2.4     12-08    T Pro  6.6  /  Alb  2.6<L>  /  T Bili  0.3  /  D Bili  x   /  AST  53<H>  /  ALT  21  /  Alk Phos  66  12-07    A/P:  Hematuria  -Bloody urine with clots noted in urinal  -Surgery PA called and made aware  -Bladder scan about 142ml  -Follow up Urology this morning
EVENT: Bladder scan 200 ml, no voiding overnight    Vital Signs Last 24 Hrs  T(C): 36.9 (13 Dec 2022 06:04), Max: 37.3 (12 Dec 2022 21:01)  T(F): 98.5 (13 Dec 2022 06:04), Max: 99.1 (12 Dec 2022 21:01)  HR: 88 (13 Dec 2022 06:04) (85 - 95)  BP: 105/55 (13 Dec 2022 06:04) (105/55 - 118/55)  BP(mean): --  RR: 18 (13 Dec 2022 06:04) (18 - 22)  SpO2: 99% (13 Dec 2022 06:04) (98% - 100%)    Parameters below as of 13 Dec 2022 06:04  Patient On (Oxygen Delivery Method): room air    1. Hold oxybutynin 5 milliGRAM(s) Oral three times a day  2. Repeat bladder scan at 10 am

## 2022-12-13 NOTE — PROGRESS NOTE ADULT - PROBLEM SELECTOR PLAN 5
SCD 2/2 hematuria
Discharge to [facility] postponed til tomorrow. Pick/up at 12pm .   CM following.
SCDs 2/2 hematuria
SCD 2/2 hematuria
SCDs 2/2 hematuria
SCDs 2/2 hematuria
Discharge to [facility] postponed til tomorrow. Pick/up at 12pm .   CM following.
SCDs 2/2 hematuria

## 2022-12-13 NOTE — PROGRESS NOTE ADULT - SUBJECTIVE AND OBJECTIVE BOX
[facility] NEPHROLOGY- PROGRESS NOTE    Patient is a 73yo Male with ESRD on HD, failed Left AVF x2, DM, recently admitted with  Rt foot gangrene s/p Rt BKA presents with left toe discoloration. Nephrology consulted for ESRD status.  Hosp cb hematuria     Hospital Medications: Medications reviewed.  REVIEW OF SYSTEMS:  CONSTITUTIONAL: No fevers or chills  RESPIRATORY: No shortness of breath  CARDIOVASCULAR: No chest pain.  GASTROINTESTINAL: No nausea, vomiting, diarrhea or abdominal pain.   : no further gross hematuria  VASCULAR: No left lower extremity edema.       VITALS:  T(F): 98.5 (12-13-22 @ 06:04), Max: 99.1 (12-12-22 @ 21:01)  HR: 74 (12-13-22 @ 12:00)  BP: 111/63 (12-13-22 @ 12:00)  RR: 18 (12-13-22 @ 06:04)  SpO2: 99% (12-13-22 @ 06:04)  Wt(kg): --    12-12 @ 07:01  -  12-13 @ 07:00  --------------------------------------------------------  IN: 500 mL / OUT: 1200 mL / NET: -700 mL          PHYSICAL EXAM:  Gen: NAD, calm  HEENT: anicteric  Cards: RRR, +S1/S2, no M/G/R  Resp: CTA B/L  GI: soft, NT/ND, NABS  : +distended bladder; pt feels like urinating  Extremities: no LLE edema  Derm: s/p Rt BKA   Access: Rt IJ tunneled HD catheter- benign  Left upper arm AVF- no thrill no bruit      LABS:  12-12    136  |  102  |  75<H>  ----------------------------<  200<H>  4.4   |  23  |  6.61<H>    Ca    9.2      12 Dec 2022 05:58  Phos  4.9     12-12      Creatinine Trend: 6.61 <--, 5.65 <--, 5.69 <--, 4.51 <--, 5.75 <--, 4.66 <--, 4.61 <--                        10.0   7.84  )-----------( 219      ( 12 Dec 2022 05:58 )             32.7     Urine Studies:

## 2022-12-14 VITALS
TEMPERATURE: 98 F | HEART RATE: 74 BPM | OXYGEN SATURATION: 99 % | DIASTOLIC BLOOD PRESSURE: 66 MMHG | RESPIRATION RATE: 18 BRPM | SYSTOLIC BLOOD PRESSURE: 136 MMHG

## 2022-12-14 LAB — GLUCOSE BLDC GLUCOMTR-MCNC: 188 MG/DL — HIGH (ref 70–99)

## 2022-12-14 PROCEDURE — 86901 BLOOD TYPING SEROLOGIC RH(D): CPT

## 2022-12-14 PROCEDURE — 82962 GLUCOSE BLOOD TEST: CPT

## 2022-12-14 PROCEDURE — 85730 THROMBOPLASTIN TIME PARTIAL: CPT

## 2022-12-14 PROCEDURE — 87641 MR-STAPH DNA AMP PROBE: CPT

## 2022-12-14 PROCEDURE — 86850 RBC ANTIBODY SCREEN: CPT

## 2022-12-14 PROCEDURE — 80048 BASIC METABOLIC PNL TOTAL CA: CPT

## 2022-12-14 PROCEDURE — 36415 COLL VENOUS BLD VENIPUNCTURE: CPT

## 2022-12-14 PROCEDURE — 87340 HEPATITIS B SURFACE AG IA: CPT

## 2022-12-14 PROCEDURE — 87637 SARSCOV2&INF A&B&RSV AMP PRB: CPT

## 2022-12-14 PROCEDURE — 93005 ELECTROCARDIOGRAM TRACING: CPT

## 2022-12-14 PROCEDURE — 84100 ASSAY OF PHOSPHORUS: CPT

## 2022-12-14 PROCEDURE — 87635 SARS-COV-2 COVID-19 AMP PRB: CPT

## 2022-12-14 PROCEDURE — 99285 EMERGENCY DEPT VISIT HI MDM: CPT

## 2022-12-14 PROCEDURE — 80053 COMPREHEN METABOLIC PANEL: CPT

## 2022-12-14 PROCEDURE — 99261: CPT

## 2022-12-14 PROCEDURE — 73630 X-RAY EXAM OF FOOT: CPT

## 2022-12-14 PROCEDURE — 85025 COMPLETE CBC W/AUTO DIFF WBC: CPT

## 2022-12-14 PROCEDURE — 85027 COMPLETE CBC AUTOMATED: CPT

## 2022-12-14 PROCEDURE — 97110 THERAPEUTIC EXERCISES: CPT

## 2022-12-14 PROCEDURE — 86900 BLOOD TYPING SEROLOGIC ABO: CPT

## 2022-12-14 PROCEDURE — 87640 STAPH A DNA AMP PROBE: CPT

## 2022-12-14 PROCEDURE — 74178 CT ABD&PLV WO CNTR FLWD CNTR: CPT

## 2022-12-14 PROCEDURE — 85610 PROTHROMBIN TIME: CPT

## 2022-12-14 PROCEDURE — 83735 ASSAY OF MAGNESIUM: CPT

## 2022-12-14 RX ADMIN — Medication 1: at 08:30

## 2022-12-14 RX ADMIN — SEVELAMER CARBONATE 1600 MILLIGRAM(S): 2400 POWDER, FOR SUSPENSION ORAL at 08:31

## 2022-12-14 RX ADMIN — MIDODRINE HYDROCHLORIDE 5 MILLIGRAM(S): 2.5 TABLET ORAL at 08:31

## 2022-12-14 RX ADMIN — Medication 1 APPLICATION(S): at 05:29

## 2022-12-14 RX ADMIN — PANTOPRAZOLE SODIUM 40 MILLIGRAM(S): 20 TABLET, DELAYED RELEASE ORAL at 06:44

## 2022-12-14 RX ADMIN — CHLORHEXIDINE GLUCONATE 1 APPLICATION(S): 213 SOLUTION TOPICAL at 05:29

## 2022-12-16 ENCOUNTER — EMERGENCY (EMERGENCY)
Facility: HOSPITAL | Age: 72
LOS: 1 days | Discharge: HOME | End: 2022-12-16
Attending: EMERGENCY MEDICINE
Payer: MEDICARE

## 2022-12-16 VITALS
WEIGHT: 149.91 LBS | SYSTOLIC BLOOD PRESSURE: 123 MMHG | HEIGHT: 72 IN | DIASTOLIC BLOOD PRESSURE: 77 MMHG | HEART RATE: 77 BPM | TEMPERATURE: 98 F | RESPIRATION RATE: 18 BRPM | OXYGEN SATURATION: 100 %

## 2022-12-16 VITALS
HEART RATE: 64 BPM | RESPIRATION RATE: 16 BRPM | DIASTOLIC BLOOD PRESSURE: 69 MMHG | SYSTOLIC BLOOD PRESSURE: 130 MMHG | OXYGEN SATURATION: 100 % | TEMPERATURE: 98 F

## 2022-12-16 LAB
APPEARANCE UR: ABNORMAL
BILIRUB UR-MCNC: NEGATIVE — SIGNIFICANT CHANGE UP
COLOR SPEC: ABNORMAL
DIFF PNL FLD: ABNORMAL
GLUCOSE UR QL: NEGATIVE — SIGNIFICANT CHANGE UP
KETONES UR-MCNC: NEGATIVE — SIGNIFICANT CHANGE UP
LEUKOCYTE ESTERASE UR-ACNC: ABNORMAL
NITRITE UR-MCNC: NEGATIVE — SIGNIFICANT CHANGE UP
PH UR: 7 — SIGNIFICANT CHANGE UP (ref 5–8)
PROT UR-MCNC: 500 MG/DL
SP GR SPEC: 1.01 — SIGNIFICANT CHANGE UP (ref 1.01–1.02)
UROBILINOGEN FLD QL: NEGATIVE — SIGNIFICANT CHANGE UP

## 2022-12-16 PROCEDURE — 87086 URINE CULTURE/COLONY COUNT: CPT

## 2022-12-16 PROCEDURE — 87186 SC STD MICRODIL/AGAR DIL: CPT

## 2022-12-16 PROCEDURE — 99283 EMERGENCY DEPT VISIT LOW MDM: CPT

## 2022-12-16 PROCEDURE — 81001 URINALYSIS AUTO W/SCOPE: CPT

## 2022-12-16 RX ORDER — LIDOCAINE 4 G/100G
1 CREAM TOPICAL ONCE
Refills: 0 | Status: COMPLETED | OUTPATIENT
Start: 2022-12-16 | End: 2022-12-16

## 2022-12-16 RX ORDER — LIDOCAINE 4 G/100G
1 CREAM TOPICAL
Qty: 21 | Refills: 0
Start: 2022-12-16 | End: 2022-12-22

## 2022-12-16 RX ADMIN — LIDOCAINE 1 APPLICATION(S): 4 CREAM TOPICAL at 06:50

## 2022-12-16 NOTE — ED PROVIDER NOTE - PROGRESS NOTE DETAILS
ua with blood but no uti. urinating without issue. no retention. advised outpatient urology f/u. given lido cream for penile laceration/pain. f/u urology. return precautions discussed.

## 2022-12-16 NOTE — ED PROVIDER NOTE - PATIENT PORTAL LINK FT
You can access the FollowMyHealth Patient Portal offered by [facility] by registering at the following website: [email or web address]. By joining [employer]’s FollowMyHealth portal, you will also be able to view your health information using other applications (apps) compatible with our system.

## 2022-12-16 NOTE — ED PROVIDER NOTE - OBJECTIVE STATEMENT
72 year old male PMH ESRD on HD, DM, PVD and right BKA coming in with hematuria. pt recently discharged from the hospital and while he was admitted was evaluated for hematuria- workup negative and was advised to f/u outpatient for cysto. states tonight with another episode of hematuria and pain by the tip of penis. denies all other complaints.

## 2022-12-16 NOTE — ED PROVIDER NOTE - NSFOLLOWUPINSTRUCTIONS_ED_ALL_ED_FT
[facility]                                                                                               Hematuria, Adult       Hematuria is blood in the urine. Blood may be visible in the urine, or it may be identified with a test. This condition can be caused by infections of the bladder, urethra, kidney, or prostate. Other possible causes include:  •Kidney stones.      •Cancer of the urinary tract.      •Too much calcium in the urine.      •Conditions that are passed from parent to child (inherited conditions).       •Exercise that requires a lot of energy.      Infections can usually be treated with medicine, and a kidney stone usually will pass through your urine. If neither of these is the cause of your hematuria, more tests may be needed to identify the cause of your symptoms.    It is very important to tell your health care provider about any blood in your urine, even if it is painless or the blood stops without treatment. Blood in the urine, when it happens and then stops and then happens again, can be a symptom of a very serious condition, including cancer. There is no pain in the initial stages of many urinary cancers.      Follow these instructions at home:    Medicines     •Take over-the-counter and prescription medicines only as told by your health care provider.      •If you were prescribed an antibiotic medicine, take it as told by your health care provider. Do not stop taking the antibiotic even if you start to feel better.      Eating and drinking     •Drink enough fluid to keep your urine pale yellow. It is recommended that you drink 3–4 quarts (2.8–3.8 L) a day. If you have been diagnosed with an infection, drinking cranberry juice in addition to large amounts of water is recommended.      •Avoid caffeine, tea, and carbonated beverages. These tend to irritate the bladder.      •Avoid alcohol because it may irritate the prostate (in males).      General instructions     •If you have been diagnosed with a kidney stone, follow your health care provider's instructions about straining your urine to catch the stone.      •Empty your bladder often. Avoid holding urine for long periods of time.    •If you are female:  •After a bowel movement, wipe from front to back and use each piece of toilet paper only once.      •Empty your bladder before and after sex.        •Pay attention to any changes in your symptoms. Tell your health care provider about any changes or any new symptoms.      •It is up to you to get the results of any tests. Ask your health care provider, or the department that is doing the test, when your results will be ready.      •Keep all follow-up visits. This is important.        Contact a health care provider if:    •You develop back pain.      •You have a fever or chills.      •You have nausea or vomiting.      •Your symptoms do not improve after 3 days.      •Your symptoms get worse.        Get help right away if:    •You develop severe vomiting and are unable to take medicine without vomiting.      •You develop severe pain in your back or abdomen even though you are taking medicine.      •You pass a large amount of blood in your urine.      •You pass blood clots in your urine.      •You feel very weak or like you might faint.      •You faint.        Summary    •Hematuria is blood in the urine. It has many possible causes.      •It is very important that you tell your health care provider about any blood in your urine, even if it is painless or the blood stops without treatment.      •Take over-the-counter and prescription medicines only as told by your health care provider.      •Drink enough fluid to keep your urine pale yellow.      This information is not intended to replace advice given to you by your health care provider. Make sure you discuss any questions you have with your health care provider.      Document Revised: 08/18/2021 Document Reviewed: 08/18/2021    Elsevier Patient Education © 2022 [employer]

## 2022-12-16 NOTE — ED ADULT NURSE NOTE - ED STAT RN HANDOFF DETAILS
No
Patient endorsed to RN Sheri for continuity of care. Awaiting for ambulette. Maintained fall/safety precautions.

## 2022-12-19 LAB
-  AMIKACIN: SIGNIFICANT CHANGE UP
-  AMOXICILLIN/CLAVULANIC ACID: SIGNIFICANT CHANGE UP
-  AMPICILLIN/SULBACTAM: SIGNIFICANT CHANGE UP
-  AMPICILLIN: SIGNIFICANT CHANGE UP
-  AZTREONAM: SIGNIFICANT CHANGE UP
-  CEFAZOLIN: SIGNIFICANT CHANGE UP
-  CEFEPIME: SIGNIFICANT CHANGE UP
-  CEFOXITIN: SIGNIFICANT CHANGE UP
-  CEFTRIAXONE: SIGNIFICANT CHANGE UP
-  CIPROFLOXACIN: SIGNIFICANT CHANGE UP
-  ERTAPENEM: SIGNIFICANT CHANGE UP
-  GENTAMICIN: SIGNIFICANT CHANGE UP
-  IMIPENEM: SIGNIFICANT CHANGE UP
-  LEVOFLOXACIN: SIGNIFICANT CHANGE UP
-  MEROPENEM: SIGNIFICANT CHANGE UP
-  NITROFURANTOIN: SIGNIFICANT CHANGE UP
-  PIPERACILLIN/TAZOBACTAM: SIGNIFICANT CHANGE UP
-  TOBRAMYCIN: SIGNIFICANT CHANGE UP
-  TRIMETHOPRIM/SULFAMETHOXAZOLE: SIGNIFICANT CHANGE UP
CULTURE RESULTS: SIGNIFICANT CHANGE UP
METHOD TYPE: SIGNIFICANT CHANGE UP
ORGANISM # SPEC MICROSCOPIC CNT: SIGNIFICANT CHANGE UP
ORGANISM # SPEC MICROSCOPIC CNT: SIGNIFICANT CHANGE UP
SPECIMEN SOURCE: SIGNIFICANT CHANGE UP

## 2022-12-23 ENCOUNTER — INPATIENT (INPATIENT)
Facility: HOSPITAL | Age: 72
LOS: 5 days | Discharge: NURSING, REHAB OR LONG-TERM CARE | DRG: 696 | End: 2022-12-29
Attending: INTERNAL MEDICINE | Admitting: INTERNAL MEDICINE
Payer: COMMERCIAL

## 2022-12-23 VITALS
HEART RATE: 92 BPM | HEIGHT: 72 IN | RESPIRATION RATE: 20 BRPM | DIASTOLIC BLOOD PRESSURE: 62 MMHG | TEMPERATURE: 98 F | SYSTOLIC BLOOD PRESSURE: 95 MMHG | WEIGHT: 128.97 LBS | OXYGEN SATURATION: 100 %

## 2022-12-23 DIAGNOSIS — E11.9 TYPE 2 DIABETES MELLITUS WITHOUT COMPLICATIONS: ICD-10-CM

## 2022-12-23 DIAGNOSIS — N18.6 END STAGE RENAL DISEASE: ICD-10-CM

## 2022-12-23 DIAGNOSIS — R31.9 HEMATURIA, UNSPECIFIED: ICD-10-CM

## 2022-12-23 DIAGNOSIS — R31.0 GROSS HEMATURIA: ICD-10-CM

## 2022-12-23 DIAGNOSIS — Z29.9 ENCOUNTER FOR PROPHYLACTIC MEASURES, UNSPECIFIED: ICD-10-CM

## 2022-12-23 DIAGNOSIS — Z89.511 ACQUIRED ABSENCE OF RIGHT LEG BELOW KNEE: Chronic | ICD-10-CM

## 2022-12-23 LAB
ALBUMIN SERPL ELPH-MCNC: 3.4 G/DL — LOW (ref 3.5–5)
ALP SERPL-CCNC: 70 U/L — SIGNIFICANT CHANGE UP (ref 40–120)
ALT FLD-CCNC: 17 U/L DA — SIGNIFICANT CHANGE UP (ref 10–60)
ANION GAP SERPL CALC-SCNC: 14 MMOL/L — SIGNIFICANT CHANGE UP (ref 5–17)
AST SERPL-CCNC: 14 U/L — SIGNIFICANT CHANGE UP (ref 10–40)
BASOPHILS # BLD AUTO: 0.05 K/UL — SIGNIFICANT CHANGE UP (ref 0–0.2)
BASOPHILS NFR BLD AUTO: 0.5 % — SIGNIFICANT CHANGE UP (ref 0–2)
BILIRUB SERPL-MCNC: 0.3 MG/DL — SIGNIFICANT CHANGE UP (ref 0.2–1.2)
BUN SERPL-MCNC: 56 MG/DL — HIGH (ref 7–18)
CALCIUM SERPL-MCNC: 9.4 MG/DL — SIGNIFICANT CHANGE UP (ref 8.4–10.5)
CHLORIDE SERPL-SCNC: 94 MMOL/L — LOW (ref 96–108)
CO2 SERPL-SCNC: 24 MMOL/L — SIGNIFICANT CHANGE UP (ref 22–31)
CREAT SERPL-MCNC: 4.78 MG/DL — HIGH (ref 0.5–1.3)
EGFR: 12 ML/MIN/1.73M2 — LOW
EOSINOPHIL # BLD AUTO: 0.04 K/UL — SIGNIFICANT CHANGE UP (ref 0–0.5)
EOSINOPHIL NFR BLD AUTO: 0.4 % — SIGNIFICANT CHANGE UP (ref 0–6)
GLUCOSE BLDC GLUCOMTR-MCNC: 188 MG/DL — HIGH (ref 70–99)
GLUCOSE SERPL-MCNC: 294 MG/DL — HIGH (ref 70–99)
HCT VFR BLD CALC: 31 % — LOW (ref 39–50)
HGB BLD-MCNC: 9.6 G/DL — LOW (ref 13–17)
IMM GRANULOCYTES NFR BLD AUTO: 0.5 % — SIGNIFICANT CHANGE UP (ref 0–0.9)
LYMPHOCYTES # BLD AUTO: 0.96 K/UL — LOW (ref 1–3.3)
LYMPHOCYTES # BLD AUTO: 9.1 % — LOW (ref 13–44)
MCHC RBC-ENTMCNC: 30.7 PG — SIGNIFICANT CHANGE UP (ref 27–34)
MCHC RBC-ENTMCNC: 31 GM/DL — LOW (ref 32–36)
MCV RBC AUTO: 99 FL — SIGNIFICANT CHANGE UP (ref 80–100)
MONOCYTES # BLD AUTO: 0.57 K/UL — SIGNIFICANT CHANGE UP (ref 0–0.9)
MONOCYTES NFR BLD AUTO: 5.4 % — SIGNIFICANT CHANGE UP (ref 2–14)
NEUTROPHILS # BLD AUTO: 8.91 K/UL — HIGH (ref 1.8–7.4)
NEUTROPHILS NFR BLD AUTO: 84.1 % — HIGH (ref 43–77)
NRBC # BLD: 0 /100 WBCS — SIGNIFICANT CHANGE UP (ref 0–0)
PLATELET # BLD AUTO: 346 K/UL — SIGNIFICANT CHANGE UP (ref 150–400)
POTASSIUM SERPL-MCNC: 3.9 MMOL/L — SIGNIFICANT CHANGE UP (ref 3.5–5.3)
POTASSIUM SERPL-SCNC: 3.9 MMOL/L — SIGNIFICANT CHANGE UP (ref 3.5–5.3)
PROT SERPL-MCNC: 7 G/DL — SIGNIFICANT CHANGE UP (ref 6–8.3)
RBC # BLD: 3.13 M/UL — LOW (ref 4.2–5.8)
RBC # FLD: 16 % — HIGH (ref 10.3–14.5)
SARS-COV-2 RNA SPEC QL NAA+PROBE: SIGNIFICANT CHANGE UP
SODIUM SERPL-SCNC: 132 MMOL/L — LOW (ref 135–145)
WBC # BLD: 10.58 K/UL — HIGH (ref 3.8–10.5)
WBC # FLD AUTO: 10.58 K/UL — HIGH (ref 3.8–10.5)

## 2022-12-23 PROCEDURE — 99222 1ST HOSP IP/OBS MODERATE 55: CPT | Mod: 25

## 2022-12-23 PROCEDURE — 99285 EMERGENCY DEPT VISIT HI MDM: CPT

## 2022-12-23 PROCEDURE — 51700 IRRIGATION OF BLADDER: CPT

## 2022-12-23 PROCEDURE — 93010 ELECTROCARDIOGRAM REPORT: CPT

## 2022-12-23 RX ORDER — ACETAMINOPHEN 500 MG
650 TABLET ORAL EVERY 6 HOURS
Refills: 0 | Status: DISCONTINUED | OUTPATIENT
Start: 2022-12-23 | End: 2022-12-29

## 2022-12-23 RX ORDER — INFLUENZA VIRUS VACCINE 15; 15; 15; 15 UG/.5ML; UG/.5ML; UG/.5ML; UG/.5ML
0.7 SUSPENSION INTRAMUSCULAR ONCE
Refills: 0 | Status: DISCONTINUED | OUTPATIENT
Start: 2022-12-23 | End: 2022-12-29

## 2022-12-23 RX ORDER — PANTOPRAZOLE SODIUM 20 MG/1
40 TABLET, DELAYED RELEASE ORAL
Refills: 0 | Status: DISCONTINUED | OUTPATIENT
Start: 2022-12-23 | End: 2022-12-29

## 2022-12-23 RX ORDER — POLYETHYLENE GLYCOL 3350 17 G/17G
17 POWDER, FOR SOLUTION ORAL DAILY
Refills: 0 | Status: DISCONTINUED | OUTPATIENT
Start: 2022-12-23 | End: 2022-12-29

## 2022-12-23 RX ORDER — LACTULOSE 10 G/15ML
10 SOLUTION ORAL DAILY
Refills: 0 | Status: DISCONTINUED | OUTPATIENT
Start: 2022-12-23 | End: 2022-12-29

## 2022-12-23 RX ORDER — REPAGLINIDE 1 MG/1
0.5 TABLET ORAL
Refills: 0 | Status: DISCONTINUED | OUTPATIENT
Start: 2022-12-23 | End: 2022-12-29

## 2022-12-23 RX ORDER — CHOLECALCIFEROL (VITAMIN D3) 125 MCG
1000 CAPSULE ORAL DAILY
Refills: 0 | Status: DISCONTINUED | OUTPATIENT
Start: 2022-12-23 | End: 2022-12-29

## 2022-12-23 RX ORDER — CEFTRIAXONE 500 MG/1
1000 INJECTION, POWDER, FOR SOLUTION INTRAMUSCULAR; INTRAVENOUS ONCE
Refills: 0 | Status: COMPLETED | OUTPATIENT
Start: 2022-12-23 | End: 2022-12-23

## 2022-12-23 RX ORDER — INSULIN LISPRO 100/ML
VIAL (ML) SUBCUTANEOUS
Refills: 0 | Status: DISCONTINUED | OUTPATIENT
Start: 2022-12-23 | End: 2022-12-29

## 2022-12-23 RX ORDER — SEVELAMER CARBONATE 2400 MG/1
800 POWDER, FOR SUSPENSION ORAL
Refills: 0 | Status: DISCONTINUED | OUTPATIENT
Start: 2022-12-23 | End: 2022-12-24

## 2022-12-23 RX ADMIN — Medication 650 MILLIGRAM(S): at 19:17

## 2022-12-23 RX ADMIN — Medication 650 MILLIGRAM(S): at 18:19

## 2022-12-23 RX ADMIN — CEFTRIAXONE 100 MILLIGRAM(S): 500 INJECTION, POWDER, FOR SOLUTION INTRAMUSCULAR; INTRAVENOUS at 16:09

## 2022-12-23 NOTE — H&P ADULT - PROBLEM SELECTOR PLAN 1
- patient admitted for hematuria  - Urology consulted started CBI, currently draining clear urine  - hgb currently 9.6 (baseline 10-11)  - hold chemical anticoagulation inculding Aspirin and Plavix  - SCDs for DVT ppx  - transfuse if hgb< 7  - keep active type and screen  - f/u UA and urine culture  - will hold abx once UA is sent   - Urology following - patient admitted for hematuria  - Urology consulted started CBI, currently draining clear urine  - hgb currently 9.6 (baseline 10-11)  - hold chemical anticoagulation inculding Aspirin and Plavix  - SCDs for DVT ppx  - transfuse if hgb< 7  - keep active type and screen  - f/u UA and urine culture  - s/p 1 dose of rocephin in the ED   - Urology following

## 2022-12-23 NOTE — ED ADULT NURSE NOTE - OBJECTIVE STATEMENT
Patient came to the ED BIBA for bloody urine with clots. Pt was sent from [facility], + pain, hematuria with clots. Patient came to the ED BIBA for bloody urine with clots. Pt was sent from [facility], + pain, hematuria with clots. Ot has a R chest PermCath for dialysis (T-Th-Sat).

## 2022-12-23 NOTE — ED PROVIDER NOTE - OBJECTIVE STATEMENT
Patient presents with gross hematuria has been having on and off for the past month.  Symptoms have been getting worse.  Patient is also complaining of pain in the groin area.  Patient transferred from nursing home for further evaluation.

## 2022-12-23 NOTE — H&P ADULT - NSHPREVIEWOFSYSTEMS_GEN_ALL_CORE
CONSTITUTIONAL: No changes to appetite or no weakness reported.  No fever, weight loss, or fatigue  EYES: No eye pain, visual disturbances, or discharge  ENT:  No difficulty hearing, tinnitus, vertigo; No sinus or throat pain  NECK: No pain or stiffness  RESPIRATORY: No cough, wheezing, chills or hemoptysis; No Shortness of Breath  CARDIOVASCULAR: No chest pain, palpitations, passing out, dizziness, or leg swelling  GASTROINTESTINAL: No abdominal or epigastric pain. No nausea, vomiting, or hematemesis; No diarrhea or constipation. No melena or hematochezia.  GENITOURINARY: No dysuria, frequency, hematuria, or incontinence  NEUROLOGICAL: No headaches, memory loss, loss of strength, numbness, or tremors  SKIN: No skin lesions   LYMPH Nodes: No enlarged glands  ENDOCRINE: No heat or cold intolerance; No hair loss  MUSCULOSKELETAL: No joint pain or swelling; No muscle, back, No extremity pain  PSYCHIATRIC: No depression, anxiety, mood swings, or difficulty sleeping  HEME/LYMPH: No easy bruising, or bleeding gums  ALLERGY AND IMMUNOLOGIC: No hives or eczema

## 2022-12-23 NOTE — H&P ADULT - ASSESSMENT
Patient is a 72 year old male, coming from [facility], with medical history of ESRD (T-TH-S),  DM, PVD, gangrene right foot, (s/p R BKA 11/9) coming to ED with complaints of hematuria. Upon evaluation in the ED, patient found to be afebrile and hemodynamically stable. Labs showing hgb of 9.6 (baseline 10-11) with a leukocytosis of 10.6. Urology consulted in ED, patient is s/p CBI. Patient is admitted to medicine for hematuria.

## 2022-12-23 NOTE — H&P ADULT - NSHPPHYSICALEXAM_GEN_ALL_CORE
PHYSICAL EXAM:  GENERAL: NAD, speaks in full sentences, no signs of respiratory distress, thin   HEAD:  Atraumatic, Normocephalic  EYES: EOMI, PERRLA, conjunctiva and sclera clear  NECK: Supple,  CHEST/LUNG: Clear to auscultation bilaterally; No wheeze; No crackles; No accessory muscles used  HEART: Regular rate and rhythm; No murmurs; gallops or rubs   ABDOMEN: Soft, Nontender, Nondistended; Bowel sounds present; No guarding  : CBI noted, Clear urine being drained   EXTREMITIES:  2+ Peripheral Pulses, No cyanosis or edema. Right BKA noted   PSYCH: AAOx3  NEUROLOGY: non-focal  SKIN: No rashes or lesions

## 2022-12-23 NOTE — PATIENT PROFILE ADULT - FALL HARM RISK - HARM RISK INTERVENTIONS
Assistance with ambulation/Assistance OOB with selected safe patient handling equipment/Communicate Risk of Fall with Harm to all staff/Discuss with provider need for PT consult/Monitor gait and stability/Reinforce activity limits and safety measures with patient and family/Tailored Fall Risk Interventions/Visual Cue: Yellow wristband and red socks/Bed in lowest position, wheels locked, appropriate side rails in place/Call bell, personal items and telephone in reach/Instruct patient to call for assistance before getting out of bed or chair/Non-slip footwear when patient is out of bed/[place] to call system/Physically safe environment - no spills, clutter or unnecessary equipment/Purposeful Proactive Rounding/Room/bathroom lighting operational, light cord in reach

## 2022-12-23 NOTE — H&P ADULT - HISTORY OF PRESENT ILLNESS
Patient is a 72 year old male, coming from [facility], with medical history of ESRD (T-TH-S),  DM, PVD, gangrene right foot, (s/p R BKA 11/9) coming to ED with complaints of hematuria. Patient states that hematuria is intermittent and has been ongoing for the last two months. Patient was recently discharged from [facility] and on that admission was found to have UTI caused by E. Coli. Patient states that he has dysuria denies increased urinary frequency or urgency. Patient denies groin pain or abdominal or flank pain., Denies changes in bowel habits. Denies trauma to the penile area. Denies fevers or chills. Denies chest pain, sob or palpitations. Denies syncope or dizziness. Patient reports that he has had three prior episodes in the past. As per med rec, patient is currently taking plavix and aspirin.

## 2022-12-23 NOTE — CONSULT NOTE ADULT - ATTENDING COMMENTS
72 year old male PMH ESRD on HD, DM, PVD and right BKA coming in with hematuria. Irrigated 100cc of old clot out and started on CBI.   - No acute urological intervention  - Recommend admission  - Urine Culture  - Abx  - U/A  - Nephrology consult for HD  - Trend CBC  - Trend Cr  - Will wean CBI   - Urology to follow

## 2022-12-23 NOTE — H&P ADULT - NSHPSOCIALHISTORY_GEN_ALL_CORE
Patient is from facility. Denies illicit drug use or ETOH use. Patient is current smoker, smokes 3 cigarettes for the last 40 years.

## 2022-12-24 LAB
ALBUMIN SERPL ELPH-MCNC: 3 G/DL — LOW (ref 3.5–5)
ALP SERPL-CCNC: 67 U/L — SIGNIFICANT CHANGE UP (ref 40–120)
ALT FLD-CCNC: 15 U/L DA — SIGNIFICANT CHANGE UP (ref 10–60)
ANION GAP SERPL CALC-SCNC: 12 MMOL/L — SIGNIFICANT CHANGE UP (ref 5–17)
AST SERPL-CCNC: 10 U/L — SIGNIFICANT CHANGE UP (ref 10–40)
BILIRUB SERPL-MCNC: 0.3 MG/DL — SIGNIFICANT CHANGE UP (ref 0.2–1.2)
BLD GP AB SCN SERPL QL: SIGNIFICANT CHANGE UP
BUN SERPL-MCNC: 65 MG/DL — HIGH (ref 7–18)
CALCIUM SERPL-MCNC: 9.2 MG/DL — SIGNIFICANT CHANGE UP (ref 8.4–10.5)
CHLORIDE SERPL-SCNC: 96 MMOL/L — SIGNIFICANT CHANGE UP (ref 96–108)
CO2 SERPL-SCNC: 26 MMOL/L — SIGNIFICANT CHANGE UP (ref 22–31)
CREAT SERPL-MCNC: 5.41 MG/DL — HIGH (ref 0.5–1.3)
EGFR: 10 ML/MIN/1.73M2 — LOW
GLUCOSE BLDC GLUCOMTR-MCNC: 121 MG/DL — HIGH (ref 70–99)
GLUCOSE BLDC GLUCOMTR-MCNC: 132 MG/DL — HIGH (ref 70–99)
GLUCOSE BLDC GLUCOMTR-MCNC: 232 MG/DL — HIGH (ref 70–99)
GLUCOSE BLDC GLUCOMTR-MCNC: 234 MG/DL — HIGH (ref 70–99)
GLUCOSE BLDC GLUCOMTR-MCNC: 248 MG/DL — HIGH (ref 70–99)
GLUCOSE SERPL-MCNC: 225 MG/DL — HIGH (ref 70–99)
HCT VFR BLD CALC: 26.9 % — LOW (ref 39–50)
HGB BLD-MCNC: 8.3 G/DL — LOW (ref 13–17)
MAGNESIUM SERPL-MCNC: 2.3 MG/DL — SIGNIFICANT CHANGE UP (ref 1.6–2.6)
MCHC RBC-ENTMCNC: 30.9 GM/DL — LOW (ref 32–36)
MCHC RBC-ENTMCNC: 30.9 PG — SIGNIFICANT CHANGE UP (ref 27–34)
MCV RBC AUTO: 100 FL — SIGNIFICANT CHANGE UP (ref 80–100)
NRBC # BLD: 0 /100 WBCS — SIGNIFICANT CHANGE UP (ref 0–0)
PHOSPHATE SERPL-MCNC: 4.5 MG/DL — SIGNIFICANT CHANGE UP (ref 2.5–4.5)
PLATELET # BLD AUTO: 323 K/UL — SIGNIFICANT CHANGE UP (ref 150–400)
POTASSIUM SERPL-MCNC: 4.1 MMOL/L — SIGNIFICANT CHANGE UP (ref 3.5–5.3)
POTASSIUM SERPL-SCNC: 4.1 MMOL/L — SIGNIFICANT CHANGE UP (ref 3.5–5.3)
PROT SERPL-MCNC: 6.7 G/DL — SIGNIFICANT CHANGE UP (ref 6–8.3)
RBC # BLD: 2.69 M/UL — LOW (ref 4.2–5.8)
RBC # FLD: 16.4 % — HIGH (ref 10.3–14.5)
SODIUM SERPL-SCNC: 134 MMOL/L — LOW (ref 135–145)
WBC # BLD: 10.39 K/UL — SIGNIFICANT CHANGE UP (ref 3.8–10.5)
WBC # FLD AUTO: 10.39 K/UL — SIGNIFICANT CHANGE UP (ref 3.8–10.5)

## 2022-12-24 PROCEDURE — 99232 SBSQ HOSP IP/OBS MODERATE 35: CPT

## 2022-12-24 RX ORDER — ERYTHROPOIETIN 10000 [IU]/ML
10000 INJECTION, SOLUTION INTRAVENOUS; SUBCUTANEOUS
Refills: 0 | Status: DISCONTINUED | OUTPATIENT
Start: 2022-12-24 | End: 2022-12-29

## 2022-12-24 RX ORDER — MIDODRINE HYDROCHLORIDE 2.5 MG/1
5 TABLET ORAL
Refills: 0 | Status: DISCONTINUED | OUTPATIENT
Start: 2022-12-24 | End: 2022-12-25

## 2022-12-24 RX ORDER — SEVELAMER CARBONATE 2400 MG/1
1600 POWDER, FOR SUSPENSION ORAL
Refills: 0 | Status: DISCONTINUED | OUTPATIENT
Start: 2022-12-24 | End: 2022-12-29

## 2022-12-24 RX ADMIN — SEVELAMER CARBONATE 800 MILLIGRAM(S): 2400 POWDER, FOR SUSPENSION ORAL at 08:19

## 2022-12-24 RX ADMIN — PANTOPRAZOLE SODIUM 40 MILLIGRAM(S): 20 TABLET, DELAYED RELEASE ORAL at 05:14

## 2022-12-24 RX ADMIN — Medication 2: at 12:12

## 2022-12-24 RX ADMIN — SEVELAMER CARBONATE 1600 MILLIGRAM(S): 2400 POWDER, FOR SUSPENSION ORAL at 18:15

## 2022-12-24 RX ADMIN — REPAGLINIDE 0.5 MILLIGRAM(S): 1 TABLET ORAL at 18:15

## 2022-12-24 RX ADMIN — SEVELAMER CARBONATE 1600 MILLIGRAM(S): 2400 POWDER, FOR SUSPENSION ORAL at 12:12

## 2022-12-24 RX ADMIN — LACTULOSE 10 GRAM(S): 10 SOLUTION ORAL at 12:12

## 2022-12-24 RX ADMIN — POLYETHYLENE GLYCOL 3350 17 GRAM(S): 17 POWDER, FOR SOLUTION ORAL at 15:02

## 2022-12-24 RX ADMIN — REPAGLINIDE 0.5 MILLIGRAM(S): 1 TABLET ORAL at 12:12

## 2022-12-24 RX ADMIN — REPAGLINIDE 0.5 MILLIGRAM(S): 1 TABLET ORAL at 05:14

## 2022-12-24 RX ADMIN — Medication 1000 UNIT(S): at 12:12

## 2022-12-24 RX ADMIN — Medication 2: at 08:19

## 2022-12-24 RX ADMIN — ERYTHROPOIETIN 10000 UNIT(S): 10000 INJECTION, SOLUTION INTRAVENOUS; SUBCUTANEOUS at 15:04

## 2022-12-24 NOTE — CONSULT NOTE ADULT - ASSESSMENT
72y Male with history of ESRD on HD presents with gross hematuria. Nephrology consulted for ESRD status.    1) ESRD: Last HD on 12/22 as an outpatient. Plan for next maintenance HD today (short HD due to staffing limitations). Monitor electrolytes.    2) HTN with ESRD: BP acceptable. Resume midodrine 5 mg pre-HD on HD days to avoid intradialytic hypotension. Monitor BP.    3) Anemia of renal disease: Hb decreasing likely due to gross hematuria. Increase Epo to 10K with HD. Check AM iron stores. Monitor Hb.    4) Hyperphosphatemia: Phosphorus acceptable. Resume renvela 2 tabs with meals. Monitor serum calcium and phosphorus.      [facility] NEPHROLOGY  Wili Hopkins M.D.  Kiran Feng D.O.  Sujatha Dumont M.D.  Radha Miller, MSN, ANP-C    Telephone: [phone]  Facsimile: [phone]    [id number] [id number]   
72 year old male PMH ESRD on HD, DM, PVD and right BKA coming in with hematuria. Irrigated 100cc of old clot out and started on CBI.   - No acute urological intervention  - Recommend admission  - Urine Culture  - Abx  - U/A  - Nephrology consult for HD  - Trend CBC  - Trend Cr  - Will wean CBI   - Urology to follow  - Discussed with Dr. Beltran

## 2022-12-24 NOTE — CONSULT NOTE ADULT - SUBJECTIVE AND OBJECTIVE BOX
HPI  72 year old male PMH ESRD on HD, DM, PVD and right BKA coming in with hematuria. pt recently discharged from the hospital and while he was admitted was evaluated for hematuria- workup negative and was advised to f/u outpatient for cysto. Patient came in complaining of bladder discomfort, clots per urethra, and pain at the tip of penis. Bedside U/S done in ED showed bladder full of clots. Urology consulted for retention and hematuria. Patient states he has had this for the past 5 days and today the pain became unbearable. Urology bedside irrigated out about 100cc of old clot and started patient on CBI. Now running on moderate drip clear urine.     PAST MEDICAL & SURGICAL HISTORY:  ESRD on dialysis      DM (diabetes mellitus)      PAD (peripheral artery disease)      No significant past surgical history          MEDICATIONS  (STANDING):    MEDICATIONS  (PRN):      FAMILY HISTORY:      Allergies    No Known Allergies    Intolerances        SOCIAL HISTORY:    REVIEW OF SYSTEMS: Otherwise negative as stated in HPI    Physical Exam  Vital signs  T(C): 36.5 (12-23-22 @ 15:23), Max: 36.5 (12-23-22 @ 12:52)  HR: 99 (12-23-22 @ 15:23)  BP: 140/78 (12-23-22 @ 15:23)  SpO2: 100% (12-23-22 @ 15:23)  Wt(kg): --    Output      Gen: NAD  Pulm: No respiratory distress	  CV: RRR  GI: S/ND/NT  : Now draining clear urine on CBI    LABS:      12-23 @ 15:00    WBC 10.58 / Hct 31.0  / SCr 4.78     12-23    132<L>  |  94<L>  |  56<H>  ----------------------------<  294<H>  3.9   |  24  |  4.78<H>    Ca    9.4      23 Dec 2022 15:00    TPro  7.0  /  Alb  3.4<L>  /  TBili  0.3  /  DBili  x   /  AST  14  /  ALT  17  /  AlkPhos  70  12-23    Urine Culture: pending      
[facility] NEPHROLOGY- CONSULTATION NOTE    72y Male with history of below presents with gross hematuria. Nephrology consulted for ESRD status.    Patient known from prior admission with h/o ESRD on HD TTS at [facility]. Last HD on 12/22 as an outpatient. Patient recently admitted found to have gross hematuria secondary to kidney stones which had resolved by discharge.    Patient on current admission started on CBI with Urology following.    REVIEW OF SYSTEMS:  Gen: no fevers  HEENT: no rhinorrhea  Neck: no sore throat  Cards: no chest pain  Resp: no dyspnea  GI: no nausea or vomiting or diarrhea  : no dysuria, + gross hematuria resolving  Vascular: no LE edema  Derm: no rashes  Neuro: no numbness/tingling    No Known Allergies      Home Medications Reviewed  Hospital Medications:   MEDICATIONS  (STANDING):  cholecalciferol 1000 Unit(s) Oral daily  influenza  Vaccine (HIGH DOSE) 0.7 milliLiter(s) IntraMuscular once  insulin lispro (ADMELOG) corrective regimen sliding scale   SubCutaneous three times a day before meals  lactulose Syrup 10 Gram(s) Oral daily  pantoprazole    Tablet 40 milliGRAM(s) Oral before breakfast  polyethylene glycol 3350 17 Gram(s) Oral daily  repaglinide 0.5 milliGRAM(s) Oral three times a day before meals  sevelamer carbonate 800 milliGRAM(s) Oral three times a day with meals      PAST MEDICAL & SURGICAL HISTORY:  ESRD on dialysis      DM (diabetes mellitus)      PAD (peripheral artery disease)      S/P BKA (below knee amputation) unilateral, right          FAMILY HISTORY:  No pertinent family history in first degree relatives        SOCIAL HISTORY:  Denies toxic substance use     VITALS:  T(F): 98 (12-24-22 @ 05:05), Max: 98 (12-24-22 @ 05:05)  HR: 75 (12-24-22 @ 05:05)  BP: 131/66 (12-24-22 @ 06:00)  RR: 18 (12-24-22 @ 05:05)  SpO2: 97% (12-24-22 @ 05:05)  Wt(kg): --    12-23 @ 07:01  -  12-24 @ 07:00  --------------------------------------------------------  IN: 89544 mL / OUT: 33108 mL / NET: -97680 mL    12-24 @ 07:01  -  12-24 @ 10:59  --------------------------------------------------------  IN: 0 mL / OUT: 4500 mL / NET: -4500 mL      Height (cm): 182.9 (12-23 @ 12:52)  Weight (kg): 61 (12-23 @ 22:32)  BMI (kg/m2): 18.2 (12-23 @ 22:32)  BSA (m2): 1.8 (12-23 @ 22:32)    PHYSICAL EXAM:  Gen: NAD, calm  HEENT: MMM  Neck: no JVD  Cards: RRR, +S1/S2, no M/G/R  Resp: CTA B/L  GI: soft, NT/ND, NABS  : no CVA tenderness + neumann with CBI running with clear urine  Vascular: no LE edema B/L, RIJ TDC intact, LUE AVF without bruit/thrill, R BKA  Derm: no rashes  Neuro: non-focal    LABS:  12-24    134<L>  |  96  |  65<H>  ----------------------------<  225<H>  4.1   |  26  |  5.41<H>    Ca    9.2      24 Dec 2022 05:35  Phos  4.5     12-24  Mg     2.3     12-24    TPro  6.7  /  Alb  3.0<L>  /  TBili  0.3  /  DBili      /  AST  10  /  ALT  15  /  AlkPhos  67  12-24    Creatinine Trend: 5.41 <--, 4.78 <--                        8.3    10.39 )-----------( 323      ( 24 Dec 2022 05:35 )             26.9     Urine Studies:        RADIOLOGY & ADDITIONAL STUDIES:    N/A

## 2022-12-24 NOTE — PROGRESS NOTE ADULT - SUBJECTIVE AND OBJECTIVE BOX
Left msg for patient requesting call back for response/recommendations.  mg   INTERVAL HPI/OVERNIGHT EVENTS:  Pt seen and examined at bedside   No acute complaints, for dialysis today   CBI clear     MEDICATIONS  (STANDING):  cholecalciferol 1000 Unit(s) Oral daily  epoetin nyasia-epbx (RETACRIT) Injectable 23259 Unit(s) IV Push <User Schedule>  influenza  Vaccine (HIGH DOSE) 0.7 milliLiter(s) IntraMuscular once  insulin lispro (ADMELOG) corrective regimen sliding scale   SubCutaneous three times a day before meals  lactulose Syrup 10 Gram(s) Oral daily  midodrine. 5 milliGRAM(s) Oral <User Schedule>  pantoprazole    Tablet 40 milliGRAM(s) Oral before breakfast  polyethylene glycol 3350 17 Gram(s) Oral daily  repaglinide 0.5 milliGRAM(s) Oral three times a day before meals  sevelamer carbonate 1600 milliGRAM(s) Oral three times a day with meals    MEDICATIONS  (PRN):  acetaminophen     Tablet .. 650 milliGRAM(s) Oral every 6 hours PRN Mild Pain (1 - 3)  bisacodyl 5 milliGRAM(s) Oral at bedtime PRN Constipation      Vital Signs Last 24 Hrs  T(C): 36.7 (24 Dec 2022 05:05), Max: 36.7 (24 Dec 2022 05:05)  T(F): 98 (24 Dec 2022 05:05), Max: 98 (24 Dec 2022 05:05)  HR: 75 (24 Dec 2022 05:05) (75 - 99)  BP: 131/66 (24 Dec 2022 06:00) (95/62 - 140/78)  BP(mean): --  RR: 18 (24 Dec 2022 05:05) (17 - 20)  SpO2: 97% (24 Dec 2022 05:05) (97% - 100%)    Parameters below as of 24 Dec 2022 05:05  Patient On (Oxygen Delivery Method): room air        Physical:  General: A&Ox3. NAD.  Respirations: Unlabored   Abdomen: Soft nondistended, nontender, no suprapubic tenderness   : CBI clear, normal external genitalia     I&O's Detail    23 Dec 2022 07:01  -  24 Dec 2022 07:00  --------------------------------------------------------  IN:    Continuous Bladder Irrigation (mL): 21051 mL  Total IN: 33316 mL    OUT:    Continuous Bladder Irrigation (mL): 36230 mL  Total OUT: 01458 mL    Total NET: -38400 mL      24 Dec 2022 07:01  -  24 Dec 2022 11:33  --------------------------------------------------------  IN:  Total IN: 0 mL    OUT:    Continuous Bladder Irrigation (mL): 4500 mL  Total OUT: 4500 mL    Total NET: -4500 mL          LABS:                        8.3    10.39 )-----------( 323      ( 24 Dec 2022 05:35 )             26.9             12-24    134<L>  |  96  |  65<H>  ----------------------------<  225<H>  4.1   |  26  |  5.41<H>    Ca    9.2      24 Dec 2022 05:35  Phos  4.5     12-24  Mg     2.3     12-24    TPro  6.7  /  Alb  3.0<L>  /  TBili  0.3  /  DBili  x   /  AST  10  /  ALT  15  /  AlkPhos  67  12-24

## 2022-12-24 NOTE — PROGRESS NOTE ADULT - ASSESSMENT
72 year old male PMH ESRD on HD, DM, PVD and right BKA coming in with hematuria. Irrigated 100cc of old clot out and started on CBI.   -Monitor/trend CBC  -HD as per nephro  -Wean CBI, irrigate PRN   -Discussed with Dr. Beltran

## 2022-12-24 NOTE — PROGRESS NOTE ADULT - SUBJECTIVE AND OBJECTIVE BOX
SUBJECTIVE / OVERNIGHT EVENTS:pt seen and examined, pts family next to pts bed  12-24-22     MEDICATIONS  (STANDING):  cholecalciferol 1000 Unit(s) Oral daily  epoetin nyasia-epbx (RETACRIT) Injectable 44770 Unit(s) IV Push <User Schedule>  influenza  Vaccine (HIGH DOSE) 0.7 milliLiter(s) IntraMuscular once  insulin lispro (ADMELOG) corrective regimen sliding scale   SubCutaneous three times a day before meals  lactulose Syrup 10 Gram(s) Oral daily  midodrine. 5 milliGRAM(s) Oral <User Schedule>  pantoprazole    Tablet 40 milliGRAM(s) Oral before breakfast  polyethylene glycol 3350 17 Gram(s) Oral daily  repaglinide 0.5 milliGRAM(s) Oral three times a day before meals  sevelamer carbonate 1600 milliGRAM(s) Oral three times a day with meals    MEDICATIONS  (PRN):  acetaminophen     Tablet .. 650 milliGRAM(s) Oral every 6 hours PRN Mild Pain (1 - 3)  bisacodyl 5 milliGRAM(s) Oral at bedtime PRN Constipation    T(C): 37.2 (12-24-22 @ 20:56), Max: 37.2 (12-24-22 @ 20:56)  HR: 89 (12-24-22 @ 20:56) (75 - 89)  BP: 105/59 (12-24-22 @ 20:56) (97/60 - 131/66)  RR: 18 (12-24-22 @ 20:56) (17 - 18)  SpO2: 97% (12-24-22 @ 20:56) (97% - 98%)    CAPILLARY BLOOD GLUCOSE      POCT Blood Glucose.: 232 mg/dL (24 Dec 2022 21:15)  POCT Blood Glucose.: 121 mg/dL (24 Dec 2022 16:48)  POCT Blood Glucose.: 132 mg/dL (24 Dec 2022 15:54)  POCT Blood Glucose.: 234 mg/dL (24 Dec 2022 11:19)  POCT Blood Glucose.: 248 mg/dL (24 Dec 2022 08:00)    I&O's Summary    23 Dec 2022 07:01  -  24 Dec 2022 07:00  --------------------------------------------------------  IN: 92982 mL / OUT: 91785 mL / NET: -99901 mL    24 Dec 2022 07:01  -  24 Dec 2022 23:16  --------------------------------------------------------  IN: 67094 mL / OUT: 61453 mL / NET: -3345 mL        Constitutional: No fever, fatigue  Skin: No rash.  Eyes: No recent vision problems or eye pain.  ENT: No congestion, ear pain, or sore throat.  Cardiovascular: No chest pain or palpation.  Respiratory: No cough, shortness of breath, congestion, or wheezing.  Gastrointestinal: No abdominal pain, nausea, vomiting, or diarrhea.  Genitourinary: No dysuria.  Musculoskeletal: No joint swelling.  Neurologic: No headache.    PHYSICAL EXAM:  GENERAL: NAD  EYES: EOMI, PERRLA  NECK: Supple, No JVD  CHEST/LUNG: dec breath sounds at bases  HEART:  S1 , S2 +  ABDOMEN: soft , bs+  EXTREMITIES:  trace edema  NEUROLOGY:alert awake      LABS:                        8.3    10.39 )-----------( 323      ( 24 Dec 2022 05:35 )             26.9     12-24    134<L>  |  96  |  65<H>  ----------------------------<  225<H>  4.1   |  26  |  5.41<H>    Ca    9.2      24 Dec 2022 05:35  Phos  4.5     12-24  Mg     2.3     12-24    TPro  6.7  /  Alb  3.0<L>  /  TBili  0.3  /  DBili  x   /  AST  10  /  ALT  15  /  AlkPhos  67  12-24              RADIOLOGY & ADDITIONAL TESTS:    Imaging Personally Reviewed:    Consultant(s) Notes Reviewed:      Care Discussed with Consultants/Other Providers:

## 2022-12-24 NOTE — PROGRESS NOTE ADULT - ASSESSMENT
Patient is a 72 year old male, coming from 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 Upon evaluation in the ED, patient found to be afebrile and hemodynamically stable. Labs showing hgb of 9.6 (baseline 10-11) with a leukocytosis of 10.6. Urology consulted in ED, patient is s/p CBI. Patient is admitted to medicine for hematuria.

## 2022-12-25 LAB
ALBUMIN SERPL ELPH-MCNC: 2.7 G/DL — LOW (ref 3.5–5)
ALP SERPL-CCNC: 63 U/L — SIGNIFICANT CHANGE UP (ref 40–120)
ALT FLD-CCNC: 13 U/L DA — SIGNIFICANT CHANGE UP (ref 10–60)
ANION GAP SERPL CALC-SCNC: 8 MMOL/L — SIGNIFICANT CHANGE UP (ref 5–17)
AST SERPL-CCNC: 8 U/L — LOW (ref 10–40)
BILIRUB SERPL-MCNC: 0.2 MG/DL — SIGNIFICANT CHANGE UP (ref 0.2–1.2)
BUN SERPL-MCNC: 39 MG/DL — HIGH (ref 7–18)
CALCIUM SERPL-MCNC: 9 MG/DL — SIGNIFICANT CHANGE UP (ref 8.4–10.5)
CHLORIDE SERPL-SCNC: 102 MMOL/L — SIGNIFICANT CHANGE UP (ref 96–108)
CO2 SERPL-SCNC: 29 MMOL/L — SIGNIFICANT CHANGE UP (ref 22–31)
CREAT SERPL-MCNC: 4.4 MG/DL — HIGH (ref 0.5–1.3)
EGFR: 13 ML/MIN/1.73M2 — LOW
FERRITIN SERPL-MCNC: 656 NG/ML — HIGH (ref 30–400)
GLUCOSE BLDC GLUCOMTR-MCNC: 151 MG/DL — HIGH (ref 70–99)
GLUCOSE BLDC GLUCOMTR-MCNC: 223 MG/DL — HIGH (ref 70–99)
GLUCOSE BLDC GLUCOMTR-MCNC: 228 MG/DL — HIGH (ref 70–99)
GLUCOSE BLDC GLUCOMTR-MCNC: 266 MG/DL — HIGH (ref 70–99)
GLUCOSE SERPL-MCNC: 211 MG/DL — HIGH (ref 70–99)
HCT VFR BLD CALC: 25.7 % — LOW (ref 39–50)
HGB BLD-MCNC: 7.9 G/DL — LOW (ref 13–17)
IRON SATN MFR SERPL: 19 % — LOW (ref 20–55)
IRON SATN MFR SERPL: 42 UG/DL — LOW (ref 65–170)
MAGNESIUM SERPL-MCNC: 2.3 MG/DL — SIGNIFICANT CHANGE UP (ref 1.6–2.6)
MCHC RBC-ENTMCNC: 30.7 GM/DL — LOW (ref 32–36)
MCHC RBC-ENTMCNC: 31.1 PG — SIGNIFICANT CHANGE UP (ref 27–34)
MCV RBC AUTO: 101.2 FL — HIGH (ref 80–100)
NRBC # BLD: 0 /100 WBCS — SIGNIFICANT CHANGE UP (ref 0–0)
PHOSPHATE SERPL-MCNC: 3.6 MG/DL — SIGNIFICANT CHANGE UP (ref 2.5–4.5)
PLATELET # BLD AUTO: 293 K/UL — SIGNIFICANT CHANGE UP (ref 150–400)
POTASSIUM SERPL-MCNC: 3.8 MMOL/L — SIGNIFICANT CHANGE UP (ref 3.5–5.3)
POTASSIUM SERPL-SCNC: 3.8 MMOL/L — SIGNIFICANT CHANGE UP (ref 3.5–5.3)
PROT SERPL-MCNC: 6.1 G/DL — SIGNIFICANT CHANGE UP (ref 6–8.3)
RBC # BLD: 2.54 M/UL — LOW (ref 4.2–5.8)
RBC # FLD: 16.4 % — HIGH (ref 10.3–14.5)
SODIUM SERPL-SCNC: 139 MMOL/L — SIGNIFICANT CHANGE UP (ref 135–145)
TIBC SERPL-MCNC: 224 UG/DL — LOW (ref 250–450)
UIBC SERPL-MCNC: 182 UG/DL — SIGNIFICANT CHANGE UP (ref 110–370)
WBC # BLD: 8.26 K/UL — SIGNIFICANT CHANGE UP (ref 3.8–10.5)
WBC # FLD AUTO: 8.26 K/UL — SIGNIFICANT CHANGE UP (ref 3.8–10.5)

## 2022-12-25 RX ORDER — PHENAZOPYRIDINE HCL 100 MG
100 TABLET ORAL EVERY 8 HOURS
Refills: 0 | Status: DISCONTINUED | OUTPATIENT
Start: 2022-12-25 | End: 2022-12-29

## 2022-12-25 RX ORDER — OXYCODONE HYDROCHLORIDE 5 MG/1
5 TABLET ORAL EVERY 6 HOURS
Refills: 0 | Status: DISCONTINUED | OUTPATIENT
Start: 2022-12-25 | End: 2022-12-27

## 2022-12-25 RX ORDER — MIDODRINE HYDROCHLORIDE 2.5 MG/1
10 TABLET ORAL
Refills: 0 | Status: DISCONTINUED | OUTPATIENT
Start: 2022-12-25 | End: 2022-12-29

## 2022-12-25 RX ADMIN — Medication 100 MILLIGRAM(S): at 21:44

## 2022-12-25 RX ADMIN — SEVELAMER CARBONATE 1600 MILLIGRAM(S): 2400 POWDER, FOR SUSPENSION ORAL at 12:20

## 2022-12-25 RX ADMIN — Medication 2: at 08:15

## 2022-12-25 RX ADMIN — REPAGLINIDE 0.5 MILLIGRAM(S): 1 TABLET ORAL at 12:19

## 2022-12-25 RX ADMIN — Medication 1000 UNIT(S): at 14:11

## 2022-12-25 RX ADMIN — SEVELAMER CARBONATE 1600 MILLIGRAM(S): 2400 POWDER, FOR SUSPENSION ORAL at 08:16

## 2022-12-25 RX ADMIN — PANTOPRAZOLE SODIUM 40 MILLIGRAM(S): 20 TABLET, DELAYED RELEASE ORAL at 05:02

## 2022-12-25 RX ADMIN — REPAGLINIDE 0.5 MILLIGRAM(S): 1 TABLET ORAL at 05:02

## 2022-12-25 RX ADMIN — Medication 1: at 18:51

## 2022-12-25 RX ADMIN — LACTULOSE 10 GRAM(S): 10 SOLUTION ORAL at 14:10

## 2022-12-25 RX ADMIN — SEVELAMER CARBONATE 1600 MILLIGRAM(S): 2400 POWDER, FOR SUSPENSION ORAL at 18:52

## 2022-12-25 RX ADMIN — Medication 100 MILLIGRAM(S): at 18:53

## 2022-12-25 RX ADMIN — Medication 3: at 12:18

## 2022-12-25 RX ADMIN — POLYETHYLENE GLYCOL 3350 17 GRAM(S): 17 POWDER, FOR SOLUTION ORAL at 14:11

## 2022-12-25 RX ADMIN — REPAGLINIDE 0.5 MILLIGRAM(S): 1 TABLET ORAL at 18:52

## 2022-12-25 NOTE — DIETITIAN INITIAL EVALUATION ADULT - PERTINENT LABORATORY DATA
12-25    139  |  102  |  39<H>  ----------------------------<  211<H>  3.8   |  29  |  4.40<H>    Ca    9.0      25 Dec 2022 06:54  Phos  3.6     12-25  Mg     2.3     12-25    TPro  6.1  /  Alb  2.7<L>  /  TBili  0.2  /  DBili  x   /  AST  8<L>  /  ALT  13  /  AlkPhos  63  12-25  POCT Blood Glucose.: 266 mg/dL (12-25-22 @ 11:02)  A1C with Estimated Average Glucose Result: 6.2 % (09-26-22 @ 19:00)

## 2022-12-25 NOTE — PROGRESS NOTE ADULT - SUBJECTIVE AND OBJECTIVE BOX
[facility] NEPHROLOGY- PROGRESS NOTE    72y Male with history of ESRD on HD presents with gross hematuria. Nephrology consulted for ESRD status.    REVIEW OF SYSTEMS:  Gen: no fevers  Cards: no chest pain  Resp: no dyspnea  GI: no nausea or vomiting or diarrhea  Vascular: no LE edema    No Known Allergies      Hospital Medications: Medications reviewed    VITALS:  T(F): 97.7 (12-25-22 @ 05:44), Max: 98.9 (12-24-22 @ 20:56)  HR: 89 (12-25-22 @ 05:44)  BP: 108/61 (12-25-22 @ 05:44)  RR: 18 (12-25-22 @ 05:44)  SpO2: 100% (12-25-22 @ 05:44)  Wt(kg): --  Height (cm): 182.9 (12-23 @ 12:52), 182.9 (12-16 @ 02:17), 188 (12-06 @ 21:01)  Weight (kg): 58 (12-24 @ 19:24), 68 (12-16 @ 02:17), 81.6 (12-06 @ 21:01)  BMI (kg/m2): 17.3 (12-24 @ 19:24), 20.3 (12-23 @ 12:52), 20.3 (12-16 @ 02:17), 23.1 (12-06 @ 21:01)  BSA (m2): 1.76 (12-24 @ 19:24), 1.88 (12-23 @ 12:52), 1.88 (12-16 @ 02:17), 2.08 (12-06 @ 21:01)    12-24 @ 07:01  -  12-25 @ 07:00  --------------------------------------------------------  IN: 22615 mL / OUT: 59282 mL / NET: -2945 mL        PHYSICAL EXAM:    Gen: NAD, calm  Cards: RRR, +S1/S2, no M/G/R  Resp: CTA B/L  GI: soft, NT/ND, NABS  : + neumann with yellow urine and CBI off  Vascular: no LE edema B/L, RIJ TDC intact, R BKA, LUE AVF without bruit/thrill    LABS:  12-25    139  |  102  |  39<H>  ----------------------------<  211<H>  3.8   |  29  |  4.40<H>    Ca    9.0      25 Dec 2022 06:54  Phos  3.6     12-25  Mg     2.3     12-25    TPro  6.1  /  Alb  2.7<L>  /  TBili  0.2  /  DBili      /  AST  8<L>  /  ALT  13  /  AlkPhos  63  12-25    Creatinine Trend: 4.40 <--, 5.41 <--, 4.78 <--                        7.9    8.26  )-----------( 293      ( 25 Dec 2022 06:54 )             25.7     Urine Studies:        RADIOLOGY & ADDITIONAL STUDIES:

## 2022-12-25 NOTE — PROGRESS NOTE ADULT - ASSESSMENT
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 Upon evaluation in the ED, patient found to be afebrile and hemodynamically stable. Labs showing hgb of 9.6 (baseline 10-11) with a leukocytosis of 10.6. Urology consulted in ED, patient is s/p CBI. Patient is admitted to medicine for hematuria.

## 2022-12-25 NOTE — CHART NOTE - NSCHARTNOTEFT_GEN_A_CORE
Called by primary team for patient complaining of abdominal pain.   CBI stopped.     PE:  abdomen: no suprapubic or abdominal tenderness    - CBI irrigated, 120 cc irrigated and clear urine returned without clotts. Post irrigation bladder scan revealed 0cc.     Plan:   -continue CBI at low continous rate  - neumann care, irrigate as needed, monitor and record output  - start patient on flomax an pyridium  - trend CBC, transfuse PRN   - remainder of care as per primary team   - call ext. [phone] for any questions/ concerns, new suprapubic tenderness   - discussed and agreed with Dr. Beltran

## 2022-12-25 NOTE — PROGRESS NOTE ADULT - ASSESSMENT
72y Male with history of ESRD on HD presents with gross hematuria. Nephrology consulted for ESRD status.    1) ESRD: Last HD on 12/24 with intradialytic hypotension and 0.4L removed. Plan for next maintenance HD on 12/27. Monitor electrolytes.    2) HTN with ESRD: BP low normal. Increase midodrine to 10 mg pre-HD on HD days to avoid intradialytic hypotension. Monitor BP.    3) Anemia of renal disease: Hb decreasing likely due to gross hematuria. Epo increased to 10K with HD. F/U AM iron stores and transfuse PRBC as needed. Monitor Hb.    4) Hyperphosphatemia: Phosphorus acceptable. Continue with renvela 2 tabs with meals. Monitor serum calcium and phosphorus.      [facility] NEPHROLOGY  Wili Hopkins M.D.  Kiran Feng D.O.  Sujatha Dumont M.D.  Radha Miller, MSN, ANP-C    Telephone: [phone]  Facsimile: [phone]    [address] [place] [id number]

## 2022-12-25 NOTE — DIETITIAN INITIAL EVALUATION ADULT - OTHER INFO
Pt visited. Pt is from NH  : Diet  renal Dialysis Diet. Pt Reports Good appetite. Per Nsg flow sheet Po intake e~ 75 - 100 % of meal.  Per Pt Ht is 6 Feet 2 inches, Bed scale 131 Lb . Pt with R BKA ~ last year and HD X  ~ 2 Years.  NKFA. Offered Nepro and Pt agreed to try. Labs noted. . H/O DM. Suggest add Diabetic diet to current Diet  Rx. D/W NP. Pt appears Thin. However Pt with good appetite.  Pt w Frequent admission in Past.

## 2022-12-25 NOTE — DIETITIAN INITIAL EVALUATION ADULT - PERTINENT MEDS FT
MEDICATIONS  (STANDING):  cholecalciferol 1000 Unit(s) Oral daily  epoetin nyasia-epbx (RETACRIT) Injectable 81132 Unit(s) IV Push <User Schedule>  influenza  Vaccine (HIGH DOSE) 0.7 milliLiter(s) IntraMuscular once  insulin lispro (ADMELOG) corrective regimen sliding scale   SubCutaneous three times a day before meals  lactulose Syrup 10 Gram(s) Oral daily  midodrine. 10 milliGRAM(s) Oral <User Schedule>  pantoprazole    Tablet 40 milliGRAM(s) Oral before breakfast  polyethylene glycol 3350 17 Gram(s) Oral daily  repaglinide 0.5 milliGRAM(s) Oral three times a day before meals  sevelamer carbonate 1600 milliGRAM(s) Oral three times a day with meals    MEDICATIONS  (PRN):  acetaminophen     Tablet .. 650 milliGRAM(s) Oral every 6 hours PRN Mild Pain (1 - 3)  bisacodyl 5 milliGRAM(s) Oral at bedtime PRN Constipation

## 2022-12-25 NOTE — PROGRESS NOTE ADULT - SUBJECTIVE AND OBJECTIVE BOX
INTERVAL HPI/OVERNIGHT EVENTS:  Pt resting comfortably. No acute complaints. Denies abd/ back pain, N/V or any other constitutional complaints.   Neumann catheter in place. As per nurse irrigated small clots this morning.     MEDICATIONS  (STANDING):  cholecalciferol 1000 Unit(s) Oral daily  epoetin nyasia-epbx (RETACRIT) Injectable 63445 Unit(s) IV Push <User Schedule>  influenza  Vaccine (HIGH DOSE) 0.7 milliLiter(s) IntraMuscular once  insulin lispro (ADMELOG) corrective regimen sliding scale   SubCutaneous three times a day before meals  lactulose Syrup 10 Gram(s) Oral daily  midodrine. 10 milliGRAM(s) Oral <User Schedule>  pantoprazole    Tablet 40 milliGRAM(s) Oral before breakfast  polyethylene glycol 3350 17 Gram(s) Oral daily  repaglinide 0.5 milliGRAM(s) Oral three times a day before meals  sevelamer carbonate 1600 milliGRAM(s) Oral three times a day with meals    MEDICATIONS  (PRN):  acetaminophen     Tablet .. 650 milliGRAM(s) Oral every 6 hours PRN Mild Pain (1 - 3)  bisacodyl 5 milliGRAM(s) Oral at bedtime PRN Constipation      Vital Signs Last 24 Hrs  T(C): 36.3 (25 Dec 2022 12:12), Max: 37.2 (24 Dec 2022 20:56)  T(F): 97.4 (25 Dec 2022 12:12), Max: 98.9 (24 Dec 2022 20:56)  HR: 85 (25 Dec 2022 12:12) (85 - 89)  BP: 108/65 (25 Dec 2022 12:12) (105/59 - 116/63)  BP(mean): --  RR: 18 (25 Dec 2022 12:12) (18 - 18)  SpO2: 97% (25 Dec 2022 12:12) (97% - 100%)    Parameters below as of 25 Dec 2022 12:12  Patient On (Oxygen Delivery Method): room air        Physical:  General: A&Ox3. NAD.  Abdomen: Soft nondistended, no suprapubic tenderness.  Gu: neumann cath, CBI on slow drip, draining clear urine, no CVAT b/l     I&O's Detail    24 Dec 2022 07:01  -  25 Dec 2022 07:00  --------------------------------------------------------  IN:    Continuous Bladder Irrigation (mL): 37963 mL  Total IN: 18514 mL    OUT:    Continuous Bladder Irrigation (mL): 40313 mL  Total OUT: 92265 mL    Total NET: -2945 mL      25 Dec 2022 07:01  -  25 Dec 2022 13:01  --------------------------------------------------------  IN:  Total IN: 0 mL    OUT:    Continuous Bladder Irrigation (mL): 7100 mL  Total OUT: 7100 mL    Total NET: -7100 mL          LABS:                        7.9    8.26  )-----------( 293      ( 25 Dec 2022 06:54 )             25.7             12-25    139  |  102  |  39<H>  ----------------------------<  211<H>  3.8   |  29  |  4.40<H>    Ca    9.0      25 Dec 2022 06:54  Phos  3.6     12-25  Mg     2.3     12-25    TPro  6.1  /  Alb  2.7<L>  /  TBili  0.2  /  DBili  x   /  AST  8<L>  /  ALT  13  /  AlkPhos  63  12-25

## 2022-12-25 NOTE — CHART NOTE - NSCHARTNOTEFT_GEN_A_CORE
Pt. noted moaning with CBI in progress with minimal drainage.  Attempts by nursing to irrigate neumann unsuccessful.  Urology contacted with Pt. noted moaning with CBI in progress with minimal drainage, suspect clotting.  Attempts by nursing to irrigate neumann unsuccessful.  Urology contacted irrigated neumann with no clots obtained however with return of all injected fluid.  -f/u bladder scan  -Start pyridium   -Pain mgnt as needed  -Strict I&O

## 2022-12-25 NOTE — PROGRESS NOTE ADULT - ASSESSMENT
72 year old male PMH ESRD on HD, DM, PVD and right BKA coming in with hematuria.   cbi in place, on slow drip   urine clear, irrigated at bedside, no clotts aprecciated     - neumann catheter care, monitor and record output   - irrigate as needed, discussed with covering nurse  - trend CBC, transfuse PRN   - HD as per nephrology   - f/u urine culture   - remainder of care as per primary team   - call ext. [phone] for any questions/ concerns, new suprapubic tenderness

## 2022-12-25 NOTE — DIETITIAN INITIAL EVALUATION ADULT - NS FNS DIET ORDER
Diet, Regular:   For patients receiving Renal Replacement - No Protein Restr, No Conc K, No Conc Phos, Low Sodium (RENAL)  Supplement Feeding Modality:  Oral  Nepro Cans or Servings Per Day:  1       Frequency:  Two Times a day (12-25-22 @ 15:11)

## 2022-12-25 NOTE — DIETITIAN INITIAL EVALUATION ADULT - ADD RECOMMEND
Add  diabetic diet to current diet Rx.  Provide Extra Portions  with protein.  Nephrocaps.  Nepro Bid added

## 2022-12-25 NOTE — PROGRESS NOTE ADULT - SUBJECTIVE AND OBJECTIVE BOX
SUBJECTIVE / OVERNIGHT EVENTS:pt seen and examined,   12-25-22     MEDICATIONS  (STANDING):  cholecalciferol 1000 Unit(s) Oral daily  epoetin nyasia-epbx (RETACRIT) Injectable 06466 Unit(s) IV Push <User Schedule>  influenza  Vaccine (HIGH DOSE) 0.7 milliLiter(s) IntraMuscular once  insulin lispro (ADMELOG) corrective regimen sliding scale   SubCutaneous three times a day before meals  lactulose Syrup 10 Gram(s) Oral daily  midodrine. 10 milliGRAM(s) Oral <User Schedule>  pantoprazole    Tablet 40 milliGRAM(s) Oral before breakfast  phenazopyridine 100 milliGRAM(s) Oral every 8 hours  polyethylene glycol 3350 17 Gram(s) Oral daily  repaglinide 0.5 milliGRAM(s) Oral three times a day before meals  sevelamer carbonate 1600 milliGRAM(s) Oral three times a day with meals    MEDICATIONS  (PRN):  acetaminophen     Tablet .. 650 milliGRAM(s) Oral every 6 hours PRN Mild Pain (1 - 3)  bisacodyl 5 milliGRAM(s) Oral at bedtime PRN Constipation  oxyCODONE    IR 5 milliGRAM(s) Oral every 6 hours PRN Severe Pain (7 - 10)    Vital Signs Last 24 Hrs  T(C): 36.3 (12-25-22 @ 12:12), Max: 37.2 (12-24-22 @ 20:56)  T(F): 97.4 (12-25-22 @ 12:12), Max: 98.9 (12-24-22 @ 20:56)  HR: 85 (12-25-22 @ 12:12) (85 - 89)  BP: 108/65 (12-25-22 @ 12:12) (105/59 - 108/65)  BP(mean): --  RR: 18 (12-25-22 @ 12:12) (18 - 18)  SpO2: 97% (12-25-22 @ 12:12) (97% - 100%)          Constitutional: No fever, fatigue  Skin: No rash.  Eyes: No recent vision problems or eye pain.  ENT: No congestion, ear pain, or sore throat.  Cardiovascular: No chest pain or palpation.  Respiratory: No cough, shortness of breath, congestion, or wheezing.  Gastrointestinal: No abdominal pain, nausea, vomiting, or diarrhea.  Genitourinary: No dysuria.  Musculoskeletal: No joint swelling.  Neurologic: No headache.    PHYSICAL EXAM:  GENERAL: NAD  EYES: EOMI, PERRLA  NECK: Supple, No JVD  CHEST/LUNG: dec breath sounds at bases  HEART:  S1 , S2 +  ABDOMEN: soft , bs+  EXTREMITIES:  trace edema  NEUROLOGY:alert awake      LABS:  12-25    139  |  102  |  39<H>  ----------------------------<  211<H>  3.8   |  29  |  4.40<H>    Ca    9.0      25 Dec 2022 06:54  Phos  3.6     12-25  Mg     2.3     12-25    TPro  6.1  /  Alb  2.7<L>  /  TBili  0.2  /  DBili      /  AST  8<L>  /  ALT  13  /  AlkPhos  63  12-25    Creatinine Trend: 4.40 <--, 5.41 <--, 4.78 <--                        7.9    8.26  )-----------( 293      ( 25 Dec 2022 06:54 )             25.7     Urine Studies:            LIVER FUNCTIONS - ( 25 Dec 2022 06:54 )  Alb: 2.7 g/dL / Pro: 6.1 g/dL / ALK PHOS: 63 U/L / ALT: 13 U/L DA / AST: 8 U/L / GGT: x                   RADIOLOGY & ADDITIONAL TESTS:    Imaging Personally Reviewed:yes    Consultant(s) Notes Reviewed:  yes    Care Discussed with Consultants/Other Providers:yes

## 2022-12-26 ENCOUNTER — TRANSCRIPTION ENCOUNTER (OUTPATIENT)
Age: 72
End: 2022-12-26

## 2022-12-26 DIAGNOSIS — I95.9 HYPOTENSION, UNSPECIFIED: ICD-10-CM

## 2022-12-26 DIAGNOSIS — Z02.9 ENCOUNTER FOR ADMINISTRATIVE EXAMINATIONS, UNSPECIFIED: ICD-10-CM

## 2022-12-26 DIAGNOSIS — I10 ESSENTIAL (PRIMARY) HYPERTENSION: ICD-10-CM

## 2022-12-26 LAB
ALBUMIN SERPL ELPH-MCNC: 2.6 G/DL — LOW (ref 3.5–5)
ALP SERPL-CCNC: 64 U/L — SIGNIFICANT CHANGE UP (ref 40–120)
ALT FLD-CCNC: 13 U/L DA — SIGNIFICANT CHANGE UP (ref 10–60)
ANION GAP SERPL CALC-SCNC: 11 MMOL/L — SIGNIFICANT CHANGE UP (ref 5–17)
AST SERPL-CCNC: 8 U/L — LOW (ref 10–40)
BILIRUB SERPL-MCNC: 0.2 MG/DL — SIGNIFICANT CHANGE UP (ref 0.2–1.2)
BUN SERPL-MCNC: 51 MG/DL — HIGH (ref 7–18)
CALCIUM SERPL-MCNC: 8.9 MG/DL — SIGNIFICANT CHANGE UP (ref 8.4–10.5)
CHLORIDE SERPL-SCNC: 101 MMOL/L — SIGNIFICANT CHANGE UP (ref 96–108)
CO2 SERPL-SCNC: 26 MMOL/L — SIGNIFICANT CHANGE UP (ref 22–31)
CREAT SERPL-MCNC: 5.63 MG/DL — HIGH (ref 0.5–1.3)
EGFR: 10 ML/MIN/1.73M2 — LOW
GLUCOSE BLDC GLUCOMTR-MCNC: 179 MG/DL — HIGH (ref 70–99)
GLUCOSE BLDC GLUCOMTR-MCNC: 213 MG/DL — HIGH (ref 70–99)
GLUCOSE BLDC GLUCOMTR-MCNC: 254 MG/DL — HIGH (ref 70–99)
GLUCOSE BLDC GLUCOMTR-MCNC: 303 MG/DL — HIGH (ref 70–99)
GLUCOSE SERPL-MCNC: 242 MG/DL — HIGH (ref 70–99)
HCT VFR BLD CALC: 26.1 % — LOW (ref 39–50)
HGB BLD-MCNC: 7.9 G/DL — LOW (ref 13–17)
MAGNESIUM SERPL-MCNC: 2.3 MG/DL — SIGNIFICANT CHANGE UP (ref 1.6–2.6)
MCHC RBC-ENTMCNC: 30.3 GM/DL — LOW (ref 32–36)
MCHC RBC-ENTMCNC: 30.7 PG — SIGNIFICANT CHANGE UP (ref 27–34)
MCV RBC AUTO: 101.6 FL — HIGH (ref 80–100)
NRBC # BLD: 0 /100 WBCS — SIGNIFICANT CHANGE UP (ref 0–0)
PHOSPHATE SERPL-MCNC: 3.4 MG/DL — SIGNIFICANT CHANGE UP (ref 2.5–4.5)
PLATELET # BLD AUTO: 289 K/UL — SIGNIFICANT CHANGE UP (ref 150–400)
POTASSIUM SERPL-MCNC: 3.8 MMOL/L — SIGNIFICANT CHANGE UP (ref 3.5–5.3)
POTASSIUM SERPL-SCNC: 3.8 MMOL/L — SIGNIFICANT CHANGE UP (ref 3.5–5.3)
PROT SERPL-MCNC: 6 G/DL — SIGNIFICANT CHANGE UP (ref 6–8.3)
RBC # BLD: 2.57 M/UL — LOW (ref 4.2–5.8)
RBC # FLD: 15.9 % — HIGH (ref 10.3–14.5)
SODIUM SERPL-SCNC: 138 MMOL/L — SIGNIFICANT CHANGE UP (ref 135–145)
WBC # BLD: 7.6 K/UL — SIGNIFICANT CHANGE UP (ref 3.8–10.5)
WBC # FLD AUTO: 7.6 K/UL — SIGNIFICANT CHANGE UP (ref 3.8–10.5)

## 2022-12-26 PROCEDURE — 99232 SBSQ HOSP IP/OBS MODERATE 35: CPT

## 2022-12-26 RX ORDER — IRON SUCROSE 20 MG/ML
200 INJECTION, SOLUTION INTRAVENOUS
Refills: 0 | Status: COMPLETED | OUTPATIENT
Start: 2022-12-26 | End: 2022-12-29

## 2022-12-26 RX ORDER — INSULIN LISPRO 100/ML
2 VIAL (ML) SUBCUTANEOUS ONCE
Refills: 0 | Status: COMPLETED | OUTPATIENT
Start: 2022-12-26 | End: 2022-12-26

## 2022-12-26 RX ADMIN — REPAGLINIDE 0.5 MILLIGRAM(S): 1 TABLET ORAL at 16:20

## 2022-12-26 RX ADMIN — Medication 1: at 08:10

## 2022-12-26 RX ADMIN — OXYCODONE HYDROCHLORIDE 5 MILLIGRAM(S): 5 TABLET ORAL at 18:45

## 2022-12-26 RX ADMIN — SEVELAMER CARBONATE 1600 MILLIGRAM(S): 2400 POWDER, FOR SUSPENSION ORAL at 17:01

## 2022-12-26 RX ADMIN — POLYETHYLENE GLYCOL 3350 17 GRAM(S): 17 POWDER, FOR SOLUTION ORAL at 11:55

## 2022-12-26 RX ADMIN — Medication 2: at 16:19

## 2022-12-26 RX ADMIN — LACTULOSE 10 GRAM(S): 10 SOLUTION ORAL at 11:47

## 2022-12-26 RX ADMIN — PANTOPRAZOLE SODIUM 40 MILLIGRAM(S): 20 TABLET, DELAYED RELEASE ORAL at 05:45

## 2022-12-26 RX ADMIN — OXYCODONE HYDROCHLORIDE 5 MILLIGRAM(S): 5 TABLET ORAL at 17:46

## 2022-12-26 RX ADMIN — REPAGLINIDE 0.5 MILLIGRAM(S): 1 TABLET ORAL at 05:45

## 2022-12-26 RX ADMIN — Medication 100 MILLIGRAM(S): at 05:45

## 2022-12-26 RX ADMIN — Medication 100 MILLIGRAM(S): at 21:08

## 2022-12-26 RX ADMIN — Medication 1000 UNIT(S): at 11:46

## 2022-12-26 RX ADMIN — SEVELAMER CARBONATE 1600 MILLIGRAM(S): 2400 POWDER, FOR SUSPENSION ORAL at 08:20

## 2022-12-26 RX ADMIN — SEVELAMER CARBONATE 1600 MILLIGRAM(S): 2400 POWDER, FOR SUSPENSION ORAL at 11:46

## 2022-12-26 RX ADMIN — Medication 100 MILLIGRAM(S): at 13:33

## 2022-12-26 RX ADMIN — Medication 3: at 11:48

## 2022-12-26 RX ADMIN — Medication 2 UNIT(S): at 21:28

## 2022-12-26 RX ADMIN — REPAGLINIDE 0.5 MILLIGRAM(S): 1 TABLET ORAL at 11:46

## 2022-12-26 NOTE — PROGRESS NOTE ADULT - ASSESSMENT
72y Male with history of ESRD on HD presents with gross hematuria. Nephrology consulted for ESRD status.    1) ESRD: Last HD on 12/24 with intradialytic hypotension and 0.4L removed. Plan for next maintenance HD on 12/27. Monitor electrolytes.    2) HTN with ESRD: BP low normal. Continue with midodrine 10 mg pre-HD on HD days to avoid intradialytic hypotension. Monitor BP.    3) Anemia of renal disease: Hb decreasing likely due to gross hematuria. Epo increased to 10K with HD. Will give venofer 200 mg IV with dialysis X 2 doses given low TSAT. Monitor Hb.    4) Hyperphosphatemia: Phosphorus acceptable. Continue with renvela 2 tabs with meals. Monitor serum calcium and phosphorus.      [facility] NEPHROLOGY  Wili Hopkins M.D.  Kiran Feng D.O.  Sujatha Dumont M.D.  Radha Miller, KENYATTA, ANP-C    Telephone: [phone]  Facsimile: [phone]    [address] [place] [id number]

## 2022-12-26 NOTE — PROGRESS NOTE ADULT - ASSESSMENT
72 year old male PMH ESRD on HD, DM, PVD and right BKA coming in with hematuria.   CBI clamped     -Continue neumann catheter, monitor output   -Irrigate PRN   -Monitor/trend CBC, transfuse PRN   -HD as per nephrology   -F/u urine culture   -Care as per medicine   -Discussed with Dr. Beltran    72 year old male PMH ESRD on HD, DM, PVD and right BKA coming in with hematuria.   CBI clamped   -F/u AM labs   -Continue neumann catheter, monitor output   -Irrigate PRN   -Monitor/trend CBC, transfuse PRN   -HD as per nephrology   -F/u urine culture   -Care as per medicine   -Discussed with Dr. Beltran

## 2022-12-26 NOTE — DISCHARGE NOTE PROVIDER - HOSPITAL COURSE
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 Upon evaluation in the ED, patient found to be afebrile and hemodynamically stable. Labs showing hgb of 9.6 (baseline 10-11) with a leukocytosis of 10.6. Urology consulted in ED, patient is s/p CBI. Patient is admitted to medicine for hematuria, all A/C held hematuria resolved CBI clamped.   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 Upon evaluation in the ED, patient found to be afebrile and hemodynamically stable. Labs showing hgb of 9.6 (baseline 10-11) with a leukocytosis of 10.6. Urology consulted in ED, patient is s/p CBI. Patient is admitted to medicine for hematuria, all A/C held hematuria resolved CBI clamped. A/C resumed 12/28/22 will D/C with [address] and follow up with urology outpatient in 1 week for cystoscopy and TOV.   Given clinical course decision made to discharge patient back to facility will trend CBC Q3 days   Discharged discussed with attending   This is only a brief summary of patient's hospital stay, for full course please see EMR 72 year old male, coming from 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 Upon evaluation in the ED, patient found to be afebrile and hemodynamically stable. Labs showing hgb of 9.6 (baseline 10-11) with a leukocytosis of 10.6. Urology consulted in ED, patient is s/p CBI. Patient is admitted to medicine for hematuria, all A/C held hematuria resolved CBI clamped. A/C resumed 12/28/22 will D/C with [address] and follow up with urology outpatient in 1 week for cystoscopy and TOV.   Given clinical course decision made to discharge patient back to facility will trend CBC Q3 days.   Discharged discussed with attending.   This is only a brief summary of patient's hospital stay, for full course please see EMR. 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 Upon evaluation in the ED, patient found to be afebrile and hemodynamically stable. Labs showing hgb of 9.6 (baseline 10-11) with a leukocytosis of 10.6. Urology consulted in ED, patient is s/p CBI. Patient is admitted to medicine for hematuria, all A/C held hematuria resolved CBI clamped. A/C resumed 12/28/22 will D/C with [address] and follow up with urology outpatient in 1 week for cystoscopy and TOV- appointment made with DR. Beltran on 1/5 at 1pm@ [facility].   Pt has HD as scheduled, last HD 12/29, intra-dialysis CBC shows Hgb 7.3 trending down, ordered 1 unit PRBC. Pt is noted before Blood tx. s/p Tylenol.   Given clinical course decision made to discharge patient back to facility will trend CBC Q3 days.     Discharged discussed with attending.   This is only a brief summary of patient's hospital stay, for full course please see EMR. 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 Upon evaluation in the ED, patient found to be afebrile and hemodynamically stable. Labs showing hgb of 9.6 (baseline 10-11) with a leukocytosis of 10.6. Urology consulted in ED, patient is s/p CBI. Patient is admitted to medicine for hematuria, all A/C held hematuria resolved CBI clamped. A/C resumed 12/28/22 will D/C with [address] and follow up with urology outpatient in 1 week for cystoscopy and TOV- appointment made with DR. Beltran on 1/5 at 1pm@ [facility].   Pt has HD as scheduled, last HD 12/29, intra-dialysis CBC shows Hgb 7.3 trending down, ordered 1 unit PRBC. Pt is noted before Blood tx. s/p Tylenol. Noted tachycardic before and  post HD. EKG shows   Given clinical course decision made to discharge patient back to facility will trend CBC Q3 days.     Discharged discussed with attending.   This is only a brief summary of patient's hospital stay, for full course please see EMR. 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 Upon evaluation in the ED, patient found to be afebrile and hemodynamically stable. Labs showing hgb of 9.6 (baseline 10-11) with a leukocytosis of 10.6. Urology consulted in ED, patient is s/p CBI. Patient is admitted to medicine for hematuria, all A/C held hematuria resolved CBI clamped. A/C resumed 12/28/22 will D/C with [address] and follow up with urology outpatient in 1 week for cystoscopy and TOV- appointment made with DR. Beltran on 1/5 at 1pm@ [facility].   Pt has HD as scheduled, last HD 12/29, intra-dialysis CBC shows Hgb 7.3 trending down, ordered 1 unit PRBC. Pt is noted before Blood tx. s/p Tylenol. Noted tachycardic before and  post HD. EKG shows Sinus tachycardia, asymptomatic. Discussed with cardiology dr. Babcock. no new medication required or further intervention. Advised to monitor symptoms at nursing facility.    Given clinical course decision made to discharge patient back to facility will trend CBC Q3 days.     Discharged discussed with attending.   This is only a brief summary of patient's hospital stay, for full course please see EMR.

## 2022-12-26 NOTE — PROGRESS NOTE ADULT - SUBJECTIVE AND OBJECTIVE BOX
INTERVAL HPI/OVERNIGHT EVENTS:  Pt seen and examined at bedside.   Pt resting comfortably. No acute complaints.   CBI clear, clamped   No suprapubic tenderness or back pain     MEDICATIONS  (STANDING):  cholecalciferol 1000 Unit(s) Oral daily  epoetin nyasia-epbx (RETACRIT) Injectable 94706 Unit(s) IV Push <User Schedule>  influenza  Vaccine (HIGH DOSE) 0.7 milliLiter(s) IntraMuscular once  insulin lispro (ADMELOG) corrective regimen sliding scale   SubCutaneous three times a day before meals  lactulose Syrup 10 Gram(s) Oral daily  midodrine. 10 milliGRAM(s) Oral <User Schedule>  pantoprazole    Tablet 40 milliGRAM(s) Oral before breakfast  phenazopyridine 100 milliGRAM(s) Oral every 8 hours  polyethylene glycol 3350 17 Gram(s) Oral daily  repaglinide 0.5 milliGRAM(s) Oral three times a day before meals  sevelamer carbonate 1600 milliGRAM(s) Oral three times a day with meals    MEDICATIONS  (PRN):  acetaminophen     Tablet .. 650 milliGRAM(s) Oral every 6 hours PRN Mild Pain (1 - 3)  bisacodyl 5 milliGRAM(s) Oral at bedtime PRN Constipation  oxyCODONE    IR 5 milliGRAM(s) Oral every 6 hours PRN Severe Pain (7 - 10)      Vital Signs Last 24 Hrs  T(C): 36.6 (26 Dec 2022 05:26), Max: 37.2 (25 Dec 2022 20:20)  T(F): 97.9 (26 Dec 2022 05:26), Max: 98.9 (25 Dec 2022 20:20)  HR: 81 (26 Dec 2022 05:26) (81 - 106)  BP: 125/62 (26 Dec 2022 05:26) (108/65 - 125/62)  BP(mean): --  RR: 17 (26 Dec 2022 05:26) (17 - 18)  SpO2: 100% (26 Dec 2022 05:26) (97% - 100%)    Parameters below as of 26 Dec 2022 05:26  Patient On (Oxygen Delivery Method): room air        Physical:  General: A&Ox3. NAD.  Respirations: Unlabored   Abdomen: Soft nondistended, nontender. No rebound, no guarding, no peritonitis, no suprapubic tenderness   Back: No CVA tenderness b/l     I&O's Detail    25 Dec 2022 07:01  -  26 Dec 2022 07:00  --------------------------------------------------------  IN:    Continuous Bladder Irrigation (mL): 96671 mL  Total IN: 10040 mL    OUT:    Continuous Bladder Irrigation (mL): 90369 mL  Total OUT: 81838 mL    Total NET: -9900 mL          LABS:                        7.9    8.26  )-----------( 293      ( 25 Dec 2022 06:54 )             25.7             12-25    139  |  102  |  39<H>  ----------------------------<  211<H>  3.8   |  29  |  4.40<H>    Ca    9.0      25 Dec 2022 06:54  Phos  3.6     12-25  Mg     2.3     12-25    TPro  6.1  /  Alb  2.7<L>  /  TBili  0.2  /  DBili  x   /  AST  8<L>  /  ALT  13  /  AlkPhos  63  12-25

## 2022-12-26 NOTE — PROGRESS NOTE ADULT - ASSESSMENT
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 Upon evaluation in the ED, patient found to be afebrile and hemodynamically stable. Labs showing hgb of 9.6 (baseline 10-11) with a leukocytosis of 10.6. Urology consulted in ED, patient is s/p CBI. Patient is admitted to medicine for hematuria, all A/C held hematuria resolved CBI clamped.

## 2022-12-26 NOTE — PROGRESS NOTE ADULT - SUBJECTIVE AND OBJECTIVE BOX
NP Note discussed with  primary attending    Patient is a 72y old  Male who presents with a chief complaint of Hematuria (26 Dec 2022 10:20)      INTERVAL HPI/OVERNIGHT EVENTS: discomfort to neumann insertion site     MEDICATIONS  (STANDING):  cholecalciferol 1000 Unit(s) Oral daily  epoetin nyasia-epbx (RETACRIT) Injectable 44656 Unit(s) IV Push <User Schedule>  influenza  Vaccine (HIGH DOSE) 0.7 milliLiter(s) IntraMuscular once  insulin lispro (ADMELOG) corrective regimen sliding scale   SubCutaneous three times a day before meals  iron sucrose Injectable 200 milliGRAM(s) IV Push <User Schedule>  lactulose Syrup 10 Gram(s) Oral daily  midodrine. 10 milliGRAM(s) Oral <User Schedule>  pantoprazole    Tablet 40 milliGRAM(s) Oral before breakfast  phenazopyridine 100 milliGRAM(s) Oral every 8 hours  polyethylene glycol 3350 17 Gram(s) Oral daily  repaglinide 0.5 milliGRAM(s) Oral three times a day before meals  sevelamer carbonate 1600 milliGRAM(s) Oral three times a day with meals    MEDICATIONS  (PRN):  acetaminophen     Tablet .. 650 milliGRAM(s) Oral every 6 hours PRN Mild Pain (1 - 3)  bisacodyl 5 milliGRAM(s) Oral at bedtime PRN Constipation  oxyCODONE    IR 5 milliGRAM(s) Oral every 6 hours PRN Severe Pain (7 - 10)      __________________________________________________  REVIEW OF SYSTEMS:    CONSTITUTIONAL: No fever,   EYES: no acute visual disturbances  NECK: No pain or stiffness  RESPIRATORY: No cough; No shortness of breath  CARDIOVASCULAR: No chest pain, no palpitations  GASTROINTESTINAL: No pain. No nausea or vomiting; No diarrhea   NEUROLOGICAL: No headache or numbness, no tremors  MUSCULOSKELETAL: No joint pain, no muscle pain  GENITOURINARY: no dysuria, no frequency, no hesitancy  PSYCHIATRY: no depression , no anxiety  ALL OTHER  ROS negative        Vital Signs Last 24 Hrs  T(C): 36.6 (26 Dec 2022 12:08), Max: 37.2 (25 Dec 2022 20:20)  T(F): 97.9 (26 Dec 2022 12:08), Max: 98.9 (25 Dec 2022 20:20)  HR: 83 (26 Dec 2022 12:08) (81 - 106)  BP: 138/65 (26 Dec 2022 12:08) (119/56 - 138/65)  BP(mean): --  RR: 17 (26 Dec 2022 12:08) (17 - 18)  SpO2: 100% (26 Dec 2022 12:08) (100% - 100%)    Parameters below as of 26 Dec 2022 12:08  Patient On (Oxygen Delivery Method): room air        ________________________________________________  PHYSICAL EXAM:  GENERAL: NAD  HEENT: Normocephalic;  conjunctivae and sclerae clear;   NECK : supple  CHEST/LUNG: Clear to ausculitation bilaterally with good air entry   HEART: S1 S2  regular; no murmurs, gallops or rubs  ABDOMEN: Soft, Nontender, Nondistended; Bowel sounds present  : NEUMANN in place CBI clamped clear yellow urine in bedside drainage bag   EXTREMITIES: +right BKA, no cyanosis; no edema; no calf tenderness  RIJ TDC intact, R BKA, LUE AVF without bruit/thrill  SKIN: warm and dry; no rash  NERVOUS SYSTEM:  Awake and alert; Oriented  to place, person and time   _________________________________________________  LABS:                        7.9    7.60  )-----------( 289      ( 26 Dec 2022 08:43 )             26.1     12-26    138  |  101  |  51<H>  ----------------------------<  242<H>  3.8   |  26  |  5.63<H>    Ca    8.9      26 Dec 2022 08:43  Phos  3.4     12-26  Mg     2.3     12-26    TPro  6.0  /  Alb  2.6<L>  /  TBili  0.2  /  DBili  x   /  AST  8<L>  /  ALT  13  /  AlkPhos  64  12-26        CAPILLARY BLOOD GLUCOSE      POCT Blood Glucose.: 254 mg/dL (26 Dec 2022 11:08)  POCT Blood Glucose.: 179 mg/dL (26 Dec 2022 07:58)  POCT Blood Glucose.: 223 mg/dL (25 Dec 2022 21:09)  POCT Blood Glucose.: 151 mg/dL (25 Dec 2022 16:13)        RADIOLOGY & ADDITIONAL TESTS: < from: Xray Foot AP + Lateral + Oblique, Left (12.13.22 @ 09:27) >  IMPRESSION:    No acute radiographic osseous pathology.    --- End of Report ---    < end of copied text >  < from: CT Abdomen and Pelvis w/wo IV Cont (12.10.22 @ 11:39) >  IMPRESSION: Moderate left and mild right hydronephrosis.    Limited evaluation for urothelial lesion. Mild debris in the bladder.    A few punctate renal calculi.    Large colonic stool burden.    < end of copied text >      Imaging Personally Reviewed:  YES/  Consultant(s) Notes Reviewed:   YES/    Plan of care was discussed with patient and /or primary care giver; all questions and concerns were addressed and care was aligned with patient's wishes.

## 2022-12-26 NOTE — PROGRESS NOTE ADULT - SUBJECTIVE AND OBJECTIVE BOX
[facility] NEPHROLOGY- PROGRESS NOTE    72y Male with history of ESRD on HD presents with gross hematuria. Nephrology consulted for ESRD status.    REVIEW OF SYSTEMS:  Gen: no fevers  Cards: no chest pain  Resp: no dyspnea  GI: no nausea or vomiting or diarrhea  : no gross hematuria  Vascular: no LE edema    No Known Allergies      Hospital Medications: Medications reviewed      VITALS:  T(F): 97.9 (12-26-22 @ 05:26), Max: 98.9 (12-25-22 @ 20:20)  HR: 81 (12-26-22 @ 05:26)  BP: 125/62 (12-26-22 @ 05:26)  RR: 17 (12-26-22 @ 05:26)  SpO2: 100% (12-26-22 @ 05:26)  Wt(kg): --    12-25 @ 07:01  -  12-26 @ 07:00  --------------------------------------------------------  IN: 54913 mL / OUT: 57547 mL / NET: -9900 mL        PHYSICAL EXAM:    Gen: NAD, calm  Cards: RRR, +S1/S2, no M/G/R  Resp: CTA B/L  GI: soft, NT/ND, NABS  : + neumann with yellow urine and CBI on  Vascular: no LE edema B/L, RIJ TDC intact, R BKA, LUE AVF without bruit/thrill      LABS:  12-26    138  |  101  |  51<H>  ----------------------------<  242<H>  3.8   |  26  |  5.63<H>    Ca    8.9      26 Dec 2022 08:43  Phos  3.4     12-26  Mg     2.3     12-26    TPro  6.0  /  Alb  2.6<L>  /  TBili  0.2  /  DBili      /  AST  8<L>  /  ALT  13  /  AlkPhos  64  12-26    Creatinine Trend: 5.63 <--, 4.40 <--, 5.41 <--, 4.78 <--                        7.9    7.60  )-----------( 289      ( 26 Dec 2022 08:43 )             26.1     Urine Studies:            RADIOLOGY & ADDITIONAL STUDIES: [facility] NEPHROLOGY- PROGRESS NOTE    72y Male with history of ESRD on HD presents with gross hematuria. Nephrology consulted for ESRD status.    REVIEW OF SYSTEMS:  Gen: no fevers  Cards: no chest pain  Resp: no dyspnea  GI: no nausea or vomiting or diarrhea  : no gross hematuria  Vascular: no LE edema    No Known Allergies      Hospital Medications: Medications reviewed      VITALS:  T(F): 97.9 (12-26-22 @ 05:26), Max: 98.9 (12-25-22 @ 20:20)  HR: 81 (12-26-22 @ 05:26)  BP: 125/62 (12-26-22 @ 05:26)  RR: 17 (12-26-22 @ 05:26)  SpO2: 100% (12-26-22 @ 05:26)  Wt(kg): --    12-25 @ 07:01  -  12-26 @ 07:00  --------------------------------------------------------  IN: 40284 mL / OUT: 27544 mL / NET: -9900 mL        PHYSICAL EXAM:    Gen: NAD, calm  Cards: RRR, +S1/S2, no M/G/R  Resp: CTA B/L  GI: soft, NT/ND, NABS  : + neumann with yellow urine and CBI clamped  Vascular: no LE edema B/L, RIJ TDC intact, R BKA, LUE AVF without bruit/thrill      LABS:  12-26    138  |  101  |  51<H>  ----------------------------<  242<H>  3.8   |  26  |  5.63<H>    Ca    8.9      26 Dec 2022 08:43  Phos  3.4     12-26  Mg     2.3     12-26    TPro  6.0  /  Alb  2.6<L>  /  TBili  0.2  /  DBili      /  AST  8<L>  /  ALT  13  /  AlkPhos  64  12-26    Creatinine Trend: 5.63 <--, 4.40 <--, 5.41 <--, 4.78 <--                        7.9    7.60  )-----------( 289      ( 26 Dec 2022 08:43 )             26.1     Urine Studies:            RADIOLOGY & ADDITIONAL STUDIES:

## 2022-12-26 NOTE — DISCHARGE NOTE PROVIDER - CARE PROVIDER_API CALL
YORDAN [facility]  Internal Medicine  [address] [place] [address]  Phone: [phone]  Fax: ()-  Follow Up Time: 1 week    Edson Beltran)  Urology  [address] [facility] [address]  [place] [id number]  Phone: [phone]  Fax: [phone]  Follow Up Time: 1 week    Kiran Feng (DO)  Internal Medicine  [address] [place] [address]  Phone: [phone]  Fax: [phone]  Follow Up Time: 1 week   YORDAN [facility]  Internal Medicine  [address] [place] [address]  Phone: ()-  Fax: ()-  Follow Up Time: 1 week    Edson Beltran)  Urology  [address] [facility] [address]  [place] [address]  Phone: [phone]  Fax: [phone]  Scheduled Appointment: 01/05/2023 01:00 PM    Kiran Feng (DO)  Internal Medicine  [address] [place] [address]  Phone: [phone]  Fax: [phone]  Follow Up Time: 1 week

## 2022-12-26 NOTE — DISCHARGE NOTE PROVIDER - NSDCFUADDAPPT_GEN_ALL_CORE_FT
Urology f/u with dr. Beltran at 1/5/23, 1pm  [address] [facility] [place]  [address]nd Floor, [address]

## 2022-12-26 NOTE — DISCHARGE NOTE PROVIDER - PROVIDER TOKENS
PROVIDER:[TOKEN:[[id number]],FOLLOWUP:[1 week]],PROVIDER:[TOKEN:[[id number]],FOLLOWUP:[1 week]],PROVIDER:[TOKEN:[[id number]],FOLLOWUP:[1 week]] PROVIDER:[TOKEN:[[id number]],FOLLOWUP:[1 week]],PROVIDER:[TOKEN:[[id number]],SCHEDULEDAPPT:[01/05/2023],SCHEDULEDAPPTTIME:[01:00 PM]],PROVIDER:[TOKEN:[[id number]],FOLLOWUP:[1 week]]

## 2022-12-26 NOTE — PROGRESS NOTE ADULT - SUBJECTIVE AND OBJECTIVE BOX
Patient is a 72y old  Male who presents with a chief complaint of Hematuria (26 Dec 2022 10:20)      INTERVAL HPI/OVERNIGHT EVENTS: discomfort to neumann insertion site     MEDICATIONS  (STANDING):  cholecalciferol 1000 Unit(s) Oral daily  epoetin nyasia-epbx (RETACRIT) Injectable 31308 Unit(s) IV Push <User Schedule>  influenza  Vaccine (HIGH DOSE) 0.7 milliLiter(s) IntraMuscular once  insulin lispro (ADMELOG) corrective regimen sliding scale   SubCutaneous three times a day before meals  iron sucrose Injectable 200 milliGRAM(s) IV Push <User Schedule>  lactulose Syrup 10 Gram(s) Oral daily  midodrine. 10 milliGRAM(s) Oral <User Schedule>  pantoprazole    Tablet 40 milliGRAM(s) Oral before breakfast  phenazopyridine 100 milliGRAM(s) Oral every 8 hours  polyethylene glycol 3350 17 Gram(s) Oral daily  repaglinide 0.5 milliGRAM(s) Oral three times a day before meals  sevelamer carbonate 1600 milliGRAM(s) Oral three times a day with meals    MEDICATIONS  (PRN):  acetaminophen     Tablet .. 650 milliGRAM(s) Oral every 6 hours PRN Mild Pain (1 - 3)  bisacodyl 5 milliGRAM(s) Oral at bedtime PRN Constipation  oxyCODONE    IR 5 milliGRAM(s) Oral every 6 hours PRN Severe Pain (7 - 10)      __________________________________________________  REVIEW OF SYSTEMS:    CONSTITUTIONAL: No fever,   EYES: no acute visual disturbances  NECK: No pain or stiffness  RESPIRATORY: No cough; No shortness of breath  CARDIOVASCULAR: No chest pain, no palpitations  GASTROINTESTINAL: No pain. No nausea or vomiting; No diarrhea   NEUROLOGICAL: No headache or numbness, no tremors  MUSCULOSKELETAL: No joint pain, no muscle pain  GENITOURINARY: no dysuria, no frequency, no hesitancy  PSYCHIATRY: no depression , no anxiety  ALL OTHER  ROS negative        Vital Signs Last 24 Hrs  T(C): 36.6 (26 Dec 2022 12:08), Max: 37.2 (25 Dec 2022 20:20)  T(F): 97.9 (26 Dec 2022 12:08), Max: 98.9 (25 Dec 2022 20:20)  HR: 83 (26 Dec 2022 12:08) (81 - 106)  BP: 138/65 (26 Dec 2022 12:08) (119/56 - 138/65)  BP(mean): --  RR: 17 (26 Dec 2022 12:08) (17 - 18)  SpO2: 100% (26 Dec 2022 12:08) (100% - 100%)    Parameters below as of 26 Dec 2022 12:08  Patient On (Oxygen Delivery Method): room air        ________________________________________________  PHYSICAL EXAM:  GENERAL: NAD  HEENT: Normocephalic;  conjunctivae and sclerae clear;   NECK : supple  CHEST/LUNG: dec breath sounds at bases  HEART: S1 S2  regular; no murmurs, gallops or rubs  ABDOMEN: Soft, Nontender, Nondistended; Bowel sounds present  : NEUMANN in place CBI clamped clear yellow urine in bedside drainage bag   EXTREMITIES: +right BKA, no cyanosis; no edema; no calf tenderness  RIJ TDC intact, R BKA, LUE AVF without bruit/thrill  SKIN: warm and dry; no rash  NERVOUS SYSTEM:  Awake and alert;  _________________________________________________  LABS:                        7.9    7.60  )-----------( 289      ( 26 Dec 2022 08:43 )             26.1     12-26    138  |  101  |  51<H>  ----------------------------<  242<H>  3.8   |  26  |  5.63<H>    Ca    8.9      26 Dec 2022 08:43  Phos  3.4     12-26  Mg     2.3     12-26    TPro  6.0  /  Alb  2.6<L>  /  TBili  0.2  /  DBili  x   /  AST  8<L>  /  ALT  13  /  AlkPhos  64  12-26        CAPILLARY BLOOD GLUCOSE      POCT Blood Glucose.: 254 mg/dL (26 Dec 2022 11:08)  POCT Blood Glucose.: 179 mg/dL (26 Dec 2022 07:58)  POCT Blood Glucose.: 223 mg/dL (25 Dec 2022 21:09)  POCT Blood Glucose.: 151 mg/dL (25 Dec 2022 16:13)        RADIOLOGY & ADDITIONAL TESTS: < from: Xray Foot AP + Lateral + Oblique, Left (12.13.22 @ 09:27) >  IMPRESSION:    No acute radiographic osseous pathology.    --- End of Report ---    < end of copied text >  < from: CT Abdomen and Pelvis w/wo IV Cont (12.10.22 @ 11:39) >  IMPRESSION: Moderate left and mild right hydronephrosis.    Limited evaluation for urothelial lesion. Mild debris in the bladder.    A few punctate renal calculi.    Large colonic stool burden.    < end of copied text >      Imaging Personally Reviewed:  YES/  Consultant(s) Notes Reviewed:   YES/    Plan of care was discussed with patient and /or primary care giver; all questions and concerns were addressed and care was aligned with patient's wishes.

## 2022-12-26 NOTE — DISCHARGE NOTE PROVIDER - NSDCMRMEDTOKEN_GEN_ALL_CORE_FT
aspirin 81 mg oral delayed release tablet: 1 tab(s) orally once a day  bisacodyl 5 mg oral delayed release tablet: 1 tab(s) orally once a day, As Needed  cholecalciferol 25 mcg (1000 intl units) oral tablet: 2 tab(s) orally once a day  clopidogrel 75 mg oral tablet: 1 tab(s) orally once a day  lactulose 10 g/15 mL oral syrup: 15 milliliter(s) orally once a day  midodrine 5 mg oral tablet: 1 tab(s) orally once a day, As Needed  MiraLax oral powder for reconstitution: 1 application orally once a day  pantoprazole 40 mg oral delayed release tablet: 1 tab(s) orally once a day  repaglinide 0.5 mg oral tablet: 1 tab(s) orally 3 times a day (before meals)  sevelamer carbonate 800 mg oral tablet: 1 tab(s) orally 3 times a day (with meals)  Vitamin B Complex 100: 1 tab(s) orally once a day   acetaminophen 325 mg oral tablet: 2 tab(s) orally every 6 hours, As needed, Mild Pain (1 - 3)  aspirin 81 mg oral delayed release tablet: 1 tab(s) orally once a day  bisacodyl 5 mg oral delayed release tablet: 1 tab(s) orally once a day, As Needed  cholecalciferol 25 mcg (1000 intl units) oral tablet: 2 tab(s) orally once a day  clopidogrel 75 mg oral tablet: 1 tab(s) orally once a day  epoetin nyasia: 10592 unit(s) intravenous Tuesday, Thursday, Saturday on HD days   lactulose 10 g/15 mL oral syrup: 15 milliliter(s) orally once a day  midodrine 5 mg oral tablet: 1 tab(s) orally once a day, As Needed  MiraLax oral powder for reconstitution: 1 application orally once a day  pantoprazole 40 mg oral delayed release tablet: 1 tab(s) orally once a day  repaglinide 0.5 mg oral tablet: 1 tab(s) orally 3 times a day (before meals)  sevelamer carbonate 800 mg oral tablet: 1 tab(s) orally 3 times a day (with meals)  Vitamin B Complex 100: 1 tab(s) orally once a day

## 2022-12-26 NOTE — DISCHARGE NOTE PROVIDER - NSDCCPCAREPLAN_GEN_ALL_CORE_FT
PRINCIPAL DISCHARGE DIAGNOSIS  Diagnosis: Gross hematuria  Assessment and Plan of Treatment: You presented to the hospital with complaints of blood in your urine. You were started on continious bladder irrigation and followed by the urology team. You blood thinner was held and this has now resolved, your hemoglobin remain stable, you are advised to -*-*-*      SECONDARY DISCHARGE DIAGNOSES  Diagnosis: PAD (peripheral artery disease)  Assessment and Plan of Treatment: Peripheral artery disease (PAD) is narrow, weak, or blocked arteries. It may affect any arteries outside of your heart and brain. PAD is usually the result of a buildup of fat and cholesterol, also called plaque, along your artery walls. Inflammation, a blood clot, or abnormal cell growth could also block your arteries. PAD prevents normal blood flow to your legs and arms. You are at risk of an amputation if poor blood flow keeps wounds from healing or causes gangrene (tissue death). It is reccomended that you **-*-*-*-*-*-*-*-    Diagnosis: Hypotension  Assessment and Plan of Treatment: You have a history of low blood pressure, continue your reccomended dose of midodrine. Stop the medication if your blood pressure is above 120. Monitor your blood pressure before you take the medical    Diagnosis: DM (diabetes mellitus)  Assessment and Plan of Treatment: Your HgA1C was 6.2 this admission.  Make sure you get your HgA1c checked every three months.  If you take oral diabetes medications, check your blood glucose two times a day.  If you take insulin, check your blood glucose before meals and at bedtime.  It's important not to skip any meals.  Keep a log of your blood glucose results and always take it with you to your doctor appointments.  Keep a list of your current medications including injectables and over the counter medications and bring this medication list with you to all your doctor appointments.  If you have not seen your ophthalmologist this year call for appointment.  Check your feet daily for redness, sores, or openings. Do not self treat. If no improvement in two days call your primary care physician for an appointment.    Diagnosis: ESRD on hemodialysis  Assessment and Plan of Treatment: You have a history of end stage renal disease, it is important to continue your hemodialysis as reccomended by your nephrologist. Avoid taking (NSAIDs) - (ex: Ibuprofen, Advil, Celebrex, Naprosyn)  Avoid taking any nephrotoxic agents (can harm kidneys) - Intravenous contrast for diagnostic testing, combination cold medications.  Have all medications adjusted for your renal function by your Health Care Provider. Blood pressure control is important.  Take all medication as prescribed.       PRINCIPAL DISCHARGE DIAGNOSIS  Diagnosis: Gross hematuria  Assessment and Plan of Treatment: You presented to the hospital with complaints of blood in your urine. You were started on continious bladder irrigation and followed by the urology team. You blood thinner was held and this has now resolved, your hemoglobin remain stable, you are advised to keep the 3 way neumann in place and follow up with Dr Beltran in 1 week to have an outpatient cystoscopy. You may resume your anticoagulation but must have your hemoglobin rechecked in 3 days      SECONDARY DISCHARGE DIAGNOSES  Diagnosis: PAD (peripheral artery disease)  Assessment and Plan of Treatment: Peripheral artery disease (PAD) is narrow, weak, or blocked arteries. It may affect any arteries outside of your heart and brain. PAD is usually the result of a buildup of fat and cholesterol, also called plaque, along your artery walls. Inflammation, a blood clot, or abnormal cell growth could also block your arteries. PAD prevents normal blood flow to your legs and arms. You are at risk of an amputation if poor blood flow keeps wounds from healing or causes gangrene (tissue death). You have been cleared to resume your anticoagulation but you must have your hemoglobin monitored in 3 days    Diagnosis: Hypotension  Assessment and Plan of Treatment: You have a history of low blood pressure, continue your reccomended dose of midodrine. Stop the medication if your blood pressure is above 120. Monitor your blood pressure before you take the medical    Diagnosis: DM (diabetes mellitus)  Assessment and Plan of Treatment: Your HgA1C was 6.2 this admission.  Make sure you get your HgA1c checked every three months.  If you take oral diabetes medications, check your blood glucose two times a day.  If you take insulin, check your blood glucose before meals and at bedtime.  It's important not to skip any meals.  Keep a log of your blood glucose results and always take it with you to your doctor appointments.  Keep a list of your current medications including injectables and over the counter medications and bring this medication list with you to all your doctor appointments.  If you have not seen your ophthalmologist this year call for appointment.  Check your feet daily for redness, sores, or openings. Do not self treat. If no improvement in two days call your primary care physician for an appointment.    Diagnosis: ESRD on hemodialysis  Assessment and Plan of Treatment: You have a history of end stage renal disease, it is important to continue your hemodialysis as reccomended by your nephrologist. Avoid taking (NSAIDs) - (ex: Ibuprofen, Advil, Celebrex, Naprosyn)  Avoid taking any nephrotoxic agents (can harm kidneys) - Intravenous contrast for diagnostic testing, combination cold medications.  Have all medications adjusted for your renal function by your Health Care Provider. Blood pressure control is important.  Take all medication as prescribed.       PRINCIPAL DISCHARGE DIAGNOSIS  Diagnosis: Gross hematuria  Assessment and Plan of Treatment: You presented to the hospital with complaints of blood in your urine. You were started on continious bladder irrigation and followed by the urology team. You blood thinner was held and this has now resolved, your hemoglobin remain stable, you are advised to keep the 3 way neumann in place.   You received 1 unit of blood transfusion for anemia.   Follow up with Dr Beltran in 1 week to have an outpatient cystoscopy-1/5/23 -1pm at [facility].  You may resume your anticoagulation but must have your hemoglobin rechecked in 3 days  Monitor CBC to monitor anemia.      SECONDARY DISCHARGE DIAGNOSES  Diagnosis: ESRD on hemodialysis  Assessment and Plan of Treatment: You have a history of end stage renal disease, it is important to continue your hemodialysis as reccomended by your nephrologist. Avoid taking (NSAIDs) - (ex: Ibuprofen, Advil, Celebrex, Naprosyn)  Avoid taking any nephrotoxic agents (can harm kidneys) - Intravenous contrast for diagnostic testing, combination cold medications.  Have all medications adjusted for your renal function by your Health Care Provider. Blood pressure control is important.  Take all medication as prescribed.      Diagnosis: Hypotension  Assessment and Plan of Treatment: You have a history of low blood pressure, continue your reccomended dose of midodrine. Stop the medication if your blood pressure is above 120. Monitor your blood pressure before you take the medical    Diagnosis: DM (diabetes mellitus)  Assessment and Plan of Treatment: Your HgA1C was 6.2 this admission.  Make sure you get your HgA1c checked every three months.  If you take oral diabetes medications, check your blood glucose two times a day.  If you take insulin, check your blood glucose before meals and at bedtime.  It's important not to skip any meals.  Keep a log of your blood glucose results and always take it with you to your doctor appointments.  Keep a list of your current medications including injectables and over the counter medications and bring this medication list with you to all your doctor appointments.  If you have not seen your ophthalmologist this year call for appointment.  Check your feet daily for redness, sores, or openings. Do not self treat. If no improvement in two days call your primary care physician for an appointment.    Diagnosis: PAD (peripheral artery disease)  Assessment and Plan of Treatment: Peripheral artery disease (PAD) is narrow, weak, or blocked arteries. It may affect any arteries outside of your heart and brain. PAD is usually the result of a buildup of fat and cholesterol, also called plaque, along your artery walls. Inflammation, a blood clot, or abnormal cell growth could also block your arteries. PAD prevents normal blood flow to your legs and arms. You are at risk of an amputation if poor blood flow keeps wounds from healing or causes gangrene (tissue death). You have been cleared to resume your anticoagulation but you must have your hemoglobin monitored in 3 days    Diagnosis: Anemia secondary to renal failure  Assessment and Plan of Treatment: You received 1 unit blood transfusion for low hemoglobin level 7.3.   Received Epogen during dialysis.   Please monitor CBC with dialysis at nursing facility to monitor anemia.   Symptoms to report, bleeding, palpitations, fatigue, pale skin, cold skin, dizziness. Take medications as ordered by nephrology.        PRINCIPAL DISCHARGE DIAGNOSIS  Diagnosis: Gross hematuria  Assessment and Plan of Treatment: You presented to the hospital with complaints of blood in your urine. You were started on continious bladder irrigation and followed by the urology team. You blood thinner was held and this has now resolved, your hemoglobin remain stable, you are advised to keep the 3 way neumann in place.   You received 1 unit of blood transfusion for anemia.   Follow up with Dr Beltran in 1 week to have an outpatient cystoscopy-1/5/23 -1pm at [facility].  You may resume your anticoagulation but must have your hemoglobin rechecked in 3 days  Monitor CBC to monitor anemia.      SECONDARY DISCHARGE DIAGNOSES  Diagnosis: ESRD on hemodialysis  Assessment and Plan of Treatment: You have a history of end stage renal disease, it is important to continue your hemodialysis as reccomended by your nephrologist. Avoid taking (NSAIDs) - (ex: Ibuprofen, Advil, Celebrex, Naprosyn)  Avoid taking any nephrotoxic agents (can harm kidneys) - Intravenous contrast for diagnostic testing, combination cold medications.  Have all medications adjusted for your renal function by your Health Care Provider. Blood pressure control is important.  Take all medication as prescribed.      Diagnosis: Hypotension  Assessment and Plan of Treatment: You have a history of low blood pressure, continue your reccomended dose of midodrine. Stop the medication if your blood pressure is above 120. Monitor your blood pressure before you take the medication.    Diagnosis: DM (diabetes mellitus)  Assessment and Plan of Treatment: Your HgA1C was 6.2 this admission.  Make sure you get your HgA1c checked every three months.  If you take oral diabetes medications, check your blood glucose two times a day.  If you take insulin, check your blood glucose before meals and at bedtime.  It's important not to skip any meals.  Keep a log of your blood glucose results and always take it with you to your doctor appointments.  Keep a list of your current medications including injectables and over the counter medications and bring this medication list with you to all your doctor appointments.  If you have not seen your ophthalmologist this year call for appointment.  Check your feet daily for redness, sores, or openings. Do not self treat. If no improvement in two days call your primary care physician for an appointment.    Diagnosis: PAD (peripheral artery disease)  Assessment and Plan of Treatment: Peripheral artery disease (PAD) is narrow, weak, or blocked arteries. It may affect any arteries outside of your heart and brain. PAD is usually the result of a buildup of fat and cholesterol, also called plaque, along your artery walls. Inflammation, a blood clot, or abnormal cell growth could also block your arteries. PAD prevents normal blood flow to your legs and arms. You are at risk of an amputation if poor blood flow keeps wounds from healing or causes gangrene (tissue death). You have been cleared to resume your anticoagulation but you must have your hemoglobin monitored in 3 days    Diagnosis: Anemia secondary to renal failure  Assessment and Plan of Treatment: You received 1 unit blood transfusion for low hemoglobin level 7.3.   Received Epogen during dialysis.   Please monitor CBC with dialysis at nursing facility to monitor anemia.   Symptoms to report, bleeding, palpitations, fatigue, pale skin, cold skin, dizziness. Take medications as ordered by nephrology.       Diagnosis: Sinus tachycardia  Assessment and Plan of Treatment: Monitored for episodes tachycardia post dialysis and during blood transfusion 12/29. Heart rate between 90s and 105. EKG shows sinus tachycardia. Asymptomatic of palpitation, dizziness, shortness of breath or chest pain. Discussed with cardiology dr. Babcock. No need for new medications or further intervention this time. Contnue monitor symptoms at nursing facility.

## 2022-12-26 NOTE — PROGRESS NOTE ADULT - ASSESSMENT
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 Upon evaluation in the ED, patient found to be afebrile and hemodynamically stable. Labs showing hgb of 9.6 (baseline 10-11) with a leukocytosis of 10.6. Urology consulted in ED, patient is s/p CBI. Patient is admitted to medicine for hematuria, all A/C held hematuria resolved CBI clamped.

## 2022-12-26 NOTE — DISCHARGE NOTE PROVIDER - NSDCFUSCHEDAPPT_GEN_ALL_CORE_FT
Edson Beltran  [facility] Physician [facility]  UROLOGY [id number]  Scheduled Appointment: 01/05/2023

## 2022-12-27 LAB
ANION GAP SERPL CALC-SCNC: 9 MMOL/L — SIGNIFICANT CHANGE UP (ref 5–17)
BUN SERPL-MCNC: 63 MG/DL — HIGH (ref 7–18)
CALCIUM SERPL-MCNC: 8.9 MG/DL — SIGNIFICANT CHANGE UP (ref 8.4–10.5)
CHLORIDE SERPL-SCNC: 101 MMOL/L — SIGNIFICANT CHANGE UP (ref 96–108)
CO2 SERPL-SCNC: 26 MMOL/L — SIGNIFICANT CHANGE UP (ref 22–31)
CREAT SERPL-MCNC: 6.2 MG/DL — HIGH (ref 0.5–1.3)
CULTURE RESULTS: SIGNIFICANT CHANGE UP
EGFR: 9 ML/MIN/1.73M2 — LOW
GLUCOSE BLDC GLUCOMTR-MCNC: 127 MG/DL — HIGH (ref 70–99)
GLUCOSE BLDC GLUCOMTR-MCNC: 202 MG/DL — HIGH (ref 70–99)
GLUCOSE BLDC GLUCOMTR-MCNC: 249 MG/DL — HIGH (ref 70–99)
GLUCOSE BLDC GLUCOMTR-MCNC: 297 MG/DL — HIGH (ref 70–99)
GLUCOSE SERPL-MCNC: 271 MG/DL — HIGH (ref 70–99)
HCT VFR BLD CALC: 25.1 % — LOW (ref 39–50)
HGB BLD-MCNC: 7.7 G/DL — LOW (ref 13–17)
MCHC RBC-ENTMCNC: 30.7 GM/DL — LOW (ref 32–36)
MCHC RBC-ENTMCNC: 30.9 PG — SIGNIFICANT CHANGE UP (ref 27–34)
MCV RBC AUTO: 100.8 FL — HIGH (ref 80–100)
NRBC # BLD: 0 /100 WBCS — SIGNIFICANT CHANGE UP (ref 0–0)
PHOSPHATE SERPL-MCNC: 3.5 MG/DL — SIGNIFICANT CHANGE UP (ref 2.5–4.5)
PLATELET # BLD AUTO: 292 K/UL — SIGNIFICANT CHANGE UP (ref 150–400)
POTASSIUM SERPL-MCNC: 4.5 MMOL/L — SIGNIFICANT CHANGE UP (ref 3.5–5.3)
POTASSIUM SERPL-SCNC: 4.5 MMOL/L — SIGNIFICANT CHANGE UP (ref 3.5–5.3)
RBC # BLD: 2.49 M/UL — LOW (ref 4.2–5.8)
RBC # FLD: 15.7 % — HIGH (ref 10.3–14.5)
SARS-COV-2 RNA SPEC QL NAA+PROBE: SIGNIFICANT CHANGE UP
SODIUM SERPL-SCNC: 136 MMOL/L — SIGNIFICANT CHANGE UP (ref 135–145)
SPECIMEN SOURCE: SIGNIFICANT CHANGE UP
WBC # BLD: 12.31 K/UL — HIGH (ref 3.8–10.5)
WBC # FLD AUTO: 12.31 K/UL — HIGH (ref 3.8–10.5)

## 2022-12-27 PROCEDURE — 99232 SBSQ HOSP IP/OBS MODERATE 35: CPT

## 2022-12-27 RX ORDER — INSULIN LISPRO 100/ML
3 VIAL (ML) SUBCUTANEOUS ONCE
Refills: 0 | Status: COMPLETED | OUTPATIENT
Start: 2022-12-27 | End: 2022-12-27

## 2022-12-27 RX ADMIN — PANTOPRAZOLE SODIUM 40 MILLIGRAM(S): 20 TABLET, DELAYED RELEASE ORAL at 05:22

## 2022-12-27 RX ADMIN — Medication 2: at 08:00

## 2022-12-27 RX ADMIN — SEVELAMER CARBONATE 1600 MILLIGRAM(S): 2400 POWDER, FOR SUSPENSION ORAL at 07:56

## 2022-12-27 RX ADMIN — Medication 1000 UNIT(S): at 12:15

## 2022-12-27 RX ADMIN — LACTULOSE 10 GRAM(S): 10 SOLUTION ORAL at 12:15

## 2022-12-27 RX ADMIN — Medication 2: at 17:13

## 2022-12-27 RX ADMIN — REPAGLINIDE 0.5 MILLIGRAM(S): 1 TABLET ORAL at 17:16

## 2022-12-27 RX ADMIN — Medication 3 UNIT(S): at 21:49

## 2022-12-27 RX ADMIN — IRON SUCROSE 200 MILLIGRAM(S): 20 INJECTION, SOLUTION INTRAVENOUS at 09:21

## 2022-12-27 RX ADMIN — OXYCODONE HYDROCHLORIDE 5 MILLIGRAM(S): 5 TABLET ORAL at 05:22

## 2022-12-27 RX ADMIN — ERYTHROPOIETIN 10000 UNIT(S): 10000 INJECTION, SOLUTION INTRAVENOUS; SUBCUTANEOUS at 09:22

## 2022-12-27 RX ADMIN — Medication 100 MILLIGRAM(S): at 21:49

## 2022-12-27 RX ADMIN — REPAGLINIDE 0.5 MILLIGRAM(S): 1 TABLET ORAL at 05:22

## 2022-12-27 RX ADMIN — Medication 100 MILLIGRAM(S): at 05:22

## 2022-12-27 RX ADMIN — SEVELAMER CARBONATE 1600 MILLIGRAM(S): 2400 POWDER, FOR SUSPENSION ORAL at 17:16

## 2022-12-27 RX ADMIN — SEVELAMER CARBONATE 1600 MILLIGRAM(S): 2400 POWDER, FOR SUSPENSION ORAL at 12:15

## 2022-12-27 RX ADMIN — OXYCODONE HYDROCHLORIDE 5 MILLIGRAM(S): 5 TABLET ORAL at 06:22

## 2022-12-27 RX ADMIN — MIDODRINE HYDROCHLORIDE 10 MILLIGRAM(S): 2.5 TABLET ORAL at 08:21

## 2022-12-27 RX ADMIN — REPAGLINIDE 0.5 MILLIGRAM(S): 1 TABLET ORAL at 12:15

## 2022-12-27 RX ADMIN — Medication 100 MILLIGRAM(S): at 13:26

## 2022-12-27 RX ADMIN — POLYETHYLENE GLYCOL 3350 17 GRAM(S): 17 POWDER, FOR SOLUTION ORAL at 13:26

## 2022-12-27 NOTE — PROGRESS NOTE ADULT - ASSESSMENT
72y Male with history of ESRD on HD presents with gross hematuria. Nephrology consulted for ESRD status.    1) ESRD: Pt seen on HD today 12/26, tolerating UF goal of ~1L. Plan for next maintenance HD on 12/29. Monitor electrolytes.    2) HTN with ESRD: BP low normal. Continue with midodrine 10 mg pre-HD on HD days to avoid intradialytic hypotension. Monitor BP.    3) Anemia of renal disease: Hb decreasing likely due to gross hematuria. c/w Epo 10K with HD (increased 12/24). c/w venofer 200 mg IV with dialysis X 2 doses given low TSAT. Monitor Hb.    4) Hyperphosphatemia: Phosphorus acceptable. Continue with renvela 2 tabs with meals. Monitor serum calcium and phosphorus.      [facility] NEPHROLOGY  Wili Hopkins M.D.  Kiran Feng D.O.  Sujatha Dumont M.D.  Radha Miller, MSN, ANP-C    Telephone: [phone]  Facsimile: [phone]    [address] [place] [address]

## 2022-12-27 NOTE — PROGRESS NOTE ADULT - SUBJECTIVE AND OBJECTIVE BOX
Patient is a 72y old  Male who presents with a chief complaint of Hematuria (27 Dec 2022 07:45)      INTERVAL HPI/OVERNIGHT EVENTS: pt seen and examined       MEDICATIONS  (STANDING):  cholecalciferol 1000 Unit(s) Oral daily  epoetin nyasia-epbx (RETACRIT) Injectable 59884 Unit(s) IV Push <User Schedule>  influenza  Vaccine (HIGH DOSE) 0.7 milliLiter(s) IntraMuscular once  insulin lispro (ADMELOG) corrective regimen sliding scale   SubCutaneous three times a day before meals  iron sucrose Injectable 200 milliGRAM(s) IV Push <User Schedule>  lactulose Syrup 10 Gram(s) Oral daily  midodrine. 10 milliGRAM(s) Oral <User Schedule>  pantoprazole    Tablet 40 milliGRAM(s) Oral before breakfast  phenazopyridine 100 milliGRAM(s) Oral every 8 hours  polyethylene glycol 3350 17 Gram(s) Oral daily  repaglinide 0.5 milliGRAM(s) Oral three times a day before meals  sevelamer carbonate 1600 milliGRAM(s) Oral three times a day with meals    MEDICATIONS  (PRN):  acetaminophen     Tablet .. 650 milliGRAM(s) Oral every 6 hours PRN Mild Pain (1 - 3)  bisacodyl 5 milliGRAM(s) Oral at bedtime PRN Constipation      __________________________________________________  REVIEW OF SYSTEMS:    CONSTITUTIONAL: No fever,   EYES: no acute visual disturbances  NECK: No pain or stiffness  RESPIRATORY: No cough; No shortness of breath  CARDIOVASCULAR: No chest pain, no palpitations  GASTROINTESTINAL: No pain. No nausea or vomiting; No diarrhea   NEUROLOGICAL: No headache or numbness, no tremors  MUSCULOSKELETAL: No joint pain, no muscle pain  GENITOURINARY: no dysuria, no frequency, no hesitancy  PSYCHIATRY: no depression , no anxiety  ALL OTHER  ROS negative        Vital Signs Last 24 Hrs  T(C): 37 (27 Dec 2022 08:45), Max: 37.2 (27 Dec 2022 05:12)  T(F): 98.6 (27 Dec 2022 08:45), Max: 98.9 (27 Dec 2022 05:12)  HR: 92 (27 Dec 2022 08:45) (83 - 99)  BP: 115/60 (27 Dec 2022 08:45) (115/60 - 138/65)  BP(mean): --  RR: 17 (27 Dec 2022 08:45) (17 - 18)  SpO2: 99% (27 Dec 2022 08:45) (99% - 100%)    Parameters below as of 27 Dec 2022 08:45  Patient On (Oxygen Delivery Method): room air        ________________________________________________  PHYSICAL EXAM:  GENERAL: NAD  HEENT: Normocephalic;  conjunctivae and sclerae clear;   NECK : supple  CHEST/LUNG: dec breath sounds at bases  HEART: S1 S2  regular; no murmurs, gallops or rubs  ABDOMEN: Soft, Nontender, Nondistended; Bowel sounds present  : STARR in place CBI clamped clear yellow urine in bedside drainage bag   EXTREMITIES: +right BKA, no cyanosis; no edema; no calf tenderness  RIJ TDC intact, R BKA, LUE AVF without bruit/thrill  SKIN: warm and dry; no rash  NERVOUS SYSTEM:  Awake and alert; Oriented  to place, person and time   _________________________________________________  LABS:                        7.7    12.31 )-----------( 292      ( 27 Dec 2022 08:48 )             25.1     12-27    136  |  101  |  63<H>  ----------------------------<  271<H>  4.5   |  26  |  6.20<H>    Ca    8.9      27 Dec 2022 08:48  Phos  3.5     12-27  Mg     2.3     12-26    TPro  6.0  /  Alb  2.6<L>  /  TBili  0.2  /  DBili  x   /  AST  8<L>  /  ALT  13  /  AlkPhos  64  12-26        CAPILLARY BLOOD GLUCOSE      POCT Blood Glucose.: 249 mg/dL (27 Dec 2022 07:53)  POCT Blood Glucose.: 303 mg/dL (26 Dec 2022 21:16)  POCT Blood Glucose.: 213 mg/dL (26 Dec 2022 16:14)  POCT Blood Glucose.: 254 mg/dL (26 Dec 2022 11:08)        RADIOLOGY & ADDITIONAL TESTS:   < from: Xray Foot AP + Lateral + Oblique, Left (12.13.22 @ 09:27) >    IMPRESSION:    No acute radiographic osseous pathology.    --- End of Report ---    < end of copied text >    < from: CT Abdomen and Pelvis w/wo IV Cont (12.10.22 @ 11:39) >  MPRESSION: Moderate left and mild right hydronephrosis.    Limited evaluation for urothelial lesion. Mild debris in the bladder.    A few punctate renal calculi.    Large colonic stool burden.        --- End of Report ---    < end of copied text >  Imaging Personally Reviewed:  YES/  Consultant(s) Notes Reviewed:   YES/    Care Discussed with Consultants: UROLOGY     Plan of care was discussed with patient and /or primary care giver; all questions and concerns were addressed and care was aligned with patient's wishes.

## 2022-12-27 NOTE — PROGRESS NOTE ADULT - SUBJECTIVE AND OBJECTIVE BOX
NP Note discussed with  primary attending    Patient is a 72y old  Male who presents with a chief complaint of Hematuria (27 Dec 2022 07:45)      INTERVAL HPI/OVERNIGHT EVENTS: HD this AM       MEDICATIONS  (STANDING):  cholecalciferol 1000 Unit(s) Oral daily  epoetin nyasia-epbx (RETACRIT) Injectable 06346 Unit(s) IV Push <User Schedule>  influenza  Vaccine (HIGH DOSE) 0.7 milliLiter(s) IntraMuscular once  insulin lispro (ADMELOG) corrective regimen sliding scale   SubCutaneous three times a day before meals  iron sucrose Injectable 200 milliGRAM(s) IV Push <User Schedule>  lactulose Syrup 10 Gram(s) Oral daily  midodrine. 10 milliGRAM(s) Oral <User Schedule>  pantoprazole    Tablet 40 milliGRAM(s) Oral before breakfast  phenazopyridine 100 milliGRAM(s) Oral every 8 hours  polyethylene glycol 3350 17 Gram(s) Oral daily  repaglinide 0.5 milliGRAM(s) Oral three times a day before meals  sevelamer carbonate 1600 milliGRAM(s) Oral three times a day with meals    MEDICATIONS  (PRN):  acetaminophen     Tablet .. 650 milliGRAM(s) Oral every 6 hours PRN Mild Pain (1 - 3)  bisacodyl 5 milliGRAM(s) Oral at bedtime PRN Constipation      __________________________________________________  REVIEW OF SYSTEMS:    CONSTITUTIONAL: No fever,   EYES: no acute visual disturbances  NECK: No pain or stiffness  RESPIRATORY: No cough; No shortness of breath  CARDIOVASCULAR: No chest pain, no palpitations  GASTROINTESTINAL: No pain. No nausea or vomiting; No diarrhea   NEUROLOGICAL: No headache or numbness, no tremors  MUSCULOSKELETAL: No joint pain, no muscle pain  GENITOURINARY: no dysuria, no frequency, no hesitancy  PSYCHIATRY: no depression , no anxiety  ALL OTHER  ROS negative        Vital Signs Last 24 Hrs  T(C): 37 (27 Dec 2022 08:45), Max: 37.2 (27 Dec 2022 05:12)  T(F): 98.6 (27 Dec 2022 08:45), Max: 98.9 (27 Dec 2022 05:12)  HR: 92 (27 Dec 2022 08:45) (83 - 99)  BP: 115/60 (27 Dec 2022 08:45) (115/60 - 138/65)  BP(mean): --  RR: 17 (27 Dec 2022 08:45) (17 - 18)  SpO2: 99% (27 Dec 2022 08:45) (99% - 100%)    Parameters below as of 27 Dec 2022 08:45  Patient On (Oxygen Delivery Method): room air        ________________________________________________  PHYSICAL EXAM:  GENERAL: NAD  HEENT: Normocephalic;  conjunctivae and sclerae clear;   NECK : supple  CHEST/LUNG: Clear to ausculitation bilaterally with good air entry   HEART: S1 S2  regular; no murmurs, gallops or rubs  ABDOMEN: Soft, Nontender, Nondistended; Bowel sounds present  : STARR in place CBI clamped clear yellow urine in bedside drainage bag   EXTREMITIES: +right BKA, no cyanosis; no edema; no calf tenderness  RIJ TDC intact, R BKA, LUE AVF without bruit/thrill  SKIN: warm and dry; no rash  NERVOUS SYSTEM:  Awake and alert; Oriented  to place, person and time   _________________________________________________  LABS:                        7.7    12.31 )-----------( 292      ( 27 Dec 2022 08:48 )             25.1     12-27    136  |  101  |  63<H>  ----------------------------<  271<H>  4.5   |  26  |  6.20<H>    Ca    8.9      27 Dec 2022 08:48  Phos  3.5     12-27  Mg     2.3     12-26    TPro  6.0  /  Alb  2.6<L>  /  TBili  0.2  /  DBili  x   /  AST  8<L>  /  ALT  13  /  AlkPhos  64  12-26        CAPILLARY BLOOD GLUCOSE      POCT Blood Glucose.: 249 mg/dL (27 Dec 2022 07:53)  POCT Blood Glucose.: 303 mg/dL (26 Dec 2022 21:16)  POCT Blood Glucose.: 213 mg/dL (26 Dec 2022 16:14)  POCT Blood Glucose.: 254 mg/dL (26 Dec 2022 11:08)        RADIOLOGY & ADDITIONAL TESTS:   < from: Xray Foot AP + Lateral + Oblique, Left (12.13.22 @ 09:27) >    IMPRESSION:    No acute radiographic osseous pathology.    --- End of Report ---    < end of copied text >    < from: CT Abdomen and Pelvis w/wo IV Cont (12.10.22 @ 11:39) >  MPRESSION: Moderate left and mild right hydronephrosis.    Limited evaluation for urothelial lesion. Mild debris in the bladder.    A few punctate renal calculi.    Large colonic stool burden.        --- End of Report ---    < end of copied text >  Imaging Personally Reviewed:  YES/  Consultant(s) Notes Reviewed:   YES/    Care Discussed with Consultants: UROLOGY     Plan of care was discussed with patient and /or primary care giver; all questions and concerns were addressed and care was aligned with patient's wishes.

## 2022-12-27 NOTE — PROGRESS NOTE ADULT - SUBJECTIVE AND OBJECTIVE BOX
Subjective  Pt seen and examined at bedside.   Pt resting comfortably. No acute complaints.   CBI clamped yesterday  No suprapubic tenderness or back pain   Objective    Vital signs  T(F): , Max: 98.9 (12-27-22 @ 05:12)  HR: 98 (12-27-22 @ 05:12)  BP: 125/57 (12-27-22 @ 05:12)  SpO2: 99% (12-27-22 @ 05:12)  Wt(kg): --    Output     OUT:    Indwelling Catheter - Urethral (mL): 1200 mL  Total OUT: 1200 mL    Total NET: -1200 mL        Physical:  General: A&Ox3. NAD.  Respirations: Unlabored   Abdomen: Soft nondistended, nontender. No rebound, no guarding, no peritonitis, no suprapubic tenderness   Back: No CVA tenderness b/l   : Garcia draining clear light pink urine    Labs      12-26 @ 08:43    WBC 7.60  / Hct 26.1  / SCr 5.63

## 2022-12-27 NOTE — PROGRESS NOTE ADULT - SUBJECTIVE AND OBJECTIVE BOX
[facility] NEPHROLOGY- PROGRESS NOTE    72y Male with history of ESRD on HD presents with gross hematuria. Nephrology consulted for ESRD status.    REVIEW OF SYSTEMS:  Gen: no fevers  Cards: no chest pain  Resp: no dyspnea  GI: no nausea or vomiting or diarrhea  : no gross hematuria  Vascular: no LE edema    No Known Allergies      Hospital Medications: Medications reviewed      VITALS:  T(F): 97.7 (12-27-22 @ 12:25), Max: 98.9 (12-27-22 @ 05:12)  HR: 95 (12-27-22 @ 12:25)  BP: 104/62 (12-27-22 @ 12:25)  RR: 18 (12-27-22 @ 12:25)  SpO2: 98% (12-27-22 @ 12:25)  Wt(kg): --    12-26 @ 07:01  -  12-27 @ 07:00  --------------------------------------------------------  IN: 0 mL / OUT: 1200 mL / NET: -1200 mL    12-27 @ 07:01  -  12-27 @ 14:44  --------------------------------------------------------  IN: 500 mL / OUT: 1500 mL / NET: -1000 mL        PHYSICAL EXAM:    Gen: NAD, calm  Cards: RRR, +S1/S2, no M/G/R  Resp: CTA B/L  GI: soft, NT/ND, NABS  : + neumann with yellow urine  Vascular: no LE edema B/L, RIJ TDC intact, R BKA, LUE AVF without bruit/thrill      LABS:  12-27    136  |  101  |  63<H>  ----------------------------<  271<H>  4.5   |  26  |  6.20<H>    Ca    8.9      27 Dec 2022 08:48  Phos  3.5     12-27  Mg     2.3     12-26    TPro  6.0  /  Alb  2.6<L>  /  TBili  0.2  /  DBili      /  AST  8<L>  /  ALT  13  /  AlkPhos  64  12-26    Creatinine Trend: 6.20 <--, 5.63 <--, 4.40 <--, 5.41 <--, 4.78 <--                        7.7    12.31 )-----------( 292      ( 27 Dec 2022 08:48 )             25.1     Urine Studies:

## 2022-12-27 NOTE — PROGRESS NOTE ADULT - ASSESSMENT
72 year old male PMH ESRD on HD, DM, PVD and right BKA coming in with hematuria.   CBI clamped yesterday. Urine clear light pink this AM.  -F/u AM labs   -Continue neumann catheter, monitor output   -Irrigate PRN   -Monitor/trend CBC, transfuse PRN   -HD as per nephrology   -F/u urine culture   -Care as per medicine .

## 2022-12-27 NOTE — PROGRESS NOTE ADULT - ASSESSMENT
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 Upon evaluation in the ED, patient found to be afebrile and hemodynamically stable. Labs showing hgb of 9.6 (baseline 10-11) with a leukocytosis of 10.6. Urology consulted in ED, patient is s/p CBI. Patient is admitted to medicine for hematuria, all A/C held hematuria resolved CBI clamped.

## 2022-12-27 NOTE — PROGRESS NOTE ADULT - ATTENDING COMMENTS
72 year old male PMH ESRD on HD, DM, PVD and right BKA coming in with hematuria.   CBI clamped yesterday. Urine clear light pink this AM.  -F/u AM labs   -Continue neumann catheter, monitor output   -Irrigate PRN   -Monitor/trend CBC, transfuse PRN   -HD as per nephrology   -F/u urine culture   -Care as per medicine .
72 year old male PMH ESRD on HD, DM, PVD and right BKA coming in with hematuria.   cbi in place, on slow drip   urine clear, irrigated at bedside, no clotts aprecciated     - nemuann catheter care, monitor and record output   - irrigate as needed, discussed with covering nurse  - trend CBC, transfuse PRN   - HD as per nephrology   - f/u urine culture   - remainder of care as per primary team
72 year old male PMH ESRD on HD, DM, PVD and right BKA coming in with hematuria.   CBI clamped   -F/u AM labs   -Continue neumann catheter, monitor output   -Irrigate PRN   -Monitor/trend CBC, transfuse PRN   -HD as per nephrology   -F/u urine culture   -Care as per medicine
72 year old male PMH ESRD on HD, DM, PVD and right BKA coming in with hematuria. Irrigated 100cc of old clot out and started on CBI.   -Monitor/trend CBC  -HD as per nephro  -Wean CBI, irrigate PRN

## 2022-12-27 NOTE — PROGRESS NOTE ADULT - ASSESSMENT
72 year old male, coming from [facility] with medical history of ESRD (T-TH-S HTN, PVD, gangrene right foot, (s/p R BKA 11/9) coming to ED with complaints of hematuria. Upon evaluation in the ED, patient found to be afebrile and hemodynamically stable. Labs showing hgb of 9.6 (baseline 10-11) with a leukocytosis of 10.6. Urology consulted in ED, patient is s/p CBI. Patient is admitted to medicine for hematuria, all A/C held hematuria resolved CBI clamped.

## 2022-12-28 LAB
ANION GAP SERPL CALC-SCNC: 5 MMOL/L — SIGNIFICANT CHANGE UP (ref 5–17)
BUN SERPL-MCNC: 44 MG/DL — HIGH (ref 7–18)
CALCIUM SERPL-MCNC: 8.6 MG/DL — SIGNIFICANT CHANGE UP (ref 8.4–10.5)
CHLORIDE SERPL-SCNC: 102 MMOL/L — SIGNIFICANT CHANGE UP (ref 96–108)
CO2 SERPL-SCNC: 30 MMOL/L — SIGNIFICANT CHANGE UP (ref 22–31)
CREAT SERPL-MCNC: 4.44 MG/DL — HIGH (ref 0.5–1.3)
EGFR: 13 ML/MIN/1.73M2 — LOW
GLUCOSE BLDC GLUCOMTR-MCNC: 141 MG/DL — HIGH (ref 70–99)
GLUCOSE BLDC GLUCOMTR-MCNC: 204 MG/DL — HIGH (ref 70–99)
GLUCOSE BLDC GLUCOMTR-MCNC: 296 MG/DL — HIGH (ref 70–99)
GLUCOSE BLDC GLUCOMTR-MCNC: 344 MG/DL — HIGH (ref 70–99)
GLUCOSE SERPL-MCNC: 196 MG/DL — HIGH (ref 70–99)
HCT VFR BLD CALC: 25.3 % — LOW (ref 39–50)
HGB BLD-MCNC: 7.7 G/DL — LOW (ref 13–17)
MCHC RBC-ENTMCNC: 30.4 GM/DL — LOW (ref 32–36)
MCHC RBC-ENTMCNC: 31 PG — SIGNIFICANT CHANGE UP (ref 27–34)
MCV RBC AUTO: 102 FL — HIGH (ref 80–100)
NRBC # BLD: 0 /100 WBCS — SIGNIFICANT CHANGE UP (ref 0–0)
PLATELET # BLD AUTO: 293 K/UL — SIGNIFICANT CHANGE UP (ref 150–400)
POTASSIUM SERPL-MCNC: 4.1 MMOL/L — SIGNIFICANT CHANGE UP (ref 3.5–5.3)
POTASSIUM SERPL-SCNC: 4.1 MMOL/L — SIGNIFICANT CHANGE UP (ref 3.5–5.3)
RBC # BLD: 2.48 M/UL — LOW (ref 4.2–5.8)
RBC # FLD: 15.9 % — HIGH (ref 10.3–14.5)
SODIUM SERPL-SCNC: 137 MMOL/L — SIGNIFICANT CHANGE UP (ref 135–145)
WBC # BLD: 9.37 K/UL — SIGNIFICANT CHANGE UP (ref 3.8–10.5)
WBC # FLD AUTO: 9.37 K/UL — SIGNIFICANT CHANGE UP (ref 3.8–10.5)

## 2022-12-28 RX ORDER — ASPIRIN/CALCIUM CARB/MAGNESIUM 324 MG
81 TABLET ORAL DAILY
Refills: 0 | Status: DISCONTINUED | OUTPATIENT
Start: 2022-12-28 | End: 2022-12-29

## 2022-12-28 RX ORDER — ERYTHROPOIETIN 10000 [IU]/ML
10000 INJECTION, SOLUTION INTRAVENOUS; SUBCUTANEOUS
Qty: 0 | Refills: 0 | DISCHARGE
Start: 2022-12-28

## 2022-12-28 RX ORDER — ACETAMINOPHEN 500 MG
2 TABLET ORAL
Qty: 0 | Refills: 0 | DISCHARGE
Start: 2022-12-28

## 2022-12-28 RX ORDER — CLOPIDOGREL BISULFATE 75 MG/1
75 TABLET, FILM COATED ORAL DAILY
Refills: 0 | Status: DISCONTINUED | OUTPATIENT
Start: 2022-12-28 | End: 2022-12-29

## 2022-12-28 RX ORDER — INSULIN LISPRO 100/ML
3 VIAL (ML) SUBCUTANEOUS ONCE
Refills: 0 | Status: COMPLETED | OUTPATIENT
Start: 2022-12-28 | End: 2022-12-28

## 2022-12-28 RX ADMIN — PANTOPRAZOLE SODIUM 40 MILLIGRAM(S): 20 TABLET, DELAYED RELEASE ORAL at 05:01

## 2022-12-28 RX ADMIN — CLOPIDOGREL BISULFATE 75 MILLIGRAM(S): 75 TABLET, FILM COATED ORAL at 12:20

## 2022-12-28 RX ADMIN — REPAGLINIDE 0.5 MILLIGRAM(S): 1 TABLET ORAL at 17:25

## 2022-12-28 RX ADMIN — POLYETHYLENE GLYCOL 3350 17 GRAM(S): 17 POWDER, FOR SOLUTION ORAL at 12:07

## 2022-12-28 RX ADMIN — REPAGLINIDE 0.5 MILLIGRAM(S): 1 TABLET ORAL at 12:02

## 2022-12-28 RX ADMIN — LACTULOSE 10 GRAM(S): 10 SOLUTION ORAL at 12:02

## 2022-12-28 RX ADMIN — Medication 100 MILLIGRAM(S): at 05:02

## 2022-12-28 RX ADMIN — SEVELAMER CARBONATE 1600 MILLIGRAM(S): 2400 POWDER, FOR SUSPENSION ORAL at 08:15

## 2022-12-28 RX ADMIN — Medication 650 MILLIGRAM(S): at 04:08

## 2022-12-28 RX ADMIN — Medication 1000 UNIT(S): at 12:02

## 2022-12-28 RX ADMIN — Medication 4: at 12:00

## 2022-12-28 RX ADMIN — SEVELAMER CARBONATE 1600 MILLIGRAM(S): 2400 POWDER, FOR SUSPENSION ORAL at 12:03

## 2022-12-28 RX ADMIN — Medication 100 MILLIGRAM(S): at 22:28

## 2022-12-28 RX ADMIN — Medication 81 MILLIGRAM(S): at 12:20

## 2022-12-28 RX ADMIN — SEVELAMER CARBONATE 1600 MILLIGRAM(S): 2400 POWDER, FOR SUSPENSION ORAL at 17:26

## 2022-12-28 RX ADMIN — Medication 100 MILLIGRAM(S): at 13:58

## 2022-12-28 RX ADMIN — Medication 2: at 08:14

## 2022-12-28 RX ADMIN — REPAGLINIDE 0.5 MILLIGRAM(S): 1 TABLET ORAL at 05:02

## 2022-12-28 RX ADMIN — Medication 3 UNIT(S): at 22:29

## 2022-12-28 RX ADMIN — Medication 650 MILLIGRAM(S): at 05:02

## 2022-12-28 NOTE — PROGRESS NOTE ADULT - ASSESSMENT
72y Male with history of ESRD on HD presents with gross hematuria. Nephrology consulted for ESRD status.    1) ESRD: Last HD 12/27, with net 0.9L removed.  Plan for next maintenance HD on 12/29. Monitor electrolytes.    2) HTN with ESRD: BP low normal. Continue with midodrine 10 mg pre-HD on HD days to avoid intradialytic hypotension. Monitor BP.    3) Anemia of renal disease: Hb decreasing likely due to gross hematuria. c/w Epo 10K with HD (increased 12/24). c/w venofer 200 mg IV with dialysis X 2 doses given low TSAT. Monitor Hb.    4) Hyperphosphatemia: Phosphorus acceptable. Continue with renvela 2 tabs with meals. Monitor serum calcium and phosphorus.      [facility] NEPHROLOGY  Wili Hopkins M.D.  Kiran Feng D.O.  Sujatha Dumont M.D.  Radha Miller, MSN, ANP-C    Telephone: [phone]  Facsimile: [phone]    [address] [place] [address]

## 2022-12-28 NOTE — PROGRESS NOTE ADULT - SUBJECTIVE AND OBJECTIVE BOX
[facility] NEPHROLOGY- PROGRESS NOTE    72y Male with history of ESRD on HD presents with gross hematuria. Nephrology consulted for ESRD status.    REVIEW OF SYSTEMS:  Gen: no fevers  Cards: no chest pain  Resp: no dyspnea  GI: no nausea or vomiting or diarrhea  : no gross hematuria  Vascular: no LE edema    No Known Allergies      Hospital Medications: Medications reviewed      VITALS:  T(F): 98.5 (12-28-22 @ 12:25), Max: 98.8 (12-27-22 @ 21:26)  HR: 92 (12-28-22 @ 12:25)  BP: 122/67 (12-28-22 @ 12:25)  RR: 18 (12-28-22 @ 12:25)  SpO2: 99% (12-28-22 @ 12:25)  Wt(kg): --    12-27 @ 07:01  -  12-28 @ 07:00  --------------------------------------------------------  IN: 500 mL / OUT: 1950 mL / NET: -1450 mL    12-28 @ 07:01  -  12-28 @ 17:11  --------------------------------------------------------  IN: 0 mL / OUT: 100 mL / NET: -100 mL              PHYSICAL EXAM:    Gen: NAD, calm  Cards: RRR, +S1/S2, no M/G/R  Resp: CTA B/L  GI: soft, NT/ND, NABS  : + neumann with yellow urine  Vascular: no LE edema B/L, RIJ TDC intact, R BKA, LUE AVF without bruit/thrill      LABS:  12-28    137  |  102  |  44<H>  ----------------------------<  196<H>  4.1   |  30  |  4.44<H>    Ca    8.6      28 Dec 2022 06:45  Phos  3.5     12-27      Creatinine Trend: 4.44 <--, 6.20 <--, 5.63 <--, 4.40 <--, 5.41 <--, 4.78 <--                        7.7    9.37  )-----------( 293      ( 28 Dec 2022 06:45 )             25.3     Urine Studies:

## 2022-12-28 NOTE — PROGRESS NOTE ADULT - SUBJECTIVE AND OBJECTIVE BOX
PATIENT SEEN AND EXAMINED ON :- 12/28/22  DATE OF SERVICE:   12/28/22          Interim events noted,Labs ,Radiological studies and Cardiology tests reviewed .       HOSPITAL COURSE: HPI:  Patient is a 72 year old male, coming from [facility], with medical history of ESRD (T-TH-S),  DM, PVD, gangrene right foot, (s/p R BKA 11/9) coming to ED with complaints of hematuria. Patient states that hematuria is intermittent and has been ongoing for the last two months. Patient was recently discharged from [facility] and on that admission was found to have UTI caused by E. Coli. Patient states that he has dysuria denies increased urinary frequency or urgency. Patient denies groin pain or abdominal or flank pain., Denies changes in bowel habits. Denies trauma to the penile area. Denies fevers or chills. Denies chest pain, sob or palpitations. Denies syncope or dizziness. Patient reports that he has had three prior episodes in the past. As per med rec, patient is currently taking plavix and aspirin.  (23 Dec 2022 18:13)      INTERIM EVENTS:Patient seen at bedside ,interim events noted.      PMH -reviewed admission note, no change since admission  HEART FAILURE: Acute[ ]Chronic[ ] Systolic[ ] Diastolic[ ] Combined Systolic and Diastolic[ ]  CAD[ ] CABG[ ] PCI[ ]  DEVICES[ ] PPM[ ] ICD[ ] ILR[ ]  ATRIAL FIBRILLATION[ ] Paroxysmal[ ] Permanent[ ] CHADS2-[  ]  CANDE[ ] CKD1[ ] CKD2[ ] CKD3[ ] CKD4[ ] ESRD[ ]  COPD[ ] HTN[ ]   DM[ ] Type1[ ] Type 2[ ]   CVA[ ] Paresis[ ]    AMBULATION: Assisted[ ] Cane/walker[ ] Independent[ ]    MEDICATIONS  (STANDING):  aspirin  chewable 81 milliGRAM(s) Oral daily  cholecalciferol 1000 Unit(s) Oral daily  clopidogrel Tablet 75 milliGRAM(s) Oral daily  epoetin nyasia-epbx (RETACRIT) Injectable 02290 Unit(s) IV Push <User Schedule>  influenza  Vaccine (HIGH DOSE) 0.7 milliLiter(s) IntraMuscular once  insulin lispro (ADMELOG) corrective regimen sliding scale   SubCutaneous three times a day before meals  iron sucrose Injectable 200 milliGRAM(s) IV Push <User Schedule>  lactulose Syrup 10 Gram(s) Oral daily  midodrine. 10 milliGRAM(s) Oral <User Schedule>  pantoprazole    Tablet 40 milliGRAM(s) Oral before breakfast  phenazopyridine 100 milliGRAM(s) Oral every 8 hours  polyethylene glycol 3350 17 Gram(s) Oral daily  repaglinide 0.5 milliGRAM(s) Oral three times a day before meals  sevelamer carbonate 1600 milliGRAM(s) Oral three times a day with meals    MEDICATIONS  (PRN):  acetaminophen     Tablet .. 650 milliGRAM(s) Oral every 6 hours PRN Mild Pain (1 - 3)  bisacodyl 5 milliGRAM(s) Oral at bedtime PRN Constipation            REVIEW OF SYSTEMS:  Constitutional: [ ] fever, [ ]weight loss,  [ ]fatigue [ ]weight gain  Eyes: [ ] visual changes  Respiratory: [ ]shortness of breath;  [ ] cough, [ ]wheezing, [ ]chills, [ ]hemoptysis  Cardiovascular: [ ] chest pain, [ ]palpitations, [ ]dizziness,  [ ]leg swelling[ ]orthopnea[ ]PND  Gastrointestinal: [ ] abdominal pain, [ ]nausea, [ ]vomiting,  [ ]diarrhea [ ]Constipation [ ]Melena  Genitourinary: [ ] dysuria, [ ] hematuria [ ]Garcia  Neurologic: [ ] headaches [ ] tremors[ ]weakness [ ]Paralysis Right[ ] Left[ ]  Skin: [ ] itching, [ ]burning, [ ] rashes  Endocrine: [ ] heat or cold intolerance  Musculoskeletal: [ ] joint pain or swelling; [ ] muscle, back, or extremity pain  Psychiatric: [ ] depression, [ ]anxiety, [ ]mood swings, or [ ]difficulty sleeping  Hematologic: [ ] easy bruising, [ ] bleeding gums    [ ] All remaining systems negative except as per above.   [ ]Unable to obtain.  [x] No change in ROS since admission      Vital Signs Last 24 Hrs  T(C): 36.9 (28 Dec 2022 12:25), Max: 37.1 (27 Dec 2022 21:26)  T(F): 98.5 (28 Dec 2022 12:25), Max: 98.8 (27 Dec 2022 21:26)  HR: 92 (28 Dec 2022 12:25) (89 - 96)  BP: 122/67 (28 Dec 2022 12:25) (95/55 - 122/67)  BP(mean): --  RR: 18 (28 Dec 2022 12:25) (18 - 18)  SpO2: 99% (28 Dec 2022 12:25) (98% - 99%)    Parameters below as of 28 Dec 2022 12:25  Patient On (Oxygen Delivery Method): room air      I&O's Summary    27 Dec 2022 07:01  -  28 Dec 2022 07:00  --------------------------------------------------------  IN: 500 mL / OUT: 1950 mL / NET: -1450 mL    28 Dec 2022 07:01  -  28 Dec 2022 20:10  --------------------------------------------------------  IN: 0 mL / OUT: 100 mL / NET: -100 mL        PHYSICAL EXAM:  General: No acute distress BMI-  HEENT: EOMI, PERRL  Neck: Supple, [ ] JVD  Lungs: Equal air entry bilaterally; [ ] rales [ ] wheezing [ ] rhonchi  Heart: Regular rate and rhythm; [x ] murmur   2/6 [ x] systolic [ ] diastolic [ ] radiation[ ] rubs [ ]  gallops  Abdomen: Nontender, bowel sounds present  Extremities: No clubbing, cyanosis, [ ] edema [ ]Pulses  equal and intact  Nervous system:  Alert & Oriented X3, no focal deficits  Psychiatric: Normal affect  Skin: No rashes or lesions    LABS:  12-28    137  |  102  |  44<H>  ----------------------------<  196<H>  4.1   |  30  |  4.44<H>    Ca    8.6      28 Dec 2022 06:45  Phos  3.5     12-27      Creatinine Trend: 4.44<--, 6.20<--, 5.63<--, 4.40<--, 5.41<--, 4.78<--                        7.7    9.37  )-----------( 293      ( 28 Dec 2022 06:45 )             25.3

## 2022-12-28 NOTE — PROGRESS NOTE ADULT - ASSESSMENT
72 year old male PMH ESRD on HD, DM, PVD and right BKA coming in with hematuria.   CBI clamped Sunday. Urine clear light pink this AM.  -Continue neumann catheter, monitor output   -Irrigate PRN   -Monitor/trend CBC, transfuse PRN   -HD as per nephrology   -F/u urine culture   -Care as per medicine   -Recommend d/c with neumann catheter  -Outpatient follow up for cystoscopy after discharge

## 2022-12-28 NOTE — PROGRESS NOTE ADULT - ASSESSMENT
72 year old male, coming from [facility] with medical history of ESRD (T-TH-S HTN, PVD, gangrene right foot, (s/p R BKA 11/9) coming to ED with complaints of hematuria. Upon evaluation in the ED, patient found to be afebrile and hemodynamically stable. Labs showing hgb of 9.6 (baseline 10-11) with a leukocytosis of 10.6. Urology consulted in ED, patient is s/p CBI. Patient is admitted to medicine for hematuria, all A/C held hematuria resolved CBI clamped.

## 2022-12-28 NOTE — CHART NOTE - NSCHARTNOTEFT_GEN_A_CORE
As per [facility] discharge cancelled today due computer downtime   will likely D/C in AM   please note A/C was resumed 12/28   plan to discharge to facility with [address] way Garcia and follow up with urology outpatient in 1 week for cystoscopy and TOV.   Given clinical course decision made to discharge patient back to facility will need to closely trend CBC Q3 days

## 2022-12-29 ENCOUNTER — TRANSCRIPTION ENCOUNTER (OUTPATIENT)
Age: 72
End: 2022-12-29

## 2022-12-29 VITALS
HEART RATE: 99 BPM | DIASTOLIC BLOOD PRESSURE: 67 MMHG | SYSTOLIC BLOOD PRESSURE: 126 MMHG | RESPIRATION RATE: 18 BRPM | TEMPERATURE: 99 F | OXYGEN SATURATION: 97 %

## 2022-12-29 PROBLEM — Z00.00 ENCOUNTER FOR PREVENTIVE HEALTH EXAMINATION: Status: ACTIVE | Noted: 2022-12-29

## 2022-12-29 LAB
ANION GAP SERPL CALC-SCNC: 8 MMOL/L — SIGNIFICANT CHANGE UP (ref 5–17)
BUN SERPL-MCNC: 60 MG/DL — HIGH (ref 7–18)
CALCIUM SERPL-MCNC: 8.7 MG/DL — SIGNIFICANT CHANGE UP (ref 8.4–10.5)
CHLORIDE SERPL-SCNC: 100 MMOL/L — SIGNIFICANT CHANGE UP (ref 96–108)
CO2 SERPL-SCNC: 27 MMOL/L — SIGNIFICANT CHANGE UP (ref 22–31)
CREAT SERPL-MCNC: 5.65 MG/DL — HIGH (ref 0.5–1.3)
EGFR: 10 ML/MIN/1.73M2 — LOW
GLUCOSE BLDC GLUCOMTR-MCNC: 194 MG/DL — HIGH (ref 70–99)
GLUCOSE BLDC GLUCOMTR-MCNC: 208 MG/DL — HIGH (ref 70–99)
GLUCOSE BLDC GLUCOMTR-MCNC: 220 MG/DL — HIGH (ref 70–99)
GLUCOSE SERPL-MCNC: 307 MG/DL — HIGH (ref 70–99)
HCT VFR BLD CALC: 24.6 % — LOW (ref 39–50)
HGB BLD-MCNC: 7.3 G/DL — LOW (ref 13–17)
MAGNESIUM SERPL-MCNC: 2.2 MG/DL — SIGNIFICANT CHANGE UP (ref 1.6–2.6)
MCHC RBC-ENTMCNC: 29.7 GM/DL — LOW (ref 32–36)
MCHC RBC-ENTMCNC: 30.2 PG — SIGNIFICANT CHANGE UP (ref 27–34)
MCV RBC AUTO: 101.7 FL — HIGH (ref 80–100)
NRBC # BLD: 0 /100 WBCS — SIGNIFICANT CHANGE UP (ref 0–0)
PHOSPHATE SERPL-MCNC: 2.9 MG/DL — SIGNIFICANT CHANGE UP (ref 2.5–4.5)
PLATELET # BLD AUTO: 336 K/UL — SIGNIFICANT CHANGE UP (ref 150–400)
POTASSIUM SERPL-MCNC: 4.5 MMOL/L — SIGNIFICANT CHANGE UP (ref 3.5–5.3)
POTASSIUM SERPL-SCNC: 4.5 MMOL/L — SIGNIFICANT CHANGE UP (ref 3.5–5.3)
RBC # BLD: 2.42 M/UL — LOW (ref 4.2–5.8)
RBC # FLD: 15.7 % — HIGH (ref 10.3–14.5)
SODIUM SERPL-SCNC: 135 MMOL/L — SIGNIFICANT CHANGE UP (ref 135–145)
WBC # BLD: 10.86 K/UL — HIGH (ref 3.8–10.5)
WBC # FLD AUTO: 10.86 K/UL — HIGH (ref 3.8–10.5)

## 2022-12-29 PROCEDURE — 85025 COMPLETE CBC W/AUTO DIFF WBC: CPT

## 2022-12-29 PROCEDURE — P9040: CPT

## 2022-12-29 PROCEDURE — 86900 BLOOD TYPING SEROLOGIC ABO: CPT

## 2022-12-29 PROCEDURE — 36430 TRANSFUSION BLD/BLD COMPNT: CPT

## 2022-12-29 PROCEDURE — 99285 EMERGENCY DEPT VISIT HI MDM: CPT | Mod: 25

## 2022-12-29 PROCEDURE — 84100 ASSAY OF PHOSPHORUS: CPT

## 2022-12-29 PROCEDURE — 87635 SARS-COV-2 COVID-19 AMP PRB: CPT

## 2022-12-29 PROCEDURE — 87640 STAPH A DNA AMP PROBE: CPT

## 2022-12-29 PROCEDURE — 85027 COMPLETE CBC AUTOMATED: CPT

## 2022-12-29 PROCEDURE — 86850 RBC ANTIBODY SCREEN: CPT

## 2022-12-29 PROCEDURE — 99261: CPT

## 2022-12-29 PROCEDURE — 86923 COMPATIBILITY TEST ELECTRIC: CPT

## 2022-12-29 PROCEDURE — 83735 ASSAY OF MAGNESIUM: CPT

## 2022-12-29 PROCEDURE — 82962 GLUCOSE BLOOD TEST: CPT

## 2022-12-29 PROCEDURE — 82728 ASSAY OF FERRITIN: CPT

## 2022-12-29 PROCEDURE — 87641 MR-STAPH DNA AMP PROBE: CPT

## 2022-12-29 PROCEDURE — 83540 ASSAY OF IRON: CPT

## 2022-12-29 PROCEDURE — 80053 COMPREHEN METABOLIC PANEL: CPT

## 2022-12-29 PROCEDURE — 86901 BLOOD TYPING SEROLOGIC RH(D): CPT

## 2022-12-29 PROCEDURE — 83550 IRON BINDING TEST: CPT

## 2022-12-29 PROCEDURE — 87086 URINE CULTURE/COLONY COUNT: CPT

## 2022-12-29 PROCEDURE — 80048 BASIC METABOLIC PNL TOTAL CA: CPT

## 2022-12-29 PROCEDURE — 36415 COLL VENOUS BLD VENIPUNCTURE: CPT

## 2022-12-29 PROCEDURE — 93005 ELECTROCARDIOGRAM TRACING: CPT

## 2022-12-29 RX ORDER — CHLORHEXIDINE GLUCONATE 213 G/1000ML
1 SOLUTION TOPICAL
Refills: 0 | Status: DISCONTINUED | OUTPATIENT
Start: 2022-12-29 | End: 2022-12-29

## 2022-12-29 RX ORDER — ACETAMINOPHEN 500 MG
650 TABLET ORAL ONCE
Refills: 0 | Status: COMPLETED | OUTPATIENT
Start: 2022-12-29 | End: 2022-12-29

## 2022-12-29 RX ADMIN — Medication 1000 UNIT(S): at 13:27

## 2022-12-29 RX ADMIN — IRON SUCROSE 200 MILLIGRAM(S): 20 INJECTION, SOLUTION INTRAVENOUS at 10:37

## 2022-12-29 RX ADMIN — REPAGLINIDE 0.5 MILLIGRAM(S): 1 TABLET ORAL at 17:27

## 2022-12-29 RX ADMIN — Medication 100 MILLIGRAM(S): at 06:17

## 2022-12-29 RX ADMIN — SEVELAMER CARBONATE 1600 MILLIGRAM(S): 2400 POWDER, FOR SUSPENSION ORAL at 17:26

## 2022-12-29 RX ADMIN — Medication 2: at 08:13

## 2022-12-29 RX ADMIN — CLOPIDOGREL BISULFATE 75 MILLIGRAM(S): 75 TABLET, FILM COATED ORAL at 13:26

## 2022-12-29 RX ADMIN — Medication 2: at 17:26

## 2022-12-29 RX ADMIN — SEVELAMER CARBONATE 1600 MILLIGRAM(S): 2400 POWDER, FOR SUSPENSION ORAL at 08:14

## 2022-12-29 RX ADMIN — REPAGLINIDE 0.5 MILLIGRAM(S): 1 TABLET ORAL at 06:17

## 2022-12-29 RX ADMIN — ERYTHROPOIETIN 10000 UNIT(S): 10000 INJECTION, SOLUTION INTRAVENOUS; SUBCUTANEOUS at 10:34

## 2022-12-29 RX ADMIN — POLYETHYLENE GLYCOL 3350 17 GRAM(S): 17 POWDER, FOR SOLUTION ORAL at 13:29

## 2022-12-29 RX ADMIN — Medication 1: at 13:28

## 2022-12-29 RX ADMIN — Medication 100 MILLIGRAM(S): at 15:26

## 2022-12-29 RX ADMIN — Medication 81 MILLIGRAM(S): at 13:29

## 2022-12-29 RX ADMIN — Medication 650 MILLIGRAM(S): at 14:49

## 2022-12-29 RX ADMIN — PANTOPRAZOLE SODIUM 40 MILLIGRAM(S): 20 TABLET, DELAYED RELEASE ORAL at 06:18

## 2022-12-29 NOTE — PROGRESS NOTE ADULT - TIME BILLING
- Review of records, telemetry, vital signs and daily labs.   - General and cardiovascular physical examination.  - Generation of cardiovascular treatment plan.  - Coordination of care.      Patient was seen and examined by me on 12/28/22interim events noted,labs and radiology studies reviewed.  Mumtaz Babcock MD,FACC.  [address] [place], [place] [id number].  [phone]
- Review of records, telemetry, vital signs and daily labs.   - General and cardiovascular physical examination.  - Generation of cardiovascular treatment plan.  - Coordination of care.      Patient was seen and examined by me on 12/29/22,interim events noted,labs and radiology studies reviewed.  Mumtaz Babcock MD,FACC.  [address] [place], [place] [id number].  [phone]

## 2022-12-29 NOTE — PROGRESS NOTE ADULT - PROBLEM SELECTOR PROBLEM 3
Hypotension
Hypotension
DM (diabetes mellitus)
Hypotension
DM (diabetes mellitus)
Hypotension

## 2022-12-29 NOTE — PROGRESS NOTE ADULT - SUBJECTIVE AND OBJECTIVE BOX
PATIENT SEEN AND EXAMINED ON :- 12/29/22  DATE OF SERVICE:     12/29/22        Interim events noted,Labs ,Radiological studies and Cardiology tests reviewed        HOSPITAL COURSE: HPI:  Patient is a 72 year old male, coming from [facility], with medical history of ESRD (T-TH-S),  DM, PVD, gangrene right foot, (s/p R BKA 11/9) coming to ED with complaints of hematuria. Patient states that hematuria is intermittent and has been ongoing for the last two months. Patient was recently discharged from [facility] and on that admission was found to have UTI caused by E. Coli. Patient states that he has dysuria denies increased urinary frequency or urgency. Patient denies groin pain or abdominal or flank pain., Denies changes in bowel habits. Denies trauma to the penile area. Denies fevers or chills. Denies chest pain, sob or palpitations. Denies syncope or dizziness. Patient reports that he has had three prior episodes in the past. As per med rec, patient is currently taking plavix and aspirin.  (23 Dec 2022 18:13)      INTERIM EVENTS:Patient seen at bedside ,interim events noted.      PMH -reviewed admission note, no change since admission  HEART FAILURE: Acute[ ]Chronic[ ] Systolic[ ] Diastolic[ ] Combined Systolic and Diastolic[ ]  CAD[ ] CABG[ ] PCI[ ]  DEVICES[ ] PPM[ ] ICD[ ] ILR[ ]  ATRIAL FIBRILLATION[ ] Paroxysmal[ ] Permanent[ ] CHADS2-[  ]  CANDE[ ] CKD1[ ] CKD2[ ] CKD3[ ] CKD4[ ] ESRD[ ]  COPD[ ] HTN[ ]   DM[ ] Type1[ ] Type 2[ ]   CVA[ ] Paresis[ ]    AMBULATION: Assisted[ ] Cane/walker[ ] Independent[ ]    MEDICATIONS  (STANDING):  aspirin  chewable 81 milliGRAM(s) Oral daily  chlorhexidine 2% Cloths 1 Application(s) Topical <User Schedule>  cholecalciferol 1000 Unit(s) Oral daily  clopidogrel Tablet 75 milliGRAM(s) Oral daily  epoetin nyasia-epbx (RETACRIT) Injectable 89665 Unit(s) IV Push <User Schedule>  influenza  Vaccine (HIGH DOSE) 0.7 milliLiter(s) IntraMuscular once  insulin lispro (ADMELOG) corrective regimen sliding scale   SubCutaneous three times a day before meals  lactulose Syrup 10 Gram(s) Oral daily  midodrine. 10 milliGRAM(s) Oral <User Schedule>  pantoprazole    Tablet 40 milliGRAM(s) Oral before breakfast  phenazopyridine 100 milliGRAM(s) Oral every 8 hours  polyethylene glycol 3350 17 Gram(s) Oral daily  repaglinide 0.5 milliGRAM(s) Oral three times a day before meals  sevelamer carbonate 1600 milliGRAM(s) Oral three times a day with meals    MEDICATIONS  (PRN):  acetaminophen     Tablet .. 650 milliGRAM(s) Oral every 6 hours PRN Mild Pain (1 - 3)  bisacodyl 5 milliGRAM(s) Oral at bedtime PRN Constipation            REVIEW OF SYSTEMS:  Constitutional: [ ] fever, [ ]weight loss,  [ ]fatigue [ ]weight gain  Eyes: [ ] visual changes  Respiratory: [ ]shortness of breath;  [ ] cough, [ ]wheezing, [ ]chills, [ ]hemoptysis  Cardiovascular: [ ] chest pain, [ ]palpitations, [ ]dizziness,  [ ]leg swelling[ ]orthopnea[ ]PND  Gastrointestinal: [ ] abdominal pain, [ ]nausea, [ ]vomiting,  [ ]diarrhea [ ]Constipation [ ]Melena  Genitourinary: [ ] dysuria, [ ] hematuria [ ]Garcia  Neurologic: [ ] headaches [ ] tremors[ ]weakness [ ]Paralysis Right[ ] Left[ ]  Skin: [ ] itching, [ ]burning, [ ] rashes  Endocrine: [ ] heat or cold intolerance  Musculoskeletal: [ ] joint pain or swelling; [ ] muscle, back, or extremity pain  Psychiatric: [ ] depression, [ ]anxiety, [ ]mood swings, or [ ]difficulty sleeping  Hematologic: [ ] easy bruising, [ ] bleeding gums    [ ] All remaining systems negative except as per above.   [ ]Unable to obtain.  [x] No change in ROS since admission      Vital Signs Last 24 Hrs  T(C): 37.5 (29 Dec 2022 16:23), Max: 37.7 (29 Dec 2022 15:38)  T(F): 99.5 (29 Dec 2022 16:23), Max: 99.9 (29 Dec 2022 15:38)  HR: 105 (29 Dec 2022 16:23) (92 - 109)  BP: 108/57 (29 Dec 2022 16:23) (107/61 - 122/54)  BP(mean): 76 (29 Dec 2022 15:38) (76 - 76)  RR: 18 (29 Dec 2022 16:23) (18 - 18)  SpO2: 95% (29 Dec 2022 16:23) (95% - 100%)    Parameters below as of 29 Dec 2022 16:23  Patient On (Oxygen Delivery Method): room air      I&O's Summary    28 Dec 2022 07:01  -  29 Dec 2022 07:00  --------------------------------------------------------  IN: 0 mL / OUT: 700 mL / NET: -700 mL    29 Dec 2022 07:01  -  29 Dec 2022 19:46  --------------------------------------------------------  IN: 600 mL / OUT: 1850 mL / NET: -1250 mL        PHYSICAL EXAM:  General: No acute distress BMI-  HEENT: EOMI, PERRL  Neck: Supple, [ ] JVD  Lungs: Equal air entry bilaterally; [ ] rales [ ] wheezing [ ] rhonchi  Heart: Regular rate and rhythm; [x ] murmur   2/6 [ x] systolic [ ] diastolic [ ] radiation[ ] rubs [ ]  gallops  Abdomen: Nontender, bowel sounds present  Extremities: No clubbing, cyanosis, [ ] edema [ ]Pulses  equal and intact  Nervous system:  Alert & Oriented X3, no focal deficits  Psychiatric: Normal affect  Skin: No rashes or lesions    LABS:  12-29    135  |  100  |  60<H>  ----------------------------<  307<H>  4.5   |  27  |  5.65<H>    Ca    8.7      29 Dec 2022 09:30  Phos  2.9     12-29  Mg     2.2     12-29      Creatinine Trend: 5.65<--, 4.44<--, 6.20<--, 5.63<--, 4.40<--, 5.41<--                        7.3    10.86 )-----------( 336      ( 29 Dec 2022 09:30 )             24.6

## 2022-12-29 NOTE — PROGRESS NOTE ADULT - PROBLEM SELECTOR PLAN 3
Improved   's  if continued to uptrend hold Midodrine   monitor BP
Improved   -130's   if continued to uptrend hold Midodrine   monitor BP
Improved   's  if continued to uptrend hold Midodrine   monitor BP
- continue with home medication  - SSI
Improved   's  if continued to uptrend hold Midodrine   monitor BP
- continue with home medication  - SSI
Improved   -130's   if continued to uptrend hold Midodrine   monitor BP

## 2022-12-29 NOTE — PROGRESS NOTE ADULT - PROBLEM SELECTOR PLAN 4
- A1C 6.2   - continue with home medication  - SSI
- SCDs for DVT prophylaxis
- A1C 6.2   - continue with home medication  - SSI
- SCDs for DVT prophylaxis
- A1C 6.2   - continue with home medication  - SSI

## 2022-12-29 NOTE — PROGRESS NOTE ADULT - ASSESSMENT
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 Upon evaluation in the ED, patient found to be afebrile and hemodynamically stable. Labs showing hgb of 9.6 (baseline 10-11) with a leukocytosis of 10.6. Urology consulted in ED, patient is s/p CBI.   Patient is admitted to medicine for hematuria, all A/C held hematuria resolved CBI clamped.   Pt has been followed by urology. urine clear. Hgb dropped 7.3 from 7.7. ordered 1 unit PRBC. Scheduled for HD.  Urology recommends to discharge with 3 way neumann and follow up urology outpatient for cystoscopy.   Patient is medically optimized for discharge back to [facility] after HD and blood transfusion.     DC plan updated and discussed with sister Darshana. agreeable plan of care/DC plan.

## 2022-12-29 NOTE — PROGRESS NOTE ADULT - SUBJECTIVE AND OBJECTIVE BOX
[facility] NEPHROLOGY- PROGRESS NOTE    72y Male with history of ESRD on HD presents with gross hematuria. Nephrology consulted for ESRD status.    REVIEW OF SYSTEMS:  Gen: no fevers  Cards: no chest pain  Resp: no dyspnea  GI: no nausea or vomiting or diarrhea  : no gross hematuria  Vascular: no LE edema    No Known Allergies    Hospital Medications: Medications reviewed    VITALS:  T(F): 97.2 (12-29-22 @ 12:33), Max: 99.3 (12-28-22 @ 20:38)  HR: 104 (12-29-22 @ 12:33)  BP: 121/71 (12-29-22 @ 12:33)  RR: 18 (12-29-22 @ 12:33)  SpO2: 99% (12-29-22 @ 12:33)  Wt(kg): --    12-28 @ 07:01  -  12-29 @ 07:00  --------------------------------------------------------  IN: 0 mL / OUT: 700 mL / NET: -700 mL    12-29 @ 07:01  -  12-29 @ 13:30  --------------------------------------------------------  IN: 600 mL / OUT: 1600 mL / NET: -1000 mL    PHYSICAL EXAM:    Gen: NAD, calm  Cards: RRR, +S1/S2, no M/G/R  Resp: CTA B/L  GI: soft, NT/ND, NABS  : + neumann with yellow urine  Vascular: no LE edema B/L, RIJ TDC intact, R BKA, LUE AVF without bruit/thrill      LABS:  12-29    135  |  100  |  60<H>  ----------------------------<  307<H>  4.5   |  27  |  5.65<H>    Ca    8.7      29 Dec 2022 09:30  Phos  2.9     12-29  Mg     2.2     12-29      Creatinine Trend: 5.65 <--, 4.44 <--, 6.20 <--, 5.63 <--, 4.40 <--, 5.41 <--, 4.78 <--                        7.3    10.86 )-----------( 336      ( 29 Dec 2022 09:30 )             24.6     Urine Studies:

## 2022-12-29 NOTE — PROGRESS NOTE ADULT - SUBJECTIVE AND OBJECTIVE BOX
NP Note discussed with  Primary Attending    INTERVAL HPI/OVERNIGHT EVENTS: No new complaints, during HD, H/H dropped 7.3, ordered 1 unit PRBC, to give before DC.     MEDICATIONS  (STANDING):  aspirin  chewable 81 milliGRAM(s) Oral daily  chlorhexidine 2% Cloths 1 Application(s) Topical <User Schedule>  cholecalciferol 1000 Unit(s) Oral daily  clopidogrel Tablet 75 milliGRAM(s) Oral daily  epoetin nyasia-epbx (RETACRIT) Injectable 17352 Unit(s) IV Push <User Schedule>  influenza  Vaccine (HIGH DOSE) 0.7 milliLiter(s) IntraMuscular once  insulin lispro (ADMELOG) corrective regimen sliding scale   SubCutaneous three times a day before meals  lactulose Syrup 10 Gram(s) Oral daily  midodrine. 10 milliGRAM(s) Oral <User Schedule>  pantoprazole    Tablet 40 milliGRAM(s) Oral before breakfast  phenazopyridine 100 milliGRAM(s) Oral every 8 hours  polyethylene glycol 3350 17 Gram(s) Oral daily  repaglinide 0.5 milliGRAM(s) Oral three times a day before meals  sevelamer carbonate 1600 milliGRAM(s) Oral three times a day with meals    MEDICATIONS  (PRN):  acetaminophen     Tablet .. 650 milliGRAM(s) Oral every 6 hours PRN Mild Pain (1 - 3)  bisacodyl 5 milliGRAM(s) Oral at bedtime PRN Constipation      __________________________________________________  REVIEW OF SYSTEMS:  Limited ROS due to impaired cognition, easily forgetful, not able to concentrate during conversation.       Vital Signs Last 24 Hrs  T(C): 37.1 (29 Dec 2022 09:33), Max: 37.4 (28 Dec 2022 20:38)  T(F): 98.7 (29 Dec 2022 09:33), Max: 99.3 (28 Dec 2022 20:38)  HR: 92 (29 Dec 2022 09:33) (92 - 98)  BP: 114/66 (29 Dec 2022 09:33) (114/66 - 122/67)  BP(mean): --  RR: 18 (29 Dec 2022 09:33) (18 - 18)  SpO2: 100% (29 Dec 2022 09:33) (98% - 100%)    Parameters below as of 29 Dec 2022 09:33  Patient On (Oxygen Delivery Method): room air        ________________________________________________  PHYSICAL EXAM:  GENERAL: NAD, Cachectic  HEENT: Normocephalic;  conjunctivae and sclerae clear; moist mucous membranes;   NECK : supple  CHEST/LUNG: Clear to auscultation bilaterally with good air entry   HEART: S1 S2  regular; no murmurs, gallops or rubs  ABDOMEN: Soft, Nontender, Nondistended; Bowel sounds present  EXTREMITIES: no cyanosis; no edema; no calf tenderness R BKA,  SKIN: warm and dry; no rash, RIJ HD Cath intact,  LUE AVF no bruit/thrill  : 3 way Garcia clamped.   NERVOUS SYSTEM:  Awake and alert; Oriented  to self and place ; no new deficits    _________________________________________________  LABS:                        7.3    10.86 )-----------( 336      ( 29 Dec 2022 09:30 )             24.6     12-29    135  |  100  |  60<H>  ----------------------------<  307<H>  4.5   |  27  |  5.65<H>    Ca    8.7      29 Dec 2022 09:30  Phos  2.9     12-29  Mg     2.2     12-29      CAPILLARY BLOOD GLUCOSE      POCT Blood Glucose.: 194 mg/dL (29 Dec 2022 11:13)  POCT Blood Glucose.: 208 mg/dL (29 Dec 2022 07:41)  POCT Blood Glucose.: 296 mg/dL (28 Dec 2022 22:14)  POCT Blood Glucose.: 141 mg/dL (28 Dec 2022 16:42)    RADIOLOGY & ADDITIONAL TESTS:    Imaging  Reviewed:  YES  no new imaging    Consultant(s) Notes Reviewed:   YES      Plan of care was discussed with patient and /or primary care giver; all questions and concerns were addressed

## 2022-12-29 NOTE — PROGRESS NOTE ADULT - PROVIDER SPECIALTY LIST ADULT
Nephrology
Urology
Internal Medicine
Nephrology
Urology
Urology
Internal Medicine
Nephrology
Urology
Urology
Nephrology
Nephrology
Internal Medicine

## 2022-12-29 NOTE — PROGRESS NOTE ADULT - ASSESSMENT
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 Upon evaluation in the ED, patient found to be afebrile and hemodynamically stable. Labs showing hgb of 9.6 (baseline 10-11) with a leukocytosis of 10.6. Urology consulted in ED, patient is s/p CBI.   Patient is admitted to medicine for hematuria, all A/C held hematuria resolved CBI clamped.   Pt has been followed by urology. urine clear. Hgb dropped 7.3 from 7.7. ordered 1 unit PRBC. Scheduled for HD.  Urology recommends to discharge with 3 way neumann and follow up urology outpatient for cystoscopy.   Patient is medically optimized for discharge back to [facility] after HD and blood transfusion.     DC plan updated and discussed with sister Darshana. agreeable plan of care/DC plan.

## 2022-12-29 NOTE — PROGRESS NOTE ADULT - PROBLEM SELECTOR PLAN 2
- patient ESRD on (T-Th-Sat)  - Renal diet   - Nephrology Dr. Dumont consulted
- patient ESRD on (T-Th-Sat)  - Last HD today 12/27   - Renal diet   - Nephrology Dr. Dumont follows
- patient ESRD on (T-Th-Sat)  - Renal diet   - Nephrology Dr. Dumont consulted
- patient ESRD on (T-Th-Sat)  - Last HD Sat 12/24  - Renal diet   - Nephrology Dr. Dumont consulted
- patient ESRD on (T-Th-Sat)  - Last HD today 12/27   - Renal diet   - Nephrology Dr. Dumont follows
- patient ESRD on (T-Th-Sat)  - Last HD Sat 12/24  - Renal diet   - Nephrology Dr. Dumont consulted
- patient ESRD on (T-Th-Sat)  - Last HD today 12/27   - Renal diet   - Nephrology Dr. Dumont follows

## 2022-12-29 NOTE — PROGRESS NOTE ADULT - PROBLEM SELECTOR PLAN 1
- patient admitted for hematuria  - Urology consulted started CBI, currently draining clear urine  - hgb currently 9.6 (baseline 10-11)  - hold chemical anticoagulation inculding Aspirin and Plavix  - SCDs for DVT ppx  - transfuse if hgb< 7  - keep active type and screen  - f/u UA and urine culture  - s/p 1 dose of rocephin in the ED   - Urology following
- p/w hematuria  - s/p CBI, currently draining clear urine  - hgb 7.3  - hold chemical anticoagulation including Aspirin and Plavix  - SCDs for DVT ppx  - transfuse if hgb< 7  - urine culture negative  - Urology following  - reccs to remain with [address] NEUMANN, will likely need to be D/C'd with neumann given had large clots removed from bladder x2 in the last 6 weeks  - urology reccs to DC with neumann 3 way, preferred by urology.   -give 1 unit PRBC for drop Hgb
- p/w hematuria  - s/p CBI, currently draining clear urine  - hgb 7.3  - hold chemical anticoagulation including Aspirin and Plavix  - SCDs for DVT ppx  - transfuse if hgb< 7  - urine culture negative  - Urology following  - reccs to remain with [address] [facility]UM[facility], will likely need to be D/C'd with neumann given had large clots removed from bladder x2 in the last 6 weeks  - urology reccs to DC with neumann 3 way, preferred by urology.   -give 1 unit PRBC for drop Hgb
- p/w hematuria  - started CBI, currently draining clear urine  - hgb stable  - hold chemical anticoagulation including Aspirin and Plavix  - SCDs for DVT ppx  - transfuse if hgb< 7  - F/U urine culture  - Urology following
- p/w hematuria  - s/p CBI, currently draining clear urine  - hgb stable  - hold chemical anticoagulation including Aspirin and Plavix  - SCDs for DVT ppx  - transfuse if hgb< 7  - F/U urine culture  - Urology following  - reccs to remain with [address] NEUM[facility], will likely need to be D/C'd with neumann given had large clots removed from bladder x2 in the last 6 weeks
- patient admitted for hematuria  - Urology consulted started CBI, currently draining clear urine  - hgb currently 9.6 (baseline 10-11)  - hold chemical anticoagulation inculding Aspirin and Plavix  - SCDs for DVT ppx  - transfuse if hgb< 7  - keep active type and screen  - f/u UA and urine culture  - s/p 1 dose of rocephin in the ED   - Urology following
- p/w hematuria  - s/p CBI, currently draining clear urine  - hgb stable  - hold chemical anticoagulation including Aspirin and Plavix  - SCDs for DVT ppx  - transfuse if hgb< 7  - F/U urine culture  - Urology following  - reccs to remain with [address] [facility]UM[facility], will likely need to be D/C'd with neumann given had large clots removed from bladder x2 in the last 6 weeks
- p/w hematuria  - started CBI, currently draining clear urine  - hgb stable  - hold chemical anticoagulation including Aspirin and Plavix  - SCDs for DVT ppx  - transfuse if hgb< 7  - F/U urine culture  - Urology following
- p/w hematuria  - s/p CBI, currently draining clear urine  - hgb stable  - hold chemical anticoagulation including Aspirin and Plavix  - SCDs for DVT ppx  - transfuse if hgb< 7  - F/U urine culture  - Urology following  - reccs to remain with [address] [facility]UM[facility], will likely need to be D/C'd with neumann given had large clots removed from bladder x2 in the last 6 weeks

## 2022-12-29 NOTE — PROGRESS NOTE ADULT - PROBLEM SELECTOR PROBLEM 4
DM (diabetes mellitus)
DM (diabetes mellitus)
Prophylactic measure
Prophylactic measure
DM (diabetes mellitus)

## 2022-12-29 NOTE — PROGRESS NOTE ADULT - ASSESSMENT
72y Male with history of ESRD on HD presents with gross hematuria. Nephrology consulted for ESRD status.    1) ESRD: Pt seen on HD today 12/29, hemodynamically stable, tolerating UF goal of 1L. Plan for next maintenance HD on 12/31. Monitor electrolytes.    2) HTN with ESRD: BP low normal. Continue with midodrine 10 mg pre-HD on HD days to avoid intradialytic hypotension. Monitor BP.    3) Anemia of renal disease: Hb decreasing likely due to gross hematuria. c/w Epo 10K with HD (increased 12/24). c/w venofer 200 mg IV with dialysis X 2 doses given low TSAT. Recc 1 unit prbc transfusion. Monitor Hb.    4) Hyperphosphatemia: Phosphorus acceptable. Continue with renvela 2 tabs with meals. Monitor serum calcium and phosphorus.    Plan discussed with ANGELINE Dominguez.     [facility] NEPHROLOGY  Wili Hopkins M.D.  Kiran Feng D.O.  Sujatha Dumont M.D.  Radha Miller, MSN, ANP-C    Telephone: [phone]  Facsimile: [phone]    [address] [place] [address]

## 2022-12-29 NOTE — DISCHARGE NOTE NURSING/CASE MANAGEMENT/SOCIAL WORK - PATIENT PORTAL LINK FT
You can access the FollowMyHealth Patient Portal offered by [facility] by registering at the following website: [email or web address]. By joining [facility]’s FollowMyHealth portal, you will also be able to view your health information using other applications (apps) compatible with our system.

## 2022-12-29 NOTE — PROGRESS NOTE ADULT - PROBLEM SELECTOR PROBLEM 2
ESRD on hemodialysis

## 2022-12-29 NOTE — DISCHARGE NOTE NURSING/CASE MANAGEMENT/SOCIAL WORK - NSDCFUADDAPPT_GEN_ALL_CORE_FT
Urology f/u with dr. Beltran at 1/5/23, 1pm  [address] [facility]  2nd Floor, [address]

## 2022-12-29 NOTE — PROGRESS NOTE ADULT - PROBLEM SELECTOR PLAN 5
- SCDs for DVT prophylaxis  - GI: Protonix

## 2022-12-29 NOTE — DISCHARGE NOTE NURSING/CASE MANAGEMENT/SOCIAL WORK - NSDCPEFALRISK_GEN_ALL_CORE
For information on Fall & Injury Prevention, visit: [email or web address] OR  [email or web address] OR  https://www.cdc.gov/steadi/patient.html

## 2022-12-29 NOTE — PROGRESS NOTE ADULT - PROBLEM SELECTOR PLAN 6
patient from [facility]   pending urology clearance  trend CBC  f/u urine culture    COVID - 12/23
patient from [facility]   pending urology clearance  trend CBC  COVID - 12/27 negative  after HD and blood tx can DC.   need cystoscopy with urology f/u
patient from [facility]   pending urology clearance  trend CBC  f/u urine culture    COVID - 12/23
patient from [facility]   pending urology clearance  trend CBC  HD in AM   COVID - 12/23
patient from [facility]   pending urology clearance  trend CBC  HD in AM   COVID - 12/23
patient from [facility]   pending urology clearance  trend CBC  COVID - 12/27 negative  after HD and blood tx can DC.   need cystoscopy with urology f/u
patient from [facility]   pending urology clearance  trend CBC  f/u urine culture    COVID - 12/23

## 2022-12-30 LAB
MRSA PCR RESULT.: SIGNIFICANT CHANGE UP
S AUREUS DNA NOSE QL NAA+PROBE: SIGNIFICANT CHANGE UP

## 2023-01-05 ENCOUNTER — APPOINTMENT (OUTPATIENT)
Dept: UROLOGY | Facility: CLINIC | Age: 73
End: 2023-01-05

## 2023-03-13 NOTE — PROGRESS NOTE ADULT - PROBLEM SELECTOR PLAN 1
- Pt presented with right foot gangrene  - CTA run off shows outflow disease  - abnormal JESUS  - NWB to RLE- podiatry recc  - cont vancomycin every 48 hrs,   - BCx negative  - MRI Rt foot negative for OM  - s/p Angiogram w/no targets for revasc but w/extensive distal small vessel disease  - recommendation would be for R BKA vs. medical management  - ID Dr. Babcock following  - Vascular Following   - Podiatry Following  reccomended for BKA patient refuses Mirvaso Counseling: Mirvaso is a topical medication which can decrease superficial blood flow where applied. Side effects are uncommon and include stinging, redness and allergic reactions.

## 2023-03-30 NOTE — PROGRESS NOTE ADULT - PROBLEM SELECTOR PLAN 4
Pt has dropped off urine sample to test for UTI. Order for Urine Culture please. Thanks.   POC FS slightly elevated however preop with poor PO intake  continue ISS

## 2023-04-17 ENCOUNTER — INPATIENT (INPATIENT)
Facility: HOSPITAL | Age: 73
LOS: 13 days | Discharge: NURSING, REHAB OR LONG-TERM CARE | End: 2023-05-01
Attending: INTERNAL MEDICINE | Admitting: INTERNAL MEDICINE
Payer: MEDICARE

## 2023-04-17 VITALS
RESPIRATION RATE: 16 BRPM | OXYGEN SATURATION: 98 % | DIASTOLIC BLOOD PRESSURE: 59 MMHG | TEMPERATURE: 98 F | SYSTOLIC BLOOD PRESSURE: 129 MMHG | HEART RATE: 90 BPM

## 2023-04-17 DIAGNOSIS — Z89.511 ACQUIRED ABSENCE OF RIGHT LEG BELOW KNEE: Chronic | ICD-10-CM

## 2023-04-17 DIAGNOSIS — I96 GANGRENE, NOT ELSEWHERE CLASSIFIED: ICD-10-CM

## 2023-04-17 PROBLEM — E11.9 TYPE 2 DIABETES MELLITUS WITHOUT COMPLICATIONS: Chronic | Status: ACTIVE | Noted: 2019-08-02

## 2023-04-17 LAB
ALBUMIN SERPL ELPH-MCNC: 3.3 G/DL — SIGNIFICANT CHANGE UP (ref 3.3–5)
ALP SERPL-CCNC: 81 U/L — SIGNIFICANT CHANGE UP (ref 40–120)
ALT FLD-CCNC: 30 U/L — SIGNIFICANT CHANGE UP (ref 4–41)
ANION GAP SERPL CALC-SCNC: 17 MMOL/L — HIGH (ref 7–14)
APTT BLD: 25.7 SEC — LOW (ref 27–36.3)
AST SERPL-CCNC: 49 U/L — HIGH (ref 4–40)
BASOPHILS # BLD AUTO: 0.03 K/UL — SIGNIFICANT CHANGE UP (ref 0–0.2)
BASOPHILS NFR BLD AUTO: 0.3 % — SIGNIFICANT CHANGE UP (ref 0–2)
BILIRUB SERPL-MCNC: 0.2 MG/DL — SIGNIFICANT CHANGE UP (ref 0.2–1.2)
BLD GP AB SCN SERPL QL: NEGATIVE — SIGNIFICANT CHANGE UP
BUN SERPL-MCNC: 66 MG/DL — HIGH (ref 7–23)
CALCIUM SERPL-MCNC: 9.1 MG/DL — SIGNIFICANT CHANGE UP (ref 8.4–10.5)
CHLORIDE SERPL-SCNC: 98 MMOL/L — SIGNIFICANT CHANGE UP (ref 98–107)
CO2 SERPL-SCNC: 17 MMOL/L — LOW (ref 22–31)
CREAT SERPL-MCNC: 4.44 MG/DL — HIGH (ref 0.5–1.3)
CRP SERPL-MCNC: 125.3 MG/L — HIGH
EGFR: 13 ML/MIN/1.73M2 — LOW
EOSINOPHIL # BLD AUTO: 0.09 K/UL — SIGNIFICANT CHANGE UP (ref 0–0.5)
EOSINOPHIL NFR BLD AUTO: 0.9 % — SIGNIFICANT CHANGE UP (ref 0–6)
GLUCOSE SERPL-MCNC: 183 MG/DL — HIGH (ref 70–99)
HCT VFR BLD CALC: 34.3 % — LOW (ref 39–50)
HGB BLD-MCNC: 10.7 G/DL — LOW (ref 13–17)
IANC: 7.24 K/UL — SIGNIFICANT CHANGE UP (ref 1.8–7.4)
IMM GRANULOCYTES NFR BLD AUTO: 0.4 % — SIGNIFICANT CHANGE UP (ref 0–0.9)
INR BLD: 1.28 RATIO — HIGH (ref 0.88–1.16)
LYMPHOCYTES # BLD AUTO: 0.98 K/UL — LOW (ref 1–3.3)
LYMPHOCYTES # BLD AUTO: 10.3 % — LOW (ref 13–44)
MAGNESIUM SERPL-MCNC: 2.2 MG/DL — SIGNIFICANT CHANGE UP (ref 1.6–2.6)
MCHC RBC-ENTMCNC: 30.5 PG — SIGNIFICANT CHANGE UP (ref 27–34)
MCHC RBC-ENTMCNC: 31.2 GM/DL — LOW (ref 32–36)
MCV RBC AUTO: 97.7 FL — SIGNIFICANT CHANGE UP (ref 80–100)
MONOCYTES # BLD AUTO: 1.1 K/UL — HIGH (ref 0–0.9)
MONOCYTES NFR BLD AUTO: 11.6 % — SIGNIFICANT CHANGE UP (ref 2–14)
NEUTROPHILS # BLD AUTO: 7.24 K/UL — SIGNIFICANT CHANGE UP (ref 1.8–7.4)
NEUTROPHILS NFR BLD AUTO: 76.5 % — SIGNIFICANT CHANGE UP (ref 43–77)
NRBC # BLD: 0 /100 WBCS — SIGNIFICANT CHANGE UP (ref 0–0)
NRBC # FLD: 0 K/UL — SIGNIFICANT CHANGE UP (ref 0–0)
PLATELET # BLD AUTO: 303 K/UL — SIGNIFICANT CHANGE UP (ref 150–400)
POTASSIUM SERPL-MCNC: 5.1 MMOL/L — SIGNIFICANT CHANGE UP (ref 3.5–5.3)
POTASSIUM SERPL-SCNC: 5.1 MMOL/L — SIGNIFICANT CHANGE UP (ref 3.5–5.3)
PROT SERPL-MCNC: 6.8 G/DL — SIGNIFICANT CHANGE UP (ref 6–8.3)
PROTHROM AB SERPL-ACNC: 14.9 SEC — HIGH (ref 10.5–13.4)
RBC # BLD: 3.51 M/UL — LOW (ref 4.2–5.8)
RBC # FLD: 14.3 % — SIGNIFICANT CHANGE UP (ref 10.3–14.5)
RH IG SCN BLD-IMP: POSITIVE — SIGNIFICANT CHANGE UP
SARS-COV-2 RNA SPEC QL NAA+PROBE: SIGNIFICANT CHANGE UP
SODIUM SERPL-SCNC: 132 MMOL/L — LOW (ref 135–145)
WBC # BLD: 9.48 K/UL — SIGNIFICANT CHANGE UP (ref 3.8–10.5)
WBC # FLD AUTO: 9.48 K/UL — SIGNIFICANT CHANGE UP (ref 3.8–10.5)

## 2023-04-17 PROCEDURE — 99223 1ST HOSP IP/OBS HIGH 75: CPT

## 2023-04-17 PROCEDURE — 73600 X-RAY EXAM OF ANKLE: CPT | Mod: 26,LT

## 2023-04-17 PROCEDURE — 71045 X-RAY EXAM CHEST 1 VIEW: CPT | Mod: 26

## 2023-04-17 PROCEDURE — 99285 EMERGENCY DEPT VISIT HI MDM: CPT | Mod: GC

## 2023-04-17 PROCEDURE — 73620 X-RAY EXAM OF FOOT: CPT | Mod: 26,LT

## 2023-04-17 NOTE — ED ADULT TRIAGE NOTE - CHIEF COMPLAINT QUOTE
Pt. coming from [facility] for left leg infection. EMS states pt. is scheduled for amputation today. Denies fevers.

## 2023-04-17 NOTE — H&P ADULT - NSICDXPASTMEDICALHX_GEN_ALL_CORE_FT
PAST MEDICAL HISTORY:  DM (diabetes mellitus)     ESRD on dialysis     PVD (peripheral vascular disease)

## 2023-04-17 NOTE — H&P ADULT - PROBLEM SELECTOR PLAN 1
- Patient with worsening pain of the L foot, reports concern for infection at his facility but on examination low concern for acute infection, will hold off on abx despite elevated CRP level (likely acute phase elevation 2/2 worsening gangrene)  - BCx in lab  - Podiatry and vascular sx consulted, f/u consults  - Pain control as needed - Patient with worsening pain of the L foot, reports concern for infection at his facility but on examination low concern for acute infection, podiatry reccs empiric unasyn -> ordered  - BCx x2 in lab  - Podiatry and vascular sx consulted, f/u consults  - Pain control as needed

## 2023-04-17 NOTE — H&P ADULT - NSHPREVIEWOFSYSTEMS_GEN_ALL_CORE
REVIEW OF SYSTEMS:    CONSTITUTIONAL: No weakness, fevers or chills  EYES: No visual changes or eye discharge  ENT: No rhinorrhea or sore throat  NECK: No pain or stiffness  RESPIRATORY: No cough, wheezing, hemoptysis; No shortness of breath  CARDIOVASCULAR: No chest pain or palpitations; No lower extremity edema  GASTROINTESTINAL: No abdominal or epigastric pain. No nausea, vomiting, or hematemesis; No diarrhea or constipation. No melena or hematochezia.  MSK: No back pain, +pain of the L plantar area  GENITOURINARY: No dysuria, frequency or hematuria  NEUROLOGICAL: No numbness or weakness  SKIN: +Duskiness of the L mid foot to the toes, +blistering of the L foot/toes, No itching, burning, rashes, or lesions

## 2023-04-17 NOTE — H&P ADULT - PROBLEM SELECTOR PLAN 2
- On HD Tu/Th/Sat, next episode due on 4/18  - Previously seen by Dr. Hopkins's group -> consult Dr. Hopkins's nephrology group for HD  - c/w renal meds  - Mild anemia likely 2/2 ESRD, improved from prior levels

## 2023-04-17 NOTE — H&P ADULT - NSHPSOCIALHISTORY_GEN_ALL_CORE
Currently a resident at [facility]  Ambulates with wheelchair  Former tobacco, quit a few years ago  Denies EtOH or illicit substance use

## 2023-04-17 NOTE — H&P ADULT - NSHPLABSRESULTS_GEN_ALL_CORE
LABS and ADDITIONAL STUDIES:                        10.7   9.48  )-----------( 303      ( 17 Apr 2023 16:15 )             34.3       04-17    132<L>  |  98  |  66<H>  ----------------------------<  183<H>  5.1   |  17<L>  |  4.44<H>    Ca    9.1      17 Apr 2023 16:15  Mg     2.20     04-17    TPro  6.8  /  Alb  3.3  /  TBili  0.2  /  DBili  x   /  AST  49<H>  /  ALT  30  /  AlkPhos  81  04-17            LIVER FUNCTIONS - ( 17 Apr 2023 16:15 )  Alb: 3.3 g/dL / Pro: 6.8 g/dL / ALK PHOS: 81 U/L / ALT: 30 U/L / AST: 49 U/L / GGT: x             PT/INR - ( 17 Apr 2023 16:15 )   PT: 14.9 sec;   INR: 1.28 ratio         PTT - ( 17 Apr 2023 16:15 )  PTT:25.7 sec LABS and ADDITIONAL STUDIES:                        10.7   9.48  )-----------( 303      ( 17 Apr 2023 16:15 )             34.3     04-17    132<L>  |  98  |  66<H>  ----------------------------<  183<H>  5.1   |  17<L>  |  4.44<H>    Ca    9.1      17 Apr 2023 16:15  Mg     2.20     04-17    TPro  6.8  /  Alb  3.3  /  TBili  0.2  /  DBili  x   /  AST  49<H>  /  ALT  30  /  AlkPhos  81  04-17    LIVER FUNCTIONS - ( 17 Apr 2023 16:15 )  Alb: 3.3 g/dL / Pro: 6.8 g/dL / ALK PHOS: 81 U/L / ALT: 30 U/L / AST: 49 U/L / GGT: x           PT/INR - ( 17 Apr 2023 16:15 )   PT: 14.9 sec;   INR: 1.28 ratio    PTT - ( 17 Apr 2023 16:15 )  PTT:25.7 sec    CRP - 125.3    < from: Xray Ankle 2 Views/Foot AP + Lateral, Left (04.17.23 @ 17:24) >  IMPRESSION:    Left ankle:  Cortically based lucent lesion within the posterior aspect of the distal tibial diaphysis, measuring 7 mm, with sclerotic rim, 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  No acute displaced fracture or dislocation. Soft tissue swelling about the ankle. No definite cortical erosion or periostitis to suggest osteomyelitis.  Atherosclerotic calcifications are noted.    Left foot:  No acute displaced fracture or dislocation. Osteoarthritic changes throughout the midfoot and forefoot. No definite cortical erosion or periostitis to suggest osteomyelitis.  Atherosclerotic calcifications are noted.  --- End of Report ---  < end of copied text >    < from: Xray Chest 1 View AP/PA (04.17.23 @ 17:24) >  IMPRESSION:  Clear lungs.  --- End of Report ---  < end of copied text >

## 2023-04-17 NOTE — ED PROVIDER NOTE - OBJECTIVE STATEMENT
This is a 72-year-old male, history of ESRD on dialysis via left chest port 72-year-old male, history of ESRD, on Tuesday Thursday Saturday dialysis and significant peripheral vascular disease status post right lower extremity amputation, presenting for chief complaint of left foot lesion.  Chronic.  Noted to be gangrenous today by both family and PCP [facility].  Sent here for admission to Dr. Babcock.  Plan will be for amputation of the left foot.

## 2023-04-17 NOTE — ED PROVIDER NOTE - ATTENDING CONTRIBUTION TO CARE
73 y/o M PMHx ESRD (on HD, T/Th/Sa), PVD (s/p RLE amputation) p/w L foot discoloration. Dry gangrene noted. HR 90, /59. Plan- EKG, Labs including blood cultures/CBC, Ankle/Foot X-ray, Will hold off on antibiotics for now given no fever/chills/overlying cellulitis, Vascular consult for admission

## 2023-04-17 NOTE — ED ADULT NURSE NOTE - OBJECTIVE STATEMENT
patient presents to ed from nursing home unsure as to why he's here. endorses " I think for an infection". Denies cp, sob, n/v, fever.chills yes

## 2023-04-17 NOTE — ED PROVIDER NOTE - NS ED ROS FT
GENERAL: no fever  EYES: no eye pain  HEENT: no neck pain  CARDIAC: no chest pain  PULMONARY: no SOB  GI: no abdominal pain  : no dysuria  SKIN:   Positive for  left foot lesion  NEURO: no headache  MSK: positive for left foot lesion

## 2023-04-17 NOTE — ED PROVIDER NOTE - CLINICAL SUMMARY MEDICAL DECISION MAKING FREE TEXT BOX
72-year-old male, history of ESRD, on Tuesday Thursday Saturday dialysis and significant peripheral vascular disease status post right lower extremity amputation, presenting for chief complaint of left foot lesion.  Vital signs unremarkable.  Nontoxic-appearing.  The left lower extremity is not overtly infected, no hard signs of wet gangrene is present.  There is clinically overt dry gangrene consistent with known history.  I spoke with Dr. Babcock, who is aware that the patient is here and has consulted vascular surgery for the case.  We will rule out bacteremia, however pretest suspicion is low given nontoxic appearance and normal vital signs.  Assess for metabolic derangements.  Assess coags and type and screen and platelet count in a patient who is operative.  Preoperative EKG and chest x-ray.  Screening ankle and foot x-ray to rule out soft tissue air which would imply evolution into wet gangrene.  Will await work-up and then admit to appropriate service.

## 2023-04-17 NOTE — ED PROVIDER NOTE - PHYSICAL EXAMINATION
Gen: NAD, non-toxic appearing  Head: normal appearing  HEENT: normal conjunctiva  Lung: no respiratory distress, speaking in full sentences,  clear to auscultation bilaterally    CV: regular rate and rhythm, no murmurs  Abd: soft, non distended, non tender   MSK: no visible deformities, thready  DP pulse is present.  Neuro: alert and grossly oriented, no gross motor deficits  Skin:  dusky gangrenous changes over the toes of the left foot diffusely.  There is no overt vesicular changes that were realized.  No cardinal signs of inflammation otherwise.

## 2023-04-17 NOTE — ED ADULT NURSE NOTE - CHIEF COMPLAINT QUOTE
Pt. coming from [facility] for left leg infection. EMS states pt. is scheduled for amputation today. Denies fevers.

## 2023-04-17 NOTE — H&P ADULT - PROBLEM SELECTOR PLAN 5
DVT ppx - HSQ  Diet - DASH/CC/Renal diet  Activity - OOB to chair/with assistance, increase as tolerated DVT ppx - HSQ  Diet - DASH/CC/Renal diet  Activity - OOB to chair/with assistance, increase as tolerated    Fall and aspiration precautions

## 2023-04-17 NOTE — H&P ADULT - ASSESSMENT
This is a 72M with history as above who presents to the hospital with worsening gangrene of his L foot.

## 2023-04-17 NOTE — H&P ADULT - HISTORY OF PRESENT ILLNESS
This is a 72M with history of ESRD on HD (T/Th/Sat), DM2, and PVD (s/p R LE  Please note, patient with second MRN #[id number] with additional recent hospitalizations.    This is a 72M with history of ESRD on HD (T/Th/Sat), DM2, and PVD (s/p R LE  Please note, patient with second MRN #[id number] with additional recent hospitalizations.    This is a 72M with history of ESRD on HD (T/Th/Sat), DM2, PVD (s/p R BKA in Nov 2022) and recurrent hematurias (has required CBI in the past) who presents to the hospital with worsening pain and duskiness of his L foot. Patient currently is a limited historian, HPI supplemented with information from patient's sister Darshana ([phone]). As per patient he was sent to the hospital for possible L foot infection, does report pain to the bottom of his foot but otherwise denies any fevers/chills/diaphoresis, no swelling or redness of his L foot. No other acute complaints. Said that he is supposed to have an amputation of his L leg but is unclear on the details. Spoke with patient's sister, Darshana, who said that the patient has known PVD, had a recent R leg BKA and has had some procedures with [facility] [place] (sister unsure of the procedures) and states that they were told the procedures did not help improve the peripheral blood flow in the patient's L leg and that he would need an amputation. Said that the family was told he was being sent to the hospital for the amputation evaluation. Otherwise sister not aware of any other acute issues for the patient.     On arrival to the ED, his vitals were T 97.5, P 90, /59, RR 16, O2 sat 98% RA. His lab work was most significant for mild anemia and elevated CRP. His imaging did not show acute findings to suggest OM. He was admitted to medicine.

## 2023-04-17 NOTE — H&P ADULT - NSHPPHYSICALEXAM_GEN_ALL_CORE
Vital Signs Last 24 Hrs  T(C): 37.1 (18 Apr 2023 01:30), Max: 37.3 (17 Apr 2023 21:40)  T(F): 98.7 (18 Apr 2023 01:30), Max: 99.2 (17 Apr 2023 21:40)  HR: 95 (18 Apr 2023 01:30) (74 - 97)  BP: 130/76 (18 Apr 2023 01:30) (126/73 - 130/76)  BP(mean): --  RR: 18 (18 Apr 2023 01:30) (16 - 18)  SpO2: 100% (18 Apr 2023 01:30) (97% - 100%)    Parameters below as of 18 Apr 2023 01:30  Patient On (Oxygen Delivery Method): room air    GENERAL: No acute distress, well-developed  EYES: EOMI, PERRL, conjunctiva and sclera clear  ENT: Neck supple, No JVD, moist mucosa  CHEST/LUNG: Clear to auscultation bilaterally; No wheeze, equal breath sounds bilaterally   BACK: No spinal tenderness, no CVA TTP  HEART: Regular rate and rhythm; No murmurs, rubs, or gallops  ABDOMEN: Soft, Nontender, Nondistended; Bowel sounds present  EXTREMITIES: R BKA, popliteal pulses intact b/l, L foot PT pulse palpable but soft, DP pulse not palpable, L foot duskiness mid-foot to toes, few bullae seen on L foot/toes without discharge, +TTP and mottling of the plantar aspect of the L foot, L foot cool to touch  PSYCH: Nl behavior, nl affect  NEUROLOGY: AAOx3, non-focal  SKIN: Normal color, No rashes or lesions Vital Signs Last 24 Hrs  T(C): 37.1 (18 Apr 2023 01:30), Max: 37.3 (17 Apr 2023 21:40)  T(F): 98.7 (18 Apr 2023 01:30), Max: 99.2 (17 Apr 2023 21:40)  HR: 95 (18 Apr 2023 01:30) (74 - 97)  BP: 130/76 (18 Apr 2023 01:30) (126/73 - 130/76)  BP(mean): --  RR: 18 (18 Apr 2023 01:30) (16 - 18)  SpO2: 100% (18 Apr 2023 01:30) (97% - 100%)    Parameters below as of 18 Apr 2023 01:30  Patient On (Oxygen Delivery Method): room air    GENERAL: No acute distress, well-developed  EYES: EOMI, PERRL, conjunctiva and sclera clear  ENT: Neck supple, No JVD, moist mucosa  CHEST/LUNG: Clear to auscultation bilaterally; No wheeze, equal breath sounds bilaterally, R chest wall tunnelled HD catheter  BACK: No spinal tenderness, no CVA TTP  HEART: Regular rate and rhythm; No murmurs, rubs, or gallops  ABDOMEN: Soft, Nontender, Nondistended; Bowel sounds present  EXTREMITIES: R BKA, popliteal pulses intact b/l, L foot PT pulse palpable but soft, DP pulse not palpable, L foot duskiness mid-foot to toes, few bullae seen on L foot/toes without discharge, +TTP and mottling of the plantar aspect of the L foot, L foot cool to touch  PSYCH: Nl behavior, nl affect  NEUROLOGY: AAOx3, non-focal  SKIN: Normal color, No rashes or lesions

## 2023-04-18 DIAGNOSIS — Z89.511 ACQUIRED ABSENCE OF RIGHT LEG BELOW KNEE: Chronic | ICD-10-CM

## 2023-04-18 DIAGNOSIS — I73.9 PERIPHERAL VASCULAR DISEASE, UNSPECIFIED: ICD-10-CM

## 2023-04-18 DIAGNOSIS — Z29.9 ENCOUNTER FOR PROPHYLACTIC MEASURES, UNSPECIFIED: ICD-10-CM

## 2023-04-18 DIAGNOSIS — E11.9 TYPE 2 DIABETES MELLITUS WITHOUT COMPLICATIONS: ICD-10-CM

## 2023-04-18 DIAGNOSIS — N18.6 END STAGE RENAL DISEASE: ICD-10-CM

## 2023-04-18 LAB
ALBUMIN SERPL ELPH-MCNC: 3.4 G/DL — SIGNIFICANT CHANGE UP (ref 3.3–5)
ALP SERPL-CCNC: 83 U/L — SIGNIFICANT CHANGE UP (ref 40–120)
ALT FLD-CCNC: 24 U/L — SIGNIFICANT CHANGE UP (ref 4–41)
ANION GAP SERPL CALC-SCNC: 16 MMOL/L — HIGH (ref 7–14)
AST SERPL-CCNC: 22 U/L — SIGNIFICANT CHANGE UP (ref 4–40)
BASOPHILS # BLD AUTO: 0.03 K/UL — SIGNIFICANT CHANGE UP (ref 0–0.2)
BASOPHILS NFR BLD AUTO: 0.3 % — SIGNIFICANT CHANGE UP (ref 0–2)
BILIRUB SERPL-MCNC: 0.2 MG/DL — SIGNIFICANT CHANGE UP (ref 0.2–1.2)
BUN SERPL-MCNC: 74 MG/DL — HIGH (ref 7–23)
CALCIUM SERPL-MCNC: 9.3 MG/DL — SIGNIFICANT CHANGE UP (ref 8.4–10.5)
CHLORIDE SERPL-SCNC: 99 MMOL/L — SIGNIFICANT CHANGE UP (ref 98–107)
CO2 SERPL-SCNC: 19 MMOL/L — LOW (ref 22–31)
CREAT SERPL-MCNC: 4.8 MG/DL — HIGH (ref 0.5–1.3)
DIALYSIS INSTRUMENT RESULT - HEPATITIS B SURFACE ANTIGEN: NEGATIVE — SIGNIFICANT CHANGE UP
EGFR: 12 ML/MIN/1.73M2 — LOW
EOSINOPHIL # BLD AUTO: 0.09 K/UL — SIGNIFICANT CHANGE UP (ref 0–0.5)
EOSINOPHIL NFR BLD AUTO: 1 % — SIGNIFICANT CHANGE UP (ref 0–6)
ERYTHROCYTE [SEDIMENTATION RATE] IN BLOOD: 101 MM/HR — HIGH (ref 1–15)
GLUCOSE BLDC GLUCOMTR-MCNC: 142 MG/DL — HIGH (ref 70–99)
GLUCOSE BLDC GLUCOMTR-MCNC: 184 MG/DL — HIGH (ref 70–99)
GLUCOSE BLDC GLUCOMTR-MCNC: 187 MG/DL — HIGH (ref 70–99)
GLUCOSE BLDC GLUCOMTR-MCNC: 190 MG/DL — HIGH (ref 70–99)
GLUCOSE BLDC GLUCOMTR-MCNC: 207 MG/DL — HIGH (ref 70–99)
GLUCOSE BLDC GLUCOMTR-MCNC: 242 MG/DL — HIGH (ref 70–99)
GLUCOSE BLDC GLUCOMTR-MCNC: 308 MG/DL — HIGH (ref 70–99)
GLUCOSE SERPL-MCNC: 212 MG/DL — HIGH (ref 70–99)
HCT VFR BLD CALC: 34.5 % — LOW (ref 39–50)
HCV AB S/CO SERPL IA: 0.11 S/CO — SIGNIFICANT CHANGE UP (ref 0–0.99)
HCV AB SERPL-IMP: SIGNIFICANT CHANGE UP
HGB BLD-MCNC: 10.8 G/DL — LOW (ref 13–17)
IANC: 6.83 K/UL — SIGNIFICANT CHANGE UP (ref 1.8–7.4)
IMM GRANULOCYTES NFR BLD AUTO: 0.6 % — SIGNIFICANT CHANGE UP (ref 0–0.9)
LYMPHOCYTES # BLD AUTO: 0.73 K/UL — LOW (ref 1–3.3)
LYMPHOCYTES # BLD AUTO: 8.5 % — LOW (ref 13–44)
MAGNESIUM SERPL-MCNC: 2.3 MG/DL — SIGNIFICANT CHANGE UP (ref 1.6–2.6)
MCHC RBC-ENTMCNC: 30.7 PG — SIGNIFICANT CHANGE UP (ref 27–34)
MCHC RBC-ENTMCNC: 31.3 GM/DL — LOW (ref 32–36)
MCV RBC AUTO: 98 FL — SIGNIFICANT CHANGE UP (ref 80–100)
MONOCYTES # BLD AUTO: 0.89 K/UL — SIGNIFICANT CHANGE UP (ref 0–0.9)
MONOCYTES NFR BLD AUTO: 10.3 % — SIGNIFICANT CHANGE UP (ref 2–14)
NEUTROPHILS # BLD AUTO: 6.83 K/UL — SIGNIFICANT CHANGE UP (ref 1.8–7.4)
NEUTROPHILS NFR BLD AUTO: 79.3 % — HIGH (ref 43–77)
NRBC # BLD: 0 /100 WBCS — SIGNIFICANT CHANGE UP (ref 0–0)
NRBC # FLD: 0 K/UL — SIGNIFICANT CHANGE UP (ref 0–0)
PHOSPHATE SERPL-MCNC: 4.6 MG/DL — HIGH (ref 2.5–4.5)
PLATELET # BLD AUTO: 300 K/UL — SIGNIFICANT CHANGE UP (ref 150–400)
POTASSIUM SERPL-MCNC: 4 MMOL/L — SIGNIFICANT CHANGE UP (ref 3.5–5.3)
POTASSIUM SERPL-SCNC: 4 MMOL/L — SIGNIFICANT CHANGE UP (ref 3.5–5.3)
PROT SERPL-MCNC: 6.7 G/DL — SIGNIFICANT CHANGE UP (ref 6–8.3)
RBC # BLD: 3.52 M/UL — LOW (ref 4.2–5.8)
RBC # FLD: 14.4 % — SIGNIFICANT CHANGE UP (ref 10.3–14.5)
SODIUM SERPL-SCNC: 134 MMOL/L — LOW (ref 135–145)
WBC # BLD: 8.62 K/UL — SIGNIFICANT CHANGE UP (ref 3.8–10.5)
WBC # FLD AUTO: 8.62 K/UL — SIGNIFICANT CHANGE UP (ref 3.8–10.5)

## 2023-04-18 PROCEDURE — 99223 1ST HOSP IP/OBS HIGH 75: CPT

## 2023-04-18 PROCEDURE — 93923 UPR/LXTR ART STDY 3+ LVLS: CPT | Mod: 26

## 2023-04-18 RX ORDER — AMPICILLIN SODIUM AND SULBACTAM SODIUM 250; 125 MG/ML; MG/ML
INJECTION, POWDER, FOR SUSPENSION INTRAMUSCULAR; INTRAVENOUS
Refills: 0 | Status: DISCONTINUED | OUTPATIENT
Start: 2023-04-18 | End: 2023-04-22

## 2023-04-18 RX ORDER — ATORVASTATIN CALCIUM 80 MG/1
40 TABLET, FILM COATED ORAL AT BEDTIME
Refills: 0 | Status: DISCONTINUED | OUTPATIENT
Start: 2023-04-18 | End: 2023-05-01

## 2023-04-18 RX ORDER — ZINC SULFATE TAB 220 MG (50 MG ZINC EQUIVALENT) 220 (50 ZN) MG
220 TAB ORAL DAILY
Refills: 0 | Status: DISCONTINUED | OUTPATIENT
Start: 2023-04-18 | End: 2023-05-01

## 2023-04-18 RX ORDER — ACETAMINOPHEN 500 MG
650 TABLET ORAL EVERY 6 HOURS
Refills: 0 | Status: DISCONTINUED | OUTPATIENT
Start: 2023-04-18 | End: 2023-05-01

## 2023-04-18 RX ORDER — ACETAMINOPHEN 500 MG
2 TABLET ORAL
Refills: 0 | DISCHARGE

## 2023-04-18 RX ORDER — HEPARIN SODIUM 5000 [USP'U]/ML
5000 INJECTION INTRAVENOUS; SUBCUTANEOUS EVERY 12 HOURS
Refills: 0 | Status: DISCONTINUED | OUTPATIENT
Start: 2023-04-18 | End: 2023-04-23

## 2023-04-18 RX ORDER — ASPIRIN/CALCIUM CARB/MAGNESIUM 324 MG
1 TABLET ORAL
Refills: 0 | DISCHARGE

## 2023-04-18 RX ORDER — DEXTROSE 50 % IN WATER 50 %
12.5 SYRINGE (ML) INTRAVENOUS ONCE
Refills: 0 | Status: DISCONTINUED | OUTPATIENT
Start: 2023-04-18 | End: 2023-05-01

## 2023-04-18 RX ORDER — SODIUM CHLORIDE 9 MG/ML
1000 INJECTION, SOLUTION INTRAVENOUS
Refills: 0 | Status: DISCONTINUED | OUTPATIENT
Start: 2023-04-18 | End: 2023-05-01

## 2023-04-18 RX ORDER — DEXTROSE 50 % IN WATER 50 %
15 SYRINGE (ML) INTRAVENOUS ONCE
Refills: 0 | Status: DISCONTINUED | OUTPATIENT
Start: 2023-04-18 | End: 2023-05-01

## 2023-04-18 RX ORDER — ASPIRIN/CALCIUM CARB/MAGNESIUM 324 MG
81 TABLET ORAL DAILY
Refills: 0 | Status: DISCONTINUED | OUTPATIENT
Start: 2023-04-18 | End: 2023-04-22

## 2023-04-18 RX ORDER — SEVELAMER CARBONATE 2400 MG/1
1600 POWDER, FOR SUSPENSION ORAL
Refills: 0 | Status: DISCONTINUED | OUTPATIENT
Start: 2023-04-18 | End: 2023-04-23

## 2023-04-18 RX ORDER — LANOLIN ALCOHOL/MO/W.PET/CERES
3 CREAM (GRAM) TOPICAL AT BEDTIME
Refills: 0 | Status: DISCONTINUED | OUTPATIENT
Start: 2023-04-18 | End: 2023-05-01

## 2023-04-18 RX ORDER — INSULIN LISPRO 100/ML
VIAL (ML) SUBCUTANEOUS AT BEDTIME
Refills: 0 | Status: DISCONTINUED | OUTPATIENT
Start: 2023-04-18 | End: 2023-05-01

## 2023-04-18 RX ORDER — GLUCAGON INJECTION, SOLUTION 0.5 MG/.1ML
1 INJECTION, SOLUTION SUBCUTANEOUS ONCE
Refills: 0 | Status: DISCONTINUED | OUTPATIENT
Start: 2023-04-18 | End: 2023-05-01

## 2023-04-18 RX ORDER — SODIUM CHLORIDE 9 MG/ML
100 INJECTION INTRAMUSCULAR; INTRAVENOUS; SUBCUTANEOUS
Refills: 0 | Status: DISCONTINUED | OUTPATIENT
Start: 2023-04-18 | End: 2023-05-01

## 2023-04-18 RX ORDER — ZINC OXIDE 200 MG/G
1 OINTMENT TOPICAL
Refills: 0 | DISCHARGE

## 2023-04-18 RX ORDER — DEXTROSE 50 % IN WATER 50 %
25 SYRINGE (ML) INTRAVENOUS ONCE
Refills: 0 | Status: DISCONTINUED | OUTPATIENT
Start: 2023-04-18 | End: 2023-05-01

## 2023-04-18 RX ORDER — AMPICILLIN SODIUM AND SULBACTAM SODIUM 250; 125 MG/ML; MG/ML
3 INJECTION, POWDER, FOR SUSPENSION INTRAMUSCULAR; INTRAVENOUS EVERY 12 HOURS
Refills: 0 | Status: DISCONTINUED | OUTPATIENT
Start: 2023-04-18 | End: 2023-04-22

## 2023-04-18 RX ORDER — CLOPIDOGREL BISULFATE 75 MG/1
75 TABLET, FILM COATED ORAL DAILY
Refills: 0 | Status: DISCONTINUED | OUTPATIENT
Start: 2023-04-18 | End: 2023-04-22

## 2023-04-18 RX ORDER — ONDANSETRON 8 MG/1
4 TABLET, FILM COATED ORAL EVERY 8 HOURS
Refills: 0 | Status: DISCONTINUED | OUTPATIENT
Start: 2023-04-18 | End: 2023-05-01

## 2023-04-18 RX ORDER — AMPICILLIN SODIUM AND SULBACTAM SODIUM 250; 125 MG/ML; MG/ML
3 INJECTION, POWDER, FOR SUSPENSION INTRAMUSCULAR; INTRAVENOUS ONCE
Refills: 0 | Status: COMPLETED | OUTPATIENT
Start: 2023-04-18 | End: 2023-04-18

## 2023-04-18 RX ORDER — CHLORHEXIDINE GLUCONATE 213 G/1000ML
1 SOLUTION TOPICAL DAILY
Refills: 0 | Status: DISCONTINUED | OUTPATIENT
Start: 2023-04-18 | End: 2023-05-01

## 2023-04-18 RX ORDER — INSULIN LISPRO 100/ML
VIAL (ML) SUBCUTANEOUS
Refills: 0 | Status: DISCONTINUED | OUTPATIENT
Start: 2023-04-18 | End: 2023-05-01

## 2023-04-18 RX ORDER — SALICYLIC ACID 0.5 %
1 CLEANSER (GRAM) TOPICAL
Refills: 0 | DISCHARGE

## 2023-04-18 RX ADMIN — Medication 1: at 18:27

## 2023-04-18 RX ADMIN — SEVELAMER CARBONATE 1600 MILLIGRAM(S): 2400 POWDER, FOR SUSPENSION ORAL at 14:00

## 2023-04-18 RX ADMIN — Medication 2: at 09:41

## 2023-04-18 RX ADMIN — Medication 4: at 13:58

## 2023-04-18 RX ADMIN — AMPICILLIN SODIUM AND SULBACTAM SODIUM 200 GRAM(S): 250; 125 INJECTION, POWDER, FOR SUSPENSION INTRAMUSCULAR; INTRAVENOUS at 03:05

## 2023-04-18 RX ADMIN — HEPARIN SODIUM 5000 UNIT(S): 5000 INJECTION INTRAVENOUS; SUBCUTANEOUS at 06:32

## 2023-04-18 RX ADMIN — HEPARIN SODIUM 5000 UNIT(S): 5000 INJECTION INTRAVENOUS; SUBCUTANEOUS at 18:34

## 2023-04-18 RX ADMIN — CLOPIDOGREL BISULFATE 75 MILLIGRAM(S): 75 TABLET, FILM COATED ORAL at 13:59

## 2023-04-18 RX ADMIN — Medication 81 MILLIGRAM(S): at 13:59

## 2023-04-18 RX ADMIN — SEVELAMER CARBONATE 1600 MILLIGRAM(S): 2400 POWDER, FOR SUSPENSION ORAL at 18:35

## 2023-04-18 RX ADMIN — AMPICILLIN SODIUM AND SULBACTAM SODIUM 200 GRAM(S): 250; 125 INJECTION, POWDER, FOR SUSPENSION INTRAMUSCULAR; INTRAVENOUS at 23:34

## 2023-04-18 RX ADMIN — ATORVASTATIN CALCIUM 40 MILLIGRAM(S): 80 TABLET, FILM COATED ORAL at 23:30

## 2023-04-18 RX ADMIN — ZINC SULFATE TAB 220 MG (50 MG ZINC EQUIVALENT) 220 MILLIGRAM(S): 220 (50 ZN) TAB at 14:00

## 2023-04-18 RX ADMIN — Medication 1 TABLET(S): at 14:00

## 2023-04-18 RX ADMIN — CHLORHEXIDINE GLUCONATE 1 APPLICATION(S): 213 SOLUTION TOPICAL at 14:03

## 2023-04-18 RX ADMIN — SEVELAMER CARBONATE 1600 MILLIGRAM(S): 2400 POWDER, FOR SUSPENSION ORAL at 10:22

## 2023-04-18 NOTE — CONSULT NOTE ADULT - SUBJECTIVE AND OBJECTIVE BOX
SURGERY CONSULT NOTE    HPI:  Please note, patient with second MRN #[id number] with additional recent hospitalizations.    This is a 72M with history of ESRD on HD (T/Th/Sat), DM2, PVD (s/p R BKA in Nov 2022) and recurrent hematurias (has required CBI in the past) who presents to the hospital with worsening pain and duskiness of his L foot. Patient currently is a limited historian, HPI supplemented with information from patient's sister Darshana ([phone]). As per patient he was sent to the hospital for possible L foot infection, does report pain to the bottom of his foot but otherwise denies any fevers/chills/diaphoresis, no swelling or redness of his L foot. No other acute complaints. Said that he is supposed to have an amputation of his L leg but is unclear on the details. Spoke with patient's sister, Darshana, who said that the patient has known PVD, had a recent R leg BKA and has had some procedures with [facility] [place] (sister unsure of the procedures) and states that they were told the procedures did not help improve the peripheral blood flow in the patient's L leg and that he would need an amputation. Said that the family was told he was being sent to the hospital for the amputation evaluation. Otherwise sister not aware of any other acute issues for the patient.     On arrival to the ED, his vitals were T 97.5, P 90, /59, RR 16, O2 sat 98% RA. His lab work was most significant for mild anemia and elevated CRP. His imaging did not show acute findings to suggest OM. He was admitted to medicine.   (17 Apr 2023 23:19)    Vascular Surgery consulted for evaluation of L foot wounds. Patient unsure of hismedical history and not participatory in encounter.    PAST MEDICAL & SURGICAL HISTORY:  DM (diabetes mellitus)      ESRD on dialysis      PVD (peripheral vascular disease)      S/P BKA (below knee amputation) unilateral, right          MEDICATIONS  (STANDING):  ampicillin/sulbactam  IVPB      ampicillin/sulbactam  IVPB 3 Gram(s) IV Intermittent every 12 hours  ampicillin/sulbactam  IVPB 3 Gram(s) IV Intermittent once  aspirin enteric coated 81 milliGRAM(s) Oral daily  atorvastatin 40 milliGRAM(s) Oral at bedtime  clopidogrel Tablet 75 milliGRAM(s) Oral daily  dextrose 5%. 1000 milliLiter(s) (100 mL/Hr) IV Continuous <Continuous>  dextrose 5%. 1000 milliLiter(s) (50 mL/Hr) IV Continuous <Continuous>  dextrose 50% Injectable 25 Gram(s) IV Push once  dextrose 50% Injectable 12.5 Gram(s) IV Push once  dextrose 50% Injectable 25 Gram(s) IV Push once  glucagon  Injectable 1 milliGRAM(s) IntraMuscular once  heparin   Injectable 5000 Unit(s) SubCutaneous every 12 hours  insulin lispro (ADMELOG) corrective regimen sliding scale   SubCutaneous at bedtime  insulin lispro (ADMELOG) corrective regimen sliding scale   SubCutaneous three times a day before meals  Nephro-zack 1 Tablet(s) Oral daily  sevelamer carbonate 1600 milliGRAM(s) Oral three times a day with meals  zinc sulfate 220 milliGRAM(s) Oral daily    MEDICATIONS  (PRN):  acetaminophen     Tablet .. 650 milliGRAM(s) Oral every 6 hours PRN Temp greater or equal to 38C (100.4F), Mild Pain (1 - 3)  aluminum hydroxide/magnesium hydroxide/simethicone Suspension 30 milliLiter(s) Oral every 4 hours PRN Dyspepsia  bisacodyl 5 milliGRAM(s) Oral every 12 hours PRN Constipation  dextrose Oral Gel 15 Gram(s) Oral once PRN Blood Glucose LESS THAN 70 milliGRAM(s)/deciliter  melatonin 3 milliGRAM(s) Oral at bedtime PRN Insomnia  ondansetron Injectable 4 milliGRAM(s) IV Push every 8 hours PRN Nausea and/or Vomiting      Allergies    No Known Allergies    Intolerances        SOCIAL HISTORY:    FAMILY HISTORY:  No pertinent family history in first degree relatives        Physical Exam:  General: NAD, resting comfortably  HEENT: NC/AT, EOMI, normal hearing, no oral lesions, no LAD, neck supple  Pulmonary: normal resp effort, CTA-B  Cardiovascular: NSR, no murmurs  Abdominal: soft, ND/NT, no organomegaly  Extremities: RLE BKA stumpc/d/i  LLE with dry gangrene of toes, puncate heel wound, bleeding    Vital Signs Last 24 Hrs  T(C): 37.1 (18 Apr 2023 01:30), Max: 37.3 (17 Apr 2023 21:40)  T(F): 98.7 (18 Apr 2023 01:30), Max: 99.2 (17 Apr 2023 21:40)  HR: 95 (18 Apr 2023 01:30) (74 - 97)  BP: 130/76 (18 Apr 2023 01:30) (126/73 - 130/76)  BP(mean): --  RR: 18 (18 Apr 2023 01:30) (16 - 18)  SpO2: 100% (18 Apr 2023 01:30) (97% - 100%)    Parameters below as of 18 Apr 2023 01:30  Patient On (Oxygen Delivery Method): room air        I&O's Summary          LABS:                        10.7   9.48  )-----------( 303      ( 17 Apr 2023 16:15 )             34.3     04-17    132<L>  |  98  |  66<H>  ----------------------------<  183<H>  5.1   |  17<L>  |  4.44<H>    Ca    9.1      17 Apr 2023 16:15  Mg     2.20     04-17    TPro  6.8  /  Alb  3.3  /  TBili  0.2  /  DBili  x   /  AST  49<H>  /  ALT  30  /  AlkPhos  81  04-17    PT/INR - ( 17 Apr 2023 16:15 )   PT: 14.9 sec;   INR: 1.28 ratio         PTT - ( 17 Apr 2023 16:15 )  PTT:25.7 sec    CAPILLARY BLOOD GLUCOSE      POCT Blood Glucose.: 270 mg/dL (17 Apr 2023 12:54)    LIVER FUNCTIONS - ( 17 Apr 2023 16:15 )  Alb: 3.3 g/dL / Pro: 6.8 g/dL / ALK PHOS: 81 U/L / ALT: 30 U/L / AST: 49 U/L / GGT: x             Cultures:      RADIOLOGY & ADDITIONAL STUDIES:      Plan:  This is a 72M with history of ESRD on HD (T/Th/Sat), DM2, PVD (s/p R BKA in Nov 2022) and recurrent hematurias (has required CBI in the past) who presents to the hospital with worsening pain and duskiness of his L foot. Vascular Surgery consulted for vascular evaluation.     - IV abx  - wound care  - JESUS/PVR  - podiatry recommendations: likely TMA during admission    to bed/w vascular fellow    C Team Surgery, [id number]         SURGERY CONSULT NOTE    HPI:  Please note, patient with second MRN #[id number] with additional recent hospitalizations.    This is a 72M with history of ESRD on HD (T/Th/Sat), DM2, PVD (s/p R BKA in Nov 2022) and recurrent hematurias (has required CBI in the past) who presents to the hospital with worsening pain and duskiness of his L foot. Patient currently is a limited historian, HPI supplemented with information from patient's sister Darshana ([phone]). As per patient he was sent to the hospital for possible L foot infection, does report pain to the bottom of his foot but otherwise denies any fevers/chills/diaphoresis, no swelling or redness of his L foot. No other acute complaints. Said that he is supposed to have an amputation of his L leg but is unclear on the details. Spoke with patient's sister, Darshana, who said that the patient has known PVD, had a recent R leg BKA and has had some procedures with Dr. [facility] [place] (sister unsure of the procedures) and states that they were told the procedures did not help improve the peripheral blood flow in the patient's L leg and that he would need an amputation. Said that the family was told he was being sent to the hospital for the amputation evaluation. Otherwise sister not aware of any other acute issues for the patient.     On arrival to the ED, his vitals were T 97.5, P 90, /59, RR 16, O2 sat 98% RA. His lab work was most significant for mild anemia and elevated CRP. His imaging did not show acute findings to suggest OM. He was admitted to medicine.   (17 Apr 2023 23:19)    Vascular Surgery consulted for evaluation of L foot wounds. Patient unsure of hismedical history and not participatory in encounter.    PAST MEDICAL & SURGICAL HISTORY:  DM (diabetes mellitus)      ESRD on dialysis      PVD (peripheral vascular disease)      S/P BKA (below knee amputation) unilateral, right          MEDICATIONS  (STANDING):  ampicillin/sulbactam  IVPB      ampicillin/sulbactam  IVPB 3 Gram(s) IV Intermittent every 12 hours  ampicillin/sulbactam  IVPB 3 Gram(s) IV Intermittent once  aspirin enteric coated 81 milliGRAM(s) Oral daily  atorvastatin 40 milliGRAM(s) Oral at bedtime  clopidogrel Tablet 75 milliGRAM(s) Oral daily  dextrose 5%. 1000 milliLiter(s) (100 mL/Hr) IV Continuous <Continuous>  dextrose 5%. 1000 milliLiter(s) (50 mL/Hr) IV Continuous <Continuous>  dextrose 50% Injectable 25 Gram(s) IV Push once  dextrose 50% Injectable 12.5 Gram(s) IV Push once  dextrose 50% Injectable 25 Gram(s) IV Push once  glucagon  Injectable 1 milliGRAM(s) IntraMuscular once  heparin   Injectable 5000 Unit(s) SubCutaneous every 12 hours  insulin lispro (ADMELOG) corrective regimen sliding scale   SubCutaneous at bedtime  insulin lispro (ADMELOG) corrective regimen sliding scale   SubCutaneous three times a day before meals  Nephro-zack 1 Tablet(s) Oral daily  sevelamer carbonate 1600 milliGRAM(s) Oral three times a day with meals  zinc sulfate 220 milliGRAM(s) Oral daily    MEDICATIONS  (PRN):  acetaminophen     Tablet .. 650 milliGRAM(s) Oral every 6 hours PRN Temp greater or equal to 38C (100.4F), Mild Pain (1 - 3)  aluminum hydroxide/magnesium hydroxide/simethicone Suspension 30 milliLiter(s) Oral every 4 hours PRN Dyspepsia  bisacodyl 5 milliGRAM(s) Oral every 12 hours PRN Constipation  dextrose Oral Gel 15 Gram(s) Oral once PRN Blood Glucose LESS THAN 70 milliGRAM(s)/deciliter  melatonin 3 milliGRAM(s) Oral at bedtime PRN Insomnia  ondansetron Injectable 4 milliGRAM(s) IV Push every 8 hours PRN Nausea and/or Vomiting      Allergies    No Known Allergies    Intolerances        SOCIAL HISTORY:    FAMILY HISTORY:  No pertinent family history in first degree relatives        Physical Exam:  General: NAD, resting comfortably  HEENT: NC/AT, EOMI, normal hearing, no oral lesions, no LAD, neck supple  Pulmonary: normal resp effort, CTA-B  Cardiovascular: NSR, no murmurs  Abdominal: soft, ND/NT, no organomegaly  Extremities: RLE BKA stumpc/d/i  LLE with dry gangrene of toes, puncate heel wound, bleeding    Vital Signs Last 24 Hrs  T(C): 37.1 (18 Apr 2023 01:30), Max: 37.3 (17 Apr 2023 21:40)  T(F): 98.7 (18 Apr 2023 01:30), Max: 99.2 (17 Apr 2023 21:40)  HR: 95 (18 Apr 2023 01:30) (74 - 97)  BP: 130/76 (18 Apr 2023 01:30) (126/73 - 130/76)  BP(mean): --  RR: 18 (18 Apr 2023 01:30) (16 - 18)  SpO2: 100% (18 Apr 2023 01:30) (97% - 100%)    Parameters below as of 18 Apr 2023 01:30  Patient On (Oxygen Delivery Method): room air        I&O's Summary          LABS:                        10.7   9.48  )-----------( 303      ( 17 Apr 2023 16:15 )             34.3     04-17    132<L>  |  98  |  66<H>  ----------------------------<  183<H>  5.1   |  17<L>  |  4.44<H>    Ca    9.1      17 Apr 2023 16:15  Mg     2.20     04-17    TPro  6.8  /  Alb  3.3  /  TBili  0.2  /  DBili  x   /  AST  49<H>  /  ALT  30  /  AlkPhos  81  04-17    PT/INR - ( 17 Apr 2023 16:15 )   PT: 14.9 sec;   INR: 1.28 ratio         PTT - ( 17 Apr 2023 16:15 )  PTT:25.7 sec    CAPILLARY BLOOD GLUCOSE      POCT Blood Glucose.: 270 mg/dL (17 Apr 2023 12:54)    LIVER FUNCTIONS - ( 17 Apr 2023 16:15 )  Alb: 3.3 g/dL / Pro: 6.8 g/dL / ALK PHOS: 81 U/L / ALT: 30 U/L / AST: 49 U/L / GGT: x             Cultures:      RADIOLOGY & ADDITIONAL STUDIES:      Plan:  This is a 72M with history of ESRD on HD (T/Th/Sat), DM2, PVD (s/p R BKA in Nov 2022) and recurrent hematurias (has required CBI in the past) who presents to the hospital with worsening pain and duskiness of his L foot. Vascular Surgery consulted for vascular evaluation.     - IV abx  - wound care  - JESUS/PVR  - podiatry recommendations: likely TMA during admission  - need to clarify ambulatory status with sister.     to bed/w vascular fellow    C Team Surgery, [id number]         SURGERY CONSULT NOTE    HPI:  Please note, patient with second MRN #[id number] with additional recent hospitalizations.    This is a 72M with history of ESRD on HD (T/Th/Sat), DM2, PVD (s/p R BKA in Nov 2022) and recurrent hematurias (has required CBI in the past) who presents to the hospital with worsening pain and duskiness of his L foot. Patient currently is a limited historian, HPI supplemented with information from patient's sister Darshana ([phone]). As per patient he was sent to the hospital for possible L foot infection, does report pain to the bottom of his foot but otherwise denies any fevers/chills/diaphoresis, no swelling or redness of his L foot. No other acute complaints. Said that he is supposed to have an amputation of his L leg but is unclear on the details. Spoke with patient's sister, Darshana, who said that the patient has known PVD, had a recent R leg BKA and has had some procedures with Dr. [facility] [place] (sister unsure of the procedures) and states that they were told the procedures did not help improve the peripheral blood flow in the patient's L leg and that he would need an amputation. Said that the family was told he was being sent to the hospital for the amputation evaluation. Otherwise sister not aware of any other acute issues for the patient.     On arrival to the ED, his vitals were T 97.5, P 90, /59, RR 16, O2 sat 98% RA. His lab work was most significant for mild anemia and elevated CRP. His imaging did not show acute findings to suggest OM. He was admitted to medicine.   (17 Apr 2023 23:19)    Vascular Surgery consulted for evaluation of L foot wounds. Patient unsure of hismedical history and not participatory in encounter.    PAST MEDICAL & SURGICAL HISTORY:  DM (diabetes mellitus)      ESRD on dialysis      PVD (peripheral vascular disease)      S/P BKA (below knee amputation) unilateral, right          MEDICATIONS  (STANDING):  ampicillin/sulbactam  IVPB      ampicillin/sulbactam  IVPB 3 Gram(s) IV Intermittent every 12 hours  ampicillin/sulbactam  IVPB 3 Gram(s) IV Intermittent once  aspirin enteric coated 81 milliGRAM(s) Oral daily  atorvastatin 40 milliGRAM(s) Oral at bedtime  clopidogrel Tablet 75 milliGRAM(s) Oral daily  dextrose 5%. 1000 milliLiter(s) (100 mL/Hr) IV Continuous <Continuous>  dextrose 5%. 1000 milliLiter(s) (50 mL/Hr) IV Continuous <Continuous>  dextrose 50% Injectable 25 Gram(s) IV Push once  dextrose 50% Injectable 12.5 Gram(s) IV Push once  dextrose 50% Injectable 25 Gram(s) IV Push once  glucagon  Injectable 1 milliGRAM(s) IntraMuscular once  heparin   Injectable 5000 Unit(s) SubCutaneous every 12 hours  insulin lispro (ADMELOG) corrective regimen sliding scale   SubCutaneous at bedtime  insulin lispro (ADMELOG) corrective regimen sliding scale   SubCutaneous three times a day before meals  Nephro-zack 1 Tablet(s) Oral daily  sevelamer carbonate 1600 milliGRAM(s) Oral three times a day with meals  zinc sulfate 220 milliGRAM(s) Oral daily    MEDICATIONS  (PRN):  acetaminophen     Tablet .. 650 milliGRAM(s) Oral every 6 hours PRN Temp greater or equal to 38C (100.4F), Mild Pain (1 - 3)  aluminum hydroxide/magnesium hydroxide/simethicone Suspension 30 milliLiter(s) Oral every 4 hours PRN Dyspepsia  bisacodyl 5 milliGRAM(s) Oral every 12 hours PRN Constipation  dextrose Oral Gel 15 Gram(s) Oral once PRN Blood Glucose LESS THAN 70 milliGRAM(s)/deciliter  melatonin 3 milliGRAM(s) Oral at bedtime PRN Insomnia  ondansetron Injectable 4 milliGRAM(s) IV Push every 8 hours PRN Nausea and/or Vomiting      Allergies    No Known Allergies    Intolerances        SOCIAL HISTORY:    FAMILY HISTORY:  No pertinent family history in first degree relatives        Physical Exam:  General: NAD, resting comfortably  HEENT: NC/AT, EOMI, normal hearing, no oral lesions, no LAD, neck supple  Pulmonary: normal resp effort, CTA-B  Cardiovascular: NSR, no murmurs  Abdominal: soft, ND/NT, no organomegaly  Extremities: RLE BKA stumpc/d/i  LLE with dry gangrene of toes, puncate heel wound, bleeding    Vital Signs Last 24 Hrs  T(C): 37.1 (18 Apr 2023 01:30), Max: 37.3 (17 Apr 2023 21:40)  T(F): 98.7 (18 Apr 2023 01:30), Max: 99.2 (17 Apr 2023 21:40)  HR: 95 (18 Apr 2023 01:30) (74 - 97)  BP: 130/76 (18 Apr 2023 01:30) (126/73 - 130/76)  BP(mean): --  RR: 18 (18 Apr 2023 01:30) (16 - 18)  SpO2: 100% (18 Apr 2023 01:30) (97% - 100%)    Parameters below as of 18 Apr 2023 01:30  Patient On (Oxygen Delivery Method): room air        I&O's Summary          LABS:                        10.7   9.48  )-----------( 303      ( 17 Apr 2023 16:15 )             34.3     04-17    132<L>  |  98  |  66<H>  ----------------------------<  183<H>  5.1   |  17<L>  |  4.44<H>    Ca    9.1      17 Apr 2023 16:15  Mg     2.20     04-17    TPro  6.8  /  Alb  3.3  /  TBili  0.2  /  DBili  x   /  AST  49<H>  /  ALT  30  /  AlkPhos  81  04-17    PT/INR - ( 17 Apr 2023 16:15 )   PT: 14.9 sec;   INR: 1.28 ratio         PTT - ( 17 Apr 2023 16:15 )  PTT:25.7 sec    CAPILLARY BLOOD GLUCOSE      POCT Blood Glucose.: 270 mg/dL (17 Apr 2023 12:54)    LIVER FUNCTIONS - ( 17 Apr 2023 16:15 )  Alb: 3.3 g/dL / Pro: 6.8 g/dL / ALK PHOS: 81 U/L / ALT: 30 U/L / AST: 49 U/L / GGT: x             Cultures:      RADIOLOGY & ADDITIONAL STUDIES:      Plan:  This is a 72M with history of ESRD on HD (T/Th/Sat), DM2, PVD (s/p R BKA in Nov 2022) and recurrent hematurias (has required CBI in the past) who presents to the hospital with worsening pain and duskiness of his L foot. Vascular Surgery consulted for vascular evaluation.     - IV abx  - wound care  - JESUS/PVR  - podiatry recommendations: likely TMA during admission  - need to clarify ambulatory status with sister in AM to evaluate for revasc potential    d/w Dr. Trejo, vascular fellow    C Team Surgery, [id number]         SURGERY CONSULT NOTE    HPI:  Please note, patient with second MRN #[id number] with additional recent hospitalizations.    This is a 72M with history of ESRD on HD (T/Th/Sat), DM2, PVD (s/p R BKA in Nov 2022) and recurrent hematurias (has required CBI in the past) who presents to the hospital with worsening pain and duskiness of his L foot. Patient currently is a limited historian, HPI supplemented with information from patient's sister Darshana ([phone]). As per patient he was sent to the hospital for possible L foot infection, does report pain to the bottom of his foot but otherwise denies any fevers/chills/diaphoresis, no swelling or redness of his L foot. No other acute complaints. Said that he is supposed to have an amputation of his L leg but is unclear on the details. Spoke with patient's sister, Darshana, who said that the patient has known PVD, had a recent R leg BKA and has had some procedures with Dr. [facility] [place] (sister unsure of the procedures) and states that they were told the procedures did not help improve the peripheral blood flow in the patient's L leg and that he would need an amputation. Said that the family was told he was being sent to the hospital for the amputation evaluation. Otherwise sister not aware of any other acute issues for the patient.     On arrival to the ED, his vitals were T 97.5, P 90, /59, RR 16, O2 sat 98% RA. His lab work was most significant for mild anemia and elevated CRP. His imaging did not show acute findings to suggest OM. He was admitted to medicine.   (17 Apr 2023 23:19)    Vascular Surgery consulted for evaluation of L foot wounds. Patient unsure of hismedical history and not participatory in encounter.    PAST MEDICAL & SURGICAL HISTORY:  DM (diabetes mellitus)      ESRD on dialysis      PVD (peripheral vascular disease)      S/P BKA (below knee amputation) unilateral, right          MEDICATIONS  (STANDING):  ampicillin/sulbactam  IVPB      ampicillin/sulbactam  IVPB 3 Gram(s) IV Intermittent every 12 hours  ampicillin/sulbactam  IVPB 3 Gram(s) IV Intermittent once  aspirin enteric coated 81 milliGRAM(s) Oral daily  atorvastatin 40 milliGRAM(s) Oral at bedtime  clopidogrel Tablet 75 milliGRAM(s) Oral daily  dextrose 5%. 1000 milliLiter(s) (100 mL/Hr) IV Continuous <Continuous>  dextrose 5%. 1000 milliLiter(s) (50 mL/Hr) IV Continuous <Continuous>  dextrose 50% Injectable 25 Gram(s) IV Push once  dextrose 50% Injectable 12.5 Gram(s) IV Push once  dextrose 50% Injectable 25 Gram(s) IV Push once  glucagon  Injectable 1 milliGRAM(s) IntraMuscular once  heparin   Injectable 5000 Unit(s) SubCutaneous every 12 hours  insulin lispro (ADMELOG) corrective regimen sliding scale   SubCutaneous at bedtime  insulin lispro (ADMELOG) corrective regimen sliding scale   SubCutaneous three times a day before meals  Nephro-zack 1 Tablet(s) Oral daily  sevelamer carbonate 1600 milliGRAM(s) Oral three times a day with meals  zinc sulfate 220 milliGRAM(s) Oral daily    MEDICATIONS  (PRN):  acetaminophen     Tablet .. 650 milliGRAM(s) Oral every 6 hours PRN Temp greater or equal to 38C (100.4F), Mild Pain (1 - 3)  aluminum hydroxide/magnesium hydroxide/simethicone Suspension 30 milliLiter(s) Oral every 4 hours PRN Dyspepsia  bisacodyl 5 milliGRAM(s) Oral every 12 hours PRN Constipation  dextrose Oral Gel 15 Gram(s) Oral once PRN Blood Glucose LESS THAN 70 milliGRAM(s)/deciliter  melatonin 3 milliGRAM(s) Oral at bedtime PRN Insomnia  ondansetron Injectable 4 milliGRAM(s) IV Push every 8 hours PRN Nausea and/or Vomiting      Allergies    No Known Allergies    Intolerances        SOCIAL HISTORY:    FAMILY HISTORY:  No pertinent family history in first degree relatives        Physical Exam:  General: NAD, resting comfortably  HEENT: NC/AT, EOMI, normal hearing, no oral lesions, no LAD, neck supple  Pulmonary: normal resp effort, CTA-B  Cardiovascular: NSR, no murmurs  Abdominal: soft, ND/NT, no organomegaly  Extremities: RLE BKA stumpc/d/i  LLE with dry gangrene of toes, puncate heel wound, bleeding, dopplerable signals noted AT/PT, absent DP signals    Vital Signs Last 24 Hrs  T(C): 37.1 (18 Apr 2023 01:30), Max: 37.3 (17 Apr 2023 21:40)  T(F): 98.7 (18 Apr 2023 01:30), Max: 99.2 (17 Apr 2023 21:40)  HR: 95 (18 Apr 2023 01:30) (74 - 97)  BP: 130/76 (18 Apr 2023 01:30) (126/73 - 130/76)  BP(mean): --  RR: 18 (18 Apr 2023 01:30) (16 - 18)  SpO2: 100% (18 Apr 2023 01:30) (97% - 100%)    Parameters below as of 18 Apr 2023 01:30  Patient On (Oxygen Delivery Method): room air        I&O's Summary          LABS:                        10.7   9.48  )-----------( 303      ( 17 Apr 2023 16:15 )             34.3     04-17    132<L>  |  98  |  66<H>  ----------------------------<  183<H>  5.1   |  17<L>  |  4.44<H>    Ca    9.1      17 Apr 2023 16:15  Mg     2.20     04-17    TPro  6.8  /  Alb  3.3  /  TBili  0.2  /  DBili  x   /  AST  49<H>  /  ALT  30  /  AlkPhos  81  04-17    PT/INR - ( 17 Apr 2023 16:15 )   PT: 14.9 sec;   INR: 1.28 ratio         PTT - ( 17 Apr 2023 16:15 )  PTT:25.7 sec    CAPILLARY BLOOD GLUCOSE      POCT Blood Glucose.: 270 mg/dL (17 Apr 2023 12:54)    LIVER FUNCTIONS - ( 17 Apr 2023 16:15 )  Alb: 3.3 g/dL / Pro: 6.8 g/dL / ALK PHOS: 81 U/L / ALT: 30 U/L / AST: 49 U/L / GGT: x             Cultures:      RADIOLOGY & ADDITIONAL STUDIES:      Plan:  This is a 72M with history of ESRD on HD (T/Th/Sat), DM2, PVD (s/p R BKA in Nov 2022) and recurrent hematurias (has required CBI in the past) who presents to the hospital with worsening pain and duskiness of his L foot. Vascular Surgery consulted for vascular evaluation.     - IV abx  - wound care  - JESUS/PVR  - podiatry recommendations: likely TMA during admission  - need to clarify ambulatory status with sister in AM to evaluate for revasc potential    d/w Dr. Trejo, vascular fellow    C Team Surgery, [id number]

## 2023-04-18 NOTE — CHART NOTE - NSCHARTNOTEFT_GEN_A_CORE
Wound Consult received for left foot wound, patient appropriately followed by by podiatry and vascular surgery. Discussion with Podiatry Surgery today, no need for wound surgery consult at this time. Will discontinue Wound Consult at this time, we remain available; please reconsult as needed.  Thank you.  Jade Lewis [facility]  Wound Surgery - NP  #[phone]

## 2023-04-18 NOTE — CONSULT NOTE ADULT - SUBJECTIVE AND OBJECTIVE BOX
[facility] NEPHROLOGY- CONSULTATION NOTE    72y Male with history of below presents with L foot gangrene. Nephrology consulted for ESRD status.    Patient known from prior admissions for h/o ESRD on HD where he gets HD TTS at [facility]. Last HD on 4/15 as an outpatient via [facility].      REVIEW OF SYSTEMS:  Gen: no fevers  HEENT: no rhinorrhea  Neck: no sore throat  Cards: no chest pain  Resp: no dyspnea  GI: no nausea or vomiting or diarrhea  : no dysuria or hematuria  Vascular: no LE edema  Derm: no rashes  Neuro: no numbness/tingling    No Known Allergies      Home Medications Reviewed  Hospital Medications:   MEDICATIONS  (STANDING):  ampicillin/sulbactam  IVPB 3 Gram(s) IV Intermittent every 12 hours  ampicillin/sulbactam  IVPB      aspirin enteric coated 81 milliGRAM(s) Oral daily  atorvastatin 40 milliGRAM(s) Oral at bedtime  clopidogrel Tablet 75 milliGRAM(s) Oral daily  dextrose 5%. 1000 milliLiter(s) (100 mL/Hr) IV Continuous <Continuous>  dextrose 5%. 1000 milliLiter(s) (50 mL/Hr) IV Continuous <Continuous>  dextrose 50% Injectable 25 Gram(s) IV Push once  dextrose 50% Injectable 12.5 Gram(s) IV Push once  dextrose 50% Injectable 25 Gram(s) IV Push once  glucagon  Injectable 1 milliGRAM(s) IntraMuscular once  heparin   Injectable 5000 Unit(s) SubCutaneous every 12 hours  insulin lispro (ADMELOG) corrective regimen sliding scale   SubCutaneous at bedtime  insulin lispro (ADMELOG) corrective regimen sliding scale   SubCutaneous three times a day before meals  Nephro-zack 1 Tablet(s) Oral daily  sevelamer carbonate 1600 milliGRAM(s) Oral three times a day with meals  zinc sulfate 220 milliGRAM(s) Oral daily      PAST MEDICAL & SURGICAL HISTORY:  DM (diabetes mellitus)      ESRD on dialysis      PVD (peripheral vascular disease)      S/P BKA (below knee amputation) unilateral, right          FAMILY HISTORY:  No pertinent family history in first degree relatives        SOCIAL HISTORY:  Denies toxic substance use     VITALS:  T(F): 97.4 (04-18-23 @ 10:28), Max: 99.2 (04-17-23 @ 21:40)  HR: 63 (04-18-23 @ 10:28)  BP: 130/76 (04-18-23 @ 10:28)  RR: 18 (04-18-23 @ 10:28)  SpO2: 95% (04-18-23 @ 10:28)  Wt(kg): --    Height (cm): 188 (04-18 @ 01:23)  Weight (kg): 72.6 (04-18 @ 01:23)  BMI (kg/m2): 20.5 (04-18 @ 01:23)  BSA (m2): 1.98 (04-18 @ 01:23)    PHYSICAL EXAM:  Gen: NAD, calm  HEENT: MMM  Neck: no JVD  Cards: RRR, +S1/S2, no M/G/R  Resp: CTA B/L  GI: soft, NT/ND, NABS  : no CVA tenderness  Vascular: L foot gangrene, R BKA, RIJ TDC  Derm: no rashes  Neuro: non-focal    LABS:  04-18    134<L>  |  99  |  74<H>  ----------------------------<  212<H>  4.0   |  19<L>  |  4.80<H>    Ca    9.3      18 Apr 2023 06:30  Phos  4.6     04-18  Mg     2.30     04-18    TPro  6.7  /  Alb  3.4  /  TBili  0.2  /  DBili      /  AST  22  /  ALT  24  /  AlkPhos  83  04-18    Creatinine Trend: 4.80 <--, 4.44 <--                        10.8   8.62  )-----------( 300      ( 18 Apr 2023 06:30 )             34.5     Urine Studies:        RADIOLOGY & ADDITIONAL STUDIES:  < from: Xray Chest 1 View AP/PA (04.17.23 @ 17:24) >  IMPRESSION:  Clear lungs.      --- End of Report ---    < end of copied text >      < from: Xray Ankle 2 Views, Left (04.17.23 @ 17:24) >  Left foot:    No acute displaced fracture or dislocation. Osteoarthritic changes   throughout the midfoot and forefoot. No definite cortical erosion or   periostitis to suggest osteomyelitis.    Atherosclerotic calcifications are noted.    --- End of Report ---    < end of copied text >

## 2023-04-18 NOTE — CONSULT NOTE ADULT - ASSESSMENT
BRIAN MATTHEWS , [id number]  72y Male with L foot ischemic changes to MPJs level, stable heel wound  - Patient seen and evaluated  - Afebrile, T(F): 98.7, WBC 9.48, ESR pending , CRP 12.53  - L foot severe ischemic changes to the level of MPJ, no acute signs of infection, lateral heel well adhered eschar, mild jeanette-wound erythema, no drainage, no malodor.    - L foot and ankle Xray: no OM  - med admitted  - recommended empirical Unasyn   - no wound culture 2/2 no acute signs of infection   - Vasc recs pending  - Pod plan TMA pending Vasc recs, meanwhile local wound care, no emergent intervention for now.   - Discussed with attending. 72y Male with L foot ischemic changes to MPJs level, stable heel wound  - Patient seen and evaluated  - Afebrile, WBC 9.48, ESR pending , CRP 12.53  - L foot severe ischemic changes to the level of MPJ, no acute signs of infection, lateral heel well adhered eschar, mild jeanette-wound erythema, no drainage, no malodor.    - L foot and ankle Xray: no OM  - med admitted  - recommended empirical Unasyn   - no wound culture 2/2 no acute signs of infection   - Vasc recs pending  - Pod plan TMA pending Vasc recs, meanwhile local wound care, no emergent intervention for now.   - Discussed with attending.

## 2023-04-18 NOTE — PROGRESS NOTE ADULT - SUBJECTIVE AND OBJECTIVE BOX
PATIENT SEEN AND EXAMINED ON :- 4/18/23  DATE OF SERVICE:  4/18/23           Interim events noted,Labs ,Radiological studies and Cardiology tests reviewed .    Patient is a 72y old  Male who presents with a chief complaint of L foot gangrene (18 Apr 2023 13:00)      HPI:  Please note, patient with second MRN #[id number] with additional recent hospitalizations.    This is a 72M with history of ESRD on HD (T/Th/Sat), DM2, PVD (s/p R BKA in Nov 2022) and recurrent hematurias (has required CBI in the past) who presents to the hospital with worsening pain and duskiness of his L foot. Patient currently is a limited historian, HPI supplemented with information from patient's sister Darshana ([phone]). As per patient he was sent to the hospital for possible L foot infection, does report pain to the bottom of his foot but otherwise denies any fevers/chills/diaphoresis, no swelling or redness of his L foot. No other acute complaints. Said that he is supposed to have an amputation of his L leg but is unclear on the details. Spoke with patient's sister, Darshana, who said that the patient has known PVD, had a recent R leg BKA and has had some procedures with [facility]. [facility] [place] (sister unsure of the procedures) and states that they were told the procedures did not help improve the peripheral blood flow in the patient's L leg and that he would need an amputation. Said that the family was told he was being sent to the hospital for the amputation evaluation. Otherwise sister not aware of any other acute issues for the patient.     On arrival to the ED, his vitals were T 97.5, P 90, /59, RR 16, O2 sat 98% RA. His lab work was most significant for mild anemia and elevated CRP. His imaging did not show acute findings to suggest OM. He was admitted to medicine.   (17 Apr 2023 23:19)      PAST MEDICAL & SURGICAL HISTORY:  DM (diabetes mellitus)      ESRD on dialysis      PVD (peripheral vascular disease)      S/P BKA (below knee amputation) unilateral, right          PREVIOUS DIAGNOSTIC TESTING:      ECHO  FINDINGS:    STRESS  FINDINGS:    CATHETERIZATION  FINDINGS:    MEDICATIONS  (STANDING):  ampicillin/sulbactam  IVPB      ampicillin/sulbactam  IVPB 3 Gram(s) IV Intermittent every 12 hours  aspirin enteric coated 81 milliGRAM(s) Oral daily  atorvastatin 40 milliGRAM(s) Oral at bedtime  chlorhexidine 2% Cloths 1 Application(s) Topical daily  clopidogrel Tablet 75 milliGRAM(s) Oral daily  dextrose 5%. 1000 milliLiter(s) (100 mL/Hr) IV Continuous <Continuous>  dextrose 5%. 1000 milliLiter(s) (50 mL/Hr) IV Continuous <Continuous>  dextrose 50% Injectable 25 Gram(s) IV Push once  dextrose 50% Injectable 12.5 Gram(s) IV Push once  dextrose 50% Injectable 25 Gram(s) IV Push once  glucagon  Injectable 1 milliGRAM(s) IntraMuscular once  heparin   Injectable 5000 Unit(s) SubCutaneous every 12 hours  insulin lispro (ADMELOG) corrective regimen sliding scale   SubCutaneous three times a day before meals  insulin lispro (ADMELOG) corrective regimen sliding scale   SubCutaneous at bedtime  Nephro-zack 1 Tablet(s) Oral daily  sevelamer carbonate 1600 milliGRAM(s) Oral three times a day with meals  zinc sulfate 220 milliGRAM(s) Oral daily    MEDICATIONS  (PRN):  acetaminophen     Tablet .. 650 milliGRAM(s) Oral every 6 hours PRN Temp greater or equal to 38C (100.4F), Mild Pain (1 - 3)  bisacodyl 5 milliGRAM(s) Oral every 12 hours PRN Constipation  dextrose Oral Gel 15 Gram(s) Oral once PRN Blood Glucose LESS THAN 70 milliGRAM(s)/deciliter  melatonin 3 milliGRAM(s) Oral at bedtime PRN Insomnia  ondansetron Injectable 4 milliGRAM(s) IV Push every 8 hours PRN Nausea and/or Vomiting  sodium chloride 0.9% Bolus. 100 milliLiter(s) IV Bolus every 5 minutes PRN SBP LESS THAN or EQUAL to 90 mmHg      FAMILY HISTORY:  No pertinent family history in first degree relatives        SOCIAL HISTORY:    CIGARETTES:    ALCOHOL:    REVIEW OF SYSTEMS:  CONSTITUTIONAL: No fever, weight loss, or fatigue  EYES: No eye pain, visual disturbances, or discharge  ENMT:  No difficulty hearing, tinnitus, vertigo; No sinus or throat pain  NECK: No pain or stiffness  RESPIRATORY: No cough, wheezing, chills or hemoptysis; No shortness of breath  CARDIOVASCULAR: No chest pain, palpitations, dizziness, or leg swelling  GASTROINTESTINAL: No abdominal or epigastric pain. No nausea, vomiting, or hematemesis; No diarrhea or constipation. No melena or hematochezia.  GENITOURINARY: No dysuria, frequency, hematuria, or incontinence  NEUROLOGICAL: No headaches, memory loss, loss of strength, numbness, or tremors  SKIN: No itching, burning, rashes, or lesions   LYMPH NODES: No enlarged glands  ENDOCRINE: No heat or cold intolerance; No hair loss  MUSCULOSKELETAL: No joint pain or swelling; No muscle, back, or extremity pain  PSYCHIATRIC: No depression, anxiety, mood swings, or difficulty sleeping  HEME/LYMPH: No easy bruising, or bleeding gums  ALLERY AND IMMUNOLOGIC: No hives or eczema    Vital Signs Last 24 Hrs  T(C): 36.9 (18 Apr 2023 19:40), Max: 37.3 (17 Apr 2023 21:40)  T(F): 98.4 (18 Apr 2023 19:40), Max: 99.2 (17 Apr 2023 21:40)  HR: 984 (18 Apr 2023 19:40) (63 - 984)  BP: 130/71 (18 Apr 2023 19:40) (119/66 - 134/73)  BP(mean): --  RR: 18 (18 Apr 2023 19:40) (15 - 18)  SpO2: 98% (18 Apr 2023 17:33) (95% - 100%)    Parameters below as of 18 Apr 2023 19:40  Patient On (Oxygen Delivery Method): room air          PHYSICAL EXAM:  GENERAL: NAD, well-groomed, well-developed  HEAD:  Atraumatic, Normocephalic  EYES: EOMI, PERRLA, conjunctiva and sclera clear  ENMT: No tonsillar erythema, exudates, or enlargement; Moist mucous membranes, Good dentition, No lesions  NECK: Supple, No JVD, Normal thyroid  NERVOUS SYSTEM:  Alert & Oriented X3, Good concentration; Motor Strength 5/5 B/L upper and lower extremities; DTRs 2+ intact and symmetric  CHEST/LUNG: Clear to percussion bilaterally; No rales, rhonchi, wheezing, or rubs  HEART: Regular rate and rhythm; No murmurs, rubs, or gallops  ABDOMEN: Soft, Nontender, Nondistended; Bowel sounds present  EXTREMITIES:  2+ Peripheral Pulses, No clubbing, cyanosis, or edema  LYMPH: No lymphadenopathy noted  SKIN: No rashes or lesions      INTERPRETATION OF TELEMETRY:    ECG:    JOANADSVASC:     LABS:                        10.8   8.62  )-----------( 300      ( 18 Apr 2023 06:30 )             34.5     04-18    134<L>  |  99  |  74<H>  ----------------------------<  212<H>  4.0   |  19<L>  |  4.80<H>    Ca    9.3      18 Apr 2023 06:30  Phos  4.6     04-18  Mg     2.30     04-18    TPro  6.7  /  Alb  3.4  /  TBili  0.2  /  DBili  x   /  AST  22  /  ALT  24  /  AlkPhos  83  04-18        PT/INR - ( 17 Apr 2023 16:15 )   PT: 14.9 sec;   INR: 1.28 ratio         PTT - ( 17 Apr 2023 16:15 )  PTT:25.7 sec    Lipid Panel:   I&O's Summary    18 Apr 2023 07:01  -  18 Apr 2023 20:30  --------------------------------------------------------  IN: 180 mL / OUT: 100 mL / NET: 80 mL        RADIOLOGY & ADDITIONAL STUDIES:

## 2023-04-18 NOTE — CONSULT NOTE ADULT - ASSESSMENT
72y Male with history of ESRD on HD presents with L foot gangrene. Nephrology consulted for ESRD status.    1) ESRD: Last HD on 4/15 as an outpatient. Plan for next maintenance HD today. Monitor electrolytes.    2) HTN with ESRD: BP controlled. Monitor off medications.    3) Anemia of renal disease: Hb acceptable. Check AM iron stores. JERED if Hb decreases. Monitor Hb.    4) Hyperphosphatemia: Phosphorus controlled. Continue with renvela 2 tabs with meals and renal diet. Monitor serum calcium and phosphorus.      [facility] NEPHROLOGY  Wili Hopkins M.D.  Kiran Feng D.O.  Sujatha Dumont M.D.  Radha Miller, MSN, ANP-C    Telephone: [phone]  Facsimile: [phone]    [address] [place] [address]

## 2023-04-18 NOTE — PROGRESS NOTE ADULT - PROBLEM SELECTOR PLAN 1
- Patient with worsening pain of the L foot, reports concern for infection at his facility but on examination low concern for acute infection, podiatry reccs empiric unasyn -> ordered  - BCx x2 in lab  - Podiatry and vascular sx consulted, f/u recs  - Pain control as needed

## 2023-04-18 NOTE — PROGRESS NOTE ADULT - SUBJECTIVE AND OBJECTIVE BOX
Patient is a 72y old  Male who presents with a chief complaint of L foot gangrene (18 Apr 2023 02:48)       INTERVAL HPI/OVERNIGHT EVENTS:  Patient seen and evaluated at bedside.  Pt is resting comfortable in NAD. Denies N/V/F/C.  Pain rated at X/10    Allergies    No Known Allergies    Intolerances        Vital Signs Last 24 Hrs  T(C): 36.8 (18 Apr 2023 06:46), Max: 37.3 (17 Apr 2023 21:40)  T(F): 98.3 (18 Apr 2023 06:46), Max: 99.2 (17 Apr 2023 21:40)  HR: 85 (18 Apr 2023 06:46) (74 - 97)  BP: 129/72 (18 Apr 2023 06:46) (126/73 - 130/76)  BP(mean): --  RR: 15 (18 Apr 2023 06:46) (15 - 18)  SpO2: 95% (18 Apr 2023 06:46) (95% - 100%)    Parameters below as of 18 Apr 2023 06:46  Patient On (Oxygen Delivery Method): room air        LABS:                        10.8   8.62  )-----------( 300      ( 18 Apr 2023 06:30 )             34.5     04-18    134<L>  |  99  |  74<H>  ----------------------------<  212<H>  4.0   |  19<L>  |  4.80<H>    Ca    9.3      18 Apr 2023 06:30  Phos  4.6     04-18  Mg     2.30     04-18    TPro  6.7  /  Alb  3.4  /  TBili  0.2  /  DBili  x   /  AST  22  /  ALT  24  /  AlkPhos  83  04-18    PT/INR - ( 17 Apr 2023 16:15 )   PT: 14.9 sec;   INR: 1.28 ratio         PTT - ( 17 Apr 2023 16:15 )  PTT:25.7 sec    CAPILLARY BLOOD GLUCOSE      POCT Blood Glucose.: 190 mg/dL (18 Apr 2023 06:38)  POCT Blood Glucose.: 207 mg/dL (18 Apr 2023 03:03)  POCT Blood Glucose.: 270 mg/dL (17 Apr 2023 12:54)      Lower Extremity Physical Exam:    Vascular: DP/PT 0/4 L, CFT N/A from gangrenous change, Temperature gradient cool to cool L  Neuro: Epicritic sensation intact to the level of forefoot,L   Musculoskeletal/Ortho: s/p R  Lower Extremity BKA.   Skin: L foot severe ischemic changes to the level of MPJ, no acute signs of infection, lateral heel well adhered eschar, mild jeanette-wound erythema, no drainage, no malodor.        RADIOLOGY & ADDITIONAL TESTS:

## 2023-04-18 NOTE — ED ADULT NURSE REASSESSMENT NOTE - NS ED NURSE REASSESS COMMENT FT1
Report received from MAURICIO Valera. Pt A&Ox2, resting in stretcher. RR even and unlabored. VSS and noted. NAD noted. Pt continent to urinal, urinal at bedside. Safety measures in place, pt pending bed assignment
Pt A&Ox3, resting in stretcher. Pt repositioned for comfort, extra pillows provided. Vascular as well as podiatry were at bedside for assessment, L foot was wrapped with gauze. Pt in NAD, abx infusing, safety in place.
Ana RN. Patient resting in stretcher, respirations even and unlabored. Vitals stable. Patient denies any complaints at this time. Pt medicated per AM orders and AM labs collected via butterfly and sent to lab. Pt awaiting bed assignment. Stretcher in lowest position, wheels locked, appropriate side rails in place.

## 2023-04-18 NOTE — PROGRESS NOTE ADULT - PROBLEM SELECTOR PLAN 5
DVT ppx - HSQ  Diet - DASH/CC/Renal diet  Activity - OOB to chair/with assistance, increase as tolerated    Fall and aspiration precautions

## 2023-04-18 NOTE — CONSULT NOTE ADULT - SUBJECTIVE AND OBJECTIVE BOX
Chief Complaint: The patient is a 72y Male complaining of sent by MD.  · HPI Objective Statement: 72-year-old male, history of ESRD, on Tuesday Thursday Saturday dialysis and significant peripheral vascular disease status post right lower extremity amputation, presenting for chief complaint of left foot lesion.  Chronic.  Noted to be gangrenous today by both family and PCP [facility].  Sent here for admission to Dr. Babcock.  Plan will be for amputation of the left foot.            MEDICATIONS  (STANDING):  dextrose 5%. 1000 milliLiter(s) (100 mL/Hr) IV Continuous <Continuous>  dextrose 5%. 1000 milliLiter(s) (50 mL/Hr) IV Continuous <Continuous>  dextrose 50% Injectable 25 Gram(s) IV Push once  dextrose 50% Injectable 12.5 Gram(s) IV Push once  dextrose 50% Injectable 25 Gram(s) IV Push once  glucagon  Injectable 1 milliGRAM(s) IntraMuscular once  heparin   Injectable 5000 Unit(s) SubCutaneous every 12 hours  insulin lispro (ADMELOG) corrective regimen sliding scale   SubCutaneous three times a day before meals  insulin lispro (ADMELOG) corrective regimen sliding scale   SubCutaneous at bedtime    MEDICATIONS  (PRN):  acetaminophen     Tablet .. 650 milliGRAM(s) Oral every 6 hours PRN Temp greater or equal to 38C (100.4F), Mild Pain (1 - 3)  aluminum hydroxide/magnesium hydroxide/simethicone Suspension 30 milliLiter(s) Oral every 4 hours PRN Dyspepsia  dextrose Oral Gel 15 Gram(s) Oral once PRN Blood Glucose LESS THAN 70 milliGRAM(s)/deciliter  melatonin 3 milliGRAM(s) Oral at bedtime PRN Insomnia  ondansetron Injectable 4 milliGRAM(s) IV Push every 8 hours PRN Nausea and/or Vomiting      Allergies    No Known Allergies    Intolerances        VITALS:    Vital Signs Last 24 Hrs  T(C): 37.3 (17 Apr 2023 21:40), Max: 37.3 (17 Apr 2023 21:40)  T(F): 99.2 (17 Apr 2023 21:40), Max: 99.2 (17 Apr 2023 21:40)  HR: 80 (17 Apr 2023 21:40) (74 - 97)  BP: 129/82 (17 Apr 2023 21:40) (126/73 - 129/82)  BP(mean): --  RR: 16 (17 Apr 2023 21:40) (16 - 17)  SpO2: 100% (17 Apr 2023 21:40) (97% - 100%)    Parameters below as of 17 Apr 2023 21:40  Patient On (Oxygen Delivery Method): room air        LABS:                          10.7   9.48  )-----------( 303      ( 17 Apr 2023 16:15 )             34.3       04-17    132<L>  |  98  |  66<H>  ----------------------------<  183<H>  5.1   |  17<L>  |  4.44<H>    Ca    9.1      17 Apr 2023 16:15  Mg     2.20     04-17    TPro  6.8  /  Alb  3.3  /  TBili  0.2  /  DBili  x   /  AST  49<H>  /  ALT  30  /  AlkPhos  81  04-17      CAPILLARY BLOOD GLUCOSE      POCT Blood Glucose.: 270 mg/dL (17 Apr 2023 12:54)      PT/INR - ( 17 Apr 2023 16:15 )   PT: 14.9 sec;   INR: 1.28 ratio         PTT - ( 17 Apr 2023 16:15 )  PTT:25.7 sec    LOWER EXTREMITY PHYSICAL EXAM:    Vascular: DP/PT 0/4 L, CFT N/A from gangrenous change, Temperature gradient cool to cool L  Neuro: Epicritic sensation intact to the level of forefoot,L   Musculoskeletal/Ortho: s/p R  Lower Extremity BKA.   Skin: L foot severe ischemic changes to the level of MPJ, no acute signs of infection, lateral heel well adhered eschar, mild jeanette-wound erythema, no drainage, no malodor.          RADIOLOGY & ADDITIONAL STUDIES:    ACC: [id number] EXAM:  XR FOOT 2 VIEWS LT   ORDERED BY: GAVIN VASQUES     ACC: [id number] EXAM:  XR ANKLE 2 VIEWS LT   ORDERED BY: GAVIN VASQUES     PROCEDURE DATE:  04/17/2023          INTERPRETATION:  CLINICAL INDICATION: Left foot pain. Evaluate for   osteomyelitis.    TECHNIQUE:  X-ray 2 views (3 images) of the left foot, 3 views of the   left ankle    COMPARISON: No similar prior comparisons available.    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    No acute displaced fracture or dislocation. Soft tissue swelling about   the ankle. No definite cortical erosion or periostitis to suggest   osteomyelitis.    Atherosclerotic calcifications are noted.    Left foot:    No acute displaced fracture or dislocation. Osteoarthritic changes   throughout the midfoot and forefoot. No definite cortical erosion or   periostitis to suggest osteomyelitis.    Atherosclerotic calcifications are noted.    --- End of Report ---          NOEMI MCNEIL MD; Resident Radiologist  This document has been electronically signed.  JEROME KOWALSKI M.D., ATTENDING RADIOLOGIST  This document has been electronically signed. Apr 17 2023 10:42PM

## 2023-04-18 NOTE — PATIENT PROFILE ADULT - FALL HARM RISK - HARM RISK INTERVENTIONS
Communicate Risk of Fall with Harm to all staff/Reinforce activity limits and safety measures with patient and family/Tailored Fall Risk Interventions/Visual Cue: Yellow wristband and red socks/Bed in lowest position, wheels locked, appropriate side rails in place/Call bell, personal items and telephone in reach/Instruct patient to call for assistance before getting out of bed or chair/Non-slip footwear when patient is out of bed/[place] to call system/Physically safe environment - no spills, clutter or unnecessary equipment/Purposeful Proactive Rounding/Room/bathroom lighting operational, light cord in reach Assistance with ambulation/Assistance OOB with selected safe patient handling equipment/Communicate Risk of Fall with Harm to all staff/Discuss with provider need for PT consult/Monitor gait and stability/Reinforce activity limits and safety measures with patient and family/Tailored Fall Risk Interventions/Visual Cue: Yellow wristband and red socks/Bed in lowest position, wheels locked, appropriate side rails in place/Call bell, personal items and telephone in reach/Instruct patient to call for assistance before getting out of bed or chair/Non-slip footwear when patient is out of bed/[place] to call system/Physically safe environment - no spills, clutter or unnecessary equipment/Purposeful Proactive Rounding/Room/bathroom lighting operational, light cord in reach

## 2023-04-18 NOTE — PROGRESS NOTE ADULT - ASSESSMENT
72y Male with L foot ischemic changes to MPJs level, stable heel wound  - Patient seen and evaluated  - Afebrile, no leukocytosis, ESR pending , CRP 12.53  - L foot ischemic changes to the level of MPJ, cold to touch and without CFT, no acute signs of infection, lateral heel well adhered eschar, no erythema, no drainage, no malodor.    - L foot and ankle Xray: no OM  - no wound culture 2/2 no acute signs of infection   - Vasc recs appreciated   - Z flows to be worn at all times with strict decubitus precautions   - Pod plan TMA pending Vasc recs, meanwhile local wound care, no emergent intervention for now.   - Seen with attending.

## 2023-04-19 ENCOUNTER — TRANSCRIPTION ENCOUNTER (OUTPATIENT)
Age: 73
End: 2023-04-19

## 2023-04-19 LAB
A1C WITH ESTIMATED AVERAGE GLUCOSE RESULT: 7.3 % — HIGH (ref 4–5.6)
ANION GAP SERPL CALC-SCNC: 15 MMOL/L — HIGH (ref 7–14)
BASOPHILS # BLD AUTO: 0.04 K/UL — SIGNIFICANT CHANGE UP (ref 0–0.2)
BASOPHILS NFR BLD AUTO: 0.5 % — SIGNIFICANT CHANGE UP (ref 0–2)
BUN SERPL-MCNC: 40 MG/DL — HIGH (ref 7–23)
CALCIUM SERPL-MCNC: 8.7 MG/DL — SIGNIFICANT CHANGE UP (ref 8.4–10.5)
CHLORIDE SERPL-SCNC: 96 MMOL/L — LOW (ref 98–107)
CO2 SERPL-SCNC: 25 MMOL/L — SIGNIFICANT CHANGE UP (ref 22–31)
CREAT SERPL-MCNC: 3.09 MG/DL — HIGH (ref 0.5–1.3)
EGFR: 21 ML/MIN/1.73M2 — LOW
EOSINOPHIL # BLD AUTO: 0.06 K/UL — SIGNIFICANT CHANGE UP (ref 0–0.5)
EOSINOPHIL NFR BLD AUTO: 0.7 % — SIGNIFICANT CHANGE UP (ref 0–6)
ESTIMATED AVERAGE GLUCOSE: 163 — SIGNIFICANT CHANGE UP
FERRITIN SERPL-MCNC: 2258 NG/ML — HIGH (ref 30–400)
GLUCOSE BLDC GLUCOMTR-MCNC: 177 MG/DL — HIGH (ref 70–99)
GLUCOSE BLDC GLUCOMTR-MCNC: 246 MG/DL — HIGH (ref 70–99)
GLUCOSE BLDC GLUCOMTR-MCNC: 252 MG/DL — HIGH (ref 70–99)
GLUCOSE BLDC GLUCOMTR-MCNC: 273 MG/DL — HIGH (ref 70–99)
GLUCOSE SERPL-MCNC: 237 MG/DL — HIGH (ref 70–99)
HBV CORE AB SER-ACNC: SIGNIFICANT CHANGE UP
HBV SURFACE AB SER-ACNC: 34.7 MIU/ML — SIGNIFICANT CHANGE UP
HBV SURFACE AG SER-ACNC: SIGNIFICANT CHANGE UP
HCT VFR BLD CALC: 36 % — LOW (ref 39–50)
HGB BLD-MCNC: 11.2 G/DL — LOW (ref 13–17)
IANC: 6.33 K/UL — SIGNIFICANT CHANGE UP (ref 1.8–7.4)
IMM GRANULOCYTES NFR BLD AUTO: 0.2 % — SIGNIFICANT CHANGE UP (ref 0–0.9)
IRON SATN MFR SERPL: 20 % — SIGNIFICANT CHANGE UP (ref 14–50)
IRON SATN MFR SERPL: 38 UG/DL — LOW (ref 45–165)
LYMPHOCYTES # BLD AUTO: 0.87 K/UL — LOW (ref 1–3.3)
LYMPHOCYTES # BLD AUTO: 10.7 % — LOW (ref 13–44)
MAGNESIUM SERPL-MCNC: 2.1 MG/DL — SIGNIFICANT CHANGE UP (ref 1.6–2.6)
MCHC RBC-ENTMCNC: 30.5 PG — SIGNIFICANT CHANGE UP (ref 27–34)
MCHC RBC-ENTMCNC: 31.1 GM/DL — LOW (ref 32–36)
MCV RBC AUTO: 98.1 FL — SIGNIFICANT CHANGE UP (ref 80–100)
MONOCYTES # BLD AUTO: 0.81 K/UL — SIGNIFICANT CHANGE UP (ref 0–0.9)
MONOCYTES NFR BLD AUTO: 10 % — SIGNIFICANT CHANGE UP (ref 2–14)
NEUTROPHILS # BLD AUTO: 6.33 K/UL — SIGNIFICANT CHANGE UP (ref 1.8–7.4)
NEUTROPHILS NFR BLD AUTO: 77.9 % — HIGH (ref 43–77)
NRBC # BLD: 0 /100 WBCS — SIGNIFICANT CHANGE UP (ref 0–0)
NRBC # FLD: 0 K/UL — SIGNIFICANT CHANGE UP (ref 0–0)
PHOSPHATE SERPL-MCNC: 3.1 MG/DL — SIGNIFICANT CHANGE UP (ref 2.5–4.5)
PLATELET # BLD AUTO: 306 K/UL — SIGNIFICANT CHANGE UP (ref 150–400)
POTASSIUM SERPL-MCNC: 3.9 MMOL/L — SIGNIFICANT CHANGE UP (ref 3.5–5.3)
POTASSIUM SERPL-SCNC: 3.9 MMOL/L — SIGNIFICANT CHANGE UP (ref 3.5–5.3)
RBC # BLD: 3.67 M/UL — LOW (ref 4.2–5.8)
RBC # FLD: 14.3 % — SIGNIFICANT CHANGE UP (ref 10.3–14.5)
SODIUM SERPL-SCNC: 136 MMOL/L — SIGNIFICANT CHANGE UP (ref 135–145)
TIBC SERPL-MCNC: 190 UG/DL — LOW (ref 220–430)
UIBC SERPL-MCNC: 152 UG/DL — SIGNIFICANT CHANGE UP (ref 110–370)
WBC # BLD: 8.13 K/UL — SIGNIFICANT CHANGE UP (ref 3.8–10.5)
WBC # FLD AUTO: 8.13 K/UL — SIGNIFICANT CHANGE UP (ref 3.8–10.5)

## 2023-04-19 PROCEDURE — 73551 X-RAY EXAM OF FEMUR 1: CPT | Mod: 26,LT

## 2023-04-19 PROCEDURE — 93010 ELECTROCARDIOGRAM REPORT: CPT

## 2023-04-19 PROCEDURE — 73590 X-RAY EXAM OF LOWER LEG: CPT | Mod: 26,LT

## 2023-04-19 PROCEDURE — 93306 TTE W/DOPPLER COMPLETE: CPT | Mod: 26

## 2023-04-19 RX ADMIN — Medication 1: at 22:36

## 2023-04-19 RX ADMIN — ZINC SULFATE TAB 220 MG (50 MG ZINC EQUIVALENT) 220 MILLIGRAM(S): 220 (50 ZN) TAB at 14:13

## 2023-04-19 RX ADMIN — AMPICILLIN SODIUM AND SULBACTAM SODIUM 200 GRAM(S): 250; 125 INJECTION, POWDER, FOR SUSPENSION INTRAMUSCULAR; INTRAVENOUS at 11:19

## 2023-04-19 RX ADMIN — Medication 3: at 09:10

## 2023-04-19 RX ADMIN — Medication 5 MILLIGRAM(S): at 14:13

## 2023-04-19 RX ADMIN — HEPARIN SODIUM 5000 UNIT(S): 5000 INJECTION INTRAVENOUS; SUBCUTANEOUS at 05:38

## 2023-04-19 RX ADMIN — CHLORHEXIDINE GLUCONATE 1 APPLICATION(S): 213 SOLUTION TOPICAL at 15:05

## 2023-04-19 RX ADMIN — SEVELAMER CARBONATE 1600 MILLIGRAM(S): 2400 POWDER, FOR SUSPENSION ORAL at 17:50

## 2023-04-19 RX ADMIN — HEPARIN SODIUM 5000 UNIT(S): 5000 INJECTION INTRAVENOUS; SUBCUTANEOUS at 17:51

## 2023-04-19 RX ADMIN — CLOPIDOGREL BISULFATE 75 MILLIGRAM(S): 75 TABLET, FILM COATED ORAL at 14:15

## 2023-04-19 RX ADMIN — Medication 81 MILLIGRAM(S): at 14:14

## 2023-04-19 RX ADMIN — Medication 2: at 13:10

## 2023-04-19 RX ADMIN — ATORVASTATIN CALCIUM 40 MILLIGRAM(S): 80 TABLET, FILM COATED ORAL at 22:37

## 2023-04-19 RX ADMIN — SEVELAMER CARBONATE 1600 MILLIGRAM(S): 2400 POWDER, FOR SUSPENSION ORAL at 14:15

## 2023-04-19 RX ADMIN — SEVELAMER CARBONATE 1600 MILLIGRAM(S): 2400 POWDER, FOR SUSPENSION ORAL at 10:09

## 2023-04-19 RX ADMIN — Medication 1 TABLET(S): at 14:14

## 2023-04-19 RX ADMIN — Medication 1: at 18:56

## 2023-04-19 NOTE — PROGRESS NOTE ADULT - ASSESSMENT
72M with history as above who presents to the hospital with worsening gangrene of his L foot.       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   ·  Plan: - Patient with worsening pain of the L foot, reports concern for infection at his facility but on examination low concern for acute infection, podiatry reccs empiric unasyn -> ordered  - BCx x2-No growth  - Podiatry and vascular Sx consults noted  - Pain control as needed.    - Overall this patient is at   low to intermediate   risk (for cardiac death, nonfatal myocardial infarction, and nonfatal cardiac arrest perioperatively for limb amputation a intermediate risk procedure).     The patient is however without evidence of ACS, Decompensated Heart Failure,Obstructive Valvular Heart disease or Unstable arrhythmia.   There  are  no further recommendation for risk stratifying imaging/stress testing prior to planned surgery    Problem/Plan - 2:  ·  Problem: ESRD on dialysis.   ·  Plan: - On HD Tu/Th/Sat, next episode due on 4/18  - c/w renal meds  - Mild anemia likely 2/2 ESRD, improved from prior levels.    Problem/Plan - 3:  ·  Problem: PVD (peripheral vascular disease).   ·  Plan: - c/w aspirin/plavix/statin therapy.    Problem/Plan - 4:  ·  Problem: Type 2 diabetes mellitus.   ·  Plan: - Hold outpatient DM2 medications  - Place on ISS, FS qAC, CC diet.    Problem/Plan - 5:  ·  Problem: Need for prophylactic measure.   ·  Plan: DVT ppx - HSQ  Diet - DASH/CC/Renal diet  Activity - OOB to chair/with assistance, increase as tolerated    Fall and aspiration precautions.

## 2023-04-19 NOTE — PROGRESS NOTE ADULT - ASSESSMENT
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 Vascular Surgery consulted for vascular evaluation.     Plan:  - Patient non-ambulatory with minimal ability to use left leg for transfers.   - Patient is currently scheduled for LLE BKA tomorrow, 4/20/23  - Pre-op 1am labs CBC, BMP, coags, updated Type and Screen   - NPO after midnight with mIVF  - please document medical clearance and cardiac risk stratification   - care per primary     Plan discussed with and approved by attending, Dr. 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 Vascular Surgery consulted for vascular evaluation.     Plan:  - Patient non-ambulatory with minimal ability to use left leg for transfers.   - Patient is currently scheduled for LLE AKA tomorrow, 4/20/23  - Pre-op 1am labs CBC, BMP, coags, updated Type and Screen   - NPO after midnight with mIVF  - please document medical clearance and cardiac risk stratification   - care per primary     Plan discussed with and approved by attending, Dr. Stefano Stock MD PGY-2  C Team Surgery   [id number]

## 2023-04-19 NOTE — PROGRESS NOTE ADULT - ASSESSMENT
{\rtf1\efnzeb96449\ansi\dngxrcg6845\ftnbj\uc1\deff0  {\fonttbl{\f0 \fnil Segoe UI;}{\f1 \fnil \fcharset0 Segoe UI;}{\f2 \fnil Times New Jc;}}  {\colortbl ;\syq529\eakai071\lyds860 ;\red0\green0\blue0 ;\red0\green0\qvvu726 ;\red0\green0\blue0 ;}  {\stylesheet{\f0\fs20 Normal;}{\cs1 Default Paragraph Font;}{\cs2\f0\fs16 Line Number;}{\cs3\f2\fs24\ul\cf3 Hyperlink;}}  {\*\revtbl{Unknown;}}  \nfebax22741\vowfun54353\qucfb2549\ledml7450\hxmqv6854\wjwap1928\pypzbno801\snstjll347\nogrowautofit\ibpdij153\formshade\nofeaturethrottle1\dntblnsbdb\fet4\aendnotes\aftnnrlc\pgbrdrhead\pgbrdrfoot  \sectd\nnxdjj97367\nkzecx99455\guttersxn0\svpqyele5041\xzsenzlw1171\lkedgmfw5886\dzowienr5169\xowqdwz719\aelthuq333\sbkpage\pgncont\pgndec  \plain\plain\f0\fs24\ql\plain\f0\fs24\plain\f0\fs20\fskw9282\hich\f0\dbch\f0\loch\f0\fs20 72M with history as above who presents to the hospital with worsening gangrene of his L foot. \par  \par  \par  \plain\f1\fs20\jxxn7931\hich\f1\dbch\f1\loch\f1\cf2\fs20\b\ul{\field{\*\fldinst HYPERLINK [id number] }{\fldrslt Problem/Plan - 1:}}\plain\f0\fs20\hyzh5673\hich\f0\dbch\f0\loch\f0\fs20\ql\par  \'b7  {\*\bkmkstart [id number]}{\*\bkmkend pn39330419501}Problem: {\*\bkmkstart [id number]}{\*\bkmkend rm34198436936}Gangrene due to peripheral vascular disease. \par  \'b7  {\*\bkmkstart wh85700078451}{\*\bkmkend np94117612551}Plan: {\*\bkmkstart gq01679961104}{\*\bkmkend wy32442694662}- Patient with worsening pain of the L foot, reports concern for infection at his facility but on examination low concern for acute   infection, podiatry reccs empiric unasyn -> ordered\par  - BCx x2-No growth\par  - Podiatry and vascular Sx consults noted\par  - Pain control as needed.\par  \pard\plain\f0\fs24\plain\f0\fs20\lmnx6722\hich\f0\dbch\f0\loch\f0\fs20\par  -\plain\f1\fs20\fxbh0475\hich\f1\dbch\f1\loch\f1\cf2\fs20\b  Overall this patient is at   low to intermediate   risk (for cardiac death, nonfatal myocardial infarction, and nonfatal cardiac arrest perioperatively for limb amputation a intermediate risk   procedure). \par  \par  The patient is however without evidence of ACS, Decompensated Heart Failure,Obstructive Valvular Heart disease or Unstable arrhythmia. \par  There  are  no further recommendation for risk stratifying imaging/stress testing prior to planned surgery\par  \ql\plain\f0\fs24\plain\f1\fs20\zhqq2465\hich\f1\dbch\f1\loch\f1\cf2\fs20\b\par  \plain\f1\fs20\qbpp3108\hich\f1\dbch\f1\loch\f1\cf2\fs20\b\ul{\field{\*\fldinst HYPERLINK [id number] }{\fldrslt Problem/Plan - 2:}}\plain\f0\fs20\gbzk2193\hich\f0\dbch\f0\loch\f0\fs20\ql\par  \'b7  {\*\bkmkstart nb22906918507}{\*\bkmkend ps49533393711}Problem: {\*\bkmkstart tf05219622413}{\*\bkmkend sx42317695724}ESRD on dialysis. \par  \'b7  {\*\bkmkstart tk49461032967}{\*\bkmkend rb65327352453}Plan: {\*\bkmkstart um90212299225}{\*\bkmkend dm05272798642}- On HD Tu/Th/Sat, next episode due on 4/18\par  - c/w renal meds\par  - Mild anemia likely 2/2 ESRD, improved from prior levels.\par  \par  \plain\f1\fs20\fkjx1007\hich\f1\dbch\f1\loch\f1\cf2\fs20\b\ul{\field{\*\fldinst HYPERLINK [id number] }{\fldrslt Problem/Plan - 3:}}\plain\f0\fs20\pfcd9128\hich\f0\dbch\f0\loch\f0\fs20\ql\par  \'b7  {\*\bkmkstart cg07502309181}{\*\bkmkend wy21505111115}Problem: {\*\bkmkstart on64266645532}{\*\bkmkend zv80142846591}PVD (peripheral vascular disease). \par  \'b7  {\*\bkmkstart us72326554398}{\*\bkmkend uz91139396944}Plan: {\*\bkmkstart ig96511168187}{\*\bkmkend fm31512070168}- c/w aspirin/plavix/statin therapy.\par  \par  \plain\f1\fs20\gcom7944\hich\f1\dbch\f1\loch\f1\cf2\fs20\b\ul{\field{\*\fldinst HYPERLINK [id number] }{\fldrslt Problem/Plan - 4:}}\plain\f0\fs20\iapv4940\hich\f0\dbch\f0\loch\f0\fs20\ql\par  \'b7  {\*\bkmkstart zs60803665233}{\*\bkmkend xb41200519896}Problem: {\*\bkmkstart wo03014113430}{\*\bkmkend gw46696729197}Type 2 diabetes mellitus. \par  \'b7  {\*\bkmkstart wy42660370125}{\*\bkmkend ne21640916010}Plan: {\*\bkmkstart ws08481086827}{\*\bkmkend su99533741969}- Hold outpatient DM2 medications\par  - Place on ISS, FS qAC, CC diet.\par  \par  \plain\f1\fs20\vwfm7478\hich\f1\dbch\f1\loch\f1\cf2\fs20\b\ul{\field{\*\fldinst HYPERLINK [id number] }{\fldrslt Problem/Plan - 5:}}\plain\f0\fs20\dnbt9882\hich\f0\dbch\f0\loch\f0\fs20\ql\par  \'b7  {\*\bkmkstart kk02985194866}{\*\bkmkend pn11366908747}Problem: {\*\bkmkstart uk05667011455}{\*\bkmkend cy88932108683}Need for prophylactic measure. \par  \'b7  {\*\bkmkstart sl40164343866}{\*\bkmkend ur35303656744}Plan: {\*\bkmkstart qj81506017242}{\*\bkmkend zz50444899704}DVT ppx - HSQ\par  Diet - DASH/CC/Renal diet\par  Activity - OOB to chair/with assistance, increase as tolerated\par  \par  Fall and aspiration precautions.\plain\f1\fs20\jnyl5905\hich\f1\dbch\f1\loch\f1\cf2\fs20\strike\plain\f0\fs20\nfkx8784\hich\f0\dbch\f0\loch\f0\fs20\par  \par  }

## 2023-04-19 NOTE — PROGRESS NOTE ADULT - SUBJECTIVE AND OBJECTIVE BOX
[facility] NEPHROLOGY- PROGRESS NOTE    72y Male with history of ESRD on HD presents with L foot gangrene. Nephrology consulted for ESRD status.    REVIEW OF SYSTEMS:  Gen: no fevers  Cards: no chest pain  Resp: no dyspnea  GI: no nausea or vomiting or diarrhea  Vascular: no LE edema    No Known Allergies      Hospital Medications: Medications reviewed    VITALS:  T(F): 98.5 (04-19-23 @ 10:11), Max: 98.9 (04-19-23 @ 06:00)  HR: 95 (04-19-23 @ 10:11)  BP: 106/67 (04-19-23 @ 10:11)  RR: 18 (04-19-23 @ 10:11)  SpO2: 100% (04-19-23 @ 10:11)  Wt(kg): --  Height (cm): 188 (04-18 @ 01:23)  Weight (kg): 72.6 (04-18 @ 01:23)  BMI (kg/m2): 20.5 (04-18 @ 01:23)  BSA (m2): 1.98 (04-18 @ 01:23)    04-18 @ 07:01  -  04-19 @ 07:00  --------------------------------------------------------  IN: 580 mL / OUT: 2001 mL / NET: -1421 mL        PHYSICAL EXAM:    Gen: NAD, calm  Cards: RRR, +S1/S2, no M/G/R  Resp: CTA B/L  GI: soft, NT/ND, NABS  Vascular: L foot gangrene, R BKA, RIJ TDC intact    LABS:  04-19    136  |  96<L>  |  40<H>  ----------------------------<  237<H>  3.9   |  25  |  3.09<H>    Ca    8.7      19 Apr 2023 06:15  Phos  3.1     04-19  Mg     2.10     04-19    TPro  6.7  /  Alb  3.4  /  TBili  0.2  /  DBili      /  AST  22  /  ALT  24  /  AlkPhos  83  04-18    Creatinine Trend: 3.09 <--, 4.80 <--, 4.44 <--                        11.2   8.13  )-----------( 306      ( 19 Apr 2023 06:15 )             36.0     Urine Studies:        RADIOLOGY & ADDITIONAL STUDIES:

## 2023-04-19 NOTE — PROGRESS NOTE ADULT - ASSESSMENT
72y Male with L foot ischemic changes to MPJs level, stable heel wound  - Patient seen and evaluated  - Afebrile, no leukocytosis, ESR pending , CRP 12.53  - L foot ischemic changes to the level of MPJ, cold to touch and without CFT, no acute signs of infection, lateral heel well adhered eschar, no erythema, no drainage, no malodor.    - L foot and ankle Xray: no OM  - no wound culture 2/2 no acute signs of infection   - Vasc plan LLE BKA tomorrow, appreciated  - Pod plan local wound care pending proximal amputation, appreciated  - Seen with attending.

## 2023-04-19 NOTE — PROGRESS NOTE ADULT - SUBJECTIVE AND OBJECTIVE BOX
MR#[id number]  PATIENT NAME:KYLIE MATTHEWS    DATE OF SERVICE: 04-19-23 @ 07:50  Patient was seen and examined by me  on    04-19-23 @ 07:50 .Interim events noted.Consultant notes ,Labs,Telemetry reviewed by me       HOSPITAL COURSE: HPI:  Please note, patient with second MRN #[id number] with additional recent hospitalizations.    This is a 72M with history of ESRD on HD (T/Th/Sat), DM2, PVD (s/p R BKA in Nov 2022) and recurrent hematurias (has required CBI in the past) who presents to the hospital with worsening pain and duskiness of his L foot. Patient currently is a limited historian, HPI supplemented with information from patient's sister Darshana ([phone]). As per patient he was sent to the hospital for possible L foot infection, does report pain to the bottom of his foot but otherwise denies any fevers/chills/diaphoresis, no swelling or redness of his L foot. No other acute complaints. Said that he is supposed to have an amputation of his L leg but is unclear on the details. Spoke with patient's sister, Darshana, who said that the patient has known PVD, had a recent R leg BKA and has had some procedures with [facility] [place] (sister unsure of the procedures) and states that they were told the procedures did not help improve the peripheral blood flow in the patient's L leg and that he would need an amputation. Said that the family was told he was being sent to the hospital for the amputation evaluation. Otherwise sister not aware of any other acute issues for the patient.     On arrival to the ED, his vitals were T 97.5, P 90, /59, RR 16, O2 sat 98% RA. His lab work was most significant for mild anemia and elevated CRP. His imaging did not show acute findings to suggest OM. He was admitted to medicine.   (17 Apr 2023 23:19)      INTERIM EVENTS:Patient seen at bedside ,interim events noted.Awake alert no dyspnea chest pain seen by Vascular and Podiatry Plan for TMA possibly      PMH -reviewed admission note, no change since admission  HEART FAILURE: Acute[ ]Chronic[ ] Systolic[ ] Diastolic[ ] Combined Systolic and Diastolic[ ]  CAD[ ] CABG[ ] PCI[ ]  DEVICES[ ] PPM[ ] ICD[ ] ILR[ ]  ATRIAL FIBRILLATION[ ] Paroxysmal[ ] Permanent[ ] CHADS2-[  ]  CANDE[ ] CKD1[ ] CKD2[ ] CKD3[ ] CKD4[ ] ESRD[x ]  COPD[ ] HTN[x ]   DM[ ] Type1[ ] Type 2[ ]   CVA[ ] Paresis[ ]    AMBULATION: Assisted[x ] Cane/walker[ ] Independent[ ]    MEDICATIONS  (STANDING):  ampicillin/sulbactam  IVPB 3 Gram(s) IV Intermittent every 12 hours  ampicillin/sulbactam  IVPB      aspirin enteric coated 81 milliGRAM(s) Oral daily  atorvastatin 40 milliGRAM(s) Oral at bedtime  chlorhexidine 2% Cloths 1 Application(s) Topical daily  clopidogrel Tablet 75 milliGRAM(s) Oral daily  glucagon  Injectable 1 milliGRAM(s) IntraMuscular once  heparin   Injectable 5000 Unit(s) SubCutaneous every 12 hours  insulin lispro (ADMELOG) corrective regimen sliding scale   SubCutaneous three times a day before meals  insulin lispro (ADMELOG) corrective regimen sliding scale   SubCutaneous at bedtime  Nephro-zack 1 Tablet(s) Oral daily  sevelamer carbonate 1600 milliGRAM(s) Oral three times a day with meals  zinc sulfate 220 milliGRAM(s) Oral daily    MEDICATIONS  (PRN):  acetaminophen     Tablet .. 650 milliGRAM(s) Oral every 6 hours PRN Temp greater or equal to 38C (100.4F), Mild Pain (1 - 3)  bisacodyl 5 milliGRAM(s) Oral every 12 hours PRN Constipation  dextrose Oral Gel 15 Gram(s) Oral once PRN Blood Glucose LESS THAN 70 milliGRAM(s)/deciliter  melatonin 3 milliGRAM(s) Oral at bedtime PRN Insomnia  ondansetron Injectable 4 milliGRAM(s) IV Push every 8 hours PRN Nausea and/or Vomiting  sodium chloride 0.9% Bolus. 100 milliLiter(s) IV Bolus every 5 minutes PRN SBP LESS THAN or EQUAL to 90 mmHg            REVIEW OF SYSTEMS:  Constitutional: [ ] fever, [ ]weight loss,  [ ]fatigue [ ]weight gain  Eyes: [ ] visual changes  Respiratory: [ ]shortness of breath;  [ ] cough, [ ]wheezing, [ ]chills, [ ]hemoptysis  Cardiovascular: [ ] chest pain, [ ]palpitations, [ ]dizziness,  [ ]leg swelling[ ]orthopnea[ ]PND  Gastrointestinal: [ ] abdominal pain, [ ]nausea, [ ]vomiting,  [ ]diarrhea [ ]Constipation [ ]Melena  Genitourinary: [ ] dysuria, [ ] hematuria [ ]Garcia  Neurologic: [ ] headaches [ ] tremors[ ]weakness [ ]Paralysis Right[ ] Left[ ]  Skin: [ ] itching, [ ]burning, [ ] rashes  Endocrine: [ ] heat or cold intolerance  Musculoskeletal: [ ] joint pain or swelling; [ ] muscle, back, or extremity pain  Psychiatric: [ ] depression, [ ]anxiety, [ ]mood swings, or [ ]difficulty sleeping  Hematologic: [ ] easy bruising, [ ] bleeding gums    [ ] All remaining systems negative except as per above.   [ ]Unable to obtain.  [x] No change in ROS since admission      Vital Signs Last 24 Hrs  T(C): 37.2 (19 Apr 2023 06:00), Max: 37.2 (19 Apr 2023 06:00)  T(F): 98.9 (19 Apr 2023 06:00), Max: 98.9 (19 Apr 2023 06:00)  HR: 100 (19 Apr 2023 06:00) (63 - 100)  BP: 97/60 (19 Apr 2023 06:00) (93/61 - 134/73)  RR: 18 (19 Apr 2023 06:00) (16 - 18)  SpO2: 100% (19 Apr 2023 06:00) (95% - 100%)    Parameters below as of 19 Apr 2023 06:00  Patient On (Oxygen Delivery Method): room air      I&O's Summary    18 Apr 2023 07:01  -  19 Apr 2023 07:00  --------------------------------------------------------  IN: 580 mL / OUT: 2001 mL / NET: -1421 mL        PHYSICAL EXAM:  General: No acute distress BMI-28  HEENT: EOMI, PERRL  Neck: Supple, [ ] JVD  Lungs: Equal air entry bilaterally; [ ] rales [ ] wheezing [ ] rhonchi  Heart: Regular rate and rhythm; [x ] murmur   2/6 [ x] systolic [ ] diastolic [ ] radiation[ ] rubs [ ]  gallops  Abdomen: Nontender, bowel sounds present  Extremities: No clubbing, cyanosis, [ ] edema [x ]R BKA[x]LLE with dry gangrene of toes, puncate heel wound, bleeding, dopplerable signals noted AT/PT, absent DP signals  Nervous system:  Alert & Oriented X3, no focal deficits  Psychiatric: Normal affect  Skin: No rashes or lesions    LABS:  04-18    134<L>  |  99  |  74<H>  ----------------------------<  212<H>  4.0   |  19<L>  |  4.80<H>    Ca    9.3      18 Apr 2023 06:30  Phos  4.6     04-18  Mg     2.30     04-18    TPro  6.7  /  Alb  3.4  /  TBili  0.2  /  DBili  x   /  AST  22  /  ALT  24  /  AlkPhos  83  04-18    Creatinine Trend: 4.80<--, 4.44<--                        10.8   8.62  )-----------( 300      ( 18 Apr 2023 06:30 )             34.5     PT/INR - ( 17 Apr 2023 16:15 )   PT: 14.9 sec;   INR: 1.28 ratio    PTT - ( 17 Apr 2023 16:15 )  PTT:25.7 sec      ECHO:       MR#[id number]  PATIENT NAME:KYLIE MATTHEWS    DATE OF SERVICE: 04-19-23 @ 07:50  Patient was seen and examined by me  on    04-19-23 @ 07:50 .Interim events noted.Consultant notes ,Labs,Telemetry reviewed by me       HOSPITAL COURSE: HPI:  Please note, patient with second MRN #[id number] with additional recent hospitalizations.    This is a 72M with history of ESRD on HD (T/Th/Sat), DM2, PVD (s/p R BKA in Nov 2022) and recurrent hematurias (has required CBI in the past) who presents to the hospital with worsening pain and duskiness of his L foot. Patient currently is a limited historian, HPI supplemented with information from patient's sister Darshana ([phone]). As per patient he was sent to the hospital for possible L foot infection, does report pain to the bottom of his foot but otherwise denies any fevers/chills/diaphoresis, no swelling or redness of his L foot. No other acute complaints. Said that he is supposed to have an amputation of his L leg but is unclear on the details. Spoke with patient's sister, Darshana, who said that the patient has known PVD, had a recent R leg BKA and has had some procedures with [facility] [place] (sister unsure of the procedures) and states that they were told the procedures did not help improve the peripheral blood flow in the patient's L leg and that he would need an amputation. Said that the family was told he was being sent to the hospital for the amputation evaluation. Otherwise sister not aware of any other acute issues for the patient.     On arrival to the ED, his vitals were T 97.5, P 90, /59, RR 16, O2 sat 98% RA. His lab work was most significant for mild anemia and elevated CRP. His imaging did not show acute findings to suggest OM. He was admitted to medicine.   (17 Apr 2023 23:19)      INTERIM EVENTS:Patient seen at bedside ,interim events noted.Awake alert no dyspnea chest pain seen by Vascular and Podiatry Plan for TMA possibly      PMH -reviewed admission note, no change since admission  HEART FAILURE: Acute[ ]Chronic[ ] Systolic[ ] Diastolic[ ] Combined Systolic and Diastolic[ ]  CAD[ ] CABG[ ] PCI[ ]  DEVICES[ ] PPM[ ] ICD[ ] ILR[ ]  ATRIAL FIBRILLATION[ ] Paroxysmal[ ] Permanent[ ] CHADS2-[  ]  CANDE[ ] CKD1[ ] CKD2[ ] CKD3[ ] CKD4[ ] ESRD[x ]  COPD[ ] HTN[x ]   DM[ ] Type1[ ] Type 2[ ]   CVA[ ] Paresis[ ]    AMBULATION: Assisted[x ] Cane/walker[ ] Independent[ ]    MEDICATIONS  (STANDING):  ampicillin/sulbactam  IVPB 3 Gram(s) IV Intermittent every 12 hours  ampicillin/sulbactam  IVPB      aspirin enteric coated 81 milliGRAM(s) Oral daily  atorvastatin 40 milliGRAM(s) Oral at bedtime  chlorhexidine 2% Cloths 1 Application(s) Topical daily  clopidogrel Tablet 75 milliGRAM(s) Oral daily  glucagon  Injectable 1 milliGRAM(s) IntraMuscular once  heparin   Injectable 5000 Unit(s) SubCutaneous every 12 hours  insulin lispro (ADMELOG) corrective regimen sliding scale   SubCutaneous three times a day before meals  insulin lispro (ADMELOG) corrective regimen sliding scale   SubCutaneous at bedtime  Nephro-zack 1 Tablet(s) Oral daily  sevelamer carbonate 1600 milliGRAM(s) Oral three times a day with meals  zinc sulfate 220 milliGRAM(s) Oral daily    MEDICATIONS  (PRN):  acetaminophen     Tablet .. 650 milliGRAM(s) Oral every 6 hours PRN Temp greater or equal to 38C (100.4F), Mild Pain (1 - 3)  bisacodyl 5 milliGRAM(s) Oral every 12 hours PRN Constipation  dextrose Oral Gel 15 Gram(s) Oral once PRN Blood Glucose LESS THAN 70 milliGRAM(s)/deciliter  melatonin 3 milliGRAM(s) Oral at bedtime PRN Insomnia  ondansetron Injectable 4 milliGRAM(s) IV Push every 8 hours PRN Nausea and/or Vomiting  sodium chloride 0.9% Bolus. 100 milliLiter(s) IV Bolus every 5 minutes PRN SBP LESS THAN or EQUAL to 90 mmHg            REVIEW OF SYSTEMS:  Constitutional: [ ] fever, [ ]weight loss,  [ ]fatigue [ ]weight gain  Eyes: [ ] visual changes  Respiratory: [ ]shortness of breath;  [ ] cough, [ ]wheezing, [ ]chills, [ ]hemoptysis  Cardiovascular: [ ] chest pain, [ ]palpitations, [ ]dizziness,  [ ]leg swelling[ ]orthopnea[ ]PND  Gastrointestinal: [ ] abdominal pain, [ ]nausea, [ ]vomiting,  [ ]diarrhea [ ]Constipation [ ]Melena  Genitourinary: [ ] dysuria, [ ] hematuria [ ]Garcia  Neurologic: [ ] headaches [ ] tremors[ ]weakness [ ]Paralysis Right[ ] Left[ ]  Skin: [ ] itching, [ ]burning, [ ] rashes  Endocrine: [ ] heat or cold intolerance  Musculoskeletal: [ ] joint pain or swelling; [ ] muscle, back, or extremity pain  Psychiatric: [ ] depression, [ ]anxiety, [ ]mood swings, or [ ]difficulty sleeping  Hematologic: [ ] easy bruising, [ ] bleeding gums    [ ] All remaining systems negative except as per above.   [ ]Unable to obtain.  [x] No change in ROS since admission      Vital Signs Last 24 Hrs  T(C): 37.2 (19 Apr 2023 06:00), Max: 37.2 (19 Apr 2023 06:00)  T(F): 98.9 (19 Apr 2023 06:00), Max: 98.9 (19 Apr 2023 06:00)  HR: 100 (19 Apr 2023 06:00) (63 - 100)  BP: 97/60 (19 Apr 2023 06:00) (93/61 - 134/73)  RR: 18 (19 Apr 2023 06:00) (16 - 18)  SpO2: 100% (19 Apr 2023 06:00) (95% - 100%)    Parameters below as of 19 Apr 2023 06:00  Patient On (Oxygen Delivery Method): room air      I&O's Summary    18 Apr 2023 07:01  -  19 Apr 2023 07:00  --------------------------------------------------------  IN: 580 mL / OUT: 2001 mL / NET: -1421 mL        PHYSICAL EXAM:  General: No acute distress BMI-28  HEENT: EOMI, PERRL  Neck: Supple, [ ] JVD  Lungs: Equal air entry bilaterally; [ ] rales [ ] wheezing [ ] rhonchi  Heart: Regular rate and rhythm; [x ] murmur   2/6 [ x] systolic [ ] diastolic [ ] radiation[ ] rubs [ ]  gallops  Abdomen: Nontender, bowel sounds present  Extremities: No clubbing, cyanosis, [ ] edema [x ]R BKA[x]LLE with dry gangrene of toes, puncate heel wound, bleeding, dopplerable signals noted AT/PT, absent DP signals  Nervous system:  Alert & Oriented X3, no focal deficits  Psychiatric: Normal affect  Skin: No rashes or lesions    LABS:  04-18    134<L>  |  99  |  74<H>  ----------------------------<  212<H>  4.0   |  19<L>  |  4.80<H>    Ca    9.3      18 Apr 2023 06:30  Phos  4.6     04-18  Mg     2.30     04-18    TPro  6.7  /  Alb  3.4  /  TBili  0.2  /  DBili  x   /  AST  22  /  ALT  24  /  AlkPhos  83  04-18    Creatinine Trend: 4.80<--, 4.44<--                        10.8   8.62  )-----------( 300      ( 18 Apr 2023 06:30 )             34.5     PT/INR - ( 17 Apr 2023 16:15 )   PT: 14.9 sec;   INR: 1.28 ratio    PTT - ( 17 Apr 2023 16:15 )  PTT:25.7 sec      ECG;  Sinus rhythm  No acute ST T wave abnormalities

## 2023-04-19 NOTE — PROGRESS NOTE ADULT - ASSESSMENT
72y Male with history of ESRD on HD presents with L foot gangrene. Nephrology consulted for ESRD status.    1) ESRD: Last HD on 4/18 tolerated well with 1.5L removed. Plan for next maintenance HD on 4/20. Will need to coordinate with vascular surgery given plans for BKA tomorrow. Monitor electrolytes.    2) HTN with ESRD: BP low normal. Monitor off medications.    3) Anemia of renal disease: Hb acceptable with adequate iron stores. JERED if Hb decreases. Monitor Hb.    4) Hyperphosphatemia: Phosphorus controlled. Continue with renvela 2 tabs with meals and renal diet. Monitor serum calcium and phosphorus.      [facility] NEPHROLOGY  Wili Hopkins M.D.  Kiran Feng D.O.  Sujatha Dumont M.D.  Radha Miller, MSN, ANP-C    Telephone: [phone]  Facsimile: [phone]    [address] [place] [id number]

## 2023-04-19 NOTE — PROGRESS NOTE ADULT - SUBJECTIVE AND OBJECTIVE BOX
Patient is a 72y old  Male who presents with a chief complaint of L foot gangrene (19 Apr 2023 07:49)       INTERVAL HPI/OVERNIGHT EVENTS:  Patient seen and evaluated at bedside.  Pt is resting comfortable in NAD. Denies N/V/F/C.    Allergies    No Known Allergies    Intolerances        Vital Signs Last 24 Hrs  T(C): 36.9 (19 Apr 2023 10:11), Max: 37.2 (19 Apr 2023 06:00)  T(F): 98.5 (19 Apr 2023 10:11), Max: 98.9 (19 Apr 2023 06:00)  HR: 95 (19 Apr 2023 10:11) (95 - 100)  BP: 106/67 (19 Apr 2023 10:11) (93/61 - 134/73)  BP(mean): --  RR: 18 (19 Apr 2023 10:11) (16 - 18)  SpO2: 100% (19 Apr 2023 10:11) (98% - 100%)    Parameters below as of 19 Apr 2023 10:11  Patient On (Oxygen Delivery Method): room air        LABS:                        11.2   8.13  )-----------( 306      ( 19 Apr 2023 06:15 )             36.0     04-19    136  |  96<L>  |  40<H>  ----------------------------<  237<H>  3.9   |  25  |  3.09<H>    Ca    8.7      19 Apr 2023 06:15  Phos  3.1     04-19  Mg     2.10     04-19    TPro  6.7  /  Alb  3.4  /  TBili  0.2  /  DBili  x   /  AST  22  /  ALT  24  /  AlkPhos  83  04-18    PT/INR - ( 17 Apr 2023 16:15 )   PT: 14.9 sec;   INR: 1.28 ratio         PTT - ( 17 Apr 2023 16:15 )  PTT:25.7 sec    CAPILLARY BLOOD GLUCOSE      POCT Blood Glucose.: 273 mg/dL (19 Apr 2023 08:44)  POCT Blood Glucose.: 187 mg/dL (18 Apr 2023 23:33)  POCT Blood Glucose.: 142 mg/dL (18 Apr 2023 22:34)  POCT Blood Glucose.: 184 mg/dL (18 Apr 2023 18:01)  POCT Blood Glucose.: 308 mg/dL (18 Apr 2023 13:42)      Lower Extremity Physical Exam:  Vascular: DP/PT 0/4 L, CFT N/A from gangrenous change, Temperature gradient cool to cool L  Neuro: Epicritic sensation intact to the level of forefoot,L   Musculoskeletal/Ortho: s/p R  Lower Extremity BKA.   Skin: L foot severe ischemic changes to the level of MPJ, no acute signs of infection, lateral heel well adhered eschar, mild jeanette-wound erythema, no drainage, no malodor.      RADIOLOGY & ADDITIONAL TESTS:

## 2023-04-19 NOTE — PROGRESS NOTE ADULT - SUBJECTIVE AND OBJECTIVE BOX
Surgery C-Team Daily Progress Note     BRIAN MATTHEWS | [id number]    SUBJECTIVE / 24H EVENTS  Patient seen and examined on morning rounds. No acute events overnight. No nausea / vomiting.     OBJECTIVE:    VITAL SIGNS:  T(C): 37.2 (23 @ 06:00), Max: 37.2 (23 @ 06:00)  HR: 100 (23 @ 06:00) (63 - 100)  BP: 97/60 (23 @ 06:00) (93/61 - 134/73)  RR: 18 (23 @ 06:00) (16 - 18)  SpO2: 100% (23 @ 06:00) (95% - 100%)  Daily     Daily Weight in k.2 (2023 22:46)  POCT Blood Glucose.: 187 mg/dL (23 @ 23:33)  POCT Blood Glucose.: 142 mg/dL (23 @ 22:34)  POCT Blood Glucose.: 184 mg/dL (23 @ 18:01)      Physical Exam:  General: NAD, resting comfortably  HEENT: NC/AT, EOMI, normal hearing, no oral lesions, no LAD, neck supple  Pulmonary: normal resp effort, CTA-B  Cardiovascular: NSR, no murmurs  Abdominal: soft, ND/NT, no organomegaly  Extremities: RLE BKA stumpc/d/i  LLE with dry gangrene of toes, puncate heel wound, bleeding, dopplerable signals noted AT/PT, absent DP signals, motor decreased, sensory intact, painful to touch        23 @ 07:01  -  23 @ 07:00  --------------------------------------------------------  IN:    Oral Fluid: 180 mL    Other (mL): 400 mL  Total IN: 580 mL    OUT:    Other (mL): 1900 mL    Stool (mL): 1 mL    Voided (mL): 100 mL  Total OUT: 2001 mL    Total NET: -1421 mL          LAB VALUES:      134<L>  |  99  |  74<H>  ----------------------------<  212<H>  4.0   |  19<L>  |  4.80<H>    Ca    9.3      2023 06:30  Phos  4.6     04-18  Mg     2.30     04-18    TPro  6.7  /  Alb  3.4  /  TBili  0.2  /  DBili  x   /  AST  22  /  ALT  24  /  AlkPhos  83  04-18                               10.8   8.62  )-----------( 300      ( 2023 06:30 )             34.5     LIVER FUNCTIONS - ( 2023 06:30 )  Alb: 3.4 g/dL / Pro: 6.7 g/dL / ALK PHOS: 83 U/L / ALT: 24 U/L / AST: 22 U/L / GGT: x           PT/INR - ( 2023 16:15 )   PT: 14.9 sec;   INR: 1.28 ratio         PTT - ( 2023 16:15 )  PTT:25.7 sec            MICROBIOLOGY:    Culture - Blood (collected 2023 17:10)  Source: .Blood Blood  Preliminary Report (2023 21:01):    No growth to date.    Culture - Blood (collected 2023 17:00)  Source: .Blood Blood  Preliminary Report (2023 21:01):    No growth to date.      No new microbiology data for review.     RADIOLOGY:    No new radiographic images for review.    MEDICATIONS  (STANDING):  ampicillin/sulbactam  IVPB 3 Gram(s) IV Intermittent every 12 hours  ampicillin/sulbactam  IVPB      aspirin enteric coated 81 milliGRAM(s) Oral daily  atorvastatin 40 milliGRAM(s) Oral at bedtime  chlorhexidine 2% Cloths 1 Application(s) Topical daily  clopidogrel Tablet 75 milliGRAM(s) Oral daily  dextrose 5%. 1000 milliLiter(s) (100 mL/Hr) IV Continuous <Continuous>  dextrose 5%. 1000 milliLiter(s) (50 mL/Hr) IV Continuous <Continuous>  dextrose 50% Injectable 25 Gram(s) IV Push once  dextrose 50% Injectable 12.5 Gram(s) IV Push once  dextrose 50% Injectable 25 Gram(s) IV Push once  glucagon  Injectable 1 milliGRAM(s) IntraMuscular once  heparin   Injectable 5000 Unit(s) SubCutaneous every 12 hours  insulin lispro (ADMELOG) corrective regimen sliding scale   SubCutaneous three times a day before meals  insulin lispro (ADMELOG) corrective regimen sliding scale   SubCutaneous at bedtime  Nephro-zack 1 Tablet(s) Oral daily  sevelamer carbonate 1600 milliGRAM(s) Oral three times a day with meals  zinc sulfate 220 milliGRAM(s) Oral daily    MEDICATIONS  (PRN):  acetaminophen     Tablet .. 650 milliGRAM(s) Oral every 6 hours PRN Temp greater or equal to 38C (100.4F), Mild Pain (1 - 3)  bisacodyl 5 milliGRAM(s) Oral every 12 hours PRN Constipation  dextrose Oral Gel 15 Gram(s) Oral once PRN Blood Glucose LESS THAN 70 milliGRAM(s)/deciliter  melatonin 3 milliGRAM(s) Oral at bedtime PRN Insomnia  ondansetron Injectable 4 milliGRAM(s) IV Push every 8 hours PRN Nausea and/or Vomiting  sodium chloride 0.9% Bolus. 100 milliLiter(s) IV Bolus every 5 minutes PRN SBP LESS THAN or EQUAL to 90 mmHg

## 2023-04-19 NOTE — PROGRESS NOTE ADULT - SUBJECTIVE AND OBJECTIVE BOX
MR#[id number]  PATIENT NAME:KYLIE MATTHEWS    DATE OF SERVICE: 04-19-23     pt seen and examined  MEDICATIONS  (STANDING):  ampicillin/sulbactam  IVPB 3 Gram(s) IV Intermittent every 12 hours  ampicillin/sulbactam  IVPB      aspirin enteric coated 81 milliGRAM(s) Oral daily  atorvastatin 40 milliGRAM(s) Oral at bedtime  chlorhexidine 2% Cloths 1 Application(s) Topical daily  clopidogrel Tablet 75 milliGRAM(s) Oral daily  glucagon  Injectable 1 milliGRAM(s) IntraMuscular once  heparin   Injectable 5000 Unit(s) SubCutaneous every 12 hours  insulin lispro (ADMELOG) corrective regimen sliding scale   SubCutaneous three times a day before meals  insulin lispro (ADMELOG) corrective regimen sliding scale   SubCutaneous at bedtime  Nephro-zack 1 Tablet(s) Oral daily  sevelamer carbonate 1600 milliGRAM(s) Oral three times a day with meals  zinc sulfate 220 milliGRAM(s) Oral daily    MEDICATIONS  (PRN):  acetaminophen     Tablet .. 650 milliGRAM(s) Oral every 6 hours PRN Temp greater or equal to 38C (100.4F), Mild Pain (1 - 3)  bisacodyl 5 milliGRAM(s) Oral every 12 hours PRN Constipation  dextrose Oral Gel 15 Gram(s) Oral once PRN Blood Glucose LESS THAN 70 milliGRAM(s)/deciliter  melatonin 3 milliGRAM(s) Oral at bedtime PRN Insomnia  ondansetron Injectable 4 milliGRAM(s) IV Push every 8 hours PRN Nausea and/or Vomiting  sodium chloride 0.9% Bolus. 100 milliLiter(s) IV Bolus every 5 minutes PRN SBP LESS THAN or EQUAL to 90 mmHg      Vital Signs Last 24 Hrs  T(C): 36.9 (04-19-23 @ 21:02), Max: 37.2 (04-19-23 @ 06:00)  T(F): 98.5 (04-19-23 @ 21:02), Max: 99 (04-19-23 @ 17:03)  HR: 100 (04-19-23 @ 21:02) (90 - 100)  BP: 111/68 (04-19-23 @ 21:02) (93/61 - 117/69)  BP(mean): --  RR: 18 (04-19-23 @ 21:02) (16 - 18)  SpO2: 99% (04-19-23 @ 21:02) (99% - 100%)      PHYSICAL EXAM:  General: No acute distress BMI-28  HEENT: EOMI, PERRL  Neck: Supple, [ ] JVD  Lungs: dec breath sounds at bases  Heart: Regular rate and rhythm; [x ] murmur   2/6 [ x] systolic [ ] diastolic [ ] radiation[ ] rubs [ ]  gallops  Abdomen: Nontender, bowel sounds present  Extremities: No clubbing, cyanosis, [ ] edema [x ]R BKA[x]LLE with dry gangrene of toes, puncate heel wound, bleeding, dopplerable signals noted AT/PT, absent DP signals  Nervous system:  Alert awake  Skin: No rashes or lesions    LABS:  04-19    136  |  96<L>  |  40<H>  ----------------------------<  237<H>  3.9   |  25  |  3.09<H>    Ca    8.7      19 Apr 2023 06:15  Phos  3.1     04-19  Mg     2.10     04-19    TPro  6.7  /  Alb  3.4  /  TBili  0.2  /  DBili      /  AST  22  /  ALT  24  /  AlkPhos  83  04-18    Creatinine Trend: 3.09 <--, 4.80 <--, 4.44 <--                        11.2   8.13  )-----------( 306      ( 19 Apr 2023 06:15 )             36.0     Urine Studies:            LIVER FUNCTIONS - ( 18 Apr 2023 06:30 )  Alb: 3.4 g/dL / Pro: 6.7 g/dL / ALK PHOS: 83 U/L / ALT: 24 U/L / AST: 22 U/L / GGT: x               ECG;  Sinus rhythm  No acute ST T wave abnormalities

## 2023-04-20 LAB
ANION GAP SERPL CALC-SCNC: 16 MMOL/L — HIGH (ref 7–14)
APTT BLD: 32.7 SEC — SIGNIFICANT CHANGE UP (ref 27–36.3)
BLD GP AB SCN SERPL QL: NEGATIVE — SIGNIFICANT CHANGE UP
BUN SERPL-MCNC: 50 MG/DL — HIGH (ref 7–23)
CALCIUM SERPL-MCNC: 8.6 MG/DL — SIGNIFICANT CHANGE UP (ref 8.4–10.5)
CHLORIDE SERPL-SCNC: 97 MMOL/L — LOW (ref 98–107)
CO2 SERPL-SCNC: 22 MMOL/L — SIGNIFICANT CHANGE UP (ref 22–31)
CREAT SERPL-MCNC: 3.85 MG/DL — HIGH (ref 0.5–1.3)
EGFR: 16 ML/MIN/1.73M2 — LOW
GLUCOSE BLDC GLUCOMTR-MCNC: 156 MG/DL — HIGH (ref 70–99)
GLUCOSE BLDC GLUCOMTR-MCNC: 160 MG/DL — HIGH (ref 70–99)
GLUCOSE BLDC GLUCOMTR-MCNC: 184 MG/DL — HIGH (ref 70–99)
GLUCOSE BLDC GLUCOMTR-MCNC: 204 MG/DL — HIGH (ref 70–99)
GLUCOSE BLDC GLUCOMTR-MCNC: 211 MG/DL — HIGH (ref 70–99)
GLUCOSE SERPL-MCNC: 175 MG/DL — HIGH (ref 70–99)
HCT VFR BLD CALC: 32.4 % — LOW (ref 39–50)
HGB BLD-MCNC: 10.2 G/DL — LOW (ref 13–17)
INR BLD: 1.26 RATIO — HIGH (ref 0.88–1.16)
MAGNESIUM SERPL-MCNC: 2 MG/DL — SIGNIFICANT CHANGE UP (ref 1.6–2.6)
MCHC RBC-ENTMCNC: 30.4 PG — SIGNIFICANT CHANGE UP (ref 27–34)
MCHC RBC-ENTMCNC: 31.5 GM/DL — LOW (ref 32–36)
MCV RBC AUTO: 96.4 FL — SIGNIFICANT CHANGE UP (ref 80–100)
NRBC # BLD: 0 /100 WBCS — SIGNIFICANT CHANGE UP (ref 0–0)
NRBC # FLD: 0 K/UL — SIGNIFICANT CHANGE UP (ref 0–0)
PHOSPHATE SERPL-MCNC: 3.9 MG/DL — SIGNIFICANT CHANGE UP (ref 2.5–4.5)
PLATELET # BLD AUTO: 302 K/UL — SIGNIFICANT CHANGE UP (ref 150–400)
POTASSIUM SERPL-MCNC: 4 MMOL/L — SIGNIFICANT CHANGE UP (ref 3.5–5.3)
POTASSIUM SERPL-SCNC: 4 MMOL/L — SIGNIFICANT CHANGE UP (ref 3.5–5.3)
PROTHROM AB SERPL-ACNC: 14.6 SEC — HIGH (ref 10.5–13.4)
RBC # BLD: 3.36 M/UL — LOW (ref 4.2–5.8)
RBC # FLD: 14.2 % — SIGNIFICANT CHANGE UP (ref 10.3–14.5)
RH IG SCN BLD-IMP: POSITIVE — SIGNIFICANT CHANGE UP
SODIUM SERPL-SCNC: 135 MMOL/L — SIGNIFICANT CHANGE UP (ref 135–145)
WBC # BLD: 9.65 K/UL — SIGNIFICANT CHANGE UP (ref 3.8–10.5)
WBC # FLD AUTO: 9.65 K/UL — SIGNIFICANT CHANGE UP (ref 3.8–10.5)

## 2023-04-20 PROCEDURE — 88307 TISSUE EXAM BY PATHOLOGIST: CPT | Mod: 26

## 2023-04-20 PROCEDURE — 88311 DECALCIFY TISSUE: CPT | Mod: 26

## 2023-04-20 PROCEDURE — 27590 AMPUTATE LEG AT THIGH: CPT | Mod: GC

## 2023-04-20 DEVICE — BONE WAX 2.5GM: Type: IMPLANTABLE DEVICE | Site: LEFT | Status: FUNCTIONAL

## 2023-04-20 DEVICE — LIGATING CLIPS WECK HORIZON MEDIUM (BLUE) 24: Type: IMPLANTABLE DEVICE | Site: LEFT | Status: FUNCTIONAL

## 2023-04-20 DEVICE — LIGATING CLIPS WECK HORIZON LARGE (ORANGE) 24: Type: IMPLANTABLE DEVICE | Site: LEFT | Status: FUNCTIONAL

## 2023-04-20 RX ORDER — MIDODRINE HYDROCHLORIDE 2.5 MG/1
5 TABLET ORAL
Refills: 0 | Status: DISCONTINUED | OUTPATIENT
Start: 2023-04-20 | End: 2023-05-01

## 2023-04-20 RX ORDER — HYDROMORPHONE HYDROCHLORIDE 2 MG/ML
0.5 INJECTION INTRAMUSCULAR; INTRAVENOUS; SUBCUTANEOUS
Refills: 0 | Status: DISCONTINUED | OUTPATIENT
Start: 2023-04-20 | End: 2023-04-20

## 2023-04-20 RX ORDER — ONDANSETRON 8 MG/1
4 TABLET, FILM COATED ORAL ONCE
Refills: 0 | Status: DISCONTINUED | OUTPATIENT
Start: 2023-04-20 | End: 2023-04-20

## 2023-04-20 RX ORDER — METOCLOPRAMIDE HCL 10 MG
10 TABLET ORAL ONCE
Refills: 0 | Status: DISCONTINUED | OUTPATIENT
Start: 2023-04-20 | End: 2023-04-20

## 2023-04-20 RX ORDER — HYDROMORPHONE HYDROCHLORIDE 2 MG/ML
1 INJECTION INTRAMUSCULAR; INTRAVENOUS; SUBCUTANEOUS EVERY 4 HOURS
Refills: 0 | Status: DISCONTINUED | OUTPATIENT
Start: 2023-04-20 | End: 2023-04-23

## 2023-04-20 RX ADMIN — HEPARIN SODIUM 5000 UNIT(S): 5000 INJECTION INTRAVENOUS; SUBCUTANEOUS at 17:43

## 2023-04-20 RX ADMIN — Medication 2: at 18:24

## 2023-04-20 RX ADMIN — AMPICILLIN SODIUM AND SULBACTAM SODIUM 200 GRAM(S): 250; 125 INJECTION, POWDER, FOR SUSPENSION INTRAMUSCULAR; INTRAVENOUS at 23:31

## 2023-04-20 RX ADMIN — HYDROMORPHONE HYDROCHLORIDE 1 MILLIGRAM(S): 2 INJECTION INTRAMUSCULAR; INTRAVENOUS; SUBCUTANEOUS at 17:42

## 2023-04-20 RX ADMIN — HEPARIN SODIUM 5000 UNIT(S): 5000 INJECTION INTRAVENOUS; SUBCUTANEOUS at 05:43

## 2023-04-20 RX ADMIN — HYDROMORPHONE HYDROCHLORIDE 1 MILLIGRAM(S): 2 INJECTION INTRAMUSCULAR; INTRAVENOUS; SUBCUTANEOUS at 23:48

## 2023-04-20 RX ADMIN — Medication 1 TABLET(S): at 15:03

## 2023-04-20 RX ADMIN — AMPICILLIN SODIUM AND SULBACTAM SODIUM 200 GRAM(S): 250; 125 INJECTION, POWDER, FOR SUSPENSION INTRAMUSCULAR; INTRAVENOUS at 00:17

## 2023-04-20 RX ADMIN — HYDROMORPHONE HYDROCHLORIDE 1 MILLIGRAM(S): 2 INJECTION INTRAMUSCULAR; INTRAVENOUS; SUBCUTANEOUS at 23:18

## 2023-04-20 RX ADMIN — ATORVASTATIN CALCIUM 40 MILLIGRAM(S): 80 TABLET, FILM COATED ORAL at 23:17

## 2023-04-20 RX ADMIN — Medication 81 MILLIGRAM(S): at 13:29

## 2023-04-20 RX ADMIN — Medication 1: at 13:29

## 2023-04-20 RX ADMIN — AMPICILLIN SODIUM AND SULBACTAM SODIUM 200 GRAM(S): 250; 125 INJECTION, POWDER, FOR SUSPENSION INTRAMUSCULAR; INTRAVENOUS at 17:43

## 2023-04-20 RX ADMIN — HYDROMORPHONE HYDROCHLORIDE 1 MILLIGRAM(S): 2 INJECTION INTRAMUSCULAR; INTRAVENOUS; SUBCUTANEOUS at 17:55

## 2023-04-20 RX ADMIN — SEVELAMER CARBONATE 1600 MILLIGRAM(S): 2400 POWDER, FOR SUSPENSION ORAL at 19:08

## 2023-04-20 RX ADMIN — ZINC SULFATE TAB 220 MG (50 MG ZINC EQUIVALENT) 220 MILLIGRAM(S): 220 (50 ZN) TAB at 15:03

## 2023-04-20 RX ADMIN — CLOPIDOGREL BISULFATE 75 MILLIGRAM(S): 75 TABLET, FILM COATED ORAL at 13:29

## 2023-04-20 RX ADMIN — MIDODRINE HYDROCHLORIDE 5 MILLIGRAM(S): 2.5 TABLET ORAL at 18:24

## 2023-04-20 NOTE — PROGRESS NOTE ADULT - ASSESSMENT
72M with history as above who presents to the hospital with worsening gangrene of his L foot.       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   ·  Plan: - Patient with worsening pain of the L foot, reports concern for infection at his facility but on examination low concern for acute infection, podiatry reccs empiric unasyn -> ordered  - BCx x2-No growth  - Podiatry and vascular Sx consults noted  - Pain control as needed.  - BCx x2 testing   - Podiatry- plan TMA pending Vasc recs, meanwhile local wound care.    - Vascular sx - - JESUS/PVR, - IV abx  - S/P LLE AKA 4/20        Problem/Plan - 2:  ·  Problem: ESRD on dialysis.   ·  Plan: - On HD Tu/Th/Sat, next episode due on 4/18  - c/w renal meds  - Mild anemia likely 2/2 ESRD, improved from prior levels.    Problem/Plan - 3:  ·  Problem: PVD (peripheral vascular disease).   ·  Plan: - c/w aspirin/plavix/statin therapy.    Problem/Plan - 4:  ·  Problem: Type 2 diabetes mellitus.   ·  Plan: - Hold outpatient DM2 medications  - Place on ISS, FS qAC, CC diet.    Problem/Plan - 5:  ·  Problem: Need for prophylactic measure.   ·  Plan: DVT ppx - HSQ  Diet - DASH/CC/Renal diet  Activity - OOB to chair/with assistance, increase as tolerated    Fall and aspiration precautions.

## 2023-04-20 NOTE — BRIEF OPERATIVE NOTE - NSICDXBRIEFPOSTOP_GEN_ALL_CORE_FT
POST-OP DIAGNOSIS:  Gangrene due to peripheral vascular disease 20-Apr-2023 12:30:57  Kelley Morales

## 2023-04-20 NOTE — PROGRESS NOTE ADULT - SUBJECTIVE AND OBJECTIVE BOX
[facility] NEPHROLOGY- PROGRESS NOTE    72y Male with history of ESRD on HD presents with L foot gangrene. Nephrology consulted for ESRD status.    REVIEW OF SYSTEMS:  Gen: no fevers  Cards: no chest pain  Resp: no dyspnea  GI: no nausea or vomiting or diarrhea  Vascular: no LE edema    No Known Allergies      Hospital Medications: Medications reviewed    VITALS:  T(F): 98.7 (04-20-23 @ 08:36), Max: 99 (04-19-23 @ 17:03)  HR: 100 (04-20-23 @ 05:40)  BP: 100/61 (04-20-23 @ 08:36)  RR: 18 (04-20-23 @ 05:40)  SpO2: 99% (04-20-23 @ 08:36)  Wt(kg): --    04-19 @ 07:01  -  04-20 @ 07:00  --------------------------------------------------------  IN: 0 mL / OUT: 151 mL / NET: -151 mL      Height (cm): 188 (04-20 @ 08:36)  Weight (kg): 72.6 (04-20 @ 08:36)  BMI (kg/m2): 20.5 (04-20 @ 08:36)  BSA (m2): 1.98 (04-20 @ 08:36)      PHYSICAL EXAM:    Gen: NAD, calm  Cards: RRR, +S1/S2, no M/G/R  Resp: CTA B/L  GI: soft, NT/ND, NABS  Vascular: L foot gangrene, R BKA, RIJ TDC intact      LABS:  04-20    135  |  97<L>  |  50<H>  ----------------------------<  175<H>  4.0   |  22  |  3.85<H>    Ca    8.6      20 Apr 2023 01:48  Phos  3.9     04-20  Mg     2.00     04-20      Creatinine Trend: 3.85 <--, 3.09 <--, 4.80 <--, 4.44 <--                        10.2   9.65  )-----------( 302      ( 20 Apr 2023 01:48 )             32.4     Urine Studies:

## 2023-04-20 NOTE — CHART NOTE - NSCHARTNOTEFT_GEN_A_CORE
Called by RN, patient's brother Orville [phone] wanted an update on the status of his brother. Called and spoked to Orville. All questions and concerned were answered. He stated that he spoke to Dr Billy after the surgery as well.

## 2023-04-20 NOTE — PROGRESS NOTE ADULT - ASSESSMENT
72y Male with history of ESRD on HD presents with L foot gangrene. Nephrology consulted for ESRD status.    1) ESRD: Last HD on 4/18 tolerated well with 1.5L removed. Plan for next maintenance HD today post-op. Monitor electrolytes.    2) HTN with ESRD: BP low normal. Start midodrine 5 mg pre-HD to avoid intradialytic hypotension.    3) Anemia of renal disease: Hb acceptable with adequate iron stores. JERED if Hb decreases. Monitor Hb.    4) Hyperphosphatemia: Phosphorus controlled. Continue with renvela 2 tabs with meals and renal diet. Monitor serum calcium and phosphorus.      [facility]  Wili Hopkins M.D.  Kiran Feng D.O.  Sujatha Dumont M.D.  Radha Miller, MSN, ANP-C    Telephone: [phone]  Facsimile: [phone]    [address] [place] [address]

## 2023-04-20 NOTE — ASU PREOP CHECKLIST - SIDE RAILS UP
Abdomen, soft, nontender, nondistended, no guarding or rigidity, no masses palpable, normal bowel sounds , Abdomen , soft, nontender, nondistended , no guarding or rigidity , no masses palpable , normal bowel sounds , Liver and Spleen , no hepatomegaly present , no hepatosplenomegaly , liver nontender , spleen not palpable done

## 2023-04-20 NOTE — PACU DISCHARGE NOTE - NAUSEA/VOMITING:
[facility] is requesting a refill on the following medications:    Requested Prescriptions     Pending Prescriptions Disp Refills    escitalopram (LEXAPRO) 20 MG tablet [Pharmacy Med Name: ESCITALOPRAM 20 MG TABLET] 30 tablet 0     Sig: TAKE 1 TABLET BY MOUTH EVERY DAY        Date of last visit: 4/27/2022  Date of next visit (if applicable):No follow up currently scheduled  Pharmacy Name: [facility], [facility] Gadiel Cassidy None

## 2023-04-20 NOTE — ASU PREOP CHECKLIST - SELECT TESTS ORDERED
FS -/CBC/CMP/PT/PTT/INR/Type and Cross/Type and Screen/EKG/POCT Blood Glucose/COVID-19 FS - 184/CBC/CMP/PT/PTT/INR/Type and Cross/Type and Screen/EKG/POCT Blood Glucose/COVID-19

## 2023-04-20 NOTE — BRIEF OPERATIVE NOTE - NSICDXBRIEFPREOP_GEN_ALL_CORE_FT
PRE-OP DIAGNOSIS:  Gangrene due to peripheral vascular disease 20-Apr-2023 12:30:51  Kelley Morales

## 2023-04-20 NOTE — CHART NOTE - NSCHARTNOTEFT_GEN_A_CORE
Post op check for Vascular Sx    S: Patient seen and examined at bedside. Having pain in LLE, controlled with medication.    T(C): 36.7 (04-20-23 @ 17:40), Max: 37.2 (04-20-23 @ 12:05)  HR: 78 (04-20-23 @ 17:40) (78 - 100)  BP: 98/56 (04-20-23 @ 17:40) (88/50 - 111/68)  RR: 18 (04-20-23 @ 17:40) (14 - 20)  SpO2: 100% (04-20-23 @ 17:40) (98% - 100%)  Wt(kg): --    04-19 @ 07:01  -  04-20 @ 07:00  --------------------------------------------------------  IN: 0 mL / OUT: 151 mL / NET: -151 mL    04-20 @ 07:01  -  04-20 @ 18:25  --------------------------------------------------------  IN: 50 mL / OUT: 0 mL / NET: 50 mL        Gen: NAD  MSK/VASC: Previous RLE BKA, LLE AKA stump c/d/i, palpable femoral pulses b/l, palpable R popliteal pulse  Groins: L groin with pulsatile mass, size smaller than this morning    A/P:  72M with history of ESRD on HD (T/Th/Sat), DM2, PVD (s/p R BKA in Nov 2022) and recurrent hematurias (has required CBI in the past) who presents to the hospital with worsening pain and duskiness of his L foot. Vascular Surgery consulted for vascular evaluation. Now s/p L AKA    Plan:  - Patient non-ambulatory with minimal ability to use left leg for transfers.   - Pain control PRN  - regular diet  - Dressing down POD#3 or when soiled  - care per primary     C Team (Vascular) Surgery   [id number]

## 2023-04-21 LAB
ALBUMIN SERPL ELPH-MCNC: 3.4 G/DL — SIGNIFICANT CHANGE UP (ref 3.3–5)
ALP SERPL-CCNC: 85 U/L — SIGNIFICANT CHANGE UP (ref 40–120)
ALT FLD-CCNC: 18 U/L — SIGNIFICANT CHANGE UP (ref 4–41)
ANION GAP SERPL CALC-SCNC: 22 MMOL/L — HIGH (ref 7–14)
ANISOCYTOSIS BLD QL: SLIGHT — SIGNIFICANT CHANGE UP
AST SERPL-CCNC: 19 U/L — SIGNIFICANT CHANGE UP (ref 4–40)
BASOPHILS # BLD AUTO: 0.17 K/UL — SIGNIFICANT CHANGE UP (ref 0–0.2)
BASOPHILS NFR BLD AUTO: 0.9 % — SIGNIFICANT CHANGE UP (ref 0–2)
BILIRUB SERPL-MCNC: 0.2 MG/DL — SIGNIFICANT CHANGE UP (ref 0.2–1.2)
BUN SERPL-MCNC: 62 MG/DL — HIGH (ref 7–23)
CALCIUM SERPL-MCNC: 9 MG/DL — SIGNIFICANT CHANGE UP (ref 8.4–10.5)
CHLORIDE SERPL-SCNC: 95 MMOL/L — LOW (ref 98–107)
CO2 SERPL-SCNC: 19 MMOL/L — LOW (ref 22–31)
CREAT SERPL-MCNC: 4.97 MG/DL — HIGH (ref 0.5–1.3)
EGFR: 12 ML/MIN/1.73M2 — LOW
EOSINOPHIL # BLD AUTO: 0 K/UL — SIGNIFICANT CHANGE UP (ref 0–0.5)
EOSINOPHIL NFR BLD AUTO: 0 % — SIGNIFICANT CHANGE UP (ref 0–6)
GLUCOSE BLDC GLUCOMTR-MCNC: 150 MG/DL — HIGH (ref 70–99)
GLUCOSE BLDC GLUCOMTR-MCNC: 275 MG/DL — HIGH (ref 70–99)
GLUCOSE BLDC GLUCOMTR-MCNC: 278 MG/DL — HIGH (ref 70–99)
GLUCOSE BLDC GLUCOMTR-MCNC: 297 MG/DL — HIGH (ref 70–99)
GLUCOSE BLDC GLUCOMTR-MCNC: 369 MG/DL — HIGH (ref 70–99)
GLUCOSE SERPL-MCNC: 241 MG/DL — HIGH (ref 70–99)
HCT VFR BLD CALC: 26.5 % — LOW (ref 39–50)
HGB BLD-MCNC: 8.1 G/DL — LOW (ref 13–17)
IANC: 17.14 K/UL — HIGH (ref 1.8–7.4)
LYMPHOCYTES # BLD AUTO: 0.33 K/UL — LOW (ref 1–3.3)
LYMPHOCYTES # BLD AUTO: 1.7 % — LOW (ref 13–44)
MACROCYTES BLD QL: SLIGHT — SIGNIFICANT CHANGE UP
MAGNESIUM SERPL-MCNC: 2.1 MG/DL — SIGNIFICANT CHANGE UP (ref 1.6–2.6)
MANUAL SMEAR VERIFICATION: SIGNIFICANT CHANGE UP
MCHC RBC-ENTMCNC: 30.5 PG — SIGNIFICANT CHANGE UP (ref 27–34)
MCHC RBC-ENTMCNC: 30.6 GM/DL — LOW (ref 32–36)
MCV RBC AUTO: 99.6 FL — SIGNIFICANT CHANGE UP (ref 80–100)
MONOCYTES # BLD AUTO: 1.17 K/UL — HIGH (ref 0–0.9)
MONOCYTES NFR BLD AUTO: 6.1 % — SIGNIFICANT CHANGE UP (ref 2–14)
NEUTROPHILS # BLD AUTO: 17.51 K/UL — HIGH (ref 1.8–7.4)
NEUTROPHILS NFR BLD AUTO: 88.7 % — HIGH (ref 43–77)
NEUTS BAND # BLD: 2.6 % — SIGNIFICANT CHANGE UP (ref 0–6)
OVALOCYTES BLD QL SMEAR: SLIGHT — SIGNIFICANT CHANGE UP
PHOSPHATE SERPL-MCNC: 5.9 MG/DL — HIGH (ref 2.5–4.5)
PLAT MORPH BLD: NORMAL — SIGNIFICANT CHANGE UP
PLATELET # BLD AUTO: 326 K/UL — SIGNIFICANT CHANGE UP (ref 150–400)
PLATELET COUNT - ESTIMATE: NORMAL — SIGNIFICANT CHANGE UP
POIKILOCYTOSIS BLD QL AUTO: SLIGHT — SIGNIFICANT CHANGE UP
POLYCHROMASIA BLD QL SMEAR: SIGNIFICANT CHANGE UP
POTASSIUM SERPL-MCNC: 5 MMOL/L — SIGNIFICANT CHANGE UP (ref 3.5–5.3)
POTASSIUM SERPL-SCNC: 5 MMOL/L — SIGNIFICANT CHANGE UP (ref 3.5–5.3)
PROT SERPL-MCNC: 6.7 G/DL — SIGNIFICANT CHANGE UP (ref 6–8.3)
RBC # BLD: 2.66 M/UL — LOW (ref 4.2–5.8)
RBC # FLD: 14.3 % — SIGNIFICANT CHANGE UP (ref 10.3–14.5)
RBC BLD AUTO: ABNORMAL
SODIUM SERPL-SCNC: 136 MMOL/L — SIGNIFICANT CHANGE UP (ref 135–145)
WBC # BLD: 19.18 K/UL — HIGH (ref 3.8–10.5)
WBC # FLD AUTO: 19.18 K/UL — HIGH (ref 3.8–10.5)

## 2023-04-21 PROCEDURE — 99222 1ST HOSP IP/OBS MODERATE 55: CPT

## 2023-04-21 RX ORDER — MIDODRINE HYDROCHLORIDE 2.5 MG/1
5 TABLET ORAL ONCE
Refills: 0 | Status: COMPLETED | OUTPATIENT
Start: 2023-04-21 | End: 2023-04-21

## 2023-04-21 RX ORDER — ERYTHROPOIETIN 10000 [IU]/ML
6000 INJECTION, SOLUTION INTRAVENOUS; SUBCUTANEOUS
Refills: 0 | Status: DISCONTINUED | OUTPATIENT
Start: 2023-04-21 | End: 2023-05-01

## 2023-04-21 RX ADMIN — Medication 5: at 11:13

## 2023-04-21 RX ADMIN — SEVELAMER CARBONATE 1600 MILLIGRAM(S): 2400 POWDER, FOR SUSPENSION ORAL at 08:36

## 2023-04-21 RX ADMIN — Medication 3: at 18:00

## 2023-04-21 RX ADMIN — HEPARIN SODIUM 5000 UNIT(S): 5000 INJECTION INTRAVENOUS; SUBCUTANEOUS at 18:01

## 2023-04-21 RX ADMIN — MIDODRINE HYDROCHLORIDE 5 MILLIGRAM(S): 2.5 TABLET ORAL at 11:17

## 2023-04-21 RX ADMIN — ZINC SULFATE TAB 220 MG (50 MG ZINC EQUIVALENT) 220 MILLIGRAM(S): 220 (50 ZN) TAB at 11:17

## 2023-04-21 RX ADMIN — Medication 1 TABLET(S): at 11:18

## 2023-04-21 RX ADMIN — Medication 1: at 22:14

## 2023-04-21 RX ADMIN — HEPARIN SODIUM 5000 UNIT(S): 5000 INJECTION INTRAVENOUS; SUBCUTANEOUS at 06:36

## 2023-04-21 RX ADMIN — Medication 3: at 09:07

## 2023-04-21 RX ADMIN — Medication 81 MILLIGRAM(S): at 11:13

## 2023-04-21 RX ADMIN — AMPICILLIN SODIUM AND SULBACTAM SODIUM 200 GRAM(S): 250; 125 INJECTION, POWDER, FOR SUSPENSION INTRAMUSCULAR; INTRAVENOUS at 11:18

## 2023-04-21 RX ADMIN — SEVELAMER CARBONATE 1600 MILLIGRAM(S): 2400 POWDER, FOR SUSPENSION ORAL at 18:00

## 2023-04-21 RX ADMIN — ATORVASTATIN CALCIUM 40 MILLIGRAM(S): 80 TABLET, FILM COATED ORAL at 22:05

## 2023-04-21 RX ADMIN — CLOPIDOGREL BISULFATE 75 MILLIGRAM(S): 75 TABLET, FILM COATED ORAL at 11:17

## 2023-04-21 NOTE — PROGRESS NOTE ADULT - SUBJECTIVE AND OBJECTIVE BOX
ANESTHESIA POSTOP CHECK    72y Male POSTOP DAY 1 S/P     [ X] NO APPARENT ANESTHESIA COMPLICATIONS      Comments:

## 2023-04-21 NOTE — OCCUPATIONAL THERAPY INITIAL EVALUATION ADULT - PERTINENT HX OF CURRENT PROBLEM, REHAB EVAL
This is a 73 y/o Male with history as above who presents to the hospital with worsening gangrene of his L foot.

## 2023-04-21 NOTE — CONSULT NOTE ADULT - SUBJECTIVE AND OBJECTIVE BOX
Patient is a 72y old  Male who presents with a chief complaint of L foot gangrene (21 Apr 2023 11:54)      HPI:  Please note, patient with second MRN #[id number] with additional recent hospitalizations.    This is a 72M with history of ESRD on HD (T/Th/Sat), DM2, PVD (s/p R BKA in Nov 2022) and recurrent hematurias (has required CBI in the past) who presents to the hospital with worsening pain and duskiness of his L foot. Patient currently is a limited historian, HPI supplemented with information from patient's sister Darshana ([phone]). As per patient he was sent to the hospital for possible L foot infection, does report pain to the bottom of his foot but otherwise denies any fevers/chills/diaphoresis, no swelling or redness of his L foot. No other acute complaints. Said that he is supposed to have an amputation of his L leg but is unclear on the details. Spoke with patient's sister, Darshana, who said that the patient has known PVD, had a recent R leg BKA and has had some procedures with [facility] [place] (sister unsure of the procedures) and states that they were told the procedures did not help improve the peripheral blood flow in the patient's L leg and that he would need an amputation. Said that the family was told he was being sent to the hospital for the amputation evaluation. Otherwise sister not aware of any other acute issues for the patient.     On arrival to the ED, his vitals were T 97.5, P 90, /59, RR 16, O2 sat 98% RA. His lab work was most significant for mild anemia and elevated CRP. His imaging did not show acute findings to suggest OM. He was admitted to medicine.   (17 Apr 2023 23:19)    s/p L aka on 4/20 23    REVIEW OF SYSTEMS    PAST MEDICAL & SURGICAL HISTORY  DM (diabetes mellitus)    ESRD on dialysis    PVD (peripheral vascular disease)    S/P BKA (below knee amputation) unilateral, right        SOCIAL HISTORY  Smoking - Denied  EtOH - Denied   Drugs - Denied    FUNCTIONAL HISTORY  Lives   Independent    CURRENT FUNCTIONAL STATUS      FAMILY HISTORY   No pertinent family history in first degree relatives      RECENT LABS/IMAGING  CBC Full  -  ( 21 Apr 2023 06:00 )  WBC Count : 19.18 K/uL  RBC Count : 2.66 M/uL  Hemoglobin : 8.1 g/dL  Hematocrit : 26.5 %  Platelet Count - Automated : 326 K/uL  Mean Cell Volume : 99.6 fL  Mean Cell Hemoglobin : 30.5 pg  Mean Cell Hemoglobin Concentration : 30.6 gm/dL  Auto Neutrophil # : 17.51 K/uL  Auto Lymphocyte # : 0.33 K/uL  Auto Monocyte # : 1.17 K/uL  Auto Eosinophil # : 0.00 K/uL  Auto Basophil # : 0.17 K/uL  Auto Neutrophil % : 88.7 %  Auto Lymphocyte % : 1.7 %  Auto Monocyte % : 6.1 %  Auto Eosinophil % : 0.0 %  Auto Basophil % : 0.9 %    04-21    136  |  95<L>  |  62<H>  ----------------------------<  241<H>  5.0   |  19<L>  |  4.97<H>    Ca    9.0      21 Apr 2023 06:00  Phos  5.9     04-21  Mg     2.10     04-21    TPro  6.7  /  Alb  3.4  /  TBili  0.2  /  DBili  x   /  AST  19  /  ALT  18  /  AlkPhos  85  04-21        VITALS  T(C): 36.9 (04-21-23 @ 11:40), Max: 37.2 (04-21-23 @ 06:34)  HR: 99 (04-21-23 @ 11:40) (75 - 99)  BP: 127/68 (04-21-23 @ 11:40) (91/58 - 128/74)  RR: 18 (04-21-23 @ 11:40) (14 - 18)  SpO2: 100% (04-21-23 @ 10:09) (98% - 100%)  Wt(kg): --    ALLERGIES  No Known Allergies      MEDICATIONS   acetaminophen     Tablet .. 650 milliGRAM(s) Oral every 6 hours PRN  ampicillin/sulbactam  IVPB 3 Gram(s) IV Intermittent every 12 hours  ampicillin/sulbactam  IVPB      aspirin enteric coated 81 milliGRAM(s) Oral daily  atorvastatin 40 milliGRAM(s) Oral at bedtime  bisacodyl 5 milliGRAM(s) Oral every 12 hours PRN  chlorhexidine 2% Cloths 1 Application(s) Topical daily  clopidogrel Tablet 75 milliGRAM(s) Oral daily  dextrose 5%. 1000 milliLiter(s) IV Continuous <Continuous>  dextrose 5%. 1000 milliLiter(s) IV Continuous <Continuous>  dextrose 50% Injectable 25 Gram(s) IV Push once  dextrose 50% Injectable 12.5 Gram(s) IV Push once  dextrose 50% Injectable 25 Gram(s) IV Push once  dextrose Oral Gel 15 Gram(s) Oral once PRN  epoetin nyasia-epbx (RETACRIT) Injectable 6000 Unit(s) IV Push <User Schedule>  glucagon  Injectable 1 milliGRAM(s) IntraMuscular once  heparin   Injectable 5000 Unit(s) SubCutaneous every 12 hours  HYDROmorphone  Injectable 1 milliGRAM(s) IV Push every 4 hours PRN  insulin lispro (ADMELOG) corrective regimen sliding scale   SubCutaneous three times a day before meals  insulin lispro (ADMELOG) corrective regimen sliding scale   SubCutaneous at bedtime  melatonin 3 milliGRAM(s) Oral at bedtime PRN  midodrine. 5 milliGRAM(s) Oral <User Schedule>  Nephro-zack 1 Tablet(s) Oral daily  ondansetron Injectable 4 milliGRAM(s) IV Push every 8 hours PRN  sevelamer carbonate 1600 milliGRAM(s) Oral three times a day with meals  sodium chloride 0.9% Bolus. 100 milliLiter(s) IV Bolus every 5 minutes PRN  zinc sulfate 220 milliGRAM(s) Oral daily      ----------------------------------------------------------------------------------------  PHYSICAL EXAM  Constitutional - NAD, Comfortable  HEENT - NCAT, EOMI  Neck - Supple, No limited ROM  Chest - CTA bilaterally, No wheeze, No rhonchi, No crackles  Cardiovascular - RRR, S1S2, No murmurs  Abdomen - BS+, Soft, NTND  Extremities - No C/C/E, No calf tenderness   Neurologic Exam -                    Cognitive - Awake, Alert, AAO to self, place, date, year, situation     Communication - Fluent, No dysarthria     Cranial Nerves - CN 2-12 intact     Motor -                      LEFT    UE - ShAB 5/5, EF 5/5, EE 5/5, WE 5/5,  5/5                    RIGHT UE - ShAB 5/5, EF 5/5, EE 5/5, WE 5/5,  5/5                    LEFT    LE - s/p LLE aka                    RIGHT LE - HF 5/5, KE 5/5, DF 5/5, PF 5/5        Sensory - Intact to LT     Reflexes - DTR Intact, No primitive reflexive     Coordination - FTN intact     OculoVestibular - No saccades, No nystagmus, VOR         Balance - WNL Static  Psychiatric - Mood stable, Affect WNL  ----------------------------------------------------------------------------------------  ASSESSMENT/PLAN  71 yo m p/w ochoa, now s/p aka  asa, plavix  unasyn  hd, midodrine  Pain -tylenol, iv dilaudid 1mg iv q4 prn  diet: regular consistent carb dash/ tlc renal diet  DVT PPX - heparin  please request OT and PT, pending  Rehab -     incomplete note, consult in progress Patient is a 72y old  Male who presents with a chief complaint of L foot gangrene (21 Apr 2023 11:54)      HPI:  Please note, patient with second MRN #[id number] with additional recent hospitalizations.    This is a 72M with history of ESRD on HD (T/Th/Sat), DM2, PVD (s/p R BKA in Nov 2022) and recurrent hematurias (has required CBI in the past) who presents to the hospital with worsening pain and duskiness of his L foot. Patient currently is a limited historian, HPI supplemented with information from patient's sister Darshana ([phone]). As per patient he was sent to the hospital for possible L foot infection, does report pain to the bottom of his foot but otherwise denies any fevers/chills/diaphoresis, no swelling or redness of his L foot. No other acute complaints. Said that he is supposed to have an amputation of his L leg but is unclear on the details. Spoke with patient's sister, Darshana, who said that the patient has known PVD, had a recent R leg BKA and has had some procedures with [facility] [place] (sister unsure of the procedures) and states that they were told the procedures did not help improve the peripheral blood flow in the patient's L leg and that he would need an amputation. Said that the family was told he was being sent to the hospital for the amputation evaluation. Otherwise sister not aware of any other acute issues for the patient.     On arrival to the ED, his vitals were T 97.5, P 90, /59, RR 16, O2 sat 98% RA. His lab work was most significant for mild anemia and elevated CRP. His imaging did not show acute findings to suggest OM. He was admitted to medicine.   (17 Apr 2023 23:19)    s/p L aka on 4/20 23    REVIEW OF SYSTEMS    PAST MEDICAL & SURGICAL HISTORY  DM (diabetes mellitus)    ESRD on dialysis    PVD (peripheral vascular disease)    S/P BKA (below knee amputation) unilateral, right        SOCIAL HISTORY  Smoking - Denied  EtOH - Denied   Drugs - Denied    FUNCTIONAL HISTORY  Lives   Independent    CURRENT FUNCTIONAL STATUS      FAMILY HISTORY   No pertinent family history in first degree relatives      RECENT LABS/IMAGING  CBC Full  -  ( 21 Apr 2023 06:00 )  WBC Count : 19.18 K/uL  RBC Count : 2.66 M/uL  Hemoglobin : 8.1 g/dL  Hematocrit : 26.5 %  Platelet Count - Automated : 326 K/uL  Mean Cell Volume : 99.6 fL  Mean Cell Hemoglobin : 30.5 pg  Mean Cell Hemoglobin Concentration : 30.6 gm/dL  Auto Neutrophil # : 17.51 K/uL  Auto Lymphocyte # : 0.33 K/uL  Auto Monocyte # : 1.17 K/uL  Auto Eosinophil # : 0.00 K/uL  Auto Basophil # : 0.17 K/uL  Auto Neutrophil % : 88.7 %  Auto Lymphocyte % : 1.7 %  Auto Monocyte % : 6.1 %  Auto Eosinophil % : 0.0 %  Auto Basophil % : 0.9 %    04-21    136  |  95<L>  |  62<H>  ----------------------------<  241<H>  5.0   |  19<L>  |  4.97<H>    Ca    9.0      21 Apr 2023 06:00  Phos  5.9     04-21  Mg     2.10     04-21    TPro  6.7  /  Alb  3.4  /  TBili  0.2  /  DBili  x   /  AST  19  /  ALT  18  /  AlkPhos  85  04-21        VITALS  T(C): 36.9 (04-21-23 @ 11:40), Max: 37.2 (04-21-23 @ 06:34)  HR: 99 (04-21-23 @ 11:40) (75 - 99)  BP: 127/68 (04-21-23 @ 11:40) (91/58 - 128/74)  RR: 18 (04-21-23 @ 11:40) (14 - 18)  SpO2: 100% (04-21-23 @ 10:09) (98% - 100%)  Wt(kg): --    ALLERGIES  No Known Allergies      MEDICATIONS   acetaminophen     Tablet .. 650 milliGRAM(s) Oral every 6 hours PRN  ampicillin/sulbactam  IVPB 3 Gram(s) IV Intermittent every 12 hours  ampicillin/sulbactam  IVPB      aspirin enteric coated 81 milliGRAM(s) Oral daily  atorvastatin 40 milliGRAM(s) Oral at bedtime  bisacodyl 5 milliGRAM(s) Oral every 12 hours PRN  chlorhexidine 2% Cloths 1 Application(s) Topical daily  clopidogrel Tablet 75 milliGRAM(s) Oral daily  dextrose 5%. 1000 milliLiter(s) IV Continuous <Continuous>  dextrose 5%. 1000 milliLiter(s) IV Continuous <Continuous>  dextrose 50% Injectable 25 Gram(s) IV Push once  dextrose 50% Injectable 12.5 Gram(s) IV Push once  dextrose 50% Injectable 25 Gram(s) IV Push once  dextrose Oral Gel 15 Gram(s) Oral once PRN  epoetin nyasia-epbx (RETACRIT) Injectable 6000 Unit(s) IV Push <User Schedule>  glucagon  Injectable 1 milliGRAM(s) IntraMuscular once  heparin   Injectable 5000 Unit(s) SubCutaneous every 12 hours  HYDROmorphone  Injectable 1 milliGRAM(s) IV Push every 4 hours PRN  insulin lispro (ADMELOG) corrective regimen sliding scale   SubCutaneous three times a day before meals  insulin lispro (ADMELOG) corrective regimen sliding scale   SubCutaneous at bedtime  melatonin 3 milliGRAM(s) Oral at bedtime PRN  midodrine. 5 milliGRAM(s) Oral <User Schedule>  Nephro-zack 1 Tablet(s) Oral daily  ondansetron Injectable 4 milliGRAM(s) IV Push every 8 hours PRN  sevelamer carbonate 1600 milliGRAM(s) Oral three times a day with meals  sodium chloride 0.9% Bolus. 100 milliLiter(s) IV Bolus every 5 minutes PRN  zinc sulfate 220 milliGRAM(s) Oral daily      ----------------------------------------------------------------------------------------  PHYSICAL EXAM  Constitutional - NAD, Comfortable  HEENT - NCAT, EOMI  Neck - Supple, No limited ROM  Chest - CTA bilaterally, No wheeze, No rhonchi, No crackles  Cardiovascular - RRR, S1S2, No murmurs  Abdomen - BS+, Soft, NTND  Extremities - No C/C/E, No calf tenderness   Neurologic Exam -                    Cognitive - Awake, Alert, AAO to self, place, date, year, situation     Communication - Fluent, No dysarthria     Cranial Nerves - CN 2-12 intact     Motor -                      LEFT    UE - ShAB 5/5, EF 5/5, EE 5/5, WE 5/5,  5/5                    RIGHT UE - ShAB 5/5, EF 5/5, EE 5/5, WE 5/5,  5/5                    LEFT    LE - s/p LLE aka                    RIGHT LE - HF 5/5, KE 5/5         Sensory - Intact to LT     Reflexes - DTR Intact, No primitive reflexive     Coordination - FTN intact     OculoVestibular - No saccades, No nystagmus, VOR         Balance - WNL Static  Psychiatric - Mood stable, Affect WNL  ----------------------------------------------------------------------------------------  ASSESSMENT/PLAN  71 yo m p/w gangrene, now s/p L aka  asa, plavix  unasyn  hd, midodrine  Pain -tylenol, iv dilaudid 1mg iv q4 prn  diet: regular consistent carb dash/ tlc renal diet  DVT PPX - heparin  please request OT and PT, pending  Rehab -     incomplete note, consult in progress Patient is a 72y old  Male who presents with a chief complaint of L foot gangrene (21 Apr 2023 11:54)      HPI:  Please note, patient with second MRN #[id number] with additional recent hospitalizations.    This is a 72M with history of ESRD on HD (T/Th/Sat), DM2, PVD (s/p R BKA in Nov 2022) and recurrent hematurias (has required CBI in the past) who presents to the hospital with worsening pain and duskiness of his L foot. Patient currently is a limited historian, HPI supplemented with information from patient's sister Darshana ([phone]). As per patient he was sent to the hospital for possible L foot infection, does report pain to the bottom of his foot but otherwise denies any fevers/chills/diaphoresis, no swelling or redness of his L foot. No other acute complaints. Said that he is supposed to have an amputation of his L leg but is unclear on the details. Spoke with patient's sister, Darshana, who said that the patient has known PVD, had a recent R leg BKA and has had some procedures with Dr[facility] [place] (sister unsure of the procedures) and states that they were told the procedures did not help improve the peripheral blood flow in the patient's L leg and that he would need an amputation. Said that the family was told he was being sent to the hospital for the amputation evaluation. Otherwise sister not aware of any other acute issues for the patient.     On arrival to the ED, his vitals were T 97.5, P 90, /59, RR 16, O2 sat 98% RA. His lab work was most significant for mild anemia and elevated CRP. His imaging did not show acute findings to suggest OM. He was admitted to medicine.   (17 Apr 2023 23:19)    s/p L aka on 4/20 23    REVIEW OF SYSTEMS  weakness  pain controlled  s/p prior R bka  now s/p L aka    PAST MEDICAL & SURGICAL HISTORY  DM (diabetes mellitus)    ESRD on dialysis    PVD (peripheral vascular disease)    S/P BKA (below knee amputation) unilateral, right        SOCIAL HISTORY  Smoking - Denied  EtOH - Denied   Drugs - Denied    FUNCTIONAL HISTORY  Lives alone in apartment   Independent at baseline, has been in rehab since prior bka, was recently fit for prosthesis but was just learning to take steps    CURRENT FUNCTIONAL STATUS  pending    FAMILY HISTORY   No pertinent family history in first degree relatives      RECENT LABS/IMAGING  CBC Full  -  ( 21 Apr 2023 06:00 )  WBC Count : 19.18 K/uL  RBC Count : 2.66 M/uL  Hemoglobin : 8.1 g/dL  Hematocrit : 26.5 %  Platelet Count - Automated : 326 K/uL  Mean Cell Volume : 99.6 fL  Mean Cell Hemoglobin : 30.5 pg  Mean Cell Hemoglobin Concentration : 30.6 gm/dL  Auto Neutrophil # : 17.51 K/uL  Auto Lymphocyte # : 0.33 K/uL  Auto Monocyte # : 1.17 K/uL  Auto Eosinophil # : 0.00 K/uL  Auto Basophil # : 0.17 K/uL  Auto Neutrophil % : 88.7 %  Auto Lymphocyte % : 1.7 %  Auto Monocyte % : 6.1 %  Auto Eosinophil % : 0.0 %  Auto Basophil % : 0.9 %    04-21    136  |  95<L>  |  62<H>  ----------------------------<  241<H>  5.0   |  19<L>  |  4.97<H>    Ca    9.0      21 Apr 2023 06:00  Phos  5.9     04-21  Mg     2.10     04-21    TPro  6.7  /  Alb  3.4  /  TBili  0.2  /  DBili  x   /  AST  19  /  ALT  18  /  AlkPhos  85  04-21        VITALS  T(C): 36.9 (04-21-23 @ 11:40), Max: 37.2 (04-21-23 @ 06:34)  HR: 99 (04-21-23 @ 11:40) (75 - 99)  BP: 127/68 (04-21-23 @ 11:40) (91/58 - 128/74)  RR: 18 (04-21-23 @ 11:40) (14 - 18)  SpO2: 100% (04-21-23 @ 10:09) (98% - 100%)  Wt(kg): --    ALLERGIES  No Known Allergies      MEDICATIONS   acetaminophen     Tablet .. 650 milliGRAM(s) Oral every 6 hours PRN  ampicillin/sulbactam  IVPB 3 Gram(s) IV Intermittent every 12 hours  ampicillin/sulbactam  IVPB      aspirin enteric coated 81 milliGRAM(s) Oral daily  atorvastatin 40 milliGRAM(s) Oral at bedtime  bisacodyl 5 milliGRAM(s) Oral every 12 hours PRN  chlorhexidine 2% Cloths 1 Application(s) Topical daily  clopidogrel Tablet 75 milliGRAM(s) Oral daily  dextrose 5%. 1000 milliLiter(s) IV Continuous <Continuous>  dextrose 5%. 1000 milliLiter(s) IV Continuous <Continuous>  dextrose 50% Injectable 25 Gram(s) IV Push once  dextrose 50% Injectable 12.5 Gram(s) IV Push once  dextrose 50% Injectable 25 Gram(s) IV Push once  dextrose Oral Gel 15 Gram(s) Oral once PRN  epoetin nyasia-epbx (RETACRIT) Injectable 6000 Unit(s) IV Push <User Schedule>  glucagon  Injectable 1 milliGRAM(s) IntraMuscular once  heparin   Injectable 5000 Unit(s) SubCutaneous every 12 hours  HYDROmorphone  Injectable 1 milliGRAM(s) IV Push every 4 hours PRN  insulin lispro (ADMELOG) corrective regimen sliding scale   SubCutaneous three times a day before meals  insulin lispro (ADMELOG) corrective regimen sliding scale   SubCutaneous at bedtime  melatonin 3 milliGRAM(s) Oral at bedtime PRN  midodrine. 5 milliGRAM(s) Oral <User Schedule>  Nephro-zack 1 Tablet(s) Oral daily  ondansetron Injectable 4 milliGRAM(s) IV Push every 8 hours PRN  sevelamer carbonate 1600 milliGRAM(s) Oral three times a day with meals  sodium chloride 0.9% Bolus. 100 milliLiter(s) IV Bolus every 5 minutes PRN  zinc sulfate 220 milliGRAM(s) Oral daily      ----------------------------------------------------------------------------------------  PHYSICAL EXAM  Constitutional - NAD, Comfortable  HEENT - NCAT, EOMI   Chest - no respiratory distress  Cardiovascular - RRR, S1S2   Abdomen -  Soft, NTND  Extremities - LLE dressings c/d/i   Neurologic Exam -                    Cognitive - Awake, Alert, AAO to self, place, date, year, situation     Communication - Fluent, No dysarthria     Cranial Nerves - CN 2-12 intact     Motor -                      LEFT    UE - 4+/5                    RIGHT UE - 4+/5                    LEFT    LE - s/p LLE aka                    RIGHT LE - HF 4+/5, KE 4+/5, hx R bka         Sensory - Intact to LT       Balance - WNL Static  Psychiatric - Mood stable, Affect WNL  ----------------------------------------------------------------------------------------  ASSESSMENT/PLAN  71 yo m pmh R bka, now p/w gangrene, now s/p L aka  asa, plavix  unasyn  hd, midodrine  Pain -tylenol, iv dilaudid 1mg iv q4 prn  diet: regular consistent carb dash/ tlc renal diet  DVT PPX - heparin  please request OT and PT, pending  Rehab - recommend subacute rehab when medically cleared. discussed with patient and sister Ekaterina by phone, they request patient return to rehab facility where he has been since prior surgery

## 2023-04-21 NOTE — OCCUPATIONAL THERAPY INITIAL EVALUATION ADULT - TOILETING, PREVIOUS LEVEL OF FUNCTION, OT EVAL
This is a recent snapshot of the patient's [facility] medical record.  For current drug dose and complete information and questions, call [phone] or In Basket pool, [facility] ([id number])  CSN Number:  [id number]       independent

## 2023-04-21 NOTE — PROGRESS NOTE ADULT - ASSESSMENT
72M with history as above who presents to the hospital with worsening gangrene of his L foot.       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   ·  Plan: - Patient with worsening pain of the L foot, reports concern for infection at his facility but on examination low concern for acute infection, podiatry reccs empiric unasyn -> ordered  - BCx x2-No growth  - Podiatry and vascular Sx consults noted  - Pain control as needed.  - BCx x2 testing   - Podiatry- plan TMA pending Vasc recs, meanwhile local wound care.    - Vascular sx - - JESUS/PVR, - IV abx  - S/P LLE AKA 4/20  D/c plan stefani      Problem/Plan - 2:  ·  Problem: ESRD on dialysis.   ·  Plan: - On HD Tu/Th/Sat, next episode due on 4/18  - c/w renal meds  - Mild anemia likely 2/2 ESRD, improved from prior levels.    Problem/Plan - 3:  ·  Problem: PVD (peripheral vascular disease).   ·  Plan: - c/w aspirin/plavix/statin therapy.    Problem/Plan - 4:  ·  Problem: Type 2 diabetes mellitus.   ·  Plan: - Hold outpatient DM2 medications  - Place on ISS, FS qAC, CC diet.    Problem/Plan - 5:  ·  Problem: Need for prophylactic measure.   ·  Plan: DVT ppx - HSQ  Diet - DASH/CC/Renal diet  Activity - OOB to chair/with assistance, increase as tolerated    Fall and aspiration precautions.

## 2023-04-21 NOTE — PROGRESS NOTE ADULT - SUBJECTIVE AND OBJECTIVE BOX
PATIENT SEEN AND EXAMINED ON :- 4/21/23  DATE OF SERVICE:   4/21/23          Interim events noted,Labs ,Radiological studies and Cardiology tests reviewed .    MR#[id number]  PATIENT NAME:-BRIAN [facility] COURSE: HPI:  Please note, patient with second MRN #[id number] with additional recent hospitalizations.    This is a 72M with history of ESRD on HD (T/Th/Sat), DM2, PVD (s/p R BKA in Nov 2022) and recurrent hematurias (has required CBI in the past) who presents to the hospital with worsening pain and duskiness of his L foot. Patient currently is a limited historian, HPI supplemented with information from patient's sister Darshana ([phone]). As per patient he was sent to the hospital for possible L foot infection, does report pain to the bottom of his foot but otherwise denies any fevers/chills/diaphoresis, no swelling or redness of his L foot. No other acute complaints. Said that he is supposed to have an amputation of his L leg but is unclear on the details. Spoke with patient's sister, Darshana, who said that the patient has known PVD, had a recent R leg BKA and has had some procedures with [facility] [place] (sister unsure of the procedures) and states that they were told the procedures did not help improve the peripheral blood flow in the patient's L leg and that he would need an amputation. Said that the family was told he was being sent to the hospital for the amputation evaluation. Otherwise sister not aware of any other acute issues for the patient.     On arrival to the ED, his vitals were T 97.5, P 90, /59, RR 16, O2 sat 98% RA. His lab work was most significant for mild anemia and elevated CRP. His imaging did not show acute findings to suggest OM. He was admitted to medicine.   (17 Apr 2023 23:19)      INTERIM EVENTS:Patient seen at bedside ,interim events noted.      PMH -reviewed admission note, no change since admission  HEART FAILURE: Acute[ ]Chronic[ ] Systolic[ ] Diastolic[ ] Combined Systolic and Diastolic[ ]  CAD[ ] CABG[ ] PCI[ ]  DEVICES[ ] PPM[ ] ICD[ ] ILR[ ]  ATRIAL FIBRILLATION[ ] Paroxysmal[ ] Permanent[ ] CHADS2-[  ]  CANDE[ ] CKD1[ ] CKD2[ ] CKD3[ ] CKD4[ ] ESRD[ ]  COPD[ ] HTN[ ]   DM[ ] Type1[ ] Type 2[ ]   CVA[ ] Paresis[ ]    AMBULATION: Assisted[ ] Cane/walker[ ] Independent[ ]    MEDICATIONS  (STANDING):  ampicillin/sulbactam  IVPB      ampicillin/sulbactam  IVPB 3 Gram(s) IV Intermittent every 12 hours  aspirin enteric coated 81 milliGRAM(s) Oral daily  atorvastatin 40 milliGRAM(s) Oral at bedtime  chlorhexidine 2% Cloths 1 Application(s) Topical daily  clopidogrel Tablet 75 milliGRAM(s) Oral daily  dextrose 5%. 1000 milliLiter(s) (100 mL/Hr) IV Continuous <Continuous>  dextrose 5%. 1000 milliLiter(s) (50 mL/Hr) IV Continuous <Continuous>  dextrose 50% Injectable 25 Gram(s) IV Push once  dextrose 50% Injectable 12.5 Gram(s) IV Push once  dextrose 50% Injectable 25 Gram(s) IV Push once  epoetin nyasia-epbx (RETACRIT) Injectable 6000 Unit(s) IV Push <User Schedule>  glucagon  Injectable 1 milliGRAM(s) IntraMuscular once  heparin   Injectable 5000 Unit(s) SubCutaneous every 12 hours  insulin lispro (ADMELOG) corrective regimen sliding scale   SubCutaneous three times a day before meals  insulin lispro (ADMELOG) corrective regimen sliding scale   SubCutaneous at bedtime  midodrine. 5 milliGRAM(s) Oral <User Schedule>  Nephro-zack 1 Tablet(s) Oral daily  sevelamer carbonate 1600 milliGRAM(s) Oral three times a day with meals  zinc sulfate 220 milliGRAM(s) Oral daily    MEDICATIONS  (PRN):  acetaminophen     Tablet .. 650 milliGRAM(s) Oral every 6 hours PRN Temp greater or equal to 38C (100.4F), Mild Pain (1 - 3)  bisacodyl 5 milliGRAM(s) Oral every 12 hours PRN Constipation  dextrose Oral Gel 15 Gram(s) Oral once PRN Blood Glucose LESS THAN 70 milliGRAM(s)/deciliter  HYDROmorphone  Injectable 1 milliGRAM(s) IV Push every 4 hours PRN Severe Pain (7 - 10)  melatonin 3 milliGRAM(s) Oral at bedtime PRN Insomnia  ondansetron Injectable 4 milliGRAM(s) IV Push every 8 hours PRN Nausea and/or Vomiting  sodium chloride 0.9% Bolus. 100 milliLiter(s) IV Bolus every 5 minutes PRN SBP LESS THAN or EQUAL to 90 mmHg            REVIEW OF SYSTEMS:  Constitutional: [ ] fever, [ ]weight loss,  [ ]fatigue [ ]weight gain  Eyes: [ ] visual changes  Respiratory: [ ]shortness of breath;  [ ] cough, [ ]wheezing, [ ]chills, [ ]hemoptysis  Cardiovascular: [ ] chest pain, [ ]palpitations, [ ]dizziness,  [ ]leg swelling[ ]orthopnea[ ]PND  Gastrointestinal: [ ] abdominal pain, [ ]nausea, [ ]vomiting,  [ ]diarrhea [ ]Constipation [ ]Melena  Genitourinary: [ ] dysuria, [ ] hematuria [ ]Garcia  Neurologic: [ ] headaches [ ] tremors[ ]weakness [ ]Paralysis Right[ ] Left[ ]  Skin: [ ] itching, [ ]burning, [ ] rashes  Endocrine: [ ] heat or cold intolerance  Musculoskeletal: [ ] joint pain or swelling; [ ] muscle, back, or extremity pain  Psychiatric: [ ] depression, [ ]anxiety, [ ]mood swings, or [ ]difficulty sleeping  Hematologic: [ ] easy bruising, [ ] bleeding gums    [ ] All remaining systems negative except as per above.   [ ]Unable to obtain.  [x] No change in ROS since admission      Vital Signs Last 24 Hrs  T(C): 37.5 (21 Apr 2023 17:07), Max: 37.5 (21 Apr 2023 17:07)  T(F): 99.5 (21 Apr 2023 17:07), Max: 99.5 (21 Apr 2023 17:07)  HR: 65 (21 Apr 2023 17:07) (65 - 99)  BP: 101/51 (21 Apr 2023 17:07) (101/51 - 128/74)  BP(mean): --  RR: 18 (21 Apr 2023 17:07) (18 - 18)  SpO2: 95% (21 Apr 2023 17:07) (95% - 100%)    Parameters below as of 21 Apr 2023 17:07  Patient On (Oxygen Delivery Method): room air      I&O's Summary    20 Apr 2023 07:01  -  21 Apr 2023 07:00  --------------------------------------------------------  IN: 50 mL / OUT: 100 mL / NET: -50 mL    21 Apr 2023 07:01  -  21 Apr 2023 20:25  --------------------------------------------------------  IN: 500 mL / OUT: 2300 mL / NET: -1800 mL        PHYSICAL EXAM:  General: No acute distress BMI-  HEENT: EOMI, PERRL  Neck: Supple, [ ] JVD  Lungs: Equal air entry bilaterally; [ ] rales [ ] wheezing [ ] rhonchi  Heart: Regular rate and rhythm; [x ] murmur   2/6 [ x] systolic [ ] diastolic [ ] radiation[ ] rubs [ ]  gallops  Abdomen: Nontender, bowel sounds present  Extremities: No clubbing, cyanosis, [ ] edema [ ]Pulses  equal and intact  Nervous system:  Alert & Oriented X3, no focal deficits  Psychiatric: Normal affect  Skin: No rashes or lesions    LABS:  04-21    136  |  95<L>  |  62<H>  ----------------------------<  241<H>  5.0   |  19<L>  |  4.97<H>    Ca    9.0      21 Apr 2023 06:00  Phos  5.9     04-21  Mg     2.10     04-21    TPro  6.7  /  Alb  3.4  /  TBili  0.2  /  DBili  x   /  AST  19  /  ALT  18  /  AlkPhos  85  04-21    Creatinine Trend: 4.97<--, 3.85<--, 3.09<--, 4.80<--, 4.44<--                        8.1    19.18 )-----------( 326      ( 21 Apr 2023 06:00 )             26.5     PT/INR - ( 20 Apr 2023 01:48 )   PT: 14.6 sec;   INR: 1.26 ratio         PTT - ( 20 Apr 2023 01:48 )  PTT:32.7 sec

## 2023-04-21 NOTE — OCCUPATIONAL THERAPY INITIAL EVALUATION ADULT - ADDITIONAL COMMENTS
Pt has been in rehab for the past few months due to right BKA and other hospitalizations. Pt reports being independent prior to surgeries.

## 2023-04-21 NOTE — PROGRESS NOTE ADULT - SUBJECTIVE AND OBJECTIVE BOX
[facility] NEPHROLOGY- PROGRESS NOTE    72y Male with history of ESRD on HD presents with L foot gangrene. Nephrology consulted for ESRD status.    REVIEW OF SYSTEMS:  Gen: no fevers  Cards: no chest pain  Resp: no dyspnea  GI: no nausea or vomiting or diarrhea  Vascular: no LE edema, + LLE pain    No Known Allergies      Hospital Medications: Medications reviewed      VITALS:  T(F): 98.7 (04-21-23 @ 10:09), Max: 99 (04-20-23 @ 12:05)  HR: 98 (04-21-23 @ 10:09)  BP: 117/55 (04-21-23 @ 10:09)  RR: 18 (04-21-23 @ 10:09)  SpO2: 100% (04-21-23 @ 10:09)  Wt(kg): --    04-20 @ 07:01  -  04-21 @ 07:00  --------------------------------------------------------  IN: 50 mL / OUT: 100 mL / NET: -50 mL        PHYSICAL EXAM:    Gen: NAD, calm  Cards: RRR, +S1/S2, no M/G/R  Resp: CTA B/L  GI: soft, NT/ND, NABS  Vascular: B/L BKA, RIJ TDC intact      LABS:  04-21    136  |  95<L>  |  62<H>  ----------------------------<  241<H>  5.0   |  19<L>  |  4.97<H>    Ca    9.0      21 Apr 2023 06:00  Phos  5.9     04-21  Mg     2.10     04-21    TPro  6.7  /  Alb  3.4  /  TBili  0.2  /  DBili      /  AST  19  /  ALT  18  /  AlkPhos  85  04-21    Creatinine Trend: 4.97 <--, 3.85 <--, 3.09 <--, 4.80 <--, 4.44 <--                        8.1    19.18 )-----------( 326      ( 21 Apr 2023 06:00 )             26.5     Urine Studies:

## 2023-04-22 LAB
ANION GAP SERPL CALC-SCNC: 16 MMOL/L — HIGH (ref 7–14)
BASOPHILS # BLD AUTO: 0.03 K/UL — SIGNIFICANT CHANGE UP (ref 0–0.2)
BASOPHILS NFR BLD AUTO: 0.3 % — SIGNIFICANT CHANGE UP (ref 0–2)
BLD GP AB SCN SERPL QL: NEGATIVE — SIGNIFICANT CHANGE UP
BUN SERPL-MCNC: 39 MG/DL — HIGH (ref 7–23)
CALCIUM SERPL-MCNC: 8.5 MG/DL — SIGNIFICANT CHANGE UP (ref 8.4–10.5)
CHLORIDE SERPL-SCNC: 93 MMOL/L — LOW (ref 98–107)
CO2 SERPL-SCNC: 24 MMOL/L — SIGNIFICANT CHANGE UP (ref 22–31)
CREAT SERPL-MCNC: 3.66 MG/DL — HIGH (ref 0.5–1.3)
CULTURE RESULTS: SIGNIFICANT CHANGE UP
CULTURE RESULTS: SIGNIFICANT CHANGE UP
EGFR: 17 ML/MIN/1.73M2 — LOW
EOSINOPHIL # BLD AUTO: 0.07 K/UL — SIGNIFICANT CHANGE UP (ref 0–0.5)
EOSINOPHIL NFR BLD AUTO: 0.6 % — SIGNIFICANT CHANGE UP (ref 0–6)
GLUCOSE BLDC GLUCOMTR-MCNC: 154 MG/DL — HIGH (ref 70–99)
GLUCOSE BLDC GLUCOMTR-MCNC: 157 MG/DL — HIGH (ref 70–99)
GLUCOSE BLDC GLUCOMTR-MCNC: 239 MG/DL — HIGH (ref 70–99)
GLUCOSE BLDC GLUCOMTR-MCNC: 244 MG/DL — HIGH (ref 70–99)
GLUCOSE SERPL-MCNC: 231 MG/DL — HIGH (ref 70–99)
HCT VFR BLD CALC: 22.2 % — LOW (ref 39–50)
HCT VFR BLD CALC: 22.9 % — LOW (ref 39–50)
HCT VFR BLD CALC: 24.9 % — LOW (ref 39–50)
HGB BLD-MCNC: 6.9 G/DL — CRITICAL LOW (ref 13–17)
HGB BLD-MCNC: 7.1 G/DL — LOW (ref 13–17)
HGB BLD-MCNC: 8.1 G/DL — LOW (ref 13–17)
IANC: 9.11 K/UL — HIGH (ref 1.8–7.4)
IMM GRANULOCYTES NFR BLD AUTO: 0.9 % — SIGNIFICANT CHANGE UP (ref 0–0.9)
LYMPHOCYTES # BLD AUTO: 0.95 K/UL — LOW (ref 1–3.3)
LYMPHOCYTES # BLD AUTO: 8.2 % — LOW (ref 13–44)
MAGNESIUM SERPL-MCNC: 2 MG/DL — SIGNIFICANT CHANGE UP (ref 1.6–2.6)
MCHC RBC-ENTMCNC: 30.7 PG — SIGNIFICANT CHANGE UP (ref 27–34)
MCHC RBC-ENTMCNC: 30.8 PG — SIGNIFICANT CHANGE UP (ref 27–34)
MCHC RBC-ENTMCNC: 31 GM/DL — LOW (ref 32–36)
MCHC RBC-ENTMCNC: 31.1 GM/DL — LOW (ref 32–36)
MCHC RBC-ENTMCNC: 31.5 PG — SIGNIFICANT CHANGE UP (ref 27–34)
MCHC RBC-ENTMCNC: 32.5 GM/DL — SIGNIFICANT CHANGE UP (ref 32–36)
MCV RBC AUTO: 101.4 FL — HIGH (ref 80–100)
MCV RBC AUTO: 94.7 FL — SIGNIFICANT CHANGE UP (ref 80–100)
MCV RBC AUTO: 99.1 FL — SIGNIFICANT CHANGE UP (ref 80–100)
MONOCYTES # BLD AUTO: 1.35 K/UL — HIGH (ref 0–0.9)
MONOCYTES NFR BLD AUTO: 11.6 % — SIGNIFICANT CHANGE UP (ref 2–14)
MRSA PCR RESULT.: SIGNIFICANT CHANGE UP
NEUTROPHILS # BLD AUTO: 9.11 K/UL — HIGH (ref 1.8–7.4)
NEUTROPHILS NFR BLD AUTO: 78.4 % — HIGH (ref 43–77)
NRBC # BLD: 0 /100 WBCS — SIGNIFICANT CHANGE UP (ref 0–0)
NRBC # FLD: 0 K/UL — SIGNIFICANT CHANGE UP (ref 0–0)
PHOSPHATE SERPL-MCNC: 3.8 MG/DL — SIGNIFICANT CHANGE UP (ref 2.5–4.5)
PLATELET # BLD AUTO: 237 K/UL — SIGNIFICANT CHANGE UP (ref 150–400)
PLATELET # BLD AUTO: 292 K/UL — SIGNIFICANT CHANGE UP (ref 150–400)
PLATELET # BLD AUTO: 305 K/UL — SIGNIFICANT CHANGE UP (ref 150–400)
POTASSIUM SERPL-MCNC: 4.6 MMOL/L — SIGNIFICANT CHANGE UP (ref 3.5–5.3)
POTASSIUM SERPL-SCNC: 4.6 MMOL/L — SIGNIFICANT CHANGE UP (ref 3.5–5.3)
RBC # BLD: 2.19 M/UL — LOW (ref 4.2–5.8)
RBC # BLD: 2.31 M/UL — LOW (ref 4.2–5.8)
RBC # BLD: 2.63 M/UL — LOW (ref 4.2–5.8)
RBC # FLD: 14.1 % — SIGNIFICANT CHANGE UP (ref 10.3–14.5)
RBC # FLD: 14.3 % — SIGNIFICANT CHANGE UP (ref 10.3–14.5)
RBC # FLD: 16.9 % — HIGH (ref 10.3–14.5)
RH IG SCN BLD-IMP: POSITIVE — SIGNIFICANT CHANGE UP
S AUREUS DNA NOSE QL NAA+PROBE: SIGNIFICANT CHANGE UP
SODIUM SERPL-SCNC: 133 MMOL/L — LOW (ref 135–145)
SPECIMEN SOURCE: SIGNIFICANT CHANGE UP
SPECIMEN SOURCE: SIGNIFICANT CHANGE UP
WBC # BLD: 11.62 K/UL — HIGH (ref 3.8–10.5)
WBC # BLD: 12.14 K/UL — HIGH (ref 3.8–10.5)
WBC # BLD: 13.5 K/UL — HIGH (ref 3.8–10.5)
WBC # FLD AUTO: 11.62 K/UL — HIGH (ref 3.8–10.5)
WBC # FLD AUTO: 12.14 K/UL — HIGH (ref 3.8–10.5)
WBC # FLD AUTO: 13.5 K/UL — HIGH (ref 3.8–10.5)

## 2023-04-22 PROCEDURE — 99222 1ST HOSP IP/OBS MODERATE 55: CPT | Mod: GC

## 2023-04-22 RX ORDER — ACETAMINOPHEN 500 MG
650 TABLET ORAL ONCE
Refills: 0 | Status: COMPLETED | OUTPATIENT
Start: 2023-04-22 | End: 2023-04-22

## 2023-04-22 RX ADMIN — Medication 650 MILLIGRAM(S): at 03:36

## 2023-04-22 RX ADMIN — SEVELAMER CARBONATE 1600 MILLIGRAM(S): 2400 POWDER, FOR SUSPENSION ORAL at 18:11

## 2023-04-22 RX ADMIN — ZINC SULFATE TAB 220 MG (50 MG ZINC EQUIVALENT) 220 MILLIGRAM(S): 220 (50 ZN) TAB at 14:05

## 2023-04-22 RX ADMIN — ERYTHROPOIETIN 6000 UNIT(S): 10000 INJECTION, SOLUTION INTRAVENOUS; SUBCUTANEOUS at 11:38

## 2023-04-22 RX ADMIN — HYDROMORPHONE HYDROCHLORIDE 1 MILLIGRAM(S): 2 INJECTION INTRAMUSCULAR; INTRAVENOUS; SUBCUTANEOUS at 17:32

## 2023-04-22 RX ADMIN — Medication 2: at 09:06

## 2023-04-22 RX ADMIN — Medication 650 MILLIGRAM(S): at 03:06

## 2023-04-22 RX ADMIN — HEPARIN SODIUM 5000 UNIT(S): 5000 INJECTION INTRAVENOUS; SUBCUTANEOUS at 08:03

## 2023-04-22 RX ADMIN — Medication 2: at 18:10

## 2023-04-22 RX ADMIN — HYDROMORPHONE HYDROCHLORIDE 1 MILLIGRAM(S): 2 INJECTION INTRAMUSCULAR; INTRAVENOUS; SUBCUTANEOUS at 18:00

## 2023-04-22 RX ADMIN — SEVELAMER CARBONATE 1600 MILLIGRAM(S): 2400 POWDER, FOR SUSPENSION ORAL at 14:06

## 2023-04-22 RX ADMIN — SEVELAMER CARBONATE 1600 MILLIGRAM(S): 2400 POWDER, FOR SUSPENSION ORAL at 08:03

## 2023-04-22 RX ADMIN — ATORVASTATIN CALCIUM 40 MILLIGRAM(S): 80 TABLET, FILM COATED ORAL at 20:56

## 2023-04-22 RX ADMIN — Medication 1 TABLET(S): at 17:23

## 2023-04-22 RX ADMIN — MIDODRINE HYDROCHLORIDE 5 MILLIGRAM(S): 2.5 TABLET ORAL at 11:11

## 2023-04-22 RX ADMIN — CHLORHEXIDINE GLUCONATE 1 APPLICATION(S): 213 SOLUTION TOPICAL at 14:19

## 2023-04-22 RX ADMIN — AMPICILLIN SODIUM AND SULBACTAM SODIUM 200 GRAM(S): 250; 125 INJECTION, POWDER, FOR SUSPENSION INTRAMUSCULAR; INTRAVENOUS at 00:14

## 2023-04-22 RX ADMIN — Medication 1: at 13:36

## 2023-04-22 NOTE — PROGRESS NOTE ADULT - SUBJECTIVE AND OBJECTIVE BOX
PATIENT SEEN AND EXAMINED ON :- 4/22/23  DATE OF SERVICE:     4/22/23        Interim events noted,Labs ,Radiological studies and Cardiology tests reviewed .    MR#[id number]  PATIENT NAME:-BRIAN [facility] COURSE: HPI:  Please note, patient with second MRN #[id number] with additional recent hospitalizations.    This is a 72M with history of ESRD on HD (T/Th/Sat), DM2, PVD (s/p R BKA in Nov 2022) and recurrent hematurias (has required CBI in the past) who presents to the hospital with worsening pain and duskiness of his L foot. Patient currently is a limited historian, HPI supplemented with information from patient's sister Darshana ([phone]). As per patient he was sent to the hospital for possible L foot infection, does report pain to the bottom of his foot but otherwise denies any fevers/chills/diaphoresis, no swelling or redness of his L foot. No other acute complaints. Said that he is supposed to have an amputation of his L leg but is unclear on the details. Spoke with patient's sister, Darshana, who said that the patient has known PVD, had a recent R leg BKA and has had some procedures with Dr[facility] [place] (sister unsure of the procedures) and states that they were told the procedures did not help improve the peripheral blood flow in the patient's L leg and that he would need an amputation. Said that the family was told he was being sent to the hospital for the amputation evaluation. Otherwise sister not aware of any other acute issues for the patient.     On arrival to the ED, his vitals were T 97.5, P 90, /59, RR 16, O2 sat 98% RA. His lab work was most significant for mild anemia and elevated CRP. His imaging did not show acute findings to suggest OM. He was admitted to medicine.   (17 Apr 2023 23:19)      INTERIM EVENTS:Patient seen at bedside ,interim events noted.      PMH -reviewed admission note, no change since admission  HEART FAILURE: Acute[ ]Chronic[ ] Systolic[ ] Diastolic[ ] Combined Systolic and Diastolic[ ]  CAD[ ] CABG[ ] PCI[ ]  DEVICES[ ] PPM[ ] ICD[ ] ILR[ ]  ATRIAL FIBRILLATION[ ] Paroxysmal[ ] Permanent[ ] CHADS2-[  ]  CANDE[ ] CKD1[ ] CKD2[ ] CKD3[ ] CKD4[ ] ESRD[ ]  COPD[ ] HTN[ ]   DM[ ] Type1[ ] Type 2[ ]   CVA[ ] Paresis[ ]    AMBULATION: Assisted[ ] Cane/walker[ ] Independent[ ]    MEDICATIONS  (STANDING):  atorvastatin 40 milliGRAM(s) Oral at bedtime  chlorhexidine 2% Cloths 1 Application(s) Topical daily  dextrose 5%. 1000 milliLiter(s) (100 mL/Hr) IV Continuous <Continuous>  dextrose 5%. 1000 milliLiter(s) (50 mL/Hr) IV Continuous <Continuous>  dextrose 50% Injectable 25 Gram(s) IV Push once  dextrose 50% Injectable 12.5 Gram(s) IV Push once  dextrose 50% Injectable 25 Gram(s) IV Push once  epoetin nyasia-epbx (RETACRIT) Injectable 6000 Unit(s) IV Push <User Schedule>  glucagon  Injectable 1 milliGRAM(s) IntraMuscular once  heparin   Injectable 5000 Unit(s) SubCutaneous every 12 hours  insulin lispro (ADMELOG) corrective regimen sliding scale   SubCutaneous three times a day before meals  insulin lispro (ADMELOG) corrective regimen sliding scale   SubCutaneous at bedtime  midodrine. 5 milliGRAM(s) Oral <User Schedule>  Nephro-zack 1 Tablet(s) Oral daily  sevelamer carbonate 1600 milliGRAM(s) Oral three times a day with meals  zinc sulfate 220 milliGRAM(s) Oral daily    MEDICATIONS  (PRN):  acetaminophen     Tablet .. 650 milliGRAM(s) Oral every 6 hours PRN Temp greater or equal to 38C (100.4F), Mild Pain (1 - 3)  bisacodyl 5 milliGRAM(s) Oral every 12 hours PRN Constipation  dextrose Oral Gel 15 Gram(s) Oral once PRN Blood Glucose LESS THAN 70 milliGRAM(s)/deciliter  HYDROmorphone  Injectable 1 milliGRAM(s) IV Push every 4 hours PRN Severe Pain (7 - 10)  melatonin 3 milliGRAM(s) Oral at bedtime PRN Insomnia  ondansetron Injectable 4 milliGRAM(s) IV Push every 8 hours PRN Nausea and/or Vomiting  sodium chloride 0.9% Bolus. 100 milliLiter(s) IV Bolus every 5 minutes PRN SBP LESS THAN or EQUAL to 90 mmHg            REVIEW OF SYSTEMS:  Constitutional: [ ] fever, [ ]weight loss,  [ ]fatigue [ ]weight gain  Eyes: [ ] visual changes  Respiratory: [ ]shortness of breath;  [ ] cough, [ ]wheezing, [ ]chills, [ ]hemoptysis  Cardiovascular: [ ] chest pain, [ ]palpitations, [ ]dizziness,  [ ]leg swelling[ ]orthopnea[ ]PND  Gastrointestinal: [ ] abdominal pain, [ ]nausea, [ ]vomiting,  [ ]diarrhea [ ]Constipation [ ]Melena  Genitourinary: [ ] dysuria, [ ] hematuria [ ]Garcia  Neurologic: [ ] headaches [ ] tremors[ ]weakness [ ]Paralysis Right[ ] Left[ ]  Skin: [ ] itching, [ ]burning, [ ] rashes  Endocrine: [ ] heat or cold intolerance  Musculoskeletal: [ ] joint pain or swelling; [ ] muscle, back, or extremity pain  Psychiatric: [ ] depression, [ ]anxiety, [ ]mood swings, or [ ]difficulty sleeping  Hematologic: [ ] easy bruising, [ ] bleeding gums    [ ] All remaining systems negative except as per above.   [ ]Unable to obtain.  [x] No change in ROS since admission      Vital Signs Last 24 Hrs  T(C): 37.1 (22 Apr 2023 17:46), Max: 37.6 (22 Apr 2023 14:44)  T(F): 98.7 (22 Apr 2023 17:46), Max: 99.6 (22 Apr 2023 14:44)  HR: 106 (22 Apr 2023 17:46) (100 - 118)  BP: 114/65 (22 Apr 2023 17:46) (96/54 - 114/65)  BP(mean): --  RR: 18 (22 Apr 2023 17:46) (17 - 18)  SpO2: 100% (22 Apr 2023 17:46) (99% - 100%)    Parameters below as of 22 Apr 2023 17:46  Patient On (Oxygen Delivery Method): room air      I&O's Summary    21 Apr 2023 07:01  -  22 Apr 2023 07:00  --------------------------------------------------------  IN: 500 mL / OUT: 2300 mL / NET: -1800 mL    22 Apr 2023 07:01  -  22 Apr 2023 21:25  --------------------------------------------------------  IN: 1312 mL / OUT: 1051 mL / NET: 261 mL        PHYSICAL EXAM:  General: No acute distress BMI-  HEENT: EOMI, PERRL  Neck: Supple, [ ] JVD  Lungs: Equal air entry bilaterally; [ ] rales [ ] wheezing [ ] rhonchi  Heart: Regular rate and rhythm; [x ] murmur   2/6 [ x] systolic [ ] diastolic [ ] radiation[ ] rubs [ ]  gallops  Abdomen: Nontender, bowel sounds present  Extremities: No clubbing, cyanosis, [ ] edema [ ]Pulses  equal and intact  Nervous system:  Alert & Oriented X3, no focal deficits  Psychiatric: Normal affect  Skin: No rashes or lesions    LABS:  04-22    133<L>  |  93<L>  |  39<H>  ----------------------------<  231<H>  4.6   |  24  |  3.66<H>    Ca    8.5      22 Apr 2023 10:58  Phos  3.8     04-22  Mg     2.00     04-22    TPro  6.7  /  Alb  3.4  /  TBili  0.2  /  DBili  x   /  AST  19  /  ALT  18  /  AlkPhos  85  04-21    Creatinine Trend: 3.66<--, 4.97<--, 3.85<--, 3.09<--, 4.80<--, 4.44<--                        7.1    12.14 )-----------( 292      ( 22 Apr 2023 12:58 )             22.9

## 2023-04-22 NOTE — PROGRESS NOTE ADULT - SUBJECTIVE AND OBJECTIVE BOX
[facility] NEPHROLOGY- PROGRESS NOTE    72y Male with history of ESRD on HD presents with L foot gangrene. Nephrology consulted for ESRD status.    REVIEW OF SYSTEMS:  Gen: no fevers  Cards: no chest pain  Resp: no dyspnea  GI: no nausea or vomiting or diarrhea  Vascular: no LE edema, + LLE pain    No Known Allergies      Hospital Medications: Medications reviewed      VITALS:  T(F): 98 (04-22-23 @ 13:30), Max: 99.5 (04-21-23 @ 17:07)  HR: 115 (04-22-23 @ 13:30)  BP: 102/59 (04-22-23 @ 13:30)  RR: 17 (04-22-23 @ 13:30)  SpO2: 99% (04-22-23 @ 10:50)  Wt(kg): --    04-21 @ 07:01  -  04-22 @ 07:00  --------------------------------------------------------  IN: 500 mL / OUT: 2300 mL / NET: -1800 mL    04-22 @ 07:01  -  04-22 @ 14:51  --------------------------------------------------------  IN: 575 mL / OUT: 1050 mL / NET: -475 mL        PHYSICAL EXAM:    Gen: NAD, calm  Cards: RRR, +S1/S2, no M/G/R  Resp: CTA B/L  GI: soft, NT/ND, NABS  Vascular: B/L BKA, RIJ TDC intact      LABS:  04-22    133<L>  |  93<L>  |  39<H>  ----------------------------<  231<H>  4.6   |  24  |  3.66<H>    Ca    8.5      22 Apr 2023 10:58  Phos  3.8     04-22  Mg     2.00     04-22    TPro  6.7  /  Alb  3.4  /  TBili  0.2  /  DBili      /  AST  19  /  ALT  18  /  AlkPhos  85  04-21    Creatinine Trend: 3.66 <--, 4.97 <--, 3.85 <--, 3.09 <--, 4.80 <--, 4.44 <--                        7.1    12.14 )-----------( 292      ( 22 Apr 2023 12:58 )             22.9     Urine Studies:

## 2023-04-22 NOTE — CONSULT NOTE ADULT - ATTENDING COMMENTS
72 m with DM,  ESRD on HD (T/Th/Sat),  PVD (s/p R BKA in Nov 2022) p/w L foot pain., found to have Left foot gangrene.   afebrile, WBC: 19 blood cx negative    s/p AKA on 4/20  no wound  cx or OR cx sent  pt is doing well, afebrile, WBC improving    DM, PVD with leukocytosis and  L foot gangrene s/p AKA 4/20    * on unasyn since 4/18, totay day 5 and post op day 3  * wound has not been checked since OR, if wound clean with no purulence or necrosis, discontinue antibiotics    The above assessment and plan was discussed with the primary team    Sierra Metcalf MD  contact on teams  After 5pm and on weekends call [phone]
fu non invasives  fu records from outside facility  poss revasc v amp if nonreconstuctible

## 2023-04-22 NOTE — CONSULT NOTE ADULT - SUBJECTIVE AND OBJECTIVE BOX
HPI:  72 year old Male with PMHx of ESRD on HD (T/Th/Sat), DM2, PVD (s/p R BKA in Nov 2022) and recurrent hematurias presented to the hospital with worsening L foot pain., found to have Left foot gangrene. Underwent AKA on 4/20. Plan for discharge to rehab.    ID consulted for further recommendations.    REVIEW OF SYSTEMS  [  ] ROS unobtainable because:    [  ] All other systems negative except as noted below    Constitutional:  [ ] fever [ ] chills  [ ] weight loss  [ ]night sweat  [ ]poor appetite/PO intake [ ]fatigue   Skin:  [ ] rash [ ] phlebitis	  Eyes: [ ] icterus [ ] pain  [ ] discharge	  ENMT: [ ] sore throat  [ ] thrush [ ] ulcers [ ] exudates [ ]anosmia  Respiratory: [ ] dyspnea [ ] hemoptysis [ ] cough [ ] sputum	  Cardiovascular:  [ ] chest pain [ ] palpitations [ ] edema	  Gastrointestinal:  [ ] nausea [ ] vomiting [ ] diarrhea [ ] constipation [ ] pain	  Genitourinary:  [ ] dysuria [ ] frequency [ ] hematuria [ ] discharge [ ] flank pain  [ ] incontinence  Musculoskeletal:  [ ] myalgias [ ] arthralgias [ ] arthritis  [ ] back pain  Neurological:  [ ] headache [ ] weakness [ ] seizures  [ ] confusion/altered mental status    prior hospital charts reviewed [V]  primary team notes reviewed [V]  other consultant notes reviewed [V]    PAST MEDICAL & SURGICAL HISTORY:  DM (diabetes mellitus)    ESRD on dialysis    PVD (peripheral vascular disease)    S/P BKA (below knee amputation) unilateral, right    SOCIAL HISTORY:  - Denied smoking/vaping/alcohol/recreational drug use    FAMILY HISTORY:  No pertinent family history in first degree relatives    Allergies  No Known Allergies    ANTIMICROBIALS:    ANTIMICROBIALS (past 90 days):  MEDICATIONS  (STANDING):    ampicillin/sulbactam  IVPB   200 mL/Hr IV Intermittent (04-18-23 @ 03:05)    ampicillin/sulbactam  IVPB   200 mL/Hr IV Intermittent (04-22-23 @ 00:14)   200 mL/Hr IV Intermittent (04-21-23 @ 11:18)   200 mL/Hr IV Intermittent (04-20-23 @ 23:31)   200 mL/Hr IV Intermittent (04-20-23 @ 17:43)   200 mL/Hr IV Intermittent (04-20-23 @ 00:17)   200 mL/Hr IV Intermittent (04-19-23 @ 11:19)   200 mL/Hr IV Intermittent (04-18-23 @ 23:34)    OTHER MEDS:   MEDICATIONS  (STANDING):  acetaminophen     Tablet .. 650 every 6 hours PRN  aspirin enteric coated 81 daily  atorvastatin 40 at bedtime  bisacodyl 5 every 12 hours PRN  clopidogrel Tablet 75 daily  dextrose 50% Injectable 25 once  dextrose 50% Injectable 12.5 once  dextrose 50% Injectable 25 once  dextrose Oral Gel 15 once PRN  epoetin nyasia-epbx (RETACRIT) Injectable 6000 <User Schedule>  glucagon  Injectable 1 once  heparin   Injectable 5000 every 12 hours  HYDROmorphone  Injectable 1 every 4 hours PRN  insulin lispro (ADMELOG) corrective regimen sliding scale  three times a day before meals  insulin lispro (ADMELOG) corrective regimen sliding scale  at bedtime  melatonin 3 at bedtime PRN  midodrine. 5 <User Schedule>  ondansetron Injectable 4 every 8 hours PRN    VITALS:  Vital Signs Last 24 Hrs  T(F): 99.1 (04-22-23 @ 11:20), Max: 99.5 (04-21-23 @ 17:07)    Vital Signs Last 24 Hrs  HR: 104 (04-22-23 @ 11:20) (65 - 104)  BP: 101/65 (04-22-23 @ 11:20) (101/51 - 122/62)  RR: 18 (04-22-23 @ 11:20)  SpO2: 99% (04-22-23 @ 10:50) (95% - 100%)  Wt(kg): --    EXAM:  Physical Exam:  Constitutional:  well preserved, comfortable  Head/Eyes: no icterus, PERRL, EOMI  ENT:  supple; no thrush  LUNGS:  CTA  CVS:  normal S1, S2, no murmur  Abd:  soft, non-tender; non-distended  Ext:  no edema  Vascular:  IV site no erythema tenderness or discharge  MSK:  joints without swelling  Neuro: AAO X 3, non- focal    Labs:                        7.1    12.14 )-----------( 292      ( 22 Apr 2023 12:58 )             22.9     04-22    133<L>  |  93<L>  |  39<H>  ----------------------------<  231<H>  4.6   |  24  |  3.66<H>    Ca    8.5      22 Apr 2023 10:58  Phos  3.8     04-22  Mg     2.00     04-22    TPro  6.7  /  Alb  3.4  /  TBili  0.2  /  DBili  x   /  AST  19  /  ALT  18  /  AlkPhos  85  04-21    WBC Trend:  WBC Count: 12.14 (04-22-23 @ 12:58)  WBC Count: 13.50 (04-22-23 @ 10:58)  WBC Count: 19.18 (04-21-23 @ 06:00)  WBC Count: 9.65 (04-20-23 @ 01:48)    Auto Neutrophil #: 17.51 K/uL (04-21-23 @ 06:00)  Band Neutrophils %: 2.6 % (04-21-23 @ 06:00)  Auto Neutrophil #: 6.33 K/uL (04-19-23 @ 06:15)  Auto Neutrophil #: 6.83 K/uL (04-18-23 @ 06:30)  Auto Neutrophil #: 7.24 K/uL (04-17-23 @ 16:15)    Creatine Trend:  Creatinine, Serum: 3.66 (04-22)  Creatinine, Serum: 4.97 (04-21)  Creatinine, Serum: 3.85 (04-20)  Creatinine, Serum: 3.09 (04-19)    Liver Biochemical Testing Trend:  Alanine Aminotransferase (ALT/SGPT): 18 (04-21)  Alanine Aminotransferase (ALT/SGPT): 24 (04-18)  Alanine Aminotransferase (ALT/SGPT): 30 (04-17)  Aspartate Aminotransferase (AST/SGOT): 19 (04-21-23 @ 06:00)  Aspartate Aminotransferase (AST/SGOT): 22 (04-18-23 @ 06:30)  Aspartate Aminotransferase (AST/SGOT): 49 (04-17-23 @ 16:15)  Bilirubin Total, Serum: 0.2 (04-21)  Bilirubin Total, Serum: 0.2 (04-18)  Bilirubin Total, Serum: 0.2 (04-17)    Trend LDH    Auto Eosinophil %: 0.0 % (04-21-23 @ 06:00)    MICROBIOLOGY:    Culture - Blood (collected 17 Apr 2023 17:10)  Source: .Blood Blood  Preliminary Report:    No growth to date.    Culture - Blood (collected 17 Apr 2023 17:00)  Source: .Blood Blood  Preliminary Report:    No growth to date.    COVID-19 PCR: NotDetec (04-17-23 @ 16:30)    C-Reactive Protein, Serum: 125.3 (04-17)    Ferritin, Serum: 2258 (04-19)    A1C with Estimated Average Glucose Result: 7.3 % (04-19-23 @ 06:15)    RADIOLOGY:  imaging below personally reviewed    < from: Xray Femur 1 View, Left (04.19.23 @ 21:10) >  IMPRESSION:  No tracking soft tissue gas collections, radiopaque foreign bodies, or   gross radiographic evidence for osteomyelitis.    No fractures or dislocations.    Mild left hip osteoarthritic change. Preserved left knee ankle and   visualized midfoot and hindfoot joint spaces.    Generalized osteopenia. Nonspecificsmall ovoid 1 cm length lucency in   distal tibial diaphyseal region, MRI may be helpful to further assess. No   additional focal osseous lesions.    Vascular calcifications. Metallic mesh vascular stent in popliteal region.    Lower pelvic surgical clips.    2 adjacent tiny stellate appearing metallic densities over left ischium.    --- End of Report ---      < end of copied text >           HPI:  72 year old Male with PMHx of ESRD on HD (T/Th/Sat), DM2, PVD (s/p R BKA in Nov 2022) and recurrent hematurias presented to the hospital with worsening L foot pain., found to have Left foot gangrene. Underwent AKA on 4/20. Plan for discharge to rehab.    ID consulted for further recommendations.    REVIEW OF SYSTEMS  [  ] ROS unobtainable because:    [X] All other systems negative except as noted below    Constitutional:  [ ] fever [ ] chills  [ ] weight loss  [ ]night sweat  [ ]poor appetite/PO intake [ ]fatigue   Skin:  [ ] rash [ ] phlebitis	  Eyes: [ ] icterus [ ] pain  [ ] discharge	  ENMT: [ ] sore throat  [ ] thrush [ ] ulcers [ ] exudates [ ]anosmia  Respiratory: [ ] dyspnea [ ] hemoptysis [ ] cough [ ] sputum	  Cardiovascular:  [ ] chest pain [ ] palpitations [ ] edema	  Gastrointestinal:  [ ] nausea [ ] vomiting [ ] diarrhea [ ] constipation [ ] pain	  Genitourinary:  [ ] dysuria [ ] frequency [ ] hematuria [ ] discharge [ ] flank pain  [ ] incontinence  Musculoskeletal:  [ ] myalgias [ ] arthralgias [ ] arthritis  [ ] back pain [x] Left AKA   Neurological:  [ ] headache [ ] weakness [ ] seizures  [ ] confusion/altered mental status    prior hospital charts reviewed [V]  primary team notes reviewed [V]  other consultant notes reviewed [V]    PAST MEDICAL & SURGICAL HISTORY:  DM (diabetes mellitus)    ESRD on dialysis    PVD (peripheral vascular disease)    S/P BKA (below knee amputation) unilateral, right    SOCIAL HISTORY:  - Denied smoking/vaping/alcohol/recreational drug use    FAMILY HISTORY:  No pertinent family history in first degree relatives    Allergies  No Known Allergies    ANTIMICROBIALS:    ANTIMICROBIALS (past 90 days):  MEDICATIONS  (STANDING):    ampicillin/sulbactam  IVPB   200 mL/Hr IV Intermittent (04-18-23 @ 03:05)    ampicillin/sulbactam  IVPB   200 mL/Hr IV Intermittent (04-22-23 @ 00:14)   200 mL/Hr IV Intermittent (04-21-23 @ 11:18)   200 mL/Hr IV Intermittent (04-20-23 @ 23:31)   200 mL/Hr IV Intermittent (04-20-23 @ 17:43)   200 mL/Hr IV Intermittent (04-20-23 @ 00:17)   200 mL/Hr IV Intermittent (04-19-23 @ 11:19)   200 mL/Hr IV Intermittent (04-18-23 @ 23:34)    OTHER MEDS:   MEDICATIONS  (STANDING):  acetaminophen     Tablet .. 650 every 6 hours PRN  aspirin enteric coated 81 daily  atorvastatin 40 at bedtime  bisacodyl 5 every 12 hours PRN  clopidogrel Tablet 75 daily  dextrose 50% Injectable 25 once  dextrose 50% Injectable 12.5 once  dextrose 50% Injectable 25 once  dextrose Oral Gel 15 once PRN  epoetin nyasia-epbx (RETACRIT) Injectable 6000 <User Schedule>  glucagon  Injectable 1 once  heparin   Injectable 5000 every 12 hours  HYDROmorphone  Injectable 1 every 4 hours PRN  insulin lispro (ADMELOG) corrective regimen sliding scale  three times a day before meals  insulin lispro (ADMELOG) corrective regimen sliding scale  at bedtime  melatonin 3 at bedtime PRN  midodrine. 5 <User Schedule>  ondansetron Injectable 4 every 8 hours PRN    VITALS:  Vital Signs Last 24 Hrs  T(F): 99.1 (04-22-23 @ 11:20), Max: 99.5 (04-21-23 @ 17:07)    Vital Signs Last 24 Hrs  HR: 104 (04-22-23 @ 11:20) (65 - 104)  BP: 101/65 (04-22-23 @ 11:20) (101/51 - 122/62)  RR: 18 (04-22-23 @ 11:20)  SpO2: 99% (04-22-23 @ 10:50) (95% - 100%)  Wt(kg): --    EXAM:  Physical Exam:  Constitutional:  well preserved, comfortable  Head/Eyes: no icterus, PERRL, EOMI  ENT:  supple; no thrush  LUNGS:  CTA  CVS:  normal S1, S2, no murmur  Abd:  soft, non-tender; non-distended  Ext:  L AKA covered with dressing, R BKA   Vascular:  IV site no erythema tenderness or discharge  MSK:  joints without swelling  Neuro: AAO X 3, non- focal    Labs:                        7.1    12.14 )-----------( 292      ( 22 Apr 2023 12:58 )             22.9     04-22    133<L>  |  93<L>  |  39<H>  ----------------------------<  231<H>  4.6   |  24  |  3.66<H>    Ca    8.5      22 Apr 2023 10:58  Phos  3.8     04-22  Mg     2.00     04-22    TPro  6.7  /  Alb  3.4  /  TBili  0.2  /  DBili  x   /  AST  19  /  ALT  18  /  AlkPhos  85  04-21    WBC Trend:  WBC Count: 12.14 (04-22-23 @ 12:58)  WBC Count: 13.50 (04-22-23 @ 10:58)  WBC Count: 19.18 (04-21-23 @ 06:00)  WBC Count: 9.65 (04-20-23 @ 01:48)    Auto Neutrophil #: 17.51 K/uL (04-21-23 @ 06:00)  Band Neutrophils %: 2.6 % (04-21-23 @ 06:00)  Auto Neutrophil #: 6.33 K/uL (04-19-23 @ 06:15)  Auto Neutrophil #: 6.83 K/uL (04-18-23 @ 06:30)  Auto Neutrophil #: 7.24 K/uL (04-17-23 @ 16:15)    Creatine Trend:  Creatinine, Serum: 3.66 (04-22)  Creatinine, Serum: 4.97 (04-21)  Creatinine, Serum: 3.85 (04-20)  Creatinine, Serum: 3.09 (04-19)    Liver Biochemical Testing Trend:  Alanine Aminotransferase (ALT/SGPT): 18 (04-21)  Alanine Aminotransferase (ALT/SGPT): 24 (04-18)  Alanine Aminotransferase (ALT/SGPT): 30 (04-17)  Aspartate Aminotransferase (AST/SGOT): 19 (04-21-23 @ 06:00)  Aspartate Aminotransferase (AST/SGOT): 22 (04-18-23 @ 06:30)  Aspartate Aminotransferase (AST/SGOT): 49 (04-17-23 @ 16:15)  Bilirubin Total, Serum: 0.2 (04-21)  Bilirubin Total, Serum: 0.2 (04-18)  Bilirubin Total, Serum: 0.2 (04-17)    Trend LDH    Auto Eosinophil %: 0.0 % (04-21-23 @ 06:00)    MICROBIOLOGY:    Culture - Blood (collected 17 Apr 2023 17:10)  Source: .Blood Blood  Preliminary Report:    No growth to date.    Culture - Blood (collected 17 Apr 2023 17:00)  Source: .Blood Blood  Preliminary Report:    No growth to date.    COVID-19 PCR: NotDetec (04-17-23 @ 16:30)    C-Reactive Protein, Serum: 125.3 (04-17)    Ferritin, Serum: 2258 (04-19)    A1C with Estimated Average Glucose Result: 7.3 % (04-19-23 @ 06:15)    RADIOLOGY:  imaging below personally reviewed    < from: Xray Femur 1 View, Left (04.19.23 @ 21:10) >  IMPRESSION:  No tracking soft tissue gas collections, radiopaque foreign bodies, or   gross radiographic evidence for osteomyelitis.    No fractures or dislocations.    Mild left hip osteoarthritic change. Preserved left knee ankle and   visualized midfoot and hindfoot joint spaces.    Generalized osteopenia. Nonspecificsmall ovoid 1 cm length lucency in   distal tibial diaphyseal region, MRI may be helpful to further assess. No   additional focal osseous lesions.    Vascular calcifications. Metallic mesh vascular stent in popliteal region.    Lower pelvic surgical clips.    2 adjacent tiny stellate appearing metallic densities over left ischium.    --- End of Report ---      < end of copied text >           HPI:  72 year old Male with PMHx of ESRD on HD (T/Th/Sat), DM2, PVD (s/p R BKA in Nov 2022) and recurrent hematurias presented to the hospital with worsening L foot pain., found to have Left foot gangrene. Underwent AKA on 4/20. Plan for discharge to rehab.    ID consulted for further recommendations.    REVIEW OF SYSTEMS  [  ] ROS unobtainable because:    [X] All other systems negative except as noted below    Constitutional:  [ ] fever [ ] chills  [ ] weight loss  [ ]night sweat  [ ]poor appetite/PO intake [ ]fatigue   Skin:  [ ] rash [ ] phlebitis	  Eyes: [ ] icterus [ ] pain  [ ] discharge	  ENMT: [ ] sore throat  [ ] thrush [ ] ulcers [ ] exudates [ ]anosmia  Respiratory: [ ] dyspnea [ ] hemoptysis [ ] cough [ ] sputum	  Cardiovascular:  [ ] chest pain [ ] palpitations [ ] edema	  Gastrointestinal:  [ ] nausea [ ] vomiting [ ] diarrhea [ ] constipation [ ] pain	  Genitourinary:  [ ] dysuria [ ] frequency [ ] hematuria [ ] discharge [ ] flank pain  [ ] incontinence  Musculoskeletal:  [ ] myalgias [ ] arthralgias [ ] arthritis  [ ] back pain [x] Left AKA   Neurological:  [ ] headache [ ] weakness [ ] seizures  [ ] confusion/altered mental status  psych: no anxiety  prior hospital charts reviewed [V]  primary team notes reviewed [V]  other consultant notes reviewed [V]    PAST MEDICAL & SURGICAL HISTORY:  DM (diabetes mellitus)    ESRD on dialysis    PVD (peripheral vascular disease)    S/P BKA (below knee amputation) unilateral, right    SOCIAL HISTORY:  lives at NH, wheelchair bound  former smoker, no alcohol or drug abuse  no recent travel    FAMILY HISTORY:  No recent febrile illness in families    Allergies  No Known Allergies    ANTIMICROBIALS:    ANTIMICROBIALS (past 90 days):  MEDICATIONS  (STANDING):    ampicillin/sulbactam  IVPB   200 mL/Hr IV Intermittent (04-18-23 @ 03:05)    ampicillin/sulbactam  IVPB   200 mL/Hr IV Intermittent (04-22-23 @ 00:14)   200 mL/Hr IV Intermittent (04-21-23 @ 11:18)   200 mL/Hr IV Intermittent (04-20-23 @ 23:31)   200 mL/Hr IV Intermittent (04-20-23 @ 17:43)   200 mL/Hr IV Intermittent (04-20-23 @ 00:17)   200 mL/Hr IV Intermittent (04-19-23 @ 11:19)   200 mL/Hr IV Intermittent (04-18-23 @ 23:34)    OTHER MEDS:   MEDICATIONS  (STANDING):  acetaminophen     Tablet .. 650 every 6 hours PRN  aspirin enteric coated 81 daily  atorvastatin 40 at bedtime  bisacodyl 5 every 12 hours PRN  clopidogrel Tablet 75 daily  dextrose 50% Injectable 25 once  dextrose 50% Injectable 12.5 once  dextrose 50% Injectable 25 once  dextrose Oral Gel 15 once PRN  epoetin nyasia-epbx (RETACRIT) Injectable 6000 <User Schedule>  glucagon  Injectable 1 once  heparin   Injectable 5000 every 12 hours  HYDROmorphone  Injectable 1 every 4 hours PRN  insulin lispro (ADMELOG) corrective regimen sliding scale  three times a day before meals  insulin lispro (ADMELOG) corrective regimen sliding scale  at bedtime  melatonin 3 at bedtime PRN  midodrine. 5 <User Schedule>  ondansetron Injectable 4 every 8 hours PRN    VITALS:  Vital Signs Last 24 Hrs  T(F): 99.1 (04-22-23 @ 11:20), Max: 99.5 (04-21-23 @ 17:07)    Vital Signs Last 24 Hrs  HR: 104 (04-22-23 @ 11:20) (65 - 104)  BP: 101/65 (04-22-23 @ 11:20) (101/51 - 122/62)  RR: 18 (04-22-23 @ 11:20)  SpO2: 99% (04-22-23 @ 10:50) (95% - 100%)  Wt(kg): --    EXAM:  Physical Exam:  Constitutional:  NAD, non toxic  Head: atraumatic, normocephalic  Eyes: no icterus, PERRL, EOMI  ENT:  supple; no thrush  LUNGS:  CTA  CVS:  normal S1, S2, no murmur  Abd:  soft, non-tender; non-distended  : no suprapubic or CVA tenderness  MSK:  L AKA covered with dressing, R BKA   Vascular: RIJ pemacath  skin: no rash  Neuro: AAO X 3, non- focal  psych: normal affect  Labs:                        7.1    12.14 )-----------( 292      ( 22 Apr 2023 12:58 )             22.9     04-22    133<L>  |  93<L>  |  39<H>  ----------------------------<  231<H>  4.6   |  24  |  3.66<H>    Ca    8.5      22 Apr 2023 10:58  Phos  3.8     04-22  Mg     2.00     04-22    TPro  6.7  /  Alb  3.4  /  TBili  0.2  /  DBili  x   /  AST  19  /  ALT  18  /  AlkPhos  85  04-21    WBC Trend:  WBC Count: 12.14 (04-22-23 @ 12:58)  WBC Count: 13.50 (04-22-23 @ 10:58)  WBC Count: 19.18 (04-21-23 @ 06:00)  WBC Count: 9.65 (04-20-23 @ 01:48)    Auto Neutrophil #: 17.51 K/uL (04-21-23 @ 06:00)  Band Neutrophils %: 2.6 % (04-21-23 @ 06:00)  Auto Neutrophil #: 6.33 K/uL (04-19-23 @ 06:15)  Auto Neutrophil #: 6.83 K/uL (04-18-23 @ 06:30)  Auto Neutrophil #: 7.24 K/uL (04-17-23 @ 16:15)    Creatine Trend:  Creatinine, Serum: 3.66 (04-22)  Creatinine, Serum: 4.97 (04-21)  Creatinine, Serum: 3.85 (04-20)  Creatinine, Serum: 3.09 (04-19)    Liver Biochemical Testing Trend:  Alanine Aminotransferase (ALT/SGPT): 18 (04-21)  Alanine Aminotransferase (ALT/SGPT): 24 (04-18)  Alanine Aminotransferase (ALT/SGPT): 30 (04-17)  Aspartate Aminotransferase (AST/SGOT): 19 (04-21-23 @ 06:00)  Aspartate Aminotransferase (AST/SGOT): 22 (04-18-23 @ 06:30)  Aspartate Aminotransferase (AST/SGOT): 49 (04-17-23 @ 16:15)  Bilirubin Total, Serum: 0.2 (04-21)  Bilirubin Total, Serum: 0.2 (04-18)  Bilirubin Total, Serum: 0.2 (04-17)    Trend LDH    Auto Eosinophil %: 0.0 % (04-21-23 @ 06:00)    MICROBIOLOGY:    Culture - Blood (collected 17 Apr 2023 17:10)  Source: .Blood Blood  Preliminary Report:    No growth to date.    Culture - Blood (collected 17 Apr 2023 17:00)  Source: .Blood Blood  Preliminary Report:    No growth to date.    COVID-19 PCR: NotDetec (04-17-23 @ 16:30)    C-Reactive Protein, Serum: 125.3 (04-17)    Ferritin, Serum: 2258 (04-19)    A1C with Estimated Average Glucose Result: 7.3 % (04-19-23 @ 06:15)    RADIOLOGY:  imaging below personally reviewed    < from: Xray Femur 1 View, Left (04.19.23 @ 21:10) >  IMPRESSION:  No tracking soft tissue gas collections, radiopaque foreign bodies, or   gross radiographic evidence for osteomyelitis.    No fractures or dislocations.    Mild left hip osteoarthritic change. Preserved left knee ankle and   visualized midfoot and hindfoot joint spaces.    Generalized osteopenia. Nonspecificsmall ovoid 1 cm length lucency in   distal tibial diaphyseal region, MRI may be helpful to further assess. No   additional focal osseous lesions.    Vascular calcifications. Metallic mesh vascular stent in popliteal region.    Lower pelvic surgical clips.    2 adjacent tiny stellate appearing metallic densities over left ischium.    --- End of Report ---      < end of copied text >

## 2023-04-22 NOTE — PROGRESS NOTE ADULT - ASSESSMENT
72M with history as above who presents to the hospital with worsening gangrene of his L foot.       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   ·  Plan: - Patient with worsening pain of the L foot, reports concern for infection at his facility but on examination low concern for acute infection, podiatry reccs empiric unasyn -> ordered  - BCx x2-No growth  - Podiatry and vascular Sx consults noted  - Pain control as needed.  - BCx x2 testing   - Podiatry- plan TMA pending Vasc recs, meanwhile local wound care.    - Vascular sx - - JESUS/PVR, - IV abx  - S/P LLE AKA 4/20  D/c plan stefani      Problem/Plan - 2:  ·  Problem: ESRD on dialysis.   ·  Plan: - On HD Tu/Th/Sat, next episode due on 4/18  - c/w renal meds  - Mild anemia likely 2/2 ESRD, improved from prior levels.    Problem/Plan - 3:  ·  Problem: PVD (peripheral vascular disease).   ·  Plan: - c/w aspirin/plavix/statin therapy.    Problem/Plan - 4:  ·  Problem: Type 2 diabetes mellitus.   ·  Plan: - Hold outpatient DM2 medications  - Place on ISS, FS qAC, CC diet.    Problem/Plan - 5:  ·  Problem: Need for prophylactic measure.   ·  Plan: DVT ppx - HSQ  Diet - DASH/CC/Renal diet  Activity - OOB to chair/with assistance, increase as tolerated    Problem/Plan - 6  - S/p 1 prbc 4/22   - 4/22: hold ASA &Plavix   - f/u post transfusion CBC

## 2023-04-22 NOTE — CHART NOTE - NSCHARTNOTEFT_GEN_A_CORE
Notified by RN that pt's lab had a critical value of hemoglobin 6.9. Pt received dialysis today and recheck hemoglobin was 7.1. Risk and benefit of transfusion was explained to the patient and patient agreed on getting transfusion today. Pt's blood pressure was soft with tachycardia, likely d/t hypovolemia from bleeding. However, otherwise pt has not complaints such as dizziness or weakness. Case was discussed with vascular team as well. Will continue with transfusion at this time and will recheck cbc tonight. If pt's hgb doesn't respond to transfusion will call vascular back to take a look at the AKA site for possible hemostatic agent. Dr. Babcock notified.

## 2023-04-22 NOTE — CONSULT NOTE ADULT - ASSESSMENT
72 year old Male with PMHx of ESRD on HD (T/Th/Sat), DM2, PVD (s/p R BKA in Nov 2022) and recurrent hematurias presented to the hospital with worsening L foot pain., found to have Left foot gangrene. Underwent AKA on 4/20. Plan for discharge to rehab.    Afebrile  Leukocytosis - trending down     Workup:   Blood Cx on 4/17: NGTD    Antimicrobials:   Unasyn 4/18-->4/22    #Left foot gangrene s/p L AKA on 4/20    Recommendations:          PT TO BE SEEN. PRELIM NOTE  PENDING RECS. PLEASE WAIT FOR FINAL RECS AFTER DISCUSSION WITH ATTENDING#           72 year old Male with PMHx of ESRD on HD (T/Th/Sat), DM2, PVD (s/p R BKA in Nov 2022) and recurrent hematurias presented to the hospital with worsening L foot pain., found to have Left foot gangrene. Underwent AKA on 4/20. Plan for discharge to rehab.    Afebrile  Leukocytosis - trending down     Workup:   Blood Cx on 4/17: NGTD    Antimicrobials:   Unasyn 4/18-->4/22    #Left foot gangrene s/p L AKA on 4/20    Recommendations:  Continue Unasyn 3 g IV daily ( AD on dialysis days)  Follow up with vascular surgery for surgical site check   If wound is healing well, with no concern for complications, can stop antibiotics   Plan discussed with primary team     Seen and discussed with Dr Tea Nuno MD, PGY4  ID fellow  [employer] Teams Preferred  After 5pm/weekends call [phone]          PT TO BE SEEN. PRELIM NOTE  PENDING RECS. PLEASE WAIT FOR FINAL RECS AFTER DISCUSSION WITH ATTENDING#

## 2023-04-22 NOTE — PROGRESS NOTE ADULT - ASSESSMENT
72y Male with history of ESRD on HD presents with L foot gangrene. Nephrology consulted for ESRD status.    1) ESRD: Plan for next maintenance HD today to place back on TTS schedule. Monitor electrolytes.    2) HTN with ESRD: BP low normal. Continue with midodrine 5 mg pre-HD to avoid intradialytic hypotension.    3) Anemia of renal disease: Hb low with adequate iron stores. Pt for 1u prbc today. c/w  Epo 6K with HD. Monitor Hb.    4) Hyperphosphatemia: Phosphorus acceptable. Continue with renvela 2 tabs with meals and renal diet. Monitor serum calcium and phosphorus.      [facility] NEPHROLOG[facility]  Wili Hopkins M.D.  Kiran Feng D.O.  Sujatha Dumont M.D.  Radha Miller, MSN, ANP-C    Telephone: [phone]  Facsimile: [phone]    [id number] [place] [address]

## 2023-04-23 LAB
ANION GAP SERPL CALC-SCNC: 15 MMOL/L — HIGH (ref 7–14)
BUN SERPL-MCNC: 32 MG/DL — HIGH (ref 7–23)
CALCIUM SERPL-MCNC: 8 MG/DL — LOW (ref 8.4–10.5)
CHLORIDE SERPL-SCNC: 94 MMOL/L — LOW (ref 98–107)
CO2 SERPL-SCNC: 22 MMOL/L — SIGNIFICANT CHANGE UP (ref 22–31)
CREAT SERPL-MCNC: 3.14 MG/DL — HIGH (ref 0.5–1.3)
EGFR: 20 ML/MIN/1.73M2 — LOW
GLUCOSE BLDC GLUCOMTR-MCNC: 199 MG/DL — HIGH (ref 70–99)
GLUCOSE BLDC GLUCOMTR-MCNC: 216 MG/DL — HIGH (ref 70–99)
GLUCOSE BLDC GLUCOMTR-MCNC: 227 MG/DL — HIGH (ref 70–99)
GLUCOSE BLDC GLUCOMTR-MCNC: 234 MG/DL — HIGH (ref 70–99)
GLUCOSE BLDC GLUCOMTR-MCNC: 277 MG/DL — HIGH (ref 70–99)
GLUCOSE SERPL-MCNC: 217 MG/DL — HIGH (ref 70–99)
HCT VFR BLD CALC: 19.1 % — CRITICAL LOW (ref 39–50)
HCT VFR BLD CALC: 23.9 % — LOW (ref 39–50)
HGB BLD-MCNC: 6 G/DL — CRITICAL LOW (ref 13–17)
HGB BLD-MCNC: 7.4 G/DL — LOW (ref 13–17)
MAGNESIUM SERPL-MCNC: 1.9 MG/DL — SIGNIFICANT CHANGE UP (ref 1.6–2.6)
MCHC RBC-ENTMCNC: 29.4 PG — SIGNIFICANT CHANGE UP (ref 27–34)
MCHC RBC-ENTMCNC: 30.6 PG — SIGNIFICANT CHANGE UP (ref 27–34)
MCHC RBC-ENTMCNC: 31 GM/DL — LOW (ref 32–36)
MCHC RBC-ENTMCNC: 31.4 GM/DL — LOW (ref 32–36)
MCV RBC AUTO: 94.8 FL — SIGNIFICANT CHANGE UP (ref 80–100)
MCV RBC AUTO: 97.4 FL — SIGNIFICANT CHANGE UP (ref 80–100)
NRBC # BLD: 0 /100 WBCS — SIGNIFICANT CHANGE UP (ref 0–0)
NRBC # BLD: 0 /100 WBCS — SIGNIFICANT CHANGE UP (ref 0–0)
NRBC # FLD: 0 K/UL — SIGNIFICANT CHANGE UP (ref 0–0)
NRBC # FLD: 0 K/UL — SIGNIFICANT CHANGE UP (ref 0–0)
PHOSPHATE SERPL-MCNC: 2.8 MG/DL — SIGNIFICANT CHANGE UP (ref 2.5–4.5)
PLATELET # BLD AUTO: 240 K/UL — SIGNIFICANT CHANGE UP (ref 150–400)
PLATELET # BLD AUTO: 285 K/UL — SIGNIFICANT CHANGE UP (ref 150–400)
POTASSIUM SERPL-MCNC: 4.1 MMOL/L — SIGNIFICANT CHANGE UP (ref 3.5–5.3)
POTASSIUM SERPL-SCNC: 4.1 MMOL/L — SIGNIFICANT CHANGE UP (ref 3.5–5.3)
RBC # BLD: 1.96 M/UL — LOW (ref 4.2–5.8)
RBC # BLD: 2.52 M/UL — LOW (ref 4.2–5.8)
RBC # FLD: 16.8 % — HIGH (ref 10.3–14.5)
RBC # FLD: 17.1 % — HIGH (ref 10.3–14.5)
SODIUM SERPL-SCNC: 131 MMOL/L — LOW (ref 135–145)
WBC # BLD: 11.57 K/UL — HIGH (ref 3.8–10.5)
WBC # BLD: 12.6 K/UL — HIGH (ref 3.8–10.5)
WBC # FLD AUTO: 11.57 K/UL — HIGH (ref 3.8–10.5)
WBC # FLD AUTO: 12.6 K/UL — HIGH (ref 3.8–10.5)

## 2023-04-23 RX ORDER — HYDROMORPHONE HYDROCHLORIDE 2 MG/ML
0.5 INJECTION INTRAMUSCULAR; INTRAVENOUS; SUBCUTANEOUS ONCE
Refills: 0 | Status: DISCONTINUED | OUTPATIENT
Start: 2023-04-23 | End: 2023-04-23

## 2023-04-23 RX ORDER — ACETAMINOPHEN 500 MG
1000 TABLET ORAL ONCE
Refills: 0 | Status: COMPLETED | OUTPATIENT
Start: 2023-04-23 | End: 2023-04-23

## 2023-04-23 RX ORDER — SEVELAMER CARBONATE 2400 MG/1
800 POWDER, FOR SUSPENSION ORAL
Refills: 0 | Status: DISCONTINUED | OUTPATIENT
Start: 2023-04-23 | End: 2023-04-25

## 2023-04-23 RX ORDER — METOCLOPRAMIDE HCL 10 MG
5 TABLET ORAL ONCE
Refills: 0 | Status: COMPLETED | OUTPATIENT
Start: 2023-04-23 | End: 2023-04-23

## 2023-04-23 RX ADMIN — SEVELAMER CARBONATE 800 MILLIGRAM(S): 2400 POWDER, FOR SUSPENSION ORAL at 12:47

## 2023-04-23 RX ADMIN — HYDROMORPHONE HYDROCHLORIDE 1 MILLIGRAM(S): 2 INJECTION INTRAMUSCULAR; INTRAVENOUS; SUBCUTANEOUS at 06:41

## 2023-04-23 RX ADMIN — HEPARIN SODIUM 5000 UNIT(S): 5000 INJECTION INTRAVENOUS; SUBCUTANEOUS at 17:20

## 2023-04-23 RX ADMIN — HEPARIN SODIUM 5000 UNIT(S): 5000 INJECTION INTRAVENOUS; SUBCUTANEOUS at 05:08

## 2023-04-23 RX ADMIN — ATORVASTATIN CALCIUM 40 MILLIGRAM(S): 80 TABLET, FILM COATED ORAL at 22:49

## 2023-04-23 RX ADMIN — Medication 2: at 09:14

## 2023-04-23 RX ADMIN — Medication 400 MILLIGRAM(S): at 23:31

## 2023-04-23 RX ADMIN — Medication 3: at 17:43

## 2023-04-23 RX ADMIN — CHLORHEXIDINE GLUCONATE 1 APPLICATION(S): 213 SOLUTION TOPICAL at 12:38

## 2023-04-23 RX ADMIN — ZINC SULFATE TAB 220 MG (50 MG ZINC EQUIVALENT) 220 MILLIGRAM(S): 220 (50 ZN) TAB at 11:41

## 2023-04-23 RX ADMIN — Medication 5 MILLIGRAM(S): at 23:31

## 2023-04-23 RX ADMIN — HYDROMORPHONE HYDROCHLORIDE 0.5 MILLIGRAM(S): 2 INJECTION INTRAMUSCULAR; INTRAVENOUS; SUBCUTANEOUS at 16:09

## 2023-04-23 RX ADMIN — Medication 650 MILLIGRAM(S): at 14:45

## 2023-04-23 RX ADMIN — HYDROMORPHONE HYDROCHLORIDE 0.5 MILLIGRAM(S): 2 INJECTION INTRAMUSCULAR; INTRAVENOUS; SUBCUTANEOUS at 16:35

## 2023-04-23 RX ADMIN — Medication 650 MILLIGRAM(S): at 13:54

## 2023-04-23 RX ADMIN — Medication 1 TABLET(S): at 11:41

## 2023-04-23 RX ADMIN — HYDROMORPHONE HYDROCHLORIDE 1 MILLIGRAM(S): 2 INJECTION INTRAMUSCULAR; INTRAVENOUS; SUBCUTANEOUS at 07:11

## 2023-04-23 RX ADMIN — SEVELAMER CARBONATE 800 MILLIGRAM(S): 2400 POWDER, FOR SUSPENSION ORAL at 17:43

## 2023-04-23 RX ADMIN — Medication 2: at 12:45

## 2023-04-23 NOTE — PHYSICAL THERAPY INITIAL EVALUATION ADULT - ADDITIONAL COMMENTS
How Severe Are Your Spot(S)?: mild Have Your Spot(S) Been Treated In The Past?: has not been treated Hpi Title: Evaluation of Skin Lesions Family Member: Brother Patient report lives in [facility], non ambulatory, uses wheel chair for locomotion.

## 2023-04-23 NOTE — PROGRESS NOTE ADULT - SUBJECTIVE AND OBJECTIVE BOX
[facility] NEPHROLOGY- PROGRESS NOTE    72y Male with history of ESRD on HD presents with L foot gangrene. Nephrology consulted for ESRD status.    REVIEW OF SYSTEMS:  Gen: no fevers  Cards: no chest pain  Resp: no dyspnea  GI: no nausea or vomiting or diarrhea  Vascular: no LE edema, + LLE pain improving    No Known Allergies      Hospital Medications: Medications reviewed    VITALS:  T(F): 99.2 (04-23-23 @ 10:06), Max: 99.6 (04-22-23 @ 14:44)  HR: 105 (04-23-23 @ 10:06)  BP: 107/67 (04-23-23 @ 10:06)  RR: 18 (04-23-23 @ 10:06)  SpO2: 100% (04-23-23 @ 10:06)  Wt(kg): --    04-22 @ 07:01  -  04-23 @ 07:00  --------------------------------------------------------  IN: 1312 mL / OUT: 1251 mL / NET: 61 mL    04-23 @ 07:01  -  04-23 @ 11:00  --------------------------------------------------------  IN: 200 mL / OUT: 175 mL / NET: 25 mL      PHYSICAL EXAM:    Gen: NAD, calm  Cards: RRR, +S1/S2, no M/G/R  Resp: CTA B/L  GI: soft, NT/ND, NABS  Vascular: B/L BKA, RIJ TDC intact      LABS:  04-23    131<L>  |  94<L>  |  32<H>  ----------------------------<  217<H>  4.1   |  22  |  3.14<H>    Ca    8.0<L>      23 Apr 2023 06:27  Phos  2.8     04-23  Mg     1.90     04-23      Creatinine Trend: 3.14 <--, 3.66 <--, 4.97 <--, 3.85 <--, 3.09 <--, 4.80 <--, 4.44 <--                        7.4    11.57 )-----------( 240      ( 23 Apr 2023 09:15 )             23.9     Urine Studies:

## 2023-04-23 NOTE — PHYSICAL THERAPY INITIAL EVALUATION ADULT - ACTIVE RANGE OF MOTION EXAMINATION, REHAB EVAL
right hip and knee WFL, left hip flexion 0-90 degrees/bilateral upper extremity Active ROM was WFL (within functional limits)

## 2023-04-23 NOTE — PROGRESS NOTE ADULT - ASSESSMENT
72y Male with history of ESRD on HD presents with L foot gangrene. Nephrology consulted for ESRD status.    1) ESRD: Last HD  4/22 (HD flowsheet not in chart). Plan for next maintenance HD on 4/24. Monitor electrolytes.    2) HTN with ESRD: BP low normal. Continue with midodrine 5 mg pre-HD to avoid intradialytic hypotension.    3) Anemia of renal disease: Hb low with adequate iron stores. s/p 1u prbc 4/22. c/w  Epo 6K with HD. Monitor Hb.    4) Hyperphosphatemia: Phosphorus acceptable. Will decrease renvela to 1 tab with meals and renal diet. Monitor serum calcium and phosphorus.      [facility] NEPHROLOGY  Wili Hopkins M.D.  Kiran Feng D.O.  Sujatha Dumont M.D.  Radha Miller, MSN, ANP-C    Telephone: [phone]  Facsimile: [phone]    [address] [place] [address]

## 2023-04-23 NOTE — PROGRESS NOTE ADULT - SUBJECTIVE AND OBJECTIVE BOX
VASCULAR SURGERY PROGRESS NOTE:    ========================================  VASCULAR SURGERY PAGER   ========================================    Subjective:  Pt reports pain in L AKA site, controlled w/ current meds.      Objective:    PE:  Gen: NAD  Resp: airway patent, respirations unlabored, no increased WoB  CVS: RRR  Abd: soft, ND, NT, no rebound or guarding  Ext: L AKA incision c/d/i, no erythema, fluctuance, or bleeding  Neuro: AAOx3, no focal deficits    Vital Signs Last 24 Hrs  T(C): 37.1 (2023 05:00), Max: 37.6 (2023 14:44)  T(F): 98.7 (2023 05:00), Max: 99.6 (2023 14:44)  HR: 87 (2023 05:00) (87 - 118)  BP: 100/56 (2023 05:00) (96/54 - 114/65)  BP(mean): --  RR: 18 (2023 05:00) (17 - 19)  SpO2: 100% (2023 05:00) (99% - 100%)    Parameters below as of 2023 05:00  Patient On (Oxygen Delivery Method): room air        I&O's Detail    2023 07:01  -  2023 07:00  --------------------------------------------------------  IN:    Oral Fluid: 625 mL    Other (mL): 400 mL    PRBCs (Packed Red Blood Cells): 287 mL  Total IN: 1312 mL    OUT:    Blood Loss (mL): 0 mL    IV PiggyBack: 0 mL    Other (mL): 900 mL    Stool (mL): 1 mL    Voided (mL): 350 mL  Total OUT: 1251 mL    Total NET: 61 mL          Daily     Daily Weight in k (2023 13:30)    MEDICATIONS  (STANDING):  atorvastatin 40 milliGRAM(s) Oral at bedtime  chlorhexidine 2% Cloths 1 Application(s) Topical daily  dextrose 5%. 1000 milliLiter(s) (100 mL/Hr) IV Continuous <Continuous>  dextrose 5%. 1000 milliLiter(s) (50 mL/Hr) IV Continuous <Continuous>  dextrose 50% Injectable 25 Gram(s) IV Push once  dextrose 50% Injectable 12.5 Gram(s) IV Push once  dextrose 50% Injectable 25 Gram(s) IV Push once  epoetin nyasia-epbx (RETACRIT) Injectable 6000 Unit(s) IV Push <User Schedule>  glucagon  Injectable 1 milliGRAM(s) IntraMuscular once  heparin   Injectable 5000 Unit(s) SubCutaneous every 12 hours  insulin lispro (ADMELOG) corrective regimen sliding scale   SubCutaneous three times a day before meals  insulin lispro (ADMELOG) corrective regimen sliding scale   SubCutaneous at bedtime  midodrine. 5 milliGRAM(s) Oral <User Schedule>  Nephro-zack 1 Tablet(s) Oral daily  sevelamer carbonate 1600 milliGRAM(s) Oral three times a day with meals  zinc sulfate 220 milliGRAM(s) Oral daily    MEDICATIONS  (PRN):  acetaminophen     Tablet .. 650 milliGRAM(s) Oral every 6 hours PRN Temp greater or equal to 38C (100.4F), Mild Pain (1 - 3)  bisacodyl 5 milliGRAM(s) Oral every 12 hours PRN Constipation  dextrose Oral Gel 15 Gram(s) Oral once PRN Blood Glucose LESS THAN 70 milliGRAM(s)/deciliter  melatonin 3 milliGRAM(s) Oral at bedtime PRN Insomnia  ondansetron Injectable 4 milliGRAM(s) IV Push every 8 hours PRN Nausea and/or Vomiting  sodium chloride 0.9% Bolus. 100 milliLiter(s) IV Bolus every 5 minutes PRN SBP LESS THAN or EQUAL to 90 mmHg      LABS:                        6.0    12.60 )-----------( 285      ( 2023 06:27 )             19.1     04-23    131<L>  |  94<L>  |  32<H>  ----------------------------<  217<H>  4.1   |  22  |  3.14<H>    Ca    8.0<L>      2023 06:27  Phos  2.8       Mg     1.90                 RADIOLOGY & ADDITIONAL STUDIES:

## 2023-04-23 NOTE — CHART NOTE - NSCHARTNOTEFT_GEN_A_CORE
Mild bleeding noted at L AKA site. Vitally stable. RN to reinforce dressing. DVT ppx held for now. Will continue to monitor site.

## 2023-04-23 NOTE — PHYSICAL THERAPY INITIAL EVALUATION ADULT - PERTINENT HX OF CURRENT PROBLEM, REHAB EVAL
Patient is 72 year old male with history of ESRD on hemo dialysis, DM2, PVD (s/p right BKA in Nov 2022) and recurrent hematurias (has required CBI in the past) who presents to the hospital with worsening pain and duskiness of his left foot. s/p left AKA 4/20

## 2023-04-23 NOTE — PROGRESS NOTE ADULT - SUBJECTIVE AND OBJECTIVE BOX
PATIENT SEEN AND EXAMINED ON :- 4/23/23  DATE OF SERVICE:         4/23/23    Interim events noted,Labs ,Radiological studies and Cardiology tests reviewed .    MR#[id number]  PATIENT NAME:-BRIAN [facility] COURSE: HPI:  Please note, patient with second MRN #[id number] with additional recent hospitalizations.    This is a 72M with history of ESRD on HD (T/Th/Sat), DM2, PVD (s/p R BKA in Nov 2022) and recurrent hematurias (has required CBI in the past) who presents to the hospital with worsening pain and duskiness of his L foot. Patient currently is a limited historian, HPI supplemented with information from patient's sister Darshana ([phone]). As per patient he was sent to the hospital for possible L foot infection, does report pain to the bottom of his foot but otherwise denies any fevers/chills/diaphoresis, no swelling or redness of his L foot. No other acute complaints. Said that he is supposed to have an amputation of his L leg but is unclear on the details. Spoke with patient's sister, Darshana, who said that the patient has known PVD, had a recent R leg BKA and has had some procedures with Dr. [facility] [place] (sister unsure of the procedures) and states that they were told the procedures did not help improve the peripheral blood flow in the patient's L leg and that he would need an amputation. Said that the family was told he was being sent to the hospital for the amputation evaluation. Otherwise sister not aware of any other acute issues for the patient.     On arrival to the ED, his vitals were T 97.5, P 90, /59, RR 16, O2 sat 98% RA. His lab work was most significant for mild anemia and elevated CRP. His imaging did not show acute findings to suggest OM. He was admitted to medicine.   (17 Apr 2023 23:19)      INTERIM EVENTS:Patient seen at bedside ,interim events noted.      PMH -reviewed admission note, no change since admission  HEART FAILURE: Acute[ ]Chronic[ ] Systolic[ ] Diastolic[ ] Combined Systolic and Diastolic[ ]  CAD[ ] CABG[ ] PCI[ ]  DEVICES[ ] PPM[ ] ICD[ ] ILR[ ]  ATRIAL FIBRILLATION[ ] Paroxysmal[ ] Permanent[ ] CHADS2-[  ]  CANDE[ ] CKD1[ ] CKD2[ ] CKD3[ ] CKD4[ ] ESRD[ ]  COPD[ ] HTN[ ]   DM[ ] Type1[ ] Type 2[ ]   CVA[ ] Paresis[ ]    AMBULATION: Assisted[ ] Cane/walker[ ] Independent[ ]    MEDICATIONS  (STANDING):  atorvastatin 40 milliGRAM(s) Oral at bedtime  chlorhexidine 2% Cloths 1 Application(s) Topical daily  dextrose 5%. 1000 milliLiter(s) (100 mL/Hr) IV Continuous <Continuous>  dextrose 5%. 1000 milliLiter(s) (50 mL/Hr) IV Continuous <Continuous>  dextrose 50% Injectable 25 Gram(s) IV Push once  dextrose 50% Injectable 12.5 Gram(s) IV Push once  dextrose 50% Injectable 25 Gram(s) IV Push once  epoetin nyasia-epbx (RETACRIT) Injectable 6000 Unit(s) IV Push <User Schedule>  glucagon  Injectable 1 milliGRAM(s) IntraMuscular once  insulin lispro (ADMELOG) corrective regimen sliding scale   SubCutaneous three times a day before meals  insulin lispro (ADMELOG) corrective regimen sliding scale   SubCutaneous at bedtime  midodrine. 5 milliGRAM(s) Oral <User Schedule>  Nephro-zack 1 Tablet(s) Oral daily  sevelamer carbonate 800 milliGRAM(s) Oral three times a day with meals  zinc sulfate 220 milliGRAM(s) Oral daily    MEDICATIONS  (PRN):  acetaminophen     Tablet .. 650 milliGRAM(s) Oral every 6 hours PRN Temp greater or equal to 38C (100.4F), Mild Pain (1 - 3)  bisacodyl 5 milliGRAM(s) Oral every 12 hours PRN Constipation  dextrose Oral Gel 15 Gram(s) Oral once PRN Blood Glucose LESS THAN 70 milliGRAM(s)/deciliter  melatonin 3 milliGRAM(s) Oral at bedtime PRN Insomnia  ondansetron Injectable 4 milliGRAM(s) IV Push every 8 hours PRN Nausea and/or Vomiting  sodium chloride 0.9% Bolus. 100 milliLiter(s) IV Bolus every 5 minutes PRN SBP LESS THAN or EQUAL to 90 mmHg            REVIEW OF SYSTEMS:  Constitutional: [ ] fever, [ ]weight loss,  [ ]fatigue [ ]weight gain  Eyes: [ ] visual changes  Respiratory: [ ]shortness of breath;  [ ] cough, [ ]wheezing, [ ]chills, [ ]hemoptysis  Cardiovascular: [ ] chest pain, [ ]palpitations, [ ]dizziness,  [ ]leg swelling[ ]orthopnea[ ]PND  Gastrointestinal: [ ] abdominal pain, [ ]nausea, [ ]vomiting,  [ ]diarrhea [ ]Constipation [ ]Melena  Genitourinary: [ ] dysuria, [ ] hematuria [ ]Garcia  Neurologic: [ ] headaches [ ] tremors[ ]weakness [ ]Paralysis Right[ ] Left[ ]  Skin: [ ] itching, [ ]burning, [ ] rashes  Endocrine: [ ] heat or cold intolerance  Musculoskeletal: [ ] joint pain or swelling; [ ] muscle, back, or extremity pain  Psychiatric: [ ] depression, [ ]anxiety, [ ]mood swings, or [ ]difficulty sleeping  Hematologic: [ ] easy bruising, [ ] bleeding gums    [ ] All remaining systems negative except as per above.   [ ]Unable to obtain.  [x] No change in ROS since admission      Vital Signs Last 24 Hrs  T(C): 37.4 (23 Apr 2023 16:03), Max: 37.5 (23 Apr 2023 13:46)  T(F): 99.3 (23 Apr 2023 16:03), Max: 99.5 (23 Apr 2023 13:46)  HR: 98 (23 Apr 2023 16:48) (87 - 108)  BP: 103/61 (23 Apr 2023 16:03) (98/59 - 111/67)  BP(mean): --  RR: 18 (23 Apr 2023 16:03) (18 - 19)  SpO2: 100% (23 Apr 2023 16:03) (100% - 100%)    Parameters below as of 23 Apr 2023 16:03  Patient On (Oxygen Delivery Method): room air      I&O's Summary    22 Apr 2023 07:01  -  23 Apr 2023 07:00  --------------------------------------------------------  IN: 1312 mL / OUT: 1251 mL / NET: 61 mL    23 Apr 2023 07:01  -  23 Apr 2023 21:12  --------------------------------------------------------  IN: 550 mL / OUT: 501 mL / NET: 49 mL        PHYSICAL EXAM:  General: No acute distress BMI-  HEENT: EOMI, PERRL  Neck: Supple, [ ] JVD  Lungs: Equal air entry bilaterally; [ ] rales [ ] wheezing [ ] rhonchi  Heart: Regular rate and rhythm; [x ] murmur   2/6 [ x] systolic [ ] diastolic [ ] radiation[ ] rubs [ ]  gallops  Abdomen: Nontender, bowel sounds present  Extremities: No clubbing, cyanosis, [ ] edema [ ]Pulses  equal and intact  Nervous system:  Alert & Oriented X3, no focal deficits  Psychiatric: Normal affect  Skin: No rashes or lesions    LABS:  04-23    131<L>  |  94<L>  |  32<H>  ----------------------------<  217<H>  4.1   |  22  |  3.14<H>    Ca    8.0<L>      23 Apr 2023 06:27  Phos  2.8     04-23  Mg     1.90     04-23      Creatinine Trend: 3.14<--, 3.66<--, 4.97<--, 3.85<--, 3.09<--, 4.80<--                        7.4    11.57 )-----------( 240      ( 23 Apr 2023 09:15 )             23.9

## 2023-04-23 NOTE — PROGRESS NOTE ADULT - ASSESSMENT
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 s/p L AKA 4/20    Plan:  - Patient non-ambulatory with minimal ability to use left leg for transfers.   - Recommend PM&R eval given Pt now b/l amputee    C Team Surgery   [id number]

## 2023-04-24 LAB
ANION GAP SERPL CALC-SCNC: 14 MMOL/L — SIGNIFICANT CHANGE UP (ref 7–14)
BLD GP AB SCN SERPL QL: NEGATIVE — SIGNIFICANT CHANGE UP
BUN SERPL-MCNC: 44 MG/DL — HIGH (ref 7–23)
CALCIUM SERPL-MCNC: 8.1 MG/DL — LOW (ref 8.4–10.5)
CHLORIDE SERPL-SCNC: 95 MMOL/L — LOW (ref 98–107)
CO2 SERPL-SCNC: 21 MMOL/L — LOW (ref 22–31)
CREAT SERPL-MCNC: 4.28 MG/DL — HIGH (ref 0.5–1.3)
EGFR: 14 ML/MIN/1.73M2 — LOW
GLUCOSE BLDC GLUCOMTR-MCNC: 170 MG/DL — HIGH (ref 70–99)
GLUCOSE BLDC GLUCOMTR-MCNC: 236 MG/DL — HIGH (ref 70–99)
GLUCOSE BLDC GLUCOMTR-MCNC: 247 MG/DL — HIGH (ref 70–99)
GLUCOSE BLDC GLUCOMTR-MCNC: 249 MG/DL — HIGH (ref 70–99)
GLUCOSE SERPL-MCNC: 218 MG/DL — HIGH (ref 70–99)
HCT VFR BLD CALC: 21.6 % — LOW (ref 39–50)
HCT VFR BLD CALC: 22.1 % — LOW (ref 39–50)
HGB BLD-MCNC: 6.8 G/DL — CRITICAL LOW (ref 13–17)
HGB BLD-MCNC: 6.9 G/DL — CRITICAL LOW (ref 13–17)
MAGNESIUM SERPL-MCNC: 1.9 MG/DL — SIGNIFICANT CHANGE UP (ref 1.6–2.6)
MCHC RBC-ENTMCNC: 30.7 PG — SIGNIFICANT CHANGE UP (ref 27–34)
MCHC RBC-ENTMCNC: 30.8 PG — SIGNIFICANT CHANGE UP (ref 27–34)
MCHC RBC-ENTMCNC: 31.2 GM/DL — LOW (ref 32–36)
MCHC RBC-ENTMCNC: 31.5 GM/DL — LOW (ref 32–36)
MCV RBC AUTO: 97.7 FL — SIGNIFICANT CHANGE UP (ref 80–100)
MCV RBC AUTO: 98.2 FL — SIGNIFICANT CHANGE UP (ref 80–100)
NRBC # BLD: 0 /100 WBCS — SIGNIFICANT CHANGE UP (ref 0–0)
NRBC # BLD: 0 /100 WBCS — SIGNIFICANT CHANGE UP (ref 0–0)
NRBC # FLD: 0 K/UL — SIGNIFICANT CHANGE UP (ref 0–0)
NRBC # FLD: 0 K/UL — SIGNIFICANT CHANGE UP (ref 0–0)
PHOSPHATE SERPL-MCNC: 4 MG/DL — SIGNIFICANT CHANGE UP (ref 2.5–4.5)
PLATELET # BLD AUTO: 216 K/UL — SIGNIFICANT CHANGE UP (ref 150–400)
PLATELET # BLD AUTO: 238 K/UL — SIGNIFICANT CHANGE UP (ref 150–400)
POTASSIUM SERPL-MCNC: 4 MMOL/L — SIGNIFICANT CHANGE UP (ref 3.5–5.3)
POTASSIUM SERPL-SCNC: 4 MMOL/L — SIGNIFICANT CHANGE UP (ref 3.5–5.3)
RBC # BLD: 2.21 M/UL — LOW (ref 4.2–5.8)
RBC # BLD: 2.25 M/UL — LOW (ref 4.2–5.8)
RBC # FLD: 15.9 % — HIGH (ref 10.3–14.5)
RBC # FLD: 16 % — HIGH (ref 10.3–14.5)
RH IG SCN BLD-IMP: POSITIVE — SIGNIFICANT CHANGE UP
SARS-COV-2 RNA SPEC QL NAA+PROBE: SIGNIFICANT CHANGE UP
SODIUM SERPL-SCNC: 130 MMOL/L — LOW (ref 135–145)
WBC # BLD: 10.07 K/UL — SIGNIFICANT CHANGE UP (ref 3.8–10.5)
WBC # BLD: 10.8 K/UL — HIGH (ref 3.8–10.5)
WBC # FLD AUTO: 10.07 K/UL — SIGNIFICANT CHANGE UP (ref 3.8–10.5)
WBC # FLD AUTO: 10.8 K/UL — HIGH (ref 3.8–10.5)

## 2023-04-24 PROCEDURE — 99231 SBSQ HOSP IP/OBS SF/LOW 25: CPT

## 2023-04-24 RX ORDER — HEPARIN SODIUM 5000 [USP'U]/ML
5000 INJECTION INTRAVENOUS; SUBCUTANEOUS EVERY 12 HOURS
Refills: 0 | Status: DISCONTINUED | OUTPATIENT
Start: 2023-04-24 | End: 2023-05-01

## 2023-04-24 RX ORDER — HYDROMORPHONE HYDROCHLORIDE 2 MG/ML
0.5 INJECTION INTRAMUSCULAR; INTRAVENOUS; SUBCUTANEOUS ONCE
Refills: 0 | Status: DISCONTINUED | OUTPATIENT
Start: 2023-04-24 | End: 2023-04-24

## 2023-04-24 RX ORDER — METOCLOPRAMIDE HCL 10 MG
5 TABLET ORAL ONCE
Refills: 0 | Status: COMPLETED | OUTPATIENT
Start: 2023-04-24 | End: 2023-04-24

## 2023-04-24 RX ADMIN — Medication 5 MILLIGRAM(S): at 20:30

## 2023-04-24 RX ADMIN — SEVELAMER CARBONATE 800 MILLIGRAM(S): 2400 POWDER, FOR SUSPENSION ORAL at 17:55

## 2023-04-24 RX ADMIN — Medication 1: at 17:55

## 2023-04-24 RX ADMIN — CHLORHEXIDINE GLUCONATE 1 APPLICATION(S): 213 SOLUTION TOPICAL at 13:03

## 2023-04-24 RX ADMIN — HEPARIN SODIUM 5000 UNIT(S): 5000 INJECTION INTRAVENOUS; SUBCUTANEOUS at 17:55

## 2023-04-24 RX ADMIN — ATORVASTATIN CALCIUM 40 MILLIGRAM(S): 80 TABLET, FILM COATED ORAL at 21:27

## 2023-04-24 RX ADMIN — SEVELAMER CARBONATE 800 MILLIGRAM(S): 2400 POWDER, FOR SUSPENSION ORAL at 09:09

## 2023-04-24 RX ADMIN — HYDROMORPHONE HYDROCHLORIDE 0.5 MILLIGRAM(S): 2 INJECTION INTRAMUSCULAR; INTRAVENOUS; SUBCUTANEOUS at 09:57

## 2023-04-24 RX ADMIN — SEVELAMER CARBONATE 800 MILLIGRAM(S): 2400 POWDER, FOR SUSPENSION ORAL at 12:59

## 2023-04-24 RX ADMIN — ZINC SULFATE TAB 220 MG (50 MG ZINC EQUIVALENT) 220 MILLIGRAM(S): 220 (50 ZN) TAB at 12:59

## 2023-04-24 RX ADMIN — Medication 1000 MILLIGRAM(S): at 00:01

## 2023-04-24 RX ADMIN — HYDROMORPHONE HYDROCHLORIDE 0.5 MILLIGRAM(S): 2 INJECTION INTRAMUSCULAR; INTRAVENOUS; SUBCUTANEOUS at 10:27

## 2023-04-24 RX ADMIN — Medication 2: at 12:58

## 2023-04-24 RX ADMIN — Medication 2: at 09:08

## 2023-04-24 RX ADMIN — Medication 1 TABLET(S): at 12:59

## 2023-04-24 NOTE — PROGRESS NOTE ADULT - SUBJECTIVE AND OBJECTIVE BOX
PATIENT SEEN AND EXAMINED ON :- 4/24/23  DATE OF SERVICE:  4/24/23           Interim events noted,Labs ,Radiological studies and Cardiology tests reviewed .    MR#[id number]  PATIENT NAME:-BRIAN [facility] COURSE: HPI:  Please note, patient with second MRN #[id number] with additional recent hospitalizations.    This is a 72M with history of ESRD on HD (T/Th/Sat), DM2, PVD (s/p R BKA in Nov 2022) and recurrent hematurias (has required CBI in the past) who presents to the hospital with worsening pain and duskiness of his L foot. Patient currently is a limited historian, HPI supplemented with information from patient's sister Darshana ([phone]). As per patient he was sent to the hospital for possible L foot infection, does report pain to the bottom of his foot but otherwise denies any fevers/chills/diaphoresis, no swelling or redness of his L foot. No other acute complaints. Said that he is supposed to have an amputation of his L leg but is unclear on the details. Spoke with patient's sister, Darshana, who said that the patient has known PVD, had a recent R leg BKA and has had some procedures with Dr[facility] [place] (sister unsure of the procedures) and states that they were told the procedures did not help improve the peripheral blood flow in the patient's L leg and that he would need an amputation. Said that the family was told he was being sent to the hospital for the amputation evaluation. Otherwise sister not aware of any other acute issues for the patient.     On arrival to the ED, his vitals were T 97.5, P 90, /59, RR 16, O2 sat 98% RA. His lab work was most significant for mild anemia and elevated CRP. His imaging did not show acute findings to suggest OM. He was admitted to medicine.   (17 Apr 2023 23:19)      INTERIM EVENTS:Patient seen at bedside ,interim events noted.      PMH -reviewed admission note, no change since admission  HEART FAILURE: Acute[ ]Chronic[ ] Systolic[ ] Diastolic[ ] Combined Systolic and Diastolic[ ]  CAD[ ] CABG[ ] PCI[ ]  DEVICES[ ] PPM[ ] ICD[ ] ILR[ ]  ATRIAL FIBRILLATION[ ] Paroxysmal[ ] Permanent[ ] CHADS2-[  ]  CANDE[ ] CKD1[ ] CKD2[ ] CKD3[ ] CKD4[ ] ESRD[ ]  COPD[ ] HTN[ ]   DM[ ] Type1[ ] Type 2[ ]   CVA[ ] Paresis[ ]    AMBULATION: Assisted[ ] Cane/walker[ ] Independent[ ]    MEDICATIONS  (STANDING):  atorvastatin 40 milliGRAM(s) Oral at bedtime  chlorhexidine 2% Cloths 1 Application(s) Topical daily  dextrose 5%. 1000 milliLiter(s) (100 mL/Hr) IV Continuous <Continuous>  dextrose 5%. 1000 milliLiter(s) (50 mL/Hr) IV Continuous <Continuous>  dextrose 50% Injectable 25 Gram(s) IV Push once  dextrose 50% Injectable 12.5 Gram(s) IV Push once  dextrose 50% Injectable 25 Gram(s) IV Push once  epoetin nyasia-epbx (RETACRIT) Injectable 6000 Unit(s) IV Push <User Schedule>  glucagon  Injectable 1 milliGRAM(s) IntraMuscular once  heparin   Injectable 5000 Unit(s) SubCutaneous every 12 hours  insulin lispro (ADMELOG) corrective regimen sliding scale   SubCutaneous at bedtime  insulin lispro (ADMELOG) corrective regimen sliding scale   SubCutaneous three times a day before meals  midodrine. 5 milliGRAM(s) Oral <User Schedule>  Nephro-zack 1 Tablet(s) Oral daily  sevelamer carbonate 800 milliGRAM(s) Oral three times a day with meals  zinc sulfate 220 milliGRAM(s) Oral daily    MEDICATIONS  (PRN):  acetaminophen     Tablet .. 650 milliGRAM(s) Oral every 6 hours PRN Temp greater or equal to 38C (100.4F), Mild Pain (1 - 3)  bisacodyl 5 milliGRAM(s) Oral every 12 hours PRN Constipation  dextrose Oral Gel 15 Gram(s) Oral once PRN Blood Glucose LESS THAN 70 milliGRAM(s)/deciliter  melatonin 3 milliGRAM(s) Oral at bedtime PRN Insomnia  ondansetron Injectable 4 milliGRAM(s) IV Push every 8 hours PRN Nausea and/or Vomiting  sodium chloride 0.9% Bolus. 100 milliLiter(s) IV Bolus every 5 minutes PRN SBP LESS THAN or EQUAL to 90 mmHg            REVIEW OF SYSTEMS:  Constitutional: [ ] fever, [ ]weight loss,  [ ]fatigue [ ]weight gain  Eyes: [ ] visual changes  Respiratory: [ ]shortness of breath;  [ ] cough, [ ]wheezing, [ ]chills, [ ]hemoptysis  Cardiovascular: [ ] chest pain, [ ]palpitations, [ ]dizziness,  [ ]leg swelling[ ]orthopnea[ ]PND  Gastrointestinal: [ ] abdominal pain, [ ]nausea, [ ]vomiting,  [ ]diarrhea [ ]Constipation [ ]Melena  Genitourinary: [ ] dysuria, [ ] hematuria [ ]Garcia  Neurologic: [ ] headaches [ ] tremors[ ]weakness [ ]Paralysis Right[ ] Left[ ]  Skin: [ ] itching, [ ]burning, [ ] rashes  Endocrine: [ ] heat or cold intolerance  Musculoskeletal: [ ] joint pain or swelling; [ ] muscle, back, or extremity pain  Psychiatric: [ ] depression, [ ]anxiety, [ ]mood swings, or [ ]difficulty sleeping  Hematologic: [ ] easy bruising, [ ] bleeding gums    [ ] All remaining systems negative except as per above.   [ ]Unable to obtain.  [x] No change in ROS since admission      Vital Signs Last 24 Hrs  T(C): 37.1 (24 Apr 2023 17:44), Max: 37.4 (23 Apr 2023 22:31)  T(F): 98.7 (24 Apr 2023 17:44), Max: 99.4 (23 Apr 2023 22:31)  HR: 100 (24 Apr 2023 17:44) (95 - 103)  BP: 130/70 (24 Apr 2023 17:44) (100/59 - 130/70)  BP(mean): --  RR: 18 (24 Apr 2023 17:44) (17 - 18)  SpO2: 100% (24 Apr 2023 17:44) (98% - 100%)    Parameters below as of 24 Apr 2023 17:44  Patient On (Oxygen Delivery Method): room air      I&O's Summary    23 Apr 2023 07:01  -  24 Apr 2023 07:00  --------------------------------------------------------  IN: 1030 mL / OUT: 801 mL / NET: 229 mL    24 Apr 2023 07:01  -  24 Apr 2023 20:35  --------------------------------------------------------  IN: 780 mL / OUT: 400 mL / NET: 380 mL        PHYSICAL EXAM:  General: No acute distress BMI-  HEENT: EOMI, PERRL  Neck: Supple, [ ] JVD  Lungs: Equal air entry bilaterally; [ ] rales [ ] wheezing [ ] rhonchi  Heart: Regular rate and rhythm; [x ] murmur   2/6 [ x] systolic [ ] diastolic [ ] radiation[ ] rubs [ ]  gallops  Abdomen: Nontender, bowel sounds present  Extremities: No clubbing, cyanosis, [ ] edema [ ]Pulses  equal and intact  Nervous system:  Alert & Oriented X3, no focal deficits  Psychiatric: Normal affect  Skin: No rashes or lesions    LABS:  04-24    130<L>  |  95<L>  |  44<H>  ----------------------------<  218<H>  4.0   |  21<L>  |  4.28<H>    Ca    8.1<L>      24 Apr 2023 05:34  Phos  4.0     04-24  Mg     1.90     04-24      Creatinine Trend: 4.28<--, 3.14<--, 3.66<--, 4.97<--, 3.85<--, 3.09<--                        6.9    10.80 )-----------( 216      ( 24 Apr 2023 07:50 )             22.1

## 2023-04-24 NOTE — PROVIDER CONTACT NOTE (CRITICAL VALUE NOTIFICATION) - ASSESSMENT
hemoglobin 6.9 hematocrit 22.1
A&Ox4. HR high (105) team made aware. All other VS stable, afebrile. No complaints of pain. Patient denies chest pain, SOB, dizziness, headache. No pallor noted upon assessment. No signs of acute distress noted at this time
A&Ox4. VS stable while on unit. Patient is currently at HD at this time

## 2023-04-24 NOTE — PROGRESS NOTE ADULT - ASSESSMENT
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 s/p L AKA 4/20    Plan:  - Patient non-ambulatory with minimal ability to use left leg for transfers.   - Recommend PM&R eval given Pt now b/l amputee  - monitor H/H  - daily dressing changes    Plan discussed with and approved by attending, Dr. Wenceslao ALBRIGHT Team Surgery   [id number]

## 2023-04-24 NOTE — PROGRESS NOTE ADULT - SUBJECTIVE AND OBJECTIVE BOX
[facility] NEPHROLOGY- PROGRESS NOTE    72y Male with history of ESRD on HD presents with L foot gangrene. Nephrology consulted for ESRD status.    REVIEW OF SYSTEMS:  Gen: no fevers  Cards: no chest pain  Resp: no dyspnea  GI: no nausea or vomiting or diarrhea  Vascular: no LE edema, + LLE pain improving    No Known Allergies      Hospital Medications: Medications reviewed      VITALS:  T(F): 98.5 (04-24-23 @ 10:14), Max: 99.5 (04-23-23 @ 13:46)  HR: 99 (04-24-23 @ 10:14)  BP: 110/51 (04-24-23 @ 10:14)  RR: 18 (04-24-23 @ 10:14)  SpO2: 98% (04-24-23 @ 10:14)  Wt(kg): --    04-23 @ 07:01  -  04-24 @ 07:00  --------------------------------------------------------  IN: 1030 mL / OUT: 801 mL / NET: 229 mL    04-24 @ 07:01  -  04-24 @ 10:23  --------------------------------------------------------  IN: 240 mL / OUT: 350 mL / NET: -110 mL      PHYSICAL EXAM:    Gen: NAD, calm  Cards: RRR, +S1/S2, no M/G/R  Resp: CTA B/L  GI: soft, NT/ND, NABS  Vascular: B/L BKA, RIJ TDC intact      LABS:  04-24    130<L>  |  95<L>  |  44<H>  ----------------------------<  218<H>  4.0   |  21<L>  |  4.28<H>    Ca    8.1<L>      24 Apr 2023 05:34  Phos  4.0     04-24  Mg     1.90     04-24      Creatinine Trend: 4.28 <--, 3.14 <--, 3.66 <--, 4.97 <--, 3.85 <--, 3.09 <--, 4.80 <--, 4.44 <--                        6.9    10.80 )-----------( 216      ( 24 Apr 2023 07:50 )             22.1     Urine Studies:

## 2023-04-24 NOTE — PROGRESS NOTE ADULT - SUBJECTIVE AND OBJECTIVE BOX
Follow Up:  foot gangrene    Interval History/ROS: pt afebrile, no cough, vomiting or leg pain        Allergies  No Known Allergies        ANTIMICROBIALS:      OTHER MEDS:  acetaminophen     Tablet .. 650 milliGRAM(s) Oral every 6 hours PRN  atorvastatin 40 milliGRAM(s) Oral at bedtime  bisacodyl 5 milliGRAM(s) Oral every 12 hours PRN  chlorhexidine 2% Cloths 1 Application(s) Topical daily  dextrose 5%. 1000 milliLiter(s) IV Continuous <Continuous>  dextrose 5%. 1000 milliLiter(s) IV Continuous <Continuous>  dextrose 50% Injectable 25 Gram(s) IV Push once  dextrose 50% Injectable 12.5 Gram(s) IV Push once  dextrose 50% Injectable 25 Gram(s) IV Push once  dextrose Oral Gel 15 Gram(s) Oral once PRN  epoetin nyasia-epbx (RETACRIT) Injectable 6000 Unit(s) IV Push <User Schedule>  glucagon  Injectable 1 milliGRAM(s) IntraMuscular once  heparin   Injectable 5000 Unit(s) SubCutaneous every 12 hours  insulin lispro (ADMELOG) corrective regimen sliding scale   SubCutaneous three times a day before meals  insulin lispro (ADMELOG) corrective regimen sliding scale   SubCutaneous at bedtime  melatonin 3 milliGRAM(s) Oral at bedtime PRN  midodrine. 5 milliGRAM(s) Oral <User Schedule>  Nephro-zack 1 Tablet(s) Oral daily  ondansetron Injectable 4 milliGRAM(s) IV Push every 8 hours PRN  sevelamer carbonate 800 milliGRAM(s) Oral three times a day with meals  sodium chloride 0.9% Bolus. 100 milliLiter(s) IV Bolus every 5 minutes PRN  zinc sulfate 220 milliGRAM(s) Oral daily      Vital Signs Last 24 Hrs  T(C): 36.8 (24 Apr 2023 14:54), Max: 37.4 (23 Apr 2023 22:31)  T(F): 98.3 (24 Apr 2023 14:54), Max: 99.4 (23 Apr 2023 22:31)  HR: 98 (24 Apr 2023 14:54) (95 - 103)  BP: 117/67 (24 Apr 2023 14:54) (100/59 - 117/67)  BP(mean): --  RR: 18 (24 Apr 2023 14:54) (17 - 18)  SpO2: 100% (24 Apr 2023 14:54) (98% - 100%)    Parameters below as of 24 Apr 2023 14:54  Patient On (Oxygen Delivery Method): room air        Physical Exam:  General:    NAD,  non toxic  Respiratory:    comfortable on RA  abd:     soft,   BS +,   no tenderness  :   no CVAT,  no suprapubic tenderness,   no  neumann  Musculoskeletal:   R BKA, L AKA clean no purulence  vascular: R permacath  Skin:    no rash                          6.9    10.80 )-----------( 216      ( 24 Apr 2023 07:50 )             22.1       04-24    130<L>  |  95<L>  |  44<H>  ----------------------------<  218<H>  4.0   |  21<L>  |  4.28<H>    Ca    8.1<L>      24 Apr 2023 05:34  Phos  4.0     04-24  Mg     1.90     04-24            MICROBIOLOGY:  v  .Blood Blood  04-17-23   No Growth Final  --  --      .Blood Blood  04-17-23   No Growth Final  --  --                RADIOLOGY:  Images independently visualized and reviewed personally, findings as below  < from: Xray Femur 1 View, Left (04.19.23 @ 21:10) >  IMPRESSION:  No tracking soft tissue gas collections, radiopaque foreign bodies, or   gross radiographic evidence for osteomyelitis.    No fractures or dislocations.    Mild left hip osteoarthritic change. Preserved left knee ankle and   visualized midfoot and hindfoot joint spaces.    Generalized osteopenia. Nonspecificsmall ovoid 1 cm length lucency in   distal tibial diaphyseal region, MRI may be helpful to further assess. No   additional focal osseous lesions.    Vascular calcifications. Metallic mesh vascular stent in popliteal region.    Lower pelvic surgical clips.    2 adjacent tiny stellate appearing metallic densities over left ischium.    < end of copied text >

## 2023-04-24 NOTE — PROGRESS NOTE ADULT - SUBJECTIVE AND OBJECTIVE BOX
Surgery C-Team Daily Progress Note     BRIAN MATTHEWS | [id number]    SUBJECTIVE / 24H EVENTS  Patient seen and examined on morning rounds. No acute events overnight. No nausea / vomiting. Tolerating diet. Voiding spontaneously.     OBJECTIVE:    VITAL SIGNS:  T(C): 36.8 (04-24-23 @ 05:30), Max: 37.5 (04-23-23 @ 13:46)  HR: 95 (04-24-23 @ 05:30) (95 - 108)  BP: 100/59 (04-24-23 @ 05:30) (98/59 - 107/67)  RR: 18 (04-24-23 @ 05:30) (17 - 18)  SpO2: 100% (04-24-23 @ 05:30) (98% - 100%)  Daily     Daily   POCT Blood Glucose.: 216 mg/dL (04-23-23 @ 22:21)  POCT Blood Glucose.: 199 mg/dL (04-23-23 @ 22:00)  POCT Blood Glucose.: 277 mg/dL (04-23-23 @ 17:32)      PE:  Gen: NAD  Resp: airway patent, respirations unlabored, no increased WoB  CVS: RRR  Abd: soft, ND, NT, no rebound or guarding  Ext: L AKA incision c/d/i, no erythema, fluctuance, or bleeding  Neuro: AAOx3, no focal deficits      04-23-23 @ 07:01  -  04-24-23 @ 07:00  --------------------------------------------------------  IN:    Oral Fluid: 1030 mL  Total IN: 1030 mL    OUT:    Blood Loss (mL): 0 mL    IV PiggyBack: 0 mL    Stool (mL): 1 mL    Voided (mL): 800 mL  Total OUT: 801 mL    Total NET: 229 mL          LAB VALUES:  04-24    130<L>  |  95<L>  |  44<H>  ----------------------------<  218<H>  4.0   |  21<L>  |  4.28<H>    Ca    8.1<L>      24 Apr 2023 05:34  Phos  4.0     04-24  Mg     1.90     04-24                                 6.8    10.07 )-----------( 238      ( 24 Apr 2023 05:34 )             21.6                   MICROBIOLOGY:    No new microbiology data for review.     RADIOLOGY:    No new radiographic images for review.    MEDICATIONS  (STANDING):  atorvastatin 40 milliGRAM(s) Oral at bedtime  chlorhexidine 2% Cloths 1 Application(s) Topical daily  dextrose 5%. 1000 milliLiter(s) (100 mL/Hr) IV Continuous <Continuous>  dextrose 5%. 1000 milliLiter(s) (50 mL/Hr) IV Continuous <Continuous>  dextrose 50% Injectable 25 Gram(s) IV Push once  dextrose 50% Injectable 12.5 Gram(s) IV Push once  dextrose 50% Injectable 25 Gram(s) IV Push once  epoetin nyasia-epbx (RETACRIT) Injectable 6000 Unit(s) IV Push <User Schedule>  glucagon  Injectable 1 milliGRAM(s) IntraMuscular once  insulin lispro (ADMELOG) corrective regimen sliding scale   SubCutaneous three times a day before meals  insulin lispro (ADMELOG) corrective regimen sliding scale   SubCutaneous at bedtime  midodrine. 5 milliGRAM(s) Oral <User Schedule>  Nephro-zack 1 Tablet(s) Oral daily  sevelamer carbonate 800 milliGRAM(s) Oral three times a day with meals  zinc sulfate 220 milliGRAM(s) Oral daily    MEDICATIONS  (PRN):  acetaminophen     Tablet .. 650 milliGRAM(s) Oral every 6 hours PRN Temp greater or equal to 38C (100.4F), Mild Pain (1 - 3)  bisacodyl 5 milliGRAM(s) Oral every 12 hours PRN Constipation  dextrose Oral Gel 15 Gram(s) Oral once PRN Blood Glucose LESS THAN 70 milliGRAM(s)/deciliter  melatonin 3 milliGRAM(s) Oral at bedtime PRN Insomnia  ondansetron Injectable 4 milliGRAM(s) IV Push every 8 hours PRN Nausea and/or Vomiting  sodium chloride 0.9% Bolus. 100 milliLiter(s) IV Bolus every 5 minutes PRN SBP LESS THAN or EQUAL to 90 mmHg

## 2023-04-24 NOTE — PROGRESS NOTE ADULT - ASSESSMENT
72 m with DM,  ESRD on HD (T/Th/Sat),  PVD (s/p R BKA in Nov 2022) p/w L foot pain., found to have Left foot gangrene.   afebrile, WBC: 19 blood cx negative    s/p AKA on 4/20  no wound  cx or OR cx sent  pt is doing well, afebrile, WBC improving    DM, PVD with leukocytosis and L foot gangrene s/p AKA 4/20    * wound clean with no purulence, s/p 3 days of unasyn post op discontinued 4/22 (also received total of 5 days)  * will sign off, please call with questions    The above assessment and plan was discussed with the primary team    Sierra Metcalf MD  contact on teams  After 5pm and on weekends call [phone]

## 2023-04-24 NOTE — PROGRESS NOTE ADULT - ASSESSMENT
72y Male with history of ESRD on HD presents with L foot gangrene. Nephrology consulted for ESRD status.    1) ESRD: Last HD on 4/22 with mild intradialytic hypotension and 0.5L removed. Plan for next maintenance HD on 4/25. Monitor electrolytes.    2) HTN with ESRD: BP low normal. Continue with midodrine 5 mg pre-HD to avoid intradialytic hypotension.    3) Anemia of renal disease: Hb low with adequate iron stores. For PRBC today and would give an additional PRBC with HD on 4/25. Continue with Epo 6K with HD. Monitor Hb.    4) Hyperphosphatemia: Phosphorus acceptable. Continue with renvela 1 tab with meals and renal diet. Monitor serum calcium and phosphorus.      [facility] NEPHROLOGY  Wili Hopkins M.D.  Kiran Feng D.O.  Sujatha Dumont M.D.  Radha Miller, KENYATTA, ANP-C    Telephone: [phone]  Facsimile: [phone]    [address] [place] [id number]

## 2023-04-24 NOTE — PROVIDER CONTACT NOTE (CRITICAL VALUE NOTIFICATION) - ACTION/TREATMENT ORDERED:
As per provider, "I will have to go up there to get consent for blood." Will continue to monitor when patient gets back to unit from HD
provider notified, will order blood
as per provider, "I will order a repeat CBC." Will continue to monitor patient.

## 2023-04-24 NOTE — PROVIDER CONTACT NOTE (CRITICAL VALUE NOTIFICATION) - BACKGROUND
Pt admitted for L foot gangrene
73 y/o m admitted for gangrene. S/P L BKA 4/20. Hx of ESRD on HD, DM2, PVD.
71 y/o m admitted for gangrene. Pt s/p left AKA 4/20. Hx of PVD, EDRD, DM.

## 2023-04-25 LAB
ANION GAP SERPL CALC-SCNC: 15 MMOL/L — HIGH (ref 7–14)
BUN SERPL-MCNC: 57 MG/DL — HIGH (ref 7–23)
CALCIUM SERPL-MCNC: 7.9 MG/DL — LOW (ref 8.4–10.5)
CHLORIDE SERPL-SCNC: 95 MMOL/L — LOW (ref 98–107)
CO2 SERPL-SCNC: 20 MMOL/L — LOW (ref 22–31)
CREAT SERPL-MCNC: 4.88 MG/DL — HIGH (ref 0.5–1.3)
EGFR: 12 ML/MIN/1.73M2 — LOW
GLUCOSE BLDC GLUCOMTR-MCNC: 125 MG/DL — HIGH (ref 70–99)
GLUCOSE BLDC GLUCOMTR-MCNC: 158 MG/DL — HIGH (ref 70–99)
GLUCOSE BLDC GLUCOMTR-MCNC: 198 MG/DL — HIGH (ref 70–99)
GLUCOSE BLDC GLUCOMTR-MCNC: 253 MG/DL — HIGH (ref 70–99)
GLUCOSE BLDC GLUCOMTR-MCNC: 267 MG/DL — HIGH (ref 70–99)
GLUCOSE SERPL-MCNC: 195 MG/DL — HIGH (ref 70–99)
HCT VFR BLD CALC: 23.2 % — LOW (ref 39–50)
HGB BLD-MCNC: 7.5 G/DL — LOW (ref 13–17)
MAGNESIUM SERPL-MCNC: 2 MG/DL — SIGNIFICANT CHANGE UP (ref 1.6–2.6)
MCHC RBC-ENTMCNC: 30.5 PG — SIGNIFICANT CHANGE UP (ref 27–34)
MCHC RBC-ENTMCNC: 32.3 GM/DL — SIGNIFICANT CHANGE UP (ref 32–36)
MCV RBC AUTO: 94.3 FL — SIGNIFICANT CHANGE UP (ref 80–100)
NRBC # BLD: 0 /100 WBCS — SIGNIFICANT CHANGE UP (ref 0–0)
NRBC # FLD: 0.02 K/UL — HIGH (ref 0–0)
PHOSPHATE SERPL-MCNC: 4.4 MG/DL — SIGNIFICANT CHANGE UP (ref 2.5–4.5)
PLATELET # BLD AUTO: 247 K/UL — SIGNIFICANT CHANGE UP (ref 150–400)
POTASSIUM SERPL-MCNC: 4.3 MMOL/L — SIGNIFICANT CHANGE UP (ref 3.5–5.3)
POTASSIUM SERPL-SCNC: 4.3 MMOL/L — SIGNIFICANT CHANGE UP (ref 3.5–5.3)
RBC # BLD: 2.46 M/UL — LOW (ref 4.2–5.8)
RBC # FLD: 16.7 % — HIGH (ref 10.3–14.5)
SODIUM SERPL-SCNC: 130 MMOL/L — LOW (ref 135–145)
WBC # BLD: 10.89 K/UL — HIGH (ref 3.8–10.5)
WBC # FLD AUTO: 10.89 K/UL — HIGH (ref 3.8–10.5)

## 2023-04-25 PROCEDURE — 93010 ELECTROCARDIOGRAM REPORT: CPT

## 2023-04-25 RX ORDER — HYDROMORPHONE HYDROCHLORIDE 2 MG/ML
2 INJECTION INTRAMUSCULAR; INTRAVENOUS; SUBCUTANEOUS EVERY 6 HOURS
Refills: 0 | Status: DISCONTINUED | OUTPATIENT
Start: 2023-04-25 | End: 2023-04-27

## 2023-04-25 RX ORDER — GABAPENTIN 400 MG/1
100 CAPSULE ORAL THREE TIMES A DAY
Refills: 0 | Status: DISCONTINUED | OUTPATIENT
Start: 2023-04-25 | End: 2023-04-25

## 2023-04-25 RX ORDER — SODIUM CHLORIDE 9 MG/ML
250 INJECTION INTRAMUSCULAR; INTRAVENOUS; SUBCUTANEOUS ONCE
Refills: 0 | Status: DISCONTINUED | OUTPATIENT
Start: 2023-04-25 | End: 2023-04-26

## 2023-04-25 RX ORDER — CALCIUM ACETATE 667 MG
667 TABLET ORAL
Refills: 0 | Status: DISCONTINUED | OUTPATIENT
Start: 2023-04-25 | End: 2023-04-28

## 2023-04-25 RX ORDER — HYDROMORPHONE HYDROCHLORIDE 2 MG/ML
1 INJECTION INTRAMUSCULAR; INTRAVENOUS; SUBCUTANEOUS ONCE
Refills: 0 | Status: DISCONTINUED | OUTPATIENT
Start: 2023-04-25 | End: 2023-04-25

## 2023-04-25 RX ORDER — GABAPENTIN 400 MG/1
100 CAPSULE ORAL DAILY
Refills: 0 | Status: DISCONTINUED | OUTPATIENT
Start: 2023-04-25 | End: 2023-05-01

## 2023-04-25 RX ADMIN — SEVELAMER CARBONATE 800 MILLIGRAM(S): 2400 POWDER, FOR SUSPENSION ORAL at 12:22

## 2023-04-25 RX ADMIN — Medication 1: at 09:06

## 2023-04-25 RX ADMIN — HEPARIN SODIUM 5000 UNIT(S): 5000 INJECTION INTRAVENOUS; SUBCUTANEOUS at 22:41

## 2023-04-25 RX ADMIN — Medication 1 TABLET(S): at 12:23

## 2023-04-25 RX ADMIN — Medication 667 MILLIGRAM(S): at 22:41

## 2023-04-25 RX ADMIN — ERYTHROPOIETIN 6000 UNIT(S): 10000 INJECTION, SOLUTION INTRAVENOUS; SUBCUTANEOUS at 18:53

## 2023-04-25 RX ADMIN — CHLORHEXIDINE GLUCONATE 1 APPLICATION(S): 213 SOLUTION TOPICAL at 12:22

## 2023-04-25 RX ADMIN — ATORVASTATIN CALCIUM 40 MILLIGRAM(S): 80 TABLET, FILM COATED ORAL at 22:41

## 2023-04-25 RX ADMIN — Medication 3: at 12:23

## 2023-04-25 RX ADMIN — ZINC SULFATE TAB 220 MG (50 MG ZINC EQUIVALENT) 220 MILLIGRAM(S): 220 (50 ZN) TAB at 12:22

## 2023-04-25 RX ADMIN — SEVELAMER CARBONATE 800 MILLIGRAM(S): 2400 POWDER, FOR SUSPENSION ORAL at 09:07

## 2023-04-25 RX ADMIN — HYDROMORPHONE HYDROCHLORIDE 1 MILLIGRAM(S): 2 INJECTION INTRAMUSCULAR; INTRAVENOUS; SUBCUTANEOUS at 05:54

## 2023-04-25 RX ADMIN — MIDODRINE HYDROCHLORIDE 5 MILLIGRAM(S): 2.5 TABLET ORAL at 15:18

## 2023-04-25 RX ADMIN — HYDROMORPHONE HYDROCHLORIDE 1 MILLIGRAM(S): 2 INJECTION INTRAMUSCULAR; INTRAVENOUS; SUBCUTANEOUS at 06:03

## 2023-04-25 RX ADMIN — HEPARIN SODIUM 5000 UNIT(S): 5000 INJECTION INTRAVENOUS; SUBCUTANEOUS at 05:54

## 2023-04-25 NOTE — DIETITIAN INITIAL EVALUATION ADULT - PERTINENT LABORATORY DATA
04-25    130<L>  |  95<L>  |  57<H>  ----------------------------<  195<H>  4.3   |  20<L>  |  4.88<H>    Ca    7.9<L>      25 Apr 2023 05:40  Phos  4.4     04-25  Mg     2.00     04-25    A1C with Estimated Average Glucose Result: 7.3 % (04-19-23 @ 06:15)    CAPILLARY BLOOD GLUCOSE  POCT Blood Glucose.: 267 mg/dL (25 Apr 2023 12:09)  POCT Blood Glucose.: 198 mg/dL (25 Apr 2023 08:38)  POCT Blood Glucose.: 247 mg/dL (24 Apr 2023 21:40)  POCT Blood Glucose.: 170 mg/dL (24 Apr 2023 17:28)

## 2023-04-25 NOTE — PROGRESS NOTE ADULT - SUBJECTIVE AND OBJECTIVE BOX
[facility] NEPHROLOGY- PROGRESS NOTE    72y Male with history of ESRD on HD presents with L foot gangrene. Nephrology consulted for ESRD status.    REVIEW OF SYSTEMS:  Gen: no fevers  Cards: no chest pain  Resp: no dyspnea  GI: no nausea or vomiting or diarrhea  Vascular: no LE edema, + LLE pain improving    No Known Allergies      Hospital Medications: Medications reviewed      VITALS:  T(F): 98.8 (04-25-23 @ 09:52), Max: 99.9 (04-24-23 @ 21:36)  HR: 95 (04-25-23 @ 09:52)  BP: 111/63 (04-25-23 @ 09:52)  RR: 18 (04-25-23 @ 09:52)  SpO2: 100% (04-25-23 @ 09:52)  Wt(kg): --    04-24 @ 07:01  -  04-25 @ 07:00  --------------------------------------------------------  IN: 830 mL / OUT: 576 mL / NET: 254 mL    04-25 @ 07:01  -  04-25 @ 14:00  --------------------------------------------------------  IN: 240 mL / OUT: 150 mL / NET: 90 mL        PHYSICAL EXAM:    Gen: NAD, calm  Cards: RRR, +S1/S2, no M/G/R  Resp: CTA B/L  GI: soft, NT/ND, NABS  Vascular: B/L BKA, RIJ TDC intact      LABS:  04-25    130<L>  |  95<L>  |  57<H>  ----------------------------<  195<H>  4.3   |  20<L>  |  4.88<H>    Ca    7.9<L>      25 Apr 2023 05:40  Phos  4.4     04-25  Mg     2.00     04-25      Creatinine Trend: 4.88 <--, 4.28 <--, 3.14 <--, 3.66 <--, 4.97 <--, 3.85 <--, 3.09 <--                        7.5    10.89 )-----------( 247      ( 25 Apr 2023 05:40 )             23.2     Urine Studies:

## 2023-04-25 NOTE — SWALLOW BEDSIDE ASSESSMENT ADULT - DATE
Render Note In Bullet Format When Appropriate: No Detail Level: Detailed Consent: The patient's consent was obtained including but not limited to risks of crusting, scabbing, blistering, scarring, darker or lighter pigmentary change, recurrence, incomplete removal and infection. Include Z78.9 (Other Specified Conditions Influencing Health Status) As An Associated Diagnosis?: Yes Medical Necessity Clause: This procedure was medically necessary because the lesions that were treated were: Medical Necessity Information: It is in your best interest to select a reason for this procedure from the list below. All of these items fulfill various CMS LCD requirements except the new and changing color options. Post-Care Instructions: I reviewed with the patient in detail post-care instructions. Patient is to wear sunprotection, and avoid picking at any of the treated lesions. Pt may apply Vaseline to crusted or scabbing areas. 25-Apr-2023

## 2023-04-25 NOTE — PROGRESS NOTE ADULT - ASSESSMENT
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 s/p L AKA 4/20    Plan:  - Patient non-ambulatory with minimal ability to use left leg for transfers.   - Recommend PM&R eval given Pt now b/l amputee  - monitor pain control with pain medication and gabapentin  - monitor H/H  - daily dressing changes    Plan discussed with and approved by attending, Dr. Wenceslao ALBRIGHT Team Surgery   [id number]

## 2023-04-25 NOTE — DIETITIAN INITIAL EVALUATION ADULT - OTHER INFO
RD visited with patient for length of stay.    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 s/p L AKA 4/20.    PO intake between poor to fair over the last 2 days. As per patient, he dislikes minced and moist diet.  Assessed by SLP today with recommendation for regular diet with thin liquids.  Reviewed therapeutic diet Rx with patient. Glucerna Therapeutic Nutrition 240mls 3x daily (660kcals, 30g protein) ordered, will recommend changing supplement to Nepro given Pt. on HD.  No reported GI distress, however, noted with alirio colored stool in nursing flowsheet - 4/24/23. Patient unsure of recent usual body weight.  Inpatient weights 56-61kg, patient receiving HD, anticipate weight fluctuations due to fluid shifts.  Denies food allergies.      Patient s/p Lt AKA and previously with Rt BKA - adjusted IBW for amputations 77.1kg, adjusted BMI for BKA and AKA - 18.6kg/m2.

## 2023-04-25 NOTE — SWALLOW BEDSIDE ASSESSMENT ADULT - COMMENTS
Per charting - 72 m with DM,  ESRD on HD (T/Th/Sat),  PVD (s/p R BKA in Nov 2022) p/w L foot pain., found to have Left foot gangrene.   afebrile, WBC: 19 blood cx negative    s/p AKA on 4/20  no wound  cx or OR cx sent  pt is doing well, afebrile, WBC improving    CXR- clear lungs    Bedside evaluation completed. Patient is alert, oriented and following commands. Denies difficulty swallowing and is requesting diet advancement to regular solids. Pt left as received, NAD.

## 2023-04-25 NOTE — PROGRESS NOTE ADULT - SUBJECTIVE AND OBJECTIVE BOX
Surgery C-Team Daily Progress Note     BRIAN MATTHEWS | [id number]    SUBJECTIVE / 24H EVENTS  Patient seen and examined on morning rounds. No acute events overnight. No nausea / vomiting. Tolerating diet.   OBJECTIVE:    VITAL SIGNS:  T(C): 37.1 (04-25-23 @ 09:52), Max: 37.7 (04-24-23 @ 21:36)  HR: 95 (04-25-23 @ 09:52) (94 - 100)  BP: 111/63 (04-25-23 @ 09:52) (108/62 - 130/70)  RR: 18 (04-25-23 @ 09:52) (16 - 18)  SpO2: 100% (04-25-23 @ 09:52) (98% - 100%)  Daily     Daily   POCT Blood Glucose.: 198 mg/dL (04-25-23 @ 08:38)  POCT Blood Glucose.: 247 mg/dL (04-24-23 @ 21:40)  POCT Blood Glucose.: 170 mg/dL (04-24-23 @ 17:28)      PE:  Gen: NAD  Resp: airway patent, respirations unlabored, no increased WoB  CVS: RRR  Abd: soft, ND, NT, no rebound or guarding  Ext: L AKA incision c/d/i, no erythema, fluctuance, or bleeding  Neuro: AAOx3, no focal deficits        04-24-23 @ 07:01  -  04-25-23 @ 07:00  --------------------------------------------------------  IN:    Oral Fluid: 530 mL    PRBCs (Packed Red Blood Cells): 300 mL  Total IN: 830 mL    OUT:    Blood Loss (mL): 0 mL    IV PiggyBack: 0 mL    Stool (mL): 1 mL    Voided (mL): 575 mL  Total OUT: 576 mL    Total NET: 254 mL      04-25-23 @ 07:01  -  04-25-23 @ 11:40  --------------------------------------------------------  IN:    Oral Fluid: 240 mL  Total IN: 240 mL    OUT:    Blood Loss (mL): 0 mL    IV PiggyBack: 0 mL    Stool (mL): 0 mL    Voided (mL): 150 mL  Total OUT: 150 mL    Total NET: 90 mL          LAB VALUES:  04-25    130<L>  |  95<L>  |  57<H>  ----------------------------<  195<H>  4.3   |  20<L>  |  4.88<H>    Ca    7.9<L>      25 Apr 2023 05:40  Phos  4.4     04-25  Mg     2.00     04-25                                 7.5    10.89 )-----------( 247      ( 25 Apr 2023 05:40 )             23.2                   MICROBIOLOGY:    No new microbiology data for review.     RADIOLOGY:    No new radiographic images for review.    MEDICATIONS  (STANDING):  atorvastatin 40 milliGRAM(s) Oral at bedtime  chlorhexidine 2% Cloths 1 Application(s) Topical daily  dextrose 5%. 1000 milliLiter(s) (100 mL/Hr) IV Continuous <Continuous>  dextrose 5%. 1000 milliLiter(s) (50 mL/Hr) IV Continuous <Continuous>  dextrose 50% Injectable 25 Gram(s) IV Push once  dextrose 50% Injectable 12.5 Gram(s) IV Push once  dextrose 50% Injectable 25 Gram(s) IV Push once  epoetin nyasia-epbx (RETACRIT) Injectable 6000 Unit(s) IV Push <User Schedule>  gabapentin 100 milliGRAM(s) Oral three times a day  glucagon  Injectable 1 milliGRAM(s) IntraMuscular once  heparin   Injectable 5000 Unit(s) SubCutaneous every 12 hours  insulin lispro (ADMELOG) corrective regimen sliding scale   SubCutaneous three times a day before meals  insulin lispro (ADMELOG) corrective regimen sliding scale   SubCutaneous at bedtime  midodrine. 5 milliGRAM(s) Oral <User Schedule>  Nephro-zack 1 Tablet(s) Oral daily  sevelamer carbonate 800 milliGRAM(s) Oral three times a day with meals  zinc sulfate 220 milliGRAM(s) Oral daily    MEDICATIONS  (PRN):  acetaminophen     Tablet .. 650 milliGRAM(s) Oral every 6 hours PRN Temp greater or equal to 38C (100.4F), Mild Pain (1 - 3)  bisacodyl 5 milliGRAM(s) Oral every 12 hours PRN Constipation  dextrose Oral Gel 15 Gram(s) Oral once PRN Blood Glucose LESS THAN 70 milliGRAM(s)/deciliter  HYDROmorphone   Tablet 2 milliGRAM(s) Oral every 6 hours PRN mod-severe pain  melatonin 3 milliGRAM(s) Oral at bedtime PRN Insomnia  ondansetron Injectable 4 milliGRAM(s) IV Push every 8 hours PRN Nausea and/or Vomiting  sodium chloride 0.9% Bolus. 100 milliLiter(s) IV Bolus every 5 minutes PRN SBP LESS THAN or EQUAL to 90 mmHg

## 2023-04-25 NOTE — DIETITIAN INITIAL EVALUATION ADULT - DIET TYPE
consistent carbohydrate renal with evening snacks Defer diet consistency to SLP recommendation / physician discretion/consistent carbohydrate renal with evening snacks

## 2023-04-25 NOTE — DIETITIAN INITIAL EVALUATION ADULT - PROBLEM SELECTOR PLAN 1
- Patient with worsening pain of the L foot, reports concern for infection at his facility but on examination low concern for acute infection, podiatry reccs empiric unasyn -> ordered  - BCx x2 in lab  - Podiatry and vascular sx consulted, f/u consults  - Pain control as needed

## 2023-04-25 NOTE — PROGRESS NOTE ADULT - ASSESSMENT
72y Male with history of ESRD on HD presents with L foot gangrene. Nephrology consulted for ESRD status.    1) ESRD: Last HD on 4/22 with mild intradialytic hypotension and 0.5L removed. Plan for next maintenance HD today. Monitor electrolytes.    2) HTN with ESRD: BP low normal. Continue with midodrine 5 mg pre-HD to avoid intradialytic hypotension.    3) Anemia of renal disease: Hb low with adequate iron stores. S/P PRBC on 4/24. Plan to give an additional PRBC today with HD. Continue with Epo 6K with HD. Monitor Hb.    4) Hyperphosphatemia: Phosphorus acceptable. Change renvela to phoslo 1 tab TID with meals given hypocalcemia. Monitor serum calcium and phosphorus.      [facility] NEPHROLOGY  Wili Hopkins M.D.  Kiran Feng D.O.  Sujatha Dumont M.D.  Radha Miller, KENYATTA, ANP-C    Telephone: [phone]  Facsimile: [phone]    [address] [place] [address]

## 2023-04-25 NOTE — DIETITIAN INITIAL EVALUATION ADULT - NSFNSGIIOFT_GEN_A_CORE
04-24-23 @ 07:01  -  04-25-23 @ 07:00  --------------------------------------------------------  OUT:    Stool (mL): 1 mL  Total OUT: 1 mL    Total NET: -1 mL      04-25-23 @ 07:01  -  04-25-23 @ 12:33  --------------------------------------------------------  OUT:    Stool (mL): 0 mL  Total OUT: 0 mL    Total NET: 0 mL

## 2023-04-25 NOTE — DIETITIAN INITIAL EVALUATION ADULT - NS FNS DIET ORDER
Diet, Minced and Moist:   Consistent Carbohydrate {Evening Snack} (CSTCHOSN)  DASH/TLC {Sodium & Cholesterol Restricted} (DASH)  For patients receiving Renal Replacement - No Protein Restr, No Conc K, No Conc Phos, Low Sodium (RENAL)  Supplement Feeding Modality:  Oral  Glucerna Shake Cans or Servings Per Day:  1       Frequency:  Three Times a day (04-24-23 @ 18:14)

## 2023-04-25 NOTE — PROGRESS NOTE ADULT - SUBJECTIVE AND OBJECTIVE BOX
PATIENT SEEN AND EXAMINED ON :- 4/25/23  DATE OF SERVICE:   4/25/23          Interim events noted,Labs ,Radiological studies and Cardiology tests reviewed .    MR#[id number]  PATIENT NAME:-BRIAN [facility] COURSE: HPI:  Please note, patient with second MRN #[id number] with additional recent hospitalizations.    This is a 72M with history of ESRD on HD (T/Th/Sat), DM2, PVD (s/p R BKA in Nov 2022) and recurrent hematurias (has required CBI in the past) who presents to the hospital with worsening pain and duskiness of his L foot. Patient currently is a limited historian, HPI supplemented with information from patient's sister Darshana ([phone]). As per patient he was sent to the hospital for possible L foot infection, does report pain to the bottom of his foot but otherwise denies any fevers/chills/diaphoresis, no swelling or redness of his L foot. No other acute complaints. Said that he is supposed to have an amputation of his L leg but is unclear on the details. Spoke with patient's sister, Darshana, who said that the patient has known PVD, had a recent R leg BKA and has had some procedures with [facility] [place] (sister unsure of the procedures) and states that they were told the procedures did not help improve the peripheral blood flow in the patient's L leg and that he would need an amputation. Said that the family was told he was being sent to the hospital for the amputation evaluation. Otherwise sister not aware of any other acute issues for the patient.     On arrival to the ED, his vitals were T 97.5, P 90, /59, RR 16, O2 sat 98% RA. His lab work was most significant for mild anemia and elevated CRP. His imaging did not show acute findings to suggest OM. He was admitted to medicine.   (17 Apr 2023 23:19)      INTERIM EVENTS:Patient seen at bedside ,interim events noted.      PMH -reviewed admission note, no change since admission  HEART FAILURE: Acute[ ]Chronic[ ] Systolic[ ] Diastolic[ ] Combined Systolic and Diastolic[ ]  CAD[ ] CABG[ ] PCI[ ]  DEVICES[ ] PPM[ ] ICD[ ] ILR[ ]  ATRIAL FIBRILLATION[ ] Paroxysmal[ ] Permanent[ ] CHADS2-[  ]  CANDE[ ] CKD1[ ] CKD2[ ] CKD3[ ] CKD4[ ] ESRD[ ]  COPD[ ] HTN[ ]   DM[ ] Type1[ ] Type 2[ ]   CVA[ ] Paresis[ ]    AMBULATION: Assisted[ ] Cane/walker[ ] Independent[ ]    MEDICATIONS  (STANDING):  atorvastatin 40 milliGRAM(s) Oral at bedtime  calcium acetate 667 milliGRAM(s) Oral three times a day with meals  chlorhexidine 2% Cloths 1 Application(s) Topical daily  dextrose 5%. 1000 milliLiter(s) (100 mL/Hr) IV Continuous <Continuous>  dextrose 5%. 1000 milliLiter(s) (50 mL/Hr) IV Continuous <Continuous>  dextrose 50% Injectable 25 Gram(s) IV Push once  dextrose 50% Injectable 12.5 Gram(s) IV Push once  dextrose 50% Injectable 25 Gram(s) IV Push once  epoetin nyasia-epbx (RETACRIT) Injectable 6000 Unit(s) IV Push <User Schedule>  gabapentin 100 milliGRAM(s) Oral daily  glucagon  Injectable 1 milliGRAM(s) IntraMuscular once  heparin   Injectable 5000 Unit(s) SubCutaneous every 12 hours  insulin lispro (ADMELOG) corrective regimen sliding scale   SubCutaneous at bedtime  insulin lispro (ADMELOG) corrective regimen sliding scale   SubCutaneous three times a day before meals  midodrine. 5 milliGRAM(s) Oral <User Schedule>  Nephro-zack 1 Tablet(s) Oral daily  zinc sulfate 220 milliGRAM(s) Oral daily    MEDICATIONS  (PRN):  acetaminophen     Tablet .. 650 milliGRAM(s) Oral every 6 hours PRN Temp greater or equal to 38C (100.4F), Mild Pain (1 - 3)  bisacodyl 5 milliGRAM(s) Oral every 12 hours PRN Constipation  dextrose Oral Gel 15 Gram(s) Oral once PRN Blood Glucose LESS THAN 70 milliGRAM(s)/deciliter  HYDROmorphone   Tablet 2 milliGRAM(s) Oral every 6 hours PRN mod-severe pain  melatonin 3 milliGRAM(s) Oral at bedtime PRN Insomnia  ondansetron Injectable 4 milliGRAM(s) IV Push every 8 hours PRN Nausea and/or Vomiting  sodium chloride 0.9% Bolus. 100 milliLiter(s) IV Bolus every 5 minutes PRN SBP LESS THAN or EQUAL to 90 mmHg            REVIEW OF SYSTEMS:  Constitutional: [ ] fever, [ ]weight loss,  [ ]fatigue [ ]weight gain  Eyes: [ ] visual changes  Respiratory: [ ]shortness of breath;  [ ] cough, [ ]wheezing, [ ]chills, [ ]hemoptysis  Cardiovascular: [ ] chest pain, [ ]palpitations, [ ]dizziness,  [ ]leg swelling[ ]orthopnea[ ]PND  Gastrointestinal: [ ] abdominal pain, [ ]nausea, [ ]vomiting,  [ ]diarrhea [ ]Constipation [ ]Melena  Genitourinary: [ ] dysuria, [ ] hematuria [ ]Garcia  Neurologic: [ ] headaches [ ] tremors[ ]weakness [ ]Paralysis Right[ ] Left[ ]  Skin: [ ] itching, [ ]burning, [ ] rashes  Endocrine: [ ] heat or cold intolerance  Musculoskeletal: [ ] joint pain or swelling; [ ] muscle, back, or extremity pain  Psychiatric: [ ] depression, [ ]anxiety, [ ]mood swings, or [ ]difficulty sleeping  Hematologic: [ ] easy bruising, [ ] bleeding gums    [ ] All remaining systems negative except as per above.   [ ]Unable to obtain.  [x] No change in ROS since admission      Vital Signs Last 24 Hrs  T(C): 37.2 (25 Apr 2023 16:45), Max: 37.7 (24 Apr 2023 21:36)  T(F): 99 (25 Apr 2023 16:45), Max: 99.9 (24 Apr 2023 21:36)  HR: 95 (25 Apr 2023 16:45) (93 - 100)  BP: 123/58 (25 Apr 2023 16:45) (108/62 - 123/58)  BP(mean): --  RR: 16 (25 Apr 2023 16:45) (16 - 18)  SpO2: 100% (25 Apr 2023 15:44) (98% - 100%)    Parameters below as of 25 Apr 2023 16:45  Patient On (Oxygen Delivery Method): room air      I&O's Summary    24 Apr 2023 07:01  -  25 Apr 2023 07:00  --------------------------------------------------------  IN: 830 mL / OUT: 576 mL / NET: 254 mL    25 Apr 2023 07:01  -  25 Apr 2023 19:26  --------------------------------------------------------  IN: 480 mL / OUT: 300 mL / NET: 180 mL        PHYSICAL EXAM:  General: No acute distress BMI-  HEENT: EOMI, PERRL  Neck: Supple, [ ] JVD  Lungs: Equal air entry bilaterally; [ ] rales [ ] wheezing [ ] rhonchi  Heart: Regular rate and rhythm; [x ] murmur   2/6 [ x] systolic [ ] diastolic [ ] radiation[ ] rubs [ ]  gallops  Abdomen: Nontender, bowel sounds present  Extremities: No clubbing, cyanosis, [ ] edema [ ]Pulses  equal and intact  Nervous system:  Alert & Oriented X3, no focal deficits  Psychiatric: Normal affect  Skin: No rashes or lesions    LABS:  04-25    130<L>  |  95<L>  |  57<H>  ----------------------------<  195<H>  4.3   |  20<L>  |  4.88<H>    Ca    7.9<L>      25 Apr 2023 05:40  Phos  4.4     04-25  Mg     2.00     04-25      Creatinine Trend: 4.88<--, 4.28<--, 3.14<--, 3.66<--, 4.97<--, 3.85<--                        7.5    10.89 )-----------( 247      ( 25 Apr 2023 05:40 )             23.2

## 2023-04-25 NOTE — DIETITIAN INITIAL EVALUATION ADULT - PERTINENT MEDS FT
MEDICATIONS  (STANDING):  atorvastatin 40 milliGRAM(s) Oral at bedtime  chlorhexidine 2% Cloths 1 Application(s) Topical daily  dextrose 5%. 1000 milliLiter(s) (100 mL/Hr) IV Continuous <Continuous>  dextrose 5%. 1000 milliLiter(s) (50 mL/Hr) IV Continuous <Continuous>  dextrose 50% Injectable 12.5 Gram(s) IV Push once  dextrose 50% Injectable 25 Gram(s) IV Push once  dextrose 50% Injectable 25 Gram(s) IV Push once  epoetin nyasia-epbx (RETACRIT) Injectable 6000 Unit(s) IV Push <User Schedule>  gabapentin 100 milliGRAM(s) Oral three times a day  glucagon  Injectable 1 milliGRAM(s) IntraMuscular once  heparin   Injectable 5000 Unit(s) SubCutaneous every 12 hours  insulin lispro (ADMELOG) corrective regimen sliding scale   SubCutaneous at bedtime  insulin lispro (ADMELOG) corrective regimen sliding scale   SubCutaneous three times a day before meals  midodrine. 5 milliGRAM(s) Oral <User Schedule>  Nephro-zack 1 Tablet(s) Oral daily  sevelamer carbonate 800 milliGRAM(s) Oral three times a day with meals  zinc sulfate 220 milliGRAM(s) Oral daily    MEDICATIONS  (PRN):  acetaminophen     Tablet .. 650 milliGRAM(s) Oral every 6 hours PRN Temp greater or equal to 38C (100.4F), Mild Pain (1 - 3)  bisacodyl 5 milliGRAM(s) Oral every 12 hours PRN Constipation  dextrose Oral Gel 15 Gram(s) Oral once PRN Blood Glucose LESS THAN 70 milliGRAM(s)/deciliter  HYDROmorphone   Tablet 2 milliGRAM(s) Oral every 6 hours PRN mod-severe pain  melatonin 3 milliGRAM(s) Oral at bedtime PRN Insomnia  ondansetron Injectable 4 milliGRAM(s) IV Push every 8 hours PRN Nausea and/or Vomiting  sodium chloride 0.9% Bolus. 100 milliLiter(s) IV Bolus every 5 minutes PRN SBP LESS THAN or EQUAL to 90 mmHg

## 2023-04-25 NOTE — DIETITIAN INITIAL EVALUATION ADULT - ADD RECOMMEND
1) Monitor weights, PO intake/diet tolerance, skin integrity, pertinent labs.   2) Please consistently document % PO intake in nursing flowsheets to assess adequacy of nutritional intake/monitor need for further nutritional intervention(s).

## 2023-04-25 NOTE — SWALLOW BEDSIDE ASSESSMENT ADULT - SWALLOW EVAL: DIAGNOSIS
Pt demonstrates adequate oropharyngeal swallow function for puree, regular solids and thin liquids marked by adequtae oral management, apearance of timely swallow initiation, present and suspect hyolaryngeal elevation/excursion and no clinical signs of aspiration/penetration.

## 2023-04-26 LAB
ANION GAP SERPL CALC-SCNC: 14 MMOL/L — SIGNIFICANT CHANGE UP (ref 7–14)
APPEARANCE UR: ABNORMAL
B PERT DNA SPEC QL NAA+PROBE: SIGNIFICANT CHANGE UP
B PERT+PARAPERT DNA PNL SPEC NAA+PROBE: SIGNIFICANT CHANGE UP
BACTERIA # UR AUTO: ABNORMAL
BASE EXCESS BLDV CALC-SCNC: 5 MMOL/L — HIGH (ref -2–3)
BILIRUB UR-MCNC: NEGATIVE — SIGNIFICANT CHANGE UP
BLOOD GAS VENOUS COMPREHENSIVE RESULT: SIGNIFICANT CHANGE UP
BORDETELLA PARAPERTUSSIS (RAPRVP): SIGNIFICANT CHANGE UP
BUN SERPL-MCNC: 26 MG/DL — HIGH (ref 7–23)
C PNEUM DNA SPEC QL NAA+PROBE: SIGNIFICANT CHANGE UP
CALCIUM SERPL-MCNC: 8.3 MG/DL — LOW (ref 8.4–10.5)
CHLORIDE BLDV-SCNC: 102 MMOL/L — SIGNIFICANT CHANGE UP (ref 96–108)
CHLORIDE SERPL-SCNC: 98 MMOL/L — SIGNIFICANT CHANGE UP (ref 98–107)
CO2 BLDV-SCNC: 31.9 MMOL/L — HIGH (ref 22–26)
CO2 SERPL-SCNC: 24 MMOL/L — SIGNIFICANT CHANGE UP (ref 22–31)
COLOR SPEC: ABNORMAL
CREAT SERPL-MCNC: 2.83 MG/DL — HIGH (ref 0.5–1.3)
DIFF PNL FLD: ABNORMAL
EGFR: 23 ML/MIN/1.73M2 — LOW
EPI CELLS # UR: SIGNIFICANT CHANGE UP
FLUAV SUBTYP SPEC NAA+PROBE: SIGNIFICANT CHANGE UP
FLUBV RNA SPEC QL NAA+PROBE: SIGNIFICANT CHANGE UP
GAS PNL BLDV: 135 MMOL/L — LOW (ref 136–145)
GLUCOSE BLDC GLUCOMTR-MCNC: 192 MG/DL — HIGH (ref 70–99)
GLUCOSE BLDC GLUCOMTR-MCNC: 221 MG/DL — HIGH (ref 70–99)
GLUCOSE BLDC GLUCOMTR-MCNC: 283 MG/DL — HIGH (ref 70–99)
GLUCOSE BLDC GLUCOMTR-MCNC: 392 MG/DL — HIGH (ref 70–99)
GLUCOSE BLDV-MCNC: 177 MG/DL — HIGH (ref 70–99)
GLUCOSE SERPL-MCNC: 178 MG/DL — HIGH (ref 70–99)
GLUCOSE UR QL: NEGATIVE — SIGNIFICANT CHANGE UP
HADV DNA SPEC QL NAA+PROBE: SIGNIFICANT CHANGE UP
HCO3 BLDV-SCNC: 30 MMOL/L — HIGH (ref 22–29)
HCOV 229E RNA SPEC QL NAA+PROBE: SIGNIFICANT CHANGE UP
HCOV HKU1 RNA SPEC QL NAA+PROBE: SIGNIFICANT CHANGE UP
HCOV NL63 RNA SPEC QL NAA+PROBE: SIGNIFICANT CHANGE UP
HCOV OC43 RNA SPEC QL NAA+PROBE: SIGNIFICANT CHANGE UP
HCT VFR BLD CALC: 24.4 % — LOW (ref 39–50)
HCT VFR BLDA CALC: 23 % — LOW (ref 39–51)
HGB BLD CALC-MCNC: 7.6 G/DL — LOW (ref 12.6–17.4)
HGB BLD-MCNC: 7.9 G/DL — LOW (ref 13–17)
HMPV RNA SPEC QL NAA+PROBE: DETECTED
HPIV1 RNA SPEC QL NAA+PROBE: SIGNIFICANT CHANGE UP
HPIV2 RNA SPEC QL NAA+PROBE: SIGNIFICANT CHANGE UP
HPIV3 RNA SPEC QL NAA+PROBE: SIGNIFICANT CHANGE UP
HPIV4 RNA SPEC QL NAA+PROBE: SIGNIFICANT CHANGE UP
KETONES UR-MCNC: ABNORMAL
LACTATE BLDV-MCNC: 1.6 MMOL/L — SIGNIFICANT CHANGE UP (ref 0.5–2)
LACTATE SERPL-SCNC: 1.2 MMOL/L — SIGNIFICANT CHANGE UP (ref 0.5–2)
LEUKOCYTE ESTERASE UR-ACNC: ABNORMAL
M PNEUMO DNA SPEC QL NAA+PROBE: SIGNIFICANT CHANGE UP
MAGNESIUM SERPL-MCNC: 2.1 MG/DL — SIGNIFICANT CHANGE UP (ref 1.6–2.6)
MCHC RBC-ENTMCNC: 29.3 PG — SIGNIFICANT CHANGE UP (ref 27–34)
MCHC RBC-ENTMCNC: 32.4 GM/DL — SIGNIFICANT CHANGE UP (ref 32–36)
MCV RBC AUTO: 90.4 FL — SIGNIFICANT CHANGE UP (ref 80–100)
NITRITE UR-MCNC: NEGATIVE — SIGNIFICANT CHANGE UP
NRBC # BLD: 0 /100 WBCS — SIGNIFICANT CHANGE UP (ref 0–0)
NRBC # FLD: 0 K/UL — SIGNIFICANT CHANGE UP (ref 0–0)
PCO2 BLDV: 49 MMHG — SIGNIFICANT CHANGE UP (ref 42–55)
PH BLDV: 7.4 — SIGNIFICANT CHANGE UP (ref 7.32–7.43)
PH UR: 7 — SIGNIFICANT CHANGE UP (ref 5–8)
PHOSPHATE SERPL-MCNC: 3.1 MG/DL — SIGNIFICANT CHANGE UP (ref 2.5–4.5)
PLATELET # BLD AUTO: 302 K/UL — SIGNIFICANT CHANGE UP (ref 150–400)
PO2 BLDV: 48 MMHG — HIGH (ref 25–45)
POTASSIUM BLDV-SCNC: 3.8 MMOL/L — SIGNIFICANT CHANGE UP (ref 3.5–5.1)
POTASSIUM SERPL-MCNC: 3.8 MMOL/L — SIGNIFICANT CHANGE UP (ref 3.5–5.3)
POTASSIUM SERPL-SCNC: 3.8 MMOL/L — SIGNIFICANT CHANGE UP (ref 3.5–5.3)
PROT UR-MCNC: ABNORMAL
RAPID RVP RESULT: DETECTED
RBC # BLD: 2.7 M/UL — LOW (ref 4.2–5.8)
RBC # FLD: 19.5 % — HIGH (ref 10.3–14.5)
RBC CASTS # UR COMP ASSIST: 6 /HPF — HIGH (ref 0–4)
RSV RNA SPEC QL NAA+PROBE: SIGNIFICANT CHANGE UP
RV+EV RNA SPEC QL NAA+PROBE: SIGNIFICANT CHANGE UP
SAO2 % BLDV: 81.2 % — SIGNIFICANT CHANGE UP (ref 67–88)
SARS-COV-2 RNA SPEC QL NAA+PROBE: SIGNIFICANT CHANGE UP
SODIUM SERPL-SCNC: 136 MMOL/L — SIGNIFICANT CHANGE UP (ref 135–145)
SP GR SPEC: 1.01 — SIGNIFICANT CHANGE UP (ref 1.01–1.05)
UROBILINOGEN FLD QL: SIGNIFICANT CHANGE UP
WBC # BLD: 11.9 K/UL — HIGH (ref 3.8–10.5)
WBC # FLD AUTO: 11.9 K/UL — HIGH (ref 3.8–10.5)
WBC UR QL: 425 /HPF — HIGH (ref 0–5)

## 2023-04-26 PROCEDURE — 71045 X-RAY EXAM CHEST 1 VIEW: CPT | Mod: 26

## 2023-04-26 RX ORDER — ACETAMINOPHEN 500 MG
1000 TABLET ORAL ONCE
Refills: 0 | Status: COMPLETED | OUTPATIENT
Start: 2023-04-26 | End: 2023-04-26

## 2023-04-26 RX ORDER — CEFTRIAXONE 500 MG/1
1000 INJECTION, POWDER, FOR SOLUTION INTRAMUSCULAR; INTRAVENOUS EVERY 24 HOURS
Refills: 0 | Status: COMPLETED | OUTPATIENT
Start: 2023-04-26 | End: 2023-04-28

## 2023-04-26 RX ORDER — HYDROMORPHONE HYDROCHLORIDE 2 MG/ML
1 INJECTION INTRAMUSCULAR; INTRAVENOUS; SUBCUTANEOUS ONCE
Refills: 0 | Status: DISCONTINUED | OUTPATIENT
Start: 2023-04-26 | End: 2023-04-26

## 2023-04-26 RX ORDER — SODIUM CHLORIDE 9 MG/ML
250 INJECTION INTRAMUSCULAR; INTRAVENOUS; SUBCUTANEOUS ONCE
Refills: 0 | Status: COMPLETED | OUTPATIENT
Start: 2023-04-26 | End: 2023-04-26

## 2023-04-26 RX ADMIN — GABAPENTIN 100 MILLIGRAM(S): 400 CAPSULE ORAL at 11:31

## 2023-04-26 RX ADMIN — Medication 1 TABLET(S): at 11:31

## 2023-04-26 RX ADMIN — CHLORHEXIDINE GLUCONATE 1 APPLICATION(S): 213 SOLUTION TOPICAL at 11:41

## 2023-04-26 RX ADMIN — Medication 3: at 18:06

## 2023-04-26 RX ADMIN — ZINC SULFATE TAB 220 MG (50 MG ZINC EQUIVALENT) 220 MILLIGRAM(S): 220 (50 ZN) TAB at 11:31

## 2023-04-26 RX ADMIN — Medication 400 MILLIGRAM(S): at 00:27

## 2023-04-26 RX ADMIN — Medication 5: at 13:11

## 2023-04-26 RX ADMIN — Medication 667 MILLIGRAM(S): at 18:08

## 2023-04-26 RX ADMIN — HEPARIN SODIUM 5000 UNIT(S): 5000 INJECTION INTRAVENOUS; SUBCUTANEOUS at 18:08

## 2023-04-26 RX ADMIN — Medication 667 MILLIGRAM(S): at 09:17

## 2023-04-26 RX ADMIN — Medication 1000 MILLIGRAM(S): at 00:59

## 2023-04-26 RX ADMIN — HYDROMORPHONE HYDROCHLORIDE 1 MILLIGRAM(S): 2 INJECTION INTRAMUSCULAR; INTRAVENOUS; SUBCUTANEOUS at 05:53

## 2023-04-26 RX ADMIN — Medication 1: at 09:17

## 2023-04-26 RX ADMIN — HEPARIN SODIUM 5000 UNIT(S): 5000 INJECTION INTRAVENOUS; SUBCUTANEOUS at 05:55

## 2023-04-26 RX ADMIN — Medication 667 MILLIGRAM(S): at 13:11

## 2023-04-26 RX ADMIN — SODIUM CHLORIDE 250 MILLILITER(S): 9 INJECTION INTRAMUSCULAR; INTRAVENOUS; SUBCUTANEOUS at 00:10

## 2023-04-26 RX ADMIN — HYDROMORPHONE HYDROCHLORIDE 1 MILLIGRAM(S): 2 INJECTION INTRAMUSCULAR; INTRAVENOUS; SUBCUTANEOUS at 06:23

## 2023-04-26 RX ADMIN — CEFTRIAXONE 100 MILLIGRAM(S): 500 INJECTION, POWDER, FOR SOLUTION INTRAMUSCULAR; INTRAVENOUS at 11:30

## 2023-04-26 RX ADMIN — ATORVASTATIN CALCIUM 40 MILLIGRAM(S): 80 TABLET, FILM COATED ORAL at 21:38

## 2023-04-26 NOTE — PROGRESS NOTE ADULT - ATTENDING COMMENTS
.
.
Seen ex'ed L LE gang/Non-slavageable  R BKA C/D/I  Plan L BKA/AKA mayte if ok from med standpoint.  DW'ed pt, brother/sister by tel.  Informed consent
As above  L AKA ok.  Plan to DC staples ~3-4 weeks post op

## 2023-04-26 NOTE — PROGRESS NOTE ADULT - SUBJECTIVE AND OBJECTIVE BOX
[facility] NEPHROLOGY- PROGRESS NOTE    72y Male with history of ESRD on HD presents with L foot gangrene. Nephrology consulted for ESRD status.    Patient febrile overnight with positive RVP and UA now on CTX.    REVIEW OF SYSTEMS:  Gen: + fevers  Cards: no chest pain  Resp: no dyspnea  GI: no nausea or vomiting or diarrhea  Vascular: no LE edema, + LLE pain improving    No Known Allergies      Hospital Medications: Medications reviewed      VITALS:  T(F): 97.3 (23 @ 10:15), Max: 100.7 (23 @ 00:27)  HR: 94 (23 @ 10:15)  BP: 101/51 (23 @ 10:15)  RR: 18 (23 @ 10:15)  SpO2: 97% (23 @ 10:15)  Wt(kg): --     @ 07:01  -   @ 07:00  --------------------------------------------------------  IN: 1370 mL / OUT: 1703 mL / NET: -333 mL        PHYSICAL EXAM:    Gen: NAD, calm  Cards: RRR, +S1/S2, no M/G/R  Resp: CTA B/L  GI: soft, NT/ND, NABS  Vascular: B/L BKA, RIJ TDC intact      LABS:      136  |  98  |  26<H>  ----------------------------<  178<H>  3.8   |  24  |  2.83<H>    Ca    8.3<L>      2023 05:30  Phos  3.1       Mg     2.10           Creatinine Trend: 2.83 <--, 4.88 <--, 4.28 <--, 3.14 <--, 3.66 <--, 4.97 <--, 3.85 <--                        7.9    11.90 )-----------( 302      ( 2023 05:30 )             24.4     Urine Studies:  Urinalysis Basic - ( 2023 06:25 )    Color: Orange / Appearance: Turbid / S.014 / pH:   Gluc:  / Ketone: Trace  / Bili: Negative / Urobili: <2 mg/dL   Blood:  / Protein: 100 mg/dL / Nitrite: Negative   Leuk Esterase: Large / RBC: 6 /HPF /  /HPF   Sq Epi:  / Non Sq Epi:  / Bacteria: Many

## 2023-04-26 NOTE — PROGRESS NOTE ADULT - SUBJECTIVE AND OBJECTIVE BOX
PATIENT SEEN AND EXAMINED ON :- 4/26/23  DATE OF SERVICE:   4/26/23          Interim events noted,Labs ,Radiological studies and Cardiology tests reviewed .    MR#[id number]  PATIENT NAME:-BRIAN [facility] COURSE: HPI:  Please note, patient with second MRN #[id number] with additional recent hospitalizations.    This is a 72M with history of ESRD on HD (T/Th/Sat), DM2, PVD (s/p R BKA in Nov 2022) and recurrent hematurias (has required CBI in the past) who presents to the hospital with worsening pain and duskiness of his L foot. Patient currently is a limited historian, HPI supplemented with information from patient's sister Darshana ([phone]). As per patient he was sent to the hospital for possible L foot infection, does report pain to the bottom of his foot but otherwise denies any fevers/chills/diaphoresis, no swelling or redness of his L foot. No other acute complaints. Said that he is supposed to have an amputation of his L leg but is unclear on the details. Spoke with patient's sister, Darshana, who said that the patient has known PVD, had a recent R leg BKA and has had some procedures with Dr. Cisco [facility]al [facility] [place] (sister unsure of the procedures) and states that they were told the procedures did not help improve the peripheral blood flow in the patient's L leg and that he would need an amputation. Said that the family was told he was being sent to the hospital for the amputation evaluation. Otherwise sister not aware of any other acute issues for the patient.     On arrival to the ED, his vitals were T 97.5, P 90, /59, RR 16, O2 sat 98% RA. His lab work was most significant for mild anemia and elevated CRP. His imaging did not show acute findings to suggest OM. He was admitted to medicine.   (17 Apr 2023 23:19)      INTERIM EVENTS:Patient seen at bedside ,interim events noted.      PMH -reviewed admission note, no change since admission  HEART FAILURE: Acute[ ]Chronic[ ] Systolic[ ] Diastolic[ ] Combined Systolic and Diastolic[ ]  CAD[ ] CABG[ ] PCI[ ]  DEVICES[ ] PPM[ ] ICD[ ] ILR[ ]  ATRIAL FIBRILLATION[ ] Paroxysmal[ ] Permanent[ ] CHADS2-[  ]  CANDE[ ] CKD1[ ] CKD2[ ] CKD3[ ] CKD4[ ] ESRD[ ]  COPD[ ] HTN[ ]   DM[ ] Type1[ ] Type 2[ ]   CVA[ ] Paresis[ ]    AMBULATION: Assisted[ ] Cane/walker[ ] Independent[ ]    MEDICATIONS  (STANDING):  atorvastatin 40 milliGRAM(s) Oral at bedtime  calcium acetate 667 milliGRAM(s) Oral three times a day with meals  cefTRIAXone   IVPB 1000 milliGRAM(s) IV Intermittent every 24 hours  chlorhexidine 2% Cloths 1 Application(s) Topical daily  dextrose 5%. 1000 milliLiter(s) (100 mL/Hr) IV Continuous <Continuous>  dextrose 5%. 1000 milliLiter(s) (50 mL/Hr) IV Continuous <Continuous>  dextrose 50% Injectable 25 Gram(s) IV Push once  dextrose 50% Injectable 12.5 Gram(s) IV Push once  dextrose 50% Injectable 25 Gram(s) IV Push once  epoetin nyasia-epbx (RETACRIT) Injectable 6000 Unit(s) IV Push <User Schedule>  gabapentin 100 milliGRAM(s) Oral daily  glucagon  Injectable 1 milliGRAM(s) IntraMuscular once  heparin   Injectable 5000 Unit(s) SubCutaneous every 12 hours  insulin lispro (ADMELOG) corrective regimen sliding scale   SubCutaneous at bedtime  insulin lispro (ADMELOG) corrective regimen sliding scale   SubCutaneous three times a day before meals  midodrine. 5 milliGRAM(s) Oral <User Schedule>  Nephro-zack 1 Tablet(s) Oral daily  zinc sulfate 220 milliGRAM(s) Oral daily    MEDICATIONS  (PRN):  acetaminophen     Tablet .. 650 milliGRAM(s) Oral every 6 hours PRN Temp greater or equal to 38C (100.4F), Mild Pain (1 - 3)  bisacodyl 5 milliGRAM(s) Oral every 12 hours PRN Constipation  dextrose Oral Gel 15 Gram(s) Oral once PRN Blood Glucose LESS THAN 70 milliGRAM(s)/deciliter  HYDROmorphone   Tablet 2 milliGRAM(s) Oral every 6 hours PRN mod-severe pain  melatonin 3 milliGRAM(s) Oral at bedtime PRN Insomnia  ondansetron Injectable 4 milliGRAM(s) IV Push every 8 hours PRN Nausea and/or Vomiting  sodium chloride 0.9% Bolus. 100 milliLiter(s) IV Bolus every 5 minutes PRN SBP LESS THAN or EQUAL to 90 mmHg            REVIEW OF SYSTEMS:  Constitutional: [ ] fever, [ ]weight loss,  [ ]fatigue [ ]weight gain  Eyes: [ ] visual changes  Respiratory: [ ]shortness of breath;  [ ] cough, [ ]wheezing, [ ]chills, [ ]hemoptysis  Cardiovascular: [ ] chest pain, [ ]palpitations, [ ]dizziness,  [ ]leg swelling[ ]orthopnea[ ]PND  Gastrointestinal: [ ] abdominal pain, [ ]nausea, [ ]vomiting,  [ ]diarrhea [ ]Constipation [ ]Melena  Genitourinary: [ ] dysuria, [ ] hematuria [ ]Garcia  Neurologic: [ ] headaches [ ] tremors[ ]weakness [ ]Paralysis Right[ ] Left[ ]  Skin: [ ] itching, [ ]burning, [ ] rashes  Endocrine: [ ] heat or cold intolerance  Musculoskeletal: [ ] joint pain or swelling; [ ] muscle, back, or extremity pain  Psychiatric: [ ] depression, [ ]anxiety, [ ]mood swings, or [ ]difficulty sleeping  Hematologic: [ ] easy bruising, [ ] bleeding gums    [ ] All remaining systems negative except as per above.   [ ]Unable to obtain.  [x] No change in ROS since admission      Vital Signs Last 24 Hrs  T(C): 36.3 (26 Apr 2023 18:00), Max: 38.2 (26 Apr 2023 00:27)  T(F): 97.3 (26 Apr 2023 18:00), Max: 100.7 (26 Apr 2023 00:27)  HR: 92 (26 Apr 2023 18:00) (92 - 117)  BP: 107/56 (26 Apr 2023 18:00) (100/65 - 122/69)  BP(mean): --  RR: 17 (26 Apr 2023 18:00) (16 - 18)  SpO2: 100% (26 Apr 2023 18:00) (97% - 100%)    Parameters below as of 26 Apr 2023 18:00  Patient On (Oxygen Delivery Method): room air      I&O's Summary    25 Apr 2023 07:01  -  26 Apr 2023 07:00  --------------------------------------------------------  IN: 1370 mL / OUT: 1703 mL / NET: -333 mL    26 Apr 2023 07:01  -  26 Apr 2023 20:59  --------------------------------------------------------  IN: 480 mL / OUT: 401 mL / NET: 79 mL        PHYSICAL EXAM:  General: No acute distress BMI-  HEENT: EOMI, PERRL  Neck: Supple, [ ] JVD  Lungs: Equal air entry bilaterally; [ ] rales [ ] wheezing [ ] rhonchi  Heart: Regular rate and rhythm; [x ] murmur   2/6 [ x] systolic [ ] diastolic [ ] radiation[ ] rubs [ ]  gallops  Abdomen: Nontender, bowel sounds present  Extremities: No clubbing, cyanosis, [ ] edema [ ]Pulses  equal and intact  Nervous system:  Alert & Oriented X3, no focal deficits  Psychiatric: Normal affect  Skin: No rashes or lesions    LABS:  04-26    136  |  98  |  26<H>  ----------------------------<  178<H>  3.8   |  24  |  2.83<H>    Ca    8.3<L>      26 Apr 2023 05:30  Phos  3.1     04-26  Mg     2.10     04-26      Creatinine Trend: 2.83<--, 4.88<--, 4.28<--, 3.14<--, 3.66<--, 4.97<--                        7.9    11.90 )-----------( 302      ( 26 Apr 2023 05:30 )             24.4

## 2023-04-26 NOTE — PROGRESS NOTE ADULT - ASSESSMENT
72y Male with history of ESRD on HD presents with L foot gangrene. Nephrology consulted for ESRD status.    1) ESRD: Last HD on 4/25 tolerated well with 1L removed. Plan for next maintenance HD on 4/27. Monitor electrolytes.    2) HTN with ESRD: BP low normal. Continue with midodrine 5 mg pre-HD to avoid intradialytic hypotension.    3) Anemia of renal disease: Hb low with adequate iron stores. Continue with Epo 6K with HD. Transfuse PRBC as needed. Monitor Hb.    4) Hyperphosphatemia: Phosphorus acceptable. Continue with phoslo 1 tab TID with meals. Monitor serum calcium and phosphorus.      [facility] NEPHROLOGY  Wili Hopkins M.D.  Kiran Feng D.O.  Sujatha Dumont M.D.  Radha Miller, MSN, ANP-C    Telephone: [phone]  Facsimile: [phone]    [id number] [id number]

## 2023-04-26 NOTE — PROGRESS NOTE ADULT - ASSESSMENT
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 s/p L AKA 4/20    Plan:  - Patient non-ambulatory with minimal ability to use left leg for transfers.   - monitor pain control with pain medication and gabapentin  - monitor H/H  - daily dressing changes    Plan discussed with and approved by attending, Dr. Wenceslao ALBRIGHT Team Surgery   [id number]

## 2023-04-26 NOTE — ADVANCED PRACTICE NURSE CONSULT - RECOMMEDATIONS
Continue with vascular f/u for L AKA management.     Topical recommendations:     Sacrum/intergluteal cleft - Cleanse with NS, pat dry. Apply Liquid barrier film to periwound skin (allow to dry). Cover with silicone foam with border. Change daily and PRN if soiled.     Continue low air loss bed therapy, continue to turn & reposition per protocol, soft pillow between bony prominences, continue moisture management with barrier creams & single breathable pad, continue measures to decrease friction/shear/pressure.     Plan discussed with patient and primary RN Elisabet Roy.     Please contact Wound/Ostomy Care Service Line if we can be of further assistance/if tissue type changes noted (ext [phone]).  Continue with vascular f/u for L AKA management.     Topical recommendations:     Sacrum/intergluteal cleft - Cleanse with NS, pat dry. Apply Liquid barrier film to periwound skin (allow to dry). Cover with silicone foam with border. Change daily and PRN if soiled. Once foam dressing is in place apply Carole moisture barrier cream twice a day and PRN with incontinent episodes.     Continue low air loss bed therapy, continue to turn & reposition per protocol, soft pillow between bony prominences, continue moisture management with barrier creams & single breathable pad, continue measures to decrease friction/shear/pressure.     Plan discussed with patient and primary RN Elisabet Roy.     Please contact Wound/Ostomy Care Service Line if we can be of further assistance/if tissue type changes noted (ext [phone]).

## 2023-04-26 NOTE — PROGRESS NOTE ADULT - SUBJECTIVE AND OBJECTIVE BOX
Surgery C-Team Daily Progress Note     BRIAN MATTHEWS | [id number]    SUBJECTIVE / 24H EVENTS  Patient seen and examined on morning rounds. No acute events overnight. No nausea / vomiting. Tolerating diet.     OBJECTIVE:    VITAL SIGNS:  T(C): 36.8 (23 @ 05:53), Max: 38.2 (23 @ 00:27)  HR: 93 (23 @ 05:53) (93 - 117)  BP: 109/77 (23 @ 05:53) (100/65 - 123/58)  RR: 16 (23 @ 05:53) (16 - 18)  SpO2: 100% (23 @ 05:53) (100% - 100%)  Daily     Daily Weight in k.2 (2023 16:45)  POCT Blood Glucose.: 158 mg/dL (23 @ 21:28)  POCT Blood Glucose.: 125 mg/dL (23 @ 19:26)  POCT Blood Glucose.: 253 mg/dL (23 @ 15:20)      PE:  Gen: NAD  Resp: airway patent, respirations unlabored, no increased WoB  CVS: RRR  Abd: soft, ND, NT, no rebound or guarding  Ext: L AKA incision c/d/i, no erythema, fluctuance, or bleeding  Neuro: AAOx3, no focal deficits      23 @ 07:01  -  23 @ 07:00  --------------------------------------------------------  IN:    IV PiggyBack: 250 mL    Oral Fluid: 720 mL    Other (mL): 400 mL  Total IN: 1370 mL    OUT:    Blood Loss (mL): 0 mL    Other (mL): 1400 mL    Stool (mL): 3 mL    Voided (mL): 300 mL  Total OUT: 1703 mL    Total NET: -333 mL          LAB VALUES:      136  |  98  |  26<H>  ----------------------------<  178<H>  3.8   |  24  |  2.83<H>    Ca    8.3<L>      2023 05:30  Phos  3.1     -  Mg     2.10     -                                 7.9    11.90 )-----------( 302      ( 2023 05:30 )             24.4               Urinalysis Basic - ( 2023 06:25 )    Color: Orange / Appearance: Turbid / S.014 / pH: x  Gluc: x / Ketone: Trace  / Bili: Negative / Urobili: <2 mg/dL   Blood: x / Protein: 100 mg/dL / Nitrite: Negative   Leuk Esterase: Large / RBC: 6 /HPF /  /HPF   Sq Epi: x / Non Sq Epi: x / Bacteria: Many        MICROBIOLOGY:    No new microbiology data for review.     RADIOLOGY:    No new radiographic images for review.    MEDICATIONS  (STANDING):  atorvastatin 40 milliGRAM(s) Oral at bedtime  calcium acetate 667 milliGRAM(s) Oral three times a day with meals  chlorhexidine 2% Cloths 1 Application(s) Topical daily  dextrose 5%. 1000 milliLiter(s) (100 mL/Hr) IV Continuous <Continuous>  dextrose 5%. 1000 milliLiter(s) (50 mL/Hr) IV Continuous <Continuous>  dextrose 50% Injectable 25 Gram(s) IV Push once  dextrose 50% Injectable 12.5 Gram(s) IV Push once  dextrose 50% Injectable 25 Gram(s) IV Push once  epoetin nyasia-epbx (RETACRIT) Injectable 6000 Unit(s) IV Push <User Schedule>  gabapentin 100 milliGRAM(s) Oral daily  glucagon  Injectable 1 milliGRAM(s) IntraMuscular once  heparin   Injectable 5000 Unit(s) SubCutaneous every 12 hours  insulin lispro (ADMELOG) corrective regimen sliding scale   SubCutaneous three times a day before meals  insulin lispro (ADMELOG) corrective regimen sliding scale   SubCutaneous at bedtime  midodrine. 5 milliGRAM(s) Oral <User Schedule>  Nephro-zack 1 Tablet(s) Oral daily  zinc sulfate 220 milliGRAM(s) Oral daily    MEDICATIONS  (PRN):  acetaminophen     Tablet .. 650 milliGRAM(s) Oral every 6 hours PRN Temp greater or equal to 38C (100.4F), Mild Pain (1 - 3)  bisacodyl 5 milliGRAM(s) Oral every 12 hours PRN Constipation  dextrose Oral Gel 15 Gram(s) Oral once PRN Blood Glucose LESS THAN 70 milliGRAM(s)/deciliter  HYDROmorphone   Tablet 2 milliGRAM(s) Oral every 6 hours PRN mod-severe pain  melatonin 3 milliGRAM(s) Oral at bedtime PRN Insomnia  ondansetron Injectable 4 milliGRAM(s) IV Push every 8 hours PRN Nausea and/or Vomiting  sodium chloride 0.9% Bolus. 100 milliLiter(s) IV Bolus every 5 minutes PRN SBP LESS THAN or EQUAL to 90 mmHg

## 2023-04-26 NOTE — ADVANCED PRACTICE NURSE CONSULT - ASSESSMENT
General: A&Ox4, able to turn independently in bed, incontinent of liquid stool. Skin warm, dry with increased moisture in intertriginous folds, cachectic, R BKA skin intact, R AKA with kerlix and ace bandage clean dry intact, managed by vascular surgery.     Sacralgluteal cleft - mixed etiology wound - moisture associated dermatitis complicated by friction over soft tissue, measuring 4.8mje9chv9.2cm, exposed.....No evidence of candidiasis.  General: A&Ox4, able to turn independently in bed, incontinent of liquid stool. Skin warm, dry with increased moisture in intertriginous folds, cachectic, R BKA skin intact, R AKA with kerlix and ace bandage clean dry intact, managed by vascular surgery.     Sacrum/intergluteal cleft - mixed etiology wound - moisture associated dermatitis complicated by friction over soft tissue, measuring 4.8mzu7joj2.2cm, 40% moist pink dermis, 60% adipose, macerated irregular wound edges, small serous drainage, no odor, macerated hypopigmented skin from 1 to 6 o clock. Periwound with hyperpigmentation, no erythema, no increased warmth, no edema, no induration, no fluctuance no signs or symptoms of overt skin infection. Goals of care: offload pressure, protect from friction and shear, monitor for tissue type changes.  General: A&Ox4, able to turn independently in bed, incontinent of liquid stool. R chest wall HD access dressing clean dry intact. Skin warm, dry with increased moisture in intertriginous folds, cachectic, R BKA skin intact, R AKA with kerlix and ace bandage clean dry intact, managed by vascular surgery.     Sacrum/intergluteal cleft - mixed etiology wound - moisture associated dermatitis complicated by friction over soft tissue, measuring 4.7lro4vzi7.2cm, 40% moist pink dermis, 60% adipose, macerated irregular wound edges, small serous drainage, no odor, macerated hypopigmented skin from 1 to 6 o clock. Periwound with hyperpigmentation, no erythema, no increased warmth, no edema, no induration, no fluctuance no signs or symptoms of overt skin infection. Goals of care: offload pressure, protect from friction and shear, monitor for tissue type changes.     Patient continues to be at high risk for skin breakdown 2/2 bedbound status, cachexia, fecal incontinence. On assessment patient on a low air loss surface, moisture management in place with use of one breathable incontinence pad. Turned and positioned per protocol.  General: A&Ox4, able to turn independently in bed, incontinent of liquid stool. R chest wall HD access dressing clean dry intact. Skin warm, dry with increased moisture in intertriginous folds, cachectic, R BKA skin intact, R AKA with kerlix and ace bandage clean dry intact, managed by vascular surgery.     Sacrum/intergluteal cleft - mixed etiology wound - incontinence and moisture associated dermatitis complicated by friction over soft tissue, measuring 4.2hfd1mpj4.2cm, 40% moist pink dermis, 60% adipose, macerated irregular wound edges, small serous drainage, no odor, macerated hypopigmented skin from 1 to 6 o clock. Periwound with hyperpigmentation, no erythema, no increased warmth, no edema, no induration, no fluctuance no signs or symptoms of overt skin infection. Goals of care: offload pressure, protect from friction and shear, monitor for tissue type changes.     Patient continues to be at high risk for skin breakdown 2/2 bedbound status, cachexia, fecal incontinence. On assessment patient on a low air loss surface, moisture management in place with use of one breathable incontinence pad. Turned and positioned per protocol.

## 2023-04-26 NOTE — ADVANCED PRACTICE NURSE CONSULT - REASON FOR CONSULT
Patient seen on skin care rounds after wound care referral received for assessment of skin impairment and recommendations of topical management of sacrum stage III pressure injury. Patient is a 72M w/ PMHx of ESRD on HD (T/Th/Sat), DM2, PVD (s/p R BKA in Nov 2022) and recurrent hematurias (has required CBI in the past) who presented to the hospital with worsening gangrene of his L foot, consulted by infectious disease, podiatry, vascular surgery. s/p L AKA 4/20/2023 by vascular surgery. S/p 1U PRBC 4/22 and 4/24 for anemia. Followed by nephrology for ESRD. Chart reviewed: WBC 11.9, H/H 7.9/24.4, INR 1.26, Platelets 302, hA1c 7.3, BMI 20.5, raghav 14.  Patient seen on skin care rounds after wound care referral received for assessment of skin impairment and recommendations of topical management of sacrum stage III pressure injury. Patient is a 72M w/ PMHx of ESRD on HD (T/Th/Sat), DM2, PVD (s/p R BKA in Nov 2022) and recurrent hematurias (has required CBI in the past) who presented to the hospital with worsening gangrene of his L foot, consulted by infectious disease, podiatry, vascular surgery. s/p L AKA 4/20/2023 by vascular surgery. S/p 1U PRBC 4/22 and 4/24 for anemia. Followed by nephrology for ESRD. Chart reviewed: WBC 11.9, H/H 7.9/24.4, INR 1.26, Platelets 302, hA1c 7.3, BMI 20.5, raghav 14. +HMPV 4/26, contact precautions maintained.  Patient seen on skin care rounds after wound care referral received for assessment of skin impairment and recommendations of topical management of sacrum stage III pressure injury. Patient is a 72M w/ PMHx of ESRD on HD (T/Th/Sat), DM2, PVD (s/p R BKA in Nov 2022) and recurrent hematurias (has required CBI in the past) who presented to the hospital with worsening gangrene of his L foot, consulted by infectious disease, podiatry, vascular surgery. s/p L AKA 4/20/2023 by vascular surgery. S/p 1U PRBC 4/22 and 4/24 for anemia, on IV antibiotic therapy for UTI. Followed by nephrology for ESRD. Chart reviewed: WBC 11.9, H/H 7.9/24.4, INR 1.26, Platelets 302, hA1c 7.3, BMI 20.5, raghav 14. +HMPV 4/26, contact precautions maintained.

## 2023-04-26 NOTE — PROVIDER CONTACT NOTE (OTHER) - REASON
Patient refusing to have HD today, states that he had left BKA surgery done and wants to have HD tomorrow
Patient temp 100.7, P117, patient had 2 BM in 2 hours
Patients HR is 108 post HD treatment

## 2023-04-26 NOTE — CHART NOTE - NSCHARTNOTEFT_GEN_A_CORE
ACP Medicine Night Coverage [facility] Medicine Night Coverage    S.  Notified by RN patient with temperature of 100.7 F rectally. Seen and examined patient at bedside. Patient is alert, NAD. Denies HA, CP, SOB, new cough, N/V, abdominal pain, burning sensation with urination, urinary frequency, dysuria or changes in bowel movement.    VITAL SIGNS:  T(C): 38.2 (04-26-23 @ 00:27), Max: 38.2 (04-26-23 @ 00:27)  HR: 117 (04-26-23 @ 00:27) (93 - 117)  BP: 100/65 (04-26-23 @ 00:27) (100/65 - 123/58)  RR: 18 (04-26-23 @ 00:27) (16 - 18)  SpO2: 100% (04-26-23 @ 00:27) (100% - 100%)  Wt(kg): --      RADIOLOGY : F/u CXR       PHYSICAL EXAM:  Constitutional: AOx3. NAD.  Respiratory: clear lungs bilaterally. No wheezing, rhonchi, or crackles.  Cardiovascular: S1 S2. No murmurs.  Gastrointestinal: BS X4 active. soft. nontender.      ASSESSMENT/PLAN:     72M w/ PMHx of ESRD on HD (T/Th/Sat), DM2, PVD (s/p R BKA in Nov 2022) and recurrent hematurias (has required CBI in the past) who presents to the hospital with worsening gangrene of his L foot now with new fever.    1) New Fever   - IVPB Tylenol 1 gram administered  - Will f/u labs, BCx x2, Lactate, CBC, BMP, VBG, RVP   - Will f/u Urine analysis   - Will f/u CXR  - Will continue to monitor closely overnight.   - Will convey overnight events to AM team       Marisel Jaquez PA-C   Department Of Medicine   In House Pager [phone] [facility] Medicine Night Coverage    S.  Notified by RN patient with temperature of 100.7 F rectally, otherwise VSS. Seen and examined patient at bedside. Patient is alert, NAD. Denies HA, CP, SOB, new cough, N/V, abdominal pain, burning sensation with urination, urinary frequency, dysuria or changes in bowel movement.    VITAL SIGNS:  T(C): 38.2 (04-26-23 @ 00:27), Max: 38.2 (04-26-23 @ 00:27)  HR: 117 (04-26-23 @ 00:27) (93 - 117)  BP: 100/65 (04-26-23 @ 00:27) (100/65 - 123/58)  RR: 18 (04-26-23 @ 00:27) (16 - 18)  SpO2: 100% (04-26-23 @ 00:27) (100% - 100%)  Wt(kg): --      RADIOLOGY : F/u CXR       PHYSICAL EXAM:  Constitutional: AOx3. NAD.  Respiratory: clear lungs bilaterally. No wheezing, rhonchi, or crackles.  Cardiovascular: S1 S2. No murmurs.  Gastrointestinal: BS X4 active. soft. nontender.      ASSESSMENT/PLAN:     72M w/ PMHx of ESRD on HD (T/Th/Sat), DM2, PVD (s/p R BKA in Nov 2022) and recurrent hematurias (has required CBI in the past) who presents to the hospital with worsening gangrene of his L foot now with new fever.    1) New Fever   - IVPB Tylenol 1 gram administered  - EKG reviewed. sinus tachycardia to 109.  - 250 bolus NS ordered  - Will f/u labs, BCx x2, Lactate, CBC, BMP, VBG, RVP   - Will f/u Urine analysis   - Will f/u CXR  - Will continue to monitor closely overnight.   - Will convey overnight events to AM team       Marisel Jaquez PA-C   Department Of Medicine   In House Pager [phone]

## 2023-04-27 LAB
GLUCOSE BLDC GLUCOMTR-MCNC: 176 MG/DL — HIGH (ref 70–99)
GLUCOSE BLDC GLUCOMTR-MCNC: 196 MG/DL — HIGH (ref 70–99)
GLUCOSE BLDC GLUCOMTR-MCNC: 264 MG/DL — HIGH (ref 70–99)
GLUCOSE BLDC GLUCOMTR-MCNC: 292 MG/DL — HIGH (ref 70–99)
GLUCOSE BLDC GLUCOMTR-MCNC: 300 MG/DL — HIGH (ref 70–99)
HCT VFR BLD CALC: 26.9 % — LOW (ref 39–50)
HGB BLD-MCNC: 8.4 G/DL — LOW (ref 13–17)
MCHC RBC-ENTMCNC: 29.3 PG — SIGNIFICANT CHANGE UP (ref 27–34)
MCHC RBC-ENTMCNC: 31.2 GM/DL — LOW (ref 32–36)
MCV RBC AUTO: 93.7 FL — SIGNIFICANT CHANGE UP (ref 80–100)
NRBC # BLD: 0 /100 WBCS — SIGNIFICANT CHANGE UP (ref 0–0)
NRBC # FLD: 0 K/UL — SIGNIFICANT CHANGE UP (ref 0–0)
PHOSPHATE SERPL-MCNC: 2.9 MG/DL — SIGNIFICANT CHANGE UP (ref 2.5–4.5)
PLATELET # BLD AUTO: 301 K/UL — SIGNIFICANT CHANGE UP (ref 150–400)
RBC # BLD: 2.87 M/UL — LOW (ref 4.2–5.8)
RBC # FLD: 18.9 % — HIGH (ref 10.3–14.5)
WBC # BLD: 11.84 K/UL — HIGH (ref 3.8–10.5)
WBC # FLD AUTO: 11.84 K/UL — HIGH (ref 3.8–10.5)

## 2023-04-27 RX ORDER — HYDROMORPHONE HYDROCHLORIDE 2 MG/ML
2 INJECTION INTRAMUSCULAR; INTRAVENOUS; SUBCUTANEOUS EVERY 6 HOURS
Refills: 0 | Status: DISCONTINUED | OUTPATIENT
Start: 2023-04-27 | End: 2023-04-27

## 2023-04-27 RX ORDER — HYDROMORPHONE HYDROCHLORIDE 2 MG/ML
2 INJECTION INTRAMUSCULAR; INTRAVENOUS; SUBCUTANEOUS EVERY 6 HOURS
Refills: 0 | Status: DISCONTINUED | OUTPATIENT
Start: 2023-04-27 | End: 2023-05-01

## 2023-04-27 RX ADMIN — ATORVASTATIN CALCIUM 40 MILLIGRAM(S): 80 TABLET, FILM COATED ORAL at 22:11

## 2023-04-27 RX ADMIN — Medication 667 MILLIGRAM(S): at 17:29

## 2023-04-27 RX ADMIN — HYDROMORPHONE HYDROCHLORIDE 2 MILLIGRAM(S): 2 INJECTION INTRAMUSCULAR; INTRAVENOUS; SUBCUTANEOUS at 14:27

## 2023-04-27 RX ADMIN — GABAPENTIN 100 MILLIGRAM(S): 400 CAPSULE ORAL at 12:27

## 2023-04-27 RX ADMIN — Medication 650 MILLIGRAM(S): at 07:45

## 2023-04-27 RX ADMIN — MIDODRINE HYDROCHLORIDE 5 MILLIGRAM(S): 2.5 TABLET ORAL at 07:46

## 2023-04-27 RX ADMIN — SODIUM CHLORIDE 1200 MILLILITER(S): 9 INJECTION INTRAMUSCULAR; INTRAVENOUS; SUBCUTANEOUS at 07:30

## 2023-04-27 RX ADMIN — HYDROMORPHONE HYDROCHLORIDE 2 MILLIGRAM(S): 2 INJECTION INTRAMUSCULAR; INTRAVENOUS; SUBCUTANEOUS at 15:27

## 2023-04-27 RX ADMIN — Medication 650 MILLIGRAM(S): at 08:19

## 2023-04-27 RX ADMIN — CEFTRIAXONE 100 MILLIGRAM(S): 500 INJECTION, POWDER, FOR SOLUTION INTRAMUSCULAR; INTRAVENOUS at 09:29

## 2023-04-27 RX ADMIN — Medication 667 MILLIGRAM(S): at 12:26

## 2023-04-27 RX ADMIN — CHLORHEXIDINE GLUCONATE 1 APPLICATION(S): 213 SOLUTION TOPICAL at 12:32

## 2023-04-27 RX ADMIN — Medication 3: at 12:26

## 2023-04-27 RX ADMIN — ERYTHROPOIETIN 6000 UNIT(S): 10000 INJECTION, SOLUTION INTRAVENOUS; SUBCUTANEOUS at 07:09

## 2023-04-27 RX ADMIN — ZINC SULFATE TAB 220 MG (50 MG ZINC EQUIVALENT) 220 MILLIGRAM(S): 220 (50 ZN) TAB at 12:27

## 2023-04-27 RX ADMIN — Medication 1 TABLET(S): at 12:27

## 2023-04-27 RX ADMIN — Medication 3: at 17:28

## 2023-04-27 RX ADMIN — Medication 1: at 09:23

## 2023-04-27 RX ADMIN — Medication 667 MILLIGRAM(S): at 09:23

## 2023-04-27 NOTE — PROGRESS NOTE ADULT - ASSESSMENT
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 s/p L AKA 4/20    Plan:  - Patient non-ambulatory with minimal ability to use left leg for transfers.   - monitor pain control with pain medication and gabapentin  - monitor H/H  - daily dressing changes   - Global care per primary    C Team Surgery   [id number]

## 2023-04-27 NOTE — PROGRESS NOTE ADULT - SUBJECTIVE AND OBJECTIVE BOX
SURGERY DAILY PROGRESS NOTE:     SUBJECTIVE/ROS: Patient seen at bedside this AM.    24h Events:   - Overnight, no acute events    OBJECTIVE:  Vital Signs Last 24 Hrs  T(C): 37 (2023 09:21), Max: 37.1 (2023 06:12)  T(F): 98.6 (:21), Max: 98.7 (2023 06:12)  HR: 86 (:) (86 - 99)  BP: 111/60 (:21) (106/55 - 112/74)  BP(mean): --  RR: 16 (:21) (16 - 18)  SpO2: 99% (:21) (98% - 100%)    Parameters below as of 2023 09:21  Patient On (Oxygen Delivery Method): room air      I&O's Detail    2023 07:01  -  2023 07:00  --------------------------------------------------------  IN:    Oral Fluid: 480 mL  Total IN: 480 mL    OUT:    Blood Loss (mL): 0 mL    IV PiggyBack: 0 mL    Stool (mL): 1 mL    Voided (mL): 400 mL  Total OUT: 401 mL    Total NET: 79 mL      2023 07:01  -  2023 13:26  --------------------------------------------------------  IN:    Other (mL): 500 mL  Total IN: 500 mL    OUT:    Other (mL): 1400 mL  Total OUT: 1400 mL    Total NET: -900 mL        Daily     Daily Weight in k.9 (2023 09:21)  MEDICATIONS  (STANDING):  atorvastatin 40 milliGRAM(s) Oral at bedtime  calcium acetate 667 milliGRAM(s) Oral three times a day with meals  cefTRIAXone   IVPB 1000 milliGRAM(s) IV Intermittent every 24 hours  chlorhexidine 2% Cloths 1 Application(s) Topical daily  dextrose 5%. 1000 milliLiter(s) (100 mL/Hr) IV Continuous <Continuous>  dextrose 5%. 1000 milliLiter(s) (50 mL/Hr) IV Continuous <Continuous>  dextrose 50% Injectable 25 Gram(s) IV Push once  dextrose 50% Injectable 12.5 Gram(s) IV Push once  dextrose 50% Injectable 25 Gram(s) IV Push once  epoetin nyasia-epbx (RETACRIT) Injectable 6000 Unit(s) IV Push <User Schedule>  gabapentin 100 milliGRAM(s) Oral daily  glucagon  Injectable 1 milliGRAM(s) IntraMuscular once  heparin   Injectable 5000 Unit(s) SubCutaneous every 12 hours  insulin lispro (ADMELOG) corrective regimen sliding scale   SubCutaneous at bedtime  insulin lispro (ADMELOG) corrective regimen sliding scale   SubCutaneous three times a day before meals  midodrine. 5 milliGRAM(s) Oral <User Schedule>  Nephro-zack 1 Tablet(s) Oral daily  zinc sulfate 220 milliGRAM(s) Oral daily    MEDICATIONS  (PRN):  acetaminophen     Tablet .. 650 milliGRAM(s) Oral every 6 hours PRN Temp greater or equal to 38C (100.4F), Mild Pain (1 - 3)  bisacodyl 5 milliGRAM(s) Oral every 12 hours PRN Constipation  dextrose Oral Gel 15 Gram(s) Oral once PRN Blood Glucose LESS THAN 70 milliGRAM(s)/deciliter  HYDROmorphone   Tablet 2 milliGRAM(s) Oral every 6 hours PRN mod-severe pain  melatonin 3 milliGRAM(s) Oral at bedtime PRN Insomnia  ondansetron Injectable 4 milliGRAM(s) IV Push every 8 hours PRN Nausea and/or Vomiting  sodium chloride 0.9% Bolus. 100 milliLiter(s) IV Bolus every 5 minutes PRN SBP LESS THAN or EQUAL to 90 mmHg      LABS:                        8.4    11.84 )-----------( 301      ( 2023 07:48 )             26.9     04    136  |  98  |  26<H>  ----------------------------<  178<H>  3.8   |  24  |  2.83<H>    Ca    8.3<L>      2023 05:30  Phos  2.9     04-27  Mg     2.10     04-26        Urinalysis Basic - ( 2023 06:25 )    Color: Orange / Appearance: Turbid / S.014 / pH: x  Gluc: x / Ketone: Trace  / Bili: Negative / Urobili: <2 mg/dL   Blood: x / Protein: 100 mg/dL / Nitrite: Negative   Leuk Esterase: Large / RBC: 6 /HPF /  /HPF   Sq Epi: x / Non Sq Epi: x / Bacteria: Many    PHYSICAL EXAM:  Gen: AAOx3, non-toxic  Head: NCAT  HEENT: EOMI, oral mucosa moist, normal conjunctiva  Lung: Breathing on RA, unlabored   Abd: soft, NTND, no guarding.   MSK: b/l knee amputations  Neuro: No focal sensory or motor deficits

## 2023-04-27 NOTE — PROGRESS NOTE ADULT - ASSESSMENT
72y Male with history of ESRD on HD presents with L foot gangrene. Nephrology consulted for ESRD status.    1) ESRD: Last HD earlier this morning with mild intradialytic hypotension and 0.9L removed. Plan for next maintenance HD on 4/29. Monitor electrolytes.    2) HTN with ESRD: BP low normal. Continue with midodrine 5 mg pre-HD to avoid intradialytic hypotension.    3) Anemia of renal disease: Hb low with adequate iron stores. Continue with Epo 6K with HD. Transfuse PRBC as needed. Monitor Hb.    4) Hyperphosphatemia: Phosphorus acceptable. Continue with phoslo 1 tab TID with meals. Monitor serum calcium and phosphorus.      [facility] NEPHROLOGY  Wili Hopkins M.D.  Kiran Feng D.O.  Sujatha Dumont M.D.  Radha Miller, MSN, ANP-C    Telephone: [phone]  Facsimile: [phone]    [address] [place] [address]

## 2023-04-27 NOTE — PROGRESS NOTE ADULT - ASSESSMENT
72M with history as above who presents to the hospital with worsening gangrene of his L foot.       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   ·  Plan: - Patient with worsening pain of the L foot, reports concern for infection at his facility but on examination low concern for acute infection, podiatry reccs empiric unasyn -> ordered  - BCx x2-No growth  - Podiatry and vascular Sx consults noted  - Pain control as needed.  - BCx x2 testing   - Podiatry- plan TMA pending Vasc recs, meanwhile local wound care.    - Vascular sx - - JESUS/PVR, - IV abx  - S/P LLE AKA 4/20  vascular f/u   D/c plan stefani      Problem/Plan - 2:  ·  Problem: ESRD on dialysis.   ·  Plan: - On HD Tu/Th/Sat, next episode due on 4/18  - c/w renal meds  - Mild anemia likely 2/2 ESRD, improved from prior levels.  nephro f/u     Problem/Plan - 3:  ·  Problem: PVD (peripheral vascular disease).   ·  Plan: - c/w aspirin/plavix/statin therapy.    Problem/Plan - 4:  ·  Problem: Type 2 diabetes mellitus.   ·  Plan: - Hold outpatient DM2 medications  - Place on ISS, FS qAC, CC diet.    Problem/Plan - 5:  ·  Problem: Need for prophylactic measure.   ·  Plan: DVT ppx - HSQ  Diet - DASH/CC/Renal diet  Activity - OOB to chair/with assistance, increase as tolerated    Problem/Plan - 6  - S/p 1 prbc 4/22   - 4/22: hold ASA &Plavix   - f/u post transfusion CBC

## 2023-04-27 NOTE — PROGRESS NOTE ADULT - SUBJECTIVE AND OBJECTIVE BOX
PATIENT SEEN AND EXAMINED ON :- 4/27/23  DATE OF SERVICE:  4/27/23           Interim events noted,Labs ,Radiological studies and Cardiology tests reviewed .    MR#[id number]  PATIENT NAME:-BRIAN [facility] COURSE: HPI:  Please note, patient with second MRN #[id number] with additional recent hospitalizations.    This is a 72M with history of ESRD on HD (T/Th/Sat), DM2, PVD (s/p R BKA in Nov 2022) and recurrent hematurias (has required CBI in the past) who presents to the hospital with worsening pain and duskiness of his L foot. Patient currently is a limited historian, HPI supplemented with information from patient's sister Darshana ([phone]). As per patient he was sent to the hospital for possible L foot infection, does report pain to the bottom of his foot but otherwise denies any fevers/chills/diaphoresis, no swelling or redness of his L foot. No other acute complaints. Said that he is supposed to have an amputation of his L leg but is unclear on the details. Spoke with patient's sister, Darshana, who said that the patient has known PVD, had a recent R leg BKA and has had some procedures with [facility] [place] (sister unsure of the procedures) and states that they were told the procedures did not help improve the peripheral blood flow in the patient's L leg and that he would need an amputation. Said that the family was told he was being sent to the hospital for the amputation evaluation. Otherwise sister not aware of any other acute issues for the patient.     On arrival to the ED, his vitals were T 97.5, P 90, /59, RR 16, O2 sat 98% RA. His lab work was most significant for mild anemia and elevated CRP. His imaging did not show acute findings to suggest OM. He was admitted to medicine.   (17 Apr 2023 23:19)      INTERIM EVENTS:Patient seen at bedside ,interim events noted.      PMH -reviewed admission note, no change since admission  HEART FAILURE: Acute[ ]Chronic[ ] Systolic[ ] Diastolic[ ] Combined Systolic and Diastolic[ ]  CAD[ ] CABG[ ] PCI[ ]  DEVICES[ ] PPM[ ] ICD[ ] ILR[ ]  ATRIAL FIBRILLATION[ ] Paroxysmal[ ] Permanent[ ] CHADS2-[  ]  CANDE[ ] CKD1[ ] CKD2[ ] CKD3[ ] CKD4[ ] ESRD[ ]  COPD[ ] HTN[ ]   DM[ ] Type1[ ] Type 2[ ]   CVA[ ] Paresis[ ]    AMBULATION: Assisted[ ] Cane/walker[ ] Independent[ ]    MEDICATIONS  (STANDING):  atorvastatin 40 milliGRAM(s) Oral at bedtime  calcium acetate 667 milliGRAM(s) Oral three times a day with meals  cefTRIAXone   IVPB 1000 milliGRAM(s) IV Intermittent every 24 hours  chlorhexidine 2% Cloths 1 Application(s) Topical daily  dextrose 5%. 1000 milliLiter(s) (100 mL/Hr) IV Continuous <Continuous>  dextrose 5%. 1000 milliLiter(s) (50 mL/Hr) IV Continuous <Continuous>  dextrose 50% Injectable 25 Gram(s) IV Push once  dextrose 50% Injectable 12.5 Gram(s) IV Push once  dextrose 50% Injectable 25 Gram(s) IV Push once  epoetin nyasia-epbx (RETACRIT) Injectable 6000 Unit(s) IV Push <User Schedule>  gabapentin 100 milliGRAM(s) Oral daily  glucagon  Injectable 1 milliGRAM(s) IntraMuscular once  heparin   Injectable 5000 Unit(s) SubCutaneous every 12 hours  insulin lispro (ADMELOG) corrective regimen sliding scale   SubCutaneous at bedtime  insulin lispro (ADMELOG) corrective regimen sliding scale   SubCutaneous three times a day before meals  midodrine. 5 milliGRAM(s) Oral <User Schedule>  Nephro-zack 1 Tablet(s) Oral daily  zinc sulfate 220 milliGRAM(s) Oral daily    MEDICATIONS  (PRN):  acetaminophen     Tablet .. 650 milliGRAM(s) Oral every 6 hours PRN Temp greater or equal to 38C (100.4F), Mild Pain (1 - 3)  bisacodyl 5 milliGRAM(s) Oral every 12 hours PRN Constipation  dextrose Oral Gel 15 Gram(s) Oral once PRN Blood Glucose LESS THAN 70 milliGRAM(s)/deciliter  HYDROmorphone   Tablet 2 milliGRAM(s) Oral every 6 hours PRN Severe Pain (7 - 10)  melatonin 3 milliGRAM(s) Oral at bedtime PRN Insomnia  ondansetron Injectable 4 milliGRAM(s) IV Push every 8 hours PRN Nausea and/or Vomiting  sodium chloride 0.9% Bolus. 100 milliLiter(s) IV Bolus every 5 minutes PRN SBP LESS THAN or EQUAL to 90 mmHg            REVIEW OF SYSTEMS:  Constitutional: [ ] fever, [ ]weight loss,  [ ]fatigue [ ]weight gain  Eyes: [ ] visual changes  Respiratory: [ ]shortness of breath;  [ ] cough, [ ]wheezing, [ ]chills, [ ]hemoptysis  Cardiovascular: [ ] chest pain, [ ]palpitations, [ ]dizziness,  [ ]leg swelling[ ]orthopnea[ ]PND  Gastrointestinal: [ ] abdominal pain, [ ]nausea, [ ]vomiting,  [ ]diarrhea [ ]Constipation [ ]Melena  Genitourinary: [ ] dysuria, [ ] hematuria [ ]Garcia  Neurologic: [ ] headaches [ ] tremors[ ]weakness [ ]Paralysis Right[ ] Left[ ]  Skin: [ ] itching, [ ]burning, [ ] rashes  Endocrine: [ ] heat or cold intolerance  Musculoskeletal: [ ] joint pain or swelling; [ ] muscle, back, or extremity pain  Psychiatric: [ ] depression, [ ]anxiety, [ ]mood swings, or [ ]difficulty sleeping  Hematologic: [ ] easy bruising, [ ] bleeding gums    [ ] All remaining systems negative except as per above.   [ ]Unable to obtain.  [x] No change in ROS since admission      Vital Signs Last 24 Hrs  T(C): 37.2 (27 Apr 2023 14:48), Max: 37.2 (27 Apr 2023 14:48)  T(F): 99 (27 Apr 2023 14:48), Max: 99 (27 Apr 2023 14:48)  HR: 90 (27 Apr 2023 14:48) (86 - 99)  BP: 108/62 (27 Apr 2023 14:48) (108/62 - 112/74)  BP(mean): --  RR: 17 (27 Apr 2023 14:48) (16 - 17)  SpO2: 99% (27 Apr 2023 14:48) (98% - 100%)    Parameters below as of 27 Apr 2023 14:48  Patient On (Oxygen Delivery Method): room air      I&O's Summary    26 Apr 2023 07:01  -  27 Apr 2023 07:00  --------------------------------------------------------  IN: 480 mL / OUT: 401 mL / NET: 79 mL    27 Apr 2023 07:01  -  27 Apr 2023 20:33  --------------------------------------------------------  IN: 500 mL / OUT: 1400 mL / NET: -900 mL        PHYSICAL EXAM:  General: No acute distress BMI-  HEENT: EOMI, PERRL  Neck: Supple, [ ] JVD  Lungs: Equal air entry bilaterally; [ ] rales [ ] wheezing [ ] rhonchi  Heart: Regular rate and rhythm; [x ] murmur   2/6 [ x] systolic [ ] diastolic [ ] radiation[ ] rubs [ ]  gallops  Abdomen: Nontender, bowel sounds present  Extremities: No clubbing, cyanosis, [ ] edema [ ]Pulses  equal and intact  Nervous system:  Alert & Oriented X3, no focal deficits  Psychiatric: Normal affect  Skin: No rashes or lesions    LABS:  04-26    136  |  98  |  26<H>  ----------------------------<  178<H>  3.8   |  24  |  2.83<H>    Ca    8.3<L>      26 Apr 2023 05:30  Phos  2.9     04-27  Mg     2.10     04-26      Creatinine Trend: 2.83<--, 4.88<--, 4.28<--, 3.14<--, 3.66<--, 4.97<--                        8.4    11.84 )-----------( 301      ( 27 Apr 2023 07:48 )             26.9

## 2023-04-27 NOTE — PROGRESS NOTE ADULT - SUBJECTIVE AND OBJECTIVE BOX
[facility] NEPHROLOGY- PROGRESS NOTE    72y Male with history of ESRD on HD presents with L foot gangrene. Nephrology consulted for ESRD status.      REVIEW OF SYSTEMS:  Gen: + fevers  Cards: no chest pain  Resp: no dyspnea  GI: no nausea or vomiting or diarrhea  Vascular: no LE edema, + LLE pain improving    No Known Allergies      Hospital Medications: Medications reviewed      VITALS:  T(F): 98.6 (23 @ 09:21), Max: 98.7 (23 @ 06:12)  HR: 86 (23 @ 09:21)  BP: 111/60 (23 @ 09:21)  RR: 16 (23 @ 09:21)  SpO2: 99% (23 @ 09:21)  Wt(kg): --     @ 07:01  -   @ 07:00  --------------------------------------------------------  IN: 480 mL / OUT: 401 mL / NET: 79 mL     @ 07:01  -   @ 16:08  --------------------------------------------------------  IN: 500 mL / OUT: 1400 mL / NET: -900 mL        PHYSICAL EXAM:    Gen: NAD, calm  Cards: RRR, +S1/S2, no M/G/R  Resp: CTA B/L  GI: soft, NT/ND, NABS  Vascular: B/L BKA, RIJ TDC intact      LABS:      136  |  98  |  26<H>  ----------------------------<  178<H>  3.8   |  24  |  2.83<H>    Ca    8.3<L>      2023 05:30  Phos  2.9       Mg     2.10           Creatinine Trend: 2.83 <--, 4.88 <--, 4.28 <--, 3.14 <--, 3.66 <--, 4.97 <--                        8.4    11.84 )-----------( 301      ( 2023 07:48 )             26.9     Urine Studies:  Urinalysis Basic - ( 2023 06:25 )    Color: Orange / Appearance: Turbid / S.014 / pH:   Gluc:  / Ketone: Trace  / Bili: Negative / Urobili: <2 mg/dL   Blood:  / Protein: 100 mg/dL / Nitrite: Negative   Leuk Esterase: Large / RBC: 6 /HPF /  /HPF   Sq Epi:  / Non Sq Epi:  / Bacteria: Many

## 2023-04-28 LAB
GLUCOSE BLDC GLUCOMTR-MCNC: 174 MG/DL — HIGH (ref 70–99)
GLUCOSE BLDC GLUCOMTR-MCNC: 274 MG/DL — HIGH (ref 70–99)
GLUCOSE BLDC GLUCOMTR-MCNC: 290 MG/DL — HIGH (ref 70–99)
GLUCOSE BLDC GLUCOMTR-MCNC: 316 MG/DL — HIGH (ref 70–99)

## 2023-04-28 RX ADMIN — ATORVASTATIN CALCIUM 40 MILLIGRAM(S): 80 TABLET, FILM COATED ORAL at 22:00

## 2023-04-28 RX ADMIN — Medication 3: at 09:04

## 2023-04-28 RX ADMIN — HEPARIN SODIUM 5000 UNIT(S): 5000 INJECTION INTRAVENOUS; SUBCUTANEOUS at 05:49

## 2023-04-28 RX ADMIN — Medication 1 TABLET(S): at 12:52

## 2023-04-28 RX ADMIN — Medication 4: at 12:51

## 2023-04-28 RX ADMIN — Medication 1: at 17:23

## 2023-04-28 RX ADMIN — GABAPENTIN 100 MILLIGRAM(S): 400 CAPSULE ORAL at 12:52

## 2023-04-28 RX ADMIN — Medication 1: at 22:31

## 2023-04-28 RX ADMIN — CEFTRIAXONE 100 MILLIGRAM(S): 500 INJECTION, POWDER, FOR SOLUTION INTRAMUSCULAR; INTRAVENOUS at 07:37

## 2023-04-28 RX ADMIN — ZINC SULFATE TAB 220 MG (50 MG ZINC EQUIVALENT) 220 MILLIGRAM(S): 220 (50 ZN) TAB at 12:52

## 2023-04-28 RX ADMIN — CHLORHEXIDINE GLUCONATE 1 APPLICATION(S): 213 SOLUTION TOPICAL at 12:51

## 2023-04-28 RX ADMIN — Medication 667 MILLIGRAM(S): at 07:37

## 2023-04-28 NOTE — CHART NOTE - NSCHARTNOTESELECT_GEN_ALL_CORE
Wound Surgery/Off Service Note
Event Note
Follow Up/Nutrition Services
Post Op Check

## 2023-04-28 NOTE — PROGRESS NOTE ADULT - SUBJECTIVE AND OBJECTIVE BOX
[facility] NEPHROLOGY- PROGRESS NOTE    72y Male with history of ESRD on HD presents with L foot gangrene. Nephrology consulted for ESRD status.      REVIEW OF SYSTEMS:  Gen: no fevers  Cards: no chest pain  Resp: no dyspnea  GI: no nausea or vomiting or diarrhea  Vascular: no LE edema, + LLE pain improving    No Known Allergies      Hospital Medications: Medications reviewed      VITALS:  T(F): 98.1 (23 @ 05:45), Max: 99 (23 @ 14:48)  HR: 81 (23 @ 05:45)  BP: 111/68 (23 @ 05:45)  RR: 17 (23 @ 05:45)  SpO2: 100% (23 @ 05:45)  Wt(kg): --     @ 07:01  -   @ 07:00  --------------------------------------------------------  IN: 500 mL / OUT: 1400 mL / NET: -900 mL        PHYSICAL EXAM:    Gen: NAD, calm  Cards: RRR, +S1/S2, no M/G/R  Resp: CTA B/L  GI: soft, NT/ND, NABS  Vascular: B/L BKA, RIJ TDC intact      LABS:    Phos  2.9           Creatinine Trend: 2.83 <--, 4.88 <--, 4.28 <--, 3.14 <--, 3.66 <--                        8.4    11.84 )-----------( 301      ( 2023 07:48 )             26.9     Urine Studies:  Urinalysis Basic - ( 2023 06:25 )    Color: Orange / Appearance: Turbid / S.014 / pH:   Gluc:  / Ketone: Trace  / Bili: Negative / Urobili: <2 mg/dL   Blood:  / Protein: 100 mg/dL / Nitrite: Negative   Leuk Esterase: Large / RBC: 6 /HPF /  /HPF   Sq Epi:  / Non Sq Epi:  / Bacteria: Many

## 2023-04-28 NOTE — PROGRESS NOTE ADULT - SUBJECTIVE AND OBJECTIVE BOX
PATIENT SEEN AND EXAMINED ON :- 4/28/23  DATE OF SERVICE:     4/28/23        Interim events noted,Labs ,Radiological studies and Cardiology tests reviewed .    MR#[id number]  PATIENT NAME:-BRIAN [facility] COURSE: HPI:  Please note, patient with second MRN #[id number] with additional recent hospitalizations.    This is a 72M with history of ESRD on HD (T/Th/Sat), DM2, PVD (s/p R BKA in Nov 2022) and recurrent hematurias (has required CBI in the past) who presents to the hospital with worsening pain and duskiness of his L foot. Patient currently is a limited historian, HPI supplemented with information from patient's sister Darshana ([phone]). As per patient he was sent to the hospital for possible L foot infection, does report pain to the bottom of his foot but otherwise denies any fevers/chills/diaphoresis, no swelling or redness of his L foot. No other acute complaints. Said that he is supposed to have an amputation of his L leg but is unclear on the details. Spoke with patient's sister, Darshana, who said that the patient has known PVD, had a recent R leg BKA and has had some procedures with Dr. [facility] [place] (sister unsure of the procedures) and states that they were told the procedures did not help improve the peripheral blood flow in the patient's L leg and that he would need an amputation. Said that the family was told he was being sent to the hospital for the amputation evaluation. Otherwise sister not aware of any other acute issues for the patient.     On arrival to the ED, his vitals were T 97.5, P 90, /59, RR 16, O2 sat 98% RA. His lab work was most significant for mild anemia and elevated CRP. His imaging did not show acute findings to suggest OM. He was admitted to medicine.   (17 Apr 2023 23:19)      INTERIM EVENTS:Patient seen at bedside ,interim events noted.      PMH -reviewed admission note, no change since admission  HEART FAILURE: Acute[ ]Chronic[ ] Systolic[ ] Diastolic[ ] Combined Systolic and Diastolic[ ]  CAD[ ] CABG[ ] PCI[ ]  DEVICES[ ] PPM[ ] ICD[ ] ILR[ ]  ATRIAL FIBRILLATION[ ] Paroxysmal[ ] Permanent[ ] CHADS2-[  ]  CANDE[ ] CKD1[ ] CKD2[ ] CKD3[ ] CKD4[ ] ESRD[ ]  COPD[ ] HTN[ ]   DM[ ] Type1[ ] Type 2[ ]   CVA[ ] Paresis[ ]    AMBULATION: Assisted[ ] Cane/walker[ ] Independent[ ]    MEDICATIONS  (STANDING):  atorvastatin 40 milliGRAM(s) Oral at bedtime  chlorhexidine 2% Cloths 1 Application(s) Topical daily  dextrose 5%. 1000 milliLiter(s) (100 mL/Hr) IV Continuous <Continuous>  dextrose 5%. 1000 milliLiter(s) (50 mL/Hr) IV Continuous <Continuous>  dextrose 50% Injectable 25 Gram(s) IV Push once  dextrose 50% Injectable 12.5 Gram(s) IV Push once  dextrose 50% Injectable 25 Gram(s) IV Push once  epoetin nyasia-epbx (RETACRIT) Injectable 6000 Unit(s) IV Push <User Schedule>  gabapentin 100 milliGRAM(s) Oral daily  glucagon  Injectable 1 milliGRAM(s) IntraMuscular once  heparin   Injectable 5000 Unit(s) SubCutaneous every 12 hours  insulin lispro (ADMELOG) corrective regimen sliding scale   SubCutaneous three times a day before meals  insulin lispro (ADMELOG) corrective regimen sliding scale   SubCutaneous at bedtime  midodrine. 5 milliGRAM(s) Oral <User Schedule>  Nephro-zack 1 Tablet(s) Oral daily  zinc sulfate 220 milliGRAM(s) Oral daily    MEDICATIONS  (PRN):  acetaminophen     Tablet .. 650 milliGRAM(s) Oral every 6 hours PRN Temp greater or equal to 38C (100.4F), Mild Pain (1 - 3)  bisacodyl 5 milliGRAM(s) Oral every 12 hours PRN Constipation  dextrose Oral Gel 15 Gram(s) Oral once PRN Blood Glucose LESS THAN 70 milliGRAM(s)/deciliter  HYDROmorphone   Tablet 2 milliGRAM(s) Oral every 6 hours PRN Severe Pain (7 - 10)  melatonin 3 milliGRAM(s) Oral at bedtime PRN Insomnia  ondansetron Injectable 4 milliGRAM(s) IV Push every 8 hours PRN Nausea and/or Vomiting  sodium chloride 0.9% Bolus. 100 milliLiter(s) IV Bolus every 5 minutes PRN SBP LESS THAN or EQUAL to 90 mmHg            REVIEW OF SYSTEMS:  Constitutional: [ ] fever, [ ]weight loss,  [ ]fatigue [ ]weight gain  Eyes: [ ] visual changes  Respiratory: [ ]shortness of breath;  [ ] cough, [ ]wheezing, [ ]chills, [ ]hemoptysis  Cardiovascular: [ ] chest pain, [ ]palpitations, [ ]dizziness,  [ ]leg swelling[ ]orthopnea[ ]PND  Gastrointestinal: [ ] abdominal pain, [ ]nausea, [ ]vomiting,  [ ]diarrhea [ ]Constipation [ ]Melena  Genitourinary: [ ] dysuria, [ ] hematuria [ ]Garcia  Neurologic: [ ] headaches [ ] tremors[ ]weakness [ ]Paralysis Right[ ] Left[ ]  Skin: [ ] itching, [ ]burning, [ ] rashes  Endocrine: [ ] heat or cold intolerance  Musculoskeletal: [ ] joint pain or swelling; [ ] muscle, back, or extremity pain  Psychiatric: [ ] depression, [ ]anxiety, [ ]mood swings, or [ ]difficulty sleeping  Hematologic: [ ] easy bruising, [ ] bleeding gums    [ ] All remaining systems negative except as per above.   [ ]Unable to obtain.  [x] No change in ROS since admission      Vital Signs Last 24 Hrs  T(C): 37.2 (28 Apr 2023 20:26), Max: 37.2 (28 Apr 2023 20:26)  T(F): 99 (28 Apr 2023 20:26), Max: 99 (28 Apr 2023 20:26)  HR: 90 (28 Apr 2023 20:26) (81 - 90)  BP: 106/64 (28 Apr 2023 20:26) (106/64 - 114/67)  BP(mean): --  RR: 18 (28 Apr 2023 20:26) (17 - 18)  SpO2: 100% (28 Apr 2023 20:26) (100% - 100%)    Parameters below as of 28 Apr 2023 20:26  Patient On (Oxygen Delivery Method): room air      I&O's Summary    27 Apr 2023 07:01  -  28 Apr 2023 07:00  --------------------------------------------------------  IN: 500 mL / OUT: 1400 mL / NET: -900 mL        PHYSICAL EXAM:  General: No acute distress BMI-  HEENT: EOMI, PERRL  Neck: Supple, [ ] JVD  Lungs: Equal air entry bilaterally; [ ] rales [ ] wheezing [ ] rhonchi  Heart: Regular rate and rhythm; [x ] murmur   2/6 [ x] systolic [ ] diastolic [ ] radiation[ ] rubs [ ]  gallops  Abdomen: Nontender, bowel sounds present  Extremities: No clubbing, cyanosis, [ ] edema [ ]Pulses  equal and intact  Nervous system:  Alert & Oriented X3, no focal deficits  Psychiatric: Normal affect  Skin: No rashes or lesions    LABS:    Phos  2.9     04-27      Creatinine Trend: 2.83<--, 4.88<--, 4.28<--, 3.14<--, 3.66<--, 4.97<--                        8.4    11.84 )-----------( 301      ( 27 Apr 2023 07:48 )             26.9       < from: TTE with Doppler (w/Cont) (04.19.23 @ 12:05) >    Patient name: BRIAN MATTHEWS  YOB: 1950   Age: 72 (M)   MR#: [id number]  Study Date: 4/19/2023  Location: [place] [id number]: Yusra Lewis RDCS  Study quality: Technically Difficult/Limited  Referring Physician: Mumtaz Babcock MD  Blood Pressure: 106/67 mmHg  Height: 188 cm  Weight: 72 kg  BSA: 2 m2  ------------------------------------------------------------------------  PROCEDURE: Transthoracic echocardiogram with 2-D, M-Mode  and complete spectral and color flow Doppler.  Intravenous ultrasound enhancing agent was administered for  improved left ventricular endocardial border definition.  Following the intravenous injection of ultrasound enhancing  agent, harmonic imaging was performed.  INDICATION: Encounter for preprocedural cardiovascular  examination (Z01.810)  ------------------------------------------------------------------------  CONCLUSIONS:  Technically very difficult study.  Endocardium not well  visualized; grossly normal left ventricular systolic  function.  Estimated LVEF about  66% (by Castro's method).   Endocardial visualization enhanced with intravenous  injection of echo contrast (Definity).  The study is  otherwise uninterpretable.  ------------------------------------------------------------------------  Confirmed on  4/19/2023 - 13:06:43 by Cisco Tai M.D.,  [facility], [place]  ------------------------------------------------------------------------    < end of copied text >

## 2023-04-28 NOTE — PROGRESS NOTE ADULT - ASSESSMENT
72M with history as above who presents to the hospital with worsening gangrene of his L foot.       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   ·  Plan: - Patient with worsening pain of the L foot, reports concern for infection at his facility but on examination low concern for acute infection, podiatry reccs empiric unasyn -> ordered  - BCx x2-No growth  - Podiatry and vascular Sx consults noted  - Pain control as needed.  - BCx x2 testing   - Podiatry- plan TMA pending Vasc recs, meanwhile local wound care.    - Vascular sx - - JESUS/PVR, - IV abx  - S/P LLE AKA 4/20  vascular f/u   D/c plan stefani      Febrile- 4/26, + UA, Started on CTX  - blood cx negative  -f/u  urine cx   +hMPV- isolation til 5/01 per infection control   d/c planning after iso off        Problem/Plan - 2:  ·  Problem: ESRD on dialysis.   ·  Plan: - On HD Tu/Th/Sat, next episode due on 4/18  - c/w renal meds  - Mild anemia likely 2/2 ESRD, improved from prior levels.  nephro f/u     Problem/Plan - 3:  ·  Problem: PVD (peripheral vascular disease).   ·  Plan: - c/w aspirin/plavix/statin therapy.    Problem/Plan - 4:  ·  Problem: Type 2 diabetes mellitus.   ·  Plan: - Hold outpatient DM2 medications  - Place on ISS, FS qAC, CC diet.    Problem/Plan - 5:  ·  Problem: Need for prophylactic measure.   ·  Plan: DVT ppx - HSQ  Diet - DASH/CC/Renal diet  Activity - OOB to chair/with assistance, increase as tolerated    Problem/Plan - 6  - S/p 1 prbc 4/22   - 4/22: hold ASA &Plavix   - f/u post transfusion CBC

## 2023-04-28 NOTE — CHART NOTE - NSCHARTNOTEFT_GEN_A_CORE
Source: Patient A&Ox [4]    PCA reached out to RD states pt kept asking for apple juice on his tray. Pt seen at bedside, reports poor PO intake. Lunch visibly seen with <50% intake. Diet has been supplementing with Nepro 2x daily (850 kcals, 38.2g protein).  Patient noted with elevated POCT 316H and 290H. Explained to patient the reason why he is not getting apple juice with his meals.  Pt was informed of his fingerstick results. Pt shows understanding. Offered pt Orgain Shake 2x daily (500cal, 32gm pro) to replace apple juice, which is amenable to try. Pt reported constipation. States he hasn't has any bowel movement for couple days in hospital. Last BM per RN flowsheet was 4/26. Bowel regimen in use. Pt continues on Nephro-Zack for micronutrient coverage. Pt previously noted with low H/H (6.9L/22.1L), s/p 1U PRBC on 4/22 and 4/24, H/H is improving now (8.4/26.9). RD to remain available for further nutritional interventions as indicated.           Diet, Regular:   Consistent Carbohydrate {Evening Snack} (CSTCHOSN)  For patients receiving Renal Replacement - No Protein Restr, No Conc K, No Conc Phos, Low Sodium (RENAL)  Supplement Feeding Modality:  Oral  Nepro Cans or Servings Per Day:  1       Frequency:  Two Times a day (04-25-23 @ 13:13)      GI: WDL. Last BM 4/26    PO intake:  < 50% [x]  Anthropometrics: Height (cm): 188 (04-20)  Weight (kg): 72.6 (04-20)  BMI (kg/m2): adjusted BMI for BKA and AKA - 18.6kg/m2.    __________________ Pertinent Medications__________________   MEDICATIONS  (STANDING):  atorvastatin 40 milliGRAM(s) Oral at bedtime  chlorhexidine 2% Cloths 1 Application(s) Topical daily  dextrose 5%. 1000 milliLiter(s) (100 mL/Hr) IV Continuous <Continuous>  dextrose 5%. 1000 milliLiter(s) (50 mL/Hr) IV Continuous <Continuous>  dextrose 50% Injectable 25 Gram(s) IV Push once  dextrose 50% Injectable 12.5 Gram(s) IV Push once  dextrose 50% Injectable 25 Gram(s) IV Push once  epoetin nyasia-epbx (RETACRIT) Injectable 6000 Unit(s) IV Push <User Schedule>  gabapentin 100 milliGRAM(s) Oral daily  glucagon  Injectable 1 milliGRAM(s) IntraMuscular once  heparin   Injectable 5000 Unit(s) SubCutaneous every 12 hours  insulin lispro (ADMELOG) corrective regimen sliding scale   SubCutaneous three times a day before meals  insulin lispro (ADMELOG) corrective regimen sliding scale   SubCutaneous at bedtime  midodrine. 5 milliGRAM(s) Oral <User Schedule>  Nephro-zack 1 Tablet(s) Oral daily  zinc sulfate 220 milliGRAM(s) Oral daily    MEDICATIONS  (PRN):  acetaminophen     Tablet .. 650 milliGRAM(s) Oral every 6 hours PRN Temp greater or equal to 38C (100.4F), Mild Pain (1 - 3)  bisacodyl 5 milliGRAM(s) Oral every 12 hours PRN Constipation  dextrose Oral Gel 15 Gram(s) Oral once PRN Blood Glucose LESS THAN 70 milliGRAM(s)/deciliter  HYDROmorphone   Tablet 2 milliGRAM(s) Oral every 6 hours PRN Severe Pain (7 - 10)  melatonin 3 milliGRAM(s) Oral at bedtime PRN Insomnia  ondansetron Injectable 4 milliGRAM(s) IV Push every 8 hours PRN Nausea and/or Vomiting  sodium chloride 0.9% Bolus. 100 milliLiter(s) IV Bolus every 5 minutes PRN SBP LESS THAN or EQUAL to 90 mmHg      __________________ Pertinent Labs__________________   04-26 Na136 mmol/L Glu 178 mg/dL<H> K+ 3.8 mmol/L Cr  2.83 mg/dL<H> BUN 26 mg/dL<H> 04-27 Phos 2.9 mg/dL        POCT Blood Glucose.: 316 mg/dL (04-28-23 @ 12:35)  POCT Blood Glucose.: 290 mg/dL (04-28-23 @ 08:55)  POCT Blood Glucose.: 196 mg/dL (04-27-23 @ 22:13)  POCT Blood Glucose.: 292 mg/dL (04-27-23 @ 17:17)  POCT Blood Glucose.: 300 mg/dL (04-27-23 @ 12:10)  POCT Blood Glucose.: 176 mg/dL (04-27-23 @ 08:46)  POCT Blood Glucose.: 264 mg/dL (04-27-23 @ 05:49)  POCT Blood Glucose.: 221 mg/dL (04-26-23 @ 22:15)  POCT Blood Glucose.: 283 mg/dL (04-26-23 @ 18:00)  POCT Blood Glucose.: 392 mg/dL (04-26-23 @ 12:22)  POCT Blood Glucose.: 192 mg/dL (04-26-23 @ 08:43)  POCT Blood Glucose.: 158 mg/dL (04-25-23 @ 21:28)  POCT Blood Glucose.: 125 mg/dL (04-25-23 @ 19:26)  POCT Blood Glucose.: 253 mg/dL (04-25-23 @ 15:20)          Estimated Needs:   [x ] no change since previous assessment      Previous Nutrition Diagnosis: Malnutrition Severe     Nutrition Diagnosis is [x ] ongoing       Recommendations:  1) Recommend continue with current diet, which remains appropriate at this time.   2) Recommend continue to provide Nepro 2x daily (850 kcals, 38.2g protein). Will additionally provide Orgain Shake 2x daily (500cal, 32gm pro) to trial.   3) Monitor PO intake, Labs, weights, BMs, and skin integrity.   4) RD to remain available for further nutritional interventions as indicated.       Monitoring and Evaluation:      [ x] Tolerance to diet prescription [x ] weights [x ] follow up per protocol  [ ] other:

## 2023-04-28 NOTE — PROGRESS NOTE ADULT - ASSESSMENT
72y Male with history of ESRD on HD presents with L foot gangrene. Nephrology consulted for ESRD status.    1) ESRD: Last HD on 4/27 with mild intradialytic hypotension and 0.9L removed. Plan for next maintenance HD on 4/29. Monitor electrolytes.    2) HTN with ESRD: BP low normal. Continue with midodrine 5 mg pre-HD to avoid intradialytic hypotension.    3) Anemia of renal disease: Hb low with adequate iron stores. Continue with Epo 6K with HD. Transfuse PRBC as needed. Monitor Hb.    4) Hyperphosphatemia: Phosphorus low. Hold phoslo 1 tab TID with meals. Monitor serum calcium and phosphorus.      [facility] NEPHROLOGY  Wili Hopkins M.D.  Kiran Feng D.O.  Sujatha Dumont M.D.  Radha Miller, MSN, ANP-C    Telephone: [phone]  Facsimile: [phone]    [address] [place] [address]

## 2023-04-28 NOTE — PROGRESS NOTE ADULT - TIME BILLING
- Review of records, telemetry, vital signs and daily labs.   - General and cardiovascular physical examination.  - Generation of cardiovascular treatment plan.  - Coordination of care.      Patient was seen and examined by me on 4/26/23,interim events noted,labs and radiology studies reviewed.  Mumtaz Babcock MD,[facility]  [address] [place], [place] [id number].  [phone]
- Review of records, telemetry, vital signs and daily labs.   - General and cardiovascular physical examination.  - Generation of cardiovascular treatment plan.  - Coordination of care.      Patient was seen and examined by me on 4/24/23,interim events noted,labs and radiology studies reviewed.  [facility]um[facility]z Babcock MD,[facility]  [address] [place], [place] [id number].  [phone]
- Review of records, telemetry, vital signs and daily labs.   - General and cardiovascular physical examination.  - Generation of cardiovascular treatment plan.  - Coordination of care.      Patient was seen and examined by me on 4/21/23,interim events noted,labs and radiology studies reviewed.  Mumtaz Babcock MD,[facility]ACC.  [address] [place], [place] [id number].  [phone]
- Review of records, telemetry, vital signs and daily labs.   - General and cardiovascular physical examination.  - Generation of cardiovascular treatment plan.  - Coordination of care.      Patient was seen and examined by me on 4/23/23,interim events noted,labs and radiology studies reviewed.  Mumtaz Babcock MD,[facility]  [address] [place], [place] [id number].  [phone]
- Review of records, telemetry, vital signs and daily labs.   - General and cardiovascular physical examination.  - Generation of cardiovascular treatment plan.  - Coordination of care.      Patient was seen and examined by me on 4/27/23,interim events noted,labs and radiology studies reviewed.  Mum[facility]z Babcock MD,[facility]  [address] [place], [place] [id number].  [phone]
- Review of records, telemetry, vital signs and daily labs.   - General and cardiovascular physical examination.  - Generation of cardiovascular treatment plan.  - Coordination of care.      Patient was seen and examined by me on 04/19/2023,interim events noted,labs and radiology studies reviewed.  Mumtaz Babcock MD,FACC.  [address] [place], [place] [id number].  [phone]
- Review of records, telemetry, vital signs and daily labs.   - General and cardiovascular physical examination.  - Generation of cardiovascular treatment plan.  - Coordination of care.      Patient was seen and examined by me on 4/20/23,interim events noted,labs and radiology studies reviewed.  Mumtaz Babcock MD,FACC.  [address] [place], [place] [id number].  [phone]
- Review of records, telemetry, vital signs and daily labs.   - General and cardiovascular physical examination.  - Generation of cardiovascular treatment plan.  - Coordination of care.      Patient was seen and examined by me on 4/22/23,interim events noted,labs and radiology studies reviewed.  Mumtaz Babcock MD,FACC.  [address] [place], [place] [id number].  [phone]
- Review of records, telemetry, vital signs and daily labs.   - General and cardiovascular physical examination.  - Generation of cardiovascular treatment plan.  - Coordination of care.      Patient was seen and examined by me on 4/25/23,interim events noted,labs and radiology studies reviewed.  Mumtaz Babcock MD,[facility]  [address] [place], [place] [id number].  [phone]
- Review of records, telemetry, vital signs and daily labs.   - General and cardiovascular physical examination.  - Generation of cardiovascular treatment plan.  - Coordination of care.      Patient was seen and examined by me on 4/28/23,interim events noted,labs and radiology studies reviewed.  Mumtaz Babcock MD,[address].  [phone]    EMA Aquino will be covering me until 5/12/23 when I am away.
- Review of records, telemetry, vital signs and daily labs.   - General and cardiovascular physical examination.  - Generation of cardiovascular treatment plan.  - Coordination of care.      Patient was seen and examined by me on 4/18/23,interim events noted,labs and radiology studies reviewed.  Mumtaz Babcock MD,FACC.  [address] [place], [place] [id number].  [phone]

## 2023-04-29 LAB
ANION GAP SERPL CALC-SCNC: 14 MMOL/L — SIGNIFICANT CHANGE UP (ref 7–14)
BUN SERPL-MCNC: 39 MG/DL — HIGH (ref 7–23)
CALCIUM SERPL-MCNC: 8.1 MG/DL — LOW (ref 8.4–10.5)
CHLORIDE SERPL-SCNC: 97 MMOL/L — LOW (ref 98–107)
CO2 SERPL-SCNC: 23 MMOL/L — SIGNIFICANT CHANGE UP (ref 22–31)
CREAT SERPL-MCNC: 4.16 MG/DL — HIGH (ref 0.5–1.3)
EGFR: 14 ML/MIN/1.73M2 — LOW
GLUCOSE BLDC GLUCOMTR-MCNC: 168 MG/DL — HIGH (ref 70–99)
GLUCOSE BLDC GLUCOMTR-MCNC: 204 MG/DL — HIGH (ref 70–99)
GLUCOSE BLDC GLUCOMTR-MCNC: 225 MG/DL — HIGH (ref 70–99)
GLUCOSE BLDC GLUCOMTR-MCNC: 360 MG/DL — HIGH (ref 70–99)
GLUCOSE BLDC GLUCOMTR-MCNC: 402 MG/DL — HIGH (ref 70–99)
GLUCOSE SERPL-MCNC: 239 MG/DL — HIGH (ref 70–99)
HCT VFR BLD CALC: 26 % — LOW (ref 39–50)
HGB BLD-MCNC: 8.2 G/DL — LOW (ref 13–17)
MAGNESIUM SERPL-MCNC: 2 MG/DL — SIGNIFICANT CHANGE UP (ref 1.6–2.6)
MCHC RBC-ENTMCNC: 29.7 PG — SIGNIFICANT CHANGE UP (ref 27–34)
MCHC RBC-ENTMCNC: 31.5 GM/DL — LOW (ref 32–36)
MCV RBC AUTO: 94.2 FL — SIGNIFICANT CHANGE UP (ref 80–100)
NRBC # BLD: 0 /100 WBCS — SIGNIFICANT CHANGE UP (ref 0–0)
NRBC # FLD: 0 K/UL — SIGNIFICANT CHANGE UP (ref 0–0)
PHOSPHATE SERPL-MCNC: 3 MG/DL — SIGNIFICANT CHANGE UP (ref 2.5–4.5)
PLATELET # BLD AUTO: 354 K/UL — SIGNIFICANT CHANGE UP (ref 150–400)
POTASSIUM SERPL-MCNC: 3.9 MMOL/L — SIGNIFICANT CHANGE UP (ref 3.5–5.3)
POTASSIUM SERPL-SCNC: 3.9 MMOL/L — SIGNIFICANT CHANGE UP (ref 3.5–5.3)
RBC # BLD: 2.76 M/UL — LOW (ref 4.2–5.8)
RBC # FLD: 17.6 % — HIGH (ref 10.3–14.5)
SODIUM SERPL-SCNC: 134 MMOL/L — LOW (ref 135–145)
WBC # BLD: 10.06 K/UL — SIGNIFICANT CHANGE UP (ref 3.8–10.5)
WBC # FLD AUTO: 10.06 K/UL — SIGNIFICANT CHANGE UP (ref 3.8–10.5)

## 2023-04-29 RX ORDER — INSULIN LISPRO 100/ML
3 VIAL (ML) SUBCUTANEOUS
Refills: 0 | Status: DISCONTINUED | OUTPATIENT
Start: 2023-04-29 | End: 2023-05-01

## 2023-04-29 RX ORDER — INSULIN GLARGINE 100 [IU]/ML
8 INJECTION, SOLUTION SUBCUTANEOUS AT BEDTIME
Refills: 0 | Status: DISCONTINUED | OUTPATIENT
Start: 2023-04-29 | End: 2023-05-01

## 2023-04-29 RX ADMIN — HEPARIN SODIUM 5000 UNIT(S): 5000 INJECTION INTRAVENOUS; SUBCUTANEOUS at 06:00

## 2023-04-29 RX ADMIN — CHLORHEXIDINE GLUCONATE 1 APPLICATION(S): 213 SOLUTION TOPICAL at 13:13

## 2023-04-29 RX ADMIN — Medication 1 TABLET(S): at 13:10

## 2023-04-29 RX ADMIN — INSULIN GLARGINE 8 UNIT(S): 100 INJECTION, SOLUTION SUBCUTANEOUS at 22:28

## 2023-04-29 RX ADMIN — ATORVASTATIN CALCIUM 40 MILLIGRAM(S): 80 TABLET, FILM COATED ORAL at 22:27

## 2023-04-29 RX ADMIN — MIDODRINE HYDROCHLORIDE 5 MILLIGRAM(S): 2.5 TABLET ORAL at 15:18

## 2023-04-29 RX ADMIN — Medication 2: at 09:21

## 2023-04-29 RX ADMIN — Medication 6: at 13:09

## 2023-04-29 RX ADMIN — ZINC SULFATE TAB 220 MG (50 MG ZINC EQUIVALENT) 220 MILLIGRAM(S): 220 (50 ZN) TAB at 13:09

## 2023-04-29 RX ADMIN — GABAPENTIN 100 MILLIGRAM(S): 400 CAPSULE ORAL at 13:10

## 2023-04-29 RX ADMIN — ERYTHROPOIETIN 6000 UNIT(S): 10000 INJECTION, SOLUTION INTRAVENOUS; SUBCUTANEOUS at 17:36

## 2023-04-29 NOTE — PROGRESS NOTE ADULT - SUBJECTIVE AND OBJECTIVE BOX
[facility] NEPHROLOGY- PROGRESS NOTE    72y Male with history of ESRD on HD presents with L foot gangrene. Nephrology consulted for ESRD status.      REVIEW OF SYSTEMS:  Gen: no fevers  Cards: no chest pain  Resp: no dyspnea  GI: no nausea or vomiting or diarrhea  Vascular: no LE edema, + LLE pain improving    No Known Allergies      Hospital Medications: Medications reviewed      VITALS:  T(F): 98.3 (23 @ 16:01), Max: 99 (23 @ 20:26)  HR: 96 (23 @ 16:01)  BP: 104/60 (23 @ 16:01)  RR: 17 (23 @ 16:01)  SpO2: 100% (23 @ 14:07)  Wt(kg): --      PHYSICAL EXAM:  Gen: NAD, calm  Cards: RRR, +S1/S2, no M/G/R  Resp: CTA B/L  GI: soft, NT/ND, NABS  Vascular: B/L BKA, RIJ TDC intact      LABS:      134<L>  |  97<L>  |  39<H>  ----------------------------<  239<H>  3.9   |  23  |  4.16<H>    Ca    8.1<L>      2023 05:33  Phos  3.0       Mg     2.00           Creatinine Trend: 4.16 <--, 2.83 <--, 4.88 <--, 4.28 <--, 3.14 <--                        8.2    10.06 )-----------( 354      ( 2023 05:33 )             26.0     Urine Studies:  Urinalysis Basic - ( 2023 06:25 )    Color: Orange / Appearance: Turbid / S.014 / pH:   Gluc:  / Ketone: Trace  / Bili: Negative / Urobili: <2 mg/dL   Blood:  / Protein: 100 mg/dL / Nitrite: Negative   Leuk Esterase: Large / RBC: 6 /HPF /  /HPF   Sq Epi:  / Non Sq Epi:  / Bacteria: Many

## 2023-04-29 NOTE — PROGRESS NOTE ADULT - SUBJECTIVE AND OBJECTIVE BOX
Surgery C-Team Daily Progress Note     BRIAN MATTHEWS | [id number]    SUBJECTIVE / 24H EVENTS  Patient seen and examined on morning rounds. No acute events overnight. No nausea / vomiting. Tolerating diet.    OBJECTIVE:    VITAL SIGNS:  T(C): 36.4 (04-29-23 @ 06:14), Max: 37.2 (04-28-23 @ 20:26)  HR: 88 (04-29-23 @ 06:14) (82 - 90)  BP: 118/53 (04-29-23 @ 06:14) (106/64 - 118/53)  RR: 17 (04-29-23 @ 06:14) (17 - 18)  SpO2: 99% (04-29-23 @ 06:14) (99% - 100%)  Daily     Daily   POCT Blood Glucose.: 225 mg/dL (04-29-23 @ 08:52)  POCT Blood Glucose.: 274 mg/dL (04-28-23 @ 22:11)  POCT Blood Glucose.: 174 mg/dL (04-28-23 @ 17:19)      PHYSICAL EXAM:  Gen: NAD  LS: Respirations unlabored.   Card: RRR. On telemetry. No m/r/g.   GI: Soft. Nontender. Nondistended. BS+.  Ext: Warm, b/l knee amputations; LLE BKA formalization incision c/d/i, dressing changed this AM        LAB VALUES:  04-29    134<L>  |  97<L>  |  39<H>  ----------------------------<  239<H>  3.9   |  23  |  4.16<H>    Ca    8.1<L>      29 Apr 2023 05:33  Phos  3.0     04-29  Mg     2.00     04-29                                 8.2    10.06 )-----------( 354      ( 29 Apr 2023 05:33 )             26.0                   MICROBIOLOGY:    No new microbiology data for review.     RADIOLOGY:    No new radiographic images for review.    MEDICATIONS  (STANDING):  atorvastatin 40 milliGRAM(s) Oral at bedtime  chlorhexidine 2% Cloths 1 Application(s) Topical daily  dextrose 5%. 1000 milliLiter(s) (100 mL/Hr) IV Continuous <Continuous>  dextrose 5%. 1000 milliLiter(s) (50 mL/Hr) IV Continuous <Continuous>  dextrose 50% Injectable 25 Gram(s) IV Push once  dextrose 50% Injectable 12.5 Gram(s) IV Push once  dextrose 50% Injectable 25 Gram(s) IV Push once  epoetin nyasia-epbx (RETACRIT) Injectable 6000 Unit(s) IV Push <User Schedule>  gabapentin 100 milliGRAM(s) Oral daily  glucagon  Injectable 1 milliGRAM(s) IntraMuscular once  heparin   Injectable 5000 Unit(s) SubCutaneous every 12 hours  insulin lispro (ADMELOG) corrective regimen sliding scale   SubCutaneous at bedtime  insulin lispro (ADMELOG) corrective regimen sliding scale   SubCutaneous three times a day before meals  midodrine. 5 milliGRAM(s) Oral <User Schedule>  Nephro-zack 1 Tablet(s) Oral daily  zinc sulfate 220 milliGRAM(s) Oral daily    MEDICATIONS  (PRN):  acetaminophen     Tablet .. 650 milliGRAM(s) Oral every 6 hours PRN Temp greater or equal to 38C (100.4F), Mild Pain (1 - 3)  bisacodyl 5 milliGRAM(s) Oral every 12 hours PRN Constipation  dextrose Oral Gel 15 Gram(s) Oral once PRN Blood Glucose LESS THAN 70 milliGRAM(s)/deciliter  HYDROmorphone   Tablet 2 milliGRAM(s) Oral every 6 hours PRN Severe Pain (7 - 10)  melatonin 3 milliGRAM(s) Oral at bedtime PRN Insomnia  ondansetron Injectable 4 milliGRAM(s) IV Push every 8 hours PRN Nausea and/or Vomiting  sodium chloride 0.9% Bolus. 100 milliLiter(s) IV Bolus every 5 minutes PRN SBP LESS THAN or EQUAL to 90 mmHg

## 2023-04-29 NOTE — PROGRESS NOTE ADULT - ASSESSMENT
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 s/p L AKA 4/20    PVD - S/P L AKA -   - monitor pain control with pain medication and gabapentin  - monitor H/H  - daily dressing changes, please change dressing; place dry gauze over staple incision with ABD, Krelex, and ACE wrap up      ESRD - HD as scheduled    DM2 -iss, elevated blood sugars - titrate insulin for optimal blood sugar control

## 2023-04-29 NOTE — PROVIDER CONTACT NOTE (OTHER) - RECOMMENDATIONS
Follow patient wishes
Give 6 units sliding scale pre-meal as ordered.
Please f/u
Transport pt back to unit

## 2023-04-29 NOTE — PROVIDER CONTACT NOTE (OTHER) - ASSESSMENT
Patient AOx4, v/s 100/65 P117, 02sa 99%, rectal  temp 100.7, 2BM IN 2 hours, EKG showed Sinus Tachycardia
Patient asymptomatic and denies chest pain. Pt was coughing and exerting himself when vitals were being taken.
Patient refusing HD today, requests HD tomorrow
Pt is A&Ox4. No complaints of pain. Denies any s/s of hyperglycemia. pt denies eating any extra snacks or having juice.

## 2023-04-29 NOTE — PROGRESS NOTE ADULT - ASSESSMENT
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 s/p L AKA 4/20    Plan:  - monitor pain control with pain medication and gabapentin  - monitor H/H  - daily dressing changes, please change dressing; place dry gauze over staple incision with ABD, Krelex, and ACE wrap up  - vascular surgery to sign off, reconsult PRN  - Global care per primary    Plan discussed with and approved by attending  C Team Surgery   [id number]

## 2023-04-29 NOTE — PROGRESS NOTE ADULT - SUBJECTIVE AND OBJECTIVE BOX
SUBJECTIVE / 24H EVENTS  Patient seen and examined     OBJECTIVE:    VITAL SIGNS:  T(C): 36.4 (23 @ 06:14), Max: 37.2 (23 @ 20:26)  HR: 88 (23 @ 06:14) (82 - 90)  BP: 118/53 (23 @ 06:14) (106/64 - 118/53)  RR: 17 (23 @ 06:14) (17 - 18)  SpO2: 99% (23 @ 06:14) (99% - 100%)  Daily     Daily   POCT Blood Glucose.: 225 mg/dL (23 @ 08:52)  POCT Blood Glucose.: 274 mg/dL (23 @ 22:11)  POCT Blood Glucose.: 174 mg/dL (23 @ 17:19)      PHYSICAL EXAM:  Gen: NAD  LS: Respirations unlabored.   Card: s1, s2+  RS - dec breath sounds at bases  GI: Soft. Nontender. Nondistended. BS+.  Ext: Warm, b/l knee amputations; LLE BKA formalization incision c/d/i, dressing changed this AM    LABS:      134<L>  |  97<L>  |  39<H>  ----------------------------<  239<H>  3.9   |  23  |  4.16<H>    Ca    8.1<L>      2023 05:33  Phos  3.0       Mg     2.00           Creatinine Trend: 4.16 <--, 2.83 <--, 4.88 <--, 4.28 <--, 3.14 <--                        8.2    10.06 )-----------( 354      ( 2023 05:33 )             26.0     Urine Studies:  Urinalysis Basic - ( 2023 06:25 )    Color: Orange / Appearance: Turbid / S.014 / pH:   Gluc:  / Ketone: Trace  / Bili: Negative / Urobili: <2 mg/dL   Blood:  / Protein: 100 mg/dL / Nitrite: Negative   Leuk Esterase: Large / RBC: 6 /HPF /  /HPF   Sq Epi:  / Non Sq Epi:  / Bacteria: Many                              MICROBIOLOGY:    No new microbiology data for review.     RADIOLOGY:    No new radiographic images for review.    MEDICATIONS  (STANDING):  atorvastatin 40 milliGRAM(s) Oral at bedtime  chlorhexidine 2% Cloths 1 Application(s) Topical daily  dextrose 5%. 1000 milliLiter(s) (100 mL/Hr) IV Continuous <Continuous>  dextrose 5%. 1000 milliLiter(s) (50 mL/Hr) IV Continuous <Continuous>  dextrose 50% Injectable 25 Gram(s) IV Push once  dextrose 50% Injectable 12.5 Gram(s) IV Push once  dextrose 50% Injectable 25 Gram(s) IV Push once  epoetin nyasia-epbx (RETACRIT) Injectable 6000 Unit(s) IV Push <User Schedule>  gabapentin 100 milliGRAM(s) Oral daily  glucagon  Injectable 1 milliGRAM(s) IntraMuscular once  heparin   Injectable 5000 Unit(s) SubCutaneous every 12 hours  insulin lispro (ADMELOG) corrective regimen sliding scale   SubCutaneous at bedtime  insulin lispro (ADMELOG) corrective regimen sliding scale   SubCutaneous three times a day before meals  midodrine. 5 milliGRAM(s) Oral <User Schedule>  Nephro-zack 1 Tablet(s) Oral daily  zinc sulfate 220 milliGRAM(s) Oral daily    MEDICATIONS  (PRN):  acetaminophen     Tablet .. 650 milliGRAM(s) Oral every 6 hours PRN Temp greater or equal to 38C (100.4F), Mild Pain (1 - 3)  bisacodyl 5 milliGRAM(s) Oral every 12 hours PRN Constipation  dextrose Oral Gel 15 Gram(s) Oral once PRN Blood Glucose LESS THAN 70 milliGRAM(s)/deciliter  HYDROmorphone   Tablet 2 milliGRAM(s) Oral every 6 hours PRN Severe Pain (7 - 10)  melatonin 3 milliGRAM(s) Oral at bedtime PRN Insomnia  ondansetron Injectable 4 milliGRAM(s) IV Push every 8 hours PRN Nausea and/or Vomiting  sodium chloride 0.9% Bolus. 100 milliLiter(s) IV Bolus every 5 minutes PRN SBP LESS THAN or EQUAL to 90 mmHg

## 2023-04-29 NOTE — PROGRESS NOTE ADULT - ASSESSMENT
72y Male with history of ESRD on HD presents with L foot gangrene. Nephrology consulted for ESRD status.    1) ESRD: HD today 4/29. Tendance toward mild intradialytic hypotension. Plan for next maintenance HD on 5/2. Monitor electrolytes.    2) HTN with ESRD: BP low normal. Continue with midodrine 5 mg pre-HD to avoid intradialytic hypotension.    3) Anemia of renal disease: Hb low with adequate iron stores. Continue with Epo 6K with HD. Transfuse PRBC as needed. Monitor Hb.    4) Hyperphosphatemia: Phosphorus low. Hold phoslo 1 tab TID with meals. Monitor serum calcium and phosphorus.      [facility] NEPHROLOGY  Wili Hopkins M.D.  Kiran Feng D.O.  Sujatha Dumont M.D.  Radha Miller, MSN, ANP-C    Telephone: [phone]  Facsimile: [phone]    [address] [place] [address]

## 2023-04-29 NOTE — PROVIDER CONTACT NOTE (OTHER) - ACTION/TREATMENT ORDERED:
As per ACP Marisel, okay to transport patient back to unit and EKG will be ordered for when pt returns to floor.
As per provider, bolus ordered and IV Tyenol
Give insulin as ordered. Will follow up about adjusting insulin if needed.
Patient is going against medical advice of having HD today on 4/20/23. MD aware and will place an order for tomorrow 4/21/23

## 2023-04-29 NOTE — PROVIDER CONTACT NOTE (OTHER) - SITUATION
Pt noted with blood sugar 402 pre-lunch
Patient refusing to have HD today, states that he had left BKA surgery done and wants to have HD tomorrow
Patients HR is 108 post HD treatment
Patient Rectal temp 100.7, P117, patient had 2 BM in 2 hours

## 2023-04-29 NOTE — PROVIDER CONTACT NOTE (OTHER) - BACKGROUND
72M, admitted with Gangrene, s/p left BKA, s/p blood transfusion in dialysis on 4/26
Pt admitted with worsening gangrene of left foot. S/P AKA on 4/20.  Hx of diabetes, ESRD on dialysis, PVD.
Patient admitted for worsening gangrene of L foot. Pt has pmh of ESRD.
Patient has an admitting diagnosis of gangrene. PMH of ESRD on dialysis, DM2, PVD

## 2023-04-30 LAB
ANION GAP SERPL CALC-SCNC: 11 MMOL/L — SIGNIFICANT CHANGE UP (ref 7–14)
BUN SERPL-MCNC: 22 MG/DL — SIGNIFICANT CHANGE UP (ref 7–23)
CALCIUM SERPL-MCNC: 8.1 MG/DL — LOW (ref 8.4–10.5)
CHLORIDE SERPL-SCNC: 100 MMOL/L — SIGNIFICANT CHANGE UP (ref 98–107)
CO2 SERPL-SCNC: 25 MMOL/L — SIGNIFICANT CHANGE UP (ref 22–31)
CREAT SERPL-MCNC: 2.74 MG/DL — HIGH (ref 0.5–1.3)
EGFR: 24 ML/MIN/1.73M2 — LOW
GLUCOSE BLDC GLUCOMTR-MCNC: 144 MG/DL — HIGH (ref 70–99)
GLUCOSE BLDC GLUCOMTR-MCNC: 204 MG/DL — HIGH (ref 70–99)
GLUCOSE BLDC GLUCOMTR-MCNC: 261 MG/DL — HIGH (ref 70–99)
GLUCOSE BLDC GLUCOMTR-MCNC: 305 MG/DL — HIGH (ref 70–99)
GLUCOSE SERPL-MCNC: 127 MG/DL — HIGH (ref 70–99)
HCT VFR BLD CALC: 27.6 % — LOW (ref 39–50)
HGB BLD-MCNC: 8.7 G/DL — LOW (ref 13–17)
MAGNESIUM SERPL-MCNC: 2.1 MG/DL — SIGNIFICANT CHANGE UP (ref 1.6–2.6)
MCHC RBC-ENTMCNC: 29.7 PG — SIGNIFICANT CHANGE UP (ref 27–34)
MCHC RBC-ENTMCNC: 31.5 GM/DL — LOW (ref 32–36)
MCV RBC AUTO: 94.2 FL — SIGNIFICANT CHANGE UP (ref 80–100)
NRBC # BLD: 0 /100 WBCS — SIGNIFICANT CHANGE UP (ref 0–0)
NRBC # FLD: 0 K/UL — SIGNIFICANT CHANGE UP (ref 0–0)
PHOSPHATE SERPL-MCNC: 2.3 MG/DL — LOW (ref 2.5–4.5)
PLATELET # BLD AUTO: 373 K/UL — SIGNIFICANT CHANGE UP (ref 150–400)
POTASSIUM SERPL-MCNC: 3.5 MMOL/L — SIGNIFICANT CHANGE UP (ref 3.5–5.3)
POTASSIUM SERPL-SCNC: 3.5 MMOL/L — SIGNIFICANT CHANGE UP (ref 3.5–5.3)
RBC # BLD: 2.93 M/UL — LOW (ref 4.2–5.8)
RBC # FLD: 17.9 % — HIGH (ref 10.3–14.5)
SARS-COV-2 RNA SPEC QL NAA+PROBE: SIGNIFICANT CHANGE UP
SODIUM SERPL-SCNC: 136 MMOL/L — SIGNIFICANT CHANGE UP (ref 135–145)
WBC # BLD: 9.99 K/UL — SIGNIFICANT CHANGE UP (ref 3.8–10.5)
WBC # FLD AUTO: 9.99 K/UL — SIGNIFICANT CHANGE UP (ref 3.8–10.5)

## 2023-04-30 RX ORDER — ACETAMINOPHEN 500 MG
1000 TABLET ORAL ONCE
Refills: 0 | Status: COMPLETED | OUTPATIENT
Start: 2023-04-30 | End: 2023-04-30

## 2023-04-30 RX ORDER — SODIUM,POTASSIUM PHOSPHATES 278-250MG
1 POWDER IN PACKET (EA) ORAL
Refills: 0 | Status: COMPLETED | OUTPATIENT
Start: 2023-04-30 | End: 2023-04-30

## 2023-04-30 RX ADMIN — ZINC SULFATE TAB 220 MG (50 MG ZINC EQUIVALENT) 220 MILLIGRAM(S): 220 (50 ZN) TAB at 11:54

## 2023-04-30 RX ADMIN — INSULIN GLARGINE 8 UNIT(S): 100 INJECTION, SOLUTION SUBCUTANEOUS at 22:36

## 2023-04-30 RX ADMIN — CHLORHEXIDINE GLUCONATE 1 APPLICATION(S): 213 SOLUTION TOPICAL at 12:00

## 2023-04-30 RX ADMIN — Medication 1000 MILLIGRAM(S): at 12:09

## 2023-04-30 RX ADMIN — Medication 400 MILLIGRAM(S): at 11:53

## 2023-04-30 RX ADMIN — Medication 4: at 12:08

## 2023-04-30 RX ADMIN — GABAPENTIN 100 MILLIGRAM(S): 400 CAPSULE ORAL at 11:54

## 2023-04-30 RX ADMIN — Medication 1 PACKET(S): at 08:46

## 2023-04-30 RX ADMIN — Medication 3 UNIT(S): at 17:31

## 2023-04-30 RX ADMIN — Medication 3: at 17:31

## 2023-04-30 RX ADMIN — HEPARIN SODIUM 5000 UNIT(S): 5000 INJECTION INTRAVENOUS; SUBCUTANEOUS at 05:40

## 2023-04-30 RX ADMIN — Medication 1 TABLET(S): at 11:54

## 2023-04-30 RX ADMIN — ATORVASTATIN CALCIUM 40 MILLIGRAM(S): 80 TABLET, FILM COATED ORAL at 22:36

## 2023-04-30 RX ADMIN — Medication 1 PACKET(S): at 11:53

## 2023-04-30 RX ADMIN — HEPARIN SODIUM 5000 UNIT(S): 5000 INJECTION INTRAVENOUS; SUBCUTANEOUS at 17:31

## 2023-04-30 RX ADMIN — Medication 3 UNIT(S): at 08:46

## 2023-04-30 RX ADMIN — Medication 3 UNIT(S): at 12:08

## 2023-04-30 NOTE — PROGRESS NOTE ADULT - SUBJECTIVE AND OBJECTIVE BOX
SUBJECTIVE / 24H EVENTS  Patient seen and examined     MEDICATIONS  (STANDING):  atorvastatin 40 milliGRAM(s) Oral at bedtime  chlorhexidine 2% Cloths 1 Application(s) Topical daily  dextrose 5%. 1000 milliLiter(s) (100 mL/Hr) IV Continuous <Continuous>  dextrose 5%. 1000 milliLiter(s) (50 mL/Hr) IV Continuous <Continuous>  dextrose 50% Injectable 25 Gram(s) IV Push once  dextrose 50% Injectable 12.5 Gram(s) IV Push once  dextrose 50% Injectable 25 Gram(s) IV Push once  epoetin nyasia-epbx (RETACRIT) Injectable 6000 Unit(s) IV Push <User Schedule>  gabapentin 100 milliGRAM(s) Oral daily  glucagon  Injectable 1 milliGRAM(s) IntraMuscular once  heparin   Injectable 5000 Unit(s) SubCutaneous every 12 hours  insulin glargine Injectable (LANTUS) 8 Unit(s) SubCutaneous at bedtime  insulin lispro (ADMELOG) corrective regimen sliding scale   SubCutaneous at bedtime  insulin lispro (ADMELOG) corrective regimen sliding scale   SubCutaneous three times a day before meals  insulin lispro Injectable (ADMELOG) 3 Unit(s) SubCutaneous three times a day before meals  midodrine. 5 milliGRAM(s) Oral <User Schedule>  Nephro-zack 1 Tablet(s) Oral daily  zinc sulfate 220 milliGRAM(s) Oral daily    MEDICATIONS  (PRN):  acetaminophen     Tablet .. 650 milliGRAM(s) Oral every 6 hours PRN Temp greater or equal to 38C (100.4F), Mild Pain (1 - 3)  bisacodyl 5 milliGRAM(s) Oral every 12 hours PRN Constipation  dextrose Oral Gel 15 Gram(s) Oral once PRN Blood Glucose LESS THAN 70 milliGRAM(s)/deciliter  HYDROmorphone   Tablet 2 milliGRAM(s) Oral every 6 hours PRN Severe Pain (7 - 10)  melatonin 3 milliGRAM(s) Oral at bedtime PRN Insomnia  ondansetron Injectable 4 milliGRAM(s) IV Push every 8 hours PRN Nausea and/or Vomiting  sodium chloride 0.9% Bolus. 100 milliLiter(s) IV Bolus every 5 minutes PRN SBP LESS THAN or EQUAL to 90 mmHg    Vital Signs Last 24 Hrs  T(C): 36.8 (23 @ 22:46), Max: 36.8 (23 @ 22:46)  T(F): 98.2 (23 @ 22:46), Max: 98.2 (23 @ 22:46)  HR: 90 (23 @ 22:46) (65 - 90)  BP: 117/66 (23 @ 22:46) (100/60 - 117/66)  BP(mean): --  RR: 17 (23 @ 22:46) (17 - 18)  SpO2: 100% (23 @ 22:46) (100% - 100%)        PHYSICAL EXAM:  Gen: NAD  LS: Respirations unlabored.   Card: s1, s2+  RS - dec breath sounds at bases  GI: Soft. Nontender. Nondistended. BS+.  Ext: Warm, b/l knee amputations; LLE BKA formalization incision c/d/i, dressing changed this AM    LABS:      136  |  100  |  22  ----------------------------<  127<H>  3.5   |  25  |  2.74<H>    Ca    8.1<L>      2023 06:12  Phos  2.3       Mg     2.10           Creatinine Trend: 2.74 <--, 4.16 <--, 2.83 <--, 4.88 <--, 4.28 <--                        8.7    9.99  )-----------( 373      ( 2023 06:12 )             27.6     Urine Studies:  Urinalysis Basic - ( 2023 06:25 )    Color: Orange / Appearance: Turbid / S.014 / pH:   Gluc:  / Ketone: Trace  / Bili: Negative / Urobili: <2 mg/dL   Blood:  / Protein: 100 mg/dL / Nitrite: Negative   Leuk Esterase: Large / RBC: 6 /HPF /  /HPF   Sq Epi:  / Non Sq Epi:  / Bacteria: Many                                        MICROBIOLOGY:    No new microbiology data for review.     RADIOLOGY:    No new radiographic images for review.    MEDICATIONS  (STANDING):  atorvastatin 40 milliGRAM(s) Oral at bedtime  chlorhexidine 2% Cloths 1 Application(s) Topical daily  dextrose 5%. 1000 milliLiter(s) (100 mL/Hr) IV Continuous <Continuous>  dextrose 5%. 1000 milliLiter(s) (50 mL/Hr) IV Continuous <Continuous>  dextrose 50% Injectable 25 Gram(s) IV Push once  dextrose 50% Injectable 12.5 Gram(s) IV Push once  dextrose 50% Injectable 25 Gram(s) IV Push once  epoetin nyasia-epbx (RETACRIT) Injectable 6000 Unit(s) IV Push <User Schedule>  gabapentin 100 milliGRAM(s) Oral daily  glucagon  Injectable 1 milliGRAM(s) IntraMuscular once  heparin   Injectable 5000 Unit(s) SubCutaneous every 12 hours  insulin lispro (ADMELOG) corrective regimen sliding scale   SubCutaneous at bedtime  insulin lispro (ADMELOG) corrective regimen sliding scale   SubCutaneous three times a day before meals  midodrine. 5 milliGRAM(s) Oral <User Schedule>  Nephro-zack 1 Tablet(s) Oral daily  zinc sulfate 220 milliGRAM(s) Oral daily    MEDICATIONS  (PRN):  acetaminophen     Tablet .. 650 milliGRAM(s) Oral every 6 hours PRN Temp greater or equal to 38C (100.4F), Mild Pain (1 - 3)  bisacodyl 5 milliGRAM(s) Oral every 12 hours PRN Constipation  dextrose Oral Gel 15 Gram(s) Oral once PRN Blood Glucose LESS THAN 70 milliGRAM(s)/deciliter  HYDROmorphone   Tablet 2 milliGRAM(s) Oral every 6 hours PRN Severe Pain (7 - 10)  melatonin 3 milliGRAM(s) Oral at bedtime PRN Insomnia  ondansetron Injectable 4 milliGRAM(s) IV Push every 8 hours PRN Nausea and/or Vomiting  sodium chloride 0.9% Bolus. 100 milliLiter(s) IV Bolus every 5 minutes PRN SBP LESS THAN or EQUAL to 90 mmHg

## 2023-04-30 NOTE — PROGRESS NOTE ADULT - SUBJECTIVE AND OBJECTIVE BOX
Surgery C-Team Daily Progress Note     BRIAN MATTHEWS | MRN-[id number]    SUBJECTIVE / 24H EVENTS  Patient seen and examined on morning rounds. No acute events overnight. No nausea / vomiting. Tolerating diet.   OBJECTIVE:    VITAL SIGNS:  T(C): 36.7 (23 @ 05:40), Max: 36.9 (23 @ 14:07)  HR: 90 (23 @ 05:40) (90 - 100)  BP: 100/60 (23 @ 05:40) (100/60 - 119/63)  RR: 18 (23 @ 05:40) (16 - 18)  SpO2: 100% (23 @ 05:40) (97% - 100%)  Daily     Daily Weight in k.4 (2023 19:18)  POCT Blood Glucose.: 144 mg/dL (23 @ 08:41)  POCT Blood Glucose.: 204 mg/dL (23 @ 22:08)  POCT Blood Glucose.: 168 mg/dL (23 @ 18:39)      PHYSICAL EXAM:  Gen: NAD  LS: Respirations unlabored.   Card: RRR. On telemetry. No m/r/g.   GI: Soft. Nontender. Nondistended. BS+.  Ext: Warm, well perfused  Pulses:       23 @ 07:01  -  23 @ 07:00  --------------------------------------------------------  IN:    Other (mL): 400 mL  Total IN: 400 mL    OUT:    Other (mL): 1400 mL  Total OUT: 1400 mL    Total NET: -1000 mL          LAB VALUES:      136  |  100  |  22  ----------------------------<  127<H>  3.5   |  25  |  2.74<H>    Ca    8.1<L>      2023 06:12  Phos  2.3       Mg     2.10                                      8.7    9.99  )-----------( 373      ( 2023 06:12 )             27.6                   MICROBIOLOGY:    No new microbiology data for review.     RADIOLOGY:    No new radiographic images for review.    MEDICATIONS  (STANDING):  atorvastatin 40 milliGRAM(s) Oral at bedtime  chlorhexidine 2% Cloths 1 Application(s) Topical daily  dextrose 5%. 1000 milliLiter(s) (100 mL/Hr) IV Continuous <Continuous>  dextrose 5%. 1000 milliLiter(s) (50 mL/Hr) IV Continuous <Continuous>  dextrose 50% Injectable 25 Gram(s) IV Push once  dextrose 50% Injectable 12.5 Gram(s) IV Push once  dextrose 50% Injectable 25 Gram(s) IV Push once  epoetin nyasia-epbx (RETACRIT) Injectable 6000 Unit(s) IV Push <User Schedule>  gabapentin 100 milliGRAM(s) Oral daily  glucagon  Injectable 1 milliGRAM(s) IntraMuscular once  heparin   Injectable 5000 Unit(s) SubCutaneous every 12 hours  insulin glargine Injectable (LANTUS) 8 Unit(s) SubCutaneous at bedtime  insulin lispro (ADMELOG) corrective regimen sliding scale   SubCutaneous three times a day before meals  insulin lispro (ADMELOG) corrective regimen sliding scale   SubCutaneous at bedtime  insulin lispro Injectable (ADMELOG) 3 Unit(s) SubCutaneous three times a day before meals  midodrine. 5 milliGRAM(s) Oral <User Schedule>  Nephro-zack 1 Tablet(s) Oral daily  potassium phosphate / sodium phosphate Powder (PHOS-NaK) 1 Packet(s) Oral every 2 hours  zinc sulfate 220 milliGRAM(s) Oral daily    MEDICATIONS  (PRN):  acetaminophen     Tablet .. 650 milliGRAM(s) Oral every 6 hours PRN Temp greater or equal to 38C (100.4F), Mild Pain (1 - 3)  bisacodyl 5 milliGRAM(s) Oral every 12 hours PRN Constipation  dextrose Oral Gel 15 Gram(s) Oral once PRN Blood Glucose LESS THAN 70 milliGRAM(s)/deciliter  HYDROmorphone   Tablet 2 milliGRAM(s) Oral every 6 hours PRN Severe Pain (7 - 10)  melatonin 3 milliGRAM(s) Oral at bedtime PRN Insomnia  ondansetron Injectable 4 milliGRAM(s) IV Push every 8 hours PRN Nausea and/or Vomiting  sodium chloride 0.9% Bolus. 100 milliLiter(s) IV Bolus every 5 minutes PRN SBP LESS THAN or EQUAL to 90 mmHg

## 2023-04-30 NOTE — PROGRESS NOTE ADULT - SUBJECTIVE AND OBJECTIVE BOX
[facility] NEPHROLOGY- PROGRESS NOTE    72y Male with history of ESRD on HD presents with L foot gangrene. Nephrology consulted for ESRD status.      REVIEW OF SYSTEMS:  Gen: no fevers  Cards: no chest pain  Resp: no dyspnea  GI: no nausea or vomiting or diarrhea  Vascular: no LE edema, + LLE pain improving    No Known Allergies      Hospital Medications: Medications reviewed    VITALS:  T(F): 97.4 (23 @ 12:53), Max: 98.4 (23 @ 14:07)  HR: 65 (23 @ 12:53)  BP: 110/59 (23 @ 12:53)  RR: 18 (23 @ 12:53)  SpO2: 100% (23 @ 12:53)  Wt(kg): --     @ 07:01  -   @ 07:00  --------------------------------------------------------  IN: 400 mL / OUT: 1400 mL / NET: -1000 mL    PHYSICAL EXAM:  Gen: NAD, calm  Cards: RRR, +S1/S2, no M/G/R  Resp: CTA B/L  GI: soft, NT/ND, NABS  Vascular: B/L BKA, RIJ TDC intact      LABS:      136  |  100  |  22  ----------------------------<  127<H>  3.5   |  25  |  2.74<H>    Ca    8.1<L>      2023 06:12  Phos  2.3       Mg     2.10           Creatinine Trend: 2.74 <--, 4.16 <--, 2.83 <--, 4.88 <--, 4.28 <--                        8.7    9.99  )-----------( 373      ( 2023 06:12 )             27.6     Urine Studies:  Urinalysis Basic - ( 2023 06:25 )    Color: Orange / Appearance: Turbid / S.014 / pH:   Gluc:  / Ketone: Trace  / Bili: Negative / Urobili: <2 mg/dL   Blood:  / Protein: 100 mg/dL / Nitrite: Negative   Leuk Esterase: Large / RBC: 6 /HPF /  /HPF   Sq Epi:  / Non Sq Epi:  / Bacteria: Many

## 2023-04-30 NOTE — PROGRESS NOTE ADULT - NUTRITIONAL ASSESSMENT
This patient has been assessed with a concern for Malnutrition and has been determined to have a diagnosis/diagnoses of Severe protein-calorie malnutrition and Underweight (BMI < 19).    This patient is being managed with:   Diet Regular-  Consistent Carbohydrate {Evening Snack} (CSTCHOSN)  For patients receiving Renal Replacement - No Protein Restr No Conc K No Conc Phos Low Sodium (RENAL)  Supplement Feeding Modality:  Oral  Nepro Cans or Servings Per Day:  1       Frequency:  Two Times a day  Entered: Apr 25 2023  1:12PM  

## 2023-04-30 NOTE — PROGRESS NOTE ADULT - ASSESSMENT
72y Male with history of ESRD on HD presents with L foot gangrene. Nephrology consulted for ESRD status.    1) ESRD: Last HD 4/29.. Plan for next maintenance HD on 5/2. Monitor electrolytes.    2) HTN with ESRD: BP low normal. Continue with midodrine 5 mg pre-HD to avoid intradialytic hypotension.    3) Anemia of renal disease: Hb low with adequate iron stores. Continue with Epo 6K with HD. Transfuse PRBC as needed. Monitor Hb.    4) Hyperphosphatemia: Phosphorus low. Hold phoslo 1 tab TID with meals. Monitor serum calcium and phosphorus.      [facility] NEPHROLOGY  Wili Hopkins M.D.  Kiran Feng D.O.  Sujatha Dumont M.D.  Radha Miller, MSN, ANP-C    Telephone: [phone]  Facsimile: [phone]    [address] [place] [address]

## 2023-04-30 NOTE — PROGRESS NOTE ADULT - ASSESSMENT
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 s/p L AKA 4/20    Plan:  - monitor pain control with pain medication and gabapentin  - monitor H/H  - daily dressing changes, please change dressing; place dry gauze over staple incision with ABD, Krelex, and ACE wrap up  - vascular surgery to sign off, reconsult PRN  - Global care per primary    Plan discussed with and approved by attending  C Team Surgery   [id number]

## 2023-05-01 ENCOUNTER — TRANSCRIPTION ENCOUNTER (OUTPATIENT)
Age: 73
End: 2023-05-01

## 2023-05-01 VITALS
TEMPERATURE: 98 F | RESPIRATION RATE: 19 BRPM | SYSTOLIC BLOOD PRESSURE: 115 MMHG | OXYGEN SATURATION: 100 % | HEART RATE: 82 BPM | DIASTOLIC BLOOD PRESSURE: 58 MMHG

## 2023-05-01 LAB
ANION GAP SERPL CALC-SCNC: 14 MMOL/L — SIGNIFICANT CHANGE UP (ref 7–14)
BUN SERPL-MCNC: 35 MG/DL — HIGH (ref 7–23)
CALCIUM SERPL-MCNC: 8.4 MG/DL — SIGNIFICANT CHANGE UP (ref 8.4–10.5)
CHLORIDE SERPL-SCNC: 97 MMOL/L — LOW (ref 98–107)
CO2 SERPL-SCNC: 23 MMOL/L — SIGNIFICANT CHANGE UP (ref 22–31)
CREAT SERPL-MCNC: 4.13 MG/DL — HIGH (ref 0.5–1.3)
CULTURE RESULTS: SIGNIFICANT CHANGE UP
CULTURE RESULTS: SIGNIFICANT CHANGE UP
EGFR: 14 ML/MIN/1.73M2 — LOW
GLUCOSE BLDC GLUCOMTR-MCNC: 202 MG/DL — HIGH (ref 70–99)
GLUCOSE BLDC GLUCOMTR-MCNC: 245 MG/DL — HIGH (ref 70–99)
GLUCOSE BLDC GLUCOMTR-MCNC: 249 MG/DL — HIGH (ref 70–99)
GLUCOSE SERPL-MCNC: 156 MG/DL — HIGH (ref 70–99)
HCT VFR BLD CALC: 26.8 % — LOW (ref 39–50)
HGB BLD-MCNC: 8.4 G/DL — LOW (ref 13–17)
MAGNESIUM SERPL-MCNC: 2 MG/DL — SIGNIFICANT CHANGE UP (ref 1.6–2.6)
MCHC RBC-ENTMCNC: 29.8 PG — SIGNIFICANT CHANGE UP (ref 27–34)
MCHC RBC-ENTMCNC: 31.3 GM/DL — LOW (ref 32–36)
MCV RBC AUTO: 95 FL — SIGNIFICANT CHANGE UP (ref 80–100)
NRBC # BLD: 0 /100 WBCS — SIGNIFICANT CHANGE UP (ref 0–0)
NRBC # FLD: 0 K/UL — SIGNIFICANT CHANGE UP (ref 0–0)
PHOSPHATE SERPL-MCNC: 2.9 MG/DL — SIGNIFICANT CHANGE UP (ref 2.5–4.5)
PLATELET # BLD AUTO: 395 K/UL — SIGNIFICANT CHANGE UP (ref 150–400)
POTASSIUM SERPL-MCNC: 3.7 MMOL/L — SIGNIFICANT CHANGE UP (ref 3.5–5.3)
POTASSIUM SERPL-SCNC: 3.7 MMOL/L — SIGNIFICANT CHANGE UP (ref 3.5–5.3)
RBC # BLD: 2.82 M/UL — LOW (ref 4.2–5.8)
RBC # FLD: 17.8 % — HIGH (ref 10.3–14.5)
SODIUM SERPL-SCNC: 134 MMOL/L — LOW (ref 135–145)
SPECIMEN SOURCE: SIGNIFICANT CHANGE UP
SPECIMEN SOURCE: SIGNIFICANT CHANGE UP
SURGICAL PATHOLOGY STUDY: SIGNIFICANT CHANGE UP
WBC # BLD: 9.48 K/UL — SIGNIFICANT CHANGE UP (ref 3.8–10.5)
WBC # FLD AUTO: 9.48 K/UL — SIGNIFICANT CHANGE UP (ref 3.8–10.5)

## 2023-05-01 RX ORDER — CLOPIDOGREL BISULFATE 75 MG/1
1 TABLET, FILM COATED ORAL
Refills: 0 | DISCHARGE

## 2023-05-01 RX ORDER — MIDODRINE HYDROCHLORIDE 2.5 MG/1
1 TABLET ORAL
Qty: 0 | Refills: 0 | DISCHARGE
Start: 2023-05-01

## 2023-05-01 RX ORDER — ATORVASTATIN CALCIUM 80 MG/1
0.5 TABLET, FILM COATED ORAL
Refills: 0 | DISCHARGE

## 2023-05-01 RX ORDER — ERYTHROPOIETIN 10000 [IU]/ML
8000 INJECTION, SOLUTION INTRAVENOUS; SUBCUTANEOUS
Refills: 0 | Status: DISCONTINUED | OUTPATIENT
Start: 2023-05-01 | End: 2023-05-01

## 2023-05-01 RX ORDER — LANOLIN ALCOHOL/MO/W.PET/CERES
1 CREAM (GRAM) TOPICAL
Qty: 0 | Refills: 0 | DISCHARGE
Start: 2023-05-01

## 2023-05-01 RX ORDER — INSULIN GLARGINE 100 [IU]/ML
8 INJECTION, SOLUTION SUBCUTANEOUS
Qty: 0 | Refills: 0 | DISCHARGE
Start: 2023-05-01

## 2023-05-01 RX ORDER — INSULIN HUMAN 100 [IU]/ML
0 INJECTION, SOLUTION SUBCUTANEOUS
Refills: 0 | DISCHARGE

## 2023-05-01 RX ORDER — INSULIN LISPRO 100/ML
3 VIAL (ML) SUBCUTANEOUS
Qty: 0 | Refills: 0 | DISCHARGE
Start: 2023-05-01

## 2023-05-01 RX ORDER — HYDROMORPHONE HYDROCHLORIDE 2 MG/ML
1 INJECTION INTRAMUSCULAR; INTRAVENOUS; SUBCUTANEOUS
Qty: 0 | Refills: 0 | DISCHARGE
Start: 2023-05-01

## 2023-05-01 RX ORDER — GABAPENTIN 400 MG/1
1 CAPSULE ORAL
Qty: 0 | Refills: 0 | DISCHARGE
Start: 2023-05-01

## 2023-05-01 RX ORDER — CLOPIDOGREL BISULFATE 75 MG/1
1 TABLET, FILM COATED ORAL
Qty: 0 | Refills: 0 | DISCHARGE
Start: 2023-05-01

## 2023-05-01 RX ORDER — ATORVASTATIN CALCIUM 80 MG/1
1 TABLET, FILM COATED ORAL
Qty: 0 | Refills: 0 | DISCHARGE
Start: 2023-05-01

## 2023-05-01 RX ORDER — SEVELAMER CARBONATE 2400 MG/1
2 POWDER, FOR SUSPENSION ORAL
Refills: 0 | DISCHARGE

## 2023-05-01 RX ORDER — ZINC SULFATE TAB 220 MG (50 MG ZINC EQUIVALENT) 220 (50 ZN) MG
1 TAB ORAL
Qty: 0 | Refills: 0 | DISCHARGE
Start: 2023-05-01

## 2023-05-01 RX ORDER — ZINC SULFATE TAB 220 MG (50 MG ZINC EQUIVALENT) 220 (50 ZN) MG
1 TAB ORAL
Refills: 0 | DISCHARGE

## 2023-05-01 RX ORDER — REPAGLINIDE 1 MG/1
1 TABLET ORAL
Refills: 0 | DISCHARGE

## 2023-05-01 RX ADMIN — Medication 3 UNIT(S): at 08:30

## 2023-05-01 RX ADMIN — Medication 2: at 08:30

## 2023-05-01 RX ADMIN — Medication 1 TABLET(S): at 11:50

## 2023-05-01 RX ADMIN — Medication 3 UNIT(S): at 12:30

## 2023-05-01 RX ADMIN — Medication 2: at 12:30

## 2023-05-01 RX ADMIN — ZINC SULFATE TAB 220 MG (50 MG ZINC EQUIVALENT) 220 MILLIGRAM(S): 220 (50 ZN) TAB at 11:50

## 2023-05-01 RX ADMIN — HEPARIN SODIUM 5000 UNIT(S): 5000 INJECTION INTRAVENOUS; SUBCUTANEOUS at 05:20

## 2023-05-01 RX ADMIN — GABAPENTIN 100 MILLIGRAM(S): 400 CAPSULE ORAL at 11:50

## 2023-05-01 RX ADMIN — HYDROMORPHONE HYDROCHLORIDE 2 MILLIGRAM(S): 2 INJECTION INTRAMUSCULAR; INTRAVENOUS; SUBCUTANEOUS at 03:48

## 2023-05-01 RX ADMIN — CHLORHEXIDINE GLUCONATE 1 APPLICATION(S): 213 SOLUTION TOPICAL at 11:52

## 2023-05-01 RX ADMIN — HYDROMORPHONE HYDROCHLORIDE 2 MILLIGRAM(S): 2 INJECTION INTRAMUSCULAR; INTRAVENOUS; SUBCUTANEOUS at 04:18

## 2023-05-01 NOTE — DISCHARGE NOTE NURSING/CASE MANAGEMENT/SOCIAL WORK - NSDCVIVACCINE_GEN_ALL_CORE_FT
Tdap; 30-Jul-2019 23:12; Baldo Trimble (RN); [employer]; [id number] (Exp. Date: 27-Jun-2021); IntraMuscular; Deltoid Left.; 0.5 milliLiter(s); VIS (VIS Published: 09-May-2013, VIS Presented: 30-Jul-2019);

## 2023-05-01 NOTE — PROGRESS NOTE ADULT - SUBJECTIVE AND OBJECTIVE BOX
[facility] NEPHROLOGY- PROGRESS NOTE    72y Male with history of ESRD on HD presents with L foot gangrene. Nephrology consulted for ESRD status.      REVIEW OF SYSTEMS:  Gen: no fevers  Cards: no chest pain  Resp: no dyspnea  GI: no nausea or vomiting or diarrhea  Vascular: no LE edema, + LLE pain improving    No Known Allergies      Hospital Medications: Medications reviewed      VITALS:  T(F): 98.3 (23 @ 05:20), Max: 98.3 (23 @ 05:20)  HR: 86 (23 @ 05:20)  BP: 117/68 (23 @ 05:20)  RR: 18 (23 @ 05:20)  SpO2: 100% (23 @ 05:20)  Wt(kg): --        PHYSICAL EXAM:    Gen: NAD, calm  Cards: RRR, +S1/S2, no M/G/R  Resp: CTA B/L  GI: soft, NT/ND, NABS  Vascular: B/L BKA, RIJ TDC intact      LABS:      134<L>  |  97<L>  |  35<H>  ----------------------------<  156<H>  3.7   |  23  |  4.13<H>    Ca    8.4      01 May 2023 05:35  Phos  2.9     05-  Mg     2.00           Creatinine Trend: 4.13 <--, 2.74 <--, 4.16 <--, 2.83 <--, 4.88 <--                        8.4    9.48  )-----------( 395      ( 01 May 2023 05:35 )             26.8     Urine Studies:  Urinalysis Basic - ( 2023 06:25 )    Color: Orange / Appearance: Turbid / S.014 / pH:   Gluc:  / Ketone: Trace  / Bili: Negative / Urobili: <2 mg/dL   Blood:  / Protein: 100 mg/dL / Nitrite: Negative   Leuk Esterase: Large / RBC: 6 /HPF /  /HPF   Sq Epi:  / Non Sq Epi:  / Bacteria: Many

## 2023-05-01 NOTE — DISCHARGE NOTE PROVIDER - NSDCMRMEDTOKEN_GEN_ALL_CORE_FT
acetaminophen 325 mg oral tablet: 2 tab(s) orally every 6 hours as needed for  pain  aspirin 81 mg oral tablet: 1 tab(s) orally once a day (at bedtime)  atorvastatin 40 mg oral tablet: 1 tab(s) orally once a day (at bedtime)  bisacodyl 5 mg oral delayed release tablet: 1 tab(s) orally once a day as needed for  constipation  gabapentin 100 mg oral capsule: 1 cap(s) orally once a day  HYDROmorphone 2 mg oral tablet: 1 tab(s) orally every 6 hours As needed Severe Pain (7 - 10)  insulin glargine 100 units/mL subcutaneous solution: 8 unit(s) subcutaneous once a day (at bedtime)  insulin lispro 100 units/mL injectable solution: 3 unit(s) injectable 3 times a day  melatonin 3 mg oral tablet: 1 tab(s) orally once a day (at bedtime) As needed Insomnia  midodrine 5 mg oral tablet: 1 tab(s) orally 3 times a week pre HD  Nephro-Killian oral tablet: 1 tab(s) orally once a day (at bedtime)  PeriGuard topical ointment: Apply topically to affected area once a day  Vitamin A and D topical ointment: Apply topically to affected area once a day  zinc sulfate 220 mg oral capsule: 1 cap(s) orally once a day

## 2023-05-01 NOTE — PROGRESS NOTE ADULT - PROVIDER SPECIALTY LIST ADULT
Nephrology
Nephrology
Vascular Surgery
Cardiology
Internal Medicine
Nephrology
Nephrology
Podiatry
Anesthesia
Cardiology
Cardiology
Internal Medicine
Nephrology
Podiatry
Vascular Surgery
Cardiology
Infectious Disease
Internal Medicine
Nephrology
Vascular Surgery
Cardiology
Vascular Surgery
Cardiology

## 2023-05-01 NOTE — DISCHARGE NOTE PROVIDER - DETAILS OF MALNUTRITION DIAGNOSIS/DIAGNOSES
This patient has been assessed with a concern for Malnutrition and was treated during this hospitalization for the following Nutrition diagnosis/diagnoses:     -  04/25/2023: Severe protein-calorie malnutrition   -  04/25/2023: Underweight (BMI < 19)

## 2023-05-01 NOTE — PROGRESS NOTE ADULT - REASON FOR ADMISSION
L foot gangrene

## 2023-05-01 NOTE — PROGRESS NOTE ADULT - ASSESSMENT
72y Male with history of ESRD on HD presents with L foot gangrene. Nephrology consulted for ESRD status.    1) ESRD: Last HD on 4/29 tolerated well with 1L removed. Plan for next maintenance HD on 5/2. Monitor electrolytes.    2) HTN with ESRD: BP low normal. Continue with midodrine 5 mg pre-HD to avoid intradialytic hypotension.    3) Anemia of renal disease: Hb low with adequate iron stores. Increase Epo to 8K with HD. Monitor Hb.    4) Hyperphosphatemia: Phosphorus acceptable. Continue with renal diet. Monitor serum calcium and phosphorus.      [facility] NEPHROLOGY  Wili Hopkins M.D.  Kiran Feng D.O.  Sujatha Dumont M.D.  Radha Miller, MSN, ANP-C    Telephone: [phone]  Facsimile: [phone]    [address] [place] [address]   [Awake] : awake [Soft] : soft [Alert] : alert [Acute Distress] : no acute distress [H/Smegaly] : no hepatosplenomegaly [Tender] : non tender [Distended] : not distended [Depressed Mood] : not depressed [Oriented x3] : oriented to person, place, and time [Flat Affect] : affect not flat [Normal] : uterus [Labia Majora] : labia major [Uterine Adnexae] : were not tender and not enlarged [No Bleeding] : there was no active vaginal bleeding

## 2023-05-01 NOTE — DISCHARGE NOTE PROVIDER - HOSPITAL COURSE
72M w/ PMHx of ESRD on HD (T/Th/Sat), DM2, PVD (s/p R BKA in Nov 2022) and recurrent hematurias (has required CBI in the past) who presents to the hospital with worsening gangrene of his L foot.   Gangrene due to peripheral vascular disease.   On Unasyn   - BCx x2 in lab    ESRD on dialysis.   - On HD Tu/Th/Sat, next episode due on 4/18  - Dr. Hopkins's nephrology group for HD    PVD   -c/w aspirin/statin therapy-hold plavix    Type 2 diabetes mellitus.   - Hold outpatient DM2 medications  - Place on ISS, FS qAC, CC diet.    on 5/1/2023 pt is stable for dc to rehab, dc meds reviewed w/ DR Huretas and Dr Sparrow   72M w/ PMHx of ESRD on HD (T/Th/Sat), DM2, PVD (s/p R BKA in Nov 2022) and recurrent hematurias (has required CBI in the past) who presents to the hospital with worsening gangrene of his L foot.   Gangrene due to peripheral vascular disease.   On Unasyn   - BCx x2 in lab    ESRD on dialysis.   - On HD Tu/Th/Sat, next episode due on 4/18  - Dr. Hopkins's nephrology group for HD    PVD   -c/w aspirin/statin therapy and plavix    Type 2 diabetes mellitus.   - Hold outpatient DM2 medications  - Place on ISS, FS qAC, CC diet.    on 5/1/2023 pt is stable for dc to rehab, dc meds reviewed w/ DR Huertas and Dr Sparrow

## 2023-05-01 NOTE — DISCHARGE NOTE NURSING/CASE MANAGEMENT/SOCIAL WORK - PATIENT PORTAL LINK FT
You can access the FollowMyHealth Patient Portal offered by [facility] by registering at the following website: [email or web address]. By joining [employer]’s FollowMyHealth portal, you will also be able to view your health information using other applications (apps) compatible with our system.

## 2023-05-01 NOTE — DISCHARGE NOTE PROVIDER - CARE PROVIDERS DIRECT ADDRESSES
,DirectAddress_Unknown
low, pt denies si/hi and father confirms this, feels pt is not a danger to self or others. pt may be high risk of self harm when intoxicated on alcohol/substances, leading to poor judgement and poor impulse control. pt was educated on importance of sobriety from alcohol.

## 2023-05-01 NOTE — DISCHARGE NOTE PROVIDER - NSDCCPCAREPLAN_GEN_ALL_CORE_FT
PRINCIPAL DISCHARGE DIAGNOSIS  Diagnosis: Gangrene  Assessment and Plan of Treatment: - daily dressing changes, please change dressing; place dry gauze over staple incision with ABD, Krelex, and ACE wrap up  Continue daily ASpirin w/ food and Statin as ordered         SECONDARY DISCHARGE DIAGNOSES  Diagnosis: ESRD on dialysis  Assessment and Plan of Treatment: ESRD: Last HD on 4/29 tolerated well with 1L removed. Plan for next maintenance HD on 5/2. Monitor electrolytes.      Diagnosis: Anemia secondary to renal failure  Assessment and Plan of Treatment: Anemia of renal disease: Hb low with adequate iron stores.   c/w Epo to 8K with HD. Monitor Hb.    Diagnosis: Essential hypertension  Assessment and Plan of Treatment: HTN with ESRD: BP low normal. Continue with midodrine 5 mg pre-HD to avoid intradialytic hypotension.      Diagnosis: Hyperphosphatemia  Assessment and Plan of Treatment: Hyperphosphatemia: Phosphorus acceptable. Continue with renal diet. Monitor serum calcium and phosphorus.    Diagnosis: Encounter for skin care  Assessment and Plan of Treatment: Sacrum/intergluteal cleft - Cleanse with NS, pat dry. Apply Liquid barrier film to periwound skin (allow to dry). Cover with silicone foam with border. Change daily and PRN if soiled. Once foam dressing is in place apply Carole moisture barrier cream twice a day and PRN with incontinent episodes.   Continue low air loss bed therapy, continue to turn & reposition per protocol, soft pillow between bony prominences, continue moisture management with barrier creams & single breathable pad, continue measures to decrease friction/shear/pressure.        PRINCIPAL DISCHARGE DIAGNOSIS  Diagnosis: Gangrene  Assessment and Plan of Treatment: - daily dressing changes, please change dressing; place dry gauze over staple incision with ABD, Krelex, and ACE wrap up  Continue daily Aspirin 81 mg, PLavix 75 mg and STatin at bed time         SECONDARY DISCHARGE DIAGNOSES  Diagnosis: ESRD on dialysis  Assessment and Plan of Treatment: ESRD: Last HD on 4/29 tolerated well with 1L removed. Plan for next maintenance HD on 5/2. Monitor electrolytes.      Diagnosis: Anemia secondary to renal failure  Assessment and Plan of Treatment: Anemia of renal disease: Hb low with adequate iron stores.   c/w Epo to 8K with HD. Monitor Hb.    Diagnosis: Essential hypertension  Assessment and Plan of Treatment: HTN with ESRD: BP low normal. Continue with midodrine 5 mg pre-HD to avoid intradialytic hypotension.      Diagnosis: Hyperphosphatemia  Assessment and Plan of Treatment: Hyperphosphatemia: Phosphorus acceptable. Continue with renal diet. Monitor serum calcium and phosphorus.    Diagnosis: Encounter for skin care  Assessment and Plan of Treatment: Sacrum/intergluteal cleft - Cleanse with NS, pat dry. Apply Liquid barrier film to periwound skin (allow to dry). Cover with silicone foam with border. Change daily and PRN if soiled. Once foam dressing is in place apply Carole moisture barrier cream twice a day and PRN with incontinent episodes.   Continue low air loss bed therapy, continue to turn & reposition per protocol, soft pillow between bony prominences, continue moisture management with barrier creams & single breathable pad, continue measures to decrease friction/shear/pressure.

## 2023-05-01 NOTE — DISCHARGE NOTE PROVIDER - CARE PROVIDER_API CALL
[employer]  INTERNAL MEDICINE  [address] [place] [address]  Phone: [phone]  Fax: [phone]  Follow Up Time:

## 2023-07-17 NOTE — DISCHARGE NOTE PROVIDER - NSCORESITESY/N_GEN_A_CORE_RD
[address]   FOLLOW-UP VISIT     Иван Vasquez is a 47 y.o. female who was seen by synchronous (real-time) audio-video technology on 7/17/2023. Due to this being a TeleHealth evaluation, many elements of the physical examination are unable to be assessed. Pursuant to the emergency declaration under the [address] authority and the [employer] Act, this Virtual  Visit was conducted, with patient's consent, to reduce the patient's risk of exposure to COVID-19 and provide continuity of care for an established patient. Services were provided through a video synchronous discussion virtually to substitute for in-person clinic visit. Иван Vasquez is a 47 y.o.  female who presents today for the following:  Chief Complaint   Patient presents with    Follow-up     Nothing to report. Follow up for MS. Use mobile number           ASSESSMENT AND PLAN  1. Multiple sclerosis ([address])  Assessment & Plan:   Continues to remain quite stable regarding her clinical status with MS  Most recent radiographic studies are also stable and shows mild disease burden  She is not due for repeat scans until 2024  Blood work is getting done through primary care and I do not have access to those labs so I have requested the patient to upload those to [address] [place] [address] and I will let her know if there is anything additional we need to do based on those results    She is to continue on Darlen Tomlin presently she is next due in September 2024 for infusion #13  She is tolerating this well without side effects or recurrent infections  2. Migraine without aura and without status migrainosus, not intractable  Assessment & Plan:   Very infrequent not requiring preventative therapy uses butalbital as needed  3.  Vitamin D deficiency  Assessment & Plan:   Patient takes over-the-counter vitamin D several times per
No

## 2024-02-05 NOTE — CONSULT NOTE ADULT - PROVIDER SPECIALTY LIST ADULT
Infectious Disease
Rx was sent by Dr. Berrios on 1/25/24.     Marie Flores PA-C  2/5/2024  2:31 PM    
Podiatry
Nephrology
Vascular Surgery

## 2024-03-18 RX ORDER — POLYETHYLENE GLYCOL 3350 17 G/17G
1 POWDER, FOR SOLUTION ORAL
Qty: 0 | Refills: 0 | DISCHARGE

## 2024-03-18 RX ORDER — PANTOPRAZOLE SODIUM 20 MG/1
1 TABLET, DELAYED RELEASE ORAL
Qty: 0 | Refills: 0 | DISCHARGE

## 2024-03-18 RX ORDER — SITAGLIPTIN 50 MG/1
1 TABLET, FILM COATED ORAL
Qty: 0 | Refills: 0 | DISCHARGE

## 2024-03-18 RX ORDER — CLOPIDOGREL BISULFATE 75 MG/1
1 TABLET, FILM COATED ORAL
Qty: 0 | Refills: 0 | DISCHARGE

## 2024-03-18 RX ORDER — SEVELAMER CARBONATE 2400 MG/1
1 POWDER, FOR SUSPENSION ORAL
Qty: 0 | Refills: 0 | DISCHARGE

## 2024-03-18 RX ORDER — LACTULOSE 10 G/15ML
15 SOLUTION ORAL
Qty: 0 | Refills: 0 | DISCHARGE

## 2024-03-18 RX ORDER — ASPIRIN/CALCIUM CARB/MAGNESIUM 324 MG
1 TABLET ORAL
Qty: 0 | Refills: 0 | DISCHARGE

## 2024-03-18 RX ORDER — CHOLECALCIFEROL (VITAMIN D3) 125 MCG
2 CAPSULE ORAL
Qty: 0 | Refills: 0 | DISCHARGE

## 2024-03-18 RX ORDER — MIDODRINE HYDROCHLORIDE 2.5 MG/1
1 TABLET ORAL
Qty: 0 | Refills: 0 | DISCHARGE

## 2024-03-18 RX ORDER — REPAGLINIDE 1 MG/1
1 TABLET ORAL
Qty: 0 | Refills: 0 | DISCHARGE

## 2024-05-15 NOTE — ED ADULT TRIAGE NOTE - BMI (KG/M2)
[FreeTextEntry1] : ECG: SB, no ST-T abnormalities, leftward axis    HDL 48 LDL 47 TG 75 (9/2023)   HDL 45 LDL 49  (5/2023)  HDL 59 LDL 61 TG 79 (9/2021)  HDL 60 LDL 56 TG 63 (11/2020)  HDL 64 LDL 59 TG 67 (8/2019)  HDL 69 LDL 46 TG 61 (6/2018)  CAROTID 1/2024: 1. 1-19% ICA stenosis bilaterally 2. Antegrade flow in the vertebral arteries bilaterally 3. No change from prior  CAROTID 9/2022: 1. 1-49% ICA stenosis bilaterally 2. Antegrade flow in the vertebral arteries bilaterally 3. No change from prior  CAROTID 1/2021: 1. 1-49% ICA stenosis bilaterally 2. Antegrade flow in the vertebral arteries bilaterally  ECHO 1/2024: 1. Mild LVH, EF 55-60% 2. Grade I diastolic dysfunction  3. Mild LAE and KATLYN 4. Ascending aorta 1.8cm/m2, 4cm   ECHO 9/2022: 1. Mild LVH, EF 55-60% 2. Grade I diastolic dysfunction 3. Normal RV/LA/RA 4. Trivial AI 5. Borderline dilated aortic root/ascending aorta  ECHO 1/2021: 1. Normal LV size, systolic and diastolic function. EF 60-65% 2. Borderline LAE 3. Normal RV/RA 4. Mild AI 5. Borderline dilated aorta at 3.9cm   ECHO 11/2019: 1. Technically difficult study due to body habitus. 2. Normal LV size, systolic and diastolic function. EF 60-65% 3. Normal RV/LA/RA 4. There is borderline dilatation of the ascending aorta. Asc Ao 3.9cm  PHARM NUCLEAR STRESS TEST 1/2021 1. Negative for ischemia/infarct, EF 62% 2. BP hypertensive at baseline, normal response   PHARM NUCLEAR STRESS TSET 5/2018: 1. Normal Sestamibi myocardial perfusion SPECT imaging with artifact as noted above. 2. Left ventricular function was normal with an EF of 67%. 3. The EKG was negative for ischemia. 4. The blood pressure response was hypertensive baseline with normal response to Regadenason. 5. The patient developed occasional PVCs 6. When compared to prior study dated 2017, there is no longer evidence of ischemia.    
23.1

## 2025-02-11 NOTE — DIETITIAN NUTRITION RISK NOTIFICATION - TREATMENT: THE FOLLOWING DIET HAS BEEN RECOMMENDED
"Diet, Consistent Carbohydrate {Evening Snack} (CSTCHOSN)  For patients receiving Renal Replacement - No Protein Restr, No Conc K, No Conc Phos, Low Sodium (RENAL)  Supplement Feeding Modality:  Oral  Nepro Cans or Servings Per Day:  1       Frequency:  Two Times a day (04-24-23 @ 18:14) [Active]       

## 2025-04-09 NOTE — PROGRESS NOTE ADULT - ASSESSMENT
- hopeful that MMJ will help        73 y/o male from [facility], with PMHx of ESRD (T-TH-S), DM, s/p Rt foot angiogram 10/03 confirmed severe PAD and  no targets for re vascularization, vascular followed and recommended a Right BKA at that time but patient refused. Patient presented back to ED on 11/8/22 with c/o R foot pain and admitted to medicine. 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 C/w with IV Linezolid & Zosyn until 11/15. ID Dr. Babcock following. PT recommends ANGELA.   Hospital course complicated by Covid + conversion on 11/13, on RA. Will likely return back to [facility] once quarantine period is completed.

## (undated) DEVICE — BLADE SCALPEL SAFETYLOCK #15

## (undated) DEVICE — TAPE GLO-N-TELL RADIOPAQUE 20 STRIPS

## (undated) DEVICE — GLV 7 PROTEXIS (WHITE)

## (undated) DEVICE — SYR LUER LOK 20CC

## (undated) DEVICE — LAP PAD 18 X 18"

## (undated) DEVICE — GLV 8.5 PROTEXIS (WHITE)

## (undated) DEVICE — SUT SILK 2-0 18" TIES

## (undated) DEVICE — FOR-TOURNIQUET 1130808443: Type: DURABLE MEDICAL EQUIPMENT

## (undated) DEVICE — DRSG COBAN 4"

## (undated) DEVICE — GLV 6.5 PROTEXIS (WHITE)

## (undated) DEVICE — VISITEC 4X4

## (undated) DEVICE — DRSG COMBINE 5X9"

## (undated) DEVICE — SUT SOFSILK 2-0 18" V-20 (POP-OFF)

## (undated) DEVICE — GLV 7.5 SENSICARE W ALOE

## (undated) DEVICE — BLADE SCALPEL SAFETYLOCK #11

## (undated) DEVICE — DRSG CURITY GAUZE SPONGE 4 X 4" 12-PLY

## (undated) DEVICE — FOLEY TRAY 16FR 5CC LTX UMETER CLOSED

## (undated) DEVICE — PACKING GAUZE PLAIN 2"

## (undated) DEVICE — PACK GENERAL MINOR

## (undated) DEVICE — SUT BIOSYN 4-0 18" P-12

## (undated) DEVICE — Device

## (undated) DEVICE — SOL IRR POUR NS 0.9% 500ML

## (undated) DEVICE — DRAPE EXTREMITY 87" X 128.5"

## (undated) DEVICE — SUCTION YANKAUER NO CONTROL VENT

## (undated) DEVICE — DRAPE LIGHT HANDLE COVER (BLUE)

## (undated) DEVICE — BLADE 32 X 64

## (undated) DEVICE — DRAPE CAMERA VIDEO 7"X96"

## (undated) DEVICE — NDL ENTRY PERC MCKNIGHT 18G

## (undated) DEVICE — INFLATION DEVICE BASIXCOMPAK

## (undated) DEVICE — STAPLER SKIN VISI-STAT 35 WIDE

## (undated) DEVICE — SUT SURGIPRO II 0 30" GS-21

## (undated) DEVICE — PACK BASIN SPECIAL PROCEDURE

## (undated) DEVICE — SAW BLADE STRYKER SAGITTAL 25X1.27X90

## (undated) DEVICE — STAPLER SKIN MULTI DIRECTION W35

## (undated) DEVICE — POSITIONER FOAM EGG CRATE ULNAR 2PCS (PINK)

## (undated) DEVICE — DRSG OPSITE 13.75 X 4"

## (undated) DEVICE — WARMING BLANKET UPPER ADULT

## (undated) DEVICE — SUT VICRYL 2-0 27" CT-1 UNDYED

## (undated) DEVICE — SUT SOFSILK 0 30" TIES

## (undated) DEVICE — PACK LIJ BASIC ORTHO

## (undated) DEVICE — SOL IRR POUR H2O 250ML

## (undated) DEVICE — VENODYNE/SCD SLEEVE CALF MEDIUM

## (undated) DEVICE — PREP BETADINE SPONGE STICKS

## (undated) DEVICE — VENODYNE/SCD SLEEVE CALF LARGE

## (undated) DEVICE — SUT SILK 3-0 18" TIES

## (undated) DEVICE — DRSG STOCKINETTE TUBULAR COTTON 2PLY 6X60"

## (undated) DEVICE — PACK MINOR NO DRAPE

## (undated) DEVICE — SAW BLADE STRYKER SAGITTAL 18.5X73X0.76MM

## (undated) DEVICE — KIT POST MORTEM PEDS

## (undated) DEVICE — SUT PROLENE 3-0 36" SH

## (undated) DEVICE — MEDICATION LABELS W MARKER

## (undated) DEVICE — DRAPE MAYO STAND 30"

## (undated) DEVICE — CONN DUAL HOSE

## (undated) DEVICE — DRSG TEGADERM 6"X8"

## (undated) DEVICE — SUT POLYSORB 0 36" GS-21

## (undated) DEVICE — DRAPE 3/4 SHEET W REINFORCEMENT 56X77"

## (undated) DEVICE — DRAPE IOBAN 23" X 23"

## (undated) DEVICE — DRSG STOCKINETTE IMPERVIOUS XL

## (undated) DEVICE — SUT PROLENE 4-0 36" SH

## (undated) DEVICE — DRSG KLING 6"

## (undated) DEVICE — DRAPE 1/2 SHEET 40X57"

## (undated) DEVICE — GOWN TRIMAX LG

## (undated) DEVICE — DRAPE TOWEL BLUE 17" X 24"

## (undated) DEVICE — DRAPE U POLY BLUE 60X72"

## (undated) DEVICE — POSITIONER STRAP ARMBOARD VELCRO TS-30

## (undated) DEVICE — NDL COUNTER FOAM AND MAGNET 40-70

## (undated) DEVICE — ELCTR GROUNDING PAD ADULT COVIDIEN

## (undated) DEVICE — NDL PERC BASEPLT 18GX7CM

## (undated) DEVICE — DRAPE FEMORAL ANGIOGRAPHY W TROUGH

## (undated) DEVICE — TORQUE DEVICE FOR GUIDEWIRE 0.0100.038"

## (undated) DEVICE — DRSG ACE BANDAGE 4"

## (undated) DEVICE — GLV 7.5 PROTEXIS (WHITE)

## (undated) DEVICE — LAP PAD W RING 18 X 18"

## (undated) DEVICE — SUT SOFSILK 2-0 18" TIES

## (undated) DEVICE — LAP PAD 4 X 18"

## (undated) DEVICE — FOR-ESU VALLEYLAB T7E14982DX: Type: DURABLE MEDICAL EQUIPMENT

## (undated) DEVICE — DRSG KERLIX ROLL 4.5"

## (undated) DEVICE — COVER PROBE S POCKET

## (undated) DEVICE — TUBING HIGH POWER CONTRAST INJ

## (undated) DEVICE — DRAPE 3/4 SHEET 52X76"

## (undated) DEVICE — BLADE SCALPEL SAFETYLOCK #10

## (undated) DEVICE — MARKING PEN W RULER

## (undated) DEVICE — LABELS BLANK W PEN

## (undated) DEVICE — GLV 8 PROTEXIS (WHITE)

## (undated) DEVICE — SPECIMEN CONTAINER 100ML

## (undated) DEVICE — DRSG STERISTRIPS 0.5 X 4"

## (undated) DEVICE — DRAPE INSTRUMENT POUCH 6.75" X 11"

## (undated) DEVICE — TOURNIQUET ESMARK 6"

## (undated) DEVICE — COVER CAP 27" DEPTH

## (undated) DEVICE — SOL IRR POUR NS 0.9% 1500ML

## (undated) DEVICE — WARMING BLANKET FULL ADULT

## (undated) DEVICE — ELCTR BOVIE PENCIL HANDPIECE

## (undated) DEVICE — PREP CHLORAPREP HI-LITE ORANGE 26ML

## (undated) DEVICE — SUT PROLENE 7-0 24" BV175-6